# Patient Record
Sex: MALE | Race: WHITE | HISPANIC OR LATINO | Employment: UNEMPLOYED | URBAN - METROPOLITAN AREA
[De-identification: names, ages, dates, MRNs, and addresses within clinical notes are randomized per-mention and may not be internally consistent; named-entity substitution may affect disease eponyms.]

---

## 2017-01-07 ENCOUNTER — HOSPITAL ENCOUNTER (EMERGENCY)
Facility: HOSPITAL | Age: 47
Discharge: HOME | End: 2017-01-07
Attending: EMERGENCY MEDICINE

## 2017-01-07 ENCOUNTER — APPOINTMENT (EMERGENCY)
Dept: RADIOLOGY | Facility: HOSPITAL | Age: 47
End: 2017-01-07

## 2017-01-07 ENCOUNTER — APPOINTMENT (EMERGENCY)
Dept: CT IMAGING | Facility: HOSPITAL | Age: 47
End: 2017-01-07

## 2017-01-07 VITALS
SYSTOLIC BLOOD PRESSURE: 180 MMHG | DIASTOLIC BLOOD PRESSURE: 110 MMHG | TEMPERATURE: 98.1 F | OXYGEN SATURATION: 94 % | BODY MASS INDEX: 41.52 KG/M2 | WEIGHT: 290 LBS | RESPIRATION RATE: 18 BRPM | HEIGHT: 70 IN | HEART RATE: 80 BPM

## 2017-01-07 DIAGNOSIS — I16.0 HYPERTENSIVE URGENCY: Primary | ICD-10-CM

## 2017-01-07 LAB
ALBUMIN SERPL BCP-MCNC: 3.7 G/DL (ref 3.5–5)
ALP SERPL-CCNC: 98 U/L (ref 46–116)
ALT SERPL W P-5'-P-CCNC: 55 U/L (ref 12–78)
ANION GAP SERPL CALCULATED.3IONS-SCNC: 8 MMOL/L (ref 4–13)
AST SERPL W P-5'-P-CCNC: 44 U/L (ref 5–45)
BASOPHILS # BLD AUTO: 0.06 THOUSANDS/ΜL (ref 0–0.1)
BASOPHILS NFR BLD AUTO: 1 % (ref 0–1)
BILIRUB DIRECT SERPL-MCNC: 0.14 MG/DL (ref 0–0.2)
BILIRUB SERPL-MCNC: 0.7 MG/DL (ref 0.2–1)
BUN SERPL-MCNC: 11 MG/DL (ref 5–25)
CALCIUM SERPL-MCNC: 8.6 MG/DL (ref 8.3–10.1)
CHLORIDE SERPL-SCNC: 104 MMOL/L (ref 100–108)
CO2 SERPL-SCNC: 28 MMOL/L (ref 21–32)
CREAT SERPL-MCNC: 1.07 MG/DL (ref 0.6–1.3)
EOSINOPHIL # BLD AUTO: 0.46 THOUSAND/ΜL (ref 0–0.61)
EOSINOPHIL NFR BLD AUTO: 5 % (ref 0–6)
ERYTHROCYTE [DISTWIDTH] IN BLOOD BY AUTOMATED COUNT: 14.4 % (ref 11.6–15.1)
GFR SERPL CREATININE-BSD FRML MDRD: >60 ML/MIN/1.73SQ M
GLUCOSE SERPL-MCNC: 133 MG/DL (ref 65–140)
HCT VFR BLD AUTO: 47.1 % (ref 36.5–49.3)
HGB BLD-MCNC: 15.4 G/DL (ref 12–17)
INR PPP: 1.12 (ref 0.86–1.16)
LYMPHOCYTES # BLD AUTO: 1.36 THOUSANDS/ΜL (ref 0.6–4.47)
LYMPHOCYTES NFR BLD AUTO: 16 % (ref 14–44)
MCH RBC QN AUTO: 27.2 PG (ref 26.8–34.3)
MCHC RBC AUTO-ENTMCNC: 32.7 G/DL (ref 31.4–37.4)
MCV RBC AUTO: 83 FL (ref 82–98)
MONOCYTES # BLD AUTO: 0.71 THOUSAND/ΜL (ref 0.17–1.22)
MONOCYTES NFR BLD AUTO: 8 % (ref 4–12)
NEUTROPHILS # BLD AUTO: 6.16 THOUSANDS/ΜL (ref 1.85–7.62)
NEUTS SEG NFR BLD AUTO: 70 % (ref 43–75)
NRBC BLD AUTO-RTO: 0 /100 WBCS
NT-PROBNP SERPL-MCNC: 55 PG/ML
PLATELET # BLD AUTO: 261 THOUSANDS/UL (ref 149–390)
PMV BLD AUTO: 9.6 FL (ref 8.9–12.7)
POTASSIUM SERPL-SCNC: 3.1 MMOL/L (ref 3.5–5.3)
PROT SERPL-MCNC: 7.6 G/DL (ref 6.4–8.2)
PROTHROMBIN TIME: 14.3 SECONDS (ref 12–14.3)
RBC # BLD AUTO: 5.66 MILLION/UL (ref 3.88–5.62)
SODIUM SERPL-SCNC: 140 MMOL/L (ref 136–145)
TROPONIN I SERPL-MCNC: <0.02 NG/ML
TROPONIN I SERPL-MCNC: <0.02 NG/ML
WBC # BLD AUTO: 8.78 THOUSAND/UL (ref 4.31–10.16)

## 2017-01-07 PROCEDURE — 93005 ELECTROCARDIOGRAM TRACING: CPT

## 2017-01-07 PROCEDURE — 70450 CT HEAD/BRAIN W/O DYE: CPT

## 2017-01-07 PROCEDURE — 84484 ASSAY OF TROPONIN QUANT: CPT | Performed by: EMERGENCY MEDICINE

## 2017-01-07 PROCEDURE — 71020 HB CHEST X-RAY 2VW FRONTAL&LATL: CPT

## 2017-01-07 PROCEDURE — 83880 ASSAY OF NATRIURETIC PEPTIDE: CPT | Performed by: NURSE PRACTITIONER

## 2017-01-07 PROCEDURE — 99285 EMERGENCY DEPT VISIT HI MDM: CPT

## 2017-01-07 PROCEDURE — 80076 HEPATIC FUNCTION PANEL: CPT | Performed by: NURSE PRACTITIONER

## 2017-01-07 PROCEDURE — 84484 ASSAY OF TROPONIN QUANT: CPT

## 2017-01-07 PROCEDURE — 36415 COLL VENOUS BLD VENIPUNCTURE: CPT

## 2017-01-07 PROCEDURE — 96374 THER/PROPH/DIAG INJ IV PUSH: CPT

## 2017-01-07 PROCEDURE — 85610 PROTHROMBIN TIME: CPT

## 2017-01-07 PROCEDURE — 85025 COMPLETE CBC W/AUTO DIFF WBC: CPT

## 2017-01-07 PROCEDURE — 80048 BASIC METABOLIC PNL TOTAL CA: CPT

## 2017-01-07 RX ORDER — LABETALOL HYDROCHLORIDE 5 MG/ML
10 INJECTION, SOLUTION INTRAVENOUS ONCE
Status: COMPLETED | OUTPATIENT
Start: 2017-01-07 | End: 2017-01-07

## 2017-01-07 RX ORDER — LISINOPRIL 10 MG/1
10 TABLET ORAL DAILY
COMMUNITY
End: 2017-01-17

## 2017-01-07 RX ORDER — LISINOPRIL 10 MG/1
10 TABLET ORAL DAILY
Qty: 30 TABLET | Refills: 1 | Status: SHIPPED | OUTPATIENT
Start: 2017-01-07 | End: 2017-01-17

## 2017-01-07 RX ORDER — LISINOPRIL 10 MG/1
10 TABLET ORAL DAILY
Qty: 30 TABLET | Refills: 0 | Status: SHIPPED | OUTPATIENT
Start: 2017-01-07 | End: 2017-01-17

## 2017-01-07 RX ADMIN — LABETALOL HYDROCHLORIDE 10 MG: 5 INJECTION, SOLUTION INTRAVENOUS at 14:59

## 2017-01-09 LAB
ATRIAL RATE: 96 BPM
P AXIS: 50 DEGREES
PR INTERVAL: 172 MS
QRS AXIS: -55 DEGREES
QRSD INTERVAL: 114 MS
QT INTERVAL: 380 MS
QTC INTERVAL: 480 MS
T WAVE AXIS: 50 DEGREES
VENTRICULAR RATE: 96 BPM

## 2017-01-17 ENCOUNTER — HOSPITAL ENCOUNTER (EMERGENCY)
Facility: HOSPITAL | Age: 47
Discharge: HOME | End: 2017-01-17
Attending: EMERGENCY MEDICINE | Admitting: EMERGENCY MEDICINE

## 2017-01-17 VITALS
HEIGHT: 70 IN | TEMPERATURE: 98.6 F | OXYGEN SATURATION: 96 % | HEART RATE: 83 BPM | BODY MASS INDEX: 44.44 KG/M2 | WEIGHT: 310.41 LBS | SYSTOLIC BLOOD PRESSURE: 186 MMHG | DIASTOLIC BLOOD PRESSURE: 105 MMHG | RESPIRATION RATE: 18 BRPM

## 2017-01-17 DIAGNOSIS — I10 HYPERTENSION: Primary | ICD-10-CM

## 2017-01-17 LAB
ALBUMIN SERPL BCP-MCNC: 3.5 G/DL (ref 3.5–5)
ALP SERPL-CCNC: 73 U/L (ref 46–116)
ALT SERPL W P-5'-P-CCNC: 42 U/L (ref 12–78)
ANION GAP SERPL CALCULATED.3IONS-SCNC: 7 MMOL/L (ref 4–13)
AST SERPL W P-5'-P-CCNC: 34 U/L (ref 5–45)
BASOPHILS # BLD AUTO: 0.08 THOUSANDS/ΜL (ref 0–0.1)
BASOPHILS NFR BLD AUTO: 1 % (ref 0–1)
BILIRUB SERPL-MCNC: 0.8 MG/DL (ref 0.2–1)
BUN SERPL-MCNC: 12 MG/DL (ref 5–25)
CALCIUM SERPL-MCNC: 8.5 MG/DL (ref 8.3–10.1)
CHLORIDE SERPL-SCNC: 106 MMOL/L (ref 100–108)
CO2 SERPL-SCNC: 29 MMOL/L (ref 21–32)
CREAT SERPL-MCNC: 1.11 MG/DL (ref 0.6–1.3)
EOSINOPHIL # BLD AUTO: 0.36 THOUSAND/ΜL (ref 0–0.61)
EOSINOPHIL NFR BLD AUTO: 4 % (ref 0–6)
ERYTHROCYTE [DISTWIDTH] IN BLOOD BY AUTOMATED COUNT: 14.7 % (ref 11.6–15.1)
GFR SERPL CREATININE-BSD FRML MDRD: >60 ML/MIN/1.73SQ M
GLUCOSE SERPL-MCNC: 87 MG/DL (ref 65–140)
HCT VFR BLD AUTO: 45.5 % (ref 36.5–49.3)
HGB BLD-MCNC: 14.7 G/DL (ref 12–17)
LYMPHOCYTES # BLD AUTO: 1.34 THOUSANDS/ΜL (ref 0.6–4.47)
LYMPHOCYTES NFR BLD AUTO: 16 % (ref 14–44)
MCH RBC QN AUTO: 27.1 PG (ref 26.8–34.3)
MCHC RBC AUTO-ENTMCNC: 32.3 G/DL (ref 31.4–37.4)
MCV RBC AUTO: 84 FL (ref 82–98)
MONOCYTES # BLD AUTO: 0.7 THOUSAND/ΜL (ref 0.17–1.22)
MONOCYTES NFR BLD AUTO: 8 % (ref 4–12)
NEUTROPHILS # BLD AUTO: 6.07 THOUSANDS/ΜL (ref 1.85–7.62)
NEUTS SEG NFR BLD AUTO: 71 % (ref 43–75)
NRBC BLD AUTO-RTO: 0 /100 WBCS
PLATELET # BLD AUTO: 250 THOUSANDS/UL (ref 149–390)
PMV BLD AUTO: 9.6 FL (ref 8.9–12.7)
POTASSIUM SERPL-SCNC: 3.7 MMOL/L (ref 3.5–5.3)
PROT SERPL-MCNC: 7.2 G/DL (ref 6.4–8.2)
RBC # BLD AUTO: 5.42 MILLION/UL (ref 3.88–5.62)
SODIUM SERPL-SCNC: 142 MMOL/L (ref 136–145)
TROPONIN I SERPL-MCNC: <0.02 NG/ML
WBC # BLD AUTO: 8.58 THOUSAND/UL (ref 4.31–10.16)

## 2017-01-17 PROCEDURE — 36415 COLL VENOUS BLD VENIPUNCTURE: CPT | Performed by: EMERGENCY MEDICINE

## 2017-01-17 PROCEDURE — 93005 ELECTROCARDIOGRAM TRACING: CPT | Performed by: EMERGENCY MEDICINE

## 2017-01-17 PROCEDURE — 99284 EMERGENCY DEPT VISIT MOD MDM: CPT

## 2017-01-17 PROCEDURE — 80053 COMPREHEN METABOLIC PANEL: CPT | Performed by: EMERGENCY MEDICINE

## 2017-01-17 PROCEDURE — 85025 COMPLETE CBC W/AUTO DIFF WBC: CPT | Performed by: EMERGENCY MEDICINE

## 2017-01-17 PROCEDURE — 84484 ASSAY OF TROPONIN QUANT: CPT | Performed by: EMERGENCY MEDICINE

## 2017-01-17 RX ORDER — LISINOPRIL 20 MG/1
20 TABLET ORAL DAILY
Qty: 30 TABLET | Refills: 0 | Status: SHIPPED | OUTPATIENT
Start: 2017-01-17 | End: 2017-09-12 | Stop reason: HOSPADM

## 2017-01-18 LAB
ATRIAL RATE: 82 BPM
P AXIS: 47 DEGREES
PR INTERVAL: 174 MS
QRS AXIS: -39 DEGREES
QRSD INTERVAL: 118 MS
QT INTERVAL: 404 MS
QTC INTERVAL: 472 MS
T WAVE AXIS: 31 DEGREES
VENTRICULAR RATE: 82 BPM

## 2017-09-07 ENCOUNTER — HOSPITAL ENCOUNTER (EMERGENCY)
Facility: HOSPITAL | Age: 47
Discharge: HOME | End: 2017-09-07
Attending: EMERGENCY MEDICINE | Admitting: EMERGENCY MEDICINE

## 2017-09-07 ENCOUNTER — APPOINTMENT (EMERGENCY)
Dept: RADIOLOGY | Facility: HOSPITAL | Age: 47
End: 2017-09-07

## 2017-09-07 ENCOUNTER — APPOINTMENT (EMERGENCY)
Dept: CT IMAGING | Facility: HOSPITAL | Age: 47
End: 2017-09-07

## 2017-09-07 VITALS
OXYGEN SATURATION: 95 % | RESPIRATION RATE: 18 BRPM | HEART RATE: 78 BPM | DIASTOLIC BLOOD PRESSURE: 109 MMHG | HEIGHT: 70 IN | SYSTOLIC BLOOD PRESSURE: 181 MMHG | BODY MASS INDEX: 43.37 KG/M2 | WEIGHT: 302.91 LBS

## 2017-09-07 DIAGNOSIS — J32.9 SINUSITIS: ICD-10-CM

## 2017-09-07 DIAGNOSIS — I10 ACCELERATED HYPERTENSION: Primary | ICD-10-CM

## 2017-09-07 LAB
ALBUMIN SERPL BCP-MCNC: 3.7 G/DL (ref 3.5–5)
ALP SERPL-CCNC: 66 U/L (ref 46–116)
ALT SERPL W P-5'-P-CCNC: 31 U/L (ref 12–78)
ANION GAP SERPL CALCULATED.3IONS-SCNC: 7 MMOL/L (ref 4–13)
APTT PPP: 34 SECONDS (ref 23–35)
AST SERPL W P-5'-P-CCNC: 26 U/L (ref 5–45)
ATRIAL RATE: 82 BPM
BASOPHILS # BLD AUTO: 0.07 THOUSANDS/ΜL (ref 0–0.1)
BASOPHILS NFR BLD AUTO: 1 % (ref 0–1)
BILIRUB SERPL-MCNC: 1.3 MG/DL (ref 0.2–1)
BUN SERPL-MCNC: 15 MG/DL (ref 5–25)
CALCIUM SERPL-MCNC: 9.2 MG/DL (ref 8.3–10.1)
CHLORIDE SERPL-SCNC: 105 MMOL/L (ref 100–108)
CO2 SERPL-SCNC: 29 MMOL/L (ref 21–32)
CREAT SERPL-MCNC: 1.17 MG/DL (ref 0.6–1.3)
EOSINOPHIL # BLD AUTO: 0.38 THOUSAND/ΜL (ref 0–0.61)
EOSINOPHIL NFR BLD AUTO: 5 % (ref 0–6)
ERYTHROCYTE [DISTWIDTH] IN BLOOD BY AUTOMATED COUNT: 15.7 % (ref 11.6–15.1)
GFR SERPL CREATININE-BSD FRML MDRD: 73 ML/MIN/1.73SQ M
GLUCOSE SERPL-MCNC: 118 MG/DL (ref 65–140)
HCT VFR BLD AUTO: 47.6 % (ref 36.5–49.3)
HGB BLD-MCNC: 15.3 G/DL (ref 12–17)
INR PPP: 0.97 (ref 0.86–1.16)
LYMPHOCYTES # BLD AUTO: 1.27 THOUSANDS/ΜL (ref 0.6–4.47)
LYMPHOCYTES NFR BLD AUTO: 16 % (ref 14–44)
MCH RBC QN AUTO: 26.8 PG (ref 26.8–34.3)
MCHC RBC AUTO-ENTMCNC: 32.1 G/DL (ref 31.4–37.4)
MCV RBC AUTO: 83 FL (ref 82–98)
MONOCYTES # BLD AUTO: 0.57 THOUSAND/ΜL (ref 0.17–1.22)
MONOCYTES NFR BLD AUTO: 7 % (ref 4–12)
NEUTROPHILS # BLD AUTO: 5.55 THOUSANDS/ΜL (ref 1.85–7.62)
NEUTS SEG NFR BLD AUTO: 71 % (ref 43–75)
NRBC BLD AUTO-RTO: 0 /100 WBCS
P AXIS: 72 DEGREES
PLATELET # BLD AUTO: 233 THOUSANDS/UL (ref 149–390)
PMV BLD AUTO: 9.7 FL (ref 8.9–12.7)
POTASSIUM SERPL-SCNC: 3.2 MMOL/L (ref 3.5–5.3)
PR INTERVAL: 182 MS
PROT SERPL-MCNC: 7.7 G/DL (ref 6.4–8.2)
PROTHROMBIN TIME: 13.1 SECONDS (ref 12.1–14.4)
QRS AXIS: -61 DEGREES
QRSD INTERVAL: 88 MS
QT INTERVAL: 376 MS
QTC INTERVAL: 439 MS
RBC # BLD AUTO: 5.71 MILLION/UL (ref 3.88–5.62)
SODIUM SERPL-SCNC: 141 MMOL/L (ref 136–145)
T WAVE AXIS: 27 DEGREES
TROPONIN I SERPL-MCNC: <0.02 NG/ML
VENTRICULAR RATE: 82 BPM
WBC # BLD AUTO: 7.88 THOUSAND/UL (ref 4.31–10.16)

## 2017-09-07 PROCEDURE — 85730 THROMBOPLASTIN TIME PARTIAL: CPT | Performed by: PHYSICIAN ASSISTANT

## 2017-09-07 PROCEDURE — 96375 TX/PRO/DX INJ NEW DRUG ADDON: CPT

## 2017-09-07 PROCEDURE — 84484 ASSAY OF TROPONIN QUANT: CPT | Performed by: PHYSICIAN ASSISTANT

## 2017-09-07 PROCEDURE — 85025 COMPLETE CBC W/AUTO DIFF WBC: CPT | Performed by: PHYSICIAN ASSISTANT

## 2017-09-07 PROCEDURE — 80053 COMPREHEN METABOLIC PANEL: CPT | Performed by: PHYSICIAN ASSISTANT

## 2017-09-07 PROCEDURE — 93005 ELECTROCARDIOGRAM TRACING: CPT | Performed by: PHYSICIAN ASSISTANT

## 2017-09-07 PROCEDURE — 74175 CTA ABDOMEN W/CONTRAST: CPT

## 2017-09-07 PROCEDURE — 85610 PROTHROMBIN TIME: CPT | Performed by: PHYSICIAN ASSISTANT

## 2017-09-07 PROCEDURE — 70450 CT HEAD/BRAIN W/O DYE: CPT

## 2017-09-07 PROCEDURE — 36415 COLL VENOUS BLD VENIPUNCTURE: CPT | Performed by: PHYSICIAN ASSISTANT

## 2017-09-07 PROCEDURE — 71275 CT ANGIOGRAPHY CHEST: CPT

## 2017-09-07 PROCEDURE — 71010 HB CHEST X-RAY 1 VIEW FRONTAL (PORTABLE): CPT

## 2017-09-07 PROCEDURE — 96361 HYDRATE IV INFUSION ADD-ON: CPT

## 2017-09-07 PROCEDURE — 96374 THER/PROPH/DIAG INJ IV PUSH: CPT

## 2017-09-07 PROCEDURE — 99285 EMERGENCY DEPT VISIT HI MDM: CPT

## 2017-09-07 RX ORDER — CEFUROXIME AXETIL 500 MG/1
500 TABLET ORAL EVERY 12 HOURS SCHEDULED
Qty: 20 TABLET | Refills: 0 | Status: SHIPPED | OUTPATIENT
Start: 2017-09-07 | End: 2017-09-12 | Stop reason: HOSPADM

## 2017-09-07 RX ORDER — AMLODIPINE BESYLATE 5 MG/1
5 TABLET ORAL DAILY
Qty: 30 TABLET | Refills: 0 | Status: SHIPPED | OUTPATIENT
Start: 2017-09-07 | End: 2017-09-12 | Stop reason: HOSPADM

## 2017-09-07 RX ORDER — LISINOPRIL 10 MG/1
10 TABLET ORAL ONCE
Status: COMPLETED | OUTPATIENT
Start: 2017-09-07 | End: 2017-09-07

## 2017-09-07 RX ORDER — LISINOPRIL 10 MG/1
10 TABLET ORAL DAILY
Qty: 30 TABLET | Refills: 0 | Status: SHIPPED | OUTPATIENT
Start: 2017-09-07 | End: 2017-09-12 | Stop reason: HOSPADM

## 2017-09-07 RX ORDER — ONDANSETRON 2 MG/ML
4 INJECTION INTRAMUSCULAR; INTRAVENOUS ONCE
Status: COMPLETED | OUTPATIENT
Start: 2017-09-07 | End: 2017-09-07

## 2017-09-07 RX ORDER — HYDRALAZINE HYDROCHLORIDE 20 MG/ML
5 INJECTION INTRAMUSCULAR; INTRAVENOUS ONCE
Status: COMPLETED | OUTPATIENT
Start: 2017-09-07 | End: 2017-09-07

## 2017-09-07 RX ADMIN — ONDANSETRON 4 MG: 2 INJECTION INTRAMUSCULAR; INTRAVENOUS at 12:52

## 2017-09-07 RX ADMIN — SODIUM CHLORIDE 500 ML: 0.9 INJECTION, SOLUTION INTRAVENOUS at 12:24

## 2017-09-07 RX ADMIN — HYDRALAZINE HYDROCHLORIDE 5 MG: 20 INJECTION INTRAMUSCULAR; INTRAVENOUS at 14:40

## 2017-09-07 RX ADMIN — HYDROMORPHONE HYDROCHLORIDE 0.5 MG: 1 INJECTION, SOLUTION INTRAMUSCULAR; INTRAVENOUS; SUBCUTANEOUS at 12:54

## 2017-09-07 RX ADMIN — LISINOPRIL 10 MG: 10 TABLET ORAL at 16:02

## 2017-09-07 RX ADMIN — IOHEXOL 100 ML: 350 INJECTION, SOLUTION INTRAVENOUS at 13:04

## 2017-09-10 ENCOUNTER — HOSPITAL ENCOUNTER (OUTPATIENT)
Facility: HOSPITAL | Age: 47
Setting detail: OBSERVATION
Discharge: HOME | End: 2017-09-12
Attending: EMERGENCY MEDICINE | Admitting: INTERNAL MEDICINE

## 2017-09-10 ENCOUNTER — APPOINTMENT (EMERGENCY)
Dept: CT IMAGING | Facility: HOSPITAL | Age: 47
End: 2017-09-10

## 2017-09-10 DIAGNOSIS — I16.0 HYPERTENSIVE URGENCY: Primary | ICD-10-CM

## 2017-09-10 DIAGNOSIS — R20.2 PARESTHESIAS: ICD-10-CM

## 2017-09-10 DIAGNOSIS — R51.9 HEADACHE: ICD-10-CM

## 2017-09-10 DIAGNOSIS — H53.8 BLURRED VISION, LEFT EYE: ICD-10-CM

## 2017-09-10 LAB
ANION GAP SERPL CALCULATED.3IONS-SCNC: 9 MMOL/L (ref 4–13)
APTT PPP: 34 SECONDS (ref 23–35)
BUN SERPL-MCNC: 14 MG/DL (ref 5–25)
CALCIUM SERPL-MCNC: 9 MG/DL (ref 8.3–10.1)
CHLORIDE SERPL-SCNC: 104 MMOL/L (ref 100–108)
CO2 SERPL-SCNC: 28 MMOL/L (ref 21–32)
CREAT SERPL-MCNC: 1.29 MG/DL (ref 0.6–1.3)
ERYTHROCYTE [DISTWIDTH] IN BLOOD BY AUTOMATED COUNT: 15.6 % (ref 11.6–15.1)
GFR SERPL CREATININE-BSD FRML MDRD: 65 ML/MIN/1.73SQ M
GLUCOSE SERPL-MCNC: 126 MG/DL (ref 65–140)
HCT VFR BLD AUTO: 45.4 % (ref 36.5–49.3)
HGB BLD-MCNC: 14.9 G/DL (ref 12–17)
INR PPP: 1.01 (ref 0.86–1.16)
MCH RBC QN AUTO: 27.3 PG (ref 26.8–34.3)
MCHC RBC AUTO-ENTMCNC: 32.8 G/DL (ref 31.4–37.4)
MCV RBC AUTO: 83 FL (ref 82–98)
PLATELET # BLD AUTO: 241 THOUSANDS/UL (ref 149–390)
PMV BLD AUTO: 9.3 FL (ref 8.9–12.7)
POTASSIUM SERPL-SCNC: 3.3 MMOL/L (ref 3.5–5.3)
PROTHROMBIN TIME: 13.5 SECONDS (ref 12.1–14.4)
RBC # BLD AUTO: 5.45 MILLION/UL (ref 3.88–5.62)
SODIUM SERPL-SCNC: 141 MMOL/L (ref 136–145)
WBC # BLD AUTO: 9.05 THOUSAND/UL (ref 4.31–10.16)

## 2017-09-10 PROCEDURE — 70450 CT HEAD/BRAIN W/O DYE: CPT

## 2017-09-10 PROCEDURE — 96375 TX/PRO/DX INJ NEW DRUG ADDON: CPT

## 2017-09-10 PROCEDURE — 96374 THER/PROPH/DIAG INJ IV PUSH: CPT

## 2017-09-10 PROCEDURE — 85027 COMPLETE CBC AUTOMATED: CPT | Performed by: EMERGENCY MEDICINE

## 2017-09-10 PROCEDURE — 93005 ELECTROCARDIOGRAM TRACING: CPT | Performed by: EMERGENCY MEDICINE

## 2017-09-10 PROCEDURE — 80048 BASIC METABOLIC PNL TOTAL CA: CPT | Performed by: EMERGENCY MEDICINE

## 2017-09-10 PROCEDURE — 36415 COLL VENOUS BLD VENIPUNCTURE: CPT | Performed by: EMERGENCY MEDICINE

## 2017-09-10 PROCEDURE — 85730 THROMBOPLASTIN TIME PARTIAL: CPT | Performed by: EMERGENCY MEDICINE

## 2017-09-10 PROCEDURE — 85610 PROTHROMBIN TIME: CPT | Performed by: EMERGENCY MEDICINE

## 2017-09-10 PROCEDURE — 84443 ASSAY THYROID STIM HORMONE: CPT | Performed by: PHYSICIAN ASSISTANT

## 2017-09-10 RX ORDER — METOCLOPRAMIDE HYDROCHLORIDE 5 MG/ML
10 INJECTION INTRAMUSCULAR; INTRAVENOUS ONCE
Status: COMPLETED | OUTPATIENT
Start: 2017-09-10 | End: 2017-09-10

## 2017-09-10 RX ORDER — POTASSIUM CHLORIDE 20 MEQ/1
40 TABLET, EXTENDED RELEASE ORAL ONCE
Status: COMPLETED | OUTPATIENT
Start: 2017-09-10 | End: 2017-09-10

## 2017-09-10 RX ORDER — LABETALOL HYDROCHLORIDE 5 MG/ML
10 INJECTION, SOLUTION INTRAVENOUS ONCE
Status: COMPLETED | OUTPATIENT
Start: 2017-09-10 | End: 2017-09-10

## 2017-09-10 RX ADMIN — METOCLOPRAMIDE 10 MG: 5 INJECTION, SOLUTION INTRAMUSCULAR; INTRAVENOUS at 22:40

## 2017-09-10 RX ADMIN — LABETALOL HYDROCHLORIDE 10 MG: 5 INJECTION, SOLUTION INTRAVENOUS at 22:39

## 2017-09-10 RX ADMIN — POTASSIUM CHLORIDE 40 MEQ: 1500 TABLET, EXTENDED RELEASE ORAL at 23:12

## 2017-09-11 ENCOUNTER — APPOINTMENT (OUTPATIENT)
Dept: NON INVASIVE DIAGNOSTICS | Facility: HOSPITAL | Age: 47
End: 2017-09-11

## 2017-09-11 PROBLEM — I16.0 HYPERTENSIVE URGENCY: Status: ACTIVE | Noted: 2017-09-11

## 2017-09-11 PROBLEM — R20.2 PARESTHESIAS: Status: ACTIVE | Noted: 2017-09-11

## 2017-09-11 PROBLEM — R51.9 HEADACHE: Status: ACTIVE | Noted: 2017-09-11

## 2017-09-11 LAB
ANION GAP SERPL CALCULATED.3IONS-SCNC: 8 MMOL/L (ref 4–13)
BUN SERPL-MCNC: 12 MG/DL (ref 5–25)
CALCIUM SERPL-MCNC: 8.6 MG/DL (ref 8.3–10.1)
CHLORIDE SERPL-SCNC: 107 MMOL/L (ref 100–108)
CO2 SERPL-SCNC: 27 MMOL/L (ref 21–32)
CREAT SERPL-MCNC: 1.13 MG/DL (ref 0.6–1.3)
ERYTHROCYTE [DISTWIDTH] IN BLOOD BY AUTOMATED COUNT: 15.6 % (ref 11.6–15.1)
GFR SERPL CREATININE-BSD FRML MDRD: 76 ML/MIN/1.73SQ M
GLUCOSE P FAST SERPL-MCNC: 129 MG/DL (ref 65–99)
GLUCOSE SERPL-MCNC: 129 MG/DL (ref 65–140)
HCT VFR BLD AUTO: 43.6 % (ref 36.5–49.3)
HGB BLD-MCNC: 14.3 G/DL (ref 12–17)
MAGNESIUM SERPL-MCNC: 2.1 MG/DL (ref 1.6–2.6)
MCH RBC QN AUTO: 27.1 PG (ref 26.8–34.3)
MCHC RBC AUTO-ENTMCNC: 32.8 G/DL (ref 31.4–37.4)
MCV RBC AUTO: 83 FL (ref 82–98)
PLATELET # BLD AUTO: 223 THOUSANDS/UL (ref 149–390)
PMV BLD AUTO: 9.7 FL (ref 8.9–12.7)
POTASSIUM SERPL-SCNC: 3.2 MMOL/L (ref 3.5–5.3)
RBC # BLD AUTO: 5.27 MILLION/UL (ref 3.88–5.62)
SODIUM SERPL-SCNC: 142 MMOL/L (ref 136–145)
TSH SERPL DL<=0.05 MIU/L-ACNC: 3.74 UIU/ML (ref 0.36–3.74)
WBC # BLD AUTO: 7.43 THOUSAND/UL (ref 4.31–10.16)

## 2017-09-11 PROCEDURE — 80048 BASIC METABOLIC PNL TOTAL CA: CPT | Performed by: PHYSICIAN ASSISTANT

## 2017-09-11 PROCEDURE — 85027 COMPLETE CBC AUTOMATED: CPT | Performed by: PHYSICIAN ASSISTANT

## 2017-09-11 PROCEDURE — 93306 TTE W/DOPPLER COMPLETE: CPT

## 2017-09-11 PROCEDURE — 83735 ASSAY OF MAGNESIUM: CPT | Performed by: PHYSICIAN ASSISTANT

## 2017-09-11 PROCEDURE — 99285 EMERGENCY DEPT VISIT HI MDM: CPT

## 2017-09-11 RX ORDER — AMLODIPINE BESYLATE 5 MG/1
5 TABLET ORAL DAILY
Status: DISCONTINUED | OUTPATIENT
Start: 2017-09-11 | End: 2017-09-12

## 2017-09-11 RX ORDER — DOCUSATE SODIUM 100 MG/1
100 CAPSULE, LIQUID FILLED ORAL 2 TIMES DAILY
Status: DISCONTINUED | OUTPATIENT
Start: 2017-09-11 | End: 2017-09-12 | Stop reason: HOSPADM

## 2017-09-11 RX ORDER — HYDRALAZINE HYDROCHLORIDE 20 MG/ML
10 INJECTION INTRAMUSCULAR; INTRAVENOUS ONCE
Status: COMPLETED | OUTPATIENT
Start: 2017-09-11 | End: 2017-09-11

## 2017-09-11 RX ORDER — LISINOPRIL 10 MG/1
10 TABLET ORAL DAILY
Status: DISCONTINUED | OUTPATIENT
Start: 2017-09-11 | End: 2017-09-11

## 2017-09-11 RX ORDER — ONDANSETRON 2 MG/ML
4 INJECTION INTRAMUSCULAR; INTRAVENOUS EVERY 6 HOURS PRN
Status: DISCONTINUED | OUTPATIENT
Start: 2017-09-11 | End: 2017-09-12 | Stop reason: HOSPADM

## 2017-09-11 RX ORDER — LABETALOL 100 MG/1
200 TABLET, FILM COATED ORAL EVERY 12 HOURS SCHEDULED
Status: DISCONTINUED | OUTPATIENT
Start: 2017-09-11 | End: 2017-09-12 | Stop reason: HOSPADM

## 2017-09-11 RX ORDER — BUTALBITAL, ACETAMINOPHEN AND CAFFEINE 50; 325; 40 MG/1; MG/1; MG/1
1 TABLET ORAL EVERY 4 HOURS PRN
Status: DISCONTINUED | OUTPATIENT
Start: 2017-09-11 | End: 2017-09-12 | Stop reason: HOSPADM

## 2017-09-11 RX ORDER — LISINOPRIL 20 MG/1
20 TABLET ORAL DAILY
Status: DISCONTINUED | OUTPATIENT
Start: 2017-09-12 | End: 2017-09-12 | Stop reason: HOSPADM

## 2017-09-11 RX ORDER — ACETAMINOPHEN 325 MG/1
650 TABLET ORAL EVERY 6 HOURS PRN
Status: DISCONTINUED | OUTPATIENT
Start: 2017-09-11 | End: 2017-09-12 | Stop reason: HOSPADM

## 2017-09-11 RX ORDER — CEFUROXIME AXETIL 250 MG/1
250 TABLET ORAL EVERY 12 HOURS SCHEDULED
Status: DISCONTINUED | OUTPATIENT
Start: 2017-09-11 | End: 2017-09-12 | Stop reason: HOSPADM

## 2017-09-11 RX ORDER — CALCIUM CARBONATE 200(500)MG
1000 TABLET,CHEWABLE ORAL DAILY PRN
Status: DISCONTINUED | OUTPATIENT
Start: 2017-09-11 | End: 2017-09-12 | Stop reason: HOSPADM

## 2017-09-11 RX ADMIN — ACETAMINOPHEN 650 MG: 325 TABLET ORAL at 16:26

## 2017-09-11 RX ADMIN — HYDRALAZINE HYDROCHLORIDE 10 MG: 20 INJECTION INTRAMUSCULAR; INTRAVENOUS at 00:42

## 2017-09-11 RX ADMIN — BUTALBITAL, ACETAMINOPHEN, AND CAFFEINE 1 TABLET: 50; 325; 40 TABLET ORAL at 23:35

## 2017-09-11 RX ADMIN — ENOXAPARIN SODIUM 40 MG: 40 INJECTION SUBCUTANEOUS at 09:10

## 2017-09-11 RX ADMIN — LABETALOL HYDROCHLORIDE 200 MG: 100 TABLET, FILM COATED ORAL at 10:19

## 2017-09-11 RX ADMIN — CEFUROXIME AXETIL 250 MG: 250 TABLET ORAL at 10:19

## 2017-09-11 RX ADMIN — CEFUROXIME AXETIL 250 MG: 250 TABLET ORAL at 21:57

## 2017-09-11 RX ADMIN — DOCUSATE SODIUM 100 MG: 100 CAPSULE, LIQUID FILLED ORAL at 18:54

## 2017-09-11 RX ADMIN — LABETALOL HYDROCHLORIDE 200 MG: 100 TABLET, FILM COATED ORAL at 21:57

## 2017-09-11 RX ADMIN — DOCUSATE SODIUM 100 MG: 100 CAPSULE, LIQUID FILLED ORAL at 09:09

## 2017-09-11 RX ADMIN — AMLODIPINE BESYLATE 5 MG: 5 TABLET ORAL at 10:17

## 2017-09-12 VITALS
DIASTOLIC BLOOD PRESSURE: 92 MMHG | HEART RATE: 81 BPM | WEIGHT: 306.44 LBS | BODY MASS INDEX: 43.87 KG/M2 | SYSTOLIC BLOOD PRESSURE: 155 MMHG | OXYGEN SATURATION: 94 % | TEMPERATURE: 98.4 F | HEIGHT: 70 IN | RESPIRATION RATE: 18 BRPM

## 2017-09-12 PROBLEM — I10 ESSENTIAL HYPERTENSION: Status: ACTIVE | Noted: 2017-09-12

## 2017-09-12 PROBLEM — I16.0 HYPERTENSIVE URGENCY: Status: RESOLVED | Noted: 2017-09-11 | Resolved: 2017-09-12

## 2017-09-12 LAB
ATRIAL RATE: 90 BPM
CHOLEST SERPL-MCNC: 120 MG/DL (ref 50–200)
HDLC SERPL-MCNC: 34 MG/DL (ref 40–60)
LDLC SERPL CALC-MCNC: 59 MG/DL (ref 0–100)
P AXIS: 51 DEGREES
POTASSIUM SERPL-SCNC: 3.7 MMOL/L (ref 3.5–5.3)
PR INTERVAL: 170 MS
QRS AXIS: -58 DEGREES
QRSD INTERVAL: 110 MS
QT INTERVAL: 378 MS
QTC INTERVAL: 462 MS
T WAVE AXIS: 48 DEGREES
TRIGL SERPL-MCNC: 137 MG/DL
VENTRICULAR RATE: 90 BPM

## 2017-09-12 PROCEDURE — 84132 ASSAY OF SERUM POTASSIUM: CPT | Performed by: FAMILY MEDICINE

## 2017-09-12 PROCEDURE — 80061 LIPID PANEL: CPT | Performed by: PHYSICIAN ASSISTANT

## 2017-09-12 RX ORDER — LABETALOL 200 MG/1
200 TABLET, FILM COATED ORAL EVERY 12 HOURS SCHEDULED
Qty: 180 TABLET | Refills: 0 | Status: SHIPPED | OUTPATIENT
Start: 2017-09-12 | End: 2018-10-23 | Stop reason: ALTCHOICE

## 2017-09-12 RX ORDER — LISINOPRIL 20 MG/1
20 TABLET ORAL DAILY
Qty: 90 TABLET | Refills: 0 | Status: SHIPPED | OUTPATIENT
Start: 2017-09-12 | End: 2019-01-15 | Stop reason: HOSPADM

## 2017-09-12 RX ORDER — AMLODIPINE BESYLATE 10 MG/1
10 TABLET ORAL DAILY
Status: DISCONTINUED | OUTPATIENT
Start: 2017-09-13 | End: 2017-09-12 | Stop reason: HOSPADM

## 2017-09-12 RX ORDER — BUTALBITAL, ACETAMINOPHEN AND CAFFEINE 50; 325; 40 MG/1; MG/1; MG/1
1 TABLET ORAL EVERY 4 HOURS PRN
Qty: 12 TABLET | Refills: 0 | Status: SHIPPED | OUTPATIENT
Start: 2017-09-12 | End: 2018-10-23 | Stop reason: ALTCHOICE

## 2017-09-12 RX ORDER — AMLODIPINE BESYLATE 10 MG/1
10 TABLET ORAL DAILY
Qty: 90 TABLET | Refills: 0 | Status: SHIPPED | OUTPATIENT
Start: 2017-09-13 | End: 2019-01-15 | Stop reason: HOSPADM

## 2017-09-12 RX ORDER — SULFAMETHOXAZOLE AND TRIMETHOPRIM 800; 160 MG/1; MG/1
1 TABLET ORAL 2 TIMES DAILY
Qty: 14 TABLET | Refills: 0 | Status: SHIPPED | OUTPATIENT
Start: 2017-09-12 | End: 2017-09-19

## 2017-09-12 RX ADMIN — DOCUSATE SODIUM 100 MG: 100 CAPSULE, LIQUID FILLED ORAL at 08:20

## 2017-09-12 RX ADMIN — AMLODIPINE BESYLATE 5 MG: 5 TABLET ORAL at 08:20

## 2017-09-12 RX ADMIN — LABETALOL HYDROCHLORIDE 200 MG: 100 TABLET, FILM COATED ORAL at 08:21

## 2017-09-12 RX ADMIN — LISINOPRIL 20 MG: 20 TABLET ORAL at 10:42

## 2017-09-12 RX ADMIN — CEFUROXIME AXETIL 250 MG: 250 TABLET ORAL at 08:20

## 2017-09-12 RX ADMIN — ENOXAPARIN SODIUM 40 MG: 40 INJECTION SUBCUTANEOUS at 08:19

## 2018-05-10 ENCOUNTER — APPOINTMENT (EMERGENCY)
Dept: RADIOLOGY | Facility: HOSPITAL | Age: 48
End: 2018-05-10
Payer: COMMERCIAL

## 2018-05-10 ENCOUNTER — HOSPITAL ENCOUNTER (EMERGENCY)
Facility: HOSPITAL | Age: 48
Discharge: HOME | End: 2018-05-10
Attending: EMERGENCY MEDICINE | Admitting: EMERGENCY MEDICINE
Payer: COMMERCIAL

## 2018-05-10 VITALS
SYSTOLIC BLOOD PRESSURE: 172 MMHG | DIASTOLIC BLOOD PRESSURE: 102 MMHG | HEART RATE: 80 BPM | RESPIRATION RATE: 18 BRPM | HEIGHT: 70 IN | WEIGHT: 310 LBS | BODY MASS INDEX: 44.38 KG/M2 | TEMPERATURE: 98.4 F | OXYGEN SATURATION: 92 %

## 2018-05-10 DIAGNOSIS — R60.0 PEDAL EDEMA: Primary | ICD-10-CM

## 2018-05-10 DIAGNOSIS — I10 HYPERTENSION: ICD-10-CM

## 2018-05-10 LAB
ALBUMIN SERPL BCP-MCNC: 3.5 G/DL (ref 3.5–5)
ALP SERPL-CCNC: 71 U/L (ref 46–116)
ALT SERPL W P-5'-P-CCNC: 38 U/L (ref 12–78)
ANION GAP SERPL CALCULATED.3IONS-SCNC: 8 MMOL/L (ref 4–13)
AST SERPL W P-5'-P-CCNC: 30 U/L (ref 5–45)
ATRIAL RATE: 73 BPM
BASOPHILS # BLD AUTO: 0.05 THOUSANDS/ΜL (ref 0–0.1)
BASOPHILS NFR BLD AUTO: 1 % (ref 0–1)
BILIRUB SERPL-MCNC: 0.9 MG/DL (ref 0.2–1)
BUN SERPL-MCNC: 13 MG/DL (ref 5–25)
CALCIUM SERPL-MCNC: 8.7 MG/DL (ref 8.3–10.1)
CHLORIDE SERPL-SCNC: 106 MMOL/L (ref 100–108)
CO2 SERPL-SCNC: 27 MMOL/L (ref 21–32)
CREAT SERPL-MCNC: 1.09 MG/DL (ref 0.6–1.3)
EOSINOPHIL # BLD AUTO: 0.2 THOUSAND/ΜL (ref 0–0.61)
EOSINOPHIL NFR BLD AUTO: 3 % (ref 0–6)
ERYTHROCYTE [DISTWIDTH] IN BLOOD BY AUTOMATED COUNT: 14.7 % (ref 11.6–15.1)
GFR SERPL CREATININE-BSD FRML MDRD: 79 ML/MIN/1.73SQ M
GLUCOSE SERPL-MCNC: 106 MG/DL (ref 65–140)
HCT VFR BLD AUTO: 42.8 % (ref 36.5–49.3)
HGB BLD-MCNC: 13.8 G/DL (ref 12–17)
INR PPP: 1.1 (ref 0.86–1.16)
LYMPHOCYTES # BLD AUTO: 1.13 THOUSANDS/ΜL (ref 0.6–4.47)
LYMPHOCYTES NFR BLD AUTO: 16 % (ref 14–44)
MCH RBC QN AUTO: 27.3 PG (ref 26.8–34.3)
MCHC RBC AUTO-ENTMCNC: 32.2 G/DL (ref 31.4–37.4)
MCV RBC AUTO: 85 FL (ref 82–98)
MONOCYTES # BLD AUTO: 0.72 THOUSAND/ΜL (ref 0.17–1.22)
MONOCYTES NFR BLD AUTO: 10 % (ref 4–12)
NEUTROPHILS # BLD AUTO: 4.8 THOUSANDS/ΜL (ref 1.85–7.62)
NEUTS SEG NFR BLD AUTO: 69 % (ref 43–75)
NRBC BLD AUTO-RTO: 0 /100 WBCS
NT-PROBNP SERPL-MCNC: 30 PG/ML
P AXIS: 51 DEGREES
PLATELET # BLD AUTO: 228 THOUSANDS/UL (ref 149–390)
PMV BLD AUTO: 9.2 FL (ref 8.9–12.7)
POTASSIUM SERPL-SCNC: 3.6 MMOL/L (ref 3.5–5.3)
PR INTERVAL: 180 MS
PROT SERPL-MCNC: 7.1 G/DL (ref 6.4–8.2)
PROTHROMBIN TIME: 14.4 SECONDS (ref 12.1–14.4)
QRS AXIS: -38 DEGREES
QRSD INTERVAL: 110 MS
QT INTERVAL: 400 MS
QTC INTERVAL: 440 MS
RBC # BLD AUTO: 5.06 MILLION/UL (ref 3.88–5.62)
SODIUM SERPL-SCNC: 141 MMOL/L (ref 136–145)
T WAVE AXIS: 10 DEGREES
TROPONIN I SERPL-MCNC: <0.02 NG/ML
VENTRICULAR RATE: 73 BPM
WBC # BLD AUTO: 6.92 THOUSAND/UL (ref 4.31–10.16)

## 2018-05-10 PROCEDURE — 93005 ELECTROCARDIOGRAM TRACING: CPT

## 2018-05-10 PROCEDURE — 83880 ASSAY OF NATRIURETIC PEPTIDE: CPT | Performed by: EMERGENCY MEDICINE

## 2018-05-10 PROCEDURE — 99284 EMERGENCY DEPT VISIT MOD MDM: CPT

## 2018-05-10 PROCEDURE — 71046 X-RAY EXAM CHEST 2 VIEWS: CPT

## 2018-05-10 PROCEDURE — 84484 ASSAY OF TROPONIN QUANT: CPT | Performed by: EMERGENCY MEDICINE

## 2018-05-10 PROCEDURE — 93010 ELECTROCARDIOGRAM REPORT: CPT | Performed by: INTERNAL MEDICINE

## 2018-05-10 PROCEDURE — 85025 COMPLETE CBC W/AUTO DIFF WBC: CPT | Performed by: EMERGENCY MEDICINE

## 2018-05-10 PROCEDURE — 85610 PROTHROMBIN TIME: CPT | Performed by: EMERGENCY MEDICINE

## 2018-05-10 PROCEDURE — 96374 THER/PROPH/DIAG INJ IV PUSH: CPT

## 2018-05-10 PROCEDURE — 80053 COMPREHEN METABOLIC PANEL: CPT | Performed by: EMERGENCY MEDICINE

## 2018-05-10 PROCEDURE — 36415 COLL VENOUS BLD VENIPUNCTURE: CPT | Performed by: EMERGENCY MEDICINE

## 2018-05-10 RX ORDER — FUROSEMIDE 10 MG/ML
40 INJECTION INTRAMUSCULAR; INTRAVENOUS ONCE
Status: COMPLETED | OUTPATIENT
Start: 2018-05-10 | End: 2018-05-10

## 2018-05-10 RX ORDER — FUROSEMIDE 20 MG/1
20 TABLET ORAL 2 TIMES DAILY
Qty: 20 TABLET | Refills: 0 | Status: SHIPPED | OUTPATIENT
Start: 2018-05-10 | End: 2018-10-23 | Stop reason: ALTCHOICE

## 2018-05-10 RX ADMIN — FUROSEMIDE 40 MG: 10 INJECTION, SOLUTION INTRAMUSCULAR; INTRAVENOUS at 14:03

## 2018-05-10 NOTE — DISCHARGE INSTRUCTIONS
Chronic Hypertension, Ambulatory Care   GENERAL INFORMATION:   Chronic hypertension  is a long-term condition in which your blood pressure (BP) is higher than normal  Your BP is the force of your blood moving against the walls of your arteries  Hypertension is a BP of 140/90 or higher  Common symptoms include the following:   · Headache     · Blurred vision    · Chest pain     · Dizziness or weakness     · Trouble breathing     · Nosebleeds  Seek immediate care for the following symptoms:   · Severe headache or vision loss    · Weakness in an arm or leg    · Confusion or difficulty speaking    · Discomfort in your chest that feels like squeezing, pressure, fullness, or pain    · Suddenly feeling lightheaded or trouble breathing    · Pain or discomfort in your back, neck, jaw, stomach, or arm  Treatment for chronic hypertension  may include medicine to lower your BP  You may also need to make lifestyle changes  Take your medicine exactly as directed  Manage chronic hypertension:   · Take your BP at home  Sit and rest for 5 minutes before you take your BP  Extend your arm and support it on a flat surface  Your arm should be at the same level as your heart  Follow the directions that came with your BP monitor  If possible, take at least 2 BP readings each time  Take your BP at least twice a day at the same times each day, such as morning and evening  Keep a log of your BP readings and bring it to your follow-up visits  · Eat less sodium (salt)  Do not add sodium to your food  Limit foods that are high in sodium, such as canned foods, potato chips, and cold cuts  Your healthcare provider may suggest that you follow the [address] [place] [address] Street  The plan is low in sodium, unhealthy fats, and total fat  It is high in potassium, calcium, and fiber  · Exercise regularly  Exercise at least 30 minutes per day, on most days of the week  This will help decrease your BP   Ask your healthcare provider about the best exercise plan for you  · Limit alcohol  Women should limit alcohol to 1 drink a day  Men should limit alcohol to 2 drinks a day  A drink of alcohol is 12 ounces of beer, 5 ounces of wine, or 1½ ounces of liquor  · Do not smoke  If you smoke, it is never too late to quit  Smoking can increase your BP  Smoking also worsens other health conditions you may have that can increase your risk for hypertension  Ask your healthcare provider for information if you need help quitting  Follow up with your healthcare provider as directed: You will need to return to have your BP checked and to have other lab tests done  Write down your questions so you remember to ask them during your visits  CARE AGREEMENT:   You have the right to help plan your care  Learn about your health condition and how it may be treated  Discuss treatment options with your caregivers to decide what care you want to receive  You always have the right to refuse treatment  The above information is an  only  It is not intended as medical advice for individual conditions or treatments  Talk to your doctor, nurse or pharmacist before following any medical regimen to see if it is safe and effective for you  © 2014 [address] is for End User's use only and may not be sold, redistributed or otherwise used for commercial purposes  All illustrations and images included in CareNotes® are the copyrighted property of [employer]  or Connor Bush  Leg Edema   [facility] Criss QUACH, Britany RO, et al: AHA/ACCF Secondary Prevention and Risk Reduction Therapy for Patients With Coronary and Other Atherosclerotic Vascular Disease: 2011 Update: A Guideline From the American Heart Association and American College of Cardiology Foundation  Circulation 2011; 124(22):3881-2164  Miranda AD & Vashti GY: Venous thromboembolism  BMJ 2006; 332(9804):215-219  Fernando Qureshi RH & Matthew PJ: Management of varicose veins   Pelon Baker Physician 2008; 78(11):8714-7494  [place] Pam HUERTAS: Lower extremity venous disorders: implications for nursing practice  J Cardiovasc Nurs 2008; 23(2):132-143  Quyen Ortiz: Venous Disease  In: [facility] [address] Shaylee Yolandeandrea Franklin  Cardiology, 3rd ed  [address] [facility], [place], [place], 2010  Tamy L: Edema  In: Alejandro Zavala, Gaby , [employer], et al  Oliverioravinallely De Lamere  [facility] Consult, 4th ed  [address] [facility] [place], [place], 2011  National Heart Lung and Blood [place] (NHLBI): Heart Failure  National Heart Lung and Blood [place] ([address] [facility])  MD Huseyin  2010  Available from URL: [employer] [facility]  As accessed 2011-04-07  Nereida GREWAL & Sana RT: Chronic Venous Disorders: General Considerations  In: Elsie Osorio  [place]'s Vascular Surgery, 7th ed  [address], [place], [place], 2010  Tapson VF: Acute pulmonary embolism  N Engl J Med 2008; 358(10):0258-0718  © 2017 [address] Information is for End User's use only and may not be sold, redistributed or otherwise used for commercial purposes  All illustrations and images included in CareNotes® are the copyrighted property of A D A M , Inc  or Connor Bush  The above information is an  only  It is not intended as medical advice for individual conditions or treatments  Talk to your doctor, nurse or pharmacist before following any medical regimen to see if it is safe and effective for you

## 2018-05-10 NOTE — ED PROVIDER NOTES
History  Chief Complaint   Patient presents with    Leg Swelling     pt with bilateral leg swelling and bruising for the past few days      53-year-old male patient presents emergency department for evaluation of bilateral lower extremity swelling  The patient states the swelling is been ongoing for several months, he only came in today for evaluation because he finally got tired of it  Patient has 4+ pitting edema bilaterally, the patient demand does extend up to the knees on both sides  Patient states no shortness of breath  The patient states summer, when he first doses, he was able to lie down, elevated feet, and within several hours swelling of go way  Now, however, the patient's swelling has been persistent and is now in going the patient  Patient be evaluated with a differential diagnosis to include but not be limited to acute coronary syndrome, kidney failure, congestive heart failure, pedal edema  History provided by:  Patient   used: No    Leg Pain   Location:  Leg  Leg location:  R leg and L leg  Pain details:     Quality:  Aching    Radiates to:  Does not radiate    Severity:  Mild    Onset quality:  Gradual    Timing:  Constant    Progression:  Worsening  Chronicity:  New  Tetanus status:  Up to date  Prior injury to area:  No  Relieved by:  Nothing  Worsened by:  Nothing  Ineffective treatments:  None tried  Associated symptoms: no fatigue, no itching, no muscle weakness, no stiffness and no tingling        Prior to Admission Medications   Prescriptions Last Dose Informant Patient Reported? Taking?    amLODIPine (NORVASC) 10 mg tablet   No No   Sig: Take 1 tablet by mouth daily   butalbital-acetaminophen-caffeine (FIORICET,ESGIC) -40 mg per tablet   No No   Sig: Take 1 tablet by mouth every 4 (four) hours as needed for headaches   labetalol (NORMODYNE) 200 mg tablet   No No   Sig: Take 1 tablet by mouth every 12 (twelve) hours   lisinopril (ZESTRIL) 20 mg tablet No No   Sig: Take 1 tablet by mouth daily      Facility-Administered Medications: None       Past Medical History:   Diagnosis Date    Hypertension        Past Surgical History:   Procedure Laterality Date    KNEE SURGERY      LEG SURGERY         Family History   Problem Relation Age of Onset    Family history unknown: Yes     I have reviewed and agree with the history as documented  Social History   Substance Use Topics    Smoking status: Current Some Day Smoker     Packs/day: 0 20     Types: Cigars    Smokeless tobacco: Never Used    Alcohol use Yes      Comment: occassionally        Review of Systems   Constitutional: Negative for fatigue  Musculoskeletal: Negative for stiffness  Skin: Negative for itching  All other systems reviewed and are negative  Physical Exam  ED Triage Vitals [05/10/18 1149]   Temperature Pulse Respirations Blood Pressure SpO2   98 4 °F (36 9 °C) 87 18 145/90 95 %      Temp Source Heart Rate Source Patient Position - Orthostatic VS BP Location FiO2 (%)   Oral Monitor Sitting Right arm --      Pain Score       7           Orthostatic Vital Signs  Vitals:    05/10/18 1149   BP: 145/90   Pulse: 87   Patient Position - Orthostatic VS: Sitting       Physical Exam   Constitutional: He is oriented to person, place, and time  He appears well-developed and well-nourished  No distress  HENT:   Head: Normocephalic and atraumatic  Right Ear: External ear normal    Left Ear: External ear normal    Eyes: Conjunctivae and EOM are normal  Right eye exhibits no discharge  Left eye exhibits no discharge  No scleral icterus  Neck: Normal range of motion  Neck supple  No JVD present  No tracheal deviation present  No thyromegaly present  Cardiovascular: Normal rate and regular rhythm  Pulmonary/Chest: Effort normal and breath sounds normal  No stridor  No respiratory distress  He has no wheezes  He has no rales  Abdominal: Soft   Bowel sounds are normal  He exhibits no distension  There is no tenderness  Musculoskeletal: Normal range of motion  He exhibits edema  He exhibits no tenderness or deformity  Neurological: He is alert and oriented to person, place, and time  No cranial nerve deficit  Coordination normal    Skin: Skin is warm and dry  He is not diaphoretic  Psychiatric: He has a normal mood and affect  His behavior is normal    Nursing note and vitals reviewed  ED Medications  Medications   furosemide (LASIX) injection 40 mg (40 mg Intravenous Given 5/10/18 1403)       Diagnostic Studies  Results Reviewed     Procedure Component Value Units Date/Time    BNP [[id number]]  (Normal) Collected:  05/10/18 1238    Lab Status:  Final result Specimen:  Blood from Arm, Left Updated:  05/10/18 1313     NT-proBNP 30 pg/mL     Troponin I [[id number]]  (Normal) Collected:  05/10/18 1238    Lab Status:  Final result Specimen:  Blood from Arm, Left Updated:  05/10/18 1309     Troponin I <0 02 ng/mL     Narrative:         [employer] Chemistry analyzer 99% cutoff is > 0 04 ng/mL in network labs    o cTnI 99% cutoff is useful only when applied to patients in the clinical setting of myocardial ischemia  o cTnI 99% cutoff should be interpreted in the context of clinical history, ECG findings and possibly cardiac imaging to establish correct diagnosis  o cTnI 99% cutoff may be suggestive but clearly not indicative of a coronary event without the clinical setting of myocardial ischemia      Comprehensive metabolic panel [[id number]] Collected:  05/10/18 1238    Lab Status:  Final result Specimen:  Blood from Arm, Left Updated:  05/10/18 1306     Sodium 141 mmol/L      Potassium 3 6 mmol/L      Chloride 106 mmol/L      CO2 27 mmol/L      Anion Gap 8 mmol/L      BUN 13 mg/dL      Creatinine 1 09 mg/dL      Glucose 106 mg/dL      Calcium 8 7 mg/dL      AST 30 U/L      ALT 38 U/L      Alkaline Phosphatase 71 U/L      Total Protein 7 1 g/dL      Albumin 3 5 g/dL      Total Bilirubin 0 90 mg/dL eGFR 79 ml/min/1 73sq m     Narrative:         National Kidney Disease Education Program recommendations are as follows:  GFR calculation is accurate only with a steady state creatinine  Chronic Kidney disease less than 60 ml/min/1 73 sq  meters  Kidney failure less than 15 ml/min/1 73 sq  meters      Protime-INR [[id number]]  (Normal) Collected:  05/10/18 1238    Lab Status:  Final result Specimen:  Blood from Arm, Left Updated:  05/10/18 1301     Protime 14 4 seconds      INR 1 10    CBC and differential [[id number]] Collected:  05/10/18 1238    Lab Status:  Final result Specimen:  Blood from Arm, Left Updated:  05/10/18 1245     WBC 6 92 Thousand/uL      RBC 5 06 Million/uL      Hemoglobin 13 8 g/dL      Hematocrit 42 8 %      MCV 85 fL      MCH 27 3 pg      MCHC 32 2 g/dL      RDW 14 7 %      MPV 9 2 fL      Platelets 775 Thousands/uL      nRBC 0 /100 WBCs      Neutrophils Relative 69 %      Lymphocytes Relative 16 %      Monocytes Relative 10 %      Eosinophils Relative 3 %      Basophils Relative 1 %      Neutrophils Absolute 4 80 Thousands/µL      Lymphocytes Absolute 1 13 Thousands/µL      Monocytes Absolute 0 72 Thousand/µL      Eosinophils Absolute 0 20 Thousand/µL      Basophils Absolute 0 05 Thousands/µL                  XR chest 2 views    (Results Pending)              Procedures  Procedures       Phone Contacts  ED Phone Contact    ED Course                               MDM  Number of Diagnoses or Management Options  Hypertension: new and requires workup  Pedal edema: new and requires workup     Amount and/or Complexity of Data Reviewed  Clinical lab tests: reviewed and ordered  Tests in the radiology section of CPT®: reviewed and ordered  Decide to obtain previous medical records or to obtain history from someone other than the patient: yes  Review and summarize past medical records: yes    Patient Progress  Patient progress: stable    CritCare Time    Disposition  Final diagnoses:   None     ED Disposition     None      Follow-up Information    None       Patient's Medications   Discharge Prescriptions    No medications on file     No discharge procedures on file      ED Provider  Electronically Signed by           Shirley Suarez DO  05/15/18 8926

## 2018-06-05 ENCOUNTER — APPOINTMENT (EMERGENCY)
Dept: RADIOLOGY | Facility: HOSPITAL | Age: 48
End: 2018-06-05
Payer: COMMERCIAL

## 2018-06-05 ENCOUNTER — APPOINTMENT (EMERGENCY)
Dept: ULTRASOUND IMAGING | Facility: HOSPITAL | Age: 48
End: 2018-06-05
Payer: COMMERCIAL

## 2018-06-05 ENCOUNTER — HOSPITAL ENCOUNTER (EMERGENCY)
Facility: HOSPITAL | Age: 48
Discharge: HOME | End: 2018-06-06
Payer: COMMERCIAL

## 2018-06-05 VITALS
RESPIRATION RATE: 17 BRPM | BODY MASS INDEX: 44.38 KG/M2 | WEIGHT: 310 LBS | TEMPERATURE: 98.9 F | SYSTOLIC BLOOD PRESSURE: 183 MMHG | OXYGEN SATURATION: 95 % | DIASTOLIC BLOOD PRESSURE: 100 MMHG | HEIGHT: 70 IN | HEART RATE: 83 BPM

## 2018-06-05 DIAGNOSIS — R20.2 PARESTHESIA OF BILATERAL LEGS: ICD-10-CM

## 2018-06-05 DIAGNOSIS — R60.0 LOWER EXTREMITY EDEMA: Primary | ICD-10-CM

## 2018-06-05 LAB
ALBUMIN SERPL BCP-MCNC: 3.5 G/DL (ref 3.5–5)
ALP SERPL-CCNC: 72 U/L (ref 46–116)
ALT SERPL W P-5'-P-CCNC: 40 U/L (ref 12–78)
ANION GAP SERPL CALCULATED.3IONS-SCNC: 7 MMOL/L (ref 4–13)
APTT PPP: 34 SECONDS (ref 24–36)
AST SERPL W P-5'-P-CCNC: 26 U/L (ref 5–45)
ATRIAL RATE: 77 BPM
BASOPHILS # BLD AUTO: 0.05 THOUSANDS/ΜL (ref 0–0.1)
BASOPHILS NFR BLD AUTO: 1 % (ref 0–1)
BILIRUB DIRECT SERPL-MCNC: 0.09 MG/DL (ref 0–0.2)
BILIRUB SERPL-MCNC: 0.4 MG/DL (ref 0.2–1)
BUN SERPL-MCNC: 15 MG/DL (ref 5–25)
CALCIUM SERPL-MCNC: 8.8 MG/DL (ref 8.3–10.1)
CHLORIDE SERPL-SCNC: 106 MMOL/L (ref 100–108)
CO2 SERPL-SCNC: 28 MMOL/L (ref 21–32)
CREAT SERPL-MCNC: 1.17 MG/DL (ref 0.6–1.3)
EOSINOPHIL # BLD AUTO: 0.21 THOUSAND/ΜL (ref 0–0.61)
EOSINOPHIL NFR BLD AUTO: 3 % (ref 0–6)
ERYTHROCYTE [DISTWIDTH] IN BLOOD BY AUTOMATED COUNT: 15 % (ref 11.6–15.1)
GFR SERPL CREATININE-BSD FRML MDRD: 72 ML/MIN/1.73SQ M
GLUCOSE SERPL-MCNC: 144 MG/DL (ref 65–140)
HCT VFR BLD AUTO: 42.7 % (ref 36.5–49.3)
HGB BLD-MCNC: 13.8 G/DL (ref 12–17)
IMM GRANULOCYTES # BLD AUTO: 0.03 THOUSAND/UL (ref 0–0.2)
IMM GRANULOCYTES NFR BLD AUTO: 0 % (ref 0–2)
INR PPP: 1.1 (ref 0.86–1.17)
LYMPHOCYTES # BLD AUTO: 1.48 THOUSANDS/ΜL (ref 0.6–4.47)
LYMPHOCYTES NFR BLD AUTO: 18 % (ref 14–44)
MCH RBC QN AUTO: 27.5 PG (ref 26.8–34.3)
MCHC RBC AUTO-ENTMCNC: 32.3 G/DL (ref 31.4–37.4)
MCV RBC AUTO: 85 FL (ref 82–98)
MONOCYTES # BLD AUTO: 0.76 THOUSAND/ΜL (ref 0.17–1.22)
MONOCYTES NFR BLD AUTO: 10 % (ref 4–12)
NEUTROPHILS # BLD AUTO: 5.5 THOUSANDS/ΜL (ref 1.85–7.62)
NEUTS SEG NFR BLD AUTO: 68 % (ref 43–75)
NRBC BLD AUTO-RTO: 0 /100 WBCS
NT-PROBNP SERPL-MCNC: 35 PG/ML
P AXIS: 54 DEGREES
PLATELET # BLD AUTO: 217 THOUSANDS/UL (ref 149–390)
PMV BLD AUTO: 9.4 FL (ref 8.9–12.7)
POTASSIUM SERPL-SCNC: 3.6 MMOL/L (ref 3.5–5.3)
PR INTERVAL: 172 MS
PROT SERPL-MCNC: 7.2 G/DL (ref 6.4–8.2)
PROTHROMBIN TIME: 14.1 SECONDS (ref 11.8–14.2)
QRS AXIS: -42 DEGREES
QRSD INTERVAL: 104 MS
QT INTERVAL: 388 MS
QTC INTERVAL: 439 MS
RBC # BLD AUTO: 5.01 MILLION/UL (ref 3.88–5.62)
SODIUM SERPL-SCNC: 141 MMOL/L (ref 136–145)
T WAVE AXIS: 22 DEGREES
TROPONIN I SERPL-MCNC: <0.02 NG/ML
VENTRICULAR RATE: 77 BPM
WBC # BLD AUTO: 8.03 THOUSAND/UL (ref 4.31–10.16)

## 2018-06-05 PROCEDURE — 36415 COLL VENOUS BLD VENIPUNCTURE: CPT | Performed by: PHYSICIAN ASSISTANT

## 2018-06-05 PROCEDURE — 85730 THROMBOPLASTIN TIME PARTIAL: CPT | Performed by: PHYSICIAN ASSISTANT

## 2018-06-05 PROCEDURE — 93971 EXTREMITY STUDY: CPT

## 2018-06-05 PROCEDURE — 93005 ELECTROCARDIOGRAM TRACING: CPT

## 2018-06-05 PROCEDURE — 85610 PROTHROMBIN TIME: CPT | Performed by: PHYSICIAN ASSISTANT

## 2018-06-05 PROCEDURE — 80048 BASIC METABOLIC PNL TOTAL CA: CPT | Performed by: PHYSICIAN ASSISTANT

## 2018-06-05 PROCEDURE — 93010 ELECTROCARDIOGRAM REPORT: CPT | Performed by: INTERNAL MEDICINE

## 2018-06-05 PROCEDURE — 84484 ASSAY OF TROPONIN QUANT: CPT | Performed by: PHYSICIAN ASSISTANT

## 2018-06-05 PROCEDURE — 83880 ASSAY OF NATRIURETIC PEPTIDE: CPT | Performed by: PHYSICIAN ASSISTANT

## 2018-06-05 PROCEDURE — 85025 COMPLETE CBC W/AUTO DIFF WBC: CPT | Performed by: PHYSICIAN ASSISTANT

## 2018-06-05 PROCEDURE — 71046 X-RAY EXAM CHEST 2 VIEWS: CPT

## 2018-06-05 PROCEDURE — 80076 HEPATIC FUNCTION PANEL: CPT | Performed by: PHYSICIAN ASSISTANT

## 2018-06-06 PROCEDURE — 93971 EXTREMITY STUDY: CPT | Performed by: SURGERY

## 2018-06-06 PROCEDURE — 99284 EMERGENCY DEPT VISIT MOD MDM: CPT

## 2018-06-06 NOTE — ED PROVIDER NOTES
History  Chief Complaint   Patient presents with    Leg Swelling     Patient reports leg swelling for the past few days  Patient reports being put on lasix but it is making his legs feel weak  57-year-old male here for evaluation of bilateral leg weakness  He states he has been having leg swelling, bilateral, the past several months was seen here few weeks ago evaluated for this  Had unremarkable workup and was discharged home with Lasix  He states the swelling continues to come and go, currently seems little bit better  However for the past 3-4 days his new symptom is weakness in both legs  He also notes he had intense cramping on Saturday in his right thigh which has since resolved  His weakness is what concerns him the most   He denies any rash redness  No calf pain or tenderness  Currently the right leg is more swollen than the left leg  He has no history of DVT  No risk factors for DVT or PE  Denies chest pain  He has shortness of breath but states is chronic and unchanged from baseline  Denies fevers chills nausea vomiting  History provided by:  Patient   used: No    Leg Pain   Lower extremity pain location: Bilateral lower extremities    Time since incident:  4 days  Injury: no    Pain details:     Quality:  Tingling    Radiates to:  Does not radiate    Severity:  Severe    Onset quality:  Gradual    Duration:  4 days    Timing:  Intermittent    Progression:  Unchanged  Chronicity:  New  Dislocation: no    Foreign body present:  No foreign bodies  Tetanus status:  Unknown  Prior injury to area:  No  Relieved by:  Nothing  Worsened by:  Nothing  Ineffective treatments:  None tried  Associated symptoms: swelling (b/l lower extremities ) and tingling    Associated symptoms: no back pain, no decreased ROM, no fatigue, no fever, no itching, no muscle weakness, no neck pain, no numbness and no stiffness    Risk factors: obesity    Risk factors: no concern for non-accidental trauma, no frequent fractures, no known bone disorder and no recent illness        Prior to Admission Medications   Prescriptions Last Dose Informant Patient Reported? Taking? amLODIPine (NORVASC) 10 mg tablet   No No   Sig: Take 1 tablet by mouth daily   butalbital-acetaminophen-caffeine (FIORICET,ESGIC) -40 mg per tablet   No No   Sig: Take 1 tablet by mouth every 4 (four) hours as needed for headaches   furosemide (LASIX) 20 mg tablet   No No   Sig: Take 1 tablet (20 mg total) by mouth 2 (two) times a day   labetalol (NORMODYNE) 200 mg tablet   No No   Sig: Take 1 tablet by mouth every 12 (twelve) hours   lisinopril (ZESTRIL) 20 mg tablet   No No   Sig: Take 1 tablet by mouth daily      Facility-Administered Medications: None       Past Medical History:   Diagnosis Date    Hypertension        Past Surgical History:   Procedure Laterality Date    KNEE SURGERY      LEG SURGERY         Family History   Problem Relation Age of Onset    Family history unknown: Yes     I have reviewed and agree with the history as documented  Social History   Substance Use Topics    Smoking status: Current Some Day Smoker     Packs/day: 0 20     Types: Cigars    Smokeless tobacco: Never Used    Alcohol use Yes      Comment: occassionally        Review of Systems   Constitutional: Negative for activity change, appetite change, chills, diaphoresis, fatigue, fever and unexpected weight change  HENT: Negative for congestion, rhinorrhea, sinus pressure, sore throat and trouble swallowing  Eyes: Negative for photophobia and visual disturbance  Respiratory: Positive for shortness of breath  Negative for apnea, cough, choking, chest tightness, wheezing and stridor  Cardiovascular: Positive for leg swelling  Negative for chest pain and palpitations  Gastrointestinal: Negative for abdominal distention, abdominal pain, blood in stool, constipation, diarrhea, nausea and vomiting     Genitourinary: Negative for decreased urine volume, difficulty urinating, dysuria, enuresis, flank pain, frequency, hematuria and urgency  Musculoskeletal: Negative for arthralgias, back pain, myalgias, neck pain, neck stiffness and stiffness  Skin: Negative for color change, itching, pallor, rash and wound  Allergic/Immunologic: Negative  Neurological: Negative for dizziness, tremors, syncope, weakness, light-headedness, numbness and headaches  Hematological: Negative  Psychiatric/Behavioral: Negative  All other systems reviewed and are negative  Physical Exam  Physical Exam   Constitutional: He is oriented to person, place, and time  He appears well-developed and well-nourished  Non-toxic appearance  He does not have a sickly appearance  He does not appear ill  No distress  HENT:   Head: Normocephalic and atraumatic  Eyes: EOM and lids are normal  Pupils are equal, round, and reactive to light  Neck: Normal range of motion  Neck supple  Cardiovascular: Normal rate, regular rhythm, S1 normal, S2 normal, normal heart sounds, intact distal pulses and normal pulses  Exam reveals no gallop, no distant heart sounds, no friction rub and no decreased pulses  No murmur heard  Pulses:       Radial pulses are 2+ on the right side, and 2+ on the left side  Dorsalis pedis pulses are 2+ on the right side, and 2+ on the left side  Bilateral pitting edema, 2+ on right 1+ on left   Pulmonary/Chest: Effort normal and breath sounds normal  No accessory muscle usage  No apnea, no tachypnea and no bradypnea  No respiratory distress  He has no decreased breath sounds  He has no wheezes  He has no rhonchi  He has no rales  Lungs clear to auscultation bilaterally   Abdominal: Soft  Normal appearance and bowel sounds are normal  He exhibits no distension and no mass  There is no tenderness  There is no rigidity, no rebound and no guarding  No hernia  Musculoskeletal: Normal range of motion   He exhibits no edema, tenderness or deformity  Neurological: He is alert and oriented to person, place, and time  No cranial nerve deficit  GCS eye subscore is 4  GCS verbal subscore is 5  GCS motor subscore is 6  Skin: Skin is warm, dry and intact  No rash noted  He is not diaphoretic  No erythema  No pallor  Psychiatric: His speech is normal    Nursing note and vitals reviewed        Vital Signs  ED Triage Vitals [06/05/18 2048]   Temperature Pulse Respirations Blood Pressure SpO2   98 9 °F (37 2 °C) 84 18 (!) 185/104 97 %      Temp Source Heart Rate Source Patient Position - Orthostatic VS BP Location FiO2 (%)   Oral Monitor Sitting Left arm --      Pain Score       8           Vitals:    06/05/18 2048 06/05/18 2358   BP: (!) 185/104 (!) 183/100   Pulse: 84 83   Patient Position - Orthostatic VS: Sitting        Visual Acuity      ED Medications  Medications - No data to display    Diagnostic Studies  Results Reviewed     Procedure Component Value Units Date/Time    Hepatic function panel [[id number]]  (Normal) Collected:  06/05/18 2245    Lab Status:  Final result Specimen:  Blood from Arm, Right Updated:  06/05/18 2315     Total Bilirubin 0 40 mg/dL      Bilirubin, Direct 0 09 mg/dL      Alkaline Phosphatase 72 U/L      AST 26 U/L      ALT 40 U/L      Total Protein 7 2 g/dL      Albumin 3 5 g/dL     B-type natriuretic peptide [[id number]]  (Normal) Collected:  06/05/18 2245    Lab Status:  Final result Specimen:  Blood from Arm, Right Updated:  06/05/18 2315     NT-proBNP 35 pg/mL     Troponin I [[id number]]  (Normal) Collected:  06/05/18 2245    Lab Status:  Final result Specimen:  Blood from Arm, Right Updated:  06/05/18 2309     Troponin I <0 02 ng/mL     Narrative:         [employer] Chemistry analyzer 99% cutoff is > 0 04 ng/mL in network labs    o cTnI 99% cutoff is useful only when applied to patients in the clinical setting of myocardial ischemia  o cTnI 99% cutoff should be interpreted in the context of clinical history, ECG findings and possibly cardiac imaging to establish correct diagnosis  o cTnI 99% cutoff may be suggestive but clearly not indicative of a coronary event without the clinical setting of myocardial ischemia  Basic metabolic panel [[id number]]  (Abnormal) Collected:  06/05/18 2245    Lab Status:  Final result Specimen:  Blood from Arm, Right Updated:  06/05/18 2307     Sodium 141 mmol/L      Potassium 3 6 mmol/L      Chloride 106 mmol/L      CO2 28 mmol/L      Anion Gap 7 mmol/L      BUN 15 mg/dL      Creatinine 1 17 mg/dL      Glucose 144 (H) mg/dL      Calcium 8 8 mg/dL      eGFR 72 ml/min/1 73sq m     Narrative:         National Kidney Disease Education Program recommendations are as follows:  GFR calculation is accurate only with a steady state creatinine  Chronic Kidney disease less than 60 ml/min/1 73 sq  meters  Kidney failure less than 15 ml/min/1 73 sq  meters      Protime-INR [[id number]]  (Normal) Collected:  06/05/18 2245    Lab Status:  Final result Specimen:  Blood from Arm, Right Updated:  06/05/18 2302     Protime 14 1 seconds      INR 1 10    APTT [[id number]]  (Normal) Collected:  06/05/18 2245    Lab Status:  Final result Specimen:  Blood from Arm, Right Updated:  06/05/18 2302     PTT 34 seconds     CBC and differential [[id number]] Collected:  06/05/18 2245    Lab Status:  Final result Specimen:  Blood from Arm, Right Updated:  06/05/18 2251     WBC 8 03 Thousand/uL      RBC 5 01 Million/uL      Hemoglobin 13 8 g/dL      Hematocrit 42 7 %      MCV 85 fL      MCH 27 5 pg      MCHC 32 3 g/dL      RDW 15 0 %      MPV 9 4 fL      Platelets 354 Thousands/uL      nRBC 0 /100 WBCs      Neutrophils Relative 68 %      Immat GRANS % 0 %      Lymphocytes Relative 18 %      Monocytes Relative 10 %      Eosinophils Relative 3 %      Basophils Relative 1 %      Neutrophils Absolute 5 50 Thousands/µL      Immature Grans Absolute 0 03 Thousand/uL      Lymphocytes Absolute 1 48 Thousands/µL      Monocytes Absolute 0 76 Thousand/µL      Eosinophils Absolute 0 21 Thousand/µL      Basophils Absolute 0 05 Thousands/µL                  XR chest 2 views    (Results Pending)   VAS lower limb venous duplex study, unilateral/limited    (Results Pending)              Procedures  Procedures       Phone Contacts  ED Phone Contact    ED Course  ED Course as of Jun 06 0030 Tue Jun 05, 2018   2302 Prelim DVT report negative  HEART Risk Score      Most Recent Value   History  0 Filed at: 06/06/2018 0030   ECG  0 Filed at: 06/06/2018 0030   Age  1 Filed at: 06/06/2018 0030   Risk Factors  1 Filed at: 06/06/2018 0030   Troponin  0 Filed at: 06/06/2018 0030   Heart Score Risk Calculator   History  0 Filed at: 06/06/2018 0030   ECG  0 Filed at: 06/06/2018 0030   Age  1 Filed at: 06/06/2018 0030   Risk Factors  1 Filed at: 06/06/2018 0030   Troponin  0 Filed at: 06/06/2018 0030   HEART Score  2 Filed at: 06/06/2018 0030   HEART Score  2 Filed at: 06/06/2018 0030                            MDM  Number of Diagnoses or Management Options  Lower extremity edema: new and requires workup  Paresthesia of bilateral legs: new and requires workup  Diagnosis management comments: Differential diagnosis includes but is not limited to CHF, mi, ACS, hepatic failure, hypoalbuminemia, electrolyte abnormality, muscle spasm, DVT  Plan:  Ultrasound right leg, cardiac workup, chest x-ray, electrolytes  Disposition pending  Amount and/or Complexity of Data Reviewed  Clinical lab tests: reviewed and ordered  Tests in the radiology section of CPT®: ordered and reviewed  Independent visualization of images, tracings, or specimens: yes    Risk of Complications, Morbidity, and/or Mortality  Presenting problems: moderate  Management options: low  General comments: 57-year-old male with chronic leg swelling and now 4 days of paresthesias/tingling of his legs  His exam is benign  He has negative DVT study  Laboratory evaluation unremarkable   His blood pressure is the best it has been per his report  He is able to ambulate without difficulty at this time  The etiology of his symptoms is unclear  I doubt a central nervous etiology or spinal cord syndrome  Could be peripheral neuropathy  Recommended he follow up with family doctor and may ultimately need to see a neurologist   No indication for admission at this time  Patient understands and agrees with plan  He will return if symptoms worsen  Patient Progress  Patient progress: stable    CritCare Time    Disposition  Final diagnoses:   Lower extremity edema   Paresthesia of bilateral legs     Time reflects when diagnosis was documented in both MDM as applicable and the Disposition within this note     Time User Action Codes Description Comment    6/5/2018 11:42 PM Luly Seth L Add [R60 0] Lower extremity edema     6/5/2018 11:42 PM Luly Allredk L Add [R20 2] Paresthesia of bilateral legs       ED Disposition     ED Disposition Condition Comment    Discharge  [facility]  discharge to home/self care      Condition at discharge: Good        Follow-up Information     Follow up With Specialties Details Why Contact Info    Your Family doctor and Cardiologist  Call in 1 day to schedule follow up previously established    Yolie Nunn MD Cardiology, Radiology Call if you need a new cardiologist [facility] [phone]  [phone]            Discharge Medication List as of 6/5/2018 11:43 PM      CONTINUE these medications which have NOT CHANGED    Details   amLODIPine (NORVASC) 10 mg tablet Take 1 tablet by mouth daily, Starting Wed 9/13/2017, Print      butalbital-acetaminophen-caffeine (FIORICET,ESGIC) -40 mg per tablet Take 1 tablet by mouth every 4 (four) hours as needed for headaches, Starting Tue 9/12/2017, Print      furosemide (LASIX) 20 mg tablet Take 1 tablet (20 mg total) by mouth 2 (two) times a day, Starting Thu 5/10/2018, Normal      labetalol (NORMODYNE) 200 mg tablet Take 1 tablet by mouth every 12 (twelve) hours, Starting Tue 9/12/2017, Print      lisinopril (ZESTRIL) 20 mg tablet Take 1 tablet by mouth daily, Starting Tue 9/12/2017, Print           No discharge procedures on file      ED Provider  Electronically Signed by           Olivia Javed PA-C  06/06/18 0023       Olivia Javed PA-C  06/06/18 0030

## 2018-06-06 NOTE — DISCHARGE INSTRUCTIONS
Paresthesia   WHAT YOU NEED TO KNOW:   Paresthesia is numbness and tingling  It can happen in any part of your body, but usually occurs in your legs, feet, arms, or hands  There are a large number of conditions that can cause paresthesia  It affects the nerves that provide sensation and happens when there are changes in nerves or nerve pathways  These changes can be temporary, such as if you take certain medicines or you are not getting enough vitamin B  Paresthesia can become permanent when there is nerve damage  Conditions that may cause nerve damage include diabetes, carpel tunnel syndrome, stroke, and multiple sclerosis  The exact cause of your paresthesia may be unknown  DISCHARGE INSTRUCTIONS:   Medicines:  Ask for more information about medicines you may need to treat the condition causing your paresthesias  · NSAIDs  help decrease swelling and pain or fever  This medicine is available with or without a doctor's order  NSAIDs can cause stomach bleeding or kidney problems in certain people  If you take blood thinner medicine, always ask your healthcare provider if NSAIDs are safe for you  Always read the medicine label and follow directions  · Take your medicine as directed  Contact your healthcare provider if you think your medicine is not helping or if you have side effects  Tell him or her if you are allergic to any medicine  Keep a list of the medicines, vitamins, and herbs you take  Include the amounts, and when and why you take them  Bring the list or the pill bottles to follow-up visits  Carry your medicine list with you in case of an emergency  Follow up with your healthcare provider or neurologist as directed:  Write down your questions so you remember to ask them during your visits  Contact your healthcare provider or neurologist if:   · Your symptoms do not improve  · You have symptoms in more than one part of your body  · You have questions about your condition or care    Return to the emergency department if:   · You have any of the following signs of a stroke:      ¨ Numbness or drooping on one side of your face     ¨ Weakness in an arm or leg    ¨ Confusion or difficulty speaking    ¨ Dizziness, a severe headache, or vision loss    · You are not able to control your urine or bowel movements  · You have severe pain along with numbness and tingling  · Your legs suddenly become cold  You have trouble moving your legs, and they ache  · You have increased weakness in a part of your body  · You have uncontrolled movements, or you have a seizure  © 2017 [address] [employer] Information is for End User's use only and may not be sold, redistributed or otherwise used for commercial purposes  All illustrations and images included in CareNotes® are the copyrighted property of [employer]  or [employer]  The above information is an  only  It is not intended as medical advice for individual conditions or treatments  Talk to your doctor, nurse or pharmacist before following any medical regimen to see if it is safe and effective for you  Leg Edema   WHAT YOU NEED TO KNOW:   Leg edema is swelling caused by fluid buildup  Your legs may swell if you sit or stand for long periods of time, are pregnant, or are injured  Swelling may also occur if you have heart failure or circulation problems  This means that your heart does not pump blood through your body as it should  DISCHARGE INSTRUCTIONS:   Self-care:   · Elevate your legs:  Raise your legs above the level of your heart as often as you can  This will help decrease swelling and pain  Prop your legs on pillows or blankets to keep them elevated comfortably  · Wear pressure stockings: These tight stockings put pressure on your legs to promote blood flow and prevent blood clots  Wear the stockings during the day  Do not wear them while you sleep  · Apply heat:  Heat helps decrease pain and swelling  Apply heat on the area for 20 to 30 minutes every 2 hours for as many days as directed  · Stay active:  Do not stand or sit for long periods of time  Ask your healthcare provider about the best exercise plan for you  · Eat healthy foods:  Healthy foods include fruits, vegetables, whole-grain breads, low-fat dairy products, beans, lean meats, and fish  Ask if you need to be on a special diet  Limit salt  Salt will make your body hold even more fluid  Follow up with your healthcare provider as directed:  Write down your questions so you remember to ask them during your visits  Contact your healthcare provider if:   · You have a fever or feel more tired than usual     · The veins in your legs look larger than usual  They may look full or bulging  · Your legs itch or feel heavy  · You have red or white areas or sores on your legs  The skin may also appear dimpled or have indentations  · You are gaining weight  · You have trouble moving your ankles  · The swelling does not go away, or other parts of your body swell  · You have questions or concerns about your condition or care  Return to the emergency department if:   · You cannot walk  · You feel faint or confused  · Your skin turns blue or gray  · Your leg feels warm, tender, and painful  It may be swollen and red  · You have chest pain or trouble breathing that is worse when you lie down  · You suddenly feel lightheaded and have trouble breathing  · You have new and sudden chest pain  You may have more pain when you take deep breaths or cough  You may also cough up blood  © 2017 [address] [employer] Information is for End User's use only and may not be sold, redistributed or otherwise used for commercial purposes  All illustrations and images included in CareNotes® are the copyrighted property of [employer]  or [employer]  The above information is an  only   It is not intended as medical advice for individual conditions or treatments  Talk to your doctor, nurse or pharmacist before following any medical regimen to see if it is safe and effective for you  For information on Fall & Injury Prevention, visit [email or web address]

## 2018-06-12 ENCOUNTER — APPOINTMENT (EMERGENCY)
Dept: RADIOLOGY | Facility: HOSPITAL | Age: 48
End: 2018-06-12
Payer: COMMERCIAL

## 2018-06-12 ENCOUNTER — HOSPITAL ENCOUNTER (EMERGENCY)
Facility: HOSPITAL | Age: 48
Discharge: HOME | End: 2018-06-12
Attending: EMERGENCY MEDICINE | Admitting: EMERGENCY MEDICINE
Payer: COMMERCIAL

## 2018-06-12 VITALS
BODY MASS INDEX: 44.38 KG/M2 | TEMPERATURE: 98.3 F | HEART RATE: 71 BPM | SYSTOLIC BLOOD PRESSURE: 144 MMHG | OXYGEN SATURATION: 95 % | WEIGHT: 310 LBS | HEIGHT: 70 IN | RESPIRATION RATE: 18 BRPM | DIASTOLIC BLOOD PRESSURE: 80 MMHG

## 2018-06-12 DIAGNOSIS — I50.9 CONGESTIVE HEART FAILURE (HCC): Primary | ICD-10-CM

## 2018-06-12 DIAGNOSIS — I10 ESSENTIAL HYPERTENSION: ICD-10-CM

## 2018-06-12 LAB
ANION GAP SERPL CALCULATED.3IONS-SCNC: 11 MMOL/L (ref 4–13)
ATRIAL RATE: 82 BPM
BASOPHILS # BLD AUTO: 0.06 THOUSANDS/ΜL (ref 0–0.1)
BASOPHILS NFR BLD AUTO: 1 % (ref 0–1)
BUN SERPL-MCNC: 14 MG/DL (ref 5–25)
CALCIUM SERPL-MCNC: 9.1 MG/DL (ref 8.3–10.1)
CHLORIDE SERPL-SCNC: 105 MMOL/L (ref 100–108)
CO2 SERPL-SCNC: 25 MMOL/L (ref 21–32)
CREAT SERPL-MCNC: 1.19 MG/DL (ref 0.6–1.3)
EOSINOPHIL # BLD AUTO: 0.17 THOUSAND/ΜL (ref 0–0.61)
EOSINOPHIL NFR BLD AUTO: 2 % (ref 0–6)
ERYTHROCYTE [DISTWIDTH] IN BLOOD BY AUTOMATED COUNT: 14.9 % (ref 11.6–15.1)
GFR SERPL CREATININE-BSD FRML MDRD: 71 ML/MIN/1.73SQ M
GLUCOSE SERPL-MCNC: 116 MG/DL (ref 65–140)
HCT VFR BLD AUTO: 43.7 % (ref 36.5–49.3)
HGB BLD-MCNC: 14.1 G/DL (ref 12–17)
IMM GRANULOCYTES # BLD AUTO: 0.04 THOUSAND/UL (ref 0–0.2)
IMM GRANULOCYTES NFR BLD AUTO: 1 % (ref 0–2)
LYMPHOCYTES # BLD AUTO: 1.27 THOUSANDS/ΜL (ref 0.6–4.47)
LYMPHOCYTES NFR BLD AUTO: 18 % (ref 14–44)
MCH RBC QN AUTO: 27.4 PG (ref 26.8–34.3)
MCHC RBC AUTO-ENTMCNC: 32.3 G/DL (ref 31.4–37.4)
MCV RBC AUTO: 85 FL (ref 82–98)
MONOCYTES # BLD AUTO: 0.6 THOUSAND/ΜL (ref 0.17–1.22)
MONOCYTES NFR BLD AUTO: 9 % (ref 4–12)
NEUTROPHILS # BLD AUTO: 4.91 THOUSANDS/ΜL (ref 1.85–7.62)
NEUTS SEG NFR BLD AUTO: 69 % (ref 43–75)
NRBC BLD AUTO-RTO: 0 /100 WBCS
NT-PROBNP SERPL-MCNC: 24 PG/ML
P AXIS: 57 DEGREES
PLATELET # BLD AUTO: 224 THOUSANDS/UL (ref 149–390)
PMV BLD AUTO: 9 FL (ref 8.9–12.7)
POTASSIUM SERPL-SCNC: 3.6 MMOL/L (ref 3.5–5.3)
PR INTERVAL: 176 MS
QRS AXIS: -46 DEGREES
QRSD INTERVAL: 110 MS
QT INTERVAL: 380 MS
QTC INTERVAL: 443 MS
RBC # BLD AUTO: 5.14 MILLION/UL (ref 3.88–5.62)
SODIUM SERPL-SCNC: 141 MMOL/L (ref 136–145)
T WAVE AXIS: 41 DEGREES
TROPONIN I SERPL-MCNC: <0.02 NG/ML
VENTRICULAR RATE: 82 BPM
WBC # BLD AUTO: 7.05 THOUSAND/UL (ref 4.31–10.16)

## 2018-06-12 PROCEDURE — 96374 THER/PROPH/DIAG INJ IV PUSH: CPT

## 2018-06-12 PROCEDURE — 84484 ASSAY OF TROPONIN QUANT: CPT | Performed by: EMERGENCY MEDICINE

## 2018-06-12 PROCEDURE — 80048 BASIC METABOLIC PNL TOTAL CA: CPT | Performed by: EMERGENCY MEDICINE

## 2018-06-12 PROCEDURE — 71046 X-RAY EXAM CHEST 2 VIEWS: CPT

## 2018-06-12 PROCEDURE — 93010 ELECTROCARDIOGRAM REPORT: CPT | Performed by: INTERNAL MEDICINE

## 2018-06-12 PROCEDURE — 85025 COMPLETE CBC W/AUTO DIFF WBC: CPT | Performed by: EMERGENCY MEDICINE

## 2018-06-12 PROCEDURE — 36415 COLL VENOUS BLD VENIPUNCTURE: CPT | Performed by: EMERGENCY MEDICINE

## 2018-06-12 PROCEDURE — 93005 ELECTROCARDIOGRAM TRACING: CPT

## 2018-06-12 PROCEDURE — 83880 ASSAY OF NATRIURETIC PEPTIDE: CPT | Performed by: EMERGENCY MEDICINE

## 2018-06-12 PROCEDURE — 99284 EMERGENCY DEPT VISIT MOD MDM: CPT

## 2018-06-12 RX ORDER — FUROSEMIDE 10 MG/ML
20 INJECTION INTRAMUSCULAR; INTRAVENOUS ONCE
Status: COMPLETED | OUTPATIENT
Start: 2018-06-12 | End: 2018-06-12

## 2018-06-12 RX ORDER — FUROSEMIDE 20 MG/1
20 TABLET ORAL 2 TIMES DAILY
Qty: 20 TABLET | Refills: 0 | Status: SHIPPED | OUTPATIENT
Start: 2018-06-12 | End: 2018-10-23 | Stop reason: ALTCHOICE

## 2018-06-12 RX ORDER — NITROGLYCERIN 0.4 MG/1
0.4 TABLET SUBLINGUAL ONCE
Status: COMPLETED | OUTPATIENT
Start: 2018-06-12 | End: 2018-06-12

## 2018-06-12 RX ADMIN — NITROGLYCERIN 0.4 MG: 0.4 TABLET SUBLINGUAL at 15:43

## 2018-06-12 RX ADMIN — FUROSEMIDE 20 MG: 10 INJECTION, SOLUTION INTRAMUSCULAR; INTRAVENOUS at 18:08

## 2018-06-12 NOTE — DISCHARGE INSTRUCTIONS
Please follow-up with your cardiologist as soon as possible to discuss this visit  As we discussed, we are concerned that you may have heart failure and you need further diagnostic workup  This workup may include an echocardiogram or other further testing  Chronic Hypertension, Ambulatory Care   GENERAL INFORMATION:   Chronic hypertension  is a long-term condition in which your blood pressure (BP) is higher than normal  Your BP is the force of your blood moving against the walls of your arteries  Hypertension is a BP of 140/90 or higher  Common symptoms include the following:   · Headache     · Blurred vision    · Chest pain     · Dizziness or weakness     · Trouble breathing     · Nosebleeds  Seek immediate care for the following symptoms:   · Severe headache or vision loss    · Weakness in an arm or leg    · Confusion or difficulty speaking    · Discomfort in your chest that feels like squeezing, pressure, fullness, or pain    · Suddenly feeling lightheaded or trouble breathing    · Pain or discomfort in your back, neck, jaw, stomach, or arm  Treatment for chronic hypertension  may include medicine to lower your BP  You may also need to make lifestyle changes  Take your medicine exactly as directed  Manage chronic hypertension:   · Take your BP at home  Sit and rest for 5 minutes before you take your BP  Extend your arm and support it on a flat surface  Your arm should be at the same level as your heart  Follow the directions that came with your BP monitor  If possible, take at least 2 BP readings each time  Take your BP at least twice a day at the same times each day, such as morning and evening  Keep a log of your BP readings and bring it to your follow-up visits  · Eat less sodium (salt)  Do not add sodium to your food  Limit foods that are high in sodium, such as canned foods, potato chips, and cold cuts  Your healthcare provider may suggest that you follow the [address] [place] [address]   The plan is low in sodium, unhealthy fats, and total fat  It is high in potassium, calcium, and fiber  · Exercise regularly  Exercise at least 30 minutes per day, on most days of the week  This will help decrease your BP  Ask your healthcare provider about the best exercise plan for you  · Limit alcohol  Women should limit alcohol to 1 drink a day  Men should limit alcohol to 2 drinks a day  A drink of alcohol is 12 ounces of beer, 5 ounces of wine, or 1½ ounces of liquor  · Do not smoke  If you smoke, it is never too late to quit  Smoking can increase your BP  Smoking also worsens other health conditions you may have that can increase your risk for hypertension  Ask your healthcare provider for information if you need help quitting  Follow up with your healthcare provider as directed: You will need to return to have your BP checked and to have other lab tests done  Write down your questions so you remember to ask them during your visits  CARE AGREEMENT:   You have the right to help plan your care  Learn about your health condition and how it may be treated  Discuss treatment options with your caregivers to decide what care you want to receive  You always have the right to refuse treatment  The above information is an  only  It is not intended as medical advice for individual conditions or treatments  Talk to your doctor, nurse or pharmacist before following any medical regimen to see if it is safe and effective for you  © 2014 [address] is for End User's use only and may not be sold, redistributed or otherwise used for commercial purposes  All illustrations and images included in CareNotes® are the copyrighted property of [employer]  or [address]  Heart Failure   AMBULATORY CARE:   Heart failure (HF) is a condition that does not allow your heart to fill or pump properly  Not enough oxygen in your blood gets to your organs and tissues   HF can occur in the right side, the left side, or both lower chambers of your heart  HF is often caused by damage or injury to your heart  The damage may be caused by heart attack, other heart conditions, or high blood pressure  HF is a long-term condition that tends to get worse over time  It is important to manage your health to improve your quality of life  HF can be worsened by heavy alcohol use, smoking, diabetes that is not controlled, or obesity  Common signs and symptoms:   · Difficulty breathing with activity that worsens to difficulty breathing at rest    · Shortness of breath while lying flat    · Severe shortness of breath and coughing at night that usually wakes you     · Chest pain at night    · Periods of no breathing, then breathing fast    · Fatigue or lack of energy (often worsened by physical activity)     · Swelling in your ankles, legs, or abdomen    · Fast heartbeat, purple color around your mouth and nailbeds    · Fingers and toes cool to the touch  Call 911 if:   · You have any of the following signs of a heart attack:      ¨ Squeezing, pressure, or pain in your chest that lasts longer than 5 minutes or returns    ¨ Discomfort or pain in your back, neck, jaw, stomach, or arm     ¨ Trouble breathing    ¨ Nausea or vomiting    ¨ Lightheadedness or a sudden cold sweat, especially with chest pain or trouble breathing    Seek care immediately if:   · You gain 3 or more pounds (1 4 kg) in a day, or more than your healthcare provider says you should  · Your heartbeat is fast, slow, or uneven all the time    Contact your healthcare provider if:   · You have symptoms of worsening HF:      ¨ Shortness of breath at rest, at night, or that is getting worse in any way     ¨ Weight gain of 5 or more pounds (2 2 kg) in a week     ¨ More swelling in your legs or ankles     ¨ Abdominal pain or swelling     ¨ More coughing     ¨ Loss of appetite     ¨ Feeling tired all the time    · You feel hopeless or depressed, or you have lost interest in things you used to enjoy  · You often feel worried or afraid  · You have questions or concerns about your condition or care  Treatment for HF  may include any of the following:  · Medicines  may be needed to help regulate your heart rhythm  You may also need medicines to lower your blood pressure, and to get rid of extra fluids  · Oxygen  may help you breathe easier if your oxygen level is lower than normal  A CPAP machine may be used to keep your airway open while you sleep  · Surgery  can be done to implant a pacemaker in your chest to regulate your heart rhythm  Other types of surgery can open blocked heart vessels, replace a damaged heart valve, or remove scar tissue  Manage or prevent HF:   · Do not smoke  Nicotine and other chemicals in cigarettes and cigars can cause lung damage and make HF difficult to manage  Ask your healthcare provider for information if you currently smoke and need help to quit  E-cigarettes or smokeless tobacco still contain nicotine  Talk to your healthcare provider before you use these products  · Do not drink alcohol or take illegal drugs  Alcohol and drugs can worsen your symptoms quickly  · Weigh yourself every morning  Use the same scale, in the same spot  Do this after you use the bathroom, but before you eat or drink anything  Wear the same type of clothing  Do not wear shoes  Record your weight each day so you will notice any sudden weight gain  Swelling and weight gain are signs of fluid retention  If you are overweight, ask how to lose weight safely  · Check your blood pressure and heart rate every day  Ask for more information about how to measure your blood pressure and heart rate correctly  Ask what these numbers should be for you  · Manage any chronic health conditions you have  These include high blood pressure, diabetes, obesity, high cholesterol, metabolic syndrome, and COPD   You will have fewer symptoms if you manage these health conditions  Follow your healthcare provider's recommendations and follow up with him or her regularly  · Eat heart-healthy foods and limit sodium (salt)  An easy way to do this is to eat more fresh fruits and vegetables and fewer canned and processed foods  Replace butter and margarine with heart-healthy oils such as olive oil and canola oil  Other heart-healthy foods include walnuts, whole-grain breads, low-fat dairy products, beans, and lean meats  Fatty fish such as salmon and tuna are also heart healthy  Ask how much salt you can eat each day  Do not use salt substitutes  · Drink liquids as directed  You may need to limit the amount of liquids you drink if you retain fluid  Ask how much liquid to drink each day and which liquids are best for you  · Stay active  If you are not active, your symptoms are likely to worsen quickly  Walking, bicycling, and other types of physical activity help maintain your strength and improve your mood  Physical activity also helps you manage your weight  Work with your healthcare provider to create an exercise plan that is right for you  · Get vaccines as directed  Get a flu shot every year  You may also need the pneumonia vaccine  The flu and pneumonia can be severe for a person who has HF  Vaccines protect you from these infections  Join a support group:  Living with HF can be difficult  It may be helpful to talk with others who have HF  You may learn how to better manage your condition or get emotional support  For more information:   · Chintan More   Phone: 1- 636 - 375-7850  Web Address: https://[employer]/  Medify   Follow up with your healthcare provider or cardiologist within 2 weeks or as directed: You may need to return for other tests  You may need home health care   A healthcare provider will monitor your vital signs, weight, and make sure your medicines are working  Write down your questions so you remember to ask them during your visits  © 2017 [address] Information is for End User's use only and may not be sold, redistributed or otherwise used for commercial purposes  All illustrations and images included in CareNotes® are the copyrighted property of [employer] Inc  or Connor Bush  The above information is an  only  It is not intended as medical advice for individual conditions or treatments  Talk to your doctor, nurse or pharmacist before following any medical regimen to see if it is safe and effective for you

## 2018-06-12 NOTE — ED PROVIDER NOTES
Pt Name: [facility]  MRN: [id number]  Birthdate 1970  Age/Sex: 46 y o  male  Date of evaluation: 6/12/2018  PCP: No primary care provider on file  CHIEF COMPLAINT    Chief Complaint   Patient presents with    Leg Swelling     bilateral leg swelling with body aches, was seen here last week for same thing         HPI    Ronald Chicas presents to the Emergency Department complaining of gradual worsening of 1 month of shortness of breath  Patient complains of swelling in both legs gradually worsened over a month and improved with Lasix given a few weeks ago, as well as gradual shortness of breath, worse with movement or exertion and better at rest   Patient notes that this shortness of breath also improved the Lasix, but has worsened few days since he ran out  Patient has scheduled follow-up with a cardiologist for this issue on Monday, the 18th of June  He denies recent trauma, fevers, chest pain, nausea, vomiting, diarrhea  HPI      Past Medical and Surgical History    Past Medical History:   Diagnosis Date    Hypertension        Past Surgical History:   Procedure Laterality Date    KNEE SURGERY      LEG SURGERY         Family History   Problem Relation Age of Onset    Family history unknown: Yes       Social History   Substance Use Topics    Smoking status: Current Some Day Smoker     Packs/day: 0 20     Types: Cigars    Smokeless tobacco: Never Used    Alcohol use Yes      Comment: occassionally           Allergies    No Known Allergies    Home Medications    Prior to Admission medications    Medication Sig Start Date End Date Taking?  Authorizing Provider   amLODIPine (NORVASC) 10 mg tablet Take 1 tablet by mouth daily 9/13/17   Liliana Adams MD   butalbital-acetaminophen-caffeine [facility] [place]) -40 mg per tablet Take 1 tablet by mouth every 4 (four) hours as needed for headaches 9/12/17   Liliana Adams MD   furosemide (LASIX) 20 mg tablet Take 1 tablet (20 mg total) by mouth 2 (two) times a day 5/10/18   Barak Sams DO   labetalol (NORMODYNE) 200 mg tablet Take 1 tablet by mouth every 12 (twelve) hours 9/12/17   Berny Carter MD   lisinopril (ZESTRIL) 20 mg tablet Take 1 tablet by mouth daily 9/12/17   Berny Carter MD           Review of Systems    Review of Systems   Constitutional: Positive for activity change  Negative for appetite change, chills and diaphoresis  HENT: Negative for drooling, facial swelling, trouble swallowing and voice change  Respiratory: Positive for shortness of breath  Negative for apnea and wheezing  Cardiovascular: Positive for leg swelling  Negative for chest pain  Gastrointestinal: Negative for abdominal distention, abdominal pain, diarrhea, nausea and vomiting  Genitourinary: Negative for dysuria and urgency  Musculoskeletal: Negative for arthralgias, back pain, gait problem and neck pain  Skin: Negative for color change, rash and wound  Neurological: Negative for seizures, speech difficulty, weakness and headaches  Psychiatric/Behavioral: Negative for agitation, behavioral problems and dysphoric mood  The patient is not nervous/anxious  All other systems reviewed and negative  Physical Exam      ED Triage Vitals   Temperature Pulse Respirations Blood Pressure SpO2   06/12/18 1527 06/12/18 1527 06/12/18 1527 06/12/18 1527 06/12/18 1527   98 3 °F (36 8 °C) 83 18 (!) 200/110 93 %      Temp src Heart Rate Source Patient Position - Orthostatic VS BP Location FiO2 (%)   -- 06/12/18 1729 06/12/18 1729 06/12/18 1729 --    Monitor Sitting Left arm       Pain Score       06/12/18 1527       7               Physical Exam   Constitutional: He is oriented to person, place, and time  He appears well-developed and well-nourished  Obese   HENT:   Head: Normocephalic and atraumatic  Eyes: Conjunctivae and EOM are normal  Pupils are equal, round, and reactive to light     Neck: Normal range of motion  Neck supple  No tracheal deviation present  Cardiovascular: Normal rate, regular rhythm, normal heart sounds and intact distal pulses  No murmur heard  Neck exam positive for JVD   Pulmonary/Chest: Effort normal and breath sounds normal  No stridor  No respiratory distress  He has no wheezes  He has no rales  Abdominal: Soft  He exhibits no distension  There is no tenderness  There is no rebound and no guarding  Musculoskeletal: Normal range of motion  He exhibits edema  He exhibits no deformity  1+ edema in both legs, improved per patient   Neurological: He is alert and oriented to person, place, and time  Skin: Skin is warm and dry  No rash noted  Psychiatric: He has a normal mood and affect  His behavior is normal  Judgment and thought content normal            [facility]    Diagnostic Results    EKG Interpretation    Rate: 82 BPM  Rhythm: NSR  Axis: normal  Intervals: LAFB, QTc 443ms  Q waves: pathologic q waves absent  T waves: wnl  ST segments:  No significant elevations or depressions    Impression:  No evidence of acute ischemia, new left anterior fascicular block noted  EKG interpreted by me         Labs:    Results for orders placed or performed during the hospital encounter of 06/12/18   CBC and differential   Result Value Ref Range    WBC 7 05 4 31 - 10 16 Thousand/uL    RBC 5 14 3 88 - 5 62 Million/uL    Hemoglobin 14 1 12 0 - 17 0 g/dL    Hematocrit 43 7 36 5 - 49 3 %    MCV 85 82 - 98 fL    MCH 27 4 26 8 - 34 3 pg    MCHC 32 3 31 4 - 37 4 g/dL    RDW 14 9 11 6 - 15 1 %    MPV 9 0 8 9 - 12 7 fL    Platelets 315 869 - 570 Thousands/uL    nRBC 0 /100 WBCs    Neutrophils Relative 69 43 - 75 %    Immat GRANS % 1 0 - 2 %    Lymphocytes Relative 18 14 - 44 %    Monocytes Relative 9 4 - 12 %    Eosinophils Relative 2 0 - 6 %    Basophils Relative 1 0 - 1 %    Neutrophils Absolute 4 91 1 85 - 7 62 Thousands/µL    Immature Grans Absolute 0 04 0 00 - 0 20 Thousand/uL    Lymphocytes Absolute 1 27 0 60 - 4 47 Thousands/µL    Monocytes Absolute 0 60 0 17 - 1 22 Thousand/µL    Eosinophils Absolute 0 17 0 00 - 0 61 Thousand/µL    Basophils Absolute 0 06 0 00 - 0 10 Thousands/µL   Basic metabolic panel   Result Value Ref Range    Sodium 141 136 - 145 mmol/L    Potassium 3 6 3 5 - 5 3 mmol/L    Chloride 105 100 - 108 mmol/L    CO2 25 21 - 32 mmol/L    Anion Gap 11 4 - 13 mmol/L    BUN 14 5 - 25 mg/dL    Creatinine 1 19 0 60 - 1 30 mg/dL    Glucose 116 65 - 140 mg/dL    Calcium 9 1 8 3 - 10 1 mg/dL    eGFR 71 ml/min/1 73sq m   B-type natriuretic peptide   Result Value Ref Range    NT-proBNP 24 <125 pg/mL   Troponin I   Result Value Ref Range    Troponin I <0 02 <=0 04 ng/mL       All labs reviewed and utilized in the medical decision making process    Radiology:    X-ray chest 2 views   Final Result   No consolidation or effusion  No cardiomegaly or radiographic evidence for pulmonary edema  Workstation performed: [id number]             All radiology studies independently viewed by me and interpreted by the radiologist     Procedure    Procedures    CritCare Time      ED Course of Care and Re-Assessments      Blood pressure significantly improved by single dose of nitroglycerin  Medications   furosemide (LASIX) injection 20 mg (not administered)   nitroglycerin (NITROSTAT) SL tablet 0 4 mg (0 4 mg Sublingual Given 6/12/18 1543)           FINAL IMPRESSION    Final diagnoses:   Essential hypertension   Congestive heart failure [facility])         DISPOSITION/PLAN    61-year-old male with history and symptoms above  Vital signs remarkable for marked hypertension improved significantly with nitroglycerin  Examination remarkable for JVD, edema both legs  Lab sent returned reassuring overall, with negative troponin and negative proBNP  However, in setting of weight of 141 kg, negative proBNP is less reassuring as false negatives even with congestive heart failure can happen with masses over 100 kg    Overall clinical picture of dyspnea on exertion, JVD, edema, chronic poorly treated hypertension, all concerning for CHF his diagnosis  Do not suspect pulmonary embolism, bacterial pneumonia, acute MI, other immediate life threat at this time  Discharged strict return precautions, follow up with primary care doctor and with Cardiology as previously scheduled  Patient given Lasix emergency department for symptomatic relief and given a script for outpatient Lasix to cover him until he is seen by his cardiologist   Patient hemodynamically stable comfortable at time of discharge  Time reflects when diagnosis was documented in both MDM as applicable and the Disposition within this note     Time User Action Codes Description Comment    6/12/2018  5:51 PM Nisha Bartolo Add [I10] Essential hypertension     6/12/2018  5:52 PM Elena Tamayo T Add [I50 9] Congestive heart failure ([facility] [address] )     6/12/2018  5:52 PM Elena Tamayo T Modify [I10] Essential hypertension     6/12/2018  5:52 PM [place] Tamayo T Modify [I50 9] Congestive heart failure [facility])       ED Disposition     ED Disposition Condition Comment    Discharge  [facility]  discharge to home/self care  Condition at discharge: Stable        Follow-up Information     Follow up With Specialties Details Why Contact Info    Your Cardiologist   On 18 June as previously scheduled             PATIENT REFERRED TO:    Your Cardiologist      On 18 June as previously scheduled      DISCHARGE MEDICATIONS:    Patient's Medications   Discharge Prescriptions    FUROSEMIDE (LASIX) 20 MG TABLET    Take 1 tablet (20 mg total) by mouth 2 (two) times a day       Start Date: 6/12/2018 End Date: --       Order Dose: 20 mg       Quantity: 20 tablet    Refills: 0       No discharge procedures on file           MD Lanre Patel MD  06/12/18 9371

## 2018-10-23 ENCOUNTER — HOSPITAL ENCOUNTER (EMERGENCY)
Facility: HOSPITAL | Age: 48
Discharge: HOME | End: 2018-10-24
Admitting: EMERGENCY MEDICINE
Payer: COMMERCIAL

## 2018-10-23 ENCOUNTER — APPOINTMENT (EMERGENCY)
Dept: CT IMAGING | Facility: HOSPITAL | Age: 48
End: 2018-10-23
Payer: COMMERCIAL

## 2018-10-23 ENCOUNTER — APPOINTMENT (EMERGENCY)
Dept: RADIOLOGY | Facility: HOSPITAL | Age: 48
End: 2018-10-23
Payer: COMMERCIAL

## 2018-10-23 DIAGNOSIS — R07.9 CHRONIC CHEST PAIN: ICD-10-CM

## 2018-10-23 DIAGNOSIS — R42 DIZZINESS: Primary | ICD-10-CM

## 2018-10-23 DIAGNOSIS — R51.9 HEADACHE: ICD-10-CM

## 2018-10-23 DIAGNOSIS — G89.29 CHRONIC CHEST PAIN: ICD-10-CM

## 2018-10-23 DIAGNOSIS — R53.1 GENERALIZED WEAKNESS: ICD-10-CM

## 2018-10-23 LAB
ALBUMIN SERPL BCP-MCNC: 3.5 G/DL (ref 3.5–5)
ALP SERPL-CCNC: 77 U/L (ref 46–116)
ALT SERPL W P-5'-P-CCNC: 48 U/L (ref 12–78)
ANION GAP SERPL CALCULATED.3IONS-SCNC: 7 MMOL/L (ref 4–13)
AST SERPL W P-5'-P-CCNC: 36 U/L (ref 5–45)
ATRIAL RATE: 90 BPM
BASOPHILS # BLD AUTO: 0.06 THOUSANDS/ΜL (ref 0–0.1)
BASOPHILS NFR BLD AUTO: 1 % (ref 0–1)
BILIRUB SERPL-MCNC: 0.6 MG/DL (ref 0.2–1)
BUN SERPL-MCNC: 16 MG/DL (ref 5–25)
CALCIUM SERPL-MCNC: 9.2 MG/DL (ref 8.3–10.1)
CHLORIDE SERPL-SCNC: 101 MMOL/L (ref 100–108)
CO2 SERPL-SCNC: 30 MMOL/L (ref 21–32)
CREAT SERPL-MCNC: 1.27 MG/DL (ref 0.6–1.3)
EOSINOPHIL # BLD AUTO: 0.52 THOUSAND/ΜL (ref 0–0.61)
EOSINOPHIL NFR BLD AUTO: 6 % (ref 0–6)
ERYTHROCYTE [DISTWIDTH] IN BLOOD BY AUTOMATED COUNT: 14.3 % (ref 11.6–15.1)
GFR SERPL CREATININE-BSD FRML MDRD: 65 ML/MIN/1.73SQ M
GLUCOSE SERPL-MCNC: 124 MG/DL (ref 65–140)
HCT VFR BLD AUTO: 47.3 % (ref 36.5–49.3)
HGB BLD-MCNC: 15.6 G/DL (ref 12–17)
IMM GRANULOCYTES # BLD AUTO: 0.04 THOUSAND/UL (ref 0–0.2)
IMM GRANULOCYTES NFR BLD AUTO: 0 % (ref 0–2)
LIPASE SERPL-CCNC: 147 U/L (ref 73–393)
LYMPHOCYTES # BLD AUTO: 1.73 THOUSANDS/ΜL (ref 0.6–4.47)
LYMPHOCYTES NFR BLD AUTO: 19 % (ref 14–44)
MCH RBC QN AUTO: 28.1 PG (ref 26.8–34.3)
MCHC RBC AUTO-ENTMCNC: 33 G/DL (ref 31.4–37.4)
MCV RBC AUTO: 85 FL (ref 82–98)
MONOCYTES # BLD AUTO: 0.82 THOUSAND/ΜL (ref 0.17–1.22)
MONOCYTES NFR BLD AUTO: 9 % (ref 4–12)
NEUTROPHILS # BLD AUTO: 6.11 THOUSANDS/ΜL (ref 1.85–7.62)
NEUTS SEG NFR BLD AUTO: 65 % (ref 43–75)
NRBC BLD AUTO-RTO: 0 /100 WBCS
P AXIS: 43 DEGREES
PLATELET # BLD AUTO: 249 THOUSANDS/UL (ref 149–390)
PMV BLD AUTO: 9.8 FL (ref 8.9–12.7)
POTASSIUM SERPL-SCNC: 3.1 MMOL/L (ref 3.5–5.3)
PR INTERVAL: 162 MS
PROT SERPL-MCNC: 7.7 G/DL (ref 6.4–8.2)
QRS AXIS: -48 DEGREES
QRSD INTERVAL: 118 MS
QT INTERVAL: 368 MS
QTC INTERVAL: 450 MS
RBC # BLD AUTO: 5.55 MILLION/UL (ref 3.88–5.62)
SODIUM SERPL-SCNC: 138 MMOL/L (ref 136–145)
T WAVE AXIS: 23 DEGREES
TROPONIN I SERPL-MCNC: <0.02 NG/ML
VENTRICULAR RATE: 90 BPM
WBC # BLD AUTO: 9.28 THOUSAND/UL (ref 4.31–10.16)

## 2018-10-23 PROCEDURE — 85025 COMPLETE CBC W/AUTO DIFF WBC: CPT | Performed by: PHYSICIAN ASSISTANT

## 2018-10-23 PROCEDURE — 93005 ELECTROCARDIOGRAM TRACING: CPT

## 2018-10-23 PROCEDURE — 93010 ELECTROCARDIOGRAM REPORT: CPT | Performed by: INTERNAL MEDICINE

## 2018-10-23 PROCEDURE — 99285 EMERGENCY DEPT VISIT HI MDM: CPT

## 2018-10-23 PROCEDURE — 83690 ASSAY OF LIPASE: CPT | Performed by: PHYSICIAN ASSISTANT

## 2018-10-23 PROCEDURE — 80053 COMPREHEN METABOLIC PANEL: CPT | Performed by: PHYSICIAN ASSISTANT

## 2018-10-23 PROCEDURE — 71046 X-RAY EXAM CHEST 2 VIEWS: CPT

## 2018-10-23 PROCEDURE — 36415 COLL VENOUS BLD VENIPUNCTURE: CPT | Performed by: PHYSICIAN ASSISTANT

## 2018-10-23 PROCEDURE — 84484 ASSAY OF TROPONIN QUANT: CPT | Performed by: PHYSICIAN ASSISTANT

## 2018-10-23 PROCEDURE — 70450 CT HEAD/BRAIN W/O DYE: CPT

## 2018-10-24 VITALS
TEMPERATURE: 98 F | WEIGHT: 315 LBS | HEART RATE: 78 BPM | BODY MASS INDEX: 45.1 KG/M2 | DIASTOLIC BLOOD PRESSURE: 78 MMHG | SYSTOLIC BLOOD PRESSURE: 130 MMHG | OXYGEN SATURATION: 96 % | RESPIRATION RATE: 18 BRPM | HEIGHT: 70 IN

## 2018-10-24 NOTE — DISCHARGE INSTRUCTIONS
Acute Headache   WHAT YOU NEED TO KNOW:   An acute headache is pain or discomfort that starts suddenly and gets worse quickly  You may have an acute headache only when you feel stress or eat certain foods  Other acute headache pain can happen every day, and sometimes several times a day  DISCHARGE INSTRUCTIONS:   Return to the emergency department if:   · You have severe pain  · You have numbness or weakness on one side of your face or body  · You have a headache that occurs after a blow to the head, a fall, or other trauma  · You have a headache, are forgetful or confused, or have trouble speaking  · You have a headache, stiff neck, and a fever  Contact your healthcare provider if:   · You have a constant headache and are vomiting  · You have a headache each day that does not get better, even after treatment  · You have changes in your headaches, or new symptoms that occur when you have a headache  · You have questions or concerns about your condition or care  Medicines: You may need any of the following:  · Prescription pain medicine  may be given  The medicine your healthcare provider recommends will depend on the kind of headaches you have  You will need to take prescription headache medicines as directed to prevent a problem called rebound headache  These headaches happen with regular use of pain relievers for headache disorders  · NSAIDs , such as ibuprofen, help decrease swelling, pain, and fever  This medicine is available with or without a doctor's order  NSAIDs can cause stomach bleeding or kidney problems in certain people  If you take blood thinner medicine, always ask your healthcare provider if NSAIDs are safe for you  Always read the medicine label and follow directions  · Acetaminophen  decreases pain and fever  It is available without a doctor's order  Ask how much to take and how often to take it  Follow directions   Read the labels of all other medicines you are using to see if they also contain acetaminophen, or ask your doctor or pharmacist  Acetaminophen can cause liver damage if not taken correctly  Do not use more than 3 grams (3,000 milligrams) total of acetaminophen in one day  · Antidepressants  may be given for some kinds of headaches  · Take your medicine as directed  Contact your healthcare provider if you think your medicine is not helping or if you have side effects  Tell him or her if you are allergic to any medicine  Keep a list of the medicines, vitamins, and herbs you take  Include the amounts, and when and why you take them  Bring the list or the pill bottles to follow-up visits  Carry your medicine list with you in case of an emergency  Manage your symptoms:   · Apply heat or ice  on the headache area  Use a heat or ice pack  For an ice pack, you can also put crushed ice in a plastic bag  Cover the pack or bag with a towel before you apply it to your skin  Ice and heat both help decrease pain, and heat also helps decrease muscle spasms  Apply heat for 20 to 30 minutes every 2 hours  Apply ice for 15 to 20 minutes every hour  Apply heat or ice for as long and for as many days as directed  You may alternate heat and ice  · Relax your muscles  Lie down in a comfortable position and close your eyes  Relax your muscles slowly  Start at your toes and work your way up your body  · Keep a record of your headaches  Write down when your headaches start and stop  Include your symptoms and what you were doing when the headache began  Record what you ate or drank for 24 hours before the headache started  Describe the pain and where it hurts  Keep track of what you did to treat your headache and if it worked  Prevent an acute headache:   · Avoid anything that triggers an acute headache  Examples include exposure to chemicals, going to high altitude, or not getting enough sleep  Create a regular sleep routine   Go to sleep at the same time and wake up at the same time each day  Do not use electronic devices before bedtime  These may trigger a headache or prevent you from sleeping well  · Do not smoke  Nicotine and other chemicals in cigarettes and cigars can trigger an acute headache or make it worse  Ask your healthcare provider for information if you currently smoke and need help to quit  E-cigarettes or smokeless tobacco still contain nicotine  Talk to your healthcare provider before you use these products  · Limit alcohol as directed  Alcohol can trigger an acute headache or make it worse  If you have cluster headaches, do not drink alcohol during an episode  For other types of headaches, ask your healthcare provider if it is safe for you to drink alcohol  Ask how much is safe for you to drink, and how often  · Exercise as directed  Exercise can reduce tension and help with headache pain  Aim for 30 minutes of physical activity on most days of the week  Your healthcare provider can help you create an exercise plan  · Eat a variety of healthy foods  Healthy foods include fruits, vegetables, low-fat dairy products, lean meats, fish, whole grains, and cooked beans  Your healthcare provider or dietitian can help you create meals plans if you need to avoid foods that trigger headaches  Follow up with your healthcare provider as directed:  Bring your headache record with you when you see your healthcare provider  Write down your questions so you remember to ask them during your visits  © 2017 [facility] Information is for End User's use only and may not be sold, redistributed or otherwise used for commercial purposes  All illustrations and images included in CareNotes® are the copyrighted property of A D A M [employer] Inc  or [employer]  The above information is an  only  It is not intended as medical advice for individual conditions or treatments   Talk to your doctor, nurse or pharmacist before following any medical regimen to see if it is safe and effective for you

## 2018-10-24 NOTE — ED PROVIDER NOTES
History  Chief Complaint   Patient presents with    Dizziness     started saturday, on/off to doctors trying to figure out what's wrong     [facility]  is a 46 y o  male w PMH HTN who presents for evaluation of dizziness  Patient complains of multiple complaints  He mentions dizziness that has been ongoing for several months but has worsened since Saturday  Since Saturday he feels more weak than he has over the past few months  He was getting out of his car on Saturday, lost his balance and nearly fell but did not  Describes dizziness as feeling off balance but denies that the room is spinning  Denies that he feels like he will pass out  He has not fallen  He has chest pain that he describes is a chest cramps on the left side that this has been present for several months without any change  He follows with his family doctor closely regarding his high blood pressure  He is compliant with his medications  He has no visual changes  Has a headache that is chronic but slightly worse over the past 3 days  Located in the is septal region  He has no neck stiffness  No fevers or chills  Headache rated as 6/10  It is improved with over-the-counter medicines  He has had no trauma or injury  No new leg swelling  No history of PE/DVT, no recent travel, trauma, surgery, no malignancy, no unilateral leg swelling  No abdominal pain, some occasional nausea  No vomiting or change in bowel habits  Has intermittent palpitations for months as well  No unilateral extremity numbness or weakness  Prior to Admission Medications   Prescriptions Last Dose Informant Patient Reported? Taking?    amLODIPine (NORVASC) 10 mg tablet   No Yes   Sig: Take 1 tablet by mouth daily   lisinopril (ZESTRIL) 20 mg tablet   No Yes   Sig: Take 1 tablet by mouth daily      Facility-Administered Medications: None       Past Medical History:   Diagnosis Date    Hypertension        Past Surgical History:   Procedure Laterality Date    KNEE SURGERY      LEG SURGERY         Family History   Problem Relation Age of Onset    Family history unknown: Yes     I have reviewed and agree with the history as documented  Social History   Substance Use Topics    Smoking status: Current Some Day Smoker     Packs/day: 0 20     Types: Cigars    Smokeless tobacco: Never Used    Alcohol use Yes      Comment: occassionally        Review of Systems   Constitutional: Negative for activity change, chills, diaphoresis, fatigue and fever  HENT: Negative for congestion and rhinorrhea  Eyes: Negative for pain  Respiratory: Negative for cough, chest tightness, shortness of breath and wheezing  Cardiovascular: Positive for chest pain (chronic )  Negative for palpitations  Gastrointestinal: Negative for abdominal distention, constipation, diarrhea, nausea and vomiting  Genitourinary: Negative for difficulty urinating and dysuria  Musculoskeletal: Negative for arthralgias and myalgias  Neurological: Positive for dizziness and headaches  Negative for weakness and light-headedness  Psychiatric/Behavioral: The patient is not nervous/anxious  Physical Exam  Physical Exam   Constitutional: He is oriented to person, place, and time  He appears well-developed and well-nourished  No distress  HENT:   Head: Normocephalic and atraumatic  Eyes: Pupils are equal, round, and reactive to light  Neck: Normal range of motion  Neck supple  No tracheal deviation present  Cardiovascular: Normal rate, regular rhythm, normal heart sounds and intact distal pulses  Exam reveals no gallop and no friction rub  No murmur heard  Pulmonary/Chest: Effort normal and breath sounds normal  No respiratory distress  He has no wheezes  He has no rales  Abdominal: Soft  Bowel sounds are normal  He exhibits no distension and no mass  There is no tenderness  There is no guarding  Musculoskeletal: He exhibits no edema or deformity  Neurological: He is alert and oriented to person, place, and time  gcs 15 non focal   Normal motor / sensory function  Clear fluent speech   CN exam 2 - 12 intact  Normal cerebellar function w finger to nose    Skin: Skin is warm and dry  He is not diaphoretic  Edematous lower extremities    Psychiatric: He has a normal mood and affect  His behavior is normal    Nursing note and vitals reviewed        Vital Signs  ED Triage Vitals   Temperature Pulse Respirations Blood Pressure SpO2   10/23/18 1823 10/23/18 1823 10/23/18 1823 10/23/18 1823 10/23/18 1823   98 5 °F (36 9 °C) 95 18 (!) 200/100 93 %      Temp Source Heart Rate Source Patient Position - Orthostatic VS BP Location FiO2 (%)   10/23/18 2016 -- 10/23/18 2016 10/23/18 2016 --   Oral  Lying Right arm       Pain Score       10/23/18 1823       5           Vitals:    10/23/18 1823 10/23/18 2016 10/24/18 0058   BP: (!) 200/100 128/83 130/78   Pulse: 95 88 78   Patient Position - Orthostatic VS:  Lying        Visual Acuity  Visual Acuity      Most Recent Value   L Pupil Size (mm)  3   R Pupil Size (mm)  3          ED Medications  Medications - No data to display    Diagnostic Studies  Results Reviewed     Procedure Component Value Units Date/Time    Troponin I [[id number]]  (Normal) Collected:  10/23/18 2058    Lab Status:  Final result Specimen:  Blood from Arm, Left Updated:  10/23/18 2133     Troponin I <0 02 ng/mL     Comprehensive metabolic panel [[id number]]  (Abnormal) Collected:  10/23/18 2058    Lab Status:  Final result Specimen:  Blood from Arm, Left Updated:  10/23/18 2130     Sodium 138 mmol/L      Potassium 3 1 (L) mmol/L      Chloride 101 mmol/L      CO2 30 mmol/L      ANION GAP 7 mmol/L      BUN 16 mg/dL      Creatinine 1 27 mg/dL      Glucose 124 mg/dL      Calcium 9 2 mg/dL      AST 36 U/L      ALT 48 U/L      Alkaline Phosphatase 77 U/L      Total Protein 7 7 g/dL      Albumin 3 5 g/dL      Total Bilirubin 0 60 mg/dL      eGFR 65 ml/min/1 73sq m     Narrative:         National Kidney Disease Education Program recommendations are as follows:  GFR calculation is accurate only with a steady state creatinine  Chronic Kidney disease less than 60 ml/min/1 73 sq  meters  Kidney failure less than 15 ml/min/1 73 sq  meters  Lipase [[id number]]  (Normal) Collected:  10/23/18 2058    Lab Status:  Final result Specimen:  Blood from Arm, Left Updated:  10/23/18 2130     Lipase 147 u/L     CBC and differential [[id number]] Collected:  10/23/18 2058    Lab Status:  Final result Specimen:  Blood from Arm, Left Updated:  10/23/18 2113     WBC 9 28 Thousand/uL      RBC 5 55 Million/uL      Hemoglobin 15 6 g/dL      Hematocrit 47 3 %      MCV 85 fL      MCH 28 1 pg      MCHC 33 0 g/dL      RDW 14 3 %      MPV 9 8 fL      Platelets 765 Thousands/uL      nRBC 0 /100 WBCs      Neutrophils Relative 65 %      Immat GRANS % 0 %      Lymphocytes Relative 19 %      Monocytes Relative 9 %      Eosinophils Relative 6 %      Basophils Relative 1 %      Neutrophils Absolute 6 11 Thousands/µL      Immature Grans Absolute 0 04 Thousand/uL      Lymphocytes Absolute 1 73 Thousands/µL      Monocytes Absolute 0 82 Thousand/µL      Eosinophils Absolute 0 52 Thousand/µL      Basophils Absolute 0 06 Thousands/µL                  CT head without contrast   Final Result by Carmen Rivas MD (10/23 2146)      No acute intracranial abnormality  Workstation performed: [id number]         XR chest 2 views   ED Interpretation by Pat Jernigan PA-C (10/23 2133)   No acute abnormality or significant change from prior                 Procedures  Procedures       Phone Contacts  ED Phone Contact    ED Course  ED Course as of Oct 24 0510   Tue Oct 23, 2018   2129 EKG Interpretation    Rate: 90 BPM  Rhythm: NSR  Axis: LAD  Intervals: Normal, LAFB, QTc 450ms  Q waves: no  T waves: flattened in III   ST segments: no depression / elevation    Impression: nonspecific EKG   EKG for comparison: 6/12/18 EKG interpreted by me  HEART Risk Score      Most Recent Value   History  0 Filed at: 10/23/2018 2344   ECG  0 Filed at: 10/23/2018 2344   Age  1 Filed at: 10/23/2018 2344   Risk Factors  1 Filed at: 10/23/2018 2344   Troponin  0 Filed at: 10/23/2018 2344   Heart Score Risk Calculator   History  0 Filed at: 10/23/2018 2344   ECG  0 Filed at: 10/23/2018 2344   Age  1 Filed at: 10/23/2018 2344   Risk Factors  1 Filed at: 10/23/2018 2344   Troponin  0 Filed at: 10/23/2018 2344   HEART Score  2 Filed at: 10/23/2018 2344   HEART Score  2 Filed at: 10/23/2018 2344                            MDM  Number of Diagnoses or Management Options  Chronic chest pain:   Dizziness:   Generalized weakness:   Headache:   Diagnosis management comments: DDX includes but not ltd to: Most of pts sx are chronic but just slightly worse today  BP significantly elevated in triage but it was checked w a cuff that was too small  When checked w appropriate cuff his pressure has been 120s or 130s  Consider TIA / CVA  Consider ACS / arhythmia  Consider vertigo although dizziness seems to come at rest or w position changes - it is not consistent  Consider lyte abnormality / alternate metabolic derangement  Plan is to obtain:  CBC to check for anemia, leukocytosis, hydration status  Chemistry panel to check for lyte abnormalities, organ function   EKG/trop to check for ischemic changes   CT head to check for any acute intracranial abnormality/ ICH/ SAH/ SDH/ lesion/ mass/ CVA     Based on results:  No acute abn on labs / CTH  Given his sx are chronic would expect to find a stroke on [facility]  If anything more acute would need MRI but most recent change in sx was Saturday  His cp is chronic - offered him serial trop which he declined and I think is reasonable as pain is all chronic w no changes  He reports he can follow w his pcp tomorrow or the next day  Will return here if any worsening   He ambulated in ED wo any issue around department, feels better and wants to go home  Return parameters discussed  Pt requires f/u as an outpt  Pt expresses understanding w above treatment plan  All questions answered prior to d/c  CritCare Time    Disposition  Final diagnoses:   Dizziness   Generalized weakness   Chronic chest pain   Headache     Time reflects when diagnosis was documented in both MDM as applicable and the Disposition within this note     Time User Action Codes Description Comment    10/24/2018 12:20 AM Malou Able Add [R42] Dizziness     10/24/2018 12:20 AM Malou Able Add [R53 1] Generalized weakness     10/24/2018 12:20 AM Malou Able Add [R07 9,  G89 29] Chronic chest pain     10/24/2018 12:21 AM Malou Able Add [R51] Headache       ED Disposition     ED Disposition Condition Comment    Discharge  [facility]  discharge to home/self care  Condition at discharge: Good        Follow-up Information     Follow up With Specialties Details Why Contact Info Additional Information    [facility]  If symptoms worsen [address] [facility] [address] ED, [address], [place], [address] [facility] Emergency Medicine Call in 1 day  [address]       [address] [facility] Neurology Call in 1 day  [address] [facility] [id number]  [address] Neu[address], [address]          Discharge Medication List as of 10/24/2018 12:21 AM      CONTINUE these medications which have NOT CHANGED    Details   amLODIPine (NORVASC) 10 mg tablet Take 1 tablet by mouth daily, Starting Wed 9/13/2017, Print      lisinopril (ZESTRIL) 20 mg tablet Take 1 tablet by mouth daily, Starting Tue 9/12/2017, Print           No discharge procedures on file      ED Provider  Electronically Signed by           Elaine Lyons PA-C  10/24/18 0510

## 2019-01-14 ENCOUNTER — APPOINTMENT (EMERGENCY)
Dept: CT IMAGING | Facility: HOSPITAL | Age: 49
End: 2019-01-14
Payer: COMMERCIAL

## 2019-01-14 ENCOUNTER — HOSPITAL ENCOUNTER (OUTPATIENT)
Facility: HOSPITAL | Age: 49
Setting detail: OBSERVATION
Discharge: HOME | End: 2019-01-15
Attending: EMERGENCY MEDICINE | Admitting: INTERNAL MEDICINE
Payer: COMMERCIAL

## 2019-01-14 ENCOUNTER — APPOINTMENT (EMERGENCY)
Dept: RADIOLOGY | Facility: HOSPITAL | Age: 49
End: 2019-01-14
Payer: COMMERCIAL

## 2019-01-14 ENCOUNTER — APPOINTMENT (OUTPATIENT)
Dept: NON INVASIVE DIAGNOSTICS | Facility: HOSPITAL | Age: 49
End: 2019-01-14
Payer: COMMERCIAL

## 2019-01-14 DIAGNOSIS — R07.89 OTHER CHEST PAIN: ICD-10-CM

## 2019-01-14 DIAGNOSIS — R51.9 HEADACHE: ICD-10-CM

## 2019-01-14 DIAGNOSIS — E87.6 HYPOKALEMIA: ICD-10-CM

## 2019-01-14 DIAGNOSIS — I10 ESSENTIAL HYPERTENSION: ICD-10-CM

## 2019-01-14 DIAGNOSIS — R07.9 CHEST PAIN: Primary | ICD-10-CM

## 2019-01-14 PROBLEM — R20.2 PARESTHESIAS: Status: RESOLVED | Noted: 2017-09-11 | Resolved: 2019-01-14

## 2019-01-14 LAB
ALBUMIN SERPL BCP-MCNC: 3.7 G/DL (ref 3.5–5)
ALP SERPL-CCNC: 81 U/L (ref 46–116)
ALT SERPL W P-5'-P-CCNC: 52 U/L (ref 12–78)
ANION GAP SERPL CALCULATED.3IONS-SCNC: 12 MMOL/L (ref 4–13)
AST SERPL W P-5'-P-CCNC: 50 U/L (ref 5–45)
BASOPHILS # BLD AUTO: 0.08 THOUSANDS/ΜL (ref 0–0.1)
BASOPHILS NFR BLD AUTO: 1 % (ref 0–1)
BILIRUB SERPL-MCNC: 1 MG/DL (ref 0.2–1)
BUN SERPL-MCNC: 14 MG/DL (ref 5–25)
CALCIUM SERPL-MCNC: 8.9 MG/DL (ref 8.3–10.1)
CHLORIDE SERPL-SCNC: 102 MMOL/L (ref 100–108)
CHOLEST SERPL-MCNC: 106 MG/DL (ref 50–200)
CO2 SERPL-SCNC: 29 MMOL/L (ref 21–32)
CREAT SERPL-MCNC: 1.32 MG/DL (ref 0.6–1.3)
EOSINOPHIL # BLD AUTO: 0.44 THOUSAND/ΜL (ref 0–0.61)
EOSINOPHIL NFR BLD AUTO: 5 % (ref 0–6)
ERYTHROCYTE [DISTWIDTH] IN BLOOD BY AUTOMATED COUNT: 15.3 % (ref 11.6–15.1)
EST. AVERAGE GLUCOSE BLD GHB EST-MCNC: 189 MG/DL
GFR SERPL CREATININE-BSD FRML MDRD: 62 ML/MIN/1.73SQ M
GLUCOSE SERPL-MCNC: 142 MG/DL (ref 65–140)
HBA1C MFR BLD: 8.2 % (ref 4.2–6.3)
HCT VFR BLD AUTO: 51.4 % (ref 36.5–49.3)
HDLC SERPL-MCNC: 33 MG/DL (ref 40–60)
HGB BLD-MCNC: 16.8 G/DL (ref 12–17)
IMM GRANULOCYTES # BLD AUTO: 0.02 THOUSAND/UL (ref 0–0.2)
IMM GRANULOCYTES NFR BLD AUTO: 0 % (ref 0–2)
LDLC SERPL CALC-MCNC: 52 MG/DL (ref 0–100)
LYMPHOCYTES # BLD AUTO: 2.02 THOUSANDS/ΜL (ref 0.6–4.47)
LYMPHOCYTES NFR BLD AUTO: 23 % (ref 14–44)
MCH RBC QN AUTO: 27.6 PG (ref 26.8–34.3)
MCHC RBC AUTO-ENTMCNC: 32.7 G/DL (ref 31.4–37.4)
MCV RBC AUTO: 85 FL (ref 82–98)
MONOCYTES # BLD AUTO: 0.68 THOUSAND/ΜL (ref 0.17–1.22)
MONOCYTES NFR BLD AUTO: 8 % (ref 4–12)
NEUTROPHILS # BLD AUTO: 5.41 THOUSANDS/ΜL (ref 1.85–7.62)
NEUTS SEG NFR BLD AUTO: 63 % (ref 43–75)
NRBC BLD AUTO-RTO: 0 /100 WBCS
PLATELET # BLD AUTO: 210 THOUSANDS/UL (ref 149–390)
PLATELET # BLD AUTO: 228 THOUSANDS/UL (ref 149–390)
PMV BLD AUTO: 10.3 FL (ref 8.9–12.7)
PMV BLD AUTO: 9.7 FL (ref 8.9–12.7)
POTASSIUM SERPL-SCNC: 3.3 MMOL/L (ref 3.5–5.3)
PROT SERPL-MCNC: 7.9 G/DL (ref 6.4–8.2)
RBC # BLD AUTO: 6.08 MILLION/UL (ref 3.88–5.62)
SODIUM SERPL-SCNC: 143 MMOL/L (ref 136–145)
TRIGL SERPL-MCNC: 107 MG/DL
TROPONIN I SERPL-MCNC: <0.02 NG/ML
TSH SERPL DL<=0.05 MIU/L-ACNC: 1.88 UIU/ML (ref 0.36–3.74)
WBC # BLD AUTO: 8.65 THOUSAND/UL (ref 4.31–10.16)

## 2019-01-14 PROCEDURE — 93005 ELECTROCARDIOGRAM TRACING: CPT

## 2019-01-14 PROCEDURE — 93306 TTE W/DOPPLER COMPLETE: CPT | Performed by: INTERNAL MEDICINE

## 2019-01-14 PROCEDURE — 99243 OFF/OP CNSLTJ NEW/EST LOW 30: CPT | Performed by: INTERNAL MEDICINE

## 2019-01-14 PROCEDURE — 84484 ASSAY OF TROPONIN QUANT: CPT | Performed by: INTERNAL MEDICINE

## 2019-01-14 PROCEDURE — 83036 HEMOGLOBIN GLYCOSYLATED A1C: CPT | Performed by: INTERNAL MEDICINE

## 2019-01-14 PROCEDURE — 85049 AUTOMATED PLATELET COUNT: CPT | Performed by: INTERNAL MEDICINE

## 2019-01-14 PROCEDURE — 93306 TTE W/DOPPLER COMPLETE: CPT

## 2019-01-14 PROCEDURE — 84484 ASSAY OF TROPONIN QUANT: CPT | Performed by: EMERGENCY MEDICINE

## 2019-01-14 PROCEDURE — 84443 ASSAY THYROID STIM HORMONE: CPT | Performed by: INTERNAL MEDICINE

## 2019-01-14 PROCEDURE — 80061 LIPID PANEL: CPT | Performed by: INTERNAL MEDICINE

## 2019-01-14 PROCEDURE — 80053 COMPREHEN METABOLIC PANEL: CPT | Performed by: EMERGENCY MEDICINE

## 2019-01-14 PROCEDURE — 99219 PR INITIAL OBSERVATION CARE/DAY 50 MINUTES: CPT | Performed by: INTERNAL MEDICINE

## 2019-01-14 PROCEDURE — 99285 EMERGENCY DEPT VISIT HI MDM: CPT

## 2019-01-14 PROCEDURE — 85025 COMPLETE CBC W/AUTO DIFF WBC: CPT | Performed by: EMERGENCY MEDICINE

## 2019-01-14 PROCEDURE — 36415 COLL VENOUS BLD VENIPUNCTURE: CPT | Performed by: EMERGENCY MEDICINE

## 2019-01-14 PROCEDURE — 71046 X-RAY EXAM CHEST 2 VIEWS: CPT

## 2019-01-14 PROCEDURE — 70450 CT HEAD/BRAIN W/O DYE: CPT

## 2019-01-14 RX ORDER — HYDROCHLOROTHIAZIDE 12.5 MG/1
12.5 TABLET ORAL DAILY
COMMUNITY
End: 2019-02-05 | Stop reason: SDUPTHER

## 2019-01-14 RX ORDER — NITROGLYCERIN 0.4 MG/1
0.4 TABLET SUBLINGUAL
Status: DISCONTINUED | OUTPATIENT
Start: 2019-01-14 | End: 2019-01-15 | Stop reason: HOSPADM

## 2019-01-14 RX ORDER — ASPIRIN 81 MG/1
81 TABLET ORAL DAILY
Status: DISCONTINUED | OUTPATIENT
Start: 2019-01-14 | End: 2019-01-15 | Stop reason: HOSPADM

## 2019-01-14 RX ORDER — SENNOSIDES 8.6 MG
1 TABLET ORAL DAILY
Status: DISCONTINUED | OUTPATIENT
Start: 2019-01-14 | End: 2019-01-15 | Stop reason: HOSPADM

## 2019-01-14 RX ORDER — LISINOPRIL 20 MG/1
40 TABLET ORAL DAILY
Status: DISCONTINUED | OUTPATIENT
Start: 2019-01-15 | End: 2019-01-15 | Stop reason: HOSPADM

## 2019-01-14 RX ORDER — CARVEDILOL 3.12 MG/1
3.12 TABLET ORAL 2 TIMES DAILY WITH MEALS
Status: DISCONTINUED | OUTPATIENT
Start: 2019-01-14 | End: 2019-01-14

## 2019-01-14 RX ORDER — MORPHINE SULFATE 4 MG/ML
4 INJECTION, SOLUTION INTRAMUSCULAR; INTRAVENOUS EVERY 4 HOURS PRN
Status: DISCONTINUED | OUTPATIENT
Start: 2019-01-14 | End: 2019-01-15 | Stop reason: HOSPADM

## 2019-01-14 RX ORDER — LISINOPRIL 20 MG/1
20 TABLET ORAL DAILY
Status: DISCONTINUED | OUTPATIENT
Start: 2019-01-14 | End: 2019-01-14

## 2019-01-14 RX ORDER — PANTOPRAZOLE SODIUM 40 MG/1
40 TABLET, DELAYED RELEASE ORAL
Status: DISCONTINUED | OUTPATIENT
Start: 2019-01-14 | End: 2019-01-15 | Stop reason: HOSPADM

## 2019-01-14 RX ORDER — ACETAMINOPHEN 325 MG/1
650 TABLET ORAL EVERY 8 HOURS PRN
Status: DISCONTINUED | OUTPATIENT
Start: 2019-01-14 | End: 2019-01-15 | Stop reason: HOSPADM

## 2019-01-14 RX ORDER — DOCUSATE SODIUM 100 MG/1
100 CAPSULE, LIQUID FILLED ORAL 2 TIMES DAILY
Status: DISCONTINUED | OUTPATIENT
Start: 2019-01-14 | End: 2019-01-15 | Stop reason: HOSPADM

## 2019-01-14 RX ORDER — METOPROLOL TARTRATE 50 MG/1
50 TABLET, FILM COATED ORAL EVERY 12 HOURS SCHEDULED
Status: DISCONTINUED | OUTPATIENT
Start: 2019-01-14 | End: 2019-01-15 | Stop reason: HOSPADM

## 2019-01-14 RX ORDER — POTASSIUM CHLORIDE 20 MEQ/1
20 TABLET, EXTENDED RELEASE ORAL ONCE
Status: COMPLETED | OUTPATIENT
Start: 2019-01-14 | End: 2019-01-14

## 2019-01-14 RX ORDER — 0.9 % SODIUM CHLORIDE 0.9 %
3 VIAL (ML) INJECTION AS NEEDED
Status: DISCONTINUED | OUTPATIENT
Start: 2019-01-14 | End: 2019-01-15 | Stop reason: HOSPADM

## 2019-01-14 RX ORDER — HYDROCHLOROTHIAZIDE 12.5 MG/1
12.5 TABLET ORAL DAILY
Status: DISCONTINUED | OUTPATIENT
Start: 2019-01-15 | End: 2019-01-15 | Stop reason: HOSPADM

## 2019-01-14 RX ORDER — ONDANSETRON 2 MG/ML
4 INJECTION INTRAMUSCULAR; INTRAVENOUS EVERY 6 HOURS PRN
Status: DISCONTINUED | OUTPATIENT
Start: 2019-01-14 | End: 2019-01-15 | Stop reason: HOSPADM

## 2019-01-14 RX ORDER — AMLODIPINE BESYLATE 10 MG/1
10 TABLET ORAL DAILY
Status: DISCONTINUED | OUTPATIENT
Start: 2019-01-14 | End: 2019-01-14

## 2019-01-14 RX ORDER — NICOTINE 21 MG/24HR
1 PATCH, TRANSDERMAL 24 HOURS TRANSDERMAL DAILY
Status: DISCONTINUED | OUTPATIENT
Start: 2019-01-14 | End: 2019-01-14

## 2019-01-14 RX ADMIN — SENNOSIDES 8.6 MG: 8.6 TABLET, FILM COATED ORAL at 09:11

## 2019-01-14 RX ADMIN — METOPROLOL TARTRATE 50 MG: 50 TABLET, FILM COATED ORAL at 21:17

## 2019-01-14 RX ADMIN — PANTOPRAZOLE SODIUM 40 MG: 40 TABLET, DELAYED RELEASE ORAL at 07:27

## 2019-01-14 RX ADMIN — ENOXAPARIN SODIUM 40 MG: 40 INJECTION SUBCUTANEOUS at 09:11

## 2019-01-14 RX ADMIN — ASPIRIN 81 MG: 81 TABLET, COATED ORAL at 09:11

## 2019-01-14 RX ADMIN — METOPROLOL TARTRATE 50 MG: 50 TABLET, FILM COATED ORAL at 12:33

## 2019-01-14 RX ADMIN — CARVEDILOL 3.12 MG: 3.12 TABLET, FILM COATED ORAL at 07:27

## 2019-01-14 RX ADMIN — ACETAMINOPHEN 650 MG: 325 TABLET, FILM COATED ORAL at 07:23

## 2019-01-14 RX ADMIN — AMLODIPINE BESYLATE 10 MG: 10 TABLET ORAL at 09:11

## 2019-01-14 RX ADMIN — LISINOPRIL 20 MG: 20 TABLET ORAL at 09:11

## 2019-01-14 RX ADMIN — DOCUSATE SODIUM 100 MG: 100 CAPSULE, LIQUID FILLED ORAL at 17:05

## 2019-01-14 RX ADMIN — NITROGLYCERIN 0.4 MG: 0.4 TABLET SUBLINGUAL at 07:23

## 2019-01-14 RX ADMIN — DOCUSATE SODIUM 100 MG: 100 CAPSULE, LIQUID FILLED ORAL at 09:11

## 2019-01-14 RX ADMIN — POTASSIUM CHLORIDE 20 MEQ: 1500 TABLET, EXTENDED RELEASE ORAL at 12:33

## 2019-01-14 NOTE — ASSESSMENT & PLAN NOTE
· He presented with acute headache which he reports is improving  No vision changes  · CT of the head negative for any acute intracranial abnormalities  · Also, complaints transient tingling numbness of left arm and left leg  · Check MRI brain  · Symptomatic treatment

## 2019-01-14 NOTE — PLAN OF CARE
Problem: DISCHARGE PLANNING - CARE MANAGEMENT  Goal: Discharge to post-acute care or home with appropriate resources  INTERVENTIONS:  - Conduct assessment to determine patient/family and health care team treatment goals, and need for post-acute services based on payer coverage, community resources, and patient preferences, and barriers to discharge  - Address psychosocial, clinical, and financial barriers to discharge as identified in assessment in conjunction with the patient/family and health care team  - Arrange appropriate level of post-acute services according to patients   needs and preference and payer coverage in collaboration with the physician and health care team  - Communicate with and update the patient/family, physician, and health care team regarding progress on the discharge plan  - Arrange appropriate transportation to post-acute venues  Outcome: Progressing  CM met with pt and wife Ava Prather at bedside  Pt lives with his wife and children in a home with 10-15 FLORY-w/railing  Pt has no problem navigating steps and independent with ADL's  He uses no DME's  Denies substance abuse or mental health issues  He smokes cigars  Dr Stevie Palencia is his PCP  He has never been in rehab or used [facility] services  He uses [employer] and has no problem with his co-pays  He does not have a POA or Advanced  Directive  CM supplied info on both  He was on short term disability, but just returned to work last wk  He does drive, but his wife will transport home when he is medically cleared  CM discussed d/c needs including [facility] services, but pt does not feel this will be needed  OBS status reviewed and signed by pt  Copy to pt and copy to MR for scanning      CM reviewed discharge planning process including the following: identifying help at home, patient preference for discharge planning needs, pharmacy preference, and availability of treatment team to discuss questions or concerns patient and/or family may have regarding understanding medications and recognizing signs and symptoms once discharged  CM also encouraged patient to follow up with all recommended appointments after discharge  Patient advised of importance for patient and family to participate in managing patients medical well being  CM name and role reviewed  Discharge Checklist reviewed and CM will continue to monitor for progress toward discharge goals in nursing and provider rounds

## 2019-01-14 NOTE — ED NOTES
[facility] provider at bedside     [facility]  [facility] 4179 prenatal chart/[place]

## 2019-01-14 NOTE — ED NOTES
Called to give report, per Mirian De La Garza, will call back to get report        Mirna Rehman RN  01/14/19 5548

## 2019-01-14 NOTE — UTILIZATION REVIEW
[address]  Utilization Review Department  Phone: [phone]; Fax [phone]  [email or web address]  ATTENTION: Please call with any questions or concerns to [phone]  and carefully listen to the prompts so that you are directed to the right person  Send all requests for admission clinical reviews, approved or denied determinations and any other requests to fax [phone]  All voicemails are confidential     Initial Clinical Review    Admission: Date/Time/Statement: OBSERVATION 1/14/19 @0617  Orders Placed This Encounter   Procedures    Place in Observation (expected length of stay for this patient is less than two midnights)     Standing Status:   Standing     Number of Occurrences:   1     Order Specific Question:   Admitting Physician     Answer:   Chelita Barraza [[id number]]     Order Specific Question:   Level of Care     Answer:   Med Surg [16]     ED: Date/Time/Mode of Arrival:   ED Arrival Information     Expected Arrival Acuity Means of Arrival Escorted By Service Admission Type    - 1/14/2019 04:35 [facility] Medicine Emergency    Arrival Complaint    CHEST PAIN        Chief Complaint:   Chief Complaint   Patient presents with    Chest Pain     Onset 1 hour ago, was awoken from sleep with left chest pain and headache  History of Illness: 47 yo m to ED from home by EMS due to 9/10 L chest pain persistent/aggravated w/movement, relieved by rest  Rad to L shoulder and L head/neck  Lasted couple of minutes, then spontaneously resolved  +diaphoresis, palpitations, and nausea  Wife stated pt pale briefly  On arrival c/o intermittent L arm/leg numbness     ED Vital Signs:   ED Triage Vitals [01/14/19 0437]   Temperature Pulse Respirations Blood Pressure SpO2   98 5 °F (36 9 °C) 80 19 130/82 92 %      Temp Source Heart Rate Source Patient Position - Orthostatic VS BP Location FiO2 (%)   Oral Monitor Lying Right arm --      Pain Score       9        Wt Readings from Last 1 Encounters:   01/14/19 (!) 149 kg (329 lb)     Vital Signs (abnormal): 175/106   177/106  Pertinent Labs/Diagnostic Test Results:   k 3 3   Creat 1 32   Gluc 142   Trop wnl so far  Wbc 8 65   hgb 16 8   hct 51 4     CT head: No acute intracranial abnormality   Chronic appearing paranasal sinus disease appears similar to October 23, 2018  There appear to be some superficial soft tissue nodules involving the scalp   There is increased soft tissue density within the subcutaneous fat of the right suboccipital region extending to the overlying skin where there appears to be some associated skin thickening  CXR: nothing acute    Echo: pending  EKG: Normal sinus rhythm with left anterior fascicular block    MRI brain: pending    ED Treatment: cardiac workup  Past Medical/Surgical History: Active Ambulatory Problems     Diagnosis Date Noted    Headache 09/11/2017    Essential hypertension 09/12/2017       Past Medical History:   Diagnosis Date    Hypertension      Admitting Diagnosis: Chest pain [R07 9]  Headache [R51]  Age/Sex: 46 y o  male  Assessment/Plan: 47 yo m to ED from home by EMS admitted under observation due to chest pain & headache  9/10 L pain, radiating to L shoulder, L head/neck, assoc with sob, palpitations, and nausea  Was pale  Became diaphoretic with numbness & tingling L arm/leg  Cardio consulted, serial trops, asa, bb, ntg, morphine in progress  MRI brain pending     Admission Orders:  Scheduled Meds:   Current Facility-Administered Medications:  acetaminophen 650 mg Oral Q8H PRN   amLODIPine 10 mg Oral Daily   aspirin 81 mg Oral Daily   carvedilol 3 125 mg Oral BID With Meals   docusate sodium 100 mg Oral BID   enoxaparin 40 mg Subcutaneous Daily   lisinopril 20 mg Oral Daily   morphine injection 4 mg Intravenous Q4H PRN   nitroglycerin 0 4 mg Sublingual Q5 Min PRN x 1 1/14 @0723   ondansetron 4 mg Intravenous Q6H PRN   pantoprazole 40 mg Oral Early Morning   senna 1 tablet Oral Daily   sodium chloride (PF) 3 mL Intravenous PRN     Tele  oob as amarilys  Daily wt  I/O  scd's  MRI brain  Cmp, mg, phos, cbc, tsh in am  A1c, lipid panel  Trop q3h  Cardiac diet  Cons cardio  Cons case mgmt  Echo    Per med 1/14 1220: still having L chest pain with headache  Await cardio consult  +MARCIANO fortune

## 2019-01-14 NOTE — PLAN OF CARE
Problem: Potential for Falls  Goal: Patient will remain free of falls  INTERVENTIONS:  - Assess patient frequently for physical needs  -  Identify cognitive and physical deficits and behaviors that affect risk of falls    -  Gallion fall precautions as indicated by assessment   - Educate patient/family on patient safety including physical limitations  - Instruct patient to call for assistance with activity based on assessment  - Modify environment to reduce risk of injury  - Consider OT/PT consult to assist with strengthening/mobility    Outcome: Progressing      Problem: PAIN - ADULT  Goal: Verbalizes/displays adequate comfort level or baseline comfort level  Interventions:  - Encourage patient to monitor pain and request assistance  - Assess pain using appropriate pain scale  - Administer analgesics based on type and severity of pain and evaluate response  - Implement non-pharmacological measures as appropriate and evaluate response  - Consider cultural and social influences on pain and pain management  - Notify physician/advanced practitioner if interventions unsuccessful or patient reports new pain   Outcome: Progressing      Problem: INFECTION - ADULT  Goal: Absence or prevention of progression during hospitalization  INTERVENTIONS:  - Assess and monitor for signs and symptoms of infection  - Monitor lab/diagnostic results  - Monitor all insertion sites, i e  indwelling lines, tubes, and drains  - Monitor endotracheal (as able) and nasal secretions for changes in amount and color  - Gallion appropriate cooling/warming therapies per order  - Administer medications as ordered  - Instruct and encourage patient and family to use good hand hygiene technique  - Identify and instruct in appropriate isolation precautions for identified infection/condition   Outcome: Progressing    Goal: Absence of fever/infection during neutropenic period  INTERVENTIONS:  - Monitor WBC  - Implement neutropenic guidelines   Outcome: Progressing      Problem: SAFETY ADULT  Goal: Maintain or return to baseline ADL function  INTERVENTIONS:  -  Assess patient's ability to carry out ADLs; assess patient's baseline for ADL function and identify physical deficits which impact ability to perform ADLs (bathing, care of mouth/teeth, toileting, grooming, dressing, etc )  - Assess/evaluate cause of self-care deficits   - Assess range of motion  - Assess patient's mobility; develop plan if impaired  - Assess patient's need for assistive devices and provide as appropriate  - Encourage maximum independence but intervene and supervise when necessary  ¯ Involve family in performance of ADLs  ¯ Assess for home care needs following discharge   ¯ Request OT consult to assist with ADL evaluation and planning for discharge  ¯ Provide patient education as appropriate   Outcome: Progressing    Goal: Maintain or return mobility status to optimal level  INTERVENTIONS:  - Assess patient's baseline mobility status (ambulation, transfers, stairs, etc )    - Identify cognitive and physical deficits and behaviors that affect mobility  - Identify mobility aids required to assist with transfers and/or ambulation (gait belt, sit-to-stand, lift, walker, cane, etc )  - Pillager fall precautions as indicated by assessment  - Record patient progress and toleration of activity level on Mobility SBAR; progress patient to next Phase/Stage  - Instruct patient to call for assistance with activity based on assessment  - Request Rehabilitation consult to assist with strengthening/weightbearing, etc    Outcome: Progressing    Goal: Patient will remain free of falls  INTERVENTIONS:  - Assess patient frequently for physical needs  -  Identify cognitive and physical deficits and behaviors that affect risk of falls    -  [place]ager fall precautions as indicated by assessment   - Educate patient/family on patient safety including physical limitations  - Instruct patient to call for assistance with activity based on assessment  - Modify environment to reduce risk of injury  - Consider OT/PT consult to assist with strengthening/mobility    Outcome: Progressing      Problem: DISCHARGE PLANNING  Goal: Discharge to home or other facility with appropriate resources  INTERVENTIONS:  - Identify barriers to discharge w/patient and caregiver  - Arrange for needed discharge resources and transportation as appropriate  - Identify discharge learning needs (meds, wound care, etc )  - Arrange for interpretive services to assist at discharge as needed  - Refer to Case Management Department for coordinating discharge planning if the patient needs post-hospital services based on physician/advanced practitioner order or complex needs related to functional status, cognitive ability, or social support system   Outcome: Progressing      Problem: Knowledge Deficit  Goal: Patient/family/caregiver demonstrates understanding of disease process, treatment plan, medications, and discharge instructions  Complete learning assessment and assess knowledge base    Interventions:  - Provide teaching at level of understanding  - Provide teaching via preferred learning methods   Outcome: Progressing

## 2019-01-14 NOTE — ASSESSMENT & PLAN NOTE
· He presented with left-sided chest pain which he reports is improving  · Continue Telemetry  · Follow up next troponin  Trop x 2 negative  · CXR showed no acute cardiopulmonary disease  · Check Echo  · Aspirin, BB, nitroglycerin prn, morphine prn  · Cardiology consult pending

## 2019-01-14 NOTE — SOCIAL WORK
CM met with pt and wife Jameel Cruz at bedside  Pt lives with his wife and children in a home with 10-15 FLORY-w/railing  Pt has no problem navigating steps and independent with ADL's  He uses no DME's  Denies substance abuse or mental health issues  He smokes cigars  Dr Marianela Munoz is his PCP  He has never been in rehab or used [facility] services  He uses [address] and has no problem with his co-pays  He does not have a POA or Advanced  Directive  CM supplied info on both  He was on short term disability, but just returned to work last wk  He does drive, but his wife will transport home when he is medically cleared  CM discussed d/c needs including [facility] services, but pt does not feel this will be needed  OBS status reviewed and signed by pt  Copy to pt and copy to MR for scanning  CM reviewed discharge planning process including the following: identifying help at home, patient preference for discharge planning needs, pharmacy preference, and availability of treatment team to discuss questions or concerns patient and/or family may have regarding understanding medications and recognizing signs and symptoms once discharged  CM also encouraged patient to follow up with all recommended appointments after discharge  Patient advised of importance for patient and family to participate in managing patients medical well being  CM name and role reviewed  Discharge Checklist reviewed and CM will continue to monitor for progress toward discharge goals in nursing and provider rounds

## 2019-01-14 NOTE — H&P
H&P- Demetri  1970, 46 y o  male MRN: [id number]  Unit/Bed#: ED 12 Encounter: [id number]  Primary Care Provider: Harlene Kanner   Date and time admitted to hospital: 1/14/2019  4:35 AM        Other chest pain   Assessment & Plan    Maintain telemetry  Trend troponin q3h x3, TSH, fasting lipid profile, HBA1C, TTE  Initiate aspirin, beta-blocker, nitroglycerin as needed, morphine as needed  Consult Cardiology for expert opinion  Essential hypertension   Assessment & Plan    Blood pressure reasonable  Continue amlodipine, lisinopril  Headache   Assessment & Plan    CT head negative for any acute intracranial abnormalities  C/O transient tingling numbness of left arm and left leg  Obtain MRI brain  Symptomatic treatment  VTE PROPHYLAXIS:  LOVENOX + SCDs    CODE STATUS: FULL    Anticipated Length of Stay:  Patient will be admitted on an Observation basis with an anticipated length of stay of less than 2 midnights  Justification for Hospital Stay:  Chest pain      CHIEF COMPLAINT    · Chest pain    HISTORY OF PRESENT ILLNESS  Demetri  is a pleasant 78-year-old gentleman with past medical history of morbid obesity, essential hypertension came to the hospital for chest pain  Patient states that he was a weekend with the left-sided chest discomfort which he grades as around 9/10 intensity  The pain persisted aggravated by movement relieved by rest   He states that the pain went over his left shoulder and over the left side of his head and neck  It lasted for a couple of minutes and subsided by itself  Associated diaphoresis, palpitations and nausea  Patient's wife mentions that he appeared pale briefly  Patient now is also complaining of intermittent left-sided numbness involving the left arm and left leg   Patient denies worsening shortness of breath, PND, orthopnea, vomiting, diarrhea, constipation, blood in urine, stool, sputum, fevers, chills, abdominal pain,dysuria,new onset weakness, slurred speech, seizure-like activity,dizziness, blurred vision, loss of consciousness, fall, trauma to the head, recent change in medications, dietary noncompliance, skin rashes, recent travel or recent sick contacts  Patient is accompanied by his wife and son at bedside  REVIEW OF SYSTEMS  · A comprehensive 10 point review system conducted all negative except as mentioned in HPI       PMH/PSH    Past Medical History:   Diagnosis Date    Hypertension        Past Surgical History:   Procedure Laterality Date    KNEE SURGERY      LEG SURGERY         ALLERGIES  No Known Allergies    HOME MEDICATIONS  No current facility-administered medications on file prior to encounter  Current Outpatient Prescriptions on File Prior to Encounter   Medication Sig    amLODIPine (NORVASC) 10 mg tablet Take 1 tablet by mouth daily    lisinopril (ZESTRIL) 20 mg tablet Take 1 tablet by mouth daily         SOCIAL HISTORY     Marital Status: Single   Substance Use History:   History   Alcohol Use    Yes     Comment: occassionally     History   Smoking Status    Current Some Day Smoker    Packs/day: 0 20    Types: Cigars   Smokeless Tobacco    Never Used     History   Drug Use No       FAMILY HISTORY  · Reviewed noncontributory  OBJECTIVE    Vitals:   Blood Pressure: 136/90 (01/14/19 0553)  Pulse: 79 (01/14/19 0553)  Temperature: 98 5 °F (36 9 °C) (01/14/19 0437)  Temp Source: Oral (01/14/19 0437)  Respirations: 18 (01/14/19 0553)  Weight - Scale: (!) 150 kg (329 lb 12 9 oz) (01/14/19 0437)  SpO2: 96 % (01/14/19 0553)    GENERAL: AAO x 3, morbid obesity  HEENT: atraumatic, normocephalic  Oral mucosa moist, no icterus, pallor  PERRLA +  Neck supple, no JVD, no lymphadenopathy, no thryomegaly  CHEST: B/L breath sounds heard  CVS: S1, S2   ABDOMEN: Soft/obese/NT/BS heard  NEUROLOGICAL: CN II -XI grossly intact  No focal motor or sensory deficits   No signs of meningeal irritation or cerebellar dysfunction  EXTREMITIES: No cyanosis/clubbing or edema  LAB DATA  Results for Gladis Cintron (MRN [id number]) as of 1/14/2019 06:54   1/14/2019 04:40 1/14/2019 04:54 1/14/2019 05:06   eGFR 62     Sodium 143     Potassium 3 3 (L)     Chloride 102     CO2 29     Anion Gap 12     BUN 14     Creatinine 1 32 (H)     Glucose, Random 142 (H)     Calcium 8 9     AST 50 (H)     ALT 52     Alkaline Phosphatase 81     Total Protein 7 9     Albumin 3 7     TOTAL BILIRUBIN 1 00     Troponin I  <0 02    WBC 8 65     Red Blood Cell Count 6 08 (H)     Hemoglobin 16 8     HCT 51 4 (H)     MCV 85     MCH 27 6     MCHC 32 7     RDW 15 3 (H)     Platelet Count 740     MPV 10 3     nRBC 0     Neutrophils % 63     Immat GRANS % 0     Lymphocytes Relative 23     Monocytes Relative 8     Eosinophils 5     Basophils Relative 1     Immature Grans Absolute 0 02     Absolute Neutrophils 5 41     Lymphocytes Absolute 2 02     Absolute Monocytes 0 68     Absolute Eosinophils 0 44     Basophils Absolute 0 08     XR CHEST PA & LATERAL   Rpt       Ct Head Without Contrast    Result Date: 1/14/2019  Narrative: CT BRAIN - WITHOUT CONTRAST INDICATION:   headache  Headache, chest pain, history of hypertension  COMPARISON:  October 23, 2018  TECHNIQUE:  CT examination of the brain was performed  In addition to axial images, coronal 2D reformatted images were created and submitted for interpretation  Radiation dose length product (DLP) for this visit:  900 mGy-cm   This examination, like all CT scans performed in the [facility], was performed utilizing techniques to minimize radiation dose exposure, including the use of iterative reconstruction and automated exposure control  IMAGE QUALITY:  Diagnostic  FINDINGS: PARENCHYMA:  No intracranial mass, mass effect or midline shift  No CT signs of acute infarction  No acute parenchymal hemorrhage  VENTRICLES AND EXTRA-AXIAL SPACES:  Normal for the patient's age   VISUALIZED ORBITS AND PARANASAL SINUSES:  There are retention cysts and mucosal thickening in the maxillary sinuses, right greater than left, and there is partial opacification of the one of the posterior ethmoid air cells on the right, similar to the prior study  The orbits appear unremarkable  CALVARIUM AND EXTRACRANIAL SOFT TISSUES:  There appear to be some superficial soft tissue nodules involving the scalp  Additionally, there is some increased soft tissue density within the subcutaneous fat of the right suboccipital region extending to the skin, and there is some skin thickening in that area  The scalp findings all appear unchanged compared to the prior study  Clinical correlation is recommended  The calvarium appears intact  Impression: No acute intracranial abnormality  Chronic appearing paranasal sinus disease appears similar to October 23, 2018  There appear to be some superficial soft tissue nodules involving the scalp  There is increased soft tissue density within the subcutaneous fat of the right suboccipital region extending to the overlying skin where there appears to be some associated skin thickening  These findings all appear similar to October 23, 2018  Clinical correlation is recommended  Workstation performed: [id number]       EKG, Pathology, and Other Studies Reviewed on Admission:  Normal sinus rhythm with left anterior fascicular block  Total time spent in the process of admission, completion of records, counseling, coordination of care, discussion with patient/family was approximately 26 minutes  Amna Trent MD  HOSPITALIST SERVICES  1/14/2019      PLEASE NOTE:  This encounter was completed utilizing the [employer] Direct Speech Voice Recognition Software  Grammatical errors, random word insertions, pronoun errors and incomplete sentences are occasional consequences of the system due to software limitations, ambient noise and hardware issues  These may be missed by proof reading prior to affixing electronic signature  Any questions or concerns about the content, text or information contained within the body of this dictation should be directly addressed to the physician for clarification  Please do not hesitate to call me directly if you have any any questions or concerns

## 2019-01-14 NOTE — PROGRESS NOTES
Progress Note - Amrik Cantor  1970, 46 y o  male MRN: [id number]    Unit/Bed#: -01 Encounter: [id number]    Primary Care Provider: Andrew Ruff   Date and time admitted to hospital: 1/14/2019  4:35 AM    Post-admission note: Other chest pain   Assessment & Plan    · He presented with left-sided chest pain which he reports is improving  · Continue Telemetry  · Follow up next troponin  Trop x 2 negative  · CXR showed no acute cardiopulmonary disease  · Check Echo  · Aspirin, BB, nitroglycerin prn, morphine prn  · Cardiology consult pending  Hypokalemia   Assessment & Plan    · K 3 3  Replace  Check Mg  · BMP in am      Essential hypertension   Assessment & Plan    · Continue Lisinopril 40mg po daily, HCTZ 12 5mg po daily, and Metoprolol 50mg po BID  · Called pharm to verify medications  Headache   Assessment & Plan    · He presented with acute headache which he reports is improving  No vision changes  · CT of the head negative for any acute intracranial abnormalities  · Also, complaints transient tingling numbness of left arm and left leg  · Check MRI brain  · Symptomatic treatment  VTE Pharmacologic Prophylaxis:   Pharmacologic: Enoxaparin (Lovenox)    Patient Centered Rounds: I discussed with nurse outside of patient's room  Discussions with Specialists or Other Care Team Provider: Discussed with case management  Education and Discussions with Family / Patient: Discussed with patient and family at bedside  Time Spent for Care: 30 minutes  More than 50% of total time spent on counseling and coordination of care as described above  Current Length of Stay: 0 day(s)    Current Patient Status: Observation   Certification Statement: The patient will continue to require additional inpatient hospital stay due to further evaluation of CP and HA  Discharge Plan: Not medically cleared      Code Status: Level 1 - Full Code      Subjective:   Patient reports his left sided chest pain is less  No SOB  He also reports his headache is less  No vision changes  He reports some tingling of his left upper extremity  No abdominal pain  Objective:     Vitals:   Temp (24hrs), Av °F (36 7 °C), Min:97 6 °F (36 4 °C), Max:98 5 °F (36 9 °C)    Temp:  [97 6 °F (36 4 °C)-98 5 °F (36 9 °C)] 97 6 °F (36 4 °C)  HR:  [63-80] 76  Resp:  [18-19] 18  BP: (130-177)/() 135/79  SpO2:  [92 %-96 %] 93 %  Body mass index is 47 21 kg/m²  Input and Output Summary (last 24 hours): Intake/Output Summary (Last 24 hours) at 19 1220  Last data filed at 19 0900   Gross per 24 hour   Intake              240 ml   Output                0 ml   Net              240 ml       Physical Exam:     Physical Exam   Constitutional: He is oriented to person, place, and time  No distress  HENT:   Head: Normocephalic and atraumatic  Eyes: No scleral icterus  Neck: Neck supple  Cardiovascular: Normal rate and regular rhythm  Pulmonary/Chest: Effort normal and breath sounds normal  No respiratory distress  He has no wheezes  Abdominal: Soft  Bowel sounds are normal  He exhibits no distension  There is no tenderness  Musculoskeletal: He exhibits no edema  Neurological: He is alert and oriented to person, place, and time  Skin: Skin is warm and dry  He is not diaphoretic  Vitals reviewed  Additional Data:     Labs:      Results from last 7 days  Lab Units 19  0844 19  0440   WBC Thousand/uL  --  8 65   HEMOGLOBIN g/dL  --  16 8   HEMATOCRIT %  --  51 4*   PLATELETS Thousands/uL 210 228   NEUTROS PCT %  --  63   LYMPHS PCT %  --  23   MONOS PCT %  --  8   EOS PCT %  --  5       Results from last 7 days  Lab Units 19  0440   POTASSIUM mmol/L 3 3*   CHLORIDE mmol/L 102   CO2 mmol/L 29   BUN mg/dL 14   CREATININE mg/dL 1 32*   CALCIUM mg/dL 8 9   ALK PHOS U/L 81   ALT U/L 52   AST U/L 50*           * I Have Reviewed All Lab Data Listed Above    * Additional Pertinent Lab Tests Reviewed: All Labs Within Last 24 Hours Reviewed    Imaging:    Imaging Reports Reviewed Today Include: CT Head and CXR  Recent Cultures (last 7 days):           Last 24 Hours Medication List:     Current Facility-Administered Medications:  acetaminophen 650 mg Oral Q8H PRN Garcia Moseley MD   aspirin 81 mg Oral Daily Garcia Moseley MD   docusate sodium 100 mg Oral BID Garcia Moseley MD   enoxaparin 40 mg Subcutaneous Daily Garcia Moseley MD   [START ON 1/15/2019] hydrochlorothiazide 12 5 mg Oral Daily Aurora Mcdowell PA-C   [START ON 1/15/2019] lisinopril 40 mg Oral Daily Aurora Mcdowell PA-C   metoprolol tartrate 50 mg Oral Q12H [facility] Jacque White PA-C   morphine injection 4 mg Intravenous Q4H PRN Garcia Moseley MD   nitroglycerin 0 4 mg Sublingual Q5 Min PRN Garcia Moseley MD   ondansetron 4 mg Intravenous Q6H PRN Garcia Moseley MD   pantoprazole 40 mg Oral Early Morning Jaspal Becker MD   potassium chloride 20 mEq Oral Once Aurora Mcdowell PA-C   senna 1 tablet Oral Daily Jaspal Becker MD   sodium chloride (PF) 3 mL Intravenous PRN Anshul Krause MD        Today, Patient Was Seen By: Aurora Mcdowell PA-C    ** Please Note: Dictation voice to text software may have been used in the creation of this document   **

## 2019-01-14 NOTE — ED PROVIDER NOTES
History  Chief Complaint   Patient presents with    Chest Pain     Onset 1 hour ago, was awoken from sleep with left chest pain and headache  HPI  46 y o  male presents with 1 hour of substernal chest pain with some radiation to the left chest  Patient describes severe pressure that came on upon awakening (unclear if it woke the patient or he woke up with it) and continues in the ER though improved following nitroglycerin and aspirin by EMS  Patient states nothing clearly worsens the pain and nothing improves the pain  Patient denies exertional component to his chest pain  Patient associates nausea at the time along with headache that has worsened but was present upon awakening (before nitro); denies fever, dyspnea, cough, hemoptysis, weakness, dizziness, back pain, syncope, focal weakness or numbness, leg pain or swelling  All other review of systems reviewed and noted to be negative  The patient denies a history of atherosclerotic disease (CAD/TIA/CVA/PAD)  Patient affirms any history of hypertension, denies hyperlipidemia, denies diabetes; denies any early family history of CAD (male less than 51yo or female less than 71 yo)  The patient affirms any use of tobacco in the past 90 days  The patient denies any use of illicit drugs, including cocaine  Patient denies any immobilization of at least 3 days or surgery in the past 4 weeks  Patient denies any history of DVT or PE  Patient denies any malignancy with treatment within the past 6 months  Objective  PHYSICAL EXAM:  Constitutional:  Moderate acute distress  Eyes: No scleral icterus or erythema  HENT:  Head normocephalic and atraumatic  Pharynx moist without erythema or exudate  CV:  Normal inspection with no rash, signs of infection, or trauma  Regular rate and rhythm  No murmur  Peripheral pulses intact and equal   Respiratory:  Lungs clear to auscultation bilaterally without adventitious sounds    Abdomen:  Soft, non-tender, non-distended  Back:  No rash or signs of herpes zoster  Skin:  Normal color  Warm and Dry  Extremities:  Non-tender lower extremities without asymmetry; no clinical signs of DVT  No lower extremity edema  Neuro: Alert and answers questions appropriately  Normal finger to nose  Normal fine motor function with rapid finger movements  Normal hand tap  Cranial nerves II through XII grossly intact  Visual fields grossly intact  Upper and lower motor strength 5/5 and symmetric  Normal light touch sensory exam  Normal DTRs  Psych: Normal mood and affect  Medical Decision Making    HEART SCORE  History: 2   -Highly suspicious +2, Moderately suspicious +1   -Likelihood ratios: radiation to both arms (2 6), similar to prior ischemia (2 2), change in pattern over 24 hours (2 0)  EK (ST changes +2, Other abnormalities +1)  Age: 1 (greater 65 +2, 45-65 +1)  Risk factors: 1 (3 or more +2, 1-2 factors +1)  Troponin: 0 (greater than 3 times limit +2)  Total: 5    Patient presenting with chest pain with a broad differential including emergent diagnoses pericarditis, acute coronary syndrome, pulmonary embolism, pneumonia, rib fracture, and pneumothorax  Patient with multiple risk factors but no known history of CAD  Patient with multiple prior admissions for hypertensive urgency  Patient has a HEART score of 5  EKG obtained and reviewed independently by myself which demonstrated NSR with nonspecific findings, no acute FLORY  CXR obtained and reviewed independently by myself; this did not demonstrate any acute abnormalities  Labs obtained and reviewed  Considering headache with history of hypertension, CT head obtained which did not demonstrate acute findings, unclear skin findings which I discussed with the patient the need for follow up with PCP  Headache possibly worsened from nitroglycerin    Patient's HEART score demonstrated high risk for ACS and, patient likely to benefit from continued observation in the [facility] for serial monitoring of troponin and cardiac monitoring  Patient in agreement with this plan  Prior to Admission Medications   Prescriptions Last Dose Informant Patient Reported? Taking? amLODIPine (NORVASC) 10 mg tablet Not Taking at Unknown time  No No   Sig: Take 1 tablet by mouth daily   Patient not taking: Reported on 1/14/2019    hydrochlorothiazide (HYDRODIURIL) 12 5 mg tablet  Self Yes Yes   Sig: Take 12 5 mg by mouth daily   lisinopril (ZESTRIL) 20 mg tablet  Self No No   Sig: Take 1 tablet by mouth daily   Patient taking differently: Take 40 mg by mouth daily     metoprolol tartrate (LOPRESSOR) 25 mg tablet  Self Yes Yes   Sig: Take 50 mg by mouth every 12 (twelve) hours      Facility-Administered Medications: None       Past Medical History:   Diagnosis Date    Hypertension        Past Surgical History:   Procedure Laterality Date    KNEE SURGERY      LEG SURGERY         Family History   Problem Relation Age of Onset    Family history unknown: Yes     I have reviewed and agree with the history as documented  Social History   Substance Use Topics    Smoking status: Current Some Day Smoker     Packs/day: 0 20     Types: Cigars    Smokeless tobacco: Never Used    Alcohol use Yes      Comment: occassionally        Review of Systems   All other systems reviewed and are negative        Physical Exam  Physical Exam    Vital Signs  ED Triage Vitals [01/14/19 0437]   Temperature Pulse Respirations Blood Pressure SpO2   98 5 °F (36 9 °C) 80 19 130/82 92 %      Temp Source Heart Rate Source Patient Position - Orthostatic VS BP Location FiO2 (%)   Oral Monitor Lying Right arm --      Pain Score       9           Vitals:    01/14/19 0754 01/14/19 1100 01/14/19 1547 01/14/19 1945   BP: 132/82 135/79 150/90 142/86   Pulse: 77 76 72 77   Patient Position - Orthostatic VS: Lying Lying  Sitting       Visual Acuity      ED Medications  Medications   sodium chloride (PF) 0 9 % injection 3 mL (not administered)   enoxaparin (LOVENOX) subcutaneous injection 40 mg (40 mg Subcutaneous Given 1/14/19 0911)   docusate sodium (COLACE) capsule 100 mg (100 mg Oral Given 1/14/19 1705)   senna (SENOKOT) tablet 8 6 mg (8 6 mg Oral Given 1/14/19 0911)   ondansetron (ZOFRAN) injection 4 mg (not administered)   acetaminophen (TYLENOL) tablet 650 mg (650 mg Oral Given 1/14/19 0723)   pantoprazole (PROTONIX) EC tablet 40 mg (40 mg Oral Given 1/14/19 0727)   nitroglycerin (NITROSTAT) SL tablet 0 4 mg (0 4 mg Sublingual Given 1/14/19 0723)   morphine (PF) 4 mg/mL injection 4 mg (not administered)   aspirin (ECOTRIN LOW STRENGTH) EC tablet 81 mg (81 mg Oral Given 1/14/19 0911)   lisinopril (ZESTRIL) tablet 40 mg (not administered)   metoprolol tartrate (LOPRESSOR) tablet 50 mg (50 mg Oral Given 1/14/19 2117)   hydrochlorothiazide (HYDRODIURIL) tablet 12 5 mg (not administered)   potassium chloride (K-DUR,KLOR-CON) CR tablet 20 mEq (20 mEq Oral Given 1/14/19 1233)       Diagnostic Studies  Results Reviewed     Procedure Component Value Units Date/Time    Troponin I repeat in 3 hrs [[id number]]  (Normal) Collected:  01/14/19 0452    Lab Status:  Final result Specimen:  Blood Updated:  01/14/19 0508     Troponin I <0 02 ng/mL     Comprehensive metabolic panel [[id number]]  (Abnormal) Collected:  01/14/19 0440    Lab Status:  Final result Specimen:  Blood from Arm, Left Updated:  01/14/19 0506     Sodium 143 mmol/L      Potassium 3 3 (L) mmol/L      Chloride 102 mmol/L      CO2 29 mmol/L      ANION GAP 12 mmol/L      BUN 14 mg/dL      Creatinine 1 32 (H) mg/dL      Glucose 142 (H) mg/dL      Calcium 8 9 mg/dL      AST 50 (H) U/L      ALT 52 U/L      Alkaline Phosphatase 81 U/L      Total Protein 7 9 g/dL      Albumin 3 7 g/dL      Total Bilirubin 1 00 mg/dL      eGFR 62 ml/min/1 73sq m     Narrative:         National Kidney Disease Education Program recommendations are as follows:  GFR calculation is accurate only with a steady state creatinine  Chronic Kidney disease less than 60 ml/min/1 73 sq  meters  Kidney failure less than 15 ml/min/1 73 sq  meters  CBC and differential [[id number]]  (Abnormal) Collected:  01/14/19 0440    Lab Status:  Final result Specimen:  Blood from Arm, Left Updated:  01/14/19 0446     WBC 8 65 Thousand/uL      RBC 6 08 (H) Million/uL      Hemoglobin 16 8 g/dL      Hematocrit 51 4 (H) %      MCV 85 fL      MCH 27 6 pg      MCHC 32 7 g/dL      RDW 15 3 (H) %      MPV 10 3 fL      Platelets 922 Thousands/uL      nRBC 0 /100 WBCs      Neutrophils Relative 63 %      Immat GRANS % 0 %      Lymphocytes Relative 23 %      Monocytes Relative 8 %      Eosinophils Relative 5 %      Basophils Relative 1 %      Neutrophils Absolute 5 41 Thousands/µL      Immature Grans Absolute 0 02 Thousand/uL      Lymphocytes Absolute 2 02 Thousands/µL      Monocytes Absolute 0 68 Thousand/µL      Eosinophils Absolute 0 44 Thousand/µL      Basophils Absolute 0 08 Thousands/µL                  X-ray chest 2 views   ED Interpretation by Rose Allen MD (01/14 8547)   No acute findings  Final Result by Louie Pompa MD (09/26 6426)      No acute cardiopulmonary disease  Workstation performed: [id number]         CT head without contrast   Final Result by Katarina Pina MD (01/14 9253)      No acute intracranial abnormality  Chronic appearing paranasal sinus disease appears similar to October 23, 2018  There appear to be some superficial soft tissue nodules involving the scalp  There is increased soft tissue density within the subcutaneous fat of the right suboccipital region extending to the overlying skin where there appears to be some associated    skin thickening  These findings all appear similar to October 23, 2018  Clinical correlation is recommended                    Workstation performed: [id number]         MRI brain wo contrast    (Results Pending)              Procedures  Procedures       Phone Contacts  ED Phone Contact    ED Course  ED Course as of Roberto 15 0004   Mon Jan 14, 2019   8489 EKG demonstrates normal sinus rhythm with no acute ST segment changes  There is a flipped T-wave in lead 3 which appears new from prior from last October though it was flattened at that point  Otherwise nonspecific changes  0510 baseline Creatinine: (!) 1 32   0609 Patient with persistent pain, stating it feels like a bruise though improved from initial after nitroglycerin and aspirin  Patient does note stress testing several months ago that he reports as unremarkable though I can find no information regarding this in the medical history  There is a cardiology note from November that does not mention this  Patient with multiple admissions for hypertensive urgency previously, denies any history of similar chest pain  As such in considering patient's heart score, will place patient in observation for continued monitoring and evaluation            HEART Risk Score      Most Recent Value   History  2 Filed at: 01/14/2019 0443   ECG     Age  1 Filed at: 01/14/2019 0443   Risk Factors  2 Filed at: 01/14/2019 0443   Troponin     Heart Score Risk Calculator   History  2 Filed at: 01/14/2019 0443   ECG     Age  1 Filed at: 01/14/2019 0443   Risk Factors  2 Filed at: 01/14/2019 0443   Troponin     HEART Score     HEART Score                              MDM  CritCare Time    Disposition  Final diagnoses:   Chest pain   Headache     Time reflects when diagnosis was documented in both MDM as applicable and the Disposition within this note     Time User Action Codes Description Comment    1/14/2019  6:17 AM Renetta Ferrer Add [R07 9] Chest pain     1/14/2019  6:19 AM Renetta Ferrer Add [R51] Headache       ED Disposition     None      Follow-up Information    None         Current Discharge Medication List      CONTINUE these medications which have NOT CHANGED    Details   hydrochlorothiazide (HYDRODIURIL) 12 5 mg tablet Take 12 5 mg by mouth daily      metoprolol tartrate (LOPRESSOR) 25 mg tablet Take 50 mg by mouth every 12 (twelve) hours      amLODIPine (NORVASC) 10 mg tablet Take 1 tablet by mouth daily  Qty: 90 tablet, Refills: 0      lisinopril (ZESTRIL) 20 mg tablet Take 1 tablet by mouth daily  Qty: 90 tablet, Refills: 0           No discharge procedures on file      ED Provider  Electronically Signed by           Eris Cast MD  01/15/19 7656

## 2019-01-14 NOTE — ASSESSMENT & PLAN NOTE
Maintain telemetry  Trend troponin q3h x3, TSH, fasting lipid profile, TTE  Initiate aspirin, beta-blocker, nitroglycerin as needed, morphine as needed  Consult Cardiology for expert opinion

## 2019-01-14 NOTE — ASSESSMENT & PLAN NOTE
CT head negative for any acute intracranial abnormalities  C/O transient tingling numbness of left arm and left leg  Obtain MRI brain  Symptomatic treatment

## 2019-01-14 NOTE — ASSESSMENT & PLAN NOTE
· Continue Lisinopril 40mg po daily, HCTZ 12 5mg po daily, and Metoprolol 50mg po BID  · Called pharm to verify medications

## 2019-01-14 NOTE — CONSULTS
Consultation - Cardiology    [facility]  46 y o  male MRN: [id number]  Unit/Bed#: -01 Encounter: [id number]    Physician Requesting Consult: Cristina Smith MD    Reason for Consult / Chief Complaint:  Chest pain    HPI: [facility]  is a 46 y o  male with a past medical history significant for hypertension  patient came to the emergency room with complaints of chest pain  Patient states he began to have chest pain over the weekend  It was left-sided  He states he was not a 10 on a pain scale  It was worse with movement, relieved with rest   He states nitroglycerin helped  He states it was over the left shoulder and on the left side of his head as well  He also had sweatiness, palpitations, nausea  His wife stated he looked pale  He also had left-sided numbness of the arm and the leg  Also shortness of breath  Patient currently states the symptoms are resolved  Patient states he had stress test and echocardiogram done last summer at Dr Cindi Avlia office will try and obtain those results  No history of CAD for himself          Historical Information   Past Medical History:   Diagnosis Date    Hypertension      Past Surgical History:   Procedure Laterality Date    KNEE SURGERY      LEG SURGERY       History   Alcohol Use    Yes     Comment: occassionally     History   Drug Use No     History   Smoking Status    Current Some Day Smoker    Packs/day: 0 20    Types: Cigars   Smokeless Tobacco    Never Used       FAMILY HISTORY:  Family History   Problem Relation Age of Onset    Family history unknown: Yes       MEDS & ALLERGIES:  all current active meds have been reviewed and current meds:   Current Facility-Administered Medications   Medication Dose Route Frequency    acetaminophen (TYLENOL) tablet 650 mg  650 mg Oral Q8H PRN    aspirin (ECOTRIN LOW STRENGTH) EC tablet 81 mg  81 mg Oral Daily    docusate sodium (COLACE) capsule 100 mg  100 mg Oral BID    enoxaparin (LOVENOX) subcutaneous injection 40 mg  40 mg Subcutaneous Daily    [START ON 1/15/2019] hydrochlorothiazide (HYDRODIURIL) tablet 12 5 mg  12 5 mg Oral Daily    [START ON 1/15/2019] lisinopril (ZESTRIL) tablet 40 mg  40 mg Oral Daily    metoprolol tartrate (LOPRESSOR) tablet 50 mg  50 mg Oral Q12H [address]    morphine (PF) 4 mg/mL injection 4 mg  4 mg Intravenous Q4H PRN    nitroglycerin (NITROSTAT) SL tablet 0 4 mg  0 4 mg Sublingual Q5 Min PRN    ondansetron (ZOFRAN) injection 4 mg  4 mg Intravenous Q6H PRN    pantoprazole (PROTONIX) EC tablet 40 mg  40 mg Oral Early Morning    senna (SENOKOT) tablet 8 6 mg  1 tablet Oral Daily    sodium chloride (PF) 0 9 % injection 3 mL  3 mL Intravenous PRN        No Known Allergies      REVIEW OF SYSTEMS:  Constitutional: Denies fever or chills  Eyes: Denies eye discharge or change in visual acuity   HENT: Denies sneezing, nasal congestion or sore throat   Respiratory: Denies cough, expectoration or shortness of breath   Cardiovascular:+ chest pain  GI: Denies abdominal pain, nausea, vomiting, bloody stools or diarrhea   : Denies dysuria, frequency, difficulty in micturition or nocturia  Musculoskeletal: Denies back pain or joint pain   Neurologic: Denies lightheadedness, syncope, headache, focal weakness or sensory changes   Endocrine: Denies polyuria or polydipsia   Lymphatic: Denies swollen glands   Psychiatric: Denies depression, anxiety or panic       PHYSICAL EXAM:  Vitals:   Vitals:    19 1100   BP: 135/79   Pulse: 76   Resp: 18   Temp: 97 6 °F (36 4 °C)   SpO2: 93%     Body mass index is 47 21 kg/m²      Systolic (14HUS), OH , Min:130 , IGZ:588     Diastolic (22YPR), DHV:84, Min:79, Max:106      Intake/Output Summary (Last 24 hours) at 19 1344  Last data filed at 19 0900   Gross per 24 hour   Intake              240 ml   Output                0 ml   Net              240 ml     Weight (last 2 days)     Date/Time   Weight    19 0731  (!)  149 (329)    19 5901  (!)  149 (328 49)    01/14/19 0437  (!)  150 (329 81)            Invasive Devices     Peripheral Intravenous Line            Peripheral IV 10/23/18 Left Hand 82 days    Peripheral IV 01/14/19 Left Hand less than 1 day                General: Patient is not in acute distress  Laying comfortably in the bed  Awake, alert, responding to commands  Head: Normocephalic  Atraumatic  HEENT: Both pupils normal sized, round and reactive to light  Nonicteric  Neck: JVP not elevated  Trachea central  No thyromegaly  Respiratory: Bilateral bronchovascular breath sounds with no crackles or rhonchi  Cardiovascular: RRR  S1 and S2 normal  No murmur, rub or gallop  GI: + obese Abdomen soft, nontender  No guarding or rigidity  Liver and spleen not palpable  Bowel sounds present  Neurologic: Patient is awake, alert, responding to commands  Well-oriented to time, place and person   Moving all extremities  Extremities: No clubbing, cyanosis or edema  Integument: No skin rashes or ulceration  Lymphatic: No cervical lymphadenopathy      LABORATORY RESULTS:    Results from last 7 days  Lab Units 01/14/19  1307 01/14/19  0844 01/14/19  0454   TROPONIN I ng/mL <0 02 <0 02 <0 02       CBC with diff:   Results from last 7 days  Lab Units 01/14/19  0844 01/14/19  0440   WBC Thousand/uL  --  8 65   HEMOGLOBIN g/dL  --  16 8   HEMATOCRIT %  --  51 4*   MCV fL  --  85   PLATELETS Thousands/uL 210 228   MCH pg  --  27 6   MCHC g/dL  --  32 7   RDW %  --  15 3*   MPV fL 9 7 10 3   NRBC AUTO /100 WBCs  --  0       CMP:  Results from last 7 days  Lab Units 01/14/19  0440   POTASSIUM mmol/L 3 3*   CHLORIDE mmol/L 102   CO2 mmol/L 29   BUN mg/dL 14   CREATININE mg/dL 1 32*   CALCIUM mg/dL 8 9   AST U/L 50*   ALT U/L 52   ALK PHOS U/L 81   EGFR ml/min/1 73sq m 62       BMP:  Results from last 7 days  Lab Units 01/14/19  0440   POTASSIUM mmol/L 3 3*   CHLORIDE mmol/L 102   CO2 mmol/L 29   BUN mg/dL 14   CREATININE mg/dL 1 32*   CALCIUM mg/dL 8 9 Results from last 7 days  Lab Units 19  0844   HEMOGLOBIN A1C % 8 2*       Results from last 7 days  Lab Units 19  0844   TSH 3RD GENERATON uIU/mL 1 882           Lipid Profile:   No results found for: CHOL  Lab Results   Component Value Date    HDL 33 (L) 2019    HDL 34 (L) 2017     Lab Results   Component Value Date    LDLCALC 52 2019    LDLCALC 59 2017     Lab Results   Component Value Date    TRIG 107 2019    TRIG 137 2017       Cardiac testing:   Results for orders placed during the hospital encounter of 09/10/17   Echo complete with contrast if indicated    Narrative [address] [place] [id number] [phone]    Transthoracic Echocardiogram  2D, M-mode, Doppler, and Color Doppler    Study date:  11-Sep-2017    Patient: Ephraim Monroe  MR number: [id number]  Account number: [de-identified]  : 1970  Age: 50 years  Gender: Male  Status: Outpatient  Location: Bedside  Height: 70 in  Weight: 306 lb  BP: 166/ 107 mmHg    Indications: Hypertensive Heart Disease    Diagnoses: 402 90 - HYPERTENSIVE HRT DIS NOS    Sonographer:  DONNA Zendejas,RDCS  Interpreting Physician:  Becky Jo MD  Referring Physician:  Olivia Roque PA-C  Group:  [facility]    SUMMARY    LEFT VENTRICLE:  Systolic function was normal  Ejection fraction was estimated to be 65 %  There were no regional wall motion abnormalities  Wall thickness was mildly increased  There was mild concentric hypertrophy  Doppler parameters were consistent with abnormal left ventricular relaxation (grade 1 diastolic dysfunction)  MITRAL VALVE:  There was mild regurgitation  AORTIC VALVE:  The valve was not visualized well enough to rule out a bicuspid morphology  Leaflets exhibited normal thickness and normal cuspal separation      HISTORY: PRIOR HISTORY: Paresthesias, Headache, Hypertensive Urgency    PROCEDURE: The procedure was performed at the bedside  This was a routine study  The transthoracic approach was used  The study included complete 2D imaging, M-mode, complete spectral Doppler, and color Doppler  The heart rate was 73 bpm,  at the start of the study  Images were obtained from the parasternal, apical, subcostal, and suprasternal notch acoustic windows  Echocardiographic views were limited due to poor acoustic window availability, decreased penetration, and  lung interference  This was a technically difficult study  LEFT VENTRICLE: Size was normal  Systolic function was normal  Ejection fraction was estimated to be 65 %  There were no regional wall motion abnormalities  Wall thickness was mildly increased  There was mild concentric hypertrophy  DOPPLER: Doppler parameters were consistent with abnormal left ventricular relaxation (grade 1 diastolic dysfunction)  RIGHT VENTRICLE: The size was normal  Systolic function was normal  Wall thickness was normal     LEFT ATRIUM: Size was normal     RIGHT ATRIUM: Size was normal     MITRAL VALVE: Valve structure was normal  There was normal leaflet separation  DOPPLER: The transmitral velocity was within the normal range  There was no evidence for stenosis  There was mild regurgitation  AORTIC VALVE: The valve was not visualized well enough to rule out a bicuspid morphology  Leaflets exhibited normal thickness and normal cuspal separation  The valve was not well visualized  DOPPLER: Transaortic velocity was within the  normal range  There was no evidence for stenosis  There was no regurgitation  TRICUSPID VALVE: The valve structure was normal  There was normal leaflet separation  DOPPLER: The transtricuspid velocity was within the normal range  There was no evidence for stenosis  There was no regurgitation  PULMONIC VALVE: Leaflets exhibited normal thickness, no calcification, and normal cuspal separation   DOPPLER: The transpulmonic velocity was within the normal range  There was no regurgitation  PERICARDIUM: There was no pericardial effusion  The pericardium was normal in appearance  AORTA: The root exhibited normal size  SYSTEMIC VEINS: IVC: The inferior vena cava was normal in size and course  Respirophasic changes were normal     SYSTEM MEASUREMENT TABLES    2D  %FS: 55 6 %  Ao Diam: 2 5 cm  EDV(Teich): 121 8 ml  EF(Teich): 85 9 %  ESV(Teich): 17 1 ml  IVSd: 1 2 cm  LA Area: 20 5 cm2  LA Diam: 3 6 cm  LVEDV MOD A4C: 156 6 ml  LVEF MOD A4C: 70 5 %  LVESV MOD A4C: 46 2 ml  LVIDd: 5 1 cm  LVIDs: 2 2 cm  LVLd A4C: 9 cm  LVLs A4C: 7 1 cm  LVPWd: 1 3 cm  RA Area: 14 1 cm2  RVIDd: 2 9 cm  SV MOD A4C: 110 4 ml  SV(Teich): 104 6 ml    MM  TAPSE: 2 1 cm    PW  E': 0 1 m/s  E/E': 6 3  MV A Ketan: 0 8 m/s  MV Dec Beltrami: 2 8 m/s2  MV DecT: 201 2 ms  MV E Ketan: 0 6 m/s  MV E/A Ratio: 0 7  MV PHT: 58 3 ms  MVA By PHT: 3 8 cm2    [facility] Accredited Echocardiography Laboratory    Prepared and electronically signed by    Matthias Diaz MD  Signed 11-Sep-2017 18:12:05       No results found for this or any previous visit  No procedure found  No results found for this or any previous visit  Imaging: I have personally reviewed pertinent reports  Procedure: X-ray Chest 2 Views    Result Date: 1/14/2019  Narrative: CHEST INDICATION:   chest pain  COMPARISON:  10/23/2018 EXAM PERFORMED/VIEWS:  XR CHEST PA & LATERAL FINDINGS: Cardiomediastinal silhouette appears unremarkable  The lungs are clear  No pneumothorax or pleural effusion  Osseous structures appear within normal limits for patient age  Impression: No acute cardiopulmonary disease  Workstation performed: [id number]     Procedure: Ct Head Without Contrast    Result Date: 1/14/2019  Narrative: CT BRAIN - WITHOUT CONTRAST INDICATION:   headache  Headache, chest pain, history of hypertension  COMPARISON:  October 23, 2018   TECHNIQUE:  CT examination of the brain was performed  In addition to axial images, coronal 2D reformatted images were created and submitted for interpretation  Radiation dose length product (DLP) for this visit:  900 mGy-cm   This examination, like all CT scans performed in the [facility], was performed utilizing techniques to minimize radiation dose exposure, including the use of iterative reconstruction and automated exposure control  IMAGE QUALITY:  Diagnostic  FINDINGS: PARENCHYMA:  No intracranial mass, mass effect or midline shift  No CT signs of acute infarction  No acute parenchymal hemorrhage  VENTRICLES AND EXTRA-AXIAL SPACES:  Normal for the patient's age  VISUALIZED ORBITS AND PARANASAL SINUSES:  There are retention cysts and mucosal thickening in the maxillary sinuses, right greater than left, and there is partial opacification of the one of the posterior ethmoid air cells on the right, similar to the prior study  The orbits appear unremarkable  CALVARIUM AND EXTRACRANIAL SOFT TISSUES:  There appear to be some superficial soft tissue nodules involving the scalp  Additionally, there is some increased soft tissue density within the subcutaneous fat of the right suboccipital region extending to the skin, and there is some skin thickening in that area  The scalp findings all appear unchanged compared to the prior study  Clinical correlation is recommended  The calvarium appears intact  Impression: No acute intracranial abnormality  Chronic appearing paranasal sinus disease appears similar to October 23, 2018  There appear to be some superficial soft tissue nodules involving the scalp  There is increased soft tissue density within the subcutaneous fat of the right suboccipital region extending to the overlying skin where there appears to be some associated skin thickening  These findings all appear similar to October 23, 2018  Clinical correlation is recommended   Workstation performed: [id number]       EKG reviewed personally:  Unavailable to me at this time      Assessment and Plan:  1-chest pain, troponins negative x3, will arrange for echocardiogram to assess LV function and regional wall motion abnormalities  Spoke to Dr Eliezer Jeffers office patient never had stress test   Will arrange for nuclear stress test tomorrow 1/15/2019 to assess for ischemia  Further recommendations based upon stress test and echocardiogram findings  2-hypertension stable  Continue all medications  3-morbid obesity, weight loss advised      Code Status: Level 1 - Full Code      Thank you for allowing us to participate in this patient's care  This pt will follow up with Dr Amarilis Cotto once discharged  Counseling / Coordination of Care  Total floor / unit time spent today 35 minutes  Greater than 50% of total time was spent with the patient and / or family counseling and / or coordination of care  A description of the counseling / coordination of care: Review of history, current assessment, development of a plan  Oswaldo Tang PA-C  1/14/2019,1:44 PM      Portions of the record may have been created with voice recognition software  Occasional wrong words or sound alike substitutions may have occurred due to the inherent limitations of voice recognition software  Please read the chart carefully and recognize, using context, where substitutions may have occurred

## 2019-01-15 ENCOUNTER — APPOINTMENT (OUTPATIENT)
Dept: NUCLEAR MEDICINE | Facility: HOSPITAL | Age: 49
End: 2019-01-15
Payer: COMMERCIAL

## 2019-01-15 ENCOUNTER — APPOINTMENT (OUTPATIENT)
Dept: NON INVASIVE DIAGNOSTICS | Facility: HOSPITAL | Age: 49
End: 2019-01-15
Payer: COMMERCIAL

## 2019-01-15 ENCOUNTER — APPOINTMENT (OUTPATIENT)
Dept: MRI IMAGING | Facility: HOSPITAL | Age: 49
End: 2019-01-15
Payer: COMMERCIAL

## 2019-01-15 VITALS
BODY MASS INDEX: 45.1 KG/M2 | SYSTOLIC BLOOD PRESSURE: 140 MMHG | OXYGEN SATURATION: 93 % | RESPIRATION RATE: 18 BRPM | HEART RATE: 73 BPM | TEMPERATURE: 98.6 F | WEIGHT: 315 LBS | DIASTOLIC BLOOD PRESSURE: 83 MMHG | HEIGHT: 70 IN

## 2019-01-15 PROBLEM — R07.89 OTHER CHEST PAIN: Status: RESOLVED | Noted: 2019-01-14 | Resolved: 2019-01-15

## 2019-01-15 PROBLEM — R51.9 HEADACHE: Status: RESOLVED | Noted: 2017-09-11 | Resolved: 2019-01-15

## 2019-01-15 PROBLEM — E11.9 DIABETES (HCC): Status: ACTIVE | Noted: 2019-01-15

## 2019-01-15 LAB
ALBUMIN SERPL BCP-MCNC: 3 G/DL (ref 3.5–5)
ALP SERPL-CCNC: 74 U/L (ref 46–116)
ALT SERPL W P-5'-P-CCNC: 47 U/L (ref 12–78)
ANION GAP SERPL CALCULATED.3IONS-SCNC: 8 MMOL/L (ref 4–13)
ARRHY DURING EX: NORMAL
AST SERPL W P-5'-P-CCNC: 35 U/L (ref 5–45)
ATRIAL RATE: 82 BPM
ATRIAL RATE: 84 BPM
BASOPHILS # BLD AUTO: 0.06 THOUSANDS/ΜL (ref 0–0.1)
BASOPHILS NFR BLD AUTO: 1 % (ref 0–1)
BILIRUB SERPL-MCNC: 0.6 MG/DL (ref 0.2–1)
BUN SERPL-MCNC: 15 MG/DL (ref 5–25)
CALCIUM SERPL-MCNC: 8.6 MG/DL (ref 8.3–10.1)
CHEST PAIN STATEMENT: NORMAL
CHLORIDE SERPL-SCNC: 106 MMOL/L (ref 100–108)
CO2 SERPL-SCNC: 28 MMOL/L (ref 21–32)
CREAT SERPL-MCNC: 1.15 MG/DL (ref 0.6–1.3)
EOSINOPHIL # BLD AUTO: 0.28 THOUSAND/ΜL (ref 0–0.61)
EOSINOPHIL NFR BLD AUTO: 4 % (ref 0–6)
ERYTHROCYTE [DISTWIDTH] IN BLOOD BY AUTOMATED COUNT: 15.1 % (ref 11.6–15.1)
GFR SERPL CREATININE-BSD FRML MDRD: 74 ML/MIN/1.73SQ M
GLUCOSE P FAST SERPL-MCNC: 187 MG/DL (ref 65–99)
GLUCOSE SERPL-MCNC: 187 MG/DL (ref 65–140)
HCT VFR BLD AUTO: 44.1 % (ref 36.5–49.3)
HGB BLD-MCNC: 14.5 G/DL (ref 12–17)
IMM GRANULOCYTES # BLD AUTO: 0.03 THOUSAND/UL (ref 0–0.2)
IMM GRANULOCYTES NFR BLD AUTO: 1 % (ref 0–2)
LYMPHOCYTES # BLD AUTO: 1.36 THOUSANDS/ΜL (ref 0.6–4.47)
LYMPHOCYTES NFR BLD AUTO: 21 % (ref 14–44)
MAGNESIUM SERPL-MCNC: 2.2 MG/DL (ref 1.6–2.6)
MAX DIASTOLIC BP: 110 MMHG
MAX HEART RATE: 99 BPM
MAX PREDICTED HEART RATE: 170 BPM
MAX. SYSTOLIC BP: 195 MMHG
MCH RBC QN AUTO: 27.8 PG (ref 26.8–34.3)
MCHC RBC AUTO-ENTMCNC: 32.9 G/DL (ref 31.4–37.4)
MCV RBC AUTO: 85 FL (ref 82–98)
MONOCYTES # BLD AUTO: 0.58 THOUSAND/ΜL (ref 0.17–1.22)
MONOCYTES NFR BLD AUTO: 9 % (ref 4–12)
NEUTROPHILS # BLD AUTO: 4.17 THOUSANDS/ΜL (ref 1.85–7.62)
NEUTS SEG NFR BLD AUTO: 64 % (ref 43–75)
NRBC BLD AUTO-RTO: 0 /100 WBCS
P AXIS: 47 DEGREES
P AXIS: 50 DEGREES
PHOSPHATE SERPL-MCNC: 2.8 MG/DL (ref 2.7–4.5)
PLATELET # BLD AUTO: 210 THOUSANDS/UL (ref 149–390)
PMV BLD AUTO: 9.7 FL (ref 8.9–12.7)
POTASSIUM SERPL-SCNC: 3.4 MMOL/L (ref 3.5–5.3)
PR INTERVAL: 160 MS
PR INTERVAL: 166 MS
PROT SERPL-MCNC: 6.5 G/DL (ref 6.4–8.2)
PROTOCOL NAME: NORMAL
QRS AXIS: -46 DEGREES
QRS AXIS: -46 DEGREES
QRSD INTERVAL: 116 MS
QRSD INTERVAL: 118 MS
QT INTERVAL: 392 MS
QT INTERVAL: 394 MS
QTC INTERVAL: 460 MS
QTC INTERVAL: 463 MS
RBC # BLD AUTO: 5.21 MILLION/UL (ref 3.88–5.62)
REASON FOR TERMINATION: NORMAL
SODIUM SERPL-SCNC: 142 MMOL/L (ref 136–145)
T WAVE AXIS: 1 DEGREES
T WAVE AXIS: 3 DEGREES
TARGET HR FORMULA: NORMAL
TEST INDICATION: NORMAL
TIME IN EXERCISE PHASE: NORMAL
VENTRICULAR RATE: 82 BPM
VENTRICULAR RATE: 84 BPM
WBC # BLD AUTO: 6.48 THOUSAND/UL (ref 4.31–10.16)

## 2019-01-15 PROCEDURE — 93018 CV STRESS TEST I&R ONLY: CPT | Performed by: INTERNAL MEDICINE

## 2019-01-15 PROCEDURE — 93016 CV STRESS TEST SUPVJ ONLY: CPT | Performed by: INTERNAL MEDICINE

## 2019-01-15 PROCEDURE — 99213 OFFICE O/P EST LOW 20 MIN: CPT | Performed by: INTERNAL MEDICINE

## 2019-01-15 PROCEDURE — 78452 HT MUSCLE IMAGE SPECT MULT: CPT | Performed by: INTERNAL MEDICINE

## 2019-01-15 PROCEDURE — 70551 MRI BRAIN STEM W/O DYE: CPT

## 2019-01-15 PROCEDURE — 80053 COMPREHEN METABOLIC PANEL: CPT | Performed by: INTERNAL MEDICINE

## 2019-01-15 PROCEDURE — 78452 HT MUSCLE IMAGE SPECT MULT: CPT

## 2019-01-15 PROCEDURE — 93017 CV STRESS TEST TRACING ONLY: CPT

## 2019-01-15 PROCEDURE — 84100 ASSAY OF PHOSPHORUS: CPT | Performed by: INTERNAL MEDICINE

## 2019-01-15 PROCEDURE — 83735 ASSAY OF MAGNESIUM: CPT | Performed by: INTERNAL MEDICINE

## 2019-01-15 PROCEDURE — 93010 ELECTROCARDIOGRAM REPORT: CPT | Performed by: INTERNAL MEDICINE

## 2019-01-15 PROCEDURE — 85025 COMPLETE CBC W/AUTO DIFF WBC: CPT | Performed by: INTERNAL MEDICINE

## 2019-01-15 PROCEDURE — 99239 HOSP IP/OBS DSCHRG MGMT >30: CPT | Performed by: PHYSICIAN ASSISTANT

## 2019-01-15 PROCEDURE — A9502 TC99M TETROFOSMIN: HCPCS

## 2019-01-15 RX ORDER — LISINOPRIL 40 MG/1
40 TABLET ORAL DAILY
Refills: 0
Start: 2019-01-16 | End: 2019-02-05 | Stop reason: SDUPTHER

## 2019-01-15 RX ORDER — POTASSIUM CHLORIDE 20 MEQ/1
40 TABLET, EXTENDED RELEASE ORAL ONCE
Status: COMPLETED | OUTPATIENT
Start: 2019-01-15 | End: 2019-01-15

## 2019-01-15 RX ORDER — ASPIRIN 81 MG/1
81 TABLET ORAL DAILY
Refills: 0
Start: 2019-01-16 | End: 2019-09-25 | Stop reason: HOSPADM

## 2019-01-15 RX ADMIN — LISINOPRIL 40 MG: 20 TABLET ORAL at 09:29

## 2019-01-15 RX ADMIN — PANTOPRAZOLE SODIUM 40 MG: 40 TABLET, DELAYED RELEASE ORAL at 05:00

## 2019-01-15 RX ADMIN — HYDROCHLOROTHIAZIDE 12.5 MG: 12.5 TABLET ORAL at 09:28

## 2019-01-15 RX ADMIN — POTASSIUM CHLORIDE 40 MEQ: 1500 TABLET, EXTENDED RELEASE ORAL at 17:20

## 2019-01-15 RX ADMIN — ASPIRIN 81 MG: 81 TABLET, COATED ORAL at 09:29

## 2019-01-15 RX ADMIN — REGADENOSON 0.4 MG: 0.08 INJECTION, SOLUTION INTRAVENOUS at 08:16

## 2019-01-15 RX ADMIN — METOPROLOL TARTRATE 50 MG: 50 TABLET, FILM COATED ORAL at 09:29

## 2019-01-15 RX ADMIN — ENOXAPARIN SODIUM 40 MG: 40 INJECTION SUBCUTANEOUS at 09:29

## 2019-01-15 NOTE — SOCIAL WORK
Pt is a new diabetic    A PCP appointment set up with Star Tran for Monday, 1/21/19 at [address] 15 minutes early and bring photo ID, insurance cards, and med list

## 2019-01-15 NOTE — DISCHARGE INSTRUCTIONS
Follow up closely with your physician for management of a new diagnosis of diabetes  It is important to be on a diabetic diet  You may need further management with medication  Also, follow up for close monitoring of your blood pressure  You should follow up with the cardiologist in a few weeks  Also, a referral was placed in our system to set up an appointment with a neurologist for further evaluation as an outpatient  Have the blood work as an outpatient to recheck your potassium level which was low during admission and replaced

## 2019-01-15 NOTE — DISCHARGE SUMMARY
Discharge- Demetri  1970, 46 y o  male MRN: [id number]    Unit/Bed#: -01 Encounter: [id number]    Primary Care Provider: Mike Cheema   Date and time admitted to hospital: 1/14/2019  4:35 AM        Other chest pain   Assessment & Plan    · He presented with left-sided chest pain which has resolved  · Troponins negative x 3   · CXR showed no acute cardiopulmonary disease  · Echo showed EF 60%  · Stress test showed no reversible perfusion defect was seen, there was a moderate-sized, moderately severe, fixed myocardial perfusion defect of the inferior wall  · Continue Aspirin, BB, Lisinopril  · He has been cleared by cardiology for discharge to home today  Discussed with cardiology on day of discharge  · Recommend outpatient follow up  Headache   Assessment & Plan    · He presented with acute headache which has resolved  No vision changes  · CT of the head negative for any acute intracranial abnormalities  · Also, he complained of transient tingling and numbness of left arm and leg which has since resolved  · MRI brain showed a single small T2 and FLAIR hyperintense focus is present within the subcortical white matter of the lateral left mid frontal lobe  Although nonspecific, the finding is suspicious for mild chronic microangiopathic change such as may accompany migraine headaches  Clinical correlation recommended  This appearance not visible by CT  There is no discrete mass, mass effect or midline shift  There is no intracranial hemorrhage  There is no evidence of acute infarction and diffusion imaging is unremarkable  There are no additional white matter changes in the cerebral hemispheres  Persistent calcified anterior falx plaque  · Patient reports a history of symptoms in the past and has seen neurology  He was recommended to have an EMG which was negative  · Patient wishes for discharge home today with outpatient neurology follow up    · Discussed findings with neurology: patient can follow up as outpatient regarding possible further testing, EMG, etc      Diabetes ([address] )   Assessment & Plan    Lab Results   Component Value Date    HGBA1C 8 2 (H) 01/14/2019       No results for input(s): POCGLU in the last 72 hours  Blood Sugar Average: Last 72 hrs:     HGB A1C consistent with diagosis of DM Type 2  He reports in the past, he was told he was prediabetic  Discussed importance of close follow up with his PCP right away for further management/medication recommendations  Discussed diabetic diet/lifestyle changes with patient and information provided  Hypokalemia   Assessment & Plan    · Replaced with K dur  Mg normal   · Recheck BMP as outpatient  Essential hypertension   Assessment & Plan    · Continue Lisinopril 40mg po daily, HCTZ 12 5mg po daily, and Metoprolol 50mg po BID  · He needs close follow up with his PCP for monitoring of his BP  Discharging Physician / Practitioner: Pretty Roman PA-C  PCP: Gretchen Lang  Admission Date:   Admission Orders     Ordered        01/14/19 0617  Place in Observation (expected length of stay for this patient is less than two midnights)  Once             Discharge Date: 01/15/19    Resolved Problems  Date Reviewed: 1/15/2019    None          Consultations During Hospital Stay:  · Cardiology    Procedures Performed:   · None    Significant Findings / Test Results:   · Troponins x 3 negative  · CXR showed no acute cardiopulmonary disease  · Echo showed EF 60%  · Stress test showed no reversible perfusion defect was seen, there was a moderate-sized, moderately severe, fixed myocardial perfusion defect of the inferior wall  · CT of the head negative for any acute intracranial abnormalities  · MRI brain showed a single small T2 and FLAIR hyperintense focus is present within the subcortical white matter of the lateral left mid frontal lobe    Although nonspecific, the finding is suspicious for mild chronic microangiopathic change such as may accompany migraine headaches  Clinical correlation recommended  This appearance not visible by CT  There is no discrete mass, mass effect or midline shift  There is no intracranial hemorrhage  There is no evidence of acute infarction and diffusion imaging is unremarkable  There are no additional white matter changes in the cerebral hemispheres  Persistent calcified anterior falx plaque  · HGB A1C 8 2  Test Results Pending at Discharge (will require follow up): · None     Outpatient Tests Requested:  · Follow up with PCP, cardiology and neurology  Complications:  None    Reason for Admission: Chest pain    Hospital Course:     Lupillo Winkler  is a 46 y o  male patient who originally presented to the hospital on 1/14/2019 due to chest pain  He presented with left sided chest pain and was admitted for further evaluation  He was monitored on telemetry without events  He had serial troponins which were negative  He was seen by Cardiology in consultation  Had an echo which showed an EF of 60%  He also had a stress test showed no reversible perfusion defect was seen, there was a moderate-sized, moderately severe, fixed myocardial perfusion defect of the inferior wall  He was cleared by cardiology for discharge to home today with recommendation for outpatient follow up  He had also reported a headache and transient paresthesias of his left arm which resolved during the admission  He had a CT Head which was negative for acute intracranial abnormality  MRI was also performed which showed a single small T2 and FLAIR hyperintense focus is present within the subcortical white matter of the lateral left mid frontal lobe  Although nonspecific, the finding is suspicious for mild chronic microangiopathic change such as may accompany migraine headaches  No evidence of an acute infarction on MRI    He has seen neurology in the past for a work up and was recommended to have an EMG which he did not have done  Reviewed MRI findings with neurology by phone with recommendation for outpatient follow up  He was also noted to have a new diagnosis of DM with a HGB A1c of 8 2  Diabetic diet instructions provided to patient with recommendation for close follow up with his PCP for further management/medication treatment  Please see above list of diagnoses and related plan for additional information  Condition at Discharge: stable     Discharge Day Visit / Exam:     Subjective:  Patient is eager for discharge home today  No further CP  No SOB  His headache has resolved  His left arm tingling has resolved  He wishes for further follow up as an outpatient and reports he feels much better than when he came in  Vitals: Blood Pressure: 140/83 (01/15/19 1500)  Pulse: 73 (01/15/19 1500)  Temperature: 98 6 °F (37 °C) (01/15/19 1500)  Temp Source: Oral (01/15/19 1500)  Respirations: 18 (01/15/19 1500)  Height: 5' 10" (177 8 cm) (01/14/19 0731)  Weight - Scale: (!) 149 kg (329 lb) (01/14/19 0731)  SpO2: 93 % (01/15/19 1500)  Exam:   Physical Exam   Constitutional: He is oriented to person, place, and time  No distress  Obese  HENT:   Head: Normocephalic and atraumatic  Eyes: No scleral icterus  Neck: Neck supple  Cardiovascular: Normal rate and regular rhythm  No murmur heard  Pulmonary/Chest: Effort normal and breath sounds normal  No respiratory distress  He has no wheezes  Abdominal: Soft  Bowel sounds are normal  He exhibits no distension  There is no tenderness  Neurological: He is alert and oriented to person, place, and time  No cranial nerve deficit  Skin: Skin is warm and dry  He is not diaphoretic  Psychiatric: He has a normal mood and affect  Vitals reviewed  Discharge instructions/Information to patient and family:   See after visit summary for information provided to patient and family        Provisions for Follow-Up Care:  See after visit summary for information related to follow-up care and any pertinent home health orders  Disposition:     Home    For Discharges to [facility]F:   · Not Applicable to this Patient - Not Applicable to this Patient    Planned Readmission: None     Discharge Statement:  I spent 45 minutes discharging the patient  This time was spent on the day of discharge  I had direct contact with the patient on the day of discharge  Greater than 50% of the total time was spent examining patient, answering all patient questions, arranging and discussing plan of care with patient as well as directly providing post-discharge instructions  Additional time then spent on discharge activities  Discharge Medications:  See after visit summary for reconciled discharge medications provided to patient and family        ** Please Note: This note has been constructed using a voice recognition system **

## 2019-01-15 NOTE — UTILIZATION REVIEW
[address] Utilization Review Department  Phone: [phone]; Fax [phone]  [email or web address]  ATTENTION: Please call with any questions or concerns to [phone]  and carefully listen to the prompts so that you are directed to the right person  Send all requests for admission clinical reviews, approved or denied determinations and any other requests to fax [phone]  All voicemails are confidential     Continued Stay Review    Date: 1/15/19    OBSERVATION   Vital Signs: /70 (BP Location: Left arm)   Pulse 73   Temp 98 2 °F (36 8 °C) (Oral)   Resp 18   Ht 5' 10" (1 778 m)   Wt (!) 149 kg (329 lb)   SpO2 90%   BMI 47 21 kg/m²   Assessment/Plan:  47 yo m to ED from home by EMS admitted under observation 1/14/19 due to chest pain & headache  1/15: stress test to be done today    Medications:   Scheduled Meds:   Current Facility-Administered Medications:  acetaminophen 650 mg Oral Q8H PRN   aspirin 81 mg Oral Daily   docusate sodium 100 mg Oral BID   enoxaparin 40 mg Subcutaneous Daily   hydrochlorothiazide 12 5 mg Oral Daily   lisinopril 40 mg Oral Daily   metoprolol tartrate 50 mg Oral Q12H [facility]   morphine injection 4 mg Intravenous Q4H PRN   nitroglycerin 0 4 mg Sublingual Q5 Min PRN   ondansetron 4 mg Intravenous Q6H PRN   pantoprazole 40 mg Oral Early Morning   senna 1 tablet Oral Daily   sodium chloride (PF) 3 mL Intravenous PRN   Pertinent Labs/Diagnostic Results:   1/14 echo: EF 60%, Wall thickness was mildly increased  There was mild concentric hypertrophy  grade 1 diastolic dysfunction  MITRAL VALVE:There was trace regurgitation  Kermitt Sukhwinder was mild regurgitation    TRICUSPID VALVE:There was trace regurgitation      1/15: k 3 4   Gluc 187   Trop wnl   Cbc wnl  a1c 8 2  MRI brain: Small nonspecific left frontal subcortical white matter focus which is suspicious for mild chronic microangiopathic change such as may accompany migraine headaches   No acute ischemic disease identified by diffusion imaging  Mild multifocal paranasal sinus disease    Several persistent superficial scalp nodules, similar to previous    nuc stress: : Abnormal study after pharmacologic stress  No reversible perfusion defect was seen  There was a moderate-sized, moderately severe, fixed myocardial perfusion defect of the inferior wall  Left ventricular systolic function was  reduced, with regional wall motion abnormalities      Age/Sex: 46 y o  male     Discharge Plan: TBD  Is a new diabetic, IP CM set up appt for 1/21/19

## 2019-01-15 NOTE — ASSESSMENT & PLAN NOTE
· Continue Lisinopril 40mg po daily, HCTZ 12 5mg po daily, and Metoprolol 50mg po BID  · He needs close follow up with his PCP for monitoring of his BP

## 2019-01-15 NOTE — UTILIZATION REVIEW
Notification of Observation Care Status/Observation Authorization Request    This is a Notification of Observation Care Status to our facility [facility]  Be advised that this patient was admitted to our facility under Observation Status  Please contact the Utilization Review Department where the patient is receiving care services for additional admission information  Place of Service Code: 25  Place of Service Name: [facility]  CPT Code for Observation:     Presentation Date & Time: 1/14/2019  4:35 AM  Observation Admission Date & Time:  Hours in Observation:  Discharge Date & Time: No discharge date for patient encounter  Discharge Disposition (if discharged): Home/Self Care  Attending Physician: LUPE Israel  [facility] ID- [id number]  Primary Office:  [address] [place] [address], [address] [place] [address]  Phone 1: [phone]  Fax: [phone]    Admission Orders     Ordered        01/14/19 0617  Place in Observation (expected length of stay for this patient is less than two midnights)  Once               Facility: [facility]  Address: [address] [place] [address]  Phone: [phone]  Tax ID: [id number]  NPI: [id number]  Medicare ID: [id number]  [address]  Utilization Review Department  Phone: [phone]; Fax [phone]  ATTENTION: Please call with any questions or concerns to [phone]  and carefully listen to the prompts so that you are directed to the right person  Send all requests for admission clinical reviews, approved or denied determinations and any other requests to fax [phone]   All voicemails are confidential

## 2019-01-15 NOTE — PROGRESS NOTES
Progress Note - Cardiology     [facility]  46 y o  male MRN: [id number]  Unit/Bed#: -01 Encounter: [id number]      Subjective:   Patient denies chest pain, SOB, palpitations, lightheadedness  Scheduled for stress test today      REVIEW OF SYSTEMS:  Constitutional: Denies fever or chills  Eyes: Denies eye discharge or change in visual acuity   HENT: Denies sneezing, nasal congestion or sore throat   Respiratory: Denies cough, expectoration or shortness of breath   Cardiovascular: Denies chest pain, palpitations or lower extremity swelling   GI: Denies abdominal pain, nausea, vomiting, bloody stools or diarrhea   : Denies dysuria, frequency, difficulty in micturition or nocturia  Musculoskeletal: Denies back pain or joint pain   Neurologic: Denies lightheadedness, syncope, headache, focal weakness or sensory changes   Endocrine: Denies polyuria or polydipsia   Lymphatic: Denies swollen glands   Psychiatric: Denies depression, anxiety or panic       Objective:   Vitals:  Vitals:    01/15/19 0331   BP: 142/100   Pulse: 65   Resp: 12   Temp: 97 6 °F (36 4 °C)   SpO2: 92%       Body mass index is 47 21 kg/m²  Systolic (58OQW), JUI:334 , Min:135 , BIF:115     Diastolic (22KHQ), GIQ:17, Min:79, Max:100      Intake/Output Summary (Last 24 hours) at 01/15/19 0900  Last data filed at 01/15/19 0626   Gross per 24 hour   Intake              320 ml   Output              645 ml   Net             -325 ml     Weight (last 2 days)     Date/Time   Weight    01/14/19 0731  (!)  149 (329)    01/14/19 0656  (!)  149 (328 49)    01/14/19 0437  (!)  150 (329 81)              PHYSICAL EXAM:  General:  Morbidly obese  Patient is not in acute distress  Laying comfortably in the bed  Awake, alert, responding to commands  Head: Normocephalic  Atraumatic  HEENT: Both pupils normal sized, round and reactive to light  Nonicteric  Neck: JVP not raised   Trachea central  No thyromegaly  Respiratory: Bilateral bronchovascular breath sounds with no crackles or rhonchi  Cardiovascular: RRR  S1 and S2 normal  No murmur, rub or gallop  GI: Abdomen soft, nontender  No guarding or rigidity  Liver and spleen not palpable  Bowel sounds present  Neurologic: Patient is awake, alert, responding to command   Moving all extremities  Integumentary:  No skin rash  Lymphatic: No cervical lymphadenopathy  Extremities: No clubbing, cyanosis or edema      LABORATORY RESULTS:    Results from last 7 days  Lab Units 01/14/19  1307 01/14/19  0844 01/14/19  0454   TROPONIN I ng/mL <0 02 <0 02 <0 02     CBC with diff:   Results from last 7 days  Lab Units 01/15/19  0449 01/14/19  0844 01/14/19  0440   WBC Thousand/uL 6 48  --  8 65   HEMOGLOBIN g/dL 14 5  --  16 8   HEMATOCRIT % 44 1  --  51 4*   MCV fL 85  --  85   PLATELETS Thousands/uL 210 210 228   MCH pg 27 8  --  27 6   MCHC g/dL 32 9  --  32 7   RDW % 15 1  --  15 3*   MPV fL 9 7 9 7 10 3   NRBC AUTO /100 WBCs 0  --  0       CMP:  Results from last 7 days  Lab Units 01/15/19  0449 01/14/19  0440   POTASSIUM mmol/L 3 4* 3 3*   CHLORIDE mmol/L 106 102   CO2 mmol/L 28 29   BUN mg/dL 15 14   CREATININE mg/dL 1 15 1 32*   CALCIUM mg/dL 8 6 8 9   AST U/L 35 50*   ALT U/L 47 52   ALK PHOS U/L 74 81   EGFR ml/min/1 73sq m 74 62       BMP:  Results from last 7 days  Lab Units 01/15/19  0449 01/14/19  0440   POTASSIUM mmol/L 3 4* 3 3*   CHLORIDE mmol/L 106 102   CO2 mmol/L 28 29   BUN mg/dL 15 14   CREATININE mg/dL 1 15 1 32*   CALCIUM mg/dL 8 6 8 9                Results from last 7 days  Lab Units 01/15/19  0449   MAGNESIUM mg/dL 2 2         Results from last 7 days  Lab Units 01/14/19  0844   HEMOGLOBIN A1C % 8 2*         Results from last 7 days  Lab Units 01/14/19  0844   TSH 3RD GENERATON uIU/mL 1 882             Lipid Profile:   No results found for: CHOL  Lab Results   Component Value Date    HDL 33 (L) 01/14/2019    HDL 34 (L) 09/12/2017     Lab Results   Component Value Date    LDLCALC 52 01/14/2019    LDLCALC 59 2017     Lab Results   Component Value Date    TRIG 107 2019    TRIG 137 2017       Cardiac testing:   Results for orders placed during the hospital encounter of 19   Echo complete with contrast if indicated    Narrative [address] [place] [id number]  [phone]    Transthoracic Echocardiogram  2D, M-mode, Doppler, and Color Doppler    Study date:  2019    Patient: Don lAbarran  MR number: [id number]  Account number: [de-identified]  : 1970  Age: 46 years  Gender: Male  Status: Outpatient  Location: Bedside  Height: 70 in  Weight: 329 lb  BP: 177/ 106 mmHg    Indications: Hypertensive heart disease, Assess left ventricular function  Diagnoses: I11 9 - Hypertensive heart disease without heart failure    Sonographer:  Patito Melgar  Referring Physician:  Sourav Henderson MD  Group:  [facility]  Interpreting Physician:  Gabrielle Soares MD    SUMMARY    PROCEDURE INFORMATION:  This was a technically difficult study  Echocardiographic views were limited due to poor acoustic window availability, decreased penetration, and lung interference  LEFT VENTRICLE:  Systolic function was normal  Ejection fraction was estimated to be 60 %  This study was inadequate for the evaluation of regional wall motion  Wall thickness was mildly increased  There was mild concentric hypertrophy  Doppler parameters were consistent with abnormal left ventricular relaxation (grade 1 diastolic dysfunction)  RIGHT VENTRICLE:  The size was normal   Systolic function was normal     MITRAL VALVE:  There was trace regurgitation  AORTIC VALVE:  There was mild regurgitation  TRICUSPID VALVE:  There was trace regurgitation  HISTORY: Symptoms: chest pain  PRIOR HISTORY: Risk factors: hypertension and morbid obesity  PROCEDURE: The procedure was performed at the bedside  This was a routine study   The transthoracic approach was used  The study included complete 2D imaging, M-mode, complete spectral Doppler, and color Doppler  The heart rate was 69 bpm,  at the start of the study  Images were obtained from the parasternal, apical, subcostal, and suprasternal notch acoustic windows  Echocardiographic views were limited due to poor acoustic window availability, decreased penetration, and  lung interference  This was a technically difficult study  LEFT VENTRICLE: Size was normal  Systolic function was normal  Ejection fraction was estimated to be 60 %  This study was inadequate for the evaluation of regional wall motion  Wall thickness was mildly increased  There was mild concentric  hypertrophy  No evidence of apical thrombus  DOPPLER: Doppler parameters were consistent with abnormal left ventricular relaxation (grade 1 diastolic dysfunction)  RIGHT VENTRICLE: The size was normal  Systolic function was normal  Wall thickness was normal     LEFT ATRIUM: Size was normal     RIGHT ATRIUM: Size was normal     MITRAL VALVE: Valve structure was normal  There was normal leaflet separation  DOPPLER: The transmitral velocity was within the normal range  There was no evidence for stenosis  There was trace regurgitation  AORTIC VALVE: The valve was trileaflet  Leaflets exhibited mildly increased thickness and normal cuspal separation  DOPPLER: Transaortic velocity was within the normal range  There was no evidence for stenosis  There was mild  regurgitation  TRICUSPID VALVE: The valve structure was normal  There was normal leaflet separation  DOPPLER: The transtricuspid velocity was within the normal range  There was no evidence for stenosis  There was trace regurgitation  PULMONIC VALVE: Leaflets exhibited normal thickness, no calcification, and normal cuspal separation  DOPPLER: The transpulmonic velocity was within the normal range  There was no significant regurgitation  PERICARDIUM: There was no pericardial effusion  The pericardium was normal in appearance  AORTA: The root exhibited dilatation - 4 2 cm  SYSTEMIC VEINS: IVC: The inferior vena cava was normal in size  SYSTEM MEASUREMENT TABLES    2D  %FS: 44 2 %  Ao Diam: 4 2 cm  EDV(Teich): 64 2 ml  EF(Teich): 76 1 %  ESV(Teich): 15 4 ml  IVSd: 1 9 cm  LA Area: 16 cm2  LA Diam: 3 2 cm  LVEDV MOD A4C: 117 5 ml  LVEF MOD A4C: 56 2 %  LVESV MOD A4C: 51 5 ml  LVIDd: 3 9 cm  LVIDs: 2 2 cm  LVLd A4C: 8 4 cm  LVLs A4C: 7 cm  LVPWd: 1 3 cm  RA Area: 14 4 cm2  RVIDd: 3 1 cm  SV MOD A4C: 66 ml  SV(Teich): 48 9 ml    CW  AR Dec [place]: 1 1 m/s2  AR Dec Time: 3372 ms  AR PHT: 977 9 ms  AR Vmax: 3 6 m/s  AR maxP 9 mmHg  TR Vmax: 2 3 m/s  TR maxP 8 mmHg    MM  TAPSE: 2 cm    PW  E': 0 1 m/s  E/E': 9  MV A Ketan: 0 8 m/s  MV Dec [place]: 2 9 m/s2  MV DecT: 232 5 ms  MV E Ketan: 0 7 m/s  MV E/A Ratio: 0 9  MV PHT: 67 4 ms  MVA By PHT: 3 3 cm2    [facility] Accredited [facility]    Prepared and electronically signed by    Yaniv Martin MD  Signed 2019 19:14:26         Imaging: I have personally reviewed pertinent reports  Procedure: X-ray Chest 2 Views    Result Date: 2019  Narrative: CHEST INDICATION:   chest pain  COMPARISON:  10/23/2018 EXAM PERFORMED/VIEWS:  XR CHEST PA & LATERAL FINDINGS: Cardiomediastinal silhouette appears unremarkable  The lungs are clear  No pneumothorax or pleural effusion  Osseous structures appear within normal limits for patient age  Impression: No acute cardiopulmonary disease  Workstation performed: [id number]     Procedure: Ct Head Without Contrast    Result Date: 2019  Narrative: CT BRAIN - WITHOUT CONTRAST INDICATION:   headache  Headache, chest pain, history of hypertension  COMPARISON:  2018  TECHNIQUE:  CT examination of the brain was performed  In addition to axial images, coronal 2D reformatted images were created and submitted for interpretation    Radiation dose length product (DLP) for this visit:  900 mGy-cm   This examination, like all CT scans performed in the [facility], was performed utilizing techniques to minimize radiation dose exposure, including the use of iterative reconstruction and automated exposure control  IMAGE QUALITY:  Diagnostic  FINDINGS: PARENCHYMA:  No intracranial mass, mass effect or midline shift  No CT signs of acute infarction  No acute parenchymal hemorrhage  VENTRICLES AND EXTRA-AXIAL SPACES:  Normal for the patient's age  VISUALIZED ORBITS AND PARANASAL SINUSES:  There are retention cysts and mucosal thickening in the maxillary sinuses, right greater than left, and there is partial opacification of the one of the posterior ethmoid air cells on the right, similar to the prior study  The orbits appear unremarkable  CALVARIUM AND EXTRACRANIAL SOFT TISSUES:  There appear to be some superficial soft tissue nodules involving the scalp  Additionally, there is some increased soft tissue density within the subcutaneous fat of the right suboccipital region extending to the skin, and there is some skin thickening in that area  The scalp findings all appear unchanged compared to the prior study  Clinical correlation is recommended  The calvarium appears intact  Impression: No acute intracranial abnormality  Chronic appearing paranasal sinus disease appears similar to October 23, 2018  There appear to be some superficial soft tissue nodules involving the scalp  There is increased soft tissue density within the subcutaneous fat of the right suboccipital region extending to the overlying skin where there appears to be some associated skin thickening  These findings all appear similar to October 23, 2018  Clinical correlation is recommended   Workstation performed: [id number]     Procedure: Mri Brain Wo Contrast    Result Date: 1/15/2019  Narrative: MRI BRAIN WITHOUT CONTRAST INDICATION: 57-year-old male, headaches, left-sided paresthesias COMPARISON:   1/14/2019 CT TECHNIQUE:  Sagittal T1, axial T2, axial FLAIR, axial T1, axial [place] and axial diffusion imaging  IMAGE QUALITY:  Diagnostic  FINDINGS: BRAIN PARENCHYMA: A single small T2 and FLAIR hyperintense focus is present within the subcortical white matter of the lateral left mid frontal lobe  Although nonspecific, the finding is suspicious for mild chronic microangiopathic change such as may accompany migraine headaches  Clinical correlation recommended  MS or Lyme disease not excluded  This appearance not visible by CT  There is no discrete mass, mass effect or midline shift  There is no intracranial hemorrhage  There is no evidence of acute infarction and diffusion imaging is unremarkable  There are no additional white matter changes in the cerebral hemispheres  Persistent calcified anterior falx plaque VENTRICLES:  The ventricles are normal in size and contour  SELLA AND PITUITARY GLAND:  Normal  ORBITS:  Normal  PARANASAL SINUSES:  Mild multifocal paranasal sinus disease again evident VASCULATURE:  Evaluation of the major intracranial vasculature demonstrates appropriate flow voids  CALVARIUM AND SKULL BASE:  Normal  EXTRACRANIAL SOFT TISSUES:  Several small nonspecific superficial scalp nodules again evident, unchanged  Clinical correlation recommended  Impression: Small nonspecific left frontal subcortical white matter focus which is suspicious for mild chronic microangiopathic change such as may accompany migraine headaches    Clinical correlation recommended No acute ischemic disease identified by diffusion imaging Mild multifocal paranasal sinus disease Several persistent superficial scalp nodules, similar to previous Workstation performed: [id number]       Meds/Allergies   current meds:   Current Facility-Administered Medications   Medication Dose Route Frequency    acetaminophen (TYLENOL) tablet 650 mg  650 mg Oral Q8H PRN    aspirin (ECOTRIN LOW STRENGTH) EC tablet 81 mg  81 mg Oral Daily    docusate sodium (COLACE) capsule 100 mg  100 mg Oral BID    enoxaparin (LOVENOX) subcutaneous injection 40 mg  40 mg Subcutaneous Daily    hydrochlorothiazide (HYDRODIURIL) tablet 12 5 mg  12 5 mg Oral Daily    lisinopril (ZESTRIL) tablet 40 mg  40 mg Oral Daily    metoprolol tartrate (LOPRESSOR) tablet 50 mg  50 mg Oral Q12H CHICO    morphine (PF) 4 mg/mL injection 4 mg  4 mg Intravenous Q4H PRN    nitroglycerin (NITROSTAT) SL tablet 0 4 mg  0 4 mg Sublingual Q5 Min PRN    ondansetron (ZOFRAN) injection 4 mg  4 mg Intravenous Q6H PRN    pantoprazole (PROTONIX) EC tablet 40 mg  40 mg Oral Early Morning    senna (SENOKOT) tablet 8 6 mg  1 tablet Oral Daily    sodium chloride (PF) 0 9 % injection 3 mL  3 mL Intravenous PRN     Prescriptions Prior to Admission   Medication    hydrochlorothiazide (HYDRODIURIL) 12 5 mg tablet    metoprolol tartrate (LOPRESSOR) 25 mg tablet    amLODIPine (NORVASC) 10 mg tablet    lisinopril (ZESTRIL) 20 mg tablet              Assessment and Plan:  Chest pain - EKG without acute ST-T changes  Troponins negative x3  Echocardiogram with normal EF and no RWMA  Patient for stress test today  Further management based on the result  Hypertension-blood pressure stable  Continue current medications  Morbid obesity-strongly counseled to try to lose weight  Luis Armando Martinez MD  1/15/2019,9:00 AM      Portions of the record may have been created with voice recognition software  Occasional wrong words or sound alike substitutions may have occurred due to the inherent limitations of voice recognition software  Please read the chart carefully and recognize, using context, where substitutions may have occurred

## 2019-01-15 NOTE — ASSESSMENT & PLAN NOTE
· He presented with left-sided chest pain which has resolved  · Troponins negative x 3   · CXR showed no acute cardiopulmonary disease  · Echo showed EF 60%  · Stress test showed no reversible perfusion defect was seen, there was a moderate-sized, moderately severe, fixed myocardial perfusion defect of the inferior wall  · Continue Aspirin, BB, Lisinopril  · He has been cleared by cardiology for discharge to home today  Discussed with cardiology on day of discharge  · Recommend outpatient follow up

## 2019-01-15 NOTE — PLAN OF CARE
Problem: DISCHARGE PLANNING - CARE MANAGEMENT  Goal: Discharge to post-acute care or home with appropriate resources  INTERVENTIONS:  - Conduct assessment to determine patient/family and health care team treatment goals, and need for post-acute services based on payer coverage, community resources, and patient preferences, and barriers to discharge  - Address psychosocial, clinical, and financial barriers to discharge as identified in assessment in conjunction with the patient/family and health care team  - Arrange appropriate level of post-acute services according to patients   needs and preference and payer coverage in collaboration with the physician and health care team  - Communicate with and update the patient/family, physician, and health care team regarding progress on the discharge plan  - Arrange appropriate transportation to post-acute venues   Outcome: Progressing  Pt is a new diabetic    A PCP appointment set up with [facility] for Monday, 1/21/19 at [id number] 15 minutes early and bring photo ID, insurance cards, and med list

## 2019-01-15 NOTE — ASSESSMENT & PLAN NOTE
Lab Results   Component Value Date    HGBA1C 8 2 (H) 01/14/2019       No results for input(s): POCGLU in the last 72 hours  Blood Sugar Average: Last 72 hrs:     HGB A1C consistent with diagosis of DM Type 2  He reports in the past, he was told he was prediabetic  Discussed importance of close follow up with his PCP right away for further management/medication recommendations  Discussed diabetic diet/lifestyle changes with patient and information provided

## 2019-01-15 NOTE — ASSESSMENT & PLAN NOTE
· He presented with acute headache which has resolved  No vision changes  · CT of the head negative for any acute intracranial abnormalities  · Also, he complained of transient tingling and numbness of left arm and leg which has since resolved  · MRI brain showed a single small T2 and FLAIR hyperintense focus is present within the subcortical white matter of the lateral left mid frontal lobe  Although nonspecific, the finding is suspicious for mild chronic microangiopathic change such as may accompany migraine headaches  Clinical correlation recommended  This appearance not visible by CT  There is no discrete mass, mass effect or midline shift  There is no intracranial hemorrhage  There is no evidence of acute infarction and diffusion imaging is unremarkable  There are no additional white matter changes in the cerebral hemispheres  Persistent calcified anterior falx plaque  · Patient reports a history of symptoms in the past and has seen neurology  He was recommended to have an EMG which was negative  · Patient wishes for discharge home today with outpatient neurology follow up    · Discussed findings with neurology: patient can follow up as outpatient regarding possible further testing, EMG, etc

## 2019-01-15 NOTE — PLAN OF CARE
CARDIOVASCULAR - ADULT     Maintains optimal cardiac output and hemodynamic stability Progressing     Absence of cardiac dysrhythmias or at baseline rhythm Progressing        DISCHARGE PLANNING     Discharge to home or other facility with appropriate resources Progressing        DISCHARGE PLANNING - CARE MANAGEMENT     Discharge to post-acute care or home with appropriate resources Progressing        INFECTION - ADULT     Absence or prevention of progression during hospitalization Progressing     Absence of fever/infection during neutropenic period Progressing        Knowledge Deficit     Patient/family/caregiver demonstrates understanding of disease process, treatment plan, medications, and discharge instructions Progressing        PAIN - ADULT     Verbalizes/displays adequate comfort level or baseline comfort level Progressing        Potential for Falls     Patient will remain free of falls Progressing        SAFETY ADULT     Maintain or return to baseline ADL function Progressing     Maintain or return mobility status to optimal level Progressing     Patient will remain free of falls Progressing

## 2019-01-18 ENCOUNTER — TELEPHONE (OUTPATIENT)
Dept: CARDIOLOGY CLINIC | Facility: CLINIC | Age: 49
End: 2019-01-18

## 2019-02-05 ENCOUNTER — OFFICE VISIT (OUTPATIENT)
Dept: CARDIOLOGY CLINIC | Facility: CLINIC | Age: 49
End: 2019-02-05
Payer: COMMERCIAL

## 2019-02-05 VITALS
BODY MASS INDEX: 45.1 KG/M2 | DIASTOLIC BLOOD PRESSURE: 124 MMHG | OXYGEN SATURATION: 96 % | WEIGHT: 315 LBS | SYSTOLIC BLOOD PRESSURE: 180 MMHG | HEART RATE: 94 BPM | HEIGHT: 70 IN

## 2019-02-05 DIAGNOSIS — I10 ESSENTIAL HYPERTENSION: ICD-10-CM

## 2019-02-05 DIAGNOSIS — I51.89 DIASTOLIC DYSFUNCTION: ICD-10-CM

## 2019-02-05 DIAGNOSIS — I51.7 LVH (LEFT VENTRICULAR HYPERTROPHY): ICD-10-CM

## 2019-02-05 DIAGNOSIS — E66.01 MORBID OBESITY DUE TO EXCESS CALORIES (HCC): Primary | ICD-10-CM

## 2019-02-05 DIAGNOSIS — I35.1 NONRHEUMATIC AORTIC VALVE INSUFFICIENCY: ICD-10-CM

## 2019-02-05 PROCEDURE — 99214 OFFICE O/P EST MOD 30 MIN: CPT | Performed by: INTERNAL MEDICINE

## 2019-02-05 RX ORDER — HYDROCHLOROTHIAZIDE 25 MG/1
25 TABLET ORAL DAILY
Qty: 90 TABLET | Refills: 1 | Status: SHIPPED | OUTPATIENT
Start: 2019-02-05 | End: 2021-01-10 | Stop reason: HOSPADM

## 2019-02-05 RX ORDER — METOPROLOL TARTRATE 100 MG/1
100 TABLET ORAL EVERY 12 HOURS SCHEDULED
Qty: 180 TABLET | Refills: 1 | Status: SHIPPED | OUTPATIENT
Start: 2019-02-05 | End: 2019-09-26 | Stop reason: SDUPTHER

## 2019-02-05 RX ORDER — LISINOPRIL 40 MG/1
60 TABLET ORAL DAILY
Qty: 135 TABLET | Refills: 1 | Status: SHIPPED | OUTPATIENT
Start: 2019-02-05 | End: 2019-09-26 | Stop reason: SDUPTHER

## 2019-02-05 RX ORDER — CLONIDINE HYDROCHLORIDE 0.3 MG/1
0.3 TABLET ORAL
COMMUNITY
Start: 2019-01-24 | End: 2019-09-25 | Stop reason: HOSPADM

## 2019-02-05 NOTE — PROGRESS NOTES
CARDIOLOGY OFFICE VISIT  [facility]  [address], [place], [facility] [place] Harris  Tel: [phone]      NAME: John Escoto  AGE: 46 y o  SEX: male  : 1970   MRN: [id number]      Chief Complaint:  Chief Complaint   Patient presents with    Follow-up     hospital follow-up CP         History of Present Illness:   Patient comes for follow up s/p recent (2019) hospitalization at [address] [place] [address] for chest pain  2D echocardiogram and stress test done there were reviewed with pt  States he is doing well from cardiac stand point and denies chest pain / pressure, SOB, palpitations, lightheadedness, syncope, swelling feet, orthopnea, PND, claudication  HTN, LVH, DD -  Has been hypertensive for many years  Taking medications regularly  Denies lightheadedness, headache, medication side effects  But states that his BP tends to fluctuate a lot    Morbid obesity -  Trying to lose weight  Aortic regurgitation - and mild, asymptomatic      Past Medical History:  Past Medical History:   Diagnosis Date    Hypertension          Past Surgical History:  Past Surgical History:   Procedure Laterality Date    KNEE SURGERY      LEG SURGERY           Family History:  Family History   Problem Relation Age of Onset    Family history unknown: Yes         Social History:  Social History     Social History    Marital status: Single     Spouse name: N/A    Number of children: N/A    Years of education: N/A     Social History Main Topics    Smoking status: Current Some Day Smoker     Packs/day: 0 20     Types: Cigars    Smokeless tobacco: Never Used    Alcohol use Yes      Comment: occassionally    Drug use: No    Sexual activity: Not on file     Other Topics Concern    Not on file     Social History Narrative    No narrative on file         Active Problems:  Patient Active Problem List   Diagnosis    Essential hypertension  Hypokalemia    Diabetes ([facility] [address] )         The following portions of the patient's history were reviewed and updated as appropriate: past medical history, past surgical history, past family history,  past social history, current medications, allergies and problem list       Review of Systems:  Constitutional: Denies fever, chills, fatigue  Eyes: Denies eye redness, eye discharge, double vision  ENT: Denies hearing loss, tinnitus, sneezing, nasal discharge, sore throat   Respiratory: Denies cough, expectoration, hemoptysis, shortness of breath  Cardiovascular: Denies chest pain, palpitations, orthopnea, PND, lower extremity swelling  Gastrointestinal: Denies abdominal pain, nausea, vomiting, hematemesis, diarrhea, bloody stools  Genito-Urinary: Denies dysuria, incontinence  Musculoskeletal: Denies back pain, joint pain, muscle pain  Neurologic: Denies confusion, lightheadedness, syncope, headache, focal weakness, sensory changes, seizures  Endocrine: Denies polyuria, polydipsia, temperature intolerance  Allergy and Immunology: Denies hives, insect bite sensitivity  Hematological and Lymphatic: Denies bleeding problems, swollen glands   Psychological: Denies depression, suicidal ideation, anxiety, panic  Dermatological: Denies pruritus, rash, skin lesion changes      Vitals:  Vitals:    02/05/19 1539   BP: (!) 180/124   Pulse: 94   SpO2: 96%       Body mass index is 47 35 kg/m²  Weight (last 2 days)     Date/Time   Weight    02/05/19 1539  (!)  150 (330)                Physical Examination:  General: Patient is not in acute distress  Awake, alert, oriented in time, place and person  Responding to commands  Head: Normocephalic  Atraumatic  Eyes: Both pupils normal sized, round and reactive to light  Nonicteric  ENT: Normal external ear canals  Nares normal, no drainage  Lips and oral mucosa normal  Neck: Supple  JVP not raised   Trachea central  No thyromegaly  Lungs: Bilateral bronchovascular breath sounds with no crackles or rhonchi  Chest wall: No tenderness  Cardiovascular: RRR  S1 and S2 normal  No murmur, rub or gallop  Gastrointestinal: Abdomen soft, nontender  No guarding or rigidity  Liver and spleen not palpable  Bowel sounds present  Neurologic: Patient is awake, alert, oriented in time, place and person  Responding to command  Moving all extremities  Integumentary:  No skin rash  Lymphatic: No cervical lymphadenopathy  Back: Symmetric   No CVA tenderness  Extremities: No clubbing, cyanosis or edema      Laboratory Results:  CBC with diff:   Lab Results   Component Value Date    WBC 6 48 01/15/2019    RBC 5 21 01/15/2019    HGB 14 5 01/15/2019    HCT 44 1 01/15/2019    MCV 85 01/15/2019    MCH 27 8 01/15/2019    RDW 15 1 01/15/2019     01/15/2019       CMP:  Lab Results   Component Value Date    CREATININE 1 15 01/15/2019    BUN 15 01/15/2019    K 3 4 (L) 01/15/2019     01/15/2019    CO2 28 01/15/2019    ALKPHOS 74 01/15/2019    ALT 47 01/15/2019    AST 35 01/15/2019    BILIDIR 0 09 06/05/2018       Lab Results   Component Value Date    HGBA1C 8 2 (H) 01/14/2019    MG 2 2 01/15/2019    PHOS 2 8 01/15/2019       Lab Results   Component Value Date    TROPONINI <0 02 01/14/2019    TROPONINI <0 02 01/14/2019    TROPONINI <0 02 01/14/2019       Lipid Profile:   No results found for: CHOL  Lab Results   Component Value Date    HDL 33 (L) 01/14/2019    HDL 34 (L) 09/12/2017     Lab Results   Component Value Date    LDLCALC 52 01/14/2019    LDLCALC 59 09/12/2017     Lab Results   Component Value Date    TRIG 107 01/14/2019    TRIG 137 09/12/2017       Cardiac testing:   Results for orders placed during the hospital encounter of 01/14/19   Echo complete with contrast if indicated    Narrative [address] [place] [id number] [phone]    Transthoracic Echocardiogram  2D, M-mode, Doppler, and Color Doppler    Study date:  14-Jan-2019    Patient: Serenity Valderrama  MR number: [id number]  Account number: [de-identified]  : 1970  Age: 46 years  Gender: Male  Status: Outpatient  Location: Bedside  Height: 70 in  Weight: 329 lb  BP: 177/ 106 mmHg    Indications: Hypertensive heart disease, Assess left ventricular function  Diagnoses: I11 9 - Hypertensive heart disease without heart failure    Sonographer:  Patito Pizano  Referring Physician:  Marco Pack MD  Group:  [facility]  Interpreting Physician:  Baltazar Minaya MD    SUMMARY    PROCEDURE INFORMATION:  This was a technically difficult study  Echocardiographic views were limited due to poor acoustic window availability, decreased penetration, and lung interference  LEFT VENTRICLE:  Systolic function was normal  Ejection fraction was estimated to be 60 %  This study was inadequate for the evaluation of regional wall motion  Wall thickness was mildly increased  There was mild concentric hypertrophy  Doppler parameters were consistent with abnormal left ventricular relaxation (grade 1 diastolic dysfunction)  RIGHT VENTRICLE:  The size was normal   Systolic function was normal     MITRAL VALVE:  There was trace regurgitation  AORTIC VALVE:  There was mild regurgitation  TRICUSPID VALVE:  There was trace regurgitation  HISTORY: Symptoms: chest pain  PRIOR HISTORY: Risk factors: hypertension and morbid obesity  PROCEDURE: The procedure was performed at the bedside  This was a routine study  The transthoracic approach was used  The study included complete 2D imaging, M-mode, complete spectral Doppler, and color Doppler  The heart rate was 69 bpm,  at the start of the study  Images were obtained from the parasternal, apical, subcostal, and suprasternal notch acoustic windows  Echocardiographic views were limited due to poor acoustic window availability, decreased penetration, and  lung interference  This was a technically difficult study      LEFT VENTRICLE: Size was normal  Systolic function was normal  Ejection fraction was estimated to be 60 %  This study was inadequate for the evaluation of regional wall motion  Wall thickness was mildly increased  There was mild concentric  hypertrophy  No evidence of apical thrombus  DOPPLER: Doppler parameters were consistent with abnormal left ventricular relaxation (grade 1 diastolic dysfunction)  RIGHT VENTRICLE: The size was normal  Systolic function was normal  Wall thickness was normal     LEFT ATRIUM: Size was normal     RIGHT ATRIUM: Size was normal     MITRAL VALVE: Valve structure was normal  There was normal leaflet separation  DOPPLER: The transmitral velocity was within the normal range  There was no evidence for stenosis  There was trace regurgitation  AORTIC VALVE: The valve was trileaflet  Leaflets exhibited mildly increased thickness and normal cuspal separation  DOPPLER: Transaortic velocity was within the normal range  There was no evidence for stenosis  There was mild  regurgitation  TRICUSPID VALVE: The valve structure was normal  There was normal leaflet separation  DOPPLER: The transtricuspid velocity was within the normal range  There was no evidence for stenosis  There was trace regurgitation  PULMONIC VALVE: Leaflets exhibited normal thickness, no calcification, and normal cuspal separation  DOPPLER: The transpulmonic velocity was within the normal range  There was no significant regurgitation  PERICARDIUM: There was no pericardial effusion  The pericardium was normal in appearance  AORTA: The root exhibited dilatation - 4 2 cm  SYSTEMIC VEINS: IVC: The inferior vena cava was normal in size      SYSTEM MEASUREMENT TABLES    2D  %FS: 44 2 %  Ao Diam: 4 2 cm  EDV(Teich): 64 2 ml  EF(Teich): 76 1 %  ESV(Teich): 15 4 ml  IVSd: 1 9 cm  LA Area: 16 cm2  LA Diam: 3 2 cm  LVEDV MOD A4C: 117 5 ml  LVEF MOD A4C: 56 2 %  LVESV MOD A4C: 51 5 ml  LVIDd: 3 9 cm  LVIDs: 2 2 cm  LVLd A4C: 8 4 cm  LVLs A4C: 7 cm  LVPWd: 1 3 cm  RA Area: 14 4 cm2  RVIDd: 3 1 cm  SV MOD A4C: 66 ml  SV(Teich): 48 9 ml    CW  AR Dec Aiken: 1 1 m/s2  AR Dec Time: 3372 ms  AR PHT: 977 9 ms  AR Vmax: 3 6 m/s  AR maxP 9 mmHg  TR Vmax: 2 3 m/s  TR maxP 8 mmHg    MM  TAPSE: 2 cm    PW  E': 0 1 m/s  E/E': 9  MV A Ketan: 0 8 m/s  MV Dec Aiken: 2 9 m/s2  MV DecT: 232 5 ms  MV E Ketan: 0 7 m/s  MV E/A Ratio: 0 9  MV PHT: 67 4 ms  MVA By PHT: 3 3 cm2    [employer] Accredited Echocardiography Laboratory    Prepared and electronically signed by    Yaniv Martin MD  Signed 2019 19:14:26         Medications:    Current Outpatient Prescriptions:     aspirin (ECOTRIN LOW STRENGTH) 81 mg EC tablet, Take 1 tablet (81 mg total) by mouth daily, Disp: , Rfl: 0    cloNIDine (CATAPRES) 0 3 mg tablet, Take 0 3 mg by mouth, Disp: , Rfl:     hydrochlorothiazide (HYDRODIURIL) 25 mg tablet, Take 1 tablet (25 mg total) by mouth daily, Disp: 90 tablet, Rfl: 1    lisinopril (ZESTRIL) 40 mg tablet, Take 1 5 tablets (60 mg total) by mouth daily, Disp: 135 tablet, Rfl: 1    metoprolol tartrate (LOPRESSOR) 100 mg tablet, Take 1 tablet (100 mg total) by mouth every 12 (twelve) hours, Disp: 180 tablet, Rfl: 1      Allergies:  No Known Allergies      Assessment and Plan:  1  Essential hypertension, LVH, diastolic dysfunction  BP very high  Asked patient to modify his medications as follow - HCTZ 25 mg daily, lisinopril 60 mg daily, metoprolol tartrate 100 mg twice a day and clonidine twice a day as before  Continue to monitor BP at home and call if abnormal   Come to the clinic in 3 days for a BP recheck and medication adjustment if needed    2  Morbid obesity due to excess calories ([address] )  Acute counseled try to lose weight    3  Aortic regurgitation  Follow-up with serial echocardiograms at recommended intervals    Recommend aggressive risk factor modification and therapeutic lifestyle changes    Low-salt, low-calorie, low-fat, low-cholesterol diet with regular exercise and to optimize weight  I will defer the ordering and monitoring of necessity lab studies to you, but I am available and happy to review and manage any of the data at your request in the future  Discussed concepts of atherosclerosis, including signs and symptoms of cardiac disease  Previous studies were reviewed  Safety measures were reviewed  Questions were entertained and answered  Patient was advised to report any problems requiring medical attention  Follow-up with PCP and appropriate specialist and lab work as discussed  Return for follow up visit as scheduled or earlier, if needed  Thank you for allowing me to participate in the care and evaluation of your patient  Should you have any questions, please feel free to contact me        Luis Armando Martinez MD  2/5/2019,4:00 PM

## 2019-02-07 ENCOUNTER — TELEPHONE (OUTPATIENT)
Dept: CARDIOLOGY CLINIC | Facility: CLINIC | Age: 49
End: 2019-02-07

## 2019-02-07 NOTE — TELEPHONE ENCOUNTER
Patients employer is requesting a letter listing any restrictions/limitations patient has for working  They faxed paper with patient job function/duties  In folder for review  Please advise, thanks

## 2019-02-18 ENCOUNTER — TELEPHONE (OUTPATIENT)
Dept: CARDIOLOGY CLINIC | Facility: CLINIC | Age: 49
End: 2019-02-18

## 2019-02-18 NOTE — TELEPHONE ENCOUNTER
Flaco Thomas from [employer] called and stated he is faxing over a pt record release form   The need to know if the pt has been seen by the Dr Huynh Given from 8/29/18-11/5/18

## 2019-02-18 NOTE — LETTER
March 15, 2019     Patient: Vamsi Bazzi  YOB: 1970   Date of Visit: 2/18/2019       To Whom It May Concern: It is my medical opinion that [facility] may return to full duty immediately with no restrictions  If you have any questions or concerns, please don't hesitate to call           Sincerely,      Meliza Marie MD

## 2019-02-19 NOTE — TELEPHONE ENCOUNTER
[address] [facility] IS REQUESTING FORMS FROM EMPLOYER NEEDING INFO ON PT RESTRICTIONS  SEE TASK FROM 02/07/19  WAS A MR FORM RECEIVED YESTERDAY? PLEASE FAX FORMS [phone]   lEiseo Pelaez

## 2019-02-25 NOTE — TELEPHONE ENCOUNTER
Called Marti back and no answer  LM stating forms have not been received and given fax# to [facility] to reassure fax was sent to that number

## 2019-03-14 ENCOUNTER — TELEPHONE (OUTPATIENT)
Dept: CARDIOLOGY CLINIC | Facility: CLINIC | Age: 49
End: 2019-03-14

## 2019-03-14 NOTE — TELEPHONE ENCOUNTER
[address] CALLED AND SAID NO ONE HAS CALLED HER BACK REGARDING FAX  I TOLD HER THERE WAS A MESSAGE LEFT STATING WE NEVER RECEIVED HER FAX  I TOLD HER TO FAX PAPERS TO DR PRICE'S FAX  SHE WILL CALL BACK TOMORROW MORNING TO VERIFY THAT WE RECEIVED FAX THIS TIME

## 2019-09-17 ENCOUNTER — APPOINTMENT (EMERGENCY)
Dept: CT IMAGING | Facility: HOSPITAL | Age: 49
DRG: 111 | End: 2019-09-17
Payer: COMMERCIAL

## 2019-09-17 ENCOUNTER — HOSPITAL ENCOUNTER (INPATIENT)
Facility: HOSPITAL | Age: 49
LOS: 1 days | DRG: 111 | End: 2019-09-18
Attending: EMERGENCY MEDICINE | Admitting: INTERNAL MEDICINE
Payer: COMMERCIAL

## 2019-09-17 DIAGNOSIS — S09.90XA HEAD INJURY: Primary | ICD-10-CM

## 2019-09-17 DIAGNOSIS — R40.4 STARING EPISODES: ICD-10-CM

## 2019-09-17 DIAGNOSIS — R73.9 HYPERGLYCEMIA: ICD-10-CM

## 2019-09-17 LAB
ALBUMIN SERPL BCP-MCNC: 3.4 G/DL (ref 3.5–5)
ALP SERPL-CCNC: 115 U/L (ref 46–116)
ALT SERPL W P-5'-P-CCNC: 61 U/L (ref 12–78)
ANION GAP SERPL CALCULATED.3IONS-SCNC: 7 MMOL/L (ref 4–13)
AST SERPL W P-5'-P-CCNC: 46 U/L (ref 5–45)
BASOPHILS # BLD AUTO: 0.03 THOUSANDS/ΜL (ref 0–0.1)
BASOPHILS NFR BLD AUTO: 1 % (ref 0–1)
BILIRUB DIRECT SERPL-MCNC: 0.29 MG/DL (ref 0–0.2)
BILIRUB SERPL-MCNC: 1.1 MG/DL (ref 0.2–1)
BUN SERPL-MCNC: 9 MG/DL (ref 5–25)
CALCIUM SERPL-MCNC: 9.3 MG/DL (ref 8.3–10.1)
CHLORIDE SERPL-SCNC: 95 MMOL/L (ref 100–108)
CO2 SERPL-SCNC: 28 MMOL/L (ref 21–32)
CREAT SERPL-MCNC: 1.13 MG/DL (ref 0.6–1.3)
EOSINOPHIL # BLD AUTO: 0.16 THOUSAND/ΜL (ref 0–0.61)
EOSINOPHIL NFR BLD AUTO: 3 % (ref 0–6)
ERYTHROCYTE [DISTWIDTH] IN BLOOD BY AUTOMATED COUNT: 13.5 % (ref 11.6–15.1)
GFR SERPL CREATININE-BSD FRML MDRD: 75 ML/MIN/1.73SQ M
GLUCOSE SERPL-MCNC: 616 MG/DL (ref 65–140)
HCT VFR BLD AUTO: 46.2 % (ref 36.5–49.3)
HGB BLD-MCNC: 15.3 G/DL (ref 12–17)
LYMPHOCYTES # BLD AUTO: 0.99 THOUSANDS/ΜL (ref 0.6–4.47)
LYMPHOCYTES NFR BLD AUTO: 16 % (ref 14–44)
MCH RBC QN AUTO: 29.8 PG (ref 26.8–34.3)
MCHC RBC AUTO-ENTMCNC: 33.1 G/DL (ref 31.4–37.4)
MCV RBC AUTO: 90 FL (ref 82–98)
MONOCYTES # BLD AUTO: 0.4 THOUSAND/ΜL (ref 0.17–1.22)
MONOCYTES NFR BLD AUTO: 6 % (ref 4–12)
NEUTROPHILS # BLD AUTO: 4.77 THOUSANDS/ΜL (ref 1.85–7.62)
NEUTS SEG NFR BLD AUTO: 74 % (ref 43–75)
PLATELET # BLD AUTO: 180 THOUSANDS/UL (ref 149–390)
PMV BLD AUTO: 11 FL (ref 8.9–12.7)
POTASSIUM SERPL-SCNC: 4.1 MMOL/L (ref 3.5–5.3)
PROT SERPL-MCNC: 7.3 G/DL (ref 6.4–8.2)
RBC # BLD AUTO: 5.14 MILLION/UL (ref 3.88–5.62)
SODIUM SERPL-SCNC: 130 MMOL/L (ref 136–145)
WBC # BLD AUTO: 6.35 THOUSAND/UL (ref 4.31–10.16)

## 2019-09-17 PROCEDURE — 80048 BASIC METABOLIC PNL TOTAL CA: CPT | Performed by: EMERGENCY MEDICINE

## 2019-09-17 PROCEDURE — 80076 HEPATIC FUNCTION PANEL: CPT | Performed by: EMERGENCY MEDICINE

## 2019-09-17 PROCEDURE — 85025 COMPLETE CBC W/AUTO DIFF WBC: CPT | Performed by: EMERGENCY MEDICINE

## 2019-09-17 PROCEDURE — 93005 ELECTROCARDIOGRAM TRACING: CPT

## 2019-09-17 PROCEDURE — 70450 CT HEAD/BRAIN W/O DYE: CPT

## 2019-09-17 PROCEDURE — 99285 EMERGENCY DEPT VISIT HI MDM: CPT

## 2019-09-17 PROCEDURE — 99285 EMERGENCY DEPT VISIT HI MDM: CPT | Performed by: EMERGENCY MEDICINE

## 2019-09-17 PROCEDURE — 83605 ASSAY OF LACTIC ACID: CPT | Performed by: EMERGENCY MEDICINE

## 2019-09-17 PROCEDURE — 82010 KETONE BODYS QUAN: CPT | Performed by: EMERGENCY MEDICINE

## 2019-09-17 PROCEDURE — 36415 COLL VENOUS BLD VENIPUNCTURE: CPT | Performed by: EMERGENCY MEDICINE

## 2019-09-17 RX ORDER — SODIUM CHLORIDE 9 MG/ML
125 INJECTION, SOLUTION INTRAVENOUS CONTINUOUS
Status: DISCONTINUED | OUTPATIENT
Start: 2019-09-17 | End: 2019-09-18

## 2019-09-17 RX ADMIN — INSULIN HUMAN 10 UNITS: 100 INJECTION, SOLUTION PARENTERAL at 23:50

## 2019-09-17 RX ADMIN — SODIUM CHLORIDE 1000 ML: 0.9 INJECTION, SOLUTION INTRAVENOUS at 23:49

## 2019-09-18 ENCOUNTER — APPOINTMENT (INPATIENT)
Dept: NEUROLOGY | Facility: HOSPITAL | Age: 49
DRG: 111 | End: 2019-09-18
Payer: COMMERCIAL

## 2019-09-18 ENCOUNTER — APPOINTMENT (INPATIENT)
Dept: NEUROLOGY | Facility: CLINIC | Age: 49
DRG: 053 | End: 2019-09-18
Payer: COMMERCIAL

## 2019-09-18 ENCOUNTER — HOSPITAL ENCOUNTER (INPATIENT)
Facility: HOSPITAL | Age: 49
LOS: 7 days | Discharge: HOME | DRG: 053 | End: 2019-09-25
Attending: INTERNAL MEDICINE | Admitting: INTERNAL MEDICINE
Payer: COMMERCIAL

## 2019-09-18 VITALS
DIASTOLIC BLOOD PRESSURE: 87 MMHG | WEIGHT: 259 LBS | RESPIRATION RATE: 18 BRPM | HEART RATE: 83 BPM | BODY MASS INDEX: 37.08 KG/M2 | SYSTOLIC BLOOD PRESSURE: 137 MMHG | TEMPERATURE: 98.8 F | OXYGEN SATURATION: 93 % | HEIGHT: 70 IN

## 2019-09-18 DIAGNOSIS — E11.8 TYPE 2 DIABETES MELLITUS WITH COMPLICATION, WITHOUT LONG-TERM CURRENT USE OF INSULIN (HCC): Chronic | ICD-10-CM

## 2019-09-18 DIAGNOSIS — S09.90XS INJURY OF HEAD, SEQUELA: ICD-10-CM

## 2019-09-18 DIAGNOSIS — R73.9 HYPERGLYCEMIA: ICD-10-CM

## 2019-09-18 DIAGNOSIS — I10 ESSENTIAL HYPERTENSION: ICD-10-CM

## 2019-09-18 DIAGNOSIS — J96.00 ACUTE RESPIRATORY FAILURE, UNSPECIFIED WHETHER WITH HYPOXIA OR HYPERCAPNIA (HCC): ICD-10-CM

## 2019-09-18 DIAGNOSIS — R56.9 SEIZURE (HCC): Primary | ICD-10-CM

## 2019-09-18 PROBLEM — S09.90XA HEAD INJURY: Status: ACTIVE | Noted: 2019-09-18

## 2019-09-18 PROBLEM — R42 DIZZINESS: Status: ACTIVE | Noted: 2019-09-18

## 2019-09-18 PROBLEM — R40.4 STARING EPISODES: Status: ACTIVE | Noted: 2019-09-18

## 2019-09-18 LAB
ALBUMIN SERPL BCP-MCNC: 3.1 G/DL (ref 3.5–5)
ALP SERPL-CCNC: 90 U/L (ref 46–116)
ALT SERPL W P-5'-P-CCNC: 48 U/L (ref 12–78)
ANION GAP SERPL CALCULATED.3IONS-SCNC: 11 MMOL/L (ref 4–13)
AST SERPL W P-5'-P-CCNC: 33 U/L (ref 5–45)
ATRIAL RATE: 95 BPM
BASOPHILS # BLD AUTO: 0.06 THOUSANDS/ΜL (ref 0–0.1)
BASOPHILS NFR BLD AUTO: 1 % (ref 0–1)
BETA-HYDROXYBUTYRATE: 0.2 MMOL/L
BILIRUB SERPL-MCNC: 1 MG/DL (ref 0.2–1)
BUN SERPL-MCNC: 9 MG/DL (ref 5–25)
CALCIUM SERPL-MCNC: 8.8 MG/DL (ref 8.3–10.1)
CHLORIDE SERPL-SCNC: 101 MMOL/L (ref 100–108)
CO2 SERPL-SCNC: 23 MMOL/L (ref 21–32)
CREAT SERPL-MCNC: 0.78 MG/DL (ref 0.6–1.3)
EOSINOPHIL # BLD AUTO: 0.24 THOUSAND/ΜL (ref 0–0.61)
EOSINOPHIL NFR BLD AUTO: 3 % (ref 0–6)
ERYTHROCYTE [DISTWIDTH] IN BLOOD BY AUTOMATED COUNT: 12.9 % (ref 11.6–15.1)
EST. AVERAGE GLUCOSE BLD GHB EST-MCNC: 332 MG/DL
GFR SERPL CREATININE-BSD FRML MDRD: 105 ML/MIN/1.73SQ M
GLUCOSE SERPL-MCNC: 286 MG/DL (ref 65–140)
GLUCOSE SERPL-MCNC: 322 MG/DL (ref 65–140)
GLUCOSE SERPL-MCNC: 332 MG/DL (ref 65–140)
GLUCOSE SERPL-MCNC: 341 MG/DL (ref 65–140)
GLUCOSE SERPL-MCNC: 348 MG/DL (ref 65–140)
GLUCOSE SERPL-MCNC: 387 MG/DL (ref 65–140)
GLUCOSE SERPL-MCNC: 487 MG/DL (ref 65–140)
HBA1C MFR BLD: 13.2 % (ref 4.2–6.3)
HCT VFR BLD AUTO: 42.6 % (ref 36.5–49.3)
HGB BLD-MCNC: 14.2 G/DL (ref 12–17)
IMM GRANULOCYTES # BLD AUTO: 0.03 THOUSAND/UL (ref 0–0.2)
IMM GRANULOCYTES NFR BLD AUTO: 0 % (ref 0–2)
LACTATE SERPL-SCNC: 0.8 MMOL/L (ref 0.5–2)
LACTATE SERPL-SCNC: 1.6 MMOL/L (ref 0.5–2)
LYMPHOCYTES # BLD AUTO: 1.66 THOUSANDS/ΜL (ref 0.6–4.47)
LYMPHOCYTES NFR BLD AUTO: 23 % (ref 14–44)
MAGNESIUM SERPL-MCNC: 1.7 MG/DL (ref 1.6–2.6)
MCH RBC QN AUTO: 29.8 PG (ref 26.8–34.3)
MCHC RBC AUTO-ENTMCNC: 33.3 G/DL (ref 31.4–37.4)
MCV RBC AUTO: 89 FL (ref 82–98)
MONOCYTES # BLD AUTO: 0.51 THOUSAND/ΜL (ref 0.17–1.22)
MONOCYTES NFR BLD AUTO: 7 % (ref 4–12)
NEUTROPHILS # BLD AUTO: 4.78 THOUSANDS/ΜL (ref 1.85–7.62)
NEUTS SEG NFR BLD AUTO: 66 % (ref 43–75)
NRBC BLD AUTO-RTO: 0 /100 WBCS
P AXIS: 56 DEGREES
PHOSPHATE SERPL-MCNC: 2.9 MG/DL (ref 2.7–4.5)
PLATELET # BLD AUTO: 180 THOUSANDS/UL (ref 149–390)
PMV BLD AUTO: 10.5 FL (ref 8.9–12.7)
POTASSIUM SERPL-SCNC: 3.3 MMOL/L (ref 3.5–5.3)
PR INTERVAL: 174 MS
PROT SERPL-MCNC: 6.5 G/DL (ref 6.4–8.2)
QRS AXIS: -66 DEGREES
QRSD INTERVAL: 106 MS
QT INTERVAL: 368 MS
QTC INTERVAL: 462 MS
RBC # BLD AUTO: 4.77 MILLION/UL (ref 3.88–5.62)
SODIUM SERPL-SCNC: 135 MMOL/L (ref 136–145)
T WAVE AXIS: 47 DEGREES
VENTRICULAR RATE: 95 BPM
WBC # BLD AUTO: 7.28 THOUSAND/UL (ref 4.31–10.16)

## 2019-09-18 PROCEDURE — 80053 COMPREHEN METABOLIC PANEL: CPT | Performed by: PHYSICIAN ASSISTANT

## 2019-09-18 PROCEDURE — 95816 EEG AWAKE AND DROWSY: CPT

## 2019-09-18 PROCEDURE — 82948 REAGENT STRIP/BLOOD GLUCOSE: CPT

## 2019-09-18 PROCEDURE — 85025 COMPLETE CBC W/AUTO DIFF WBC: CPT | Performed by: PHYSICIAN ASSISTANT

## 2019-09-18 PROCEDURE — 95951 HB EEG MONITORING/VIDEORECORD: CPT

## 2019-09-18 PROCEDURE — 83605 ASSAY OF LACTIC ACID: CPT | Performed by: EMERGENCY MEDICINE

## 2019-09-18 PROCEDURE — 95819 EEG AWAKE AND ASLEEP: CPT | Performed by: PSYCHIATRY & NEUROLOGY

## 2019-09-18 PROCEDURE — 99223 1ST HOSP IP/OBS HIGH 75: CPT | Performed by: INTERNAL MEDICINE

## 2019-09-18 PROCEDURE — 93010 ELECTROCARDIOGRAM REPORT: CPT | Performed by: INTERNAL MEDICINE

## 2019-09-18 PROCEDURE — 83735 ASSAY OF MAGNESIUM: CPT | Performed by: PHYSICIAN ASSISTANT

## 2019-09-18 PROCEDURE — 83036 HEMOGLOBIN GLYCOSYLATED A1C: CPT | Performed by: PHYSICIAN ASSISTANT

## 2019-09-18 PROCEDURE — 84100 ASSAY OF PHOSPHORUS: CPT | Performed by: PHYSICIAN ASSISTANT

## 2019-09-18 PROCEDURE — 99255 IP/OBS CONSLTJ NEW/EST HI 80: CPT | Performed by: PHYSICIAN ASSISTANT

## 2019-09-18 RX ORDER — NICOTINE 21 MG/24HR
1 PATCH, TRANSDERMAL 24 HOURS TRANSDERMAL DAILY
Status: DISCONTINUED | OUTPATIENT
Start: 2019-09-18 | End: 2019-09-18 | Stop reason: HOSPADM

## 2019-09-18 RX ORDER — HYDROCHLOROTHIAZIDE 25 MG/1
25 TABLET ORAL DAILY
Status: CANCELLED | OUTPATIENT
Start: 2019-09-19

## 2019-09-18 RX ORDER — INSULIN GLARGINE 100 [IU]/ML
20 INJECTION, SOLUTION SUBCUTANEOUS EVERY MORNING
Status: DISCONTINUED | OUTPATIENT
Start: 2019-09-19 | End: 2019-09-19

## 2019-09-18 RX ORDER — ACETAMINOPHEN 325 MG/1
650 TABLET ORAL EVERY 6 HOURS PRN
Status: CANCELLED | OUTPATIENT
Start: 2019-09-18

## 2019-09-18 RX ORDER — METOPROLOL TARTRATE 50 MG/1
100 TABLET, FILM COATED ORAL EVERY 12 HOURS SCHEDULED
Status: DISCONTINUED | OUTPATIENT
Start: 2019-09-18 | End: 2019-09-18 | Stop reason: HOSPADM

## 2019-09-18 RX ORDER — NICOTINE 21 MG/24HR
1 PATCH, TRANSDERMAL 24 HOURS TRANSDERMAL DAILY
Status: CANCELLED | OUTPATIENT
Start: 2019-09-19

## 2019-09-18 RX ORDER — ACETAMINOPHEN 325 MG/1
650 TABLET ORAL EVERY 6 HOURS PRN
Status: DISCONTINUED | OUTPATIENT
Start: 2019-09-18 | End: 2019-09-18 | Stop reason: HOSPADM

## 2019-09-18 RX ORDER — HYDROCHLOROTHIAZIDE 25 MG/1
25 TABLET ORAL DAILY
Status: DISCONTINUED | OUTPATIENT
Start: 2019-09-19 | End: 2019-09-19

## 2019-09-18 RX ORDER — HYDRALAZINE HYDROCHLORIDE 20 MG/ML
10 INJECTION INTRAMUSCULAR; INTRAVENOUS EVERY 4 HOURS PRN
Status: DISCONTINUED | OUTPATIENT
Start: 2019-09-18 | End: 2019-09-18 | Stop reason: HOSPADM

## 2019-09-18 RX ORDER — ONDANSETRON 2 MG/ML
4 INJECTION INTRAMUSCULAR; INTRAVENOUS EVERY 6 HOURS PRN
Status: DISCONTINUED | OUTPATIENT
Start: 2019-09-18 | End: 2019-09-25 | Stop reason: HOSPADM

## 2019-09-18 RX ORDER — LISINOPRIL 20 MG/1
40 TABLET ORAL DAILY
Status: CANCELLED | OUTPATIENT
Start: 2019-09-19

## 2019-09-18 RX ORDER — HYDROCHLOROTHIAZIDE 25 MG/1
25 TABLET ORAL DAILY
Status: DISCONTINUED | OUTPATIENT
Start: 2019-09-18 | End: 2019-09-18 | Stop reason: HOSPADM

## 2019-09-18 RX ORDER — HYDRALAZINE HYDROCHLORIDE 20 MG/ML
10 INJECTION INTRAMUSCULAR; INTRAVENOUS EVERY 4 HOURS PRN
Status: DISCONTINUED | OUTPATIENT
Start: 2019-09-18 | End: 2019-09-19

## 2019-09-18 RX ORDER — METOPROLOL TARTRATE 50 MG/1
100 TABLET, FILM COATED ORAL EVERY 12 HOURS SCHEDULED
Status: DISCONTINUED | OUTPATIENT
Start: 2019-09-19 | End: 2019-09-19

## 2019-09-18 RX ORDER — INSULIN GLARGINE 100 [IU]/ML
20 INJECTION, SOLUTION SUBCUTANEOUS EVERY MORNING
Status: DISCONTINUED | OUTPATIENT
Start: 2019-09-18 | End: 2019-09-18 | Stop reason: HOSPADM

## 2019-09-18 RX ORDER — LISINOPRIL 20 MG/1
40 TABLET ORAL DAILY
Status: DISCONTINUED | OUTPATIENT
Start: 2019-09-19 | End: 2019-09-19

## 2019-09-18 RX ORDER — POTASSIUM CHLORIDE 20 MEQ/1
20 TABLET, EXTENDED RELEASE ORAL ONCE
Status: COMPLETED | OUTPATIENT
Start: 2019-09-18 | End: 2019-09-18

## 2019-09-18 RX ORDER — LORAZEPAM 2 MG/ML
1 INJECTION INTRAMUSCULAR ONCE
Status: COMPLETED | OUTPATIENT
Start: 2019-09-18 | End: 2019-09-18

## 2019-09-18 RX ORDER — METOPROLOL TARTRATE 50 MG/1
100 TABLET, FILM COATED ORAL EVERY 12 HOURS SCHEDULED
Status: CANCELLED | OUTPATIENT
Start: 2019-09-18

## 2019-09-18 RX ORDER — LORAZEPAM 2 MG/ML
2 INJECTION INTRAMUSCULAR ONCE
Status: COMPLETED | OUTPATIENT
Start: 2019-09-18 | End: 2019-09-18

## 2019-09-18 RX ORDER — ACETAMINOPHEN 325 MG/1
650 TABLET ORAL EVERY 6 HOURS PRN
Status: DISCONTINUED | OUTPATIENT
Start: 2019-09-18 | End: 2019-09-19

## 2019-09-18 RX ORDER — ONDANSETRON 2 MG/ML
4 INJECTION INTRAMUSCULAR; INTRAVENOUS EVERY 6 HOURS PRN
Status: DISCONTINUED | OUTPATIENT
Start: 2019-09-18 | End: 2019-09-18 | Stop reason: HOSPADM

## 2019-09-18 RX ORDER — ONDANSETRON 2 MG/ML
4 INJECTION INTRAMUSCULAR; INTRAVENOUS EVERY 6 HOURS PRN
Status: CANCELLED | OUTPATIENT
Start: 2019-09-18

## 2019-09-18 RX ORDER — HYDRALAZINE HYDROCHLORIDE 20 MG/ML
10 INJECTION INTRAMUSCULAR; INTRAVENOUS ONCE
Status: COMPLETED | OUTPATIENT
Start: 2019-09-18 | End: 2019-09-18

## 2019-09-18 RX ORDER — INSULIN GLARGINE 100 [IU]/ML
20 INJECTION, SOLUTION SUBCUTANEOUS EVERY MORNING
Status: CANCELLED | OUTPATIENT
Start: 2019-09-19

## 2019-09-18 RX ORDER — HYDRALAZINE HYDROCHLORIDE 20 MG/ML
10 INJECTION INTRAMUSCULAR; INTRAVENOUS EVERY 4 HOURS PRN
Status: CANCELLED | OUTPATIENT
Start: 2019-09-18

## 2019-09-18 RX ORDER — LISINOPRIL 20 MG/1
60 TABLET ORAL DAILY
Status: DISCONTINUED | OUTPATIENT
Start: 2019-09-18 | End: 2019-09-18 | Stop reason: HOSPADM

## 2019-09-18 RX ORDER — NICOTINE 21 MG/24HR
1 PATCH, TRANSDERMAL 24 HOURS TRANSDERMAL DAILY
Status: DISCONTINUED | OUTPATIENT
Start: 2019-09-19 | End: 2019-09-25 | Stop reason: HOSPADM

## 2019-09-18 RX ADMIN — INSULIN LISPRO 6 UNITS: 100 INJECTION, SOLUTION INTRAVENOUS; SUBCUTANEOUS at 17:34

## 2019-09-18 RX ADMIN — INSULIN LISPRO 8 UNITS: 100 INJECTION, SOLUTION INTRAVENOUS; SUBCUTANEOUS at 11:47

## 2019-09-18 RX ADMIN — LISINOPRIL 60 MG: 20 TABLET ORAL at 17:31

## 2019-09-18 RX ADMIN — ACETAMINOPHEN 650 MG: 325 TABLET ORAL at 23:09

## 2019-09-18 RX ADMIN — LEVETIRACETAM 3000 MG: 100 INJECTION, SOLUTION INTRAVENOUS at 15:13

## 2019-09-18 RX ADMIN — SODIUM CHLORIDE 125 ML/HR: 0.9 INJECTION, SOLUTION INTRAVENOUS at 01:41

## 2019-09-18 RX ADMIN — ENOXAPARIN SODIUM 40 MG: 40 INJECTION SUBCUTANEOUS at 08:21

## 2019-09-18 RX ADMIN — LORAZEPAM 2 MG: 2 INJECTION INTRAMUSCULAR; INTRAVENOUS at 23:47

## 2019-09-18 RX ADMIN — LORAZEPAM 1 MG: 2 INJECTION INTRAMUSCULAR; INTRAVENOUS at 15:03

## 2019-09-18 RX ADMIN — POTASSIUM CHLORIDE 20 MEQ: 1500 TABLET, EXTENDED RELEASE ORAL at 14:14

## 2019-09-18 RX ADMIN — HYDRALAZINE HYDROCHLORIDE 10 MG: 20 INJECTION INTRAMUSCULAR; INTRAVENOUS at 19:09

## 2019-09-18 RX ADMIN — INSULIN GLARGINE 20 UNITS: 100 INJECTION, SOLUTION SUBCUTANEOUS at 14:14

## 2019-09-18 RX ADMIN — INSULIN LISPRO 5 UNITS: 100 INJECTION, SOLUTION INTRAVENOUS; SUBCUTANEOUS at 17:33

## 2019-09-18 RX ADMIN — LEVETIRACETAM 1000 MG: 100 INJECTION, SOLUTION INTRAVENOUS at 23:00

## 2019-09-18 RX ADMIN — HYDROCHLOROTHIAZIDE 25 MG: 25 TABLET ORAL at 17:31

## 2019-09-18 RX ADMIN — METOPROLOL TARTRATE 100 MG: 50 TABLET, FILM COATED ORAL at 17:00

## 2019-09-18 RX ADMIN — INSULIN LISPRO 8 UNITS: 100 INJECTION, SOLUTION INTRAVENOUS; SUBCUTANEOUS at 08:21

## 2019-09-18 RX ADMIN — HYDRALAZINE HYDROCHLORIDE 10 MG: 20 INJECTION INTRAMUSCULAR; INTRAVENOUS at 02:36

## 2019-09-18 RX ADMIN — ACETAMINOPHEN 650 MG: 325 TABLET, FILM COATED ORAL at 08:37

## 2019-09-18 NOTE — ASSESSMENT & PLAN NOTE
Lab Results   Component Value Date    HGBA1C 8 2 (H) 01/14/2019       Recent Labs     09/18/19  0020   POCGLU 487*       Blood Sugar Average: Last 72 hrs:  (P) 487   · Patient sugar was 616 when he presented to the ED, he does not recall taking anything for his diabetes  · Check hemoglobin A1c  · Sliding scale insulin  · Check sugars t i d

## 2019-09-18 NOTE — PLAN OF CARE
Problem: Potential for Falls  Goal: Patient will remain free of falls  Description  INTERVENTIONS:  - Assess patient frequently for physical needs  -  Identify cognitive and physical deficits and behaviors that affect risk of falls  -  [place] fall precautions as indicated by assessment   - Educate patient/family on patient safety including physical limitations  - Instruct patient to call for assistance with activity based on assessment  - Modify environment to reduce risk of injury  - Consider OT/PT consult to assist with strengthening/mobility  Outcome: Progressing     Problem: Nutrition/Hydration-ADULT  Goal: Nutrient/Hydration intake appropriate for improving, restoring or maintaining nutritional needs  Description  Monitor and assess patient's nutrition/hydration status for malnutrition  Collaborate with interdisciplinary team and initiate plan and interventions as ordered  Monitor patient's weight and dietary intake as ordered or per policy  Utilize nutrition screening tool and intervene as necessary  Determine patient's food preferences and provide high-protein, high-caloric foods as appropriate       INTERVENTIONS:  - Monitor oral intake, urinary output, labs, and treatment plans  - Assess nutrition and hydration status and recommend course of action  - Evaluate amount of meals eaten  - Assist patient with eating if necessary   - Allow adequate time for meals  - Recommend/ encourage appropriate diets, oral nutritional supplements, and vitamin/mineral supplements  - Order, calculate, and assess calorie counts as needed  - Recommend, monitor, and adjust tube feedings and TPN/PPN based on assessed needs  - Assess need for intravenous fluids  - Provide specific nutrition/hydration education as appropriate  - Include patient/family/caregiver in decisions related to nutrition  Outcome: Progressing

## 2019-09-18 NOTE — PHYSICAL THERAPY NOTE
Physical Therapy Cancellation Note    PT order received  Chart review performed  At this time, PT evaluation cancelled as pt unavailable, undergoing EEG study  Discussed with ANNIE Sánchez  PT will follow and evaluate as appropriate      Tesfaye Yun, PT, DPT

## 2019-09-18 NOTE — ASSESSMENT & PLAN NOTE
· Patient with recent head injury 08/2019 was admitted to ICU at [facility] for subarachnoid hemorrhage secondary to head trauma-he was cleared by Neurosurgery at the time  · It appears that patient has been having episodes of dizziness and absence like seizures  · He has been having brief episodes of  staring that was even reported in the ED  · He had a head CT done today which was negative  · ED spoke with Neurosurgery, okay to admit to [facility]  · Monitor for any changes in symptoms,neuro checks  · Neurology consultation  · EEG ordered

## 2019-09-18 NOTE — ASSESSMENT & PLAN NOTE
Status post head injury August 2019 which cause subarachnoid hemorrhage he was admitted to ICU at [facility] in cleared by Neurosurgery  Recent head CT done today is negative for any acute pathology  Keep on fall precautions  PT OT  Supportive care

## 2019-09-18 NOTE — ED PROVIDER NOTES
History  Chief Complaint   Patient presents with    Head Injury     Pt presents to ED with head injury a few weeks ago  Pt states he's "felt off" since and has vision issues  Was seen at [facility] and had two head bleeds at this time  43-year-old male presents after a fall resulted in a subarachnoid and subdural hematoma on August 26 with a chief complaint of "something is wrong in my sensory "  Patient states that he had improved following the incident and had no symptoms until 3 days ago during which he notes he has been off balance and "hearing and seeing stuff that is not there" which patient further describes as it "feels like I am picking up 2 different radiofrequencies is at one time "    Patient experienced an approximately 5 second pause while talking to nursing where he "spaced out "    Patient states he was evaluated at [facility] and told he did not have a concussion after a barrage of testing  [address] [facility] [facility]  "[facility] and [facility] on OSH imaging  Briefly, patient is a 44 y o  male with PMH of HTN presenting s/p witnessed fall backwards, striking head on rock  +LOC, neg thinners  No acute concerns the following morning, no neck pain  C-collar cleared  Evaluated by Neurosurgery, repeat [address] unchanged  Recommend outpatient follow up  Cleared by PT/OT  Patient requesting discharge multiple times  Remained stable while in [facility]  Discharged home with appropriate Neurosurgery, PT, and PCP follow up advised  "    Impression and plan:  43-year-old male status post head injury with subarachnoid and subdural hematomas  Patient was seen immediately upon arrival and taken to CT for repeat imaging to evaluate for delayed bleeding  Patient's neurologic evaluation is unremarkable other than the reported pause witnessed by nursing    I did not experience any pauses during my conversations with patient though the patient's wife states he has been having these of increasing frequency over the past few days  Considering patient was evaluated for concussion and was negative at that time he has poor outpatient follow-up due to lack of insurance, I have advised the patient it would be best to be transferred back to a trauma center for re-evaluation  As patient is prior evaluation was at [facility], I have suggested transfer back to the facility for continuity of care as we do not have trauma at this facility  Will monitor and re-evaluate the patient discussed this and considers my recommendations  Reassessment:  Patient continues to decline transfer  Notably patient's glucose is significantly elevated  Patient states I thought I had diabetes but denies prior history though there is information in the medical record noting he has a history of diabetes  Patient does not have follow-up due to lack of insurance  Patient's symptoms could be somewhat related to hyperglycemia or possibly secondary to absent seizures considering his recent trauma  Patient may have underlying concussion though this would need to be evaluated after other more significant concerns are ruled out  I discussed with Dr Kenny De Los Santos of trauma at [place] the patient's case and he was in agreement the patient could start evaluation here as his repeat CT scan did not demonstrate acute trauma today and be transferred if symptoms worsen or findings are identified that would require neurosurgical or trauma evaluation  Patient was in agreement with being evaluated at [place] for continued monitoring and evaluation        Head Injury w/unknown LOC   Associated symptoms: blurred vision, disorientation, headache, memory loss and nausea    Associated symptoms: no difficulty breathing, no double vision, no focal weakness, no hearing loss, no loss of consciousness (none since the episode), no neck pain, no numbness, no seizures and no vomiting        Prior to Admission Medications   Prescriptions Last Dose Informant Patient Reported? Taking?   aspirin (ECOTRIN LOW STRENGTH) 81 mg EC tablet   No No   Sig: Take 1 tablet (81 mg total) by mouth daily   cloNIDine (CATAPRES) 0 3 mg tablet   Yes No   Sig: Take 0 3 mg by mouth   hydrochlorothiazide (HYDRODIURIL) 25 mg tablet   No No   Sig: Take 1 tablet (25 mg total) by mouth daily   lisinopril (ZESTRIL) 40 mg tablet   No No   Sig: Take 1 5 tablets (60 mg total) by mouth daily   metoprolol tartrate (LOPRESSOR) 100 mg tablet   No No   Sig: Take 1 tablet (100 mg total) by mouth every 12 (twelve) hours      Facility-Administered Medications: None       Past Medical History:   Diagnosis Date    Hypertension        Past Surgical History:   Procedure Laterality Date    KNEE SURGERY      LEG SURGERY         Family History   Family history unknown: Yes     I have reviewed and agree with the history as documented  Social History     Tobacco Use    Smoking status: Current Some Day Smoker     Packs/day: 0 20     Types: Cigars    Smokeless tobacco: Never Used   Substance Use Topics    Alcohol use: Yes     Alcohol/week: 3 0 standard drinks     Types: 3 Cans of beer per week     Frequency: 2-4 times a month     Drinks per session: 3 or 4     Comment: occassionally    Drug use: No        Review of Systems   HENT: Negative for hearing loss  Eyes: Positive for blurred vision  Negative for double vision  Gastrointestinal: Positive for nausea  Negative for vomiting  Musculoskeletal: Negative for neck pain  Neurological: Positive for headaches  Negative for focal weakness, seizures, loss of consciousness (none since the episode) and numbness  Psychiatric/Behavioral: Positive for memory loss  All other systems reviewed and are negative  Physical Exam  Physical Exam   Constitutional: He is oriented to person, place, and time  He appears well-developed and well-nourished  No distress  HENT:   Head: Normocephalic and atraumatic     Eyes: Pupils are equal, round, and reactive to light  EOM are normal    Neck: Normal range of motion  Neck supple  Cardiovascular: Normal rate and regular rhythm  Pulmonary/Chest: Effort normal and breath sounds normal    Abdominal: Soft  Bowel sounds are normal  He exhibits no distension  There is no tenderness  There is no rebound and no guarding  Musculoskeletal: He exhibits no edema, tenderness or deformity  Neurological: He is alert and oriented to person, place, and time  He displays normal reflexes  No cranial nerve deficit or sensory deficit  He exhibits normal muscle tone  Coordination normal    Neuro: Alert and answers questions appropriately  Normal tandem gait, including toe walking and heel walking  Normal Romberg exam  No pronator drift  Normal finger to nose  Normal fine motor function with rapid finger movements  Normal hand tap  Cranial nerves II through XII grossly intact  Visual fields grossly intact  Upper and lower motor strength 5/5 and symmetric  Normal light touch sensory exam  Normal DTRs  Skin: Skin is warm and dry  He is not diaphoretic  Psychiatric: He has a normal mood and affect  Vitals reviewed        Vital Signs  ED Triage Vitals   Temperature Pulse Respirations Blood Pressure SpO2   09/17/19 2115 09/17/19 2115 09/17/19 2115 09/17/19 2115 09/17/19 2115   99 3 °F (37 4 °C) 90 16 (!) 188/100 93 %      Temp Source Heart Rate Source Patient Position - Orthostatic VS BP Location FiO2 (%)   09/17/19 2115 09/17/19 2115 09/17/19 2115 09/17/19 2115 --   Oral Monitor Sitting Left arm       Pain Score       09/17/19 0035       No Pain           Vitals:    09/17/19 2115 09/18/19 0030 09/18/19 0100   BP: (!) 188/100 170/97 (!) 164/106   Pulse: 90 100 78   Patient Position - Orthostatic VS: Sitting Sitting Sitting         Visual Acuity  Visual Acuity      Most Recent Value   L Pupil Size (mm)  4   R Pupil Size (mm)  4   L Pupil Shape  Round   R Pupil Shape  Round          ED Medications  Medications   sodium chloride 0 9 % infusion (125 mL/hr Intravenous New Bag 9/18/19 0141)   hydrochlorothiazide (HYDRODIURIL) tablet 25 mg (has no administration in time range)   lisinopril (ZESTRIL) tablet 60 mg (has no administration in time range)   metoprolol tartrate (LOPRESSOR) tablet 100 mg (100 mg Oral Not Given 9/18/19 0152)   ondansetron (ZOFRAN) injection 4 mg (has no administration in time range)   nicotine (NICODERM CQ) 21 mg/24 hr TD 24 hr patch 1 patch (has no administration in time range)   enoxaparin (LOVENOX) subcutaneous injection 40 mg (has no administration in time range)   acetaminophen (TYLENOL) tablet 650 mg (has no administration in time range)   insulin lispro (HumaLOG) 100 units/mL subcutaneous injection 2-12 Units (has no administration in time range)   hydrALAZINE (APRESOLINE) injection 10 mg (has no administration in time range)   sodium chloride 0 9 % bolus 1,000 mL (1,000 mL Intravenous New Bag 9/17/19 2349)   insulin regular (HumuLIN R,NovoLIN R) injection 10 Units (10 Units Intravenous Given 9/17/19 2350)   hydrALAZINE (APRESOLINE) injection 10 mg (10 mg Intravenous Given 9/18/19 0236)       Diagnostic Studies  Results Reviewed     Procedure Component Value Units Date/Time    Lactic acid x2 Q2H [[id number]]  (Normal) Collected:  09/17/19 2349    Lab Status:  Final result Specimen:  Blood from Arm, Right Updated:  09/18/19 0029     LACTIC ACID 1 6 mmol/L     Narrative:       Result may be elevated if tourniquet was used during collection      Fingerstick Glucose (POCT) [[id number]]  (Abnormal) Collected:  09/18/19 0020    Lab Status:  Final result Updated:  09/18/19 0021     POC Glucose 487 mg/dl     Beta Hydroxybutyrate [[id number]]  (Normal) Collected:  09/17/19 2349    Lab Status:  Final result Specimen:  Blood from Arm, Right Updated:  09/18/19 0013     BETA-HYDROXYBUTYRATE 0 2 mmol/L     Hepatic function panel [[id number]]  (Abnormal) Collected:  09/17/19 2218    Lab Status:  Final result Specimen:  Blood from Arm, Right Updated:  09/17/19 2309     Total Bilirubin 1 10 mg/dL      Bilirubin, Direct 0 29 mg/dL      Alkaline Phosphatase 115 U/L      AST 46 U/L      ALT 61 U/L      Total Protein 7 3 g/dL      Albumin 3 4 g/dL     Lactic acid x2 Q2H [[id number]]     Lab Status:  No result Specimen:  Blood     Basic metabolic panel [[id number]]  (Abnormal) Collected:  09/17/19 2218    Lab Status:  Final result Specimen:  Blood from Arm, Right Updated:  09/17/19 2247     Sodium 130 mmol/L      Potassium 4 1 mmol/L      Chloride 95 mmol/L      CO2 28 mmol/L      ANION GAP 7 mmol/L      BUN 9 mg/dL      Creatinine 1 13 mg/dL      Glucose 616 mg/dL      Calcium 9 3 mg/dL      eGFR 75 ml/min/1 73sq m     Narrative:       [place] guidelines for Chronic Kidney Disease (CKD):     Stage 1 with normal or high GFR (GFR > 90 mL/min/1 73 square meters)    Stage 2 Mild CKD (GFR = 60-89 mL/min/1 73 square meters)    Stage 3A Moderate CKD (GFR = 45-59 mL/min/1 73 square meters)    Stage 3B Moderate CKD (GFR = 30-44 mL/min/1 73 square meters)    Stage 4 Severe CKD (GFR = 15-29 mL/min/1 73 square meters)    Stage 5 End Stage CKD (GFR <15 mL/min/1 73 square meters)  Note: GFR calculation is accurate only with a steady state creatinine    CBC and differential [[id number]]  (Normal) Collected:  09/17/19 2218    Lab Status:  Final result Specimen:  Blood from Arm, Right Updated:  09/17/19 2226     WBC 6 35 Thousand/uL      RBC 5 14 Million/uL      Hemoglobin 15 3 g/dL      Hematocrit 46 2 %      MCV 90 fL      MCH 29 8 pg      MCHC 33 1 g/dL      RDW 13 5 %      MPV 11 0 fL      Platelets 428 Thousands/uL      Neutrophils Relative 74 %      Lymphocytes Relative 16 %      Monocytes Relative 6 %      Eosinophils Relative 3 %      Basophils Relative 1 %      Neutrophils Absolute 4 77 Thousands/µL      Lymphocytes Absolute 0 99 Thousands/µL      Monocytes Absolute 0 40 Thousand/µL      Eosinophils Absolute 0 16 Thousand/µL Basophils Absolute 0 03 Thousands/µL                  CT head without contrast   Final Result by Neri Mortensen DO (09/17 2140)      No acute intracranial abnormality  Left parietal scalp soft tissue swelling which could represent contusion  No calvarial fracture  Workstation performed: [id number]                    Procedures  Procedures       ED Course  ED Course as of Sep 18 0456   Tue Sep 17, 2019   2330 Patient does not want to return to [facility] as it is quite far from his home  I discussed the case with Dr Mariusz Reyes of trauma at [place] and as there is no new trauma with a negative CT imaging, he feels it is reasonable to start the evaluation at [facility] and transfer if findings are concerning or symptoms worsen  I discussed this with the patient who was okay with staying at [facility]                                    MDM    Disposition  Final diagnoses:   Head injury   Staring episodes   Hyperglycemia     Time reflects when diagnosis was documented in both MDM as applicable and the Disposition within this note     Time User Action Codes Description Comment    9/17/2019 11:36 PM Lisha Garcia Add [S09 90XA] Head injury     9/17/2019 11:36 PM Lisha Garcia Add [R40 4] Staring episodes     9/17/2019 11:36 PM Lisha Garcia Add [R73 9] Hyperglycemia       ED Disposition     ED Disposition Condition Date/Time Comment    Admit Stable Tue Sep 17, 2019 11:36 PM Case was discussed with KOJO and the patient's admission status was agreed to be Admission Status: inpatient status to the service of Dr Nida Brewster          Follow-up Information    None         Current Discharge Medication List      CONTINUE these medications which have NOT CHANGED    Details   aspirin (ECOTRIN LOW STRENGTH) 81 mg EC tablet Take 1 tablet (81 mg total) by mouth daily  Refills: 0    Associated Diagnoses: Other chest pain      cloNIDine (CATAPRES) 0 3 mg tablet Take 0 3 mg by mouth      hydrochlorothiazide (HYDRODIURIL) 25 mg tablet Take 1 tablet (25 mg total) by mouth daily  Qty: 90 tablet, Refills: 1    Associated Diagnoses: Essential hypertension      lisinopril (ZESTRIL) 40 mg tablet Take 1 5 tablets (60 mg total) by mouth daily  Qty: 135 tablet, Refills: 1    Associated Diagnoses: Essential hypertension      metoprolol tartrate (LOPRESSOR) 100 mg tablet Take 1 tablet (100 mg total) by mouth every 12 (twelve) hours  Qty: 180 tablet, Refills: 1    Associated Diagnoses: Essential hypertension           No discharge procedures on file      ED Provider  Electronically Signed by           Krys Chowdhury MD  09/18/19 2630

## 2019-09-18 NOTE — DISCHARGE SUMMARY
Discharge Summary - [address] [facility]    Patient Information: Allyson Zarate  44 y o  male MRN: [id number]  Unit/Bed#: -01 Encounter: [id number]    Discharging Physician / Practitioner: Crystal Fernández MD  PCP: Elias Jones  Admission Date: 9/17/2019  Discharge Date: 09/18/19    Disposition:     Other: Transfer to [facility]    Reason for Admission:  Dizziness, staring spells, headache    Discharge Diagnoses:     Principal Problem:    Dizziness  Active Problems:    Essential hypertension    Diabetes ([address] )    Staring episodes    Head injury  Resolved Problems:    * No resolved hospital problems  *      Consultations During Hospital Stay:  · Neurology    Procedures Performed:     · Routine EEG    Significant Findings / Test Results:     · CT of the head    Incidental Findings:   None    Test Results Pending at Discharge (will require follow up): · None     Outpatient Tests Requested:  · None    Complications:  None    Hospital Course:     Allyson Zarate  is a 44 y o  male patient with past medical history of uncontrolled hypertension, morbid obesity, recent history of subarachnoid hemorrhage and subdural hematoma status post fall in August 2019 who originally presented to the hospital on 9/17/2019 due to multiple episodes of dizziness, headache, ambulatory dysfunction and staring spells as per his wife  Patient was evaluated by Neurology, underwent routine EEG which demonstrated several electrographic seizures stemming from right hemisphere this concerning for ongoing active subclinical seizure activity  Due to recent history of right subarachnoid hemorrhage/cortical irritation patient was recommended to be transferred to [facility] for continuous EEG monitoring  Neurology discussed the case with epileptologist Dr Orville Galloway    I personally discussed the case with slim attending on-call at [facility] who accepted the patient    Patient's blood pressure was uncontrolled while in the hospital, he refused to take any blood pressure medications though he was counseled on importance of blood pressure control  especially given history of recent subarachnoid hemorrhage  As per his his wife this is new onset/diagnosis of diabetes A1c is 13 8%  Patient was started on Lantus and Humalog with sliding scale  Will need diabetic education prior to discharge  Condition at Discharge: stable     Discharge Day Visit / Exam:     Subjective:  Patient seen and examined  He denies any new complaints  He has mild headache at this time  Again explained to the patient about taking blood pressure pills, he refused again    Vitals: Blood Pressure: (!) 171/103 (09/18/19 1500)  Pulse: 101 (09/18/19 1500)  Temperature: 98 8 °F (37 1 °C) (09/18/19 1500)  Temp Source: Oral (09/18/19 1500)  Respirations: 18 (09/18/19 1500)  Height: 5' 10" (177 8 cm) (09/17/19 2115)  Weight - Scale: 117 kg (259 lb) (09/17/19 2115)  SpO2: 93 % (09/18/19 1500)  Exam:   Physical Exam   Constitutional: He is oriented to person, place, and time  He appears well-developed and well-nourished  HENT:   Head: Normocephalic and atraumatic  Eyes: Pupils are equal, round, and reactive to light  EOM are normal    Neck: Normal range of motion  Neck supple  Cardiovascular: Normal rate and regular rhythm  Pulmonary/Chest: Effort normal and breath sounds normal    Abdominal: Soft  Bowel sounds are normal    Musculoskeletal: Normal range of motion  Neurological: He is alert and oriented to person, place, and time  Skin: Skin is warm and dry  Discussion with Family: significant other on the phone    Discharge instructions/Information to patient and family:   See after visit summary for information provided to patient and family  Provisions for Follow-Up Care:  See after visit summary for information related to follow-up care and any pertinent home health orders        Planned Readmission: to [facility]     Discharge Statement:  I spent 35 minutes discharging the patient  This time was spent on the day of discharge  I had direct contact with the patient on the day of discharge  Greater than 50% of the total time was spent examining patient, answering all patient questions, arranging and discussing plan of care with patient as well as directly providing post-discharge instructions  Additional time then spent on discharge activities  Discharge Medications:  See after visit summary for reconciled discharge medications provided to patient and family        ** Please Note: This note has been constructed using a voice recognition system **

## 2019-09-18 NOTE — ASSESSMENT & PLAN NOTE
· Elevated  · Continue with home meds  · If it remains elevated titrate of IV hydralazine are metoprolol

## 2019-09-18 NOTE — ED NOTES
1  Chief Complaint: Pt presents to ED with head injury a few weeks ago  Pt states he's "felt off" since and has vision issues  Was seen at [facility] and had two head bleeds at this time  2  Is this admission related to an injury?: No    3  Orientation Status: AAOx4, easily distracted    4  Abnormal Labs/ Abnormal Focused Assessment/ Abnormal Vitals: poor attention/ concentration, spouse states unsteady gait at times, pt states easily distracted by peripheral sights and sounds since discharge  Glucose 616 (given 10 units humulin/ 1L NSS bolus); Na 130/ Cl 95; BP elevated (non compliant)  5  Medication/ Drips: NSS, insulin IVP    6  Last narcotic/ pain medication given: none    7  IV lines/ drains/ etc: #20G RAC    8  Isolation Status: none    9  Skin: intact    10   Ambulatory Status: Ambulatory    11: ED Phone Number: [phone]     Holly Panda RN  09/17/19 8642

## 2019-09-18 NOTE — CONSULTS
Consultation - Neurology   Demetri  44 y o  male MRN: [id number]  Unit/Bed#: -01 Encounter: [id number]      Assessment/Plan   Assessment/Plan:  33-year-old male with history of recent traumatic right-sided subarachnoid hemorrhage in late August 2019, presenting with wide array of neurologic symptoms for several days duration including: headache, gait instability, dysarthria, staring spells per wife and abnormal hearing disturbance  Ultimately on routine EEG he was noted to have several electrographic seizures stemming from the right hemisphere; thus ongoing concern for active subclinical seizure activity (potentially from recent right subarachnoid hemorrhage/cortical irritation)      1  Gait instability, staring spells, hearing disturbance, recent right subarachnoid hemorrhage:  -reviewed routine EEG results with epileptologist:  Several electrographic seizures arising from right hemisphere without clear clinical correlate  -will give IV Ativan 1 mg and Keppra load of 3 G now, will continue maintenance dosing of Keppra (1 G twice daily) beginning tonight   -will transfer to [facility] for video EEG monitoring to further evaluate subclinical seizure activity, discussed with epileptologist and primary team (counseled patient extensively on the need for video EEG monitoring to evaluate for resolution of electrographic/subclinical seizure activity)  -reviewed CT head yesterday, no acute pathology noted  -supportive care/seizure precautions  -continued monitoring of any metabolic or infectious derangements per primary team    Discussed plan of care with attending neurologist     History of Present Illness     Reason for Consult / Principal Problem:  Recent subarachnoid hemorrhage, headache, gait instability, staring spells, hearing disturbance, seizure    HPI: Demetri  is a 44 y o  male with history as mentioned above in assessment who neurology is asked to evaluate in regards to several days duration of constellation of neurologic symptoms following recent admission for traumatic right SAH    To review, he was recently admitted in late August 2019 at [facility] for a traumatic fall and subarachnoid hemorrhage/subdural hematoma  To review, he was fixing the brakes on his car during that time when he went to sit down on a stool and misjudged its placement, he fell backwards striking his head on a large rock with loss of consciousness  He was brought to [facility] where he was noted to have right-sided subarachnoid hemorrhage, as well as temporal parietal extra-axial hematoma, was evaluated by Neurosurgery, he had surveillance CT head which was stable, underwent therapy evaluations and subsequently discharged home with Neurosurgery and therapy follow-up  Upon discussion with the patient this afternoon he states following that admission, he did well at home over the past several weeks, only occasionally having dizziness with ambulation  He noted over the past 2-3 days a worsening of overall symptoms, which include:  Posterior headache, neck pain, dysarthria noted per wife, word-finding difficulty, frequent staring spells per wife, gait instability, as well as hearing disturbance (which the patient describes as hearing things specifically behind his left ear, even if they are occurring on his right side)  He denies any specific vision disturbances, no clear visual hallucinations per patient  No focal weakness, no focal sensory changes  Ultimately due to the symptoms persisting over the past several days he came to the ED for evaluation yesterday  Initial CT head negative for acute intracranial pathology  He denies any fall or head trauma in the interim since late August 2019  Denies any traditional seizure risk factors including personal or family history of seizure, prior head trauma/TBI before August 2019, no CNS infection, no recent infection or illness to patient's knowledge       No acute issues with concentration/memory/orientation/confusion  Inpatient consult to Neurology  Consult performed by: Angelita Cotton PA-C  Consult ordered by: Brady Saavedra PA-C          Review of Systems   Constitutional: Negative  HENT: Negative  Hearing disturbance   Eyes: Negative for photophobia and visual disturbance  Respiratory: Negative  Cardiovascular: Negative  Gastrointestinal: Negative  Musculoskeletal: Negative  Skin: Negative  Neurological: Positive for dizziness, seizures, speech difficulty, light-headedness, numbness and headaches  Negative for tremors, syncope, facial asymmetry and weakness  Psychiatric/Behavioral: Positive for hallucinations  Negative for confusion and decreased concentration  All other ROS reviewed and negative  Historical Information   Past Medical History:   Diagnosis Date    Hypertension      Past Surgical History:   Procedure Laterality Date    KNEE SURGERY      LEG SURGERY       Social History   Social History     Substance and Sexual Activity   Alcohol Use Yes    Alcohol/week: 3 0 standard drinks    Types: 3 Cans of beer per week    Frequency: 2-4 times a month    Drinks per session: 3 or 4    Comment: occassionally     Social History     Substance and Sexual Activity   Drug Use No     Social History     Tobacco Use   Smoking Status Current Some Day Smoker    Packs/day: 0 20    Types: Cigars   Smokeless Tobacco Never Used     Family History:   Family History   Family history unknown: Yes       Review of previous medical records was completed      Meds/Allergies   current meds:   Current Facility-Administered Medications   Medication Dose Route Frequency    acetaminophen (TYLENOL) tablet 650 mg  650 mg Oral Q6H PRN    enoxaparin (LOVENOX) subcutaneous injection 40 mg  40 mg Subcutaneous Daily    hydrALAZINE (APRESOLINE) injection 10 mg  10 mg Intravenous Q4H PRN    hydrochlorothiazide (HYDRODIURIL) tablet 25 mg  25 mg Oral Daily    insulin glargine (LANTUS) subcutaneous injection 20 Units 0 2 mL  20 Units Subcutaneous QAM    insulin lispro (HumaLOG) 100 units/mL subcutaneous injection 2-12 Units  2-12 Units Subcutaneous TID AC    insulin lispro (HumaLOG) 100 units/mL subcutaneous injection 5 Units  5 Units Subcutaneous TID With Meals    levETIRAcetam (KEPPRA) 1,000 mg in sodium chloride 0 9 % 100 mL IVPB  1,000 mg Intravenous Q12H    levETIRAcetam (KEPPRA) 3,000 mg in sodium chloride 0 9 % 250 mL IVPB  3,000 mg Intravenous Once    lisinopril (ZESTRIL) tablet 60 mg  60 mg Oral Daily    metoprolol tartrate (LOPRESSOR) tablet 100 mg  100 mg Oral Q12H [facility]    nicotine (NICODERM CQ) 21 mg/24 hr TD 24 hr patch 1 patch  1 patch Transdermal Daily    ondansetron (ZOFRAN) injection 4 mg  4 mg Intravenous Q6H PRN    sodium chloride 0 9 % infusion  125 mL/hr Intravenous Continuous    and PTA meds:   Prior to Admission Medications   Prescriptions Last Dose Informant Patient Reported? Taking?   aspirin (ECOTRIN LOW STRENGTH) 81 mg EC tablet   No No   Sig: Take 1 tablet (81 mg total) by mouth daily   cloNIDine (CATAPRES) 0 3 mg tablet   Yes No   Sig: Take 0 3 mg by mouth   hydrochlorothiazide (HYDRODIURIL) 25 mg tablet   No No   Sig: Take 1 tablet (25 mg total) by mouth daily   lisinopril (ZESTRIL) 40 mg tablet   No No   Sig: Take 1 5 tablets (60 mg total) by mouth daily   metoprolol tartrate (LOPRESSOR) 100 mg tablet   No No   Sig: Take 1 tablet (100 mg total) by mouth every 12 (twelve) hours      Facility-Administered Medications: None       No Known Allergies    Objective   Vitals:Blood pressure 160/94, pulse 86, temperature 98 5 °F (36 9 °C), temperature source Oral, resp  rate 18, height 5' 10" (1 778 m), weight 117 kg (259 lb), SpO2 92 %  ,Body mass index is 37 16 kg/m²      Intake/Output Summary (Last 24 hours) at 9/18/2019 1329  Last data filed at 9/18/2019 0800  Gross per 24 hour   Intake 416 ml   Output    Net 416 ml       Invasive Devices: Invasive Devices     Peripheral Intravenous Line            Peripheral IV 09/17/19 Right Antecubital less than 1 day                Physical Exam   Constitutional: He is oriented to person, place, and time  He appears well-developed and well-nourished  HENT:   Head: Normocephalic and atraumatic  No frontal/temporal tenderness to palpation, area of bruising at his prior head trauma site  Eyes: Pupils are equal, round, and reactive to light  Conjunctivae and EOM are normal    Neck: Normal range of motion  Neck supple  Mild left paracervical tenderness to palpation, no specific range of motion abnormalities, head turning strength as well as neck flexion extension appears full   Cardiovascular: Normal rate and regular rhythm  Pulmonary/Chest: Effort normal and breath sounds normal    Abdominal: Soft  Bowel sounds are normal    Musculoskeletal: Normal range of motion  Neurological: He is alert and oriented to person, place, and time  He has a normal Finger-Nose-Finger Test and a normal Heel to [employer]    Skin: Skin is warm and dry  Neurologic Exam     Mental Status   Oriented to person, place, and time  Awake and alert, cooperative, fully oriented, follows commands well  Speech is without dysarthria or aphasia  No evidence of staring off during personal evaluation  Cranial Nerves     CN II   Visual fields full to confrontation  CN III, IV, VI   Pupils are equal, round, and reactive to light  Extraocular motions are normal      CN V   Facial sensation intact  CN VII   Facial expression full, symmetric  CN VIII   CN VIII normal      CN IX, X   CN IX normal    CN X normal      CN XI   CN XI normal      CN XII   CN XII normal      Motor Exam   Muscle bulk: normal  Overall muscle tone: normal  Right arm pronator drift: absent  Left arm pronator drift: absent  5/5 upper and lower extremity strength throughout    No focal deficit or laterality on exam      Sensory Exam   Light touch normal      Gait, Coordination, and Reflexes     Coordination   Finger to nose coordination: normal  Heel to shin coordination: normal    Tremor   Resting tremor: absent  Intention tremor: absent    Reflexes   Right plantar: normal  Left plantar: normal  Right ankle clonus: absent  Left ankle clonus: absent      Lab Results:   CBC:   Results from last 7 days   Lab Units 09/18/19  0518 09/17/19  2218   WBC Thousand/uL 7 28 6 35   RBC Million/uL 4 77 5 14   HEMOGLOBIN g/dL 14 2 15 3   HEMATOCRIT % 42 6 46 2   MCV fL 89 90   PLATELETS Thousands/uL 180 180   , BMP/CMP:   Results from last 7 days   Lab Units 09/18/19  0518 09/17/19  2218   SODIUM mmol/L 135* 130*   POTASSIUM mmol/L 3 3* 4 1   CHLORIDE mmol/L 101 95*   CO2 mmol/L 23 28   BUN mg/dL 9 9   CREATININE mg/dL 0 78 1 13   CALCIUM mg/dL 8 8 9 3   AST U/L 33 46*   ALT U/L 48 61   ALK PHOS U/L 90 115   EGFR ml/min/1 73sq m 105 75   , Vitamin B12:   , HgBA1C:   Results from last 7 days   Lab Units 09/18/19 0518   HEMOGLOBIN A1C % 13 2*   , TSH:   , Coagulation:   , Lipid Profile:   , Ammonia:   , Urinalysis:       Invalid input(s): URIBILINOGEN, Drug Screen:   , Medication Drug Levels:       Invalid input(s): CARBAMAZEPINE,  PHENOBARB, LACOSAMIDE, OXCARBAZEPINE  Imaging Studies: I have personally reviewed pertinent films in PACS   CT head 09/17/2019: No acute intracranial abnormality      Left parietal scalp soft tissue swelling which could represent contusion  No calvarial fracture  EKG, Pathology, and Other Studies: I have personally reviewed pertinent reports  VTE Prophylaxis: Sequential compression device (Venodyne)  and Enoxaparin (Lovenox)    Code Status: Level 1 - Full Code    Total time spent today 55 minutes  Discussed plan of care with patient and primary team:  Active seizures on routine EEG, need for video EEG monitoring, starting AEDs

## 2019-09-18 NOTE — PROGRESS NOTES
Upon admission patient noted to have an elevated /106  Patient refused metoprolol stating," I have not been taking my blood pressure medication at home for the past few months  It makes me sick so I do not take it  As a matter of fact I have been doing better without it"  Patient educated on the importance of medication compliance  Continued to refuse  SLIM made aware  IV hydralazine administered as ordered

## 2019-09-18 NOTE — CASE MANAGEMENT
Patient admit to the hospital with dizziness/ recent fall  CM met with patient baseline information was obtained  Per patient he recently was at [facility] for a head injury after a fall and has had persistent symptoms of concentration/ fuzziness/ hearing issues  Pt resides with his wife and 2 adult children  Pt reports he resides in a 2 story home with 10-15 steps to enter with hand rails and notes all ADLS are done on the 1st floor  Per patient he is not using any assistive devices or DME's  Pt has not utilized HHC/rehab in the past  Pt denies MH/SA hx Patient uses [facility] for his medications Currently he does not have health insurance and per patient is waiting for disability as he has had persistent issues with dizziness and has not worked since April    Pt said his currents medications prior to admission were affordable  Per patient he was to follow up with a [facility] representative at home today and rescheduled this  Pt does not have a POA/AD, notes his wife Esha Shepard would be his health care representative  Pt does drive and will have available transportation home upon dc  CM reviewed discharge planning process including the following: identifying help at home, patient preference for discharge planning needs, pharmacy preference, and availability of treatment team to discuss questions or concerns patient and/or family may have regarding understanding medications and recognizing signs and symptoms once discharged  CM also encouraged patient to follow up with all recommended appointments after discharge  Patient advised of importance for patient and family to participate in managing patients medical well being  CM educated patient on the importance of follow up care upon discharge and the need to have a  PCP appointment post discharge  Pt was not agreeable for me to call his PCP as he will if needed       Spoke to [employer] and PATHS will be notified that patient is in the hospital and Financial requested PATHS to see  Per patient his family is supportive and available to assist prn  DC plan at this time is home with family support  CM department will follow patient through discharge          Jamie Ramos RN BSN Clinical Coordinator

## 2019-09-18 NOTE — UTILIZATION REVIEW
Initial Clinical Review    Admission: Date/Time/Statement: Inpatient Admission Orders (From admission, onward)     Ordered        09/17/19 2337  Inpatient Admission  Once                   Orders Placed This Encounter   Procedures    Inpatient Admission     Standing Status:   Standing     Number of Occurrences:   1     Order Specific Question:   Admitting Physician     Answer:   [address] [[id number]]     Order Specific Question:   Level of Care     Answer:   Med Surg [[id number]]     Order Specific Question:   Estimated length of stay     Answer:   More than 2 Midnights     Order Specific Question:   Certification     Answer:   I certify that inpatient services are medically necessary for this patient for a duration of greater than two midnights  See H&P and MD Progress Notes for additional information about the patient's course of treatment  ED Arrival Information     Expected Arrival Acuity Means of Arrival Escorted By Service Admission Type    - 9/17/2019 21:05 Emergent Walk-In Family Member General Medicine Emergency    Arrival Complaint    Dizziness        Chief Complaint   Patient presents with    Head Injury     Pt presents to ED with head injury a few weeks ago  Pt states he's "felt off" since and has vision issues  [place] and had two head bleeds at this time  Assessment/Plan: 43 yo male to ED from home w/ feeling off balance since head injury 8/26 after fall and hitting head on rock   CT head checked for delayed bleed and was neg  Admitted IP status for dizziness consult to neurosurgery and neuro , EEG , fall precautions, PT OT and supportive care      ED Triage Vitals   Temperature Pulse Respirations Blood Pressure SpO2   09/17/19 2115 09/17/19 2115 09/17/19 2115 09/17/19 2115 09/17/19 2115   99 3 °F (37 4 °C) 90 16 (!) 188/100 93 %      Temp Source Heart Rate Source Patient Position - Orthostatic VS BP Location FiO2 (%)   09/17/19 2115 09/17/19 2115 09/17/19 2115 09/17/19 2115 -- Oral Monitor Sitting Left arm       Pain Score       09/17/19 0035       No Pain        Wt Readings from Last 1 Encounters:   09/17/19 117 kg (259 lb)     Additional Vital Signs:   09/18/19 0739  98 5 °F (36 9 °C)  86  18  160/94  92 %  None (Room air)  Lying   09/18/19 0100  98 5 °F (36 9 °C)  78  16  164/106Abnormal   95 %  None (Room air)  Sitting   09/18/19 0030    100  29Abnormal   170/97  95 %  None (Room air)  Sitting   09/17/19 2117            None (Room air)         Pertinent Labs/Diagnostic Test Results:   9/17 CT head - Left parietal scalp soft tissue swelling which could represent contusion  No calvarial fracture    Results from last 7 days   Lab Units 09/18/19  0518 09/17/19  2218   WBC Thousand/uL 7 28 6 35   HEMOGLOBIN g/dL 14 2 15 3   HEMATOCRIT % 42 6 46 2   PLATELETS Thousands/uL 180 180   NEUTROS ABS Thousands/µL 4 78 4 77     Results from last 7 days   Lab Units 09/18/19  0518 09/17/19  2218   SODIUM mmol/L 135* 130*   POTASSIUM mmol/L 3 3* 4 1   CHLORIDE mmol/L 101 95*   CO2 mmol/L 23 28   ANION GAP mmol/L 11 7   BUN mg/dL 9 9   CREATININE mg/dL 0 78 1 13   EGFR ml/min/1 73sq m 105 75   CALCIUM mg/dL 8 8 9 3   MAGNESIUM mg/dL 1 7  --    PHOSPHORUS mg/dL 2 9  --      Results from last 7 days   Lab Units 09/18/19  0518 09/17/19  2218   AST U/L 33 46*   ALT U/L 48 61   ALK PHOS U/L 90 115   TOTAL PROTEIN g/dL 6 5 7 3   ALBUMIN g/dL 3 1* 3 4*   TOTAL BILIRUBIN mg/dL 1 00 1 10*   BILIRUBIN DIRECT mg/dL  --  0 29*     Results from last 7 days   Lab Units 09/18/19  0717 09/18/19  0358 09/18/19  0227 09/18/19  0020   POC GLUCOSE mg/dl 341* 332* 387* 487*     Results from last 7 days   Lab Units 09/18/19  0518 09/17/19  2218   GLUCOSE RANDOM mg/dL 322* 616*       BETA-HYDROXYBUTYRATE   Date Value Ref Range Status   09/17/2019 0 2 <0 6 mmol/L Final      Results from last 7 days   Lab Units 09/18/19  0518 09/17/19  2349   LACTIC ACID mmol/L 0 8 1 6     ED Treatment:   Medication Administration from 09/17/2019 2105 to 09/18/2019 0107       Date/Time Order Dose Route Action     09/17/2019 2349 sodium chloride 0 9 % bolus 1,000 mL 1,000 mL Intravenous New Bag     09/17/2019 2350 insulin regular (HumuLIN R,NovoLIN R) injection 10 Units 10 Units Intravenous Given        Past Medical History:   Diagnosis Date    Hypertension      Present on Admission:   Essential hypertension   Diabetes ([facility] [address] )      Admitting Diagnosis: Dizziness [R42]  Head injury [S09 90XA]  Hyperglycemia [R73 9]  Staring episodes [R40 4]  Age/Sex: 44 y o  male  Admission Orders:    Current Facility-Administered Medications:  acetaminophen 650 mg Oral Q6H PRN     enoxaparin 40 mg Subcutaneous Daily     hydrALAZINE 10 mg Intravenous Q4H PRN     hydrochlorothiazide 25 mg Oral Daily     insulin lispro 2-12 Units Subcutaneous TID AC     lisinopril 60 mg Oral Daily     metoprolol tartrate 100 mg Oral Q12H [address]     nicotine 1 patch Transdermal Daily     ondansetron 4 mg Intravenous Q6H PRN     sodium chloride 125 mL/hr Intravenous Continuous       OT PT eval   Neuro checks   Reg diet   SCD  OT PT eval   Up and oOB as amarilys     IP CONSULT TO NEUROLOGY    Network Utilization Review Department  Phone: [phone]; Fax [phone]  [email or web address]  ATTENTION: Please call with any questions or concerns to [phone]  and carefully listen to the prompts so that you are directed to the right person  Send all requests for admission clinical reviews, approved or denied determinations and any other requests to fax [phone]   All voicemails are confidential

## 2019-09-18 NOTE — CASE MANAGEMENT
Per nursing patient is going to go to [place], medical necessity form was done and placed in the chart, nursing is aware

## 2019-09-18 NOTE — PROGRESS NOTES
Notified by nurse  BP elevated 164/106, 170/97  Pt refusing his home oral BP meds stating- all of my blood pressure medicine makes me feel sick and thats why I haven't been taking them  Will give a one time dose of IV hydralazine 10 mg  now  Add prn hydralazine 10 mg for SBP >170  Will need counseling of importance of medication compliance zohra with his neurological history of subarachnoid hemorrhage and head trauma

## 2019-09-18 NOTE — TRANSPORTATION MEDICAL NECESSITY
Section I - General Information    Name of Patient: Gordon Negron  : 1970    Medicare #:   Transport Date: 19 (PCS is valid for round trips on this date and for all repetitive trips in the 60-day range as noted below )  Origin: [place]: [facility]  Is the pt's stay covered under Medicare Part A (PPS/DRG)   []     Closest appropriate facility? If no, why is transport to more distant facility required? Yes  If hospice pt, is this transport related to pt's terminal illness? No       Section II - Medical Necessity Questionnaire  Ambulance transportation is medically necessary only if other means of transport are contraindicated or would be potentially harmful to the patient  To meet this requirement, the patient must either be "bed confined" or suffer from a condition such that transport by means other than ambulance is contraindicated by the patient's condition  The following questions must be answered by the medical professional signing below for this form to be valid:    1)  Describe the MEDICAL CONDITION (physical and/or mental) of this patient AT [address] [place] [address] that requires the patient to be transported in an ambulance and why transport by other means is contraindicated by the patient's condition: hospital to hospital transfer  Higher level of care needed  Medical attendant required    2) Is the patient "bed confined" as defined below? No    To be "be confined" the patient must satisfy all three of the following conditions: (1) unable to get up from bed without Assistance; AND (2) unable to ambulate; AND (3) unable to sit in a chair or wheelchair  98    3) Can this patient safely be transported by car or wheelchair van (i e , seated during transport without a medical attendant or monitoring)?    No    4) In addition to completing questions 1-3 above, please check any of the following conditions that apply*:   *Note: supporting documentation for any boxes checked must be maintained in the patient's medical records  If hosp-hosp transfer, describe services needed at 2nd facility not available at 1st facility? Other(specify) patient requiring ambulance as hospital to hospital transfer, needs monitoring for new onset of seizures, attendant required      Section III - Signature of Physician or Healthcare Professional  I certify that the above information is true and correct based on my evaluation of this patient, and represent that the patient requires transport by ambulance and that other forms of transport are contraindicated  I understand that this information will be used by the Centers for Medicare and Medicaid Services (CMS) to support the determination of medical necessity for ambulance services, and I represent that I have personal knowledge of the patient's condition at time of transport  []  If this box is checked, I also certify that the patient is physically or mentally incapable of signing the ambulance service's claim and that the institution with which I am affiliated has furnished care, services, or assistance to the patient  My signature below is made on behalf of the patient pursuant to 42 CFR §424 36(b)(4)  In accordance with 42 CFR §424 37, the specific reason(s) that the patient is physically or mentally incapable of signing the claim form is as follows: n/a  Signature of Physician* or Healthcare Professional______________________________________________________________  Signature Date 09/18/19 (For scheduled repetitive transports, this form is not valid for transports performed more than 60 days after this date)    Printed Name & Credentials of Physician or Healthcare Professional (MD, DO, RN, etc )________________________________  *Form must be signed by patient's attending physician for scheduled, repetitive transports   For non-repetitive, unscheduled ambulance transports, if unable to obtain the signature of the attending physician, any of the following may sign (choose appropriate option below)  [] Physician Assistant []  Clinical Nurse Specialist [x]  Registered Nurse  []  Nurse Practitioner  [] Discharge Planner

## 2019-09-18 NOTE — H&P
H&P- [facility]  1970, 44 y o  male MRN: [id number]    Unit/Bed#: -01 Encounter: [id number]    Primary Care Provider: Noemy Whitney   Date and time admitted to hospital: 9/17/2019  9:20 PM        * Dizziness  Assessment & Plan  · Patient with recent head injury 08/2019 was admitted to ICU at [facility] for subarachnoid hemorrhage secondary to head trauma-he was cleared by Neurosurgery at the time  · It appears that patient has been having episodes of dizziness and absence like seizures  · He has been having brief episodes of  staring that was even reported in the ED  · He had a head CT done today which was negative  · ED spoke with Neurosurgery, okay to admit to [facility]  · Monitor for any changes in symptoms,neuro checks  · Neurology consultation  · EEG ordered    Head injury  Assessment & Plan  Status post head injury August 2019 which cause subarachnoid hemorrhage he was admitted to ICU at [facility] in cleared by Neurosurgery  Recent head CT done today is negative for any acute pathology  Keep on fall precautions  PT OT  Supportive care    Staring episodes  Assessment & Plan  See plan above, will order an EEG    Diabetes [facility])  Assessment & Plan  Lab Results   Component Value Date    HGBA1C 8 2 (H) 01/14/2019       Recent Labs     09/18/19  0020   POCGLU 487*       Blood Sugar Average: Last 72 hrs:  (P) 487   · Patient sugar was 616 when he presented to the ED, he does not recall taking anything for his diabetes  · Check hemoglobin A1c  · Sliding scale insulin  · Check sugars t i d     Essential hypertension  Assessment & Plan  · Elevated  · Continue with home meds  · If it remains elevated titrate of IV hydralazine are metoprolol    VTE Prophylaxis: recent head bleed  / sequential compression device   Code Status: full  Code   POLST: POLST form is on file already (pre-hospital)  Discussion with family:     Anticipated Length of Stay:  Patient will be admitted on an Inpatient basis with an anticipated length of stay of   2 midnights  Justification for Hospital Stay: dizziness    Total Time for Visit, including Counseling / Coordination of Care: 1 hour  Greater than 50% of this total time spent on direct patient counseling and coordination of care  Chief Complaint:   Dizziness, possible absence sz    History of Present Illness:    Alyssia Alfonso  is a 44 y o  male with history of subarachnoid hemorrhage and subdural hematoma status post fall August 26, 2019  Patient present today today with complaints of hearing things that are not really there  He describes it as picking up 2 different radiofrequencies  He also complains of his being off balance since the head injury, it was reported in the ED that he appears spaced out at times  He was seen at [facility] for head injury status post fall witnessed fall backwards striking his head on a rock was cleared by neuro surgery  Patient is not on any blood thinners  At the time advised to follow up with PCP and PT upon discharge  CT of the head was done down in the ED to check for any delayed bleeding which was negative  Patient will be admitted for further evaluation and monitoring  He denies any numbness, speech difficulty, syncope, tremors or weakness    Review of Systems:    Review of Systems   Constitutional: Negative  HENT: Negative  Respiratory: Negative  Cardiovascular: Negative  Gastrointestinal: Negative  Musculoskeletal: Negative  Neurological: Positive for dizziness and seizures  Negative for tremors, syncope, facial asymmetry, speech difficulty, weakness, light-headedness, numbness and headaches  All other systems reviewed and are negative        Past Medical and Surgical History:     Past Medical History:   Diagnosis Date    Hypertension        Past Surgical History:   Procedure Laterality Date    KNEE SURGERY      LEG SURGERY         Meds/Allergies:    Prior to Admission medications Medication Sig Start Date End Date Taking? Authorizing Provider   aspirin (ECOTRIN LOW STRENGTH) 81 mg EC tablet Take 1 tablet (81 mg total) by mouth daily 1/16/19   Erin Nunn PA-C   cloNIDine (CATAPRES) 0 3 mg tablet Take 0 3 mg by mouth 1/24/19 1/24/20  Historical Provider, MD   hydrochlorothiazide (HYDRODIURIL) 25 mg tablet Take 1 tablet (25 mg total) by mouth daily 2/5/19   Raghavendra Pool MD   lisinopril (ZESTRIL) 40 mg tablet Take 1 5 tablets (60 mg total) by mouth daily 2/5/19   Raghavendra Pool MD   metoprolol tartrate (LOPRESSOR) 100 mg tablet Take 1 tablet (100 mg total) by mouth every 12 (twelve) hours 2/5/19   Raghavendra Pool MD     I have reviewed home medications with patient personally  Allergies: No Known Allergies    Social History:     Marital Status: Single     Substance Use History:   Social History     Substance and Sexual Activity   Alcohol Use Yes    Alcohol/week: 3 0 standard drinks    Types: 3 Cans of beer per week    Frequency: 2-4 times a month    Drinks per session: 3 or 4    Comment: occassionally     Social History     Tobacco Use   Smoking Status Current Some Day Smoker    Packs/day: 0 20    Types: Cigars   Smokeless Tobacco Never Used     Social History     Substance and Sexual Activity   Drug Use No       Family History:    non-contributory    Physical Exam:     Vitals:   Blood Pressure: 137/87 (09/18/19 1931)  Pulse: 83 (09/18/19 1931)  Temperature: 98 8 °F (37 1 °C) (09/18/19 1500)  Temp Source: Oral (09/18/19 1500)  Respirations: 18 (09/18/19 1500)  Height: 5' 10" (177 8 cm) (09/17/19 2115)  Weight - Scale: 117 kg (259 lb) (09/17/19 2115)  SpO2: 93 % (09/18/19 1500)    Physical Exam   Constitutional: He is oriented to person, place, and time  No distress  Neck: Normal range of motion  Neck supple  Cardiovascular: Normal rate, regular rhythm, normal heart sounds and intact distal pulses     Pulmonary/Chest: Effort normal and breath sounds normal  No respiratory distress  Abdominal: Soft  Bowel sounds are normal    Neurological: He is alert and oriented to person, place, and time  He has normal strength  No cranial nerve deficit or sensory deficit  Coordination and gait normal  GCS eye subscore is 4  GCS verbal subscore is 5  GCS motor subscore is 6  Skin: Skin is warm  Additional Data:     Lab Results: I have personally reviewed pertinent reports  Results from last 7 days   Lab Units 09/18/19  0518   WBC Thousand/uL 7 28   HEMOGLOBIN g/dL 14 2   HEMATOCRIT % 42 6   PLATELETS Thousands/uL 180   NEUTROS PCT % 66   LYMPHS PCT % 23   MONOS PCT % 7   EOS PCT % 3     Results from last 7 days   Lab Units 09/18/19  0518   SODIUM mmol/L 135*   POTASSIUM mmol/L 3 3*   CHLORIDE mmol/L 101   CO2 mmol/L 23   BUN mg/dL 9   CREATININE mg/dL 0 78   ANION GAP mmol/L 11   CALCIUM mg/dL 8 8   ALBUMIN g/dL 3 1*   TOTAL BILIRUBIN mg/dL 1 00   ALK PHOS U/L 90   ALT U/L 48   AST U/L 33   GLUCOSE RANDOM mg/dL 322*         Results from last 7 days   Lab Units 09/18/19  1728 09/18/19  1053 09/18/19  0717 09/18/19  0358 09/18/19  0227 09/18/19  0020   POC GLUCOSE mg/dl 286* 348* 341* 332* 387* 487*     Results from last 7 days   Lab Units 09/18/19  0518   HEMOGLOBIN A1C % 13 2*     Results from last 7 days   Lab Units 09/18/19  0518 09/17/19  2349   LACTIC ACID mmol/L 0 8 1 6       Imaging: I have personally reviewed pertinent reports  CT head without contrast   Final Result by Jodi Haley DO (09/17 2140)      No acute intracranial abnormality  Left parietal scalp soft tissue swelling which could represent contusion  No calvarial fracture  Workstation performed: [id number]             EKG, Pathology, and Other Studies Reviewed on Admission:   · EKG:     Allscripts / Epic Records Reviewed: Yes     ** Please Note: This note has been constructed using a voice recognition system   **

## 2019-09-19 ENCOUNTER — APPOINTMENT (INPATIENT)
Dept: RADIOLOGY | Facility: HOSPITAL | Age: 49
DRG: 053 | End: 2019-09-19
Payer: COMMERCIAL

## 2019-09-19 ENCOUNTER — APPOINTMENT (INPATIENT)
Dept: NEUROLOGY | Facility: CLINIC | Age: 49
DRG: 053 | End: 2019-09-19
Payer: COMMERCIAL

## 2019-09-19 PROBLEM — G40.901 STATUS EPILEPTICUS (HCC): Status: ACTIVE | Noted: 2019-09-19

## 2019-09-19 PROBLEM — I10 ESSENTIAL HYPERTENSION: Chronic | Status: ACTIVE | Noted: 2017-09-12

## 2019-09-19 PROBLEM — J96.00 ACUTE RESPIRATORY FAILURE (HCC): Status: ACTIVE | Noted: 2019-09-19

## 2019-09-19 PROBLEM — E11.9 DIABETES (HCC): Chronic | Status: ACTIVE | Noted: 2019-01-15

## 2019-09-19 PROBLEM — S06.9XAA TBI (TRAUMATIC BRAIN INJURY): Chronic | Status: ACTIVE | Noted: 2019-09-19

## 2019-09-19 PROBLEM — S09.90XA HEAD INJURY: Status: RESOLVED | Noted: 2019-09-18 | Resolved: 2019-09-19

## 2019-09-19 PROBLEM — S06.9X9A TBI (TRAUMATIC BRAIN INJURY) (HCC): Chronic | Status: ACTIVE | Noted: 2019-09-19

## 2019-09-19 PROBLEM — J96.00 ACUTE RESPIRATORY FAILURE (HCC): Status: RESOLVED | Noted: 2019-09-19 | Resolved: 2019-09-19

## 2019-09-19 PROBLEM — R56.9 SEIZURE (HCC): Status: ACTIVE | Noted: 2019-09-18

## 2019-09-19 LAB
ALBUMIN SERPL BCP-MCNC: 3.3 G/DL (ref 3.5–5)
ALP SERPL-CCNC: 99 U/L (ref 46–116)
ALT SERPL W P-5'-P-CCNC: 53 U/L (ref 12–78)
ANION GAP SERPL CALCULATED.3IONS-SCNC: 7 MMOL/L (ref 4–13)
ARTERIAL PATENCY WRIST A: NO
AST SERPL W P-5'-P-CCNC: 36 U/L (ref 5–45)
BASE EXCESS BLDA CALC-SCNC: -1.3 MMOL/L
BASOPHILS # BLD AUTO: 0.06 THOUSANDS/ΜL (ref 0–0.1)
BASOPHILS NFR BLD AUTO: 1 % (ref 0–1)
BILIRUB SERPL-MCNC: 1.12 MG/DL (ref 0.2–1)
BUN SERPL-MCNC: 11 MG/DL (ref 5–25)
CALCIUM SERPL-MCNC: 9.3 MG/DL (ref 8.3–10.1)
CHLORIDE SERPL-SCNC: 103 MMOL/L (ref 100–108)
CO2 SERPL-SCNC: 26 MMOL/L (ref 21–32)
CREAT SERPL-MCNC: 1.18 MG/DL (ref 0.6–1.3)
EOSINOPHIL # BLD AUTO: 0.23 THOUSAND/ΜL (ref 0–0.61)
EOSINOPHIL NFR BLD AUTO: 3 % (ref 0–6)
ERYTHROCYTE [DISTWIDTH] IN BLOOD BY AUTOMATED COUNT: 13.5 % (ref 11.6–15.1)
GFR SERPL CREATININE-BSD FRML MDRD: 71 ML/MIN/1.73SQ M
GLUCOSE SERPL-MCNC: 201 MG/DL (ref 65–140)
GLUCOSE SERPL-MCNC: 341 MG/DL (ref 65–140)
GLUCOSE SERPL-MCNC: 342 MG/DL (ref 65–140)
GLUCOSE SERPL-MCNC: 358 MG/DL (ref 65–140)
GLUCOSE SERPL-MCNC: 365 MG/DL (ref 65–140)
GLUCOSE SERPL-MCNC: 380 MG/DL (ref 65–140)
GLUCOSE SERPL-MCNC: >500 MG/DL (ref 65–140)
HCO3 BLDA-SCNC: 23.8 MMOL/L (ref 22–28)
HCT VFR BLD AUTO: 48.6 % (ref 36.5–49.3)
HGB BLD-MCNC: 15.9 G/DL (ref 12–17)
HOROWITZ INDEX BLDA+IHG-RTO: 40 MM[HG]
IMM GRANULOCYTES # BLD AUTO: 0.03 THOUSAND/UL (ref 0–0.2)
IMM GRANULOCYTES NFR BLD AUTO: 0 % (ref 0–2)
LACTATE SERPL-SCNC: 2 MMOL/L (ref 0.5–2)
LACTATE SERPL-SCNC: 2.2 MMOL/L (ref 0.5–2)
LYMPHOCYTES # BLD AUTO: 1.43 THOUSANDS/ΜL (ref 0.6–4.47)
LYMPHOCYTES NFR BLD AUTO: 17 % (ref 14–44)
MAGNESIUM SERPL-MCNC: 2.2 MG/DL (ref 1.6–2.6)
MCH RBC QN AUTO: 29.7 PG (ref 26.8–34.3)
MCHC RBC AUTO-ENTMCNC: 32.7 G/DL (ref 31.4–37.4)
MCV RBC AUTO: 91 FL (ref 82–98)
MONOCYTES # BLD AUTO: 0.56 THOUSAND/ΜL (ref 0.17–1.22)
MONOCYTES NFR BLD AUTO: 7 % (ref 4–12)
NEUTROPHILS # BLD AUTO: 6.33 THOUSANDS/ΜL (ref 1.85–7.62)
NEUTS SEG NFR BLD AUTO: 72 % (ref 43–75)
NRBC BLD AUTO-RTO: 0 /100 WBCS
O2 CT BLDA-SCNC: 20.5 ML/DL (ref 16–23)
OXYHGB MFR BLDA: 95.7 % (ref 94–97)
PCO2 BLDA: 41.3 MM HG (ref 36–44)
PEEP RESPIRATORY: 5 CM[H2O]
PH BLDA: 7.38 [PH] (ref 7.35–7.45)
PHENYTOIN SERPL-MCNC: 2.5 UG/ML (ref 10–20)
PHOSPHATE SERPL-MCNC: 2.4 MG/DL (ref 2.7–4.5)
PLATELET # BLD AUTO: 224 THOUSANDS/UL (ref 149–390)
PMV BLD AUTO: 10.6 FL (ref 8.9–12.7)
PO2 BLDA: 90.2 MM HG (ref 75–129)
POTASSIUM SERPL-SCNC: 3.3 MMOL/L (ref 3.5–5.3)
PROT SERPL-MCNC: 7.4 G/DL (ref 6.4–8.2)
RBC # BLD AUTO: 5.36 MILLION/UL (ref 3.88–5.62)
SODIUM SERPL-SCNC: 136 MMOL/L (ref 136–145)
SPECIMEN SOURCE: NORMAL
VALPROATE SERPL-MCNC: 101 UG/ML (ref 50–100)
VALPROATE SERPL-MCNC: 107 UG/ML (ref 50–100)
VENT AC: 12
VENT- AC: AC
VT SETTING VENT: 500 ML
WBC # BLD AUTO: 8.64 THOUSAND/UL (ref 4.31–10.16)

## 2019-09-19 PROCEDURE — 99233 SBSQ HOSP IP/OBS HIGH 50: CPT | Performed by: PSYCHIATRY & NEUROLOGY

## 2019-09-19 PROCEDURE — 84100 ASSAY OF PHOSPHORUS: CPT | Performed by: NURSE PRACTITIONER

## 2019-09-19 PROCEDURE — 95951 HB EEG MONITORING/VIDEORECORD: CPT

## 2019-09-19 PROCEDURE — 02HV33Z INSERTION OF INFUSION DEVICE INTO SUPERIOR VENA CAVA, PERCUTANEOUS APPROACH: ICD-10-PCS | Performed by: INTERNAL MEDICINE

## 2019-09-19 PROCEDURE — C1751 CATH, INF, PER/CENT/MIDLINE: HCPCS

## 2019-09-19 PROCEDURE — NC001 PR NO CHARGE: Performed by: INTERNAL MEDICINE

## 2019-09-19 PROCEDURE — 94002 VENT MGMT INPAT INIT DAY: CPT

## 2019-09-19 PROCEDURE — 71045 X-RAY EXAM CHEST 1 VIEW: CPT

## 2019-09-19 PROCEDURE — 82805 BLOOD GASES W/O2 SATURATION: CPT | Performed by: PHYSICIAN ASSISTANT

## 2019-09-19 PROCEDURE — 0BH17EZ INSERTION OF ENDOTRACHEAL AIRWAY INTO TRACHEA, VIA NATURAL OR ARTIFICIAL OPENING: ICD-10-PCS | Performed by: INTERNAL MEDICINE

## 2019-09-19 PROCEDURE — 83605 ASSAY OF LACTIC ACID: CPT | Performed by: NURSE PRACTITIONER

## 2019-09-19 PROCEDURE — 83735 ASSAY OF MAGNESIUM: CPT | Performed by: NURSE PRACTITIONER

## 2019-09-19 PROCEDURE — 82948 REAGENT STRIP/BLOOD GLUCOSE: CPT

## 2019-09-19 PROCEDURE — 95951 PR EEG MONITORING/VIDEORECORD: CPT | Performed by: PSYCHIATRY & NEUROLOGY

## 2019-09-19 PROCEDURE — 36569 INSJ PICC 5 YR+ W/O IMAGING: CPT

## 2019-09-19 PROCEDURE — 94760 N-INVAS EAR/PLS OXIMETRY 1: CPT

## 2019-09-19 PROCEDURE — 80164 ASSAY DIPROPYLACETIC ACD TOT: CPT | Performed by: PHYSICIAN ASSISTANT

## 2019-09-19 PROCEDURE — 80185 ASSAY OF PHENYTOIN TOTAL: CPT | Performed by: PHYSICIAN ASSISTANT

## 2019-09-19 PROCEDURE — 80053 COMPREHEN METABOLIC PANEL: CPT | Performed by: INTERNAL MEDICINE

## 2019-09-19 PROCEDURE — 5A1945Z RESPIRATORY VENTILATION, 24-96 CONSECUTIVE HOURS: ICD-10-PCS | Performed by: INTERNAL MEDICINE

## 2019-09-19 PROCEDURE — 99291 CRITICAL CARE FIRST HOUR: CPT | Performed by: INTERNAL MEDICINE

## 2019-09-19 PROCEDURE — 99232 SBSQ HOSP IP/OBS MODERATE 35: CPT | Performed by: INTERNAL MEDICINE

## 2019-09-19 PROCEDURE — 85025 COMPLETE CBC W/AUTO DIFF WBC: CPT | Performed by: NURSE PRACTITIONER

## 2019-09-19 PROCEDURE — 31500 INSERT EMERGENCY AIRWAY: CPT | Performed by: INTERNAL MEDICINE

## 2019-09-19 PROCEDURE — 36600 WITHDRAWAL OF ARTERIAL BLOOD: CPT

## 2019-09-19 RX ORDER — FENTANYL CITRATE 50 UG/ML
100 INJECTION, SOLUTION INTRAMUSCULAR; INTRAVENOUS ONCE
Status: COMPLETED | OUTPATIENT
Start: 2019-09-19 | End: 2019-09-19

## 2019-09-19 RX ORDER — LORAZEPAM 2 MG/ML
2 INJECTION INTRAMUSCULAR ONCE
Status: COMPLETED | OUTPATIENT
Start: 2019-09-19 | End: 2019-09-19

## 2019-09-19 RX ORDER — CHLORHEXIDINE GLUCONATE 0.12 MG/ML
15 RINSE ORAL EVERY 12 HOURS SCHEDULED
Status: DISCONTINUED | OUTPATIENT
Start: 2019-09-19 | End: 2019-09-22

## 2019-09-19 RX ORDER — SUCCINYLCHOLINE/SOD CL,ISO/PF 100 MG/5ML
100 SYRINGE (ML) INTRAVENOUS ONCE
Status: COMPLETED | OUTPATIENT
Start: 2019-09-19 | End: 2019-09-19

## 2019-09-19 RX ORDER — MIDAZOLAM HYDROCHLORIDE 1 MG/ML
4 INJECTION INTRAMUSCULAR; INTRAVENOUS ONCE
Status: COMPLETED | OUTPATIENT
Start: 2019-09-19 | End: 2019-09-19

## 2019-09-19 RX ORDER — PROPOFOL 10 MG/ML
5-50 INJECTION, EMULSION INTRAVENOUS
Status: DISCONTINUED | OUTPATIENT
Start: 2019-09-19 | End: 2019-09-20

## 2019-09-19 RX ORDER — MIDAZOLAM HYDROCHLORIDE 1 MG/ML
2 INJECTION INTRAMUSCULAR; INTRAVENOUS EVERY 2 HOUR PRN
Status: DISCONTINUED | OUTPATIENT
Start: 2019-09-19 | End: 2019-09-22

## 2019-09-19 RX ORDER — MIDAZOLAM HYDROCHLORIDE 1 MG/ML
8 INJECTION INTRAMUSCULAR; INTRAVENOUS ONCE
Status: COMPLETED | OUTPATIENT
Start: 2019-09-19 | End: 2019-09-19

## 2019-09-19 RX ORDER — SODIUM CHLORIDE, SODIUM GLUCONATE, SODIUM ACETATE, POTASSIUM CHLORIDE, MAGNESIUM CHLORIDE, SODIUM PHOSPHATE, DIBASIC, AND POTASSIUM PHOSPHATE .53; .5; .37; .037; .03; .012; .00082 G/100ML; G/100ML; G/100ML; G/100ML; G/100ML; G/100ML; G/100ML
500 INJECTION, SOLUTION INTRAVENOUS ONCE
Status: COMPLETED | OUTPATIENT
Start: 2019-09-19 | End: 2019-09-19

## 2019-09-19 RX ORDER — POTASSIUM CHLORIDE 20MEQ/15ML
20 LIQUID (ML) ORAL ONCE
Status: COMPLETED | OUTPATIENT
Start: 2019-09-19 | End: 2019-09-19

## 2019-09-19 RX ORDER — MIDAZOLAM HYDROCHLORIDE 1 MG/ML
INJECTION INTRAMUSCULAR; INTRAVENOUS
Status: COMPLETED
Start: 2019-09-19 | End: 2019-09-19

## 2019-09-19 RX ORDER — FENTANYL CITRATE 50 UG/ML
50 INJECTION, SOLUTION INTRAMUSCULAR; INTRAVENOUS EVERY 4 HOURS PRN
Status: DISCONTINUED | OUTPATIENT
Start: 2019-09-19 | End: 2019-09-20

## 2019-09-19 RX ORDER — SODIUM CHLORIDE, SODIUM GLUCONATE, SODIUM ACETATE, POTASSIUM CHLORIDE, MAGNESIUM CHLORIDE, SODIUM PHOSPHATE, DIBASIC, AND POTASSIUM PHOSPHATE .53; .5; .37; .037; .03; .012; .00082 G/100ML; G/100ML; G/100ML; G/100ML; G/100ML; G/100ML; G/100ML
75 INJECTION, SOLUTION INTRAVENOUS CONTINUOUS
Status: DISCONTINUED | OUTPATIENT
Start: 2019-09-19 | End: 2019-09-20

## 2019-09-19 RX ORDER — ETOMIDATE 2 MG/ML
20 INJECTION INTRAVENOUS ONCE
Status: COMPLETED | OUTPATIENT
Start: 2019-09-19 | End: 2019-09-19

## 2019-09-19 RX ORDER — POTASSIUM CHLORIDE 20MEQ/15ML
40 LIQUID (ML) ORAL ONCE
Status: COMPLETED | OUTPATIENT
Start: 2019-09-19 | End: 2019-09-19

## 2019-09-19 RX ORDER — SODIUM CHLORIDE, SODIUM GLUCONATE, SODIUM ACETATE, POTASSIUM CHLORIDE, MAGNESIUM CHLORIDE, SODIUM PHOSPHATE, DIBASIC, AND POTASSIUM PHOSPHATE .53; .5; .37; .037; .03; .012; .00082 G/100ML; G/100ML; G/100ML; G/100ML; G/100ML; G/100ML; G/100ML
1000 INJECTION, SOLUTION INTRAVENOUS ONCE
Status: COMPLETED | OUTPATIENT
Start: 2019-09-19 | End: 2019-09-19

## 2019-09-19 RX ORDER — LABETALOL 20 MG/4 ML (5 MG/ML) INTRAVENOUS SYRINGE
20 EVERY 4 HOURS PRN
Status: DISCONTINUED | OUTPATIENT
Start: 2019-09-19 | End: 2019-09-19

## 2019-09-19 RX ORDER — VALPROIC ACID 250 MG/5ML
500 SOLUTION ORAL EVERY 8 HOURS SCHEDULED
Status: DISCONTINUED | OUTPATIENT
Start: 2019-09-19 | End: 2019-09-23

## 2019-09-19 RX ADMIN — PHENYTOIN SODIUM 200 MG: 50 INJECTION INTRAMUSCULAR; INTRAVENOUS at 21:19

## 2019-09-19 RX ADMIN — INSULIN LISPRO 8 UNITS: 100 INJECTION, SOLUTION INTRAVENOUS; SUBCUTANEOUS at 07:41

## 2019-09-19 RX ADMIN — CHLORHEXIDINE GLUCONATE 0.12% ORAL RINSE 15 ML: 1.2 LIQUID ORAL at 21:19

## 2019-09-19 RX ADMIN — POTASSIUM CHLORIDE 20 MEQ: 20 SOLUTION ORAL at 16:30

## 2019-09-19 RX ADMIN — MIDAZOLAM HYDROCHLORIDE 6 MG: 1 INJECTION INTRAMUSCULAR; INTRAVENOUS at 12:21

## 2019-09-19 RX ADMIN — VALPROIC ACID 500 MG: 500 SOLUTION ORAL at 22:22

## 2019-09-19 RX ADMIN — HYDROCHLOROTHIAZIDE 25 MG: 25 TABLET ORAL at 09:04

## 2019-09-19 RX ADMIN — SODIUM CHLORIDE, SODIUM GLUCONATE, SODIUM ACETATE, POTASSIUM CHLORIDE, MAGNESIUM CHLORIDE, SODIUM PHOSPHATE, DIBASIC, AND POTASSIUM PHOSPHATE 1000 ML: .53; .5; .37; .037; .03; .012; .00082 INJECTION, SOLUTION INTRAVENOUS at 14:13

## 2019-09-19 RX ADMIN — LORAZEPAM 2 MG: 2 INJECTION INTRAMUSCULAR; INTRAVENOUS at 03:32

## 2019-09-19 RX ADMIN — SODIUM CHLORIDE, SODIUM GLUCONATE, SODIUM ACETATE, POTASSIUM CHLORIDE AND MAGNESIUM CHLORIDE 100 ML/HR: 526; 502; 368; 37; 30 INJECTION, SOLUTION INTRAVENOUS at 16:30

## 2019-09-19 RX ADMIN — SODIUM CHLORIDE, SODIUM GLUCONATE, SODIUM ACETATE, POTASSIUM CHLORIDE AND MAGNESIUM CHLORIDE 100 ML/HR: 526; 502; 368; 37; 30 INJECTION, SOLUTION INTRAVENOUS at 19:42

## 2019-09-19 RX ADMIN — SODIUM CHLORIDE, SODIUM GLUCONATE, SODIUM ACETATE, POTASSIUM CHLORIDE AND MAGNESIUM CHLORIDE 100 ML/HR: 526; 502; 368; 37; 30 INJECTION, SOLUTION INTRAVENOUS at 12:20

## 2019-09-19 RX ADMIN — LISINOPRIL 40 MG: 20 TABLET ORAL at 09:05

## 2019-09-19 RX ADMIN — SODIUM CHLORIDE 2350 MG PE: 9 INJECTION, SOLUTION INTRAVENOUS at 04:32

## 2019-09-19 RX ADMIN — LEVETIRACETAM 1000 MG: 100 INJECTION, SOLUTION INTRAVENOUS at 21:38

## 2019-09-19 RX ADMIN — INSULIN LISPRO 5 UNITS: 100 INJECTION, SOLUTION INTRAVENOUS; SUBCUTANEOUS at 09:28

## 2019-09-19 RX ADMIN — INSULIN GLARGINE 20 UNITS: 100 INJECTION, SOLUTION SUBCUTANEOUS at 09:23

## 2019-09-19 RX ADMIN — INSULIN LISPRO 4 UNITS: 100 INJECTION, SOLUTION INTRAVENOUS; SUBCUTANEOUS at 18:22

## 2019-09-19 RX ADMIN — FENTANYL CITRATE 100 MCG: 50 INJECTION INTRAMUSCULAR; INTRAVENOUS at 12:43

## 2019-09-19 RX ADMIN — LEVETIRACETAM 1000 MG: 100 INJECTION, SOLUTION INTRAVENOUS at 09:05

## 2019-09-19 RX ADMIN — SODIUM CHLORIDE, SODIUM GLUCONATE, SODIUM ACETATE, POTASSIUM CHLORIDE, MAGNESIUM CHLORIDE, SODIUM PHOSPHATE, DIBASIC, AND POTASSIUM PHOSPHATE 500 ML: .53; .5; .37; .037; .03; .012; .00082 INJECTION, SOLUTION INTRAVENOUS at 13:24

## 2019-09-19 RX ADMIN — PHENYTOIN SODIUM 200 MG: 50 INJECTION INTRAMUSCULAR; INTRAVENOUS at 09:41

## 2019-09-19 RX ADMIN — MIDAZOLAM 6 MG: 1 INJECTION INTRAMUSCULAR; INTRAVENOUS at 12:21

## 2019-09-19 RX ADMIN — ENOXAPARIN SODIUM 40 MG: 40 INJECTION SUBCUTANEOUS at 09:02

## 2019-09-19 RX ADMIN — METOPROLOL TARTRATE 100 MG: 50 TABLET, FILM COATED ORAL at 09:04

## 2019-09-19 RX ADMIN — FENTANYL CITRATE 50 MCG: 50 INJECTION INTRAMUSCULAR; INTRAVENOUS at 22:22

## 2019-09-19 RX ADMIN — Medication 100 MG: at 12:27

## 2019-09-19 RX ADMIN — PROPOFOL 40 MCG/KG/MIN: 10 INJECTION, EMULSION INTRAVENOUS at 12:40

## 2019-09-19 RX ADMIN — MIDAZOLAM 2 MG/HR: 5 INJECTION INTRAMUSCULAR; INTRAVENOUS at 13:22

## 2019-09-19 RX ADMIN — VALPROATE SODIUM 3000 MG: 100 INJECTION, SOLUTION INTRAVENOUS at 00:23

## 2019-09-19 RX ADMIN — DIVALPROEX SODIUM 750 MG: 500 TABLET, DELAYED RELEASE ORAL at 09:03

## 2019-09-19 RX ADMIN — VALPROIC ACID 500 MG: 500 SOLUTION ORAL at 14:46

## 2019-09-19 RX ADMIN — Medication 20 MG: at 14:45

## 2019-09-19 RX ADMIN — CHLORHEXIDINE GLUCONATE 0.12% ORAL RINSE 15 ML: 1.2 LIQUID ORAL at 14:45

## 2019-09-19 RX ADMIN — MIDAZOLAM 4 MG: 1 INJECTION INTRAMUSCULAR; INTRAVENOUS at 16:41

## 2019-09-19 RX ADMIN — ETOMIDATE 20 MG: 2 INJECTION, SOLUTION INTRAVENOUS at 12:24

## 2019-09-19 RX ADMIN — POTASSIUM CHLORIDE 40 MEQ: 20 SOLUTION ORAL at 14:46

## 2019-09-19 RX ADMIN — PROPOFOL 10 MCG/KG/MIN: 10 INJECTION, EMULSION INTRAVENOUS at 19:42

## 2019-09-19 RX ADMIN — INSULIN LISPRO 10 UNITS: 100 INJECTION, SOLUTION INTRAVENOUS; SUBCUTANEOUS at 11:52

## 2019-09-19 RX ADMIN — MIDAZOLAM 6 MG/HR: 5 INJECTION INTRAMUSCULAR; INTRAVENOUS at 21:59

## 2019-09-19 NOTE — PROCEDURES
Insert PICC line  Date/Time: 9/19/2019 3:52 PM  Performed by: Terrie Wu RN  Authorized by: Bernadine Salter MD     Patient location:  Bedside  Other Assisting Provider: Yes (comment) (Judit Rebollar, Infusion Tech)    Consent:     Consent obtained:  Written    Consent given by:  Healthcare agent    Procedural risks discussed: when obtaining consent  Alternatives discussed: when obtaining consent  Universal protocol:     Procedure explained and questions answered to patient or proxy's satisfaction: yes      Relevant documents present and verified: yes      Test results available and properly labeled: yes      Radiology Images displayed and confirmed  If images not available, report reviewed: yes      Required blood products, implants, devices, and special equipment available: yes      Site/side marked: yes      Immediately prior to procedure, a time out was called: yes      Patient identity confirmed:  Verbally with patient and arm band  Pre-procedure details:     Hand hygiene: Hand hygiene performed prior to insertion      Sterile barrier technique: All elements of maximal sterile technique followed      Skin preparation:  ChloraPrep  Indications:     PICC line indications: medications requiring central line    Anesthesia (see MAR for exact dosages):      Anesthesia method:  Local infiltration    Local anesthetic:  Lidocaine 1% w/o epi (3 mls)  Procedure details:     Location:  Brachial    Vessel type: vein      Laterality:  Right    Approach: percutaneous technique used      Patient position:  Flat    Procedural supplies:  Triple lumen    Catheter size:  5 Fr    Landmarks identified: yes      Ultrasound guidance: yes      Sterile ultrasound techniques: Sterile gel and sterile probe covers were used      Number of attempts:  1    Successful placement: yes      Vessel of catheter tip end:  Sherlock 3CG confirmed    Total catheter length (cm):  48    Catheter out on skin (cm):  2    Max flow rate:  999    Arm circumference:  34  Post-procedure details:     Post-procedure:  Dressing applied and securement device placed    Assessment:  Blood return through all ports    Post-procedure complications: none      Patient tolerance of procedure:   Tolerated well, no immediate complications
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
## 2019-09-19 NOTE — NURSING NOTE
Patient alert and responsive  Transferred to [facility] for continous eeg monitoring  All belongings taken with patient   Left facility per stretcher by transport team

## 2019-09-19 NOTE — OCCUPATIONAL THERAPY NOTE
Occupational Therapy Cancellation Note  OT orders received, pt's chart reviewed  Per neurology, pt not appropriate for OT evaluation at this time  OT to continue to follow and re-attempt as medically and clinically appropriate      ZIGGY Hu, OTR/L
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
## 2019-09-19 NOTE — PROGRESS NOTES
Patient had 3 more similar events at [facility], 0325, 0330  On-call SLIM made aware and is at bedside  Ativan 2mg/ml ordered and given  Blood drawn for valproic level  Patient still minimally responsive  Does not follow commands but he moves purposefully  He mumbles his name and he states he feels "woozy " Will continue to monitor

## 2019-09-19 NOTE — H&P
H&P- Demetri  1970, 44 y o  male MRN: [id number]    [facility] Encounter: [id number]    Primary Care Provider: Ameena Lorenzana   Date and time admitted to hospital: 9/18/2019  9:19 PM        * Seizure [facility])  Assessment & Plan  · Initially presenting to THE [facility] with dizziness but also with staring episodes suspicious for absence like seizures  · Had routine EEG with evidence of seizure and given 1 mg IV Ativan and 3 g Keppra load, transferred to this facility for video EEG  · Initiate video EEG at this time, continue anti epileptics as per Neurology and appreciate their recommendations  · Seizure precautions  · Fall precautions    Head injury  Assessment & Plan  · With history of head injury August 2019 causing subarachnoid hemorrhage  · CT head on admission at THE [facility] negative for acute pathology  · Fall precautions, PT/OT evaluations    Diabetes [facility])  Assessment & Plan  Lab Results   Component Value Date    HGBA1C 13 2 (H) 09/18/2019       Recent Labs     09/18/19  0358 09/18/19  0717 09/18/19  1053 09/18/19  1728   POCGLU 332* 341* 348* 286*       Blood Sugar Average: Last 72 hrs:  ·  uncontrolled, was started on insulin at THE [facility] and will continue  · SSI with Accu-Cheks  · Monitor blood glucose, initiate hypoglycemia protocol    Essential hypertension  Assessment & Plan  · Continue home antihypertensives; note patient has been refusing these at prior admission  · Blood pressure monitoring per protocol      VTE Prophylaxis: Enoxaparin (Lovenox)  / sequential compression device   Code Status: Level 1 - Full Code  POLST: POLST form is not discussed and not completed at this time  Anticipated Length of Stay:  Patient will be admitted on an Inpatient basis with an anticipated length of stay of  greater than 2 midnights  Justification for Hospital Stay: Please see detailed plans noted above      Chief Complaint:     Staring episodes  History of Present Illness:  Demetri  is a 44 y o  male who has past medical history significant for uncontrolled hypertension, obesity, recent history subarachnoid hemorrhage and subdural hematoma S/P fall 2019 initially presenting to THE [facility] 09/17/2019 due to multiple episodes of dizziness, ambulatory dysfunction, and staring spells  He underwent routine EEG revealing several extra graphic seizure setting from right hemisphere suspicious for ongoing active subclinical seizure activity, and the setting of above injuries is transferred to this facility for video EEG monitoring  Currently, patient is somnolent and somewhat difficult to arouse, but does respond to verbal stimuli  He does admit to some mild headache, otherwise no other complaints at this time      Review of Systems:    Constitutional:  Denies fever or chills   Eyes:  Denies change in visual acuity   HENT:  Denies nasal congestion or sore throat   Respiratory:  Denies cough or shortness of breath   Cardiovascular:  Denies chest pain or edema   GI:  Denies abdominal pain, nausea, vomiting, bloody stools or diarrhea   :  Denies dysuria   Musculoskeletal:  Denies back pain or joint pain   Integument:  Denies rash   Neurologic:  Denies focal weakness or sensory changes but endorses headache  Endocrine:  Denies polyuria or polydipsia   Lymphatic:  Denies swollen glands   Psychiatric:  Denies depression or anxiety     Past Medical and Surgical History:   Past Medical History:   Diagnosis Date    Hypertension      Past Surgical History:   Procedure Laterality Date    KNEE SURGERY      LEG SURGERY         Meds/Allergies:  Medications Prior to Admission   Medication    aspirin (ECOTRIN LOW STRENGTH) 81 mg EC tablet    cloNIDine (CATAPRES) 0 3 mg tablet    hydrochlorothiazide (HYDRODIURIL) 25 mg tablet    lisinopril (ZESTRIL) 40 mg tablet    metoprolol tartrate (LOPRESSOR) 100 mg tablet       Allergies: No Known Allergies  History:  Marital Status: Single     Substance Use History:   Social History Substance and Sexual Activity   Alcohol Use Yes    Alcohol/week: 3 0 standard drinks    Types: 3 Cans of beer per week    Frequency: 2-4 times a month    Drinks per session: 3 or 4    Comment: occassionally     Social History     Tobacco Use   Smoking Status Current Some Day Smoker    Packs/day: 0 20    Types: Cigars   Smokeless Tobacco Never Used     Social History     Substance and Sexual Activity   Drug Use No       Family History:  Family History   Family history unknown: Yes       Physical Exam:     Vitals:   Blood Pressure: 142/93 (09/19/19 0249)  Pulse: 91 (09/19/19 0249)  Temperature: 98 7 °F (37 1 °C) (09/18/19 2132)  Respirations: 18 (09/19/19 0249)  Height: 5' 10" (177 8 cm) (09/18/19 2132)  Weight - Scale: 117 kg (257 lb 15 oz) (09/18/19 2132)  SpO2: 92 % (09/19/19 0249)    Constitutional:  Well developed, well nourished, no acute distress, non-toxic appearance   Eyes:  PERRL, conjunctiva normal   HENT:  Atraumatic, external ears normal, nose normal, oropharynx moist, no pharyngeal exudates  Neck- normal range of motion, no tenderness, supple   Respiratory:  No respiratory distress, normal breath sounds, no rales, no wheezing   Cardiovascular:  Normal rate, normal rhythm, no murmurs, no gallops, no rubs   GI:  Soft, nondistended, normal bowel sounds, nontender, no organomegaly, no mass, no rebound, no guarding   :  No costovertebral angle tenderness   Musculoskeletal:  No edema, no tenderness, no deformities  Back- no tenderness  Integument:  Well hydrated, no rash   Lymphatic:  No lymphadenopathy noted   Neurologic:  Alert &awake but somewhat somnolent but responds to verbal stimuli, communicative, CN 2-12 normal, normal motor function, normal sensory function, no focal deficits noted   Psychiatric:  Speech and behavior appropriate       Lab Results: I have personally reviewed pertinent reports        Results from last 7 days   Lab Units 09/18/19  0518   WBC Thousand/uL 7 28   HEMOGLOBIN g/dL 14 2   HEMATOCRIT % 42 6   PLATELETS Thousands/uL 180   NEUTROS PCT % 66   LYMPHS PCT % 23   MONOS PCT % 7   EOS PCT % 3     Results from last 7 days   Lab Units 19  0518   POTASSIUM mmol/L 3 3*   CHLORIDE mmol/L 101   CO2 mmol/L 23   BUN mg/dL 9   CREATININE mg/dL 0 78   CALCIUM mg/dL 8 8   ALK PHOS U/L 90   ALT U/L 48   AST U/L 33           EK2019- Normal sinus rhythm, incomplete RBBB, LAFB    Imaging: I have personally reviewed pertinent reports  Ct Head Without Contrast    Result Date: 2019  Narrative: CT BRAIN - WITHOUT CONTRAST INDICATION:   Confusion/delirium, altered LOC, unexplained  COMPARISON:  CT head 2019, MRI brain 1/15/2019 TECHNIQUE:  CT examination of the brain was performed  In addition to axial images, coronal 2D reformatted images were created and submitted for interpretation  Radiation dose length product (DLP) for this visit:  846 19 mGy-cm   This examination, like all CT scans performed in the [facility], was performed utilizing techniques to minimize radiation dose exposure, including the use of iterative  reconstruction and automated exposure control  IMAGE QUALITY:  Diagnostic  FINDINGS: PARENCHYMA:  No intracranial mass, mass effect or midline shift  No CT signs of acute infarction  No acute parenchymal hemorrhage  VENTRICLES AND EXTRA-AXIAL SPACES:  Normal for the patient's age  Ossification of the anterior interhemispheric falx is again identified  VISUALIZED ORBITS AND PARANASAL SINUSES:  No acute abnormality involving the orbits  Mild scattered sinus mucosal thickening is noted  No fluid levels are seen  CALVARIUM AND EXTRACRANIAL SOFT TISSUES:  There is some left parietal scalp soft tissue swelling which could represent contusion  No calvarial fracture  Impression: No acute intracranial abnormality  Left parietal scalp soft tissue swelling which could represent contusion  No calvarial fracture   Workstation performed: [id number]         ** Please Note: Dragon 360 Dictation voice to text software was used in the creation of this document   **

## 2019-09-19 NOTE — ASSESSMENT & PLAN NOTE
· Initially presenting to THE [facility] with dizziness but also with staring episodes suspicious for absence like seizures  · Had routine EEG with evidence of seizure and given 1 mg IV Ativan and 3 g Keppra load, transferred to this facility for video EEG  · Initiate video EEG at this time, continue anti epileptics as per Neurology and appreciate their recommendations  · Seizure precautions  · Fall precautions

## 2019-09-19 NOTE — ASSESSMENT & PLAN NOTE
Lab Results   Component Value Date    HGBA1C 13 2 (H) 09/18/2019       Recent Labs     09/18/19  0358 09/18/19  0717 09/18/19  1053 09/18/19  1728   POCGLU 332* 341* 348* 286*       Blood Sugar Average: Last 72 hrs:  ·  uncontrolled, was started on insulin at THE [facility] and will continue  · SSI with Accu-Cheks  · Monitor blood glucose, initiate hypoglycemia protocol

## 2019-09-19 NOTE — RESPIRATORY THERAPY NOTE
RT Ventilator Management Note  Bonnie Suh  44 y o  male MRN: [id number]  Unit/Bed#: [facility] 05 Encounter: [id number]      Daily Screen     No data found in the last 10 encounters              Physical Exam:   Assessment Type: Assess only  General Appearance: Sedated  Respiratory Pattern: Assisted  Chest Assessment: Chest expansion symmetrical, Trachea midline  Bilateral Breath Sounds: Clear  R Breath Sounds: Clear  L Breath Sounds: Clear  Location Specific: No  Cough: None  O2 Device: Vent 40%  Subjective Data: Protocol and vent check      Resp Comments: PT is put on vent sedated, Dr Georgina Villegas intubated the pt with Marianna Edwards 3 successfully(AC//12/40%/5+)

## 2019-09-19 NOTE — PROCEDURES
Intubation  Date/Time: 9/19/2019 1:02 PM  Performed by: Frankey Nanny, MD  Authorized by: Frankey Nanny, MD     Patient location:  Bedside  Consent:     Consent obtained:  Verbal    Consent given by:  Spouse    Risks discussed:  Aspiration, dental trauma, laryngeal injury, pneumothorax, hypoxia, death and bleeding    Alternatives discussed:  No treatment and delayed treatment  Universal protocol:     Procedure explained and questions answered to patient or proxy's satisfaction: yes      Relevant documents present and verified: yes      Test results available and properly labeled: yes      Radiology Images displayed and confirmed  If images not available, report reviewed: yes      Required blood products, implants, devices, and special equipment available: yes      Immediately prior to procedure, a time out was called: yes      Patient identity confirmed:  Verbally with patient and arm band  Pre-procedure details:     Patient status:  Awake    Mallampati score:  2    Pretreatment medications:  Midazolam, etomidate and fentanyl (Versed 4mg, Etomidate 20mg, Succinylcholine 100mg, 40mcg fentanyl)    Paralytics:  Succinylcholine  Indications:     Indications for intubation: other (comment)      Indications for intubation comment:  Airway protection - status epilepticus   Procedure details:     Preoxygenation:  Bag valve mask    CPR in progress: no      Intubation method:  Oral    Oral intubation technique:  Direct    Laryngoscope blade:  Soto 4    Tube size (mm):  8 0    Number of attempts:  2    Ventilation between attempts: yes      Tube visualized through cords: yes    Placement assessment:     ETT to lip:  26cm    Tube secured with:  ETT lilly    Breath sounds:  Equal    Placement verification: chest rise, condensation and equal breath sounds      CXR findings:  ETT in proper place (3 cm above francisca )  Post-procedure details:     Patient tolerance of procedure:   Tolerated well, no immediate complications

## 2019-09-19 NOTE — SOCIAL WORK
Pt is a transfer form  [facility] for continuous EEG monitoring  Pt is currently intubated and sedated  CM reviewed pt's medical records from last admission  Pt has no LW and POA  Primary contact is his wife Torri Segovia- [phone]  Pt resides with his wife and 2 adult children in a 2 story home with 10-15 steps to enter with hand rails  Pt can have a 1st flr set up  Pt was IPTA with ADL's and drive  Pt has no DME's  Pt denies HHC, STR, MH/SA hx   Patient uses [facility] for his medications Currently he does not have health insurance and per patient is waiting for disability as he has had persistent issues with dizziness and has not worked since April  Pt will have available transportation home upon dc  CM reviewed d/c planning process including the following: identifying help at home, patient preference for d/c planning needs, Discharge Lounge, [facility] Meds to Bed program, availability of treatment team to discuss questions or concerns patient and/or family may have regarding understanding medications and recognizing signs and symptoms once discharged  CM also encouraged patient to follow up with all recommended appointments after discharge  Patient advised of importance for patient and family to participate in managing patients medical well being

## 2019-09-19 NOTE — PROGRESS NOTES
At 2342 MD ordered pt ativan 2mg/ml IV  Charge nurse told primary RN that patient is actively having an event  Upon reaching pt's room, on-call from [facility] are at bed side assessing the patient  Pt is oriented to person, place, and time  Minimally responsive and dozes off after answering questions  Unable to raise his arms up d/t lethargy  Pt was able to identify 2 common objects  Ativan given as ordered  depacon to be given when obtained from pharmacy  VSS: HR 88, /95, SaO2 92% on RA  Pt was also complaining of auditory hallucinations earlier at 2200 when I was admitting the patient but denies it now   He stated earlier at 2200 that he was hearing "a lady's voice mumbling and she saying comments about the niesha in the television " will continue to monitor

## 2019-09-19 NOTE — RESPIRATORY THERAPY NOTE
RT Protocol Note  Barbara Griffin  44 y o  male MRN: [id number]  Unit/Bed#: [facility] 05 Encounter: [id number]    Assessment    Principal Problem:    Seizure ([facility] [address] )  Active Problems:    Essential hypertension    Diabetes ([facility] [address] )    Head injury      Home Pulmonary Medications:    Home Devices/Therapy: Other (Comment)(none)    Past Medical History:   Diagnosis Date    Hypertension      Social History     Socioeconomic History    Marital status: Single     Spouse name: Not on file    Number of children: Not on file    Years of education: Not on file    Highest education level: Not on file   Occupational History    Not on file   Social Needs    Financial resource strain: Not on file    Food insecurity:     Worry: Not on file     Inability: Not on file    Transportation needs:     Medical: Not on file     Non-medical: Not on file   Tobacco Use    Smoking status: Current Some Day Smoker     Packs/day: 0 20     Types: Cigars    Smokeless tobacco: Never Used   Substance and Sexual Activity    Alcohol use:  Yes     Alcohol/week: 3 0 standard drinks     Types: 3 Cans of beer per week     Frequency: 2-4 times a month     Drinks per session: 3 or 4     Comment: occassionally    Drug use: No    Sexual activity: Not on file   Lifestyle    Physical activity:     Days per week: Not on file     Minutes per session: Not on file    Stress: Not on file   Relationships    Social connections:     Talks on phone: Not on file     Gets together: Not on file     Attends Yazdanism service: Not on file     Active member of club or organization: Not on file     Attends meetings of clubs or organizations: Not on file     Relationship status: Not on file    Intimate partner violence:     Fear of current or ex partner: Not on file     Emotionally abused: Not on file     Physically abused: Not on file     Forced sexual activity: Not on file   Other Topics Concern    Not on file   Social History Narrative    Not on file Subjective    Subjective Data: Protocol and vent check    Objective    Physical Exam:   Assessment Type: Assess only  General Appearance: Sedated  Respiratory Pattern: Assisted  Chest Assessment: Chest expansion symmetrical, Trachea midline  Bilateral Breath Sounds: Clear  R Breath Sounds: Clear  L Breath Sounds: Clear  Location Specific: No  Cough: None  O2 Device: Vent 40%    Vitals:  Blood pressure 100/63, pulse 74, temperature 98 6 °F (37 °C), temperature source Oral, resp  rate 12, height 5' 10" (1 778 m), weight 118 kg (260 lb 9 3 oz), SpO2 99 %  Imaging and other studies: I have personally reviewed pertinent reports        O2 Device: Vent 40%     Plan    Respiratory Plan: Vent/NIV/HFNC        Resp Comments: PT is put on vent sedated, Dr Felice Renner intubated the pt with Claudette Puente 3 successfully(AC//12/40%/5+)

## 2019-09-19 NOTE — PROGRESS NOTES
EEG button activated by EEG techs from the monitor room  Patient is responding to sternal rubs but he's somnolent  Not responding to questions, nor following commands  He opens his eyes once in a while but just close them right away  Patient unable to identify common objects because he's too lethargic  Pt also unable to recall code phrase  VSS  Will continue to monitor

## 2019-09-19 NOTE — PROGRESS NOTES
RN giving pt his depacon/valproate IV  Pt minimally responsive  He opens his eyes to sternal rub but does not respond or answer questions  EEG techs made aware of that and that depacon was started

## 2019-09-19 NOTE — ASSESSMENT & PLAN NOTE
· With history of head injury August 2019 causing subarachnoid hemorrhage  · CT head on admission at THE [facility] negative for acute pathology  · Fall precautions, PT/OT evaluations

## 2019-09-19 NOTE — ASSESSMENT & PLAN NOTE
· Continue home antihypertensives; note patient has been refusing these at prior admission  · Blood pressure monitoring per protocol

## 2019-09-20 ENCOUNTER — APPOINTMENT (INPATIENT)
Dept: NEUROLOGY | Facility: CLINIC | Age: 49
DRG: 053 | End: 2019-09-20
Payer: COMMERCIAL

## 2019-09-20 LAB
ALBUMIN SERPL BCP-MCNC: 2.8 G/DL (ref 3.5–5)
ANION GAP SERPL CALCULATED.3IONS-SCNC: 6 MMOL/L (ref 4–13)
BASOPHILS # BLD AUTO: 0.04 THOUSANDS/ΜL (ref 0–0.1)
BASOPHILS NFR BLD AUTO: 1 % (ref 0–1)
BUN SERPL-MCNC: 12 MG/DL (ref 5–25)
CALCIUM SERPL-MCNC: 8.4 MG/DL (ref 8.3–10.1)
CHLORIDE SERPL-SCNC: 107 MMOL/L (ref 100–108)
CO2 SERPL-SCNC: 27 MMOL/L (ref 21–32)
CREAT SERPL-MCNC: 0.83 MG/DL (ref 0.6–1.3)
EOSINOPHIL # BLD AUTO: 0.18 THOUSAND/ΜL (ref 0–0.61)
EOSINOPHIL NFR BLD AUTO: 2 % (ref 0–6)
ERYTHROCYTE [DISTWIDTH] IN BLOOD BY AUTOMATED COUNT: 13.8 % (ref 11.6–15.1)
GFR SERPL CREATININE-BSD FRML MDRD: 102 ML/MIN/1.73SQ M
GLUCOSE SERPL-MCNC: 172 MG/DL (ref 65–140)
GLUCOSE SERPL-MCNC: 184 MG/DL (ref 65–140)
GLUCOSE SERPL-MCNC: 194 MG/DL (ref 65–140)
GLUCOSE SERPL-MCNC: 207 MG/DL (ref 65–140)
GLUCOSE SERPL-MCNC: 211 MG/DL (ref 65–140)
GLUCOSE SERPL-MCNC: 245 MG/DL (ref 65–140)
HCT VFR BLD AUTO: 45.9 % (ref 36.5–49.3)
HGB BLD-MCNC: 15 G/DL (ref 12–17)
IMM GRANULOCYTES # BLD AUTO: 0.03 THOUSAND/UL (ref 0–0.2)
IMM GRANULOCYTES NFR BLD AUTO: 0 % (ref 0–2)
LYMPHOCYTES # BLD AUTO: 1.2 THOUSANDS/ΜL (ref 0.6–4.47)
LYMPHOCYTES NFR BLD AUTO: 14 % (ref 14–44)
MAGNESIUM SERPL-MCNC: 2.7 MG/DL (ref 1.6–2.6)
MCH RBC QN AUTO: 29.9 PG (ref 26.8–34.3)
MCHC RBC AUTO-ENTMCNC: 32.7 G/DL (ref 31.4–37.4)
MCV RBC AUTO: 92 FL (ref 82–98)
MONOCYTES # BLD AUTO: 0.62 THOUSAND/ΜL (ref 0.17–1.22)
MONOCYTES NFR BLD AUTO: 7 % (ref 4–12)
NEUTROPHILS # BLD AUTO: 6.42 THOUSANDS/ΜL (ref 1.85–7.62)
NEUTS SEG NFR BLD AUTO: 76 % (ref 43–75)
NRBC BLD AUTO-RTO: 0 /100 WBCS
PHENYTOIN SERPL-MCNC: 10.8 UG/ML (ref 10–20)
PLATELET # BLD AUTO: 186 THOUSANDS/UL (ref 149–390)
PMV BLD AUTO: 10.4 FL (ref 8.9–12.7)
POTASSIUM SERPL-SCNC: 3 MMOL/L (ref 3.5–5.3)
POTASSIUM SERPL-SCNC: 3.3 MMOL/L (ref 3.5–5.3)
RBC # BLD AUTO: 5.01 MILLION/UL (ref 3.88–5.62)
SODIUM SERPL-SCNC: 140 MMOL/L (ref 136–145)
VALPROATE SERPL-MCNC: 92 UG/ML (ref 50–100)
WBC # BLD AUTO: 8.49 THOUSAND/UL (ref 4.31–10.16)

## 2019-09-20 PROCEDURE — 82040 ASSAY OF SERUM ALBUMIN: CPT | Performed by: PHYSICIAN ASSISTANT

## 2019-09-20 PROCEDURE — 83735 ASSAY OF MAGNESIUM: CPT | Performed by: STUDENT IN AN ORGANIZED HEALTH CARE EDUCATION/TRAINING PROGRAM

## 2019-09-20 PROCEDURE — 95951 HB EEG MONITORING/VIDEORECORD: CPT

## 2019-09-20 PROCEDURE — NC001 PR NO CHARGE: Performed by: EMERGENCY MEDICINE

## 2019-09-20 PROCEDURE — 0T9B70Z DRAINAGE OF BLADDER WITH DRAINAGE DEVICE, VIA NATURAL OR ARTIFICIAL OPENING: ICD-10-PCS | Performed by: INTERNAL MEDICINE

## 2019-09-20 PROCEDURE — 80185 ASSAY OF PHENYTOIN TOTAL: CPT | Performed by: NURSE PRACTITIONER

## 2019-09-20 PROCEDURE — 82948 REAGENT STRIP/BLOOD GLUCOSE: CPT

## 2019-09-20 PROCEDURE — 80048 BASIC METABOLIC PNL TOTAL CA: CPT | Performed by: PHYSICIAN ASSISTANT

## 2019-09-20 PROCEDURE — 80164 ASSAY DIPROPYLACETIC ACD TOT: CPT | Performed by: NURSE PRACTITIONER

## 2019-09-20 PROCEDURE — 94760 N-INVAS EAR/PLS OXIMETRY 1: CPT

## 2019-09-20 PROCEDURE — 95951 PR EEG MONITORING/VIDEORECORD: CPT | Performed by: PSYCHIATRY & NEUROLOGY

## 2019-09-20 PROCEDURE — 99233 SBSQ HOSP IP/OBS HIGH 50: CPT | Performed by: PSYCHIATRY & NEUROLOGY

## 2019-09-20 PROCEDURE — 94003 VENT MGMT INPAT SUBQ DAY: CPT

## 2019-09-20 PROCEDURE — 99291 CRITICAL CARE FIRST HOUR: CPT | Performed by: INTERNAL MEDICINE

## 2019-09-20 PROCEDURE — 84132 ASSAY OF SERUM POTASSIUM: CPT | Performed by: STUDENT IN AN ORGANIZED HEALTH CARE EDUCATION/TRAINING PROGRAM

## 2019-09-20 PROCEDURE — 85025 COMPLETE CBC W/AUTO DIFF WBC: CPT | Performed by: PHYSICIAN ASSISTANT

## 2019-09-20 RX ORDER — SENNOSIDES 8.8 MG/5ML
8.8 LIQUID ORAL
Status: DISCONTINUED | OUTPATIENT
Start: 2019-09-20 | End: 2019-09-21

## 2019-09-20 RX ORDER — FENTANYL CITRATE-0.9 % NACL/PF 10 MCG/ML
100 PLASTIC BAG, INJECTION (ML) INTRAVENOUS CONTINUOUS
Status: DISCONTINUED | OUTPATIENT
Start: 2019-09-20 | End: 2019-09-22

## 2019-09-20 RX ORDER — FENTANYL CITRATE 50 UG/ML
50 INJECTION, SOLUTION INTRAMUSCULAR; INTRAVENOUS EVERY 2 HOUR PRN
Status: DISCONTINUED | OUTPATIENT
Start: 2019-09-20 | End: 2019-09-20

## 2019-09-20 RX ORDER — BISACODYL 10 MG
10 SUPPOSITORY, RECTAL RECTAL DAILY PRN
Status: DISCONTINUED | OUTPATIENT
Start: 2019-09-20 | End: 2019-09-25 | Stop reason: HOSPADM

## 2019-09-20 RX ORDER — SODIUM CHLORIDE, SODIUM GLUCONATE, SODIUM ACETATE, POTASSIUM CHLORIDE, MAGNESIUM CHLORIDE, SODIUM PHOSPHATE, DIBASIC, AND POTASSIUM PHOSPHATE .53; .5; .37; .037; .03; .012; .00082 G/100ML; G/100ML; G/100ML; G/100ML; G/100ML; G/100ML; G/100ML
500 INJECTION, SOLUTION INTRAVENOUS ONCE
Status: COMPLETED | OUTPATIENT
Start: 2019-09-20 | End: 2019-09-20

## 2019-09-20 RX ORDER — POTASSIUM CHLORIDE 20MEQ/15ML
40 LIQUID (ML) ORAL ONCE
Status: COMPLETED | OUTPATIENT
Start: 2019-09-20 | End: 2019-09-20

## 2019-09-20 RX ORDER — FENTANYL CITRATE 50 UG/ML
100 INJECTION, SOLUTION INTRAMUSCULAR; INTRAVENOUS EVERY 2 HOUR PRN
Status: DISCONTINUED | OUTPATIENT
Start: 2019-09-20 | End: 2019-09-22

## 2019-09-20 RX ORDER — INSULIN GLARGINE 100 [IU]/ML
20 INJECTION, SOLUTION SUBCUTANEOUS ONCE
Status: COMPLETED | OUTPATIENT
Start: 2019-09-20 | End: 2019-09-20

## 2019-09-20 RX ORDER — POTASSIUM CHLORIDE 20MEQ/15ML
40 LIQUID (ML) ORAL 2 TIMES DAILY
Status: COMPLETED | OUTPATIENT
Start: 2019-09-20 | End: 2019-09-20

## 2019-09-20 RX ORDER — ACETAMINOPHEN 325 MG/1
975 TABLET ORAL ONCE
Status: COMPLETED | OUTPATIENT
Start: 2019-09-21 | End: 2019-09-21

## 2019-09-20 RX ORDER — MAGNESIUM SULFATE HEPTAHYDRATE 40 MG/ML
2 INJECTION, SOLUTION INTRAVENOUS ONCE
Status: COMPLETED | OUTPATIENT
Start: 2019-09-20 | End: 2019-09-20

## 2019-09-20 RX ADMIN — SENNOSIDES 8.8 MG: 8.8 SYRUP ORAL at 21:21

## 2019-09-20 RX ADMIN — FENTANYL CITRATE 50 MCG: 50 INJECTION INTRAMUSCULAR; INTRAVENOUS at 10:18

## 2019-09-20 RX ADMIN — INSULIN LISPRO 5 UNITS: 100 INJECTION, SOLUTION INTRAVENOUS; SUBCUTANEOUS at 17:38

## 2019-09-20 RX ADMIN — CHLORHEXIDINE GLUCONATE 0.12% ORAL RINSE 15 ML: 1.2 LIQUID ORAL at 21:21

## 2019-09-20 RX ADMIN — SODIUM CHLORIDE, SODIUM GLUCONATE, SODIUM ACETATE, POTASSIUM CHLORIDE AND MAGNESIUM CHLORIDE 100 ML/HR: 526; 502; 368; 37; 30 INJECTION, SOLUTION INTRAVENOUS at 04:45

## 2019-09-20 RX ADMIN — NICOTINE 1 PATCH: 21 PATCH, EXTENDED RELEASE TRANSDERMAL at 09:55

## 2019-09-20 RX ADMIN — INSULIN LISPRO 4 UNITS: 100 INJECTION, SOLUTION INTRAVENOUS; SUBCUTANEOUS at 05:39

## 2019-09-20 RX ADMIN — MIDAZOLAM 2 MG: 1 INJECTION INTRAMUSCULAR; INTRAVENOUS at 23:59

## 2019-09-20 RX ADMIN — DEXMEDETOMIDINE 0.2 MCG/KG/HR: 100 INJECTION, SOLUTION, CONCENTRATE INTRAVENOUS at 10:45

## 2019-09-20 RX ADMIN — MIDAZOLAM 5 MG/HR: 5 INJECTION INTRAMUSCULAR; INTRAVENOUS at 14:01

## 2019-09-20 RX ADMIN — Medication 10 MG: at 17:26

## 2019-09-20 RX ADMIN — POTASSIUM CHLORIDE 40 MEQ: 20 SOLUTION ORAL at 09:55

## 2019-09-20 RX ADMIN — FENTANYL CITRATE 100 MCG: 50 INJECTION INTRAMUSCULAR; INTRAVENOUS at 22:31

## 2019-09-20 RX ADMIN — VALPROIC ACID 500 MG: 500 SOLUTION ORAL at 05:39

## 2019-09-20 RX ADMIN — LEVETIRACETAM 1000 MG: 100 INJECTION, SOLUTION INTRAVENOUS at 10:34

## 2019-09-20 RX ADMIN — MAGNESIUM SULFATE HEPTAHYDRATE 2 G: 40 INJECTION, SOLUTION INTRAVENOUS at 06:33

## 2019-09-20 RX ADMIN — MIDAZOLAM 2 MG: 1 INJECTION INTRAMUSCULAR; INTRAVENOUS at 10:37

## 2019-09-20 RX ADMIN — CHLORHEXIDINE GLUCONATE 0.12% ORAL RINSE 15 ML: 1.2 LIQUID ORAL at 09:55

## 2019-09-20 RX ADMIN — POTASSIUM CHLORIDE 40 MEQ: 20 SOLUTION ORAL at 06:33

## 2019-09-20 RX ADMIN — VALPROIC ACID 500 MG: 500 SOLUTION ORAL at 21:21

## 2019-09-20 RX ADMIN — SODIUM CHLORIDE, SODIUM GLUCONATE, SODIUM ACETATE, POTASSIUM CHLORIDE AND MAGNESIUM CHLORIDE 500 ML: 526; 502; 368; 37; 30 INJECTION, SOLUTION INTRAVENOUS at 12:40

## 2019-09-20 RX ADMIN — POTASSIUM CHLORIDE 40 MEQ: 20 SOLUTION ORAL at 17:36

## 2019-09-20 RX ADMIN — FENTANYL CITRATE 50 MCG: 50 INJECTION INTRAMUSCULAR; INTRAVENOUS at 07:39

## 2019-09-20 RX ADMIN — Medication 20 MG: at 05:39

## 2019-09-20 RX ADMIN — LEVETIRACETAM 1000 MG: 100 INJECTION, SOLUTION INTRAVENOUS at 21:21

## 2019-09-20 RX ADMIN — Medication 100 MCG/HR: at 10:02

## 2019-09-20 RX ADMIN — FENTANYL CITRATE 50 MCG: 50 INJECTION INTRAMUSCULAR; INTRAVENOUS at 05:02

## 2019-09-20 RX ADMIN — MIDAZOLAM 3 MG/HR: 5 INJECTION INTRAMUSCULAR; INTRAVENOUS at 16:42

## 2019-09-20 RX ADMIN — INSULIN GLARGINE 20 UNITS: 100 INJECTION, SOLUTION SUBCUTANEOUS at 10:35

## 2019-09-20 RX ADMIN — PHENYTOIN SODIUM 200 MG: 50 INJECTION INTRAMUSCULAR; INTRAVENOUS at 09:49

## 2019-09-20 RX ADMIN — MIDAZOLAM 2 MG: 1 INJECTION INTRAMUSCULAR; INTRAVENOUS at 21:03

## 2019-09-20 RX ADMIN — POTASSIUM CHLORIDE 40 MEQ: 20 SOLUTION ORAL at 14:01

## 2019-09-20 RX ADMIN — Medication 75 MCG/HR: at 16:49

## 2019-09-20 RX ADMIN — INSULIN LISPRO 2 UNITS: 100 INJECTION, SOLUTION INTRAVENOUS; SUBCUTANEOUS at 01:05

## 2019-09-20 RX ADMIN — MIDAZOLAM 6 MG/HR: 5 INJECTION INTRAMUSCULAR; INTRAVENOUS at 05:40

## 2019-09-20 RX ADMIN — INSULIN LISPRO 5 UNITS: 100 INJECTION, SOLUTION INTRAVENOUS; SUBCUTANEOUS at 12:10

## 2019-09-20 RX ADMIN — ENOXAPARIN SODIUM 40 MG: 40 INJECTION SUBCUTANEOUS at 09:55

## 2019-09-20 RX ADMIN — VALPROIC ACID 500 MG: 500 SOLUTION ORAL at 14:01

## 2019-09-20 RX ADMIN — PHENYTOIN SODIUM 200 MG: 50 INJECTION INTRAMUSCULAR; INTRAVENOUS at 21:54

## 2019-09-20 RX ADMIN — PROPOFOL 10 MCG/KG/MIN: 10 INJECTION, EMULSION INTRAVENOUS at 04:45

## 2019-09-20 RX ADMIN — FENTANYL CITRATE 50 MCG: 50 INJECTION INTRAMUSCULAR; INTRAVENOUS at 20:17

## 2019-09-20 NOTE — PLAN OF CARE
Problem: Potential for Falls  Goal: Patient will remain free of falls  Description  INTERVENTIONS:  - Assess patient frequently for physical needs  -  Identify cognitive and physical deficits and behaviors that affect risk of falls  -  [place] fall precautions as indicated by assessment   - Educate patient/family on patient safety including physical limitations  - Instruct patient to call for assistance with activity based on assessment  - Modify environment to reduce risk of injury  - Consider OT/PT consult to assist with strengthening/mobility  Outcome: Progressing     Problem: NEUROSENSORY - ADULT  Goal: Remains free of injury related to seizures activity  Description  INTERVENTIONS  - Maintain airway, patient safety  and administer oxygen as ordered  - Monitor patient for seizure activity, document and report duration and description of seizure to physician/advanced practitioner  - If seizure occurs,  ensure patient safety during seizure  - Reorient patient post seizure  - Seizure pads on all 4 side rails  - Instruct patient/family to notify RN of any seizure activity including if an aura is experienced  - Instruct patient/family to call for assistance with activity based on nursing assessment  - Administer anti-seizure medications if ordered    Outcome: Progressing  Goal: Achieves maximal functionality and self care  Description  INTERVENTIONS  - Monitor swallowing and airway patency with patient fatigue and changes in neurological status  - Encourage and assist patient to increase activity and self care     - Encourage visually impaired, hearing impaired and aphasic patients to use assistive/communication devices  Outcome: Progressing     Problem: DISCHARGE PLANNING  Goal: Discharge to home or other facility with appropriate resources  Description  INTERVENTIONS:  - Identify barriers to discharge w/patient and caregiver  - Arrange for needed discharge resources and transportation as appropriate  - Identify discharge learning needs (meds, wound care, etc )  - Arrange for interpretive services to assist at discharge as needed  - Refer to Case Management Department for coordinating discharge planning if the patient needs post-hospital services based on physician/advanced practitioner order or complex needs related to functional status, cognitive ability, or social support system  Outcome: Progressing     Problem: Knowledge Deficit  Goal: Patient/family/caregiver demonstrates understanding of disease process, treatment plan, medications, and discharge instructions  Description  Complete learning assessment and assess knowledge base  Interventions:  - Provide teaching at level of understanding  - Provide teaching via preferred learning methods  Outcome: Progressing     Problem: Nutrition/Hydration-ADULT  Goal: Nutrient/Hydration intake appropriate for improving, restoring or maintaining nutritional needs  Description  Monitor and assess patient's nutrition/hydration status for malnutrition  Collaborate with interdisciplinary team and initiate plan and interventions as ordered  Monitor patient's weight and dietary intake as ordered or per policy  Utilize nutrition screening tool and intervene as necessary  Determine patient's food preferences and provide high-protein, high-caloric foods as appropriate       INTERVENTIONS:  - Monitor oral intake, urinary output, labs, and treatment plans  - Assess nutrition and hydration status and recommend course of action  - Evaluate amount of meals eaten  - Assist patient with eating if necessary   - Allow adequate time for meals  - Recommend/ encourage appropriate diets, oral nutritional supplements, and vitamin/mineral supplements  - Order, calculate, and assess calorie counts as needed  - Recommend, monitor, and adjust tube feedings and TPN/PPN based on assessed needs  - Assess need for intravenous fluids  - Provide specific nutrition/hydration education as appropriate  - Include patient/family/caregiver in decisions related to nutrition  Outcome: Progressing     Problem: Prexisting or High Potential for Compromised Skin Integrity  Goal: Skin integrity is maintained or improved  Description  INTERVENTIONS:  - Identify patients at risk for skin breakdown  - Assess and monitor skin integrity  - Assess and monitor nutrition and hydration status  - Monitor labs   - Assess for incontinence   - Turn and reposition patient  - Assist with mobility/ambulation  - Relieve pressure over bony prominences  - Avoid friction and shearing  - Provide appropriate hygiene as needed including keeping skin clean and dry  - Evaluate need for skin moisturizer/barrier cream  - Collaborate with interdisciplinary team   - Patient/family teaching  - Consider wound care consult   Outcome: Progressing     Problem: SAFETY,RESTRAINT: NV/NON-SELF DESTRUCTIVE BEHAVIOR  Goal: Remains free of harm/injury (restraint for non violent/non self-detsructive behavior)  Description  INTERVENTIONS:  - Instruct patient/family regarding restraint use   - Assess and monitor physiologic and psychological status   - Provide interventions and comfort measures to meet assessed patient needs   - Identify and implement measures to help patient regain control  - Assess readiness for release of restraint   Outcome: Progressing  Goal: Returns to optimal restraint-free functioning  Description  INTERVENTIONS:  - Assess the patient's behavior and symptoms that indicate continued need for restraint  - Identify and implement measures to help patient regain control  - Assess readiness for release of restraint   Outcome: Progressing     Problem: RESPIRATORY - ADULT  Goal: Achieves optimal ventilation and oxygenation  Description  INTERVENTIONS:  - Assess for changes in respiratory status  - Assess for changes in mentation and behavior  - Position to facilitate oxygenation and minimize respiratory effort  - Oxygen administered by appropriate delivery if ordered  - Initiate smoking cessation education as indicated  - Encourage broncho-pulmonary hygiene including cough, deep breathe, Incentive Spirometry  - Assess the need for suctioning and aspirate as needed  - Assess and instruct to report SOB or any respiratory difficulty  - Respiratory Therapy support as indicated  Outcome: Progressing     Problem: COPING  Goal: Pt/Family able to verbalize concerns and demonstrate effective coping strategies  Description  INTERVENTIONS:  - Assist patient/family to identify coping skills, available support systems and cultural and spiritual values  - Provide emotional support, including active listening and acknowledgement of concerns of patient and caregivers  - Reduce environmental stimuli, as able  - Provide patient education  - Assess for spiritual pain/suffering and initiate spiritual care, including notification of Pastoral Care or [place]abel based community as needed  - Assess effectiveness of coping strategies  Outcome: Progressing  Goal: Will report anxiety at manageable levels  Description  INTERVENTIONS:  - Administer medication as ordered  - Teach and encourage coping skills  - Provide emotional support  - Assess patient/family for anxiety and ability to cope  Outcome: Progressing     Problem: BEHAVIOR  Goal: Pt/Family maintain appropriate behavior and adhere to behavioral management agreement, if implemented  Description  INTERVENTIONS:  - Assess the family dynamic   - Encourage verbalization of thoughts and concerns in a socially appropriate manner  - Assess patient/family's coping skills and non-compliant behavior (including use of illegal substances)    - Utilize positive, consistent limit setting strategies supporting safety of patient, staff and others  - Initiate consult with Case Management, Spiritual Care or other ancillary services as appropriate  - If a patient's/visitor's behavior jeopardizes the safety of the patient, staff, or others, refer to organization procedure     - Notify Security of behavior or suspected illegal substances which indicate the need for search of the patient and/or belongings  - Encourage participation in the decision making process about a behavioral management agreement; implement if patient meets criteria  Outcome: Progressing     Problem: NEUROSENSORY - ADULT  Goal: Achieves stable or improved neurological status  Description  INTERVENTIONS  - Monitor and report changes in neurological status  - Monitor vital signs such as temperature, blood pressure, glucose, and any other labs ordered   - Initiate measures to prevent increased intracranial pressure  - Monitor for seizure activity and implement precautions if appropriate      Outcome: Not Progressing     Problem: SAFETY ADULT  Goal: Maintain or return to baseline ADL function  Description  INTERVENTIONS:  -  Assess patient's ability to carry out ADLs; assess patient's baseline for ADL function and identify physical deficits which impact ability to perform ADLs (bathing, care of mouth/teeth, toileting, grooming, dressing, etc )  - Assess/evaluate cause of self-care deficits   - Assess range of motion  - Assess patient's mobility; develop plan if impaired  - Assess patient's need for assistive devices and provide as appropriate  - Encourage maximum independence but intervene and supervise when necessary  - Involve family in performance of ADLs  - Assess for home care needs following discharge   - Consider OT consult to assist with ADL evaluation and planning for discharge  - Provide patient education as appropriate  Outcome: Not Progressing  Goal: Maintain or return mobility status to optimal level  Description  INTERVENTIONS:  - Assess patient's baseline mobility status (ambulation, transfers, stairs, etc )    - Identify cognitive and physical deficits and behaviors that affect mobility  - Identify mobility aids required to assist with transfers and/or ambulation (gait belt, sit-to-stand, lift, walker, cane, etc )  - [place] fall precautions as indicated by assessment  - Record patient progress and toleration of activity level on Mobility SBAR; progress patient to next Phase/Stage  - Instruct patient to call for assistance with activity based on assessment  - Consider rehabilitation consult to assist with strengthening/weightbearing, etc   Outcome: Not Progressing

## 2019-09-20 NOTE — NUTRITION
09/20/19 1503   Recommendations/Interventions   Nutrition Recommendations Continue EN/PN as ordered; Other (specify)  (Replete K and phosphorus  Continue to advance TF as ordered, to goal: Jevity 1 2 @ 80ml/hr  Provides 2304kcal, 107g protein, 1555ml free water   Adjust water flushes per MD   Monitor weight and lytes )

## 2019-09-20 NOTE — ASSESSMENT & PLAN NOTE
· Subclinical right hemispheric focal status epilepticus  · Recent traumatic subarachnoid hemorrhage  · Persisting despite Depakote, Keppra, phenytoin  · Discussed with Neurology - patient to be transferred to critical care for elective intubation and midazolam drip

## 2019-09-20 NOTE — OCCUPATIONAL THERAPY NOTE
OT CANCEL NOTE    OT orders received  Chart reviewed  Pt is currently intubated/sedated and not appropriate to engage in skilled OT services at this time  Will hold initial OT evaluation  Will continue to follow pt on caseload and see pt when medically stable and as clinically appropriate      Roxanne TRINH, OTR/L

## 2019-09-20 NOTE — PROGRESS NOTES
Progress Note - Carol Connelly  1970, 44 y o  male MRN: [id number]    [facility] En[facility]: [id number]    Primary Care Provider: Efra Rogers   Date and time admitted to hospital: 2019  9:19 PM        Diabetes [facility])  Assessment & Plan  Lab Results   Component Value Date    HGBA1C 13 2 (H) 2019     New diagnosis  Begun on subcu insulin  Currently being transferred to critical care - insulin per CC      ·     Essential hypertension  Assessment & Plan  · Continue home regimen    * Refractory status epilepticus  Assessment & Plan  · Subclinical right hemispheric focal status epilepticus  · Recent traumatic subarachnoid hemorrhage  · Persisting despite Depakote, Keppra, phenytoin  · Discussed with Neurology - patient to be transferred to critical care for elective intubation and midazolam drip      VTE Pharmacologic Prophylaxis:   Pharmacologic: Enoxaparin (Lovenox)  Mechanical VTE Prophylaxis in Place: Yes    Patient Centered Rounds: I have performed bedside rounds with nursing staff today  Discussions with Specialists or Other Care Team Provider:  Discussed with Neurology    Education and Discussions with Family / Patient:  Discussed with wife on the phone    Time Spent for Care: 20 minutes  More than 50% of total time spent on counseling and coordination of care as described above  Current Length of Stay: 1 day(s)    Current Patient Status: Inpatient   Certification Statement: The patient will continue to require additional inpatient hospital stay due to Refractory seizures    Code Status: Level 1 - Full Code    Subjective:   Video EEG shows subclinical seizures  Patient feels well    Objective:     Vitals:   Temp (24hrs), Av 5 °F (36 9 °C), Min:98 1 °F (36 7 °C), Max:98 9 °F (37 2 °C)    Temp:  [98 1 °F (36 7 °C)-98 9 °F (37 2 °C)] 98 3 °F (36 8 °C)  HR:  [70-95] 84  Resp:  [11-26] 15  BP: ()/() 95/57  SpO2:  [92 %-100 %] 97 %  Body mass index is 37 39 kg/m²  Physical Exam:     Physical Exam   Constitutional: He is oriented to person, place, and time  HENT:   Head: Normocephalic and atraumatic  Eyes: Pupils are equal, round, and reactive to light  EOM are normal    Neck: Normal range of motion  Neck supple  Cardiovascular: Normal rate and regular rhythm  Pulmonary/Chest: Effort normal and breath sounds normal  No stridor  No respiratory distress  He has no wheezes  Abdominal: Soft  Bowel sounds are normal  He exhibits no distension  There is no tenderness  There is no guarding  Musculoskeletal: He exhibits no edema  Neurological: He is alert and oriented to person, place, and time  Skin: Skin is warm and dry  Psychiatric: He has a normal mood and affect  Additional Data:     Labs:    Results from last 7 days   Lab Units 09/19/19  1113   WBC Thousand/uL 8 64   HEMOGLOBIN g/dL 15 9   HEMATOCRIT % 48 6   PLATELETS Thousands/uL 224   NEUTROS PCT % 72   LYMPHS PCT % 17   MONOS PCT % 7   EOS PCT % 3     Results from last 7 days   Lab Units 09/19/19  1116   SODIUM mmol/L 136   POTASSIUM mmol/L 3 3*   CHLORIDE mmol/L 103   CO2 mmol/L 26   BUN mg/dL 11   CREATININE mg/dL 1 18   ANION GAP mmol/L 7   CALCIUM mg/dL 9 3   ALBUMIN g/dL 3 3*   TOTAL BILIRUBIN mg/dL 1 12*   ALK PHOS U/L 99   ALT U/L 53   AST U/L 36   GLUCOSE RANDOM mg/dL 342*         Results from last 7 days   Lab Units 09/19/19  1804 09/19/19  1150 09/19/19  1146 09/19/19  0931 09/19/19  0629 09/18/19  2137 09/18/19  1728 09/18/19  1053 09/18/19  0717 09/18/19  0358 09/18/19  0227 09/18/19  0020   POC GLUCOSE mg/dl 201* 358* >500* 380* 341* 365* 286* 348* 341* 332* 387* 487*     Results from last 7 days   Lab Units 09/18/19  0518   HEMOGLOBIN A1C % 13 2*     Results from last 7 days   Lab Units 09/19/19  1627 09/19/19  1113 09/18/19  0518 09/17/19  2349   LACTIC ACID mmol/L 2 0 2 2* 0 8 1 6           * I Have Reviewed All Lab Data Listed Above  * Additional Pertinent Lab Tests Reviewed:  [facility]      Last 24 Hours Medication List:     Current Facility-Administered Medications:  chlorhexidine 15 mL Swish & Spit Q12H [facility] HOME ROCKY Miller    enoxaparin 40 mg Subcutaneous Daily ROCKY Miller    fentanyl citrate (PF) 50 mcg Intravenous Q4H PRN ROCKY Miller    insulin lispro 2-12 Units Subcutaneous Q6H [facility] ROCKY Dias    levETIRAcetam 1,000 mg Intravenous Q12H ROCKY Miller Last Rate: Stopped (09/19/19 2201)   midazolam 6 mg/hr Intravenous Continuous Arleen Osgood, MD Last Rate: 6 mg/hr (09/19/19 2159)   midazolam 2 mg Intravenous Q2H PRN ROCKY Miller    multi-electrolyte 100 mL/hr Intravenous Continuous Elizabeth Leslie PA-C Last Rate: 100 mL/hr (09/19/19 1942)   nicotine 1 patch Transdermal Daily ROCKY Myrick    omeprazole (PRILOSEC) suspension 2 mg/mL 20 mg Oral Early Morning ROCKY Miller    ondansetron 4 mg Intravenous Q6H PRN ROCKY Miller    phenytoin 200 mg Intravenous Q12H [facility] ROCKY Miller Last Rate: Stopped (09/19/19 2201)   propofol 5-50 mcg/kg/min Intravenous Titrated ROCKY Miller Last Rate: 10 mcg/kg/min (09/19/19 1942)   valproic acid 500 mg Oral Q8H [address] ROCKY Burger         Today, Patient Was Seen By: Mickey Holland MD    ** Please Note: Dictation voice to text software may have been used in the creation of this document   **

## 2019-09-20 NOTE — RESPIRATORY THERAPY NOTE
RT Ventilator Management Note  Marvin Howard  44 y o  male MRN: [id number]  Unit[facility]Bed#: [facility] Encounter: [id number]      Daily Screen     No data found in the last 10 encounters  Physical Exam:   Assessment Type: Assess only  General Appearance: Sedated  Respiratory Pattern: Assisted  Chest Assessment: Chest expansion symmetrical  Bilateral Breath Sounds: Clear  O2 Device: vent      Resp Comments: pt remained on AC/VC mode/settings all night   No vent changes made during the night

## 2019-09-20 NOTE — UTILIZATION REVIEW
Initial Clinical Review    Admission: Date/Time/Statement: Inpatient Admission Orders (From admission, onward)     Ordered        09/18/19 2138  Inpatient Admission  Once                   Orders Placed This Encounter   Procedures    Inpatient Admission     Standing Status:   Standing     Number of Occurrences:   1     Order Specific Question:   Admitting Physician     Answer:   Nay Scherer [[id number]]     Order Specific Question:   Level of Care     Answer:   Med Surg [[id number]]     Order Specific Question:   Estimated length of stay     Answer:   More than 2 Midnights     Order Specific Question:   Certification     Answer:   I certify that inpatient services are medically necessary for this patient for a duration of greater than two midnights  See H&P and MD Progress Notes for additional information about the patient's course of treatment  Assessment/Plan:  44 yop m  With a PMH of uncontrolled HTN, obesity, recent  (August 2019) [facility] and SDH s/p fall   He initially presented to [address] [place] [address] with episodes of dizziness , ambulatory dysfunction and staring spells  Routine EEG  Revealed several extra graphic seizure setting from right hemisphere suspicious for ongoing active subclinical seizure activity  ( status epilepticus)   He is transferred to [facility] and admitted inpatient for video EEG monitoring  On initiation of  V EEG the patient was found to havenumerous subclinical right temporal seizures  Patient loaded with Keppra 3 g, and 1 g b i d  Initiated  An additional 2 mg of Ativan IV ordered  Patient did become less responsive overnight  Patient given an additional 2 mgs of Ativan  At approximately 4:00 a m  This morning, the patient initiated on fosphenytoin 200 mg p o  B i d   Patient continues to have subclinical seizures    In discussion with epileptologist, attending neurologist and ICU attending, will recommend elective intubation and seizures verses burst suppression  On 9/19 am he was transferred to ICU for elective intubation and midazolam drip  Neurology note 9/19 - Subclinical R hemispheric focal status epilepticus in setting of recent traumatic subarachnoid hemorrhage              -VEEG with several focal partial R temporal seizures despite escalating AEDs  -Thus far, pt with:                                      -Depakote 750mg PO BID (), Loaded with 3G x1                                       -Keppra 1G BID  Loaded with 3G x1                                      -phenytoin 200mg PO BID                                       -Ativan 5mg total               -In discussion [facility] Epileptology, attending Neurologist and Critical Care attending, will recommend elective intubation and initiation of Midazolam drip               -the patient continues to demonstrate refractory status epilepticus, will consider lumbar puncture for CSF analysis              -Will continue to follow closely, please monitor exam and notify with changes         9/20 - 24hr events intubated, sedated with versed gtt  Mild hypotension responded to IVF  PICC line placed,s traight cath x 3 overnight    No seizure activity with good burst suppression per discussion with neurology     PLAN  · NEURO - continue current AED agents per neurology, continue versed gtt at 6mg/hr until 1200 then start 12hr wean as tolerated, start fentanyl gtt 100mcg/hr and precedex gtt, cEEG monitoring  · CARDIAC - currently holding BP regimen with sedatives for burst suppression  · PULM - cont MV support, SBT once off versed gtt and neurologically stable  · GI - Titrate TFs to goal, add bowel regimen  · RENAL - replete K now, condom cath/serial bladder scans - may need paul  · ID - monitor off abx  · ENDO - ISS add lantus 20units x 1  · HEME - no active issues  · ICU Care - SCDs/LMWH, PPI, HOB >30deg, CHG  · Full Code          Vitals   Temperature Pulse Respirations Blood Pressure SpO2   09/18/19 9512 09/18/19 2132 09/18/19 2132 09/18/19 2132 09/18/19 2132   98 7 °F (37 1 °C) 84 20 147/93 96 %      Temp Source Heart Rate Source Patient Position - Orthostatic VS BP Location FiO2 (%)   09/19/19 1059 09/19/19 1059 09/19/19 1059 09/19/19 1059 09/19/19 1300   Oral Monitor Lying Left arm 40      Pain Score       09/18/19 2100       7        Wt Readings from Last 1 Encounters:   09/20/19 117 kg (257 lb 0 9 oz)     Additional Vital Signs:   Date/Time  Temp  Pulse  Resp  BP  MAP (mmHg)  SpO2  O2 Device  Patient Position - Orthostatic VS   09/20/19 1252    86  13  102/64  77  98 %       09/20/19 1245    86  12  94/58  68  97 %       09/20/19 1230    88  11Abnormal   99/64  74  96 %    Lying   09/20/19 1215    88  12  71/44Abnormal   53  93 %       09/20/19 1200    88  11Abnormal   66/47Abnormal   53  95 %    Lying   09/20/19 1151            96 %       09/20/19 1000    98  17  135/86  103  98 %  Ventilator  Lying   09/20/19 0800    92  12  104/68  81  95 %       09/20/19 0736  98 1 °F (36 7 °C)      98/64  73    Ventilator  Lying   09/20/19 0733            94 %       09/20/19 0700    102  15  93/64  77  95 %       09/20/19 0600    100  16  99/69  79  96 %       09/20/19 0500    100  17  149/96  112  98 %       09/20/19 0415            96 %       09/20/19 0400    96  19  97/67  76  96 %       09/20/19 0300  97 8 °F (36 6 °C)  92  18  112/70  87  96 %  Ventilator  Lying   09/20/19 0200    90  17  102/65  75  96 %       09/20/19 0100    90  17  98/65  80  96 %       09/20/19 0020            96 %       09/20/19 0000  98 5 °F (36 9 °C)  92  15  118/79  95  98 %  Ventilator  Lying   09/19/19 2300    90  15  96/66  74  97 %       09/19/19 2200    84  15  95/57  72  97 %       09/19/19 2100    86  15  104/62  78  97 %       09/19/19 2010            97 %       09/19/19 2000  98 3 °F (36 8 °C)  86  19  128/77  89  98 %  Ventilator  Lying   09/19/19 1900   80  14  106/65  79  98 %       09/19/19 1800    82  14  102/58  75  96 %       09/19/19 1700    80  17  136/81  98  96 %       09/19/19 1600  98 9 °F (37 2 °C)  76  13  105/62  78  98 %  Ventilator  Lying   09/19/19 1400    74  14  82/51Abnormal   67  98 %  Ventilator  Lying   09/19/19 1300    74  12  81/57Abnormal   63  99 %  Other (comment)      O2 Device: Vent at 09/19/19 1300   09/19/19 1255    74  12  87/63Abnormal   71  99 %       09/19/19 1250    74  11Abnormal   117/78  93  100 %       09/19/19 1240    70  14  154/101Abnormal   118  100 %       09/19/19 1235    78  15  153/102Abnormal   120  100 %       09/19/19 1225    76  26Abnormal   224/151Abnormal    181  99 %       BP: DURING INTUBATION PT AGITATED at 09/19/19 1225   09/19/19 1100    76  18  100/63  75      Lying   09/19/19 1059  98 6 °F (37 °C)  78  16  119/77  94  94 %  None (Room air)  Lying         Pertinent Labs/Diagnostic Test Results:   Results from last 7 days   Lab Units 09/20/19  0451 09/19/19  1113 09/18/19  0518 09/17/19  2218   WBC Thousand/uL 8 49 8 64 7 28 6 35   HEMOGLOBIN g/dL 15 0 15 9 14 2 15 3   HEMATOCRIT % 45 9 48 6 42 6 46 2   PLATELETS Thousands/uL 186 224 180 180   NEUTROS ABS Thousands/µL 6 42 6 33 4 78 4 77         Results from last 7 days   Lab Units 09/20/19  1214 09/20/19  0451 09/19/19  1117 09/19/19  1116 09/18/19  0518 09/17/19  2218   SODIUM mmol/L  --  140  --  136 135* 130*   POTASSIUM mmol/L 3 3* 3 0*  --  3 3* 3 3* 4 1   CHLORIDE mmol/L  --  107  --  103 101 95*   CO2 mmol/L  --  27  --  26 23 28   ANION GAP mmol/L  --  6  --  7 11 7   BUN mg/dL  --  12  --  11 9 9   CREATININE mg/dL  --  0 83  --  1 18 0 78 1 13   EGFR ml/min/1 73sq m  --  102  --  71 105 75   CALCIUM mg/dL  --  8 4  --  9 3 8 8 9 3   MAGNESIUM mg/dL 2 7*  --  2 2  --  1 7  --    PHOSPHORUS mg/dL  --   --  2 4*  --  2 9  --      Results from last 7 days   Lab Units 09/20/19  0451 09/19/19  1116 09/18/19  0518 09/17/19  2218   AST U/L  --  36 33 46*   ALT U/L  --  53 48 61   ALK PHOS U/L  --  99 90 115   TOTAL PROTEIN g/dL  --  7 4 6 5 7 3   ALBUMIN g/dL 2 8* 3 3* 3 1* 3 4*   TOTAL BILIRUBIN mg/dL  --  1 12* 1 00 1 10*   BILIRUBIN DIRECT mg/dL  --   --   --  0 29*     Results from last 7 days   Lab Units 09/20/19  1128 09/20/19  0546 09/20/19  0539 09/20/19  0102 09/19/19  1804 09/19/19  1150 09/19/19  1146 09/19/19  0931 09/19/19  0629 09/18/19  2137   POC GLUCOSE mg/dl 184* 207* 245* 194* 201* 358* >500* 380* 341* 365*     Results from last 7 days   Lab Units 09/20/19  0451 09/19/19  1116 09/18/19  0518 09/17/19  2218   GLUCOSE RANDOM mg/dL 211* 342* 322* 616*         Results from last 7 days   Lab Units 09/18/19  0518   HEMOGLOBIN A1C % 13 2*   EAG mg/dl 332     BETA-HYDROXYBUTYRATE   Date Value Ref Range Status   09/17/2019 0 2 <0 6 mmol/L Final      Results from last 7 days   Lab Units 09/19/19  1427   PH ART  7 379   PCO2 ART mm Hg 41 3   PO2 ART mm Hg 90 2   HCO3 ART mmol/L 23 8   BASE EXC ART mmol/L -1 3   O2 CONTENT ART mL/dL 20 5   O2 HGB, ARTERIAL % 95 7   ABG SOURCE  Radial, Right     Results from last 7 days   Lab Units 09/19/19  1627 09/19/19  1113 09/18/19  0518 09/17/19  2349   LACTIC ACID mmol/L 2 0 2 2* 0 8 1 6           Past Medical History:   Diagnosis Date    Hypertension      Present on Admission:   [facility]   Diabetes ([facility] )   Essential hypertension   (Resolved) Head injury   Refractory status epilepticus   TBI (traumatic brain injury) ([facility] )      Admitting Diagnosis: Seizure ([facility] ) [[id number]]  Age/Sex: 44 y o  male  Admission Orders:    Current Facility-Administered Medications:  bisacodyl 10 mg Rectal Daily PRN    chlorhexidine 15 mL Swish & Spit Q12H [address]    dexmedetomidine 0 1-0 7 mcg/kg/hr Intravenous Titrated    enoxaparin 40 mg Subcutaneous Daily    fentaNYL 75 mcg/hr Intravenous Continuous    fentanyl citrate (PF) 50 mcg Intravenous Q2H PRN    insulin lispro 5-25 Units Subcutaneous Q6H [address]    levETIRAcetam 1,000 mg Intravenous Q12H    midazolam 5 mg/hr Intravenous Continuous    midazolam 2 mg Intravenous Q2H PRN    nicotine 1 patch Transdermal Daily    ondansetron 4 mg Intravenous Q6H PRN    phenytoin 200 mg Intravenous Q12H [address]    potassium chloride 40 mEq Oral BID    senna 8 8 mg Oral HS    valproic acid 500 mg Oral Q8H [address]      Network Utilization Review Department  Phone: [phone]; Fax [phone]  [email or web address]  ATTENTION: Please call with any questions or concerns to [phone]  and carefully listen to the prompts so that you are directed to the right person  Send all requests for admission clinical reviews, approved or denied determinations and any other requests to fax [phone]   All voicemails are confidential

## 2019-09-20 NOTE — RESPIRATORY THERAPY NOTE
RT Ventilator Management Note  Trisha Quintana  44 y o  male MRN: [id number]  Unit/Bed#: [facility] 05 Encounter: [id number]      Daily Screen       9/20/2019  0733             Patient safety screen outcome[de-identified]  Failed            Physical Exam:   Assessment Type: Assess only  General Appearance: Sedated  Respiratory Pattern: Assisted  Chest Assessment: Chest expansion symmetrical  Bilateral Breath Sounds: Scattered, Coarse  O2 Device: vent      Resp Comments: Pt currently sedated, resting on AC settings, on EMU  Per RN awaiting 24H of no seizures before waking patient  HiLow noted at full cont sxn  made appropriate changes

## 2019-09-20 NOTE — PHYSICAL THERAPY NOTE
Physical Therapy Cancellation Note      PT orders received, chart review performed  Pt is currently intubated/sedated and not appropriate for PT evaluation at this time  PT to hold evaluation, continue to follow       Steven Garcia PT, DPT

## 2019-09-20 NOTE — ASSESSMENT & PLAN NOTE
Lab Results   Component Value Date    HGBA1C 13 2 (H) 09/18/2019     New diagnosis  Begun on subcu insulin  Currently being transferred to critical care - insulin per CC      ·

## 2019-09-20 NOTE — PROGRESS NOTES
Progress Note - [facility]  44 y o  male MRN: [id number]  [facility] En[facility]: [id number]    Assessment:   Principal Problem:    Refractory status epilepticus  Active Problems:    Essential hypertension    Diabetes ([facility] )    Seizure ([facility] )    [facility]    Acute respiratory failure   Resolved Problems:    Head injury      Plan  Neuro:   Hx of [address] 8/2019 after fall  Focal status epilepticus, subclinical  Sedation with goal burst suppression on vEEG  Pain  · Continue video eeg with goal burst suppression  · Continue versed gtt and titrate for goal, currently 6mg/hr, no evidence of clinical seizure overnight  · Appreciate Neuro/Epileptology recommendations  · Fentanyl prn for pain, Propofol gtt for sedation, currently at 10mcg/kg/hr  · Continue on AED's keppra 1000mg bid, phenytoin and valproic acid as per Neuro  · Delirium Precautions  · CAM ICU per protocol  · Regulate sleep/wake cycle+  · Trend neuro exam  CV:   Underlying HTN disorder  Grade 1 DD on echo 1/2019, appears compensated currently  · Continue to monitor bp  · Hold home antiHTN medications currently with high sedation  · Consider restart low dose home BB today  · MAP goal > 65  · Rhythm: NSR  · Follow rhythm on telemetry  Lung:   Acute hypoxic respiratory failure, intubated for airway protection  · Continue AC/VC at currently settings  · Daily SBT assessment in coordination with SAT per protocol: yes  ·  Chlorhexidine/HOB>30degrees ordered: yes  · Pulmonary toileting  · Wean vent as able  GI:   NPO with OG tube  · Increase tube feeds to goal today  · Stress ulcer prophylaxis: Not Indicated discontinue today when tube feeds advanced  · Bowel regimen: senna  FEN:   Hypokalemia  · Goal 24 hour fluid balance: even   Fluid/Diuretic plan: isolyte maintence, stop once tube feeds to goal  · Nutrition/diet plan: jevity 1 2  · Replete electrolytes with goals: K >4 0, Mag >2 0, and Phos >3 0  :   Urine retention on urine retention protocol  · Indwelling Paul present: no   · Trend UOP and BUN/creat  · Strict I and O  · Continue urine retention protocol with serial straight cath, insert paul if fails again  ID:   · Trend temps and WBC count  · No evidence of infection  Heme:   · Trend hgb and plts  · Transfuse as needed for goal hgb >7  · Cbc am  Endo:   Poorly controlled diabetic type 2, last a1c 13 2  · Glycemic control plan: increase from algorithm 4 to 5  · ssi for goal 140-180  · Consider endocrine consult when back on med surgery floor  MSK/Skin:  · Mobility goal: serially moving and off loading, undergoing burst suppression  · PT consult: yes  · OT consult: yes  · Frequent turning and pressure off-loading  Family:  · Family updated within 24 hours: yes    Lines:  · Right picc line day 2, peripheral IV's, ett day 2, og day 2  VTE Prophylaxis:  · Pharmacologic Prophylaxis:Enoxaparin (Lovenox)  · Mechanical Prophylaxis: sequential compression device    Disposition: Continue ICU care      ______________________________________________________________________  Chief Complaint: intubated sedated      HPI/24hr events: yesterday brought to the icu, intubated and sedated for burst suppression on versed drip, propofol weaned down, video eeg on   No reports of clinical seizure activity, no radiographic seizure activity reported    Afebrile overnight, no arrhythmia    Prior to intubation moving all 4 extremities, eyes open, following commands, confused on year, orient to person place and situation      Review of Systems   Unable to perform ROS: Intubated     ______________________________________________________________________  Temperature:   Temp (24hrs), Av 4 °F (36 9 °C), Min:97 8 °F (36 6 °C), Max:98 9 °F (37 2 °C)    Current Temperature: 97 8 °F (36 6 °C)    Temp:  [97 8 °F (36 6 °C)-98 9 °F (37 2 °C)] 97 8 °F (36 6 °C)  HR:  [] 100  Resp:  [11-26] 16  BP: (81224)/() 99/69  FiO2 (%):  [40] 40    Vitals: 09/20/19 0400 09/20/19 0415 09/20/19 0500 09/20/19 0600   BP: 97/67  149/96 99/69   BP Location:       Pulse: 96  100 100   Resp: 19 17 16   Temp:       TempSrc:       SpO2: 96% 96% 98% 96%   Weight:       Height:                  Weights:   IBW: 73 kg    Body mass index is 36 88 kg/m²  Weight (last 2 days)     Date/Time   Weight    09/20/19 0300   117 (257 06)    09/19/19 1059   118 (260 58)    09/18/19 21:32:07   117 (257 94)            Height: 5' 10" (177 8 cm)  Noninvasive/Ventilator Settings:  Ventilator Settings:   Vent Mode: AC/VC    Settings  Resp Rate (BPM): 12 BPM  Vt (mL): 500 mL  FIO2 (%): 40 %  PEEP (cmH2O): 5 cmH2O  Flow (LPM): 60 L/min      Lab Results   Component Value Date    PHART 7 379 09/19/2019    KKJ6PED 41 3 09/19/2019    PO2ART 90 2 09/19/2019    [id number] 23 8 09/19/2019    BEART -1 3 09/19/2019    SOURCE Radial, Right 09/19/2019     SpO2: SpO2: 96 %  ______________________________________________________________________  Physical Exam:  Aguilar Agitation Sedation Scale (RASS): Light sedation  Physical Exam   Constitutional: He appears well-developed and well-nourished  HENT:   Head: Normocephalic and atraumatic  Nose: Nose normal    eeg leads in place   Eyes: Pupils are equal, round, and reactive to light  Conjunctivae are normal    4-5mm brisk reaction to light, midline, no roving   Neck: No JVD present  Cardiovascular: Normal rate, regular rhythm, normal heart sounds and intact distal pulses  Pulmonary/Chest: Effort normal and breath sounds normal  He has no wheezes  Abdominal: Soft  Bowel sounds are normal    Musculoskeletal: He exhibits no edema or deformity  Neurological: GCS eye subscore is 1  GCS verbal subscore is 1  GCS motor subscore is 4  Withdrawal to pain x 4  No tics or tremors   Skin: Skin is warm  Capillary refill takes less than 2 seconds  He is not diaphoretic     Nursing note and vitals reviewed  ______________________________________________________________________  Intake and Outputs:  I/O       09/18 0701 - 09/19 0700 09/19 0701 - 09/20 0700    I V  (mL/kg)  3206 9 (27 4)    NG/GT  283    IV Piggyback 150 280    Total Intake(mL/kg) 150 (1 3) 3769 9 (32 2)    Urine (mL/kg/hr)  3569 (1 3)    Total Output  3569    Net +150 +200 9          Unmeasured Urine Occurrence  1 x          Nutrition:        Diet Orders   (From admission, onward)             Start     Ordered    09/19/19 1631  Diet Enteral/Parenteral; Tube Feeding No Oral Diet; Jevity 1 2 Yunior; Continuous; 20  Diet effective now     Comments:  Please hold at 20 mL/hr for now   Question Answer Comment   Diet Type Enteral/Parenteral    Enteral/Parenteral Tube Feeding No Oral Diet    Tube Feeding Formula: Jevity 1 2 Yunior    Bolus/Cyclic/Continuous Continuous    Tube Feeding Goal Rate (mL/hr): 20    RD to adjust diet per protocol?  Yes        09/19/19 1631              Labs:   Results from last 7 days   Lab Units 09/20/19 0451 09/19/19  1113 09/18/19  0518   WBC Thousand/uL 8 49 8 64 7 28   HEMOGLOBIN g/dL 15 0 15 9 14 2   HEMATOCRIT % 45 9 48 6 42 6   PLATELETS Thousands/uL 186 224 180   NEUTROS PCT % 76* 72 66   MONOS PCT % 7 7 7    Results from last 7 days   Lab Units 09/20/19  0451 09/19/19  1116 09/18/19  0518 09/17/19  2218   SODIUM mmol/L 140 136 135* 130*   POTASSIUM mmol/L 3 0* 3 3* 3 3* 4 1   CHLORIDE mmol/L 107 103 101 95*   CO2 mmol/L 27 26 23 28   BUN mg/dL 12 11 9 9   CREATININE mg/dL 0 83 1 18 0 78 1 13   CALCIUM mg/dL 8 4 9 3 8 8 9 3   ALK PHOS U/L  --  99 90 115   ALT U/L  --  53 48 61   AST U/L  --  36 33 46*   GLUCOSE RANDOM mg/dL 211* 342* 322* 616*     Results from last 7 days   Lab Units 09/19/19  1117 09/18/19  0518   MAGNESIUM mg/dL 2 2 1 7     Results from last 7 days   Lab Units 09/19/19  1117 09/18/19  0518   PHOSPHORUS mg/dL 2 4* 2 9          Results from last 7 days   Lab Units 09/19/19  1627   LACTIC ACID mmol/L 2 0 0   Lab Value Date/Time    TROPONINI <0 02 01/14/2019 1307    TROPONINI <0 02 01/14/2019 0844    TROPONINI <0 02 01/14/2019 3057    TROPONINI <0 02 10/23/2018 2058    TROPONINI <0 02 06/12/2018 1549    TROPONINI <0 02 06/05/2018 2245    TROPONINI <0 02 05/10/2018 1238    TROPONINI <0 02 09/07/2017 1221    TROPONINI <0 02 01/17/2017 1517    TROPONINI <0 02 01/07/2017 1622    TROPONINI <0 02 01/07/2017 1349     Imaging: CXR yesterday with no infiltrated, ett tube in place above francisca, og requiring advancement I have personally reviewed pertinent reports      EKG: NSR  Micro:  No results found for: Charles Kirby, WOUNDCULT, SPUTUMCULTUR  Allergies: No Known Allergies  Medications:   Scheduled Meds:  Current Facility-Administered Medications:  chlorhexidine 15 mL Swish & Spit Q12H [address] Galliarashmi Littlejohn, ROCKY    enoxaparin 40 mg Subcutaneous Daily Gallia Crystal, CRNIMA    fentanyl citrate (PF) 50 mcg Intravenous Q2H PRN Mushtaq Bates PA-C    insulin lispro 5-25 Units Subcutaneous Q6H [address] Richard Vicente PA-C    levETIRAcetam 1,000 mg Intravenous Q12H Gallia Crystal, CRNP Last Rate: Stopped (09/19/19 2201)   magnesium sulfate 2 g Intravenous Once Mushtaq Bates PA-C    midazolam 6 mg/hr Intravenous Continuous Jan Tadeo MD Last Rate: 6 mg/hr (09/20/19 0540)   midazolam 2 mg Intravenous Q2H PRN Dorene Crystal, CRNP    multi-electrolyte 100 mL/hr Intravenous Continuous Mushtaq Bates PA-C Last Rate: 100 mL/hr (09/20/19 8805)   nicotine 1 patch Transdermal Daily ROCKY Myrick    omeprazole (PRILOSEC) suspension 2 mg/mL 20 mg Oral Early Morning Gallia Crystal, CRNP    ondansetron 4 mg Intravenous Q6H PRN Gallia Crystal, CRNP    phenytoin 200 mg Intravenous Q12H [address] Dorene Crystal, CRNP Last Rate: Stopped (09/19/19 2201)   potassium chloride 40 mEq Oral BID Gisella Vicente PA-C    propofol 5-50 mcg/kg/min Intravenous Titrated ROCKY Hager Last Rate: 10 mcg/kg/min (09/20/19 5682) valproic acid 500 mg Oral [facility] ROCKY Wheeler      Continuous Infusions:  midazolam 6 mg/hr Last Rate: 6 mg/hr (09/20/19 0540)   multi-electrolyte 100 mL/hr Last Rate: 100 mL/hr (09/20/19 0445)   propofol 5-50 mcg/kg/min Last Rate: 10 mcg/kg/min (09/20/19 0445)     PRN Meds:    fentanyl citrate (PF) 50 mcg Q2H PRN   midazolam 2 mg Q2H PRN   ondansetron 4 mg Q6H PRN       Invasive lines and devices: Invasive Devices     Peripherally Inserted Central Catheter Line            PICC Line 09/19/19 Left Brachial less than 1 day          Peripheral Intravenous Line            Peripheral IV 09/19/19 Dorsal (posterior); Left Hand less than 1 day    Peripheral IV 09/19/19 Left Antecubital less than 1 day          Drain            NG/OG Tube Orogastric Right mouth less than 1 day    NG/OG/Enteral Tube Orogastric Center mouth less than 1 day          Airway            ETT  8 mm less than 1 day                   Counseling / Coordination of Care  Total Critical Care time spent 35 minutes excluding procedures, teaching and family updates  Code Status: Level 1 - Full Code    Portions of the record may have been created with voice recognition software  Occasional wrong word or "sound a like" substitutions may have occurred due to the inherent limitations of voice recognition software  Read the chart carefully and recognize, using context, where substitutions have occurred      Sybil Shelby PA-C

## 2019-09-20 NOTE — PROGRESS NOTES
Pt visited by Brii Church anointed pt and provided garfield       09/20/19 1300   Clinical Encounter Type   Visited With Patient   Routine Visit Introduction   Sacramental Encounters   Sacrament of Sick-Anointing Anointed   Sacrament Other Other (Comment)

## 2019-09-20 NOTE — PROGRESS NOTES
Progress Note - Neurology   [facility]  44 y o  male MRN: [id number]  Unit/Bed#: MICU 05 Encounter: [id number]    Assessment / Plan:  1)  Subclinical R hemispheric focal status epilepticus in setting of recent traumatic subarachnoid hemorrhage   -VEEG with several focal partial R temporal seizures despite escalating AEDs  -With initiation of midazolam GGT, no further epileptiform activity    -Pt required propofol over the evening due to agitation, discontinued this AM    -Plan for 12 hour midazolam wean    -Will continue VEEG    -Continue     -Depakote 750mg PO BID       -VPA trend from 107 (9/19) to 92 (9/20)      -VPA Qam     -Keppra 1G BID      -phenytoin 200mg PO BID       -Total 10 8 (9/20)      -Phenytoin free and total Qam   -Supportive care and medication management per primary team   -Will continue to follow closely, please monitor exam and notify with changes      Subjective:   Pt is a 44 YOM, s/p R traumatic SAH in August, 2019 who initially presented to the [facility] emergency dept due to a variety of neurologic complaints over the past several days including headache, gait disability, dysarthria, and staring spells  A routine EEG was performed, and the patient had 3 electrographic subclinical seizures  The patient was given 1 mg of Ativan and a 3 g Keppra load, and was transferred to [facility] for video EEG monitoring  CT head performed at [id number] [facility] without acute intracranial abnormality, or evidence of hemorrhage  Upon initiation of video EEG monitoring, the patient was found to have numerous subclinical right temporal seizures  Patient loaded with Keppra 3 g, and 1 g b i d  Initiated  An additional 2 mg of Ativan IV ordered  Patient did become less responsive overnight on 09/19/2018  Patient given an additional 2 mgs of Ativan  At approximately 4:00 a m   On 09/19/2018, the patient initiated on fosphenytoin 200 mg p o  B i d   Patient continued to have subclinical seizures  In discussion with epileptologist, attending neurologist and ICU attending, recommended elective intubation and seizures verses burst suppression  Patient placed on midazolam drip at approximately 12 noon on 09/19/2019  Patient required 6 milligrams/hour as well as propofol overnight for agitation  In discussion with epileptologist, no further epileptiform activity with initiation of midazolam     On examination today, the patient is examined on all sedating medications and is intubated  Discussed plan as above with Critical Care Medicine  Patient loaded with Precedex and fentanyl prior to planned for decreasing midazolam over 12 hour period today  Unable to obtain review of systems secondary sedation/ intubation  Neurological exam is detailed below          ROS:  See subjective    Medications:  Scheduled Meds:    Current Facility-Administered Medications:  bisacodyl 10 mg Rectal Daily PRN Holly Kroner, DO    chlorhexidine 15 mL Swish & Spit Q12H [address] ROCKY Arrington    dexmedetomidine 0 1-0 7 mcg/kg/hr Intravenous Titrated Holly Kroner, DO Last Rate: 0 2 mcg/kg/hr (09/20/19 1045)   enoxaparin 40 mg Subcutaneous Daily ROCKY Arrington    fentaNYL 100 mcg/hr Intravenous Continuous Holly Kroner, DO Last Rate: 100 mcg/hr (09/20/19 1002)   fentanyl citrate (PF) 50 mcg Intravenous Q2H PRN Willem Almanzar PA-C    insulin lispro 5-25 Units Subcutaneous Q6H [address] Richard Vicente PA-C    levETIRAcetam 1,000 mg Intravenous Q12H ROCKY Arrington Last Rate: Stopped (09/20/19 1109)   midazolam 6 mg/hr Intravenous Continuous Osvaldo Veliz MD Last Rate: 6 mg/hr (09/20/19 0540)   midazolam 2 mg Intravenous Q2H PRN ROCKY Arrington    nicotine 1 patch Transdermal Daily ROCKY Arrington    ondansetron 4 mg Intravenous Q6H PRN ROCKY Arrington    phenytoin 200 mg Intravenous Q12H [address] ROCKY Arrington Last Rate: Stopped (09/20/19 1002)   potassium chloride 40 mEq Oral BID Mushtaq Bates PA-C    senna 8 8 mg Oral HS Mushtaq Bates PA-C    valproic acid 500 mg Oral [facility] ROCKY Hager      Continuous Infusions:    dexmedetomidine 0 1-0 7 mcg/kg/hr Last Rate: 0 2 mcg/kg/hr (09/20/19 1045)   fentaNYL 100 mcg/hr Last Rate: 100 mcg/hr (09/20/19 1002)   midazolam 6 mg/hr Last Rate: 6 mg/hr (09/20/19 0540)     PRN Meds:   bisacodyl    fentanyl citrate (PF)    midazolam    ondansetron    Vitals: Blood pressure 135/86, pulse 98, temperature 98 1 °F (36 7 °C), temperature source Oral, resp  rate 17, height 5' 10" (1 778 m), weight 117 kg (257 lb 0 9 oz), SpO2 98 %  ,Body mass index is 36 88 kg/m²  Physical Exam:   Physical Exam   Constitutional: He appears well-developed and well-nourished  HENT:   Head: Normocephalic and atraumatic  Intubated   Eyes: Conjunctivae are normal  Left eye exhibits no discharge  No scleral icterus  Neck: Normal range of motion  Neck supple  Pulmonary/Chest:   Ventilated   Musculoskeletal: He exhibits no edema or deformity  Skin: Skin is warm and dry  No rash noted  No erythema  No pallor  Nursing note and vitals reviewed  Neurologic Exam     Mental Status     Patient examined on midazolam drip  Unable to obtain mental status exam     Cranial Nerves     Patient examined on midazolam drip  Pupils pinpoint  No blink to threat as expected  CN 2 through 12 otherwise grossly intact     Motor Exam   Patient moves all extremities spontaneously after noxious stimuli     Sensory Exam   Grimace to noxious stimuli x4     Gait, Coordination, and Reflexes     Gait  Gait: (Deferred secondary to intubation and sedation)    Tremor   Resting tremor: absent      Lab, Imaging and other studies: I have personally reviewed pertinent reports  I personally reviewed pertinent imaging in PACs  VTE Prophylaxis: Sequential compression device (Venodyne)  and Enoxaparin (Lovenox)    Counseling / Coordination of Care  Total time spent today 35 minutes  Greater than 50% of total time was spent with the patient and / or family counseling and / or coordination of care  A description of the counseling / coordination of care: Francy Boogie

## 2019-09-21 ENCOUNTER — APPOINTMENT (INPATIENT)
Dept: RADIOLOGY | Facility: HOSPITAL | Age: 49
DRG: 053 | End: 2019-09-21
Payer: COMMERCIAL

## 2019-09-21 ENCOUNTER — APPOINTMENT (INPATIENT)
Dept: NEUROLOGY | Facility: CLINIC | Age: 49
DRG: 053 | End: 2019-09-21
Payer: COMMERCIAL

## 2019-09-21 PROBLEM — A41.9 SEPSIS (HCC): Status: ACTIVE | Noted: 2019-09-21

## 2019-09-21 PROBLEM — N47.2 PARAPHIMOSIS: Status: ACTIVE | Noted: 2019-09-21

## 2019-09-21 LAB
ALBUMIN SERPL BCP-MCNC: 2.4 G/DL (ref 3.5–5)
ANION GAP SERPL CALCULATED.3IONS-SCNC: 5 MMOL/L (ref 4–13)
APTT PPP: 24 SECONDS (ref 23–37)
BACTERIA UR QL AUTO: ABNORMAL /HPF
BASOPHILS # BLD AUTO: 0.02 THOUSANDS/ΜL (ref 0–0.1)
BASOPHILS NFR BLD AUTO: 0 % (ref 0–1)
BILIRUB UR QL STRIP: ABNORMAL
BUN SERPL-MCNC: 15 MG/DL (ref 5–25)
CALCIUM SERPL-MCNC: 7.8 MG/DL (ref 8.3–10.1)
CHLORIDE SERPL-SCNC: 110 MMOL/L (ref 100–108)
CLARITY UR: CLEAR
CO2 SERPL-SCNC: 25 MMOL/L (ref 21–32)
COLOR UR: ABNORMAL
CREAT SERPL-MCNC: 0.88 MG/DL (ref 0.6–1.3)
EOSINOPHIL # BLD AUTO: 0.11 THOUSAND/ΜL (ref 0–0.61)
EOSINOPHIL NFR BLD AUTO: 2 % (ref 0–6)
ERYTHROCYTE [DISTWIDTH] IN BLOOD BY AUTOMATED COUNT: 13.7 % (ref 11.6–15.1)
GFR SERPL CREATININE-BSD FRML MDRD: 99 ML/MIN/1.73SQ M
GLUCOSE SERPL-MCNC: 213 MG/DL (ref 65–140)
GLUCOSE SERPL-MCNC: 223 MG/DL (ref 65–140)
GLUCOSE SERPL-MCNC: 230 MG/DL (ref 65–140)
GLUCOSE SERPL-MCNC: 253 MG/DL (ref 65–140)
GLUCOSE SERPL-MCNC: 255 MG/DL (ref 65–140)
GLUCOSE SERPL-MCNC: 293 MG/DL (ref 65–140)
GLUCOSE SERPL-MCNC: 414 MG/DL (ref 65–140)
GLUCOSE UR STRIP-MCNC: ABNORMAL MG/DL
HCT VFR BLD AUTO: 42.3 % (ref 36.5–49.3)
HGB BLD-MCNC: 13.2 G/DL (ref 12–17)
HGB UR QL STRIP.AUTO: ABNORMAL
HYALINE CASTS #/AREA URNS LPF: ABNORMAL /LPF
IMM GRANULOCYTES # BLD AUTO: 0.02 THOUSAND/UL (ref 0–0.2)
IMM GRANULOCYTES NFR BLD AUTO: 0 % (ref 0–2)
INR PPP: 1.22 (ref 0.84–1.19)
KETONES UR STRIP-MCNC: ABNORMAL MG/DL
LACTATE SERPL-SCNC: 1.1 MMOL/L (ref 0.5–2)
LEUKOCYTE ESTERASE UR QL STRIP: ABNORMAL
LYMPHOCYTES # BLD AUTO: 0.52 THOUSANDS/ΜL (ref 0.6–4.47)
LYMPHOCYTES NFR BLD AUTO: 8 % (ref 14–44)
MAGNESIUM SERPL-MCNC: 2.7 MG/DL (ref 1.6–2.6)
MCH RBC QN AUTO: 29.6 PG (ref 26.8–34.3)
MCHC RBC AUTO-ENTMCNC: 31.2 G/DL (ref 31.4–37.4)
MCV RBC AUTO: 95 FL (ref 82–98)
MONOCYTES # BLD AUTO: 0.55 THOUSAND/ΜL (ref 0.17–1.22)
MONOCYTES NFR BLD AUTO: 9 % (ref 4–12)
NEUTROPHILS # BLD AUTO: 5.22 THOUSANDS/ΜL (ref 1.85–7.62)
NEUTS SEG NFR BLD AUTO: 81 % (ref 43–75)
NITRITE UR QL STRIP: NEGATIVE
NON-SQ EPI CELLS URNS QL MICRO: ABNORMAL /HPF
NRBC BLD AUTO-RTO: 0 /100 WBCS
PH UR STRIP.AUTO: 5.5 [PH]
PLATELET # BLD AUTO: 144 THOUSANDS/UL (ref 149–390)
PMV BLD AUTO: 10.4 FL (ref 8.9–12.7)
POTASSIUM SERPL-SCNC: 3.7 MMOL/L (ref 3.5–5.3)
PROCALCITONIN SERPL-MCNC: 0.14 NG/ML
PROT UR STRIP-MCNC: ABNORMAL MG/DL
PROTHROMBIN TIME: 15 SECONDS (ref 11.6–14.5)
RBC # BLD AUTO: 4.46 MILLION/UL (ref 3.88–5.62)
RBC #/AREA URNS AUTO: ABNORMAL /HPF
SODIUM SERPL-SCNC: 140 MMOL/L (ref 136–145)
SP GR UR STRIP.AUTO: 1.03 (ref 1–1.03)
UROBILINOGEN UR QL STRIP.AUTO: 1 E.U./DL
WBC # BLD AUTO: 6.44 THOUSAND/UL (ref 4.31–10.16)
WBC #/AREA URNS AUTO: ABNORMAL /HPF

## 2019-09-21 PROCEDURE — 83735 ASSAY OF MAGNESIUM: CPT | Performed by: PHYSICIAN ASSISTANT

## 2019-09-21 PROCEDURE — 71045 X-RAY EXAM CHEST 1 VIEW: CPT

## 2019-09-21 PROCEDURE — 87086 URINE CULTURE/COLONY COUNT: CPT | Performed by: INTERNAL MEDICINE

## 2019-09-21 PROCEDURE — NC001 PR NO CHARGE: Performed by: EMERGENCY MEDICINE

## 2019-09-21 PROCEDURE — 81001 URINALYSIS AUTO W/SCOPE: CPT | Performed by: STUDENT IN AN ORGANIZED HEALTH CARE EDUCATION/TRAINING PROGRAM

## 2019-09-21 PROCEDURE — 85025 COMPLETE CBC W/AUTO DIFF WBC: CPT | Performed by: PHYSICIAN ASSISTANT

## 2019-09-21 PROCEDURE — 95951 HB EEG MONITORING/VIDEORECORD: CPT

## 2019-09-21 PROCEDURE — 87081 CULTURE SCREEN ONLY: CPT | Performed by: INTERNAL MEDICINE

## 2019-09-21 PROCEDURE — 84145 PROCALCITONIN (PCT): CPT | Performed by: STUDENT IN AN ORGANIZED HEALTH CARE EDUCATION/TRAINING PROGRAM

## 2019-09-21 PROCEDURE — 85730 THROMBOPLASTIN TIME PARTIAL: CPT | Performed by: STUDENT IN AN ORGANIZED HEALTH CARE EDUCATION/TRAINING PROGRAM

## 2019-09-21 PROCEDURE — 94760 N-INVAS EAR/PLS OXIMETRY 1: CPT

## 2019-09-21 PROCEDURE — 99291 CRITICAL CARE FIRST HOUR: CPT | Performed by: INTERNAL MEDICINE

## 2019-09-21 PROCEDURE — 95951 PR EEG MONITORING/VIDEORECORD: CPT | Performed by: PSYCHIATRY & NEUROLOGY

## 2019-09-21 PROCEDURE — 94003 VENT MGMT INPAT SUBQ DAY: CPT

## 2019-09-21 PROCEDURE — 80186 ASSAY OF PHENYTOIN FREE: CPT | Performed by: EMERGENCY MEDICINE

## 2019-09-21 PROCEDURE — 0VNTXZZ RELEASE PREPUCE, EXTERNAL APPROACH: ICD-10-PCS | Performed by: UROLOGY

## 2019-09-21 PROCEDURE — 80164 ASSAY DIPROPYLACETIC ACD TOT: CPT | Performed by: EMERGENCY MEDICINE

## 2019-09-21 PROCEDURE — 82948 REAGENT STRIP/BLOOD GLUCOSE: CPT

## 2019-09-21 PROCEDURE — 87040 BLOOD CULTURE FOR BACTERIA: CPT | Performed by: STUDENT IN AN ORGANIZED HEALTH CARE EDUCATION/TRAINING PROGRAM

## 2019-09-21 PROCEDURE — 85610 PROTHROMBIN TIME: CPT | Performed by: STUDENT IN AN ORGANIZED HEALTH CARE EDUCATION/TRAINING PROGRAM

## 2019-09-21 PROCEDURE — 80048 BASIC METABOLIC PNL TOTAL CA: CPT | Performed by: PHYSICIAN ASSISTANT

## 2019-09-21 PROCEDURE — 83605 ASSAY OF LACTIC ACID: CPT | Performed by: STUDENT IN AN ORGANIZED HEALTH CARE EDUCATION/TRAINING PROGRAM

## 2019-09-21 PROCEDURE — 94664 DEMO&/EVAL PT USE INHALER: CPT

## 2019-09-21 PROCEDURE — 80185 ASSAY OF PHENYTOIN TOTAL: CPT | Performed by: EMERGENCY MEDICINE

## 2019-09-21 PROCEDURE — 54450 PREPUTIAL STRETCHING: CPT | Performed by: PHYSICIAN ASSISTANT

## 2019-09-21 PROCEDURE — 82040 ASSAY OF SERUM ALBUMIN: CPT | Performed by: PHYSICIAN ASSISTANT

## 2019-09-21 PROCEDURE — 99232 SBSQ HOSP IP/OBS MODERATE 35: CPT | Performed by: PSYCHIATRY & NEUROLOGY

## 2019-09-21 RX ORDER — SODIUM CHLORIDE, SODIUM GLUCONATE, SODIUM ACETATE, POTASSIUM CHLORIDE, MAGNESIUM CHLORIDE, SODIUM PHOSPHATE, DIBASIC, AND POTASSIUM PHOSPHATE .53; .5; .37; .037; .03; .012; .00082 G/100ML; G/100ML; G/100ML; G/100ML; G/100ML; G/100ML; G/100ML
2200 INJECTION, SOLUTION INTRAVENOUS ONCE
Status: COMPLETED | OUTPATIENT
Start: 2019-09-21 | End: 2019-09-21

## 2019-09-21 RX ORDER — POTASSIUM CHLORIDE 20MEQ/15ML
20 LIQUID (ML) ORAL ONCE
Status: COMPLETED | OUTPATIENT
Start: 2019-09-21 | End: 2019-09-21

## 2019-09-21 RX ORDER — INSULIN GLARGINE 100 [IU]/ML
30 INJECTION, SOLUTION SUBCUTANEOUS ONCE
Status: COMPLETED | OUTPATIENT
Start: 2019-09-21 | End: 2019-09-21

## 2019-09-21 RX ORDER — ACETAMINOPHEN 160 MG/5ML
650 SUSPENSION, ORAL (FINAL DOSE FORM) ORAL EVERY 6 HOURS PRN
Status: DISCONTINUED | OUTPATIENT
Start: 2019-09-21 | End: 2019-09-22

## 2019-09-21 RX ORDER — SENNOSIDES 8.8 MG/5ML
17.6 LIQUID ORAL
Status: DISCONTINUED | OUTPATIENT
Start: 2019-09-21 | End: 2019-09-22

## 2019-09-21 RX ORDER — LEVALBUTEROL 1.25 MG/.5ML
1.25 SOLUTION, CONCENTRATE RESPIRATORY (INHALATION)
Status: DISCONTINUED | OUTPATIENT
Start: 2019-09-21 | End: 2019-09-22

## 2019-09-21 RX ORDER — MIDAZOLAM HYDROCHLORIDE 1 MG/ML
2 INJECTION INTRAMUSCULAR; INTRAVENOUS ONCE
Status: COMPLETED | OUTPATIENT
Start: 2019-09-21 | End: 2019-09-21

## 2019-09-21 RX ORDER — POLYETHYLENE GLYCOL 3350 17 G/17G
17 POWDER, FOR SOLUTION ORAL DAILY
Status: DISCONTINUED | OUTPATIENT
Start: 2019-09-21 | End: 2019-09-23

## 2019-09-21 RX ORDER — SODIUM CHLORIDE 30 MG/ML INHALATION SOLUTION 30 MG/ML
4 SOLUTION INHALANT
Status: DISCONTINUED | OUTPATIENT
Start: 2019-09-21 | End: 2019-09-21

## 2019-09-21 RX ORDER — LEVALBUTEROL INHALATION SOLUTION 0.63 MG/3ML
0.63 SOLUTION RESPIRATORY (INHALATION)
Status: DISCONTINUED | OUTPATIENT
Start: 2019-09-21 | End: 2019-09-21

## 2019-09-21 RX ORDER — SODIUM CHLORIDE 30 MG/ML INHALATION SOLUTION 30 MG/ML
3 SOLUTION INHALANT
Status: DISCONTINUED | OUTPATIENT
Start: 2019-09-21 | End: 2019-09-22

## 2019-09-21 RX ADMIN — INSULIN GLARGINE 30 UNITS: 100 INJECTION, SOLUTION SUBCUTANEOUS at 11:17

## 2019-09-21 RX ADMIN — ENOXAPARIN SODIUM 40 MG: 40 INJECTION SUBCUTANEOUS at 08:09

## 2019-09-21 RX ADMIN — Medication 100 MCG/HR: at 19:36

## 2019-09-21 RX ADMIN — INSULIN LISPRO 10 UNITS: 100 INJECTION, SOLUTION INTRAVENOUS; SUBCUTANEOUS at 07:17

## 2019-09-21 RX ADMIN — AMPICILLIN 2000 MG: 2 INJECTION, POWDER, FOR SOLUTION INTRAVENOUS at 18:41

## 2019-09-21 RX ADMIN — PHENYTOIN SODIUM 200 MG: 50 INJECTION INTRAMUSCULAR; INTRAVENOUS at 20:37

## 2019-09-21 RX ADMIN — MIDAZOLAM 2 MG: 1 INJECTION INTRAMUSCULAR; INTRAVENOUS at 07:51

## 2019-09-21 RX ADMIN — FENTANYL CITRATE 100 MCG: 50 INJECTION INTRAMUSCULAR; INTRAVENOUS at 17:44

## 2019-09-21 RX ADMIN — SENNOSIDES 17.6 MG: 8.8 SYRUP ORAL at 21:20

## 2019-09-21 RX ADMIN — LEVETIRACETAM 1000 MG: 100 INJECTION, SOLUTION INTRAVENOUS at 11:18

## 2019-09-21 RX ADMIN — VALPROIC ACID 500 MG: 500 SOLUTION ORAL at 15:04

## 2019-09-21 RX ADMIN — NICOTINE 1 PATCH: 21 PATCH, EXTENDED RELEASE TRANSDERMAL at 08:08

## 2019-09-21 RX ADMIN — FENTANYL CITRATE 100 MCG: 50 INJECTION INTRAMUSCULAR; INTRAVENOUS at 07:59

## 2019-09-21 RX ADMIN — SODIUM CHLORIDE SOLN NEBU 3% 3 ML: 3 NEBU SOLN at 16:31

## 2019-09-21 RX ADMIN — VANCOMYCIN HYDROCHLORIDE 1750 MG: 1 INJECTION, POWDER, LYOPHILIZED, FOR SOLUTION INTRAVENOUS at 15:06

## 2019-09-21 RX ADMIN — INSULIN LISPRO 15 UNITS: 100 INJECTION, SOLUTION INTRAVENOUS; SUBCUTANEOUS at 00:21

## 2019-09-21 RX ADMIN — FENTANYL CITRATE 100 MCG: 50 INJECTION INTRAMUSCULAR; INTRAVENOUS at 22:01

## 2019-09-21 RX ADMIN — SODIUM CHLORIDE, SODIUM GLUCONATE, SODIUM ACETATE, POTASSIUM CHLORIDE, MAGNESIUM CHLORIDE, SODIUM PHOSPHATE, DIBASIC, AND POTASSIUM PHOSPHATE 2200 ML: .53; .5; .37; .037; .03; .012; .00082 INJECTION, SOLUTION INTRAVENOUS at 01:44

## 2019-09-21 RX ADMIN — DEXMEDETOMIDINE 0.6 MCG/KG/HR: 100 INJECTION, SOLUTION, CONCENTRATE INTRAVENOUS at 01:10

## 2019-09-21 RX ADMIN — LEVALBUTEROL HYDROCHLORIDE 1.25 MG: 1.25 SOLUTION, CONCENTRATE RESPIRATORY (INHALATION) at 23:59

## 2019-09-21 RX ADMIN — VALPROIC ACID 500 MG: 500 SOLUTION ORAL at 07:17

## 2019-09-21 RX ADMIN — AMPICILLIN 2000 MG: 2 INJECTION, POWDER, FOR SOLUTION INTRAVENOUS at 21:27

## 2019-09-21 RX ADMIN — POTASSIUM CHLORIDE 20 MEQ: 20 SOLUTION ORAL at 08:09

## 2019-09-21 RX ADMIN — METRONIDAZOLE 500 MG: 500 INJECTION, SOLUTION INTRAVENOUS at 01:57

## 2019-09-21 RX ADMIN — LEVALBUTEROL HYDROCHLORIDE 1.25 MG: 1.25 SOLUTION, CONCENTRATE RESPIRATORY (INHALATION) at 16:31

## 2019-09-21 RX ADMIN — FENTANYL CITRATE 100 MCG: 50 INJECTION INTRAMUSCULAR; INTRAVENOUS at 13:21

## 2019-09-21 RX ADMIN — ALTEPLASE 2 MG: 2.2 INJECTION, POWDER, LYOPHILIZED, FOR SOLUTION INTRAVENOUS at 11:20

## 2019-09-21 RX ADMIN — LEVETIRACETAM 1000 MG: 100 INJECTION, SOLUTION INTRAVENOUS at 21:01

## 2019-09-21 RX ADMIN — CEFTRIAXONE SODIUM 2000 MG: 10 INJECTION, POWDER, FOR SOLUTION INTRAVENOUS at 19:33

## 2019-09-21 RX ADMIN — MIDAZOLAM 2 MG: 1 INJECTION INTRAMUSCULAR; INTRAVENOUS at 12:38

## 2019-09-21 RX ADMIN — CEFEPIME HYDROCHLORIDE 2000 MG: 2 INJECTION, POWDER, FOR SOLUTION INTRAVENOUS at 13:26

## 2019-09-21 RX ADMIN — CHLORHEXIDINE GLUCONATE 0.12% ORAL RINSE 15 ML: 1.2 LIQUID ORAL at 08:09

## 2019-09-21 RX ADMIN — MIDAZOLAM 2 MG: 1 INJECTION INTRAMUSCULAR; INTRAVENOUS at 17:44

## 2019-09-21 RX ADMIN — METRONIDAZOLE 500 MG: 500 INJECTION, SOLUTION INTRAVENOUS at 17:45

## 2019-09-21 RX ADMIN — ACETAMINOPHEN 975 MG: 325 TABLET ORAL at 00:42

## 2019-09-21 RX ADMIN — VALPROIC ACID 500 MG: 500 SOLUTION ORAL at 21:20

## 2019-09-21 RX ADMIN — POLYETHYLENE GLYCOL 3350 17 G: 17 POWDER, FOR SOLUTION ORAL at 08:08

## 2019-09-21 RX ADMIN — DEXMEDETOMIDINE 0.6 MCG/KG/HR: 100 INJECTION, SOLUTION, CONCENTRATE INTRAVENOUS at 09:44

## 2019-09-21 RX ADMIN — MIDAZOLAM 2 MG: 1 INJECTION INTRAMUSCULAR; INTRAVENOUS at 18:40

## 2019-09-21 RX ADMIN — DEXMEDETOMIDINE 1 MCG/KG/HR: 100 INJECTION, SOLUTION, CONCENTRATE INTRAVENOUS at 19:33

## 2019-09-21 RX ADMIN — INSULIN LISPRO 15 UNITS: 100 INJECTION, SOLUTION INTRAVENOUS; SUBCUTANEOUS at 12:27

## 2019-09-21 RX ADMIN — INSULIN LISPRO 15 UNITS: 100 INJECTION, SOLUTION INTRAVENOUS; SUBCUTANEOUS at 17:45

## 2019-09-21 RX ADMIN — VANCOMYCIN HYDROCHLORIDE 1750 MG: 1 INJECTION, POWDER, LYOPHILIZED, FOR SOLUTION INTRAVENOUS at 04:06

## 2019-09-21 RX ADMIN — DEXMEDETOMIDINE 0.8 MCG/KG/HR: 100 INJECTION, SOLUTION, CONCENTRATE INTRAVENOUS at 15:04

## 2019-09-21 RX ADMIN — PHENYTOIN SODIUM 200 MG: 50 INJECTION INTRAMUSCULAR; INTRAVENOUS at 09:45

## 2019-09-21 RX ADMIN — CHLORHEXIDINE GLUCONATE 0.12% ORAL RINSE 15 ML: 1.2 LIQUID ORAL at 20:09

## 2019-09-21 RX ADMIN — Medication 75 MCG/HR: at 07:17

## 2019-09-21 RX ADMIN — METRONIDAZOLE 500 MG: 500 INJECTION, SOLUTION INTRAVENOUS at 08:09

## 2019-09-21 RX ADMIN — MIDAZOLAM 2 MG: 1 INJECTION INTRAMUSCULAR; INTRAVENOUS at 22:24

## 2019-09-21 RX ADMIN — CEFEPIME HYDROCHLORIDE 2000 MG: 2 INJECTION, POWDER, FOR SOLUTION INTRAVENOUS at 02:52

## 2019-09-21 NOTE — PROGRESS NOTES
Neurology note: The patient was seen this morning on rounds  Patient was fully discussed with the critical care team   The events of left is evening were noted particularly of a para phimosis which was reduced, becoming febrile and hypotensive and the resuscitation  In discussion with the epileptologist this a m  The EEG has shown no further subclinical or electrographic seizures however is diffusely slow  The patient is much more alert this morning responding to vocal commands and lateralizing movement  The patient opens eyes to command nods to command, moves fingers bilaterally and toes bilaterally to command with appropriate movement  Pupils are reactive and gaze is primary midline and patient has full extraocular movement  He is blinking to visual threat in all fields  There is no abnormal tone and toes are downgoing    Anticonvulsant levels both free and total are pending  Impression:  Status epilepticus resolving off midazolam infusion  Overall the clinical picture is certainly improving  However with the temperature elevation and hypotension underlying infection is certainly strongly considered  A consideration would be a lumbar puncture however I feel we could continue to observe the patient due to his significant improvement in clinical status  We are going to continue video continuous video EEG at this time    I discussed the case with the critical care team and epileptologist   Critical care time 45 minutes

## 2019-09-21 NOTE — PROGRESS NOTES
Called to patient's bedside by the nurse to evaluate the patient's penile swelling  Patient's foreskin appeared edematous and I was unable to get the glans to retract into the foreskin  Patient was noted to be tachycardic and febrile to 101F so blood cultures, procal, lactate, a CXR, and a UA were sent  Called and spoke with the surgical PA who came to evaluate the patient and was able to break an adhesion between the foreskin and the glans, which then allowed the foreskin to cover the glans and become less edematous  Although there was some mild bleeding from where the adhesion broke  We applied bacitracin to the area and dressed it with some gauze

## 2019-09-21 NOTE — PROGRESS NOTES
Patient had a decline in BP to 70 with new fever and tachycardia meeting sepsis/ sirs/ shock criteria   Lactic pending, abx ordered and patient to receive ivf bolus for sepsis 30ml/ kg at ideal body weight as BMI >30      CC time 32min

## 2019-09-21 NOTE — RESPIRATORY THERAPY NOTE
RT Ventilator Management Note  Arti Jolley  44 y o  male MRN: [id number]  Unit/Bed#: MICU 05 Encounter: [id number]      Daily Screen       9/20/2019  0733 9/20/2019  1559          Patient safety screen outcome[de-identified]  Failed  Failed              Physical Exam:   Assessment Type: Assess only  General Appearance: Sedated  Respiratory Pattern: Assisted  Chest Assessment: Chest expansion symmetrical  Bilateral Breath Sounds: Diminished  Suction: ET Tube  O2 Device: vent      Resp Comments: pt remained on AC/VC mode/settings all evening/night   No vent changes made

## 2019-09-21 NOTE — PROGRESS NOTES
Called by critical care resident because patient appears to have a paraphimosis which cannot be adequately released/ reversed  Patient is somewhat sedated and is intubated secondary to prior seizure events  When I arrived, I found his foreskin raised and edematous  Using blunt finger and pulling technique, I was able to forcibly release his foreskin band and retract it back downward into its normal position  The edges of his foreskin appeared raw with minor evidence of bleeding which was blotted with a sterile gauze pad  His paul catheter was intact and draining a slightly yellow murky-colored urine into the collection container  I advised his nurse to apply some neosporin/ antibiotic ointment along his foreskin edge  She was present throughout the procedure/ encounter  Patient had some discomfort during the event but tolerated the procedure well and is now resting comfortably        Abraham mary lou [facility]  9/21/19  0020

## 2019-09-21 NOTE — PROGRESS NOTES
Progress Note - [address] [facility]  44 y o  male MRN: [id number]  Unit/Bed#: MICU 05 Encounter: [id number]  Attending Physician: Jovanny Hills MD  Assessment:   Principal Problem:    Refractory status epilepticus s/p burst suppression  Active Problems:  Acute respiratory failure /airway protection for burst suppression  Sepsis/Shock, fluid responsive  Encephalopathy  Paraphimosis    Essential hypertension    Diabetes ([facility] [address] )    Seizure ([facility] [address] )    TBI (traumatic brain injury) ([facility] )  Resolved Problems:    Head injury    Plan  Neuro:   Subclinical Right hemispheric focal status epilepticus  Recent traumatic SAH  Focal Partial Right Temporal Seizures  Encephalopathy  · Continue AED per neurology recommendations: Keppra 1 gram IV Q12 hours, Valporic acid 500 mg PO Q8 hours, Phenytoin 200 mg IV Q12 hours  · Monitor levels: dilantin, valproic acid levels duet at 0830 am  · Trend neuro exam  · CVEEG monitoring per epileptology  · PAD  · Pain controlled with:  Fentanyl drip and p r n   Final doses (100 mcg IV x 2  And 50 mcg IV x 1 dose overnight)  · Sedation plan/Daily sedation holiday: Precedex 0 4 mcg/kg/hr, PRN versed x 2 doses overnight  · RASS goal: -1 Drowsy and 0 Alert and Calm  · Delirium Precautions  · CAM ICU per protocol  · Regulate sleep/wake cycle+  CV:   Septic Shock  · Hemodynamic infusions: None  · MAP goal > 65  · Rhythm: NSR  · Follow rhythm on telemetry  Lung:  Acute Respiratory Failure/Airway Protection   · Daily SBT assessment in coordination with SAT per protocol: assess daily, suspect may not be able to extubated secondary to encephalopathy  ·  Chlorhexidine/HOB>30degrees ordered: yes  · Pulmonary toileting  GI:   · Stress ulcer prophylaxis: Not Indicated  · Bowel regimen: Senna, add miralax  FEN:   · Goal 24 hour fluid balance: net positive   Fluid/Diuretic plan: may need additional fluid  · Nutrition/diet plan: TF increase to goal  · Replete electrolytes with goals: K >4 0, Mag >2 0, and Phos >3 0  :   Paraphimosis  · Indwelling Kurtz present: yes   · Trend UOP and BUN/creat  · Strict I and O  ID:   Septic Shock  · Abx ordered: Cefepime, Metronidazole and Vancomycin  · PCT 0 14  · Trend temps and WBC count  Heme:   · Trend hgb and plts  · Transfuse as needed for goal hgb >7  Endo:   Diabetes  · Glycemic control plan: Redose lantus today, increase dose to 30 units SQ x 1 dose and continue SSI  · Lantus x 1 dose yesterday (20 units) /SSI alg #6 (total of 24 units SSI)  · Blood glucose 414-184mg/dl after lantus  MSK/Skin:  · Mobility goal: Turn and position q2 hours  · PT consult: not applicable  · OT consult: not applicable  · Frequent turning and pressure off-loading  · Allevyn to pressure points  Family:  · Family updated within 24 hours: yes   · Family meeting planned today: no   Lines:  · Central venous access: Right arm PICC  · Keep central line today for no peripheral access    VTE Prophylaxis:  · Pharmacologic Prophylaxis:Enoxaparin (Lovenox)  · Mechanical Prophylaxis: sequential compression device    Disposition: Continue ICU care      ______________________________________________________________________  Chief Complaint:   Pt intubated and sedated, unable to provide, overnight events discussed with nursing staff at the bedside    HPI/24hr events:   Tmax 101 3  Paraphimosis reduced by surgical PA at bedside  Met Septic shock criteria, 30cc/kg IBW, cultures and abx started (Cefepime/Vanco/Flagyl)  Decreased UO    Review of Systems   Unable to perform ROS: Intubated     ______________________________________________________________________  Temperature:   Temp (24hrs), Av 8 °F (37 7 °C), Min:98 1 °F (36 7 °C), Max:101 3 °F (38 5 °C)    Current Temperature: 99 3 °F (37 4 °C)    Vitals:    19 0400 19 0500 19 0600 19 0645   BP: (!) 84/59 92/62 92/63 97/67   BP Location:       Pulse: 72 76 74 78   Resp: 13 13 14 16   Temp: 99 3 °F (37 4 °C) 99 3 °F (37 4 °C) 99 3 °F (37 4 °C) 99 3 °F (37 4 °C)   TempSrc:       SpO2: 92% 93% 93% 94%   Weight:       Height:               Weights:   IBW: 73 kg    Body mass index is 36 88 kg/m²  Weight (last 2 days)     Date/Time   Weight    09/20/19 0300   117 (257 06)    09/19/19 1059   118 (260 58)            Height: 5' 10" (177 8 cm)  Hemodynamic Monitoring:  N/A     Noninvasive/Ventilator Settings:  Ventilator Settings:   Vent Mode: AC/VC    Settings  Resp Rate (BPM): 12 BPM  Vt (mL): 500 mL  FIO2 (%): 40 %  PEEP (cmH2O): 5 cmH2O  Flow (LPM): 60 L/min    SpO2: SpO2: 94 %  ______________________________________________________________________  Physical Exam:  Aguilar Agitation Sedation Scale (RASS): Restless  Physical Exam   Constitutional: He appears lethargic  No distress  He is sedated, intubated and restrained  HENT:   Head: Normocephalic and atraumatic  Eyes: Pupils are equal, round, and reactive to light  Right eye exhibits no exudate  Left eye exhibits no exudate  Neck: Neck supple  No JVD present  Cardiovascular: Normal rate, regular rhythm and normal heart sounds  Pulses:       Radial pulses are 2+ on the right side, and 2+ on the left side  Dorsalis pedis pulses are 1+ on the right side, and 1+ on the left side  Pulmonary/Chest: Breath sounds normal  No accessory muscle usage  He is intubated  He has no wheezes  He has no rhonchi  Scant secretions per nursing   Abdominal: Soft  Bowel sounds are normal  There is no tenderness  Genitourinary:   Genitourinary Comments: Some bloody drainage noted from penis  Kurtz to gravity   Neurological: He appears lethargic  GCS eye subscore is 3  GCS verbal subscore is 1  GCS motor subscore is 6  Patient is sedated on fentanyl and Precedex infusions  Opens eyes to voice  Follows simple commands in all extremities  Follows 2 step commands intermittently   Skin: Skin is warm, dry and intact  Capillary refill takes less than 2 seconds  No cyanosis  Nails show no clubbing  ______________________________________________________________________  Intake and Outputs:  I/O       09/19 0701 - 09/20 0700 09/20 0701 - 09/21 0700    I V  (mL/kg) 3206 9 (27 4) 3785 6 (32 4)    NG/ 1599    IV Piggyback 280 480    Total Intake(mL/kg) 3829 9 (32 7) 5864 6 (50 1)    Urine (mL/kg/hr) 3569 (1 3) 950 (0 3)    Total Output 3569 950    Net +260 9 +4914 6          Unmeasured Urine Occurrence 1 x         UOP: 10-30/hour   Nutrition:        Diet Orders   (From admission, onward)             Start     Ordered    09/20/19 0943  Diet Enteral/Parenteral; Tube Feeding No Oral Diet; Jevity 1 2 Yunior; Continuous; 80; 120; Every 4 hours  Diet effective now     Comments:  Increase by 10 mL/hr every 4 hours until at goal of 80 mL/hr   Question Answer Comment   Diet Type Enteral/Parenteral    Enteral/Parenteral Tube Feeding No Oral Diet    Tube Feeding Formula: Jevity 1 2 Yunior    Bolus/Cyclic/Continuous Continuous    Tube Feeding Goal Rate (mL/hr): 80    Tube Feeding water flush (mL): 120    Water flush frequency: Every 4 hours    RD to adjust diet per protocol?  Yes        09/20/19 0944              Formula: Jevity 1 2, currently running at 50ml/hr, with a goal of 80 ml/hr  Labs:   Results from last 7 days   Lab Units 09/21/19  0547 09/20/19  0451 09/19/19  1113 09/18/19  0518 09/17/19  2218   WBC Thousand/uL 6 44 8 49 8 64 7 28 6 35   HEMOGLOBIN g/dL 13 2 15 0 15 9 14 2 15 3   HEMATOCRIT % 42 3 45 9 48 6 42 6 46 2   PLATELETS Thousands/uL 144* 186 224 180 180   NEUTROS PCT % 81* 76* 72 66 74   MONOS PCT % 9 7 7 7 6     Results from last 7 days   Lab Units 09/21/19  0547 09/20/19  1214 09/20/19  0451 09/19/19  1116 09/18/19  0518 09/17/19  2218   SODIUM mmol/L 140  --  140 136 135* 130*   POTASSIUM mmol/L 3 7 3 3* 3 0* 3 3* 3 3* 4 1   CHLORIDE mmol/L 110*  --  107 103 101 95*   CO2 mmol/L 25  --  27 26 23 28   ANION GAP mmol/L 5  --  6 7 11 7   BUN mg/dL 15  --  12 11 9 9   CREATININE mg/dL 0 88  --  0 83 1 18 0  78 1 13   CALCIUM mg/dL 7 8*  --  8 4 9 3 8 8 9 3   GLUCOSE RANDOM mg/dL 213*  --  211* 342* 322* 616*   ALT U/L  --   --   --  53 48 61   AST U/L  --   --   --  36 33 46*   ALK PHOS U/L  --   --   --  99 90 115   ALBUMIN g/dL 2 4*  --  2 8* 3 3* 3 1* 3 4*   TOTAL BILIRUBIN mg/dL  --   --   --  1 12* 1 00 1 10*     Results from last 7 days   Lab Units 09/21/19  0547 09/20/19  1214 09/19/19  1117 09/18/19  0518   MAGNESIUM mg/dL 2 7* 2 7* 2 2 1 7   PHOSPHORUS mg/dL  --   --  2 4* 2 9      Results from last 7 days   Lab Units 09/21/19  0129   INR  1 22*   PTT seconds 24          Results from last 7 days   Lab Units 09/21/19  0016 09/19/19  1627 09/19/19  1113 09/18/19  0518 09/17/19  2349   LACTIC ACID mmol/L 1 1 2 0 2 2* 0 8 1 6       Results from last 7 days   Lab Units 09/21/19  0016   PROCALCITONIN ng/ml 0 14     Imaging:  I have personally reviewed pertinent films in PACS, ETT in appropriate position, no PTX, PICC in appropriate position, gastric tube below diaphragm    Micro:  Procedure Component Value - Date/Time   Blood culture [[id number]] Collected: 09/21/19 0140   Lab Status: In process Specimen: Blood from Arm, Left Updated: 09/21/19 0144   Blood culture [[id number]] Collected: 09/21/19 0125   Lab Status:  In process Specimen: Blood from Hand, Right Updated: 09/21/19 0144     Component      Latest Ref Rng & Units 9/21/2019   Clarity, UA       Clear   Color, UA       Dk Yellow   SL AMB SPECIFIC GRAVITY-URINE      1 003 - 1 030 1 028   pH, UA      4 5, 5 0, 5 5, 6 0, 6 5, 7 0, 7 5, 8 0 5 5   Glucose, UA      Negative mg/dl >=1000 (1%) (A)   Ketones, UA      Negative mg/dl 40 (2+) (A)   Blood, UA      Negative Large (A)   POCT URINE PROTEIN      Negative mg/dl 30 (1+) (A)   Nitrite, UA      Negative Negative   Bilirubin, UA      Negative Interference- unable to analyze (A)   SL AMB POCT UROBILINOGEN      0 2, 1 0 E U /dl E U /dl 1 0   Leukocytes, UA      Negative Small (A)   WBC, UA      None Seen, 0-5, 5-55, 5-65 /hpf 20-30 (A)   RBC, UA      None Seen, 0-5 /hpf Innumerable (A)   Hyaline Casts, UA      None Seen /lpf 10-25 (A)   Bacteria, UA      None Seen, Occasional /hpf None Seen   Epithelial Cells      None Seen, Occasional /hpf None Seen         Allergies: No Known Allergies  Medications:   Scheduled Meds:    Current Facility-Administered Medications:  bisacodyl 10 mg Rectal Daily PRN Lisa Heron, DO    cefepime 2,000 mg Intravenous Q12H Tia Whelan MD Last Rate: Stopped (09/21/19 0406)   chlorhexidine 15 mL Swish & Spit Q12H [address] ROCKY Dias    dexmedetomidine 0 1-0 7 mcg/kg/hr Intravenous Titrated Lisa Heron, DO Last Rate: 0 4 mcg/kg/hr (09/21/19 0315)   enoxaparin 40 mg Subcutaneous Daily ROCKY Nowak    fentaNYL 75 mcg/hr Intravenous Continuous ROCKY Caro Last Rate: 75 mcg/hr (09/20/19 1649)   fentanyl citrate (PF) 100 mcg Intravenous Q2H PRN Tia Whelan MD    insulin lispro 5-25 Units Subcutaneous Q6H [address] Richard Vicente PA-C    levETIRAcetam 1,000 mg Intravenous Q12H ROCKY Nowak Last Rate: Stopped (09/20/19 2154)   metroNIDAZOLE 500 mg Intravenous Q8H Tia Whelan MD Last Rate: Stopped (09/21/19 0252)   midazolam 2 mg Intravenous Q2H PRN ROCKY Nowak    nicotine 1 patch Transdermal Daily ROCKY Nowak    ondansetron 4 mg Intravenous Q6H PRN ROCKY Nowak    phenytoin 200 mg Intravenous Q12H [address] ROCKY Nowak Last Rate: Stopped (09/20/19 2210)   polyethylene glycol 17 g Per NG Tube Daily ROCKY Caro    potassium chloride 20 mEq Per NG Tube Once ROCKY Caro    senna 8 8 mg Oral HS Richard Vicente PA-C    valproic acid 500 mg Oral [facility] ROCKY Myrick    vancomycin 15 mg/kg Intravenous Q12H Tia Whelan MD Last Rate: Stopped (09/21/19 0630)     Continuous Infusions:    dexmedetomidine 0 1-0 7 mcg/kg/hr Last Rate: 0 4 mcg/kg/hr (09/21/19 0315)   fentaNYL 75 mcg/hr Last Rate: 75 mcg/hr (09/20/19 3229)     PRN Meds:    bisacodyl 10 mg Daily PRN   fentanyl citrate (PF) 100 mcg Q2H PRN   midazolam 2 mg Q2H PRN   ondansetron 4 mg Q6H PRN       Invasive lines and devices: Invasive Devices     Peripherally Inserted Central Catheter Line            PICC Line 09/19/19 Right Brachial 1 day          Peripheral Intravenous Line            Peripheral IV 09/19/19 Dorsal (posterior); Left Hand 1 day    Peripheral IV 09/19/19 Left Antecubital 1 day          Drain            NG/OG/Enteral Tube Orogastric Center mouth less than 1 day    Urethral Catheter Temperature probe 16 Fr  less than 1 day          Airway            ETT  8 mm 1 day                   Counseling / Coordination of Care  0 mins of critical care time    Code Status: Level 1 - Full Code    Portions of the record may have been created with voice recognition software  Occasional wrong word or "sound a like" substitutions may have occurred due to the inherent limitations of voice recognition software  Read the chart carefully and recognize, using context, where substitutions have occurred      ROCKY Schwarz

## 2019-09-21 NOTE — PROGRESS NOTES
Vancomycin Assessment    Soheila Lowe  is a 44 y o  male who is currently receiving vancomycin 1750 mg q12 for other septic shock   Relevant clinical data and objective history reviewed:  Creatinine   Date Value Ref Range Status   09/21/2019 0 88 0 60 - 1 30 mg/dL Final     Comment:     Standardized to IDMS reference method   09/20/2019 0 83 0 60 - 1 30 mg/dL Final     Comment:     Standardized to IDMS reference method   09/19/2019 1 18 0 60 - 1 30 mg/dL Final     Comment:     Standardized to IDMS reference method     BP 97/67   Pulse 78   Temp 99 3 °F (37 4 °C)   Resp 16   Ht 5' 10" (1 778 m)   Wt 117 kg (257 lb 0 9 oz)   SpO2 94%   BMI 36 88 kg/m²   I/O last 3 completed shifts: In: 8241 9 [I V :5279 9; NG/GT:1842; IV [id number]:9635]  Out: 7045 [Urine:2370]  Lab Results   Component Value Date/Time    BUN 15 09/21/2019 05:47 AM    WBC 6 44 09/21/2019 05:47 AM    HGB 13 2 09/21/2019 05:47 AM    HCT 42 3 09/21/2019 05:47 AM    MCV 95 09/21/2019 05:47 AM     (L) 09/21/2019 05:47 AM     Temp Readings from Last 3 Encounters:   09/21/19 99 3 °F (37 4 °C)   09/18/19 98 8 °F (37 1 °C) (Oral)   01/15/19 98 6 °F (37 °C) (Oral)     Vancomycin Days of Therapy: 1    Assessment/Plan  The patient is currently on vancomycin utilizing scheduled dosing based on adjusted body weight (due to obesity)  Baseline risks associated with therapy include: obese   The patient is currently receiving 1750 mg q12 and is clinically appropriate and dose will be continued  Pharmacy will also follow closely for s/sx of nephrotoxicity, infusion reactions and appropriateness of therapy  BMP and CBC will be ordered per protocol  Plan for trough as patient approaches steady state, prior to the 4th  dose at approximately 1545 sun 9-22  Due to infection severity, will target a trough of 15-20 (appropriate for most indications)   Pharmacy will continue to follow the patients culture results and clinical progress daily      Polly Driscoll Romero, Pharmacist no

## 2019-09-21 NOTE — RESPIRATORY THERAPY NOTE
RT Ventilator Management Note  Bonnie Suh  44 y o  male MRN: [id number]  Unit/Bed#: MICU 05 Encounter: [id number]      Daily Screen       9/20/2019  1559 9/21/2019  0756          Patient safety screen outcome[de-identified]  Failed  Failed  (Pended)       Not Ready for Weaning due to[de-identified]    Underline problem not resolved; Oxygenation saturation < 90%; FiO2 >60%  (Pended)               Physical Exam:   Assessment Type: Assess only  General Appearance: Sedated  Respiratory Pattern: Assisted  Chest Assessment: Chest expansion symmetrical  O2 Device: vent      Resp Comments: Patient attempted to self extubate this morning  He did pull his OG tube out  ETT was found out to 24-23 cm and disconnected from ventilator  ETT readjusted back to 26 @ lip  BS are coarse, suctioned small-mod  Amount tan sputum  SPO2 down to high 80s despite suctioning    FIO2 was adjusted to 60% and then 65% to maintain SPO2 >/= 92%

## 2019-09-22 ENCOUNTER — APPOINTMENT (INPATIENT)
Dept: RADIOLOGY | Facility: HOSPITAL | Age: 49
DRG: 053 | End: 2019-09-22
Payer: COMMERCIAL

## 2019-09-22 ENCOUNTER — APPOINTMENT (INPATIENT)
Dept: NEUROLOGY | Facility: CLINIC | Age: 49
DRG: 053 | End: 2019-09-22
Payer: COMMERCIAL

## 2019-09-22 LAB
ANION GAP SERPL CALCULATED.3IONS-SCNC: 4 MMOL/L (ref 4–13)
BACTERIA UR CULT: NORMAL
BASOPHILS # BLD AUTO: 0.02 THOUSANDS/ΜL (ref 0–0.1)
BASOPHILS NFR BLD AUTO: 0 % (ref 0–1)
BUN SERPL-MCNC: 13 MG/DL (ref 5–25)
CA-I BLD-SCNC: 1.16 MMOL/L (ref 1.12–1.32)
CALCIUM SERPL-MCNC: 7.9 MG/DL (ref 8.3–10.1)
CHLORIDE SERPL-SCNC: 111 MMOL/L (ref 100–108)
CO2 SERPL-SCNC: 27 MMOL/L (ref 21–32)
CREAT SERPL-MCNC: 0.66 MG/DL (ref 0.6–1.3)
EOSINOPHIL # BLD AUTO: 0.16 THOUSAND/ΜL (ref 0–0.61)
EOSINOPHIL NFR BLD AUTO: 2 % (ref 0–6)
ERYTHROCYTE [DISTWIDTH] IN BLOOD BY AUTOMATED COUNT: 13.6 % (ref 11.6–15.1)
GFR SERPL CREATININE-BSD FRML MDRD: 112 ML/MIN/1.73SQ M
GLUCOSE SERPL-MCNC: 163 MG/DL (ref 65–140)
GLUCOSE SERPL-MCNC: 167 MG/DL (ref 65–140)
GLUCOSE SERPL-MCNC: 178 MG/DL (ref 65–140)
GLUCOSE SERPL-MCNC: 196 MG/DL (ref 65–140)
GLUCOSE SERPL-MCNC: 241 MG/DL (ref 65–140)
GLUCOSE SERPL-MCNC: 325 MG/DL (ref 65–140)
HCT VFR BLD AUTO: 40.5 % (ref 36.5–49.3)
HGB BLD-MCNC: 12.5 G/DL (ref 12–17)
IMM GRANULOCYTES # BLD AUTO: 0.03 THOUSAND/UL (ref 0–0.2)
IMM GRANULOCYTES NFR BLD AUTO: 0 % (ref 0–2)
LYMPHOCYTES # BLD AUTO: 0.59 THOUSANDS/ΜL (ref 0.6–4.47)
LYMPHOCYTES NFR BLD AUTO: 9 % (ref 14–44)
MAGNESIUM SERPL-MCNC: 2.3 MG/DL (ref 1.6–2.6)
MCH RBC QN AUTO: 29.6 PG (ref 26.8–34.3)
MCHC RBC AUTO-ENTMCNC: 30.9 G/DL (ref 31.4–37.4)
MCV RBC AUTO: 96 FL (ref 82–98)
MONOCYTES # BLD AUTO: 0.67 THOUSAND/ΜL (ref 0.17–1.22)
MONOCYTES NFR BLD AUTO: 10 % (ref 4–12)
NEUTROPHILS # BLD AUTO: 5.3 THOUSANDS/ΜL (ref 1.85–7.62)
NEUTS SEG NFR BLD AUTO: 79 % (ref 43–75)
NRBC BLD AUTO-RTO: 0 /100 WBCS
PHENYTOIN SERPL-MCNC: 7.3 UG/ML (ref 10–20)
PHENYTOIN SERPL-MCNC: 8.3 UG/ML (ref 10–20)
PHOSPHATE SERPL-MCNC: 2.2 MG/DL (ref 2.7–4.5)
PLATELET # BLD AUTO: 128 THOUSANDS/UL (ref 149–390)
PMV BLD AUTO: 10.6 FL (ref 8.9–12.7)
POTASSIUM SERPL-SCNC: 3.5 MMOL/L (ref 3.5–5.3)
PROCALCITONIN SERPL-MCNC: 0.35 NG/ML
RBC # BLD AUTO: 4.23 MILLION/UL (ref 3.88–5.62)
SODIUM SERPL-SCNC: 142 MMOL/L (ref 136–145)
VALPROATE SERPL-MCNC: 63 UG/ML (ref 50–100)
VALPROATE SERPL-MCNC: 72 UG/ML (ref 50–100)
WBC # BLD AUTO: 6.77 THOUSAND/UL (ref 4.31–10.16)

## 2019-09-22 PROCEDURE — 84100 ASSAY OF PHOSPHORUS: CPT | Performed by: NURSE PRACTITIONER

## 2019-09-22 PROCEDURE — 80164 ASSAY DIPROPYLACETIC ACD TOT: CPT | Performed by: EMERGENCY MEDICINE

## 2019-09-22 PROCEDURE — 83735 ASSAY OF MAGNESIUM: CPT | Performed by: NURSE PRACTITIONER

## 2019-09-22 PROCEDURE — 82330 ASSAY OF CALCIUM: CPT | Performed by: NURSE PRACTITIONER

## 2019-09-22 PROCEDURE — 95951 PR EEG MONITORING/VIDEORECORD: CPT | Performed by: PSYCHIATRY & NEUROLOGY

## 2019-09-22 PROCEDURE — 80048 BASIC METABOLIC PNL TOTAL CA: CPT | Performed by: NURSE PRACTITIONER

## 2019-09-22 PROCEDURE — 84145 PROCALCITONIN (PCT): CPT | Performed by: PHYSICIAN ASSISTANT

## 2019-09-22 PROCEDURE — 71045 X-RAY EXAM CHEST 1 VIEW: CPT

## 2019-09-22 PROCEDURE — 80185 ASSAY OF PHENYTOIN TOTAL: CPT | Performed by: EMERGENCY MEDICINE

## 2019-09-22 PROCEDURE — 82948 REAGENT STRIP/BLOOD GLUCOSE: CPT

## 2019-09-22 PROCEDURE — 94760 N-INVAS EAR/PLS OXIMETRY 1: CPT

## 2019-09-22 PROCEDURE — 99232 SBSQ HOSP IP/OBS MODERATE 35: CPT | Performed by: PHYSICIAN ASSISTANT

## 2019-09-22 PROCEDURE — 80186 ASSAY OF PHENYTOIN FREE: CPT | Performed by: EMERGENCY MEDICINE

## 2019-09-22 PROCEDURE — 94640 AIRWAY INHALATION TREATMENT: CPT

## 2019-09-22 PROCEDURE — 99233 SBSQ HOSP IP/OBS HIGH 50: CPT | Performed by: INTERNAL MEDICINE

## 2019-09-22 PROCEDURE — 85025 COMPLETE CBC W/AUTO DIFF WBC: CPT | Performed by: PHYSICIAN ASSISTANT

## 2019-09-22 PROCEDURE — 95951 HB EEG MONITORING/VIDEORECORD: CPT

## 2019-09-22 RX ORDER — POTASSIUM CHLORIDE 20MEQ/15ML
20 LIQUID (ML) ORAL ONCE
Status: COMPLETED | OUTPATIENT
Start: 2019-09-22 | End: 2019-09-22

## 2019-09-22 RX ORDER — SODIUM CHLORIDE FOR INHALATION 0.9 %
3 VIAL, NEBULIZER (ML) INHALATION
Status: DISCONTINUED | OUTPATIENT
Start: 2019-09-22 | End: 2019-09-22

## 2019-09-22 RX ORDER — LEVALBUTEROL 1.25 MG/.5ML
1.25 SOLUTION, CONCENTRATE RESPIRATORY (INHALATION) EVERY 6 HOURS PRN
Status: DISCONTINUED | OUTPATIENT
Start: 2019-09-22 | End: 2019-09-23

## 2019-09-22 RX ORDER — ALBUTEROL SULFATE 2.5 MG/3ML
2.5 SOLUTION RESPIRATORY (INHALATION) EVERY 4 HOURS PRN
Status: DISCONTINUED | OUTPATIENT
Start: 2019-09-22 | End: 2019-09-25 | Stop reason: HOSPADM

## 2019-09-22 RX ORDER — INSULIN GLARGINE 100 [IU]/ML
30 INJECTION, SOLUTION SUBCUTANEOUS ONCE
Status: COMPLETED | OUTPATIENT
Start: 2019-09-22 | End: 2019-09-22

## 2019-09-22 RX ORDER — SODIUM CHLORIDE FOR INHALATION 0.9 %
3 VIAL, NEBULIZER (ML) INHALATION
Status: DISCONTINUED | OUTPATIENT
Start: 2019-09-23 | End: 2019-09-23

## 2019-09-22 RX ORDER — LEVALBUTEROL 1.25 MG/.5ML
1.25 SOLUTION, CONCENTRATE RESPIRATORY (INHALATION)
Status: DISCONTINUED | OUTPATIENT
Start: 2019-09-23 | End: 2019-09-23

## 2019-09-22 RX ORDER — ACETAMINOPHEN 325 MG/1
650 TABLET ORAL EVERY 6 HOURS PRN
Status: DISCONTINUED | OUTPATIENT
Start: 2019-09-22 | End: 2019-09-25 | Stop reason: HOSPADM

## 2019-09-22 RX ORDER — ALBUTEROL SULFATE 2.5 MG/3ML
SOLUTION RESPIRATORY (INHALATION)
Status: COMPLETED
Start: 2019-09-22 | End: 2019-09-22

## 2019-09-22 RX ORDER — MIDAZOLAM HYDROCHLORIDE 1 MG/ML
2 INJECTION INTRAMUSCULAR; INTRAVENOUS ONCE
Status: COMPLETED | OUTPATIENT
Start: 2019-09-22 | End: 2019-09-22

## 2019-09-22 RX ORDER — KETOROLAC TROMETHAMINE 30 MG/ML
15 INJECTION, SOLUTION INTRAMUSCULAR; INTRAVENOUS ONCE
Status: COMPLETED | OUTPATIENT
Start: 2019-09-22 | End: 2019-09-22

## 2019-09-22 RX ORDER — SODIUM CHLORIDE FOR INHALATION 0.9 %
3 VIAL, NEBULIZER (ML) INHALATION EVERY 6 HOURS PRN
Status: DISCONTINUED | OUTPATIENT
Start: 2019-09-22 | End: 2019-09-23

## 2019-09-22 RX ORDER — FUROSEMIDE 10 MG/ML
10 INJECTION INTRAMUSCULAR; INTRAVENOUS ONCE
Status: COMPLETED | OUTPATIENT
Start: 2019-09-22 | End: 2019-09-22

## 2019-09-22 RX ADMIN — VALPROIC ACID 500 MG: 500 SOLUTION ORAL at 05:07

## 2019-09-22 RX ADMIN — MIDAZOLAM 2 MG: 1 INJECTION INTRAMUSCULAR; INTRAVENOUS at 23:34

## 2019-09-22 RX ADMIN — VALPROIC ACID 500 MG: 500 SOLUTION ORAL at 16:09

## 2019-09-22 RX ADMIN — DEXMEDETOMIDINE 1 MCG/KG/HR: 100 INJECTION, SOLUTION, CONCENTRATE INTRAVENOUS at 07:21

## 2019-09-22 RX ADMIN — DIBASIC SODIUM PHOSPHATE, MONOBASIC POTASSIUM PHOSPHATE AND MONOBASIC SODIUM PHOSPHATE 1 TABLET: 852; 155; 130 TABLET ORAL at 21:36

## 2019-09-22 RX ADMIN — ACETAMINOPHEN 650 MG: 325 TABLET ORAL at 16:25

## 2019-09-22 RX ADMIN — ALBUTEROL SULFATE 2.5 MG: 2.5 SOLUTION RESPIRATORY (INHALATION) at 11:05

## 2019-09-22 RX ADMIN — LEVETIRACETAM 1000 MG: 100 INJECTION, SOLUTION INTRAVENOUS at 22:22

## 2019-09-22 RX ADMIN — CEFTRIAXONE SODIUM 2000 MG: 10 INJECTION, POWDER, FOR SOLUTION INTRAVENOUS at 06:06

## 2019-09-22 RX ADMIN — FENTANYL CITRATE 100 MCG: 50 INJECTION INTRAMUSCULAR; INTRAVENOUS at 09:34

## 2019-09-22 RX ADMIN — INSULIN LISPRO 10 UNITS: 100 INJECTION, SOLUTION INTRAVENOUS; SUBCUTANEOUS at 05:42

## 2019-09-22 RX ADMIN — DEXMEDETOMIDINE 1 MCG/KG/HR: 100 INJECTION, SOLUTION, CONCENTRATE INTRAVENOUS at 02:07

## 2019-09-22 RX ADMIN — SODIUM CHLORIDE SOLN NEBU 3% 3 ML: 3 NEBU SOLN at 16:57

## 2019-09-22 RX ADMIN — FENTANYL CITRATE 100 MCG: 50 INJECTION INTRAMUSCULAR; INTRAVENOUS at 04:34

## 2019-09-22 RX ADMIN — INSULIN GLARGINE 30 UNITS: 100 INJECTION, SOLUTION SUBCUTANEOUS at 10:26

## 2019-09-22 RX ADMIN — AMPICILLIN 2000 MG: 2 INJECTION, POWDER, FOR SOLUTION INTRAVENOUS at 02:08

## 2019-09-22 RX ADMIN — DIBASIC SODIUM PHOSPHATE, MONOBASIC POTASSIUM PHOSPHATE AND MONOBASIC SODIUM PHOSPHATE 1 TABLET: 852; 155; 130 TABLET ORAL at 16:08

## 2019-09-22 RX ADMIN — DIBASIC SODIUM PHOSPHATE, MONOBASIC POTASSIUM PHOSPHATE AND MONOBASIC SODIUM PHOSPHATE 1 TABLET: 852; 155; 130 TABLET ORAL at 08:05

## 2019-09-22 RX ADMIN — POTASSIUM CHLORIDE 20 MEQ: 20 SOLUTION ORAL at 08:04

## 2019-09-22 RX ADMIN — INSULIN LISPRO 10 UNITS: 100 INJECTION, SOLUTION INTRAVENOUS; SUBCUTANEOUS at 00:12

## 2019-09-22 RX ADMIN — VALPROIC ACID 500 MG: 500 SOLUTION ORAL at 21:20

## 2019-09-22 RX ADMIN — ACETAMINOPHEN 650 MG: 160 SUSPENSION ORAL at 05:22

## 2019-09-22 RX ADMIN — INSULIN LISPRO 20 UNITS: 100 INJECTION, SOLUTION INTRAVENOUS; SUBCUTANEOUS at 12:00

## 2019-09-22 RX ADMIN — ENOXAPARIN SODIUM 40 MG: 40 INJECTION SUBCUTANEOUS at 08:04

## 2019-09-22 RX ADMIN — CHLORHEXIDINE GLUCONATE 0.12% ORAL RINSE 15 ML: 1.2 LIQUID ORAL at 08:04

## 2019-09-22 RX ADMIN — SODIUM CHLORIDE SOLN NEBU 3% 3 ML: 3 NEBU SOLN at 00:00

## 2019-09-22 RX ADMIN — LEVALBUTEROL HYDROCHLORIDE 1.25 MG: 1.25 SOLUTION, CONCENTRATE RESPIRATORY (INHALATION) at 16:54

## 2019-09-22 RX ADMIN — LEVETIRACETAM 1000 MG: 100 INJECTION, SOLUTION INTRAVENOUS at 10:22

## 2019-09-22 RX ADMIN — PHENYTOIN SODIUM 300 MG: 50 INJECTION INTRAMUSCULAR; INTRAVENOUS at 21:13

## 2019-09-22 RX ADMIN — METRONIDAZOLE 500 MG: 500 INJECTION, SOLUTION INTRAVENOUS at 08:05

## 2019-09-22 RX ADMIN — LEVALBUTEROL HYDROCHLORIDE 1.25 MG: 1.25 SOLUTION, CONCENTRATE RESPIRATORY (INHALATION) at 07:13

## 2019-09-22 RX ADMIN — POLYETHYLENE GLYCOL 3350 17 G: 17 POWDER, FOR SOLUTION ORAL at 08:04

## 2019-09-22 RX ADMIN — LEVETIRACETAM 1000 MG: 100 INJECTION, SOLUTION INTRAVENOUS at 21:29

## 2019-09-22 RX ADMIN — FENTANYL CITRATE 100 MCG: 50 INJECTION INTRAMUSCULAR; INTRAVENOUS at 06:55

## 2019-09-22 RX ADMIN — VANCOMYCIN HYDROCHLORIDE 1750 MG: 1 INJECTION, POWDER, LYOPHILIZED, FOR SOLUTION INTRAVENOUS at 03:56

## 2019-09-22 RX ADMIN — AMPICILLIN 2000 MG: 2 INJECTION, POWDER, FOR SOLUTION INTRAVENOUS at 05:17

## 2019-09-22 RX ADMIN — DEXMEDETOMIDINE 1 MCG/KG/HR: 100 INJECTION, SOLUTION, CONCENTRATE INTRAVENOUS at 03:18

## 2019-09-22 RX ADMIN — ONDANSETRON 4 MG: 2 INJECTION INTRAMUSCULAR; INTRAVENOUS at 19:56

## 2019-09-22 RX ADMIN — FUROSEMIDE 10 MG: 10 INJECTION, SOLUTION INTRAMUSCULAR; INTRAVENOUS at 10:26

## 2019-09-22 RX ADMIN — AMPICILLIN 2000 MG: 2 INJECTION, POWDER, FOR SOLUTION INTRAVENOUS at 09:03

## 2019-09-22 RX ADMIN — KETOROLAC TROMETHAMINE 15 MG: 30 INJECTION, SOLUTION INTRAMUSCULAR at 20:13

## 2019-09-22 RX ADMIN — METRONIDAZOLE 500 MG: 500 INJECTION, SOLUTION INTRAVENOUS at 00:13

## 2019-09-22 RX ADMIN — MIDAZOLAM 2 MG: 1 INJECTION INTRAMUSCULAR; INTRAVENOUS at 03:07

## 2019-09-22 RX ADMIN — MIDAZOLAM 2 MG: 1 INJECTION INTRAMUSCULAR; INTRAVENOUS at 05:06

## 2019-09-22 RX ADMIN — NICOTINE 1 PATCH: 21 PATCH, EXTENDED RELEASE TRANSDERMAL at 08:06

## 2019-09-22 RX ADMIN — SODIUM CHLORIDE SOLN NEBU 3% 3 ML: 3 NEBU SOLN at 07:15

## 2019-09-22 RX ADMIN — PHENYTOIN SODIUM 200 MG: 50 INJECTION INTRAMUSCULAR; INTRAVENOUS at 09:04

## 2019-09-22 RX ADMIN — Medication 100 MCG/HR: at 05:41

## 2019-09-22 RX ADMIN — MIDAZOLAM 2 MG: 1 INJECTION INTRAMUSCULAR; INTRAVENOUS at 07:21

## 2019-09-22 RX ADMIN — SODIUM CHLORIDE 250 MG PE: 9 INJECTION, SOLUTION INTRAVENOUS at 13:49

## 2019-09-22 RX ADMIN — FENTANYL CITRATE 100 MCG: 50 INJECTION INTRAMUSCULAR; INTRAVENOUS at 00:22

## 2019-09-22 NOTE — CONSULTS
Vancomycin IV Pharmacy-to-Dose Consultation    Arun López  is a 44 y o  male who was receiving Vancomycin IV with management by the Pharmacy Consult service for treatment of other septic shock  The patients Vancomycin therapy has been discontinued per CritCdanial  Thank you for allowing us to take part in this patient's care  Pharmacy will sign-off now; please call or re-consult if there are any questions  Familia Bañuelos  D , [place]  Clinical Pharmacist, [address]  [phone] or [place]

## 2019-09-22 NOTE — PLAN OF CARE
Problem: Potential for Falls  Goal: Patient will remain free of falls  Description  INTERVENTIONS:  - Assess patient frequently for physical needs  -  Identify cognitive and physical deficits and behaviors that affect risk of falls  -  [place] fall precautions as indicated by assessment   - Educate patient/family on patient safety including physical limitations  - Instruct patient to call for assistance with activity based on assessment  - Modify environment to reduce risk of injury  - Consider OT/PT consult to assist with strengthening/mobility  Outcome: Progressing     Problem: NEUROSENSORY - ADULT  Goal: Achieves stable or improved neurological status  Description  INTERVENTIONS  - Monitor and report changes in neurological status  - Monitor vital signs such as temperature, blood pressure, glucose, and any other labs ordered   - Initiate measures to prevent increased intracranial pressure  - Monitor for seizure activity and implement precautions if appropriate      Outcome: Progressing  Goal: Remains free of injury related to seizures activity  Description  INTERVENTIONS  - Maintain airway, patient safety  and administer oxygen as ordered  - Monitor patient for seizure activity, document and report duration and description of seizure to physician/advanced practitioner  - If seizure occurs,  ensure patient safety during seizure  - Reorient patient post seizure  - Seizure pads on all 4 side rails  - Instruct patient/family to notify RN of any seizure activity including if an aura is experienced  - Instruct patient/family to call for assistance with activity based on nursing assessment  - Administer anti-seizure medications if ordered    Outcome: Progressing  Goal: Achieves maximal functionality and self care  Description  INTERVENTIONS  - Monitor swallowing and airway patency with patient fatigue and changes in neurological status  - Encourage and assist patient to increase activity and self care     - Encourage visually impaired, hearing impaired and aphasic patients to use assistive/communication devices  Outcome: Progressing     Problem: DISCHARGE PLANNING  Goal: Discharge to home or other facility with appropriate resources  Description  INTERVENTIONS:  - Identify barriers to discharge w/patient and caregiver  - Arrange for needed discharge resources and transportation as appropriate  - Identify discharge learning needs (meds, wound care, etc )  - Arrange for interpretive services to assist at discharge as needed  - Refer to Case Management Department for coordinating discharge planning if the patient needs post-hospital services based on physician/advanced practitioner order or complex needs related to functional status, cognitive ability, or social support system  Outcome: Progressing     Problem: Knowledge Deficit  Goal: Patient/family/caregiver demonstrates understanding of disease process, treatment plan, medications, and discharge instructions  Description  Complete learning assessment and assess knowledge base  Interventions:  - Provide teaching at level of understanding  - Provide teaching via preferred learning methods  Outcome: Progressing     Problem: Nutrition/Hydration-ADULT  Goal: Nutrient/Hydration intake appropriate for improving, restoring or maintaining nutritional needs  Description  Monitor and assess patient's nutrition/hydration status for malnutrition  Collaborate with interdisciplinary team and initiate plan and interventions as ordered  Monitor patient's weight and dietary intake as ordered or per policy  Utilize nutrition screening tool and intervene as necessary  Determine patient's food preferences and provide high-protein, high-caloric foods as appropriate       INTERVENTIONS:  - Monitor oral intake, urinary output, labs, and treatment plans  - Assess nutrition and hydration status and recommend course of action  - Evaluate amount of meals eaten  - Assist patient with eating if necessary   - Allow adequate time for meals  - Recommend/ encourage appropriate diets, oral nutritional supplements, and vitamin/mineral supplements  - Order, calculate, and assess calorie counts as needed  - Recommend, monitor, and adjust tube feedings and TPN/PPN based on assessed needs  - Assess need for intravenous fluids  - Provide specific nutrition/hydration education as appropriate  - Include patient/family/caregiver in decisions related to nutrition  Outcome: Progressing     Problem: Prexisting or High Potential for Compromised Skin Integrity  Goal: Skin integrity is maintained or improved  Description  INTERVENTIONS:  - Identify patients at risk for skin breakdown  - Assess and monitor skin integrity  - Assess and monitor nutrition and hydration status  - Monitor labs   - Assess for incontinence   - Turn and reposition patient  - Assist with mobility/ambulation  - Relieve pressure over bony prominences  - Avoid friction and shearing  - Provide appropriate hygiene as needed including keeping skin clean and dry  - Evaluate need for skin moisturizer/barrier cream  - Collaborate with interdisciplinary team   - Patient/family teaching  - Consider wound care consult   Outcome: Progressing     Problem: RESPIRATORY - ADULT  Goal: Achieves optimal ventilation and oxygenation  Description  INTERVENTIONS:  - Assess for changes in respiratory status  - Assess for changes in mentation and behavior  - Position to facilitate oxygenation and minimize respiratory effort  - Oxygen administered by appropriate delivery if ordered  - Initiate smoking cessation education as indicated  - Encourage broncho-pulmonary hygiene including cough, deep breathe, Incentive Spirometry  - Assess the need for suctioning and aspirate as needed  - Assess and instruct to report SOB or any respiratory difficulty  - Respiratory Therapy support as indicated  Outcome: Progressing     Problem: COPING  Goal: Pt/Family able to verbalize concerns and demonstrate effective coping strategies  Description  INTERVENTIONS:  - Assist patient/family to identify coping skills, available support systems and cultural and spiritual values  - Provide emotional support, including active listening and acknowledgement of concerns of patient and caregivers  - Reduce environmental stimuli, as able  - Provide patient education  - Assess for spiritual pain/suffering and initiate spiritual care, including notification of Pastoral Care or isabel based community as needed  - Assess effectiveness of coping strategies  Outcome: Progressing  Goal: Will report anxiety at manageable levels  Description  INTERVENTIONS:  - Administer medication as ordered  - Teach and encourage coping skills  - Provide emotional support  - Assess patient/family for anxiety and ability to cope  Outcome: Progressing     Problem: BEHAVIOR  Goal: Pt/Family maintain appropriate behavior and adhere to behavioral management agreement, if implemented  Description  INTERVENTIONS:  - Assess the family dynamic   - Encourage verbalization of thoughts and concerns in a socially appropriate manner  - Assess patient/family's coping skills and non-compliant behavior (including use of illegal substances)  - Utilize positive, consistent limit setting strategies supporting safety of patient, staff and others  - Initiate consult with Case Management, Spiritual Care or other ancillary services as appropriate  - If a patient's/visitor's behavior jeopardizes the safety of the patient, staff, or others, refer to organization procedure     - Notify Security of behavior or suspected illegal substances which indicate the need for search of the patient and/or belongings  - Encourage participation in the decision making process about a behavioral management agreement; implement if patient meets criteria  Outcome: Progressing     Problem: SAFETY ADULT  Goal: Maintain or return to baseline ADL function  Description  INTERVENTIONS:  - Assess patient's ability to carry out ADLs; assess patient's baseline for ADL function and identify physical deficits which impact ability to perform ADLs (bathing, care of mouth/teeth, toileting, grooming, dressing, etc )  - Assess/evaluate cause of self-care deficits   - Assess range of motion  - Assess patient's mobility; develop plan if impaired  - Assess patient's need for assistive devices and provide as appropriate  - Encourage maximum independence but intervene and supervise when necessary  - Involve family in performance of ADLs  - Assess for home care needs following discharge   - Consider OT consult to assist with ADL evaluation and planning for discharge  - Provide patient education as appropriate  Outcome: Not Progressing  Goal: Maintain or return mobility status to optimal level  Description  INTERVENTIONS:  - Assess patient's baseline mobility status (ambulation, transfers, stairs, etc )    - Identify cognitive and physical deficits and behaviors that affect mobility  - Identify mobility aids required to assist with transfers and/or ambulation (gait belt, sit-to-stand, lift, walker, cane, etc )  - [place] fall precautions as indicated by assessment  - Record patient progress and toleration of activity level on Mobility SBAR; progress patient to next Phase/Stage  - Instruct patient to call for assistance with activity based on assessment  - Consider rehabilitation consult to assist with strengthening/weightbearing, etc   Outcome: Not Progressing     Problem: SAFETY,RESTRAINT: NV/NON-SELF DESTRUCTIVE BEHAVIOR  Goal: Remains free of harm/injury (restraint for non violent/non self-detsructive behavior)  Description  INTERVENTIONS:  - Instruct patient/family regarding restraint use   - Assess and monitor physiologic and psychological status   - Provide interventions and comfort measures to meet assessed patient needs   - Identify and implement measures to help patient regain control  - Assess readiness for release of restraint   Outcome: Not Progressing  Goal: Returns to optimal restraint-free functioning  Description  INTERVENTIONS:  - Assess the patient's behavior and symptoms that indicate continued need for restraint  - Identify and implement measures to help patient regain control  - Assess readiness for release of restraint   Outcome: Not Progressing

## 2019-09-22 NOTE — PROGRESS NOTES
Progress Note - Neurology   Demetri  44 y o  male [id number]  Unit/Bed#: MICU 05/MICU 05    Assessment:  Demetri  is a 44 y o  male seen by Neurology for subclinical right hemispheric focal status epilepticus in the setting of recent traumatic subarachnoid hemorrhage  Patient was placed on Depakote, Keppra, phenytoin, and midazolam drip, which eventually resolved his seizure activity  He has been seizure free for 36 hours  AED plan as detailed below, with new increase in phenytoin given low level  Plan:  - Okay to discontinue vEEG monitoring today  Discussed with epileptology  - Current AED regimen   · Depakote 750 mg BID  · Keppra 1g BID  · Phenytoin 200 mg BID- Increase to 300 mg BID  · Add fosphenytoin 250 mg IV once  - Levels pending:  · Phenytoin level, free pending    - Will defer LP as patient improved significantly   - Continue neuro checks   - Medical management and supportive care per primary team  Correction of any metabolic or infectious disturbances  Results:   - No epileptiform activity overnight  EEG remained slow  - Phenytoin (total) level 7 3   - Valproic acid level normal       Subjective:   Patient seen and examined with attending neurologist, Dr Haris Petty  Patient was seen sitting up in bed, conversing with family at bedside  He has since been extubated  He did appear drowsy, but demonstrated a positive attitude and a good sense of humor  Family reported he seemed like he was back to normal, as if nothing had happened  The patient denied any complaints           Past Medical History:   Diagnosis Date    Hypertension      Past Surgical History:   Procedure Laterality Date    KNEE SURGERY      LEG SURGERY       Family History   Family history unknown: Yes     Social History     Socioeconomic History    Marital status: Single     Spouse name: Not on file    Number of children: Not on file    Years of education: Not on file    Highest education level: Not on file Occupational History    Not on file   Social Needs    Financial resource strain: Not on file    Food insecurity:     Worry: Not on file     Inability: Not on file    Transportation needs:     Medical: Not on file     Non-medical: Not on file   Tobacco Use    Smoking status: Current Some Day Smoker     Packs/day: 0 20     Types: Cigars    Smokeless tobacco: Never Used   Substance and Sexual Activity    Alcohol use: Yes     Alcohol/week: 3 0 standard drinks     Types: 3 Cans of beer per week     Frequency: 2-4 times a month     Drinks per session: 3 or 4     Comment: occassionally    Drug use: No    Sexual activity: Not on file   Lifestyle    Physical activity:     Days per week: Not on file     Minutes per session: Not on file    Stress: Not on file   Relationships    Social connections:     Talks on phone: Not on file     Gets together: Not on file     Attends Sikhism service: Not on file     Active member of club or organization: Not on file     Attends meetings of clubs or organizations: Not on file     Relationship status: Not on file    Intimate partner violence:     Fear of current or ex partner: Not on file     Emotionally abused: Not on file     Physically abused: Not on file     Forced sexual activity: Not on file   Other Topics Concern    Not on file   Social History Narrative    Not on file         Medications:   All current active meds have been reviewed and current meds:  Scheduled Meds:  Current Facility-Administered Medications:  acetaminophen 650 mg Per NG Tube Q6H PRN Cathryn Hope PA-C    alteplase 2 mg Intracatheter Once Orene Cooler, ROCKY    alteplase 2 mg Intracatheter Once Orene Cooler, VIVIENNP    bisacodyl 10 mg Rectal Daily PRN Ana Shea DO    [START ON 9/23/2019] cefTRIAXone 1,000 mg Intravenous Q24H Cathryn Hope PA-C    chlorhexidine 15 mL Swish & Spit Q12H [facility] ROCKY Horan    dexmedetomidine 0 1-1 mcg/kg/hr Intravenous Titrated Can Gaspar MD Last Rate: 0 8 mcg/kg/hr (09/22/19 1005)   enoxaparin 40 mg Subcutaneous Daily ROCKY Ramos    insulin lispro 5-25 Units Subcutaneous Q6H [facility] Brandon Fitch PA-C    levalbuterol 1 25 mg Nebulization Q8H Guillermina Conteh, DO    levETIRAcetam 1,000 mg Intravenous Q12H ROCKY Ramos Last Rate: 1,000 mg (09/22/19 1022)   nicotine 1 patch Transdermal Daily ROCKY Myrick    ondansetron 4 mg Intravenous Q6H PRN ROCKY Ramos    phenytoin 200 mg Intravenous Q12H [facility] ROCKY Ramos Last Rate: 200 mg (09/22/19 0904)   polyethylene glycol 17 g Per NG Tube Daily Hsantel Gaye, ROCKY    potassium-sodium phosphateS 1 tablet Oral Q8H ROCKY Spear    senna 17 6 mg Oral HS ROCKY Spear    sodium chloride 3 mL Nebulization Q8H Yazan Mark,     valproic acid 500 mg Oral [facility] ROCKY Ramos      Continuous Infusions:  dexmedetomidine 0 1-1 mcg/kg/hr Last Rate: 0 8 mcg/kg/hr (09/22/19 1005)     PRN Meds:   acetaminophen    bisacodyl    ondansetron       ROS: A 12 point ROS was completed  Other than the above mentioned complaints in the HPI and those commented on below, all remaining systems were negative:  - Fatigue       Vitals:   BP 95/58   Pulse 92   Temp 99 7 °F (37 6 °C)   Resp 16   Ht 5' 10" (1 778 m)   Wt 117 kg (257 lb 0 9 oz)   SpO2 93%   BMI 36 88 kg/m²       Physical Exam:   Constitutional: Patient is resting comfortably in bed, EEG leads being removed  Well developed  No acute distress  No diaphoresis  HENT: Right and left external ear normal  Oropharynx clear and moist  Nose normal    Eyes: PERRL  EOMs intact without nystagmus  No scleral icterus or injection  No discharge  Neck: Supple, normal ROM  Cardiovascular: Regular rate and rhythm  Pulmonary: No respiratory distress  Effort normal    Musculoskeletal: Moves all 4 extremities spontaneously  Neurological: Alert, follows commands  Skin: Warm and dry     Psychiatric: Conversational  Normal mood and affect  Normal thought process and thought content, without hallucinations  Neurologic Exam:  Mental Status: Patient is awake, alert, and oriented  Follows all simple commands without difficulty  No aphasia noted  Cranial Nerves: Visual acuity grossly intact  EOMs intact  No obvious facial asymmetry  Motor: Moves all 4 extremities spontaneously  No tremors noted  Labs: I have personally reviewed pertinent reports     Recent Results (from the past 24 hour(s))   Fingerstick Glucose (POCT)    Collection Time: 09/21/19 11:56 AM   Result Value Ref Range    POC Glucose 293 (H) 65 - 140 mg/dl   Fingerstick Glucose (POCT)    Collection Time: 09/21/19  5:43 PM   Result Value Ref Range    POC Glucose 255 (H) 65 - 140 mg/dl   Fingerstick Glucose (POCT)    Collection Time: 09/21/19 11:38 PM   Result Value Ref Range    POC Glucose 230 (H) 65 - 140 mg/dl   CBC and differential    Collection Time: 09/22/19  4:10 AM   Result Value Ref Range    WBC 6 77 4 31 - 10 16 Thousand/uL    RBC 4 23 3 88 - 5 62 Million/uL    Hemoglobin 12 5 12 0 - 17 0 g/dL    Hematocrit 40 5 36 5 - 49 3 %    MCV 96 82 - 98 fL    MCH 29 6 26 8 - 34 3 pg    MCHC 30 9 (L) 31 4 - 37 4 g/dL    RDW 13 6 11 6 - 15 1 %    MPV 10 6 8 9 - 12 7 fL    Platelets 381 (L) 358 - 390 Thousands/uL    nRBC 0 /100 WBCs    Neutrophils Relative 79 (H) 43 - 75 %    Immat GRANS % 0 0 - 2 %    Lymphocytes Relative 9 (L) 14 - 44 %    Monocytes Relative 10 4 - 12 %    Eosinophils Relative 2 0 - 6 %    Basophils Relative 0 0 - 1 %    Neutrophils Absolute 5 30 1 85 - 7 62 Thousands/µL    Immature Grans Absolute 0 03 0 00 - 0 20 Thousand/uL    Lymphocytes Absolute 0 59 (L) 0 60 - 4 47 Thousands/µL    Monocytes Absolute 0 67 0 17 - 1 22 Thousand/µL    Eosinophils Absolute 0 16 0 00 - 0 61 Thousand/µL    Basophils Absolute 0 02 0 00 - 0 10 Thousands/µL   Procalcitonin    Collection Time: 09/22/19  4:10 AM   Result Value Ref Range    Procalcitonin 0 35 (H) <=0 25 ng/ml   Basic metabolic panel    Collection Time: 09/22/19  4:10 AM   Result Value Ref Range    Sodium 142 136 - 145 mmol/L    Potassium 3 5 3 5 - 5 3 mmol/L    Chloride 111 (H) 100 - 108 mmol/L    CO2 27 21 - 32 mmol/L    ANION GAP 4 4 - 13 mmol/L    BUN 13 5 - 25 mg/dL    Creatinine 0 66 0 60 - 1 30 mg/dL    Glucose 196 (H) 65 - 140 mg/dL    Calcium 7 9 (L) 8 3 - 10 1 mg/dL    eGFR 112 ml/min/1 73sq m   Magnesium    Collection Time: 09/22/19  4:10 AM   Result Value Ref Range    Magnesium 2 3 1 6 - 2 6 mg/dL   Phosphorus    Collection Time: 09/22/19  4:10 AM   Result Value Ref Range    Phosphorus 2 2 (L) 2 7 - 4 5 mg/dL   Calcium, ionized    Collection Time: 09/22/19  4:10 AM   Result Value Ref Range    Calcium, Ionized 1 16 1 12 - 1 32 mmol/L   Fingerstick Glucose (POCT)    Collection Time: 09/22/19  5:42 AM   Result Value Ref Range    POC Glucose 241 (H) 65 - 140 mg/dl       Imaging: I have personally reviewed pertinent imaging and I have personally reviewed PACS reports  EKG, Pathology, and Other Studies: I have personally reviewed pertinent reports         VTE Prophylaxis: Enoxaparin (Lovenox)

## 2019-09-22 NOTE — RESPIRATORY THERAPY NOTE
RT Ventilator Management Note  Tara Knutson  44 y o  male MRN: [id number]  Unit/Bed#: MICU 05 Encounter: [id number]      Daily Screen       9/21/2019  0756 9/22/2019  0715          Patient safety screen outcome[de-identified]  Failed  Failed      Not Ready for Weaning due to[de-identified]  Underline problem not resolved; Oxygenation saturation < 90%; FiO2 >60%  Underline problem not resolved              Physical Exam:   Assessment Type: Pre-treatment  General Appearance: Sedated  Respiratory Pattern: Assisted  Chest Assessment: Chest expansion symmetrical  Bilateral Breath Sounds: Diminished, Clear  Suction: ET Tube, Oral  O2 Device: vent  Subjective Data: intubated      Resp Comments: Patient wtih desat to mid-high 80s this morning  PEEP was increased to +8 and FIO2 was increased to 60%  Patient was lavaged and suctioned for minimal secretions  Patient with occassional double stacking breaths  He was changed to AC/VC+ for asynchrony with vent           09/22/19 0715   Vent Information   Vent ID [id number]   Vent type     Vent Mode AC/VC+   $ Pulse Oximetry Spot Check Charge Completed   SpO2 94 %   AC/VC+ Settings   Resp Rate (BPM) 12 BPM   VT (mL) 500 mL   Insp Time (S) 0 9 S   FIO2 (%) 60 %   PEEP (cmH2O) 8 cmH2O   Rise Time (%) 50 %   Trigger Sensitivity Flow (LPM) -3 LPM   Humidification Heater   Heater Temp 98 6 °F (37 °C)   AC/VC+ Actuals   Resp Rate (BPM) 15 BPM   VT (mL) 568 mL   MV (Obs) 8 39   MAP (cmH2O) 11 cmH2O   Peak Pressure (cmH2O) 17 cmH2O   I:E Ratio (Obs) 1:3 9   Static Compliance (mL/cmH20) 83 mL/cmH2O   Plateau Pressure (cm H2O) 18 cm H2O   Heater Temperature (Obs) 98 4 °F (36 9 °C)   AC/VC+ ALARMS   High Peak Pressure (cmH2O) 45 cmH2O   Low Peak Pressure (cmH2O) 0 cm H2O   High Resp Rate (BPM) 45 BPM   High MV (L/min) 25 L/min   Low MV (L/min) 4 L/min   High VT (mL) 1300 mL   Low VT (mL) 300 mL   High Ben VTE (mL) 1300 mL   Low Ben VTE (mL) 300 mL   High Spont VTE (mL) 1300 mL   Low Spont VTE (mL) 300 mL High Insp Ben VT (mL) 1400 mL   AC/VC+ Apnea Settings   Resp Rate (BPM) 12 BPM   VT (mL) 500 mL   FIO2 (%) 100 %   Apnea Time (s) 20 S   Apnea Flow (L/min) 60 L/min   Maintenance   Alarm (pink) cable attached Yes   Resuscitation bag with peep valve at bedside Yes   Water bag changed No   Circuit changed No   Daily Screen   Patient safety screen outcome: Failed   Not Ready for Weaning due to:  Underline problem not resolved   IHI Ventilator Associated Pneumonia Bundle   Daily Awakening Trials Performed Not applicable (Comment)  (Patient keeps desatting)   Daily Assessment of Readiness to Extubate Yes   ETT  8 mm   Placement Date/Time: 09/19/19 (c) 2716   Tube Size: 8 mm  Location: Oral  Insertion attempts: 2  Placement Verification: Symmetrical chest wall movement  Secured at (cm): 26cm   Secured at (cm) 26   [facility]   Repositioned Left to [address] [place] [address] by Commercial tube lilly   Site Condition Dry   Cuff Pressure (cm H2O) 25 cm H2O   HI-LO Suction  Intermittent suction   HI-LO Secretions Small   HI-LO Intervention Patent

## 2019-09-23 PROBLEM — A41.9 SEPSIS (HCC): Status: RESOLVED | Noted: 2019-09-21 | Resolved: 2019-09-23

## 2019-09-23 LAB
ANION GAP SERPL CALCULATED.3IONS-SCNC: 6 MMOL/L (ref 4–13)
BUN SERPL-MCNC: 8 MG/DL (ref 5–25)
CALCIUM SERPL-MCNC: 8.4 MG/DL (ref 8.3–10.1)
CHLORIDE SERPL-SCNC: 110 MMOL/L (ref 100–108)
CO2 SERPL-SCNC: 27 MMOL/L (ref 21–32)
CREAT SERPL-MCNC: 0.69 MG/DL (ref 0.6–1.3)
ERYTHROCYTE [DISTWIDTH] IN BLOOD BY AUTOMATED COUNT: 13.5 % (ref 11.6–15.1)
GFR SERPL CREATININE-BSD FRML MDRD: 110 ML/MIN/1.73SQ M
GLUCOSE SERPL-MCNC: 137 MG/DL (ref 65–140)
GLUCOSE SERPL-MCNC: 139 MG/DL (ref 65–140)
GLUCOSE SERPL-MCNC: 150 MG/DL (ref 65–140)
GLUCOSE SERPL-MCNC: 164 MG/DL (ref 65–140)
GLUCOSE SERPL-MCNC: 193 MG/DL (ref 65–140)
HCT VFR BLD AUTO: 39.9 % (ref 36.5–49.3)
HGB BLD-MCNC: 12.8 G/DL (ref 12–17)
MCH RBC QN AUTO: 30.1 PG (ref 26.8–34.3)
MCHC RBC AUTO-ENTMCNC: 32.1 G/DL (ref 31.4–37.4)
MCV RBC AUTO: 94 FL (ref 82–98)
MRSA NOSE QL CULT: NORMAL
PHENYTOIN SERPL-MCNC: 12.2 UG/ML (ref 10–20)
PHOSPHATE SERPL-MCNC: 2.4 MG/DL (ref 2.7–4.5)
PLATELET # BLD AUTO: 139 THOUSANDS/UL (ref 149–390)
PMV BLD AUTO: 10.6 FL (ref 8.9–12.7)
POTASSIUM SERPL-SCNC: 3 MMOL/L (ref 3.5–5.3)
RBC # BLD AUTO: 4.25 MILLION/UL (ref 3.88–5.62)
SODIUM SERPL-SCNC: 143 MMOL/L (ref 136–145)
VALPROATE SERPL-MCNC: 66 UG/ML (ref 50–100)
WBC # BLD AUTO: 7.49 THOUSAND/UL (ref 4.31–10.16)

## 2019-09-23 PROCEDURE — 94640 AIRWAY INHALATION TREATMENT: CPT

## 2019-09-23 PROCEDURE — 97166 OT EVAL MOD COMPLEX 45 MIN: CPT

## 2019-09-23 PROCEDURE — 80186 ASSAY OF PHENYTOIN FREE: CPT | Performed by: PHYSICIAN ASSISTANT

## 2019-09-23 PROCEDURE — 80048 BASIC METABOLIC PNL TOTAL CA: CPT | Performed by: NURSE PRACTITIONER

## 2019-09-23 PROCEDURE — 80185 ASSAY OF PHENYTOIN TOTAL: CPT | Performed by: PHYSICIAN ASSISTANT

## 2019-09-23 PROCEDURE — 94760 N-INVAS EAR/PLS OXIMETRY 1: CPT

## 2019-09-23 PROCEDURE — NC001 PR NO CHARGE: Performed by: EMERGENCY MEDICINE

## 2019-09-23 PROCEDURE — 97163 PT EVAL HIGH COMPLEX 45 MIN: CPT

## 2019-09-23 PROCEDURE — 95951 PR EEG MONITORING/VIDEORECORD: CPT | Performed by: PSYCHIATRY & NEUROLOGY

## 2019-09-23 PROCEDURE — 85027 COMPLETE CBC AUTOMATED: CPT | Performed by: NURSE PRACTITIONER

## 2019-09-23 PROCEDURE — G8979 MOBILITY GOAL STATUS: HCPCS

## 2019-09-23 PROCEDURE — G8978 MOBILITY CURRENT STATUS: HCPCS

## 2019-09-23 PROCEDURE — 84100 ASSAY OF PHOSPHORUS: CPT | Performed by: NURSE PRACTITIONER

## 2019-09-23 PROCEDURE — 99233 SBSQ HOSP IP/OBS HIGH 50: CPT | Performed by: INTERNAL MEDICINE

## 2019-09-23 PROCEDURE — 80164 ASSAY DIPROPYLACETIC ACD TOT: CPT | Performed by: PHYSICIAN ASSISTANT

## 2019-09-23 PROCEDURE — 82948 REAGENT STRIP/BLOOD GLUCOSE: CPT

## 2019-09-23 PROCEDURE — NC001 PR NO CHARGE: Performed by: INTERNAL MEDICINE

## 2019-09-23 PROCEDURE — G8987 SELF CARE CURRENT STATUS: HCPCS

## 2019-09-23 PROCEDURE — 99233 SBSQ HOSP IP/OBS HIGH 50: CPT | Performed by: PSYCHIATRY & NEUROLOGY

## 2019-09-23 PROCEDURE — 99255 IP/OBS CONSLTJ NEW/EST HI 80: CPT | Performed by: PHYSICAL MEDICINE & REHABILITATION

## 2019-09-23 PROCEDURE — G8988 SELF CARE GOAL STATUS: HCPCS

## 2019-09-23 RX ORDER — POLYETHYLENE GLYCOL 3350 17 G/17G
17 POWDER, FOR SOLUTION ORAL DAILY
Status: DISCONTINUED | OUTPATIENT
Start: 2019-09-23 | End: 2019-09-25 | Stop reason: HOSPADM

## 2019-09-23 RX ORDER — METOCLOPRAMIDE HYDROCHLORIDE 5 MG/ML
10 INJECTION INTRAMUSCULAR; INTRAVENOUS ONCE
Status: DISCONTINUED | OUTPATIENT
Start: 2019-09-23 | End: 2019-09-23

## 2019-09-23 RX ORDER — ONDANSETRON 2 MG/ML
4 INJECTION INTRAMUSCULAR; INTRAVENOUS ONCE
Status: COMPLETED | OUTPATIENT
Start: 2019-09-23 | End: 2019-09-23

## 2019-09-23 RX ORDER — LABETALOL 20 MG/4 ML (5 MG/ML) INTRAVENOUS SYRINGE
10 EVERY 4 HOURS PRN
Status: DISCONTINUED | OUTPATIENT
Start: 2019-09-23 | End: 2019-09-25 | Stop reason: HOSPADM

## 2019-09-23 RX ORDER — INSULIN GLARGINE 100 [IU]/ML
30 INJECTION, SOLUTION SUBCUTANEOUS
Status: DISCONTINUED | OUTPATIENT
Start: 2019-09-23 | End: 2019-09-24

## 2019-09-23 RX ORDER — VALPROIC ACID 250 MG/5ML
750 SOLUTION ORAL EVERY 8 HOURS SCHEDULED
Status: DISCONTINUED | OUTPATIENT
Start: 2019-09-23 | End: 2019-09-24

## 2019-09-23 RX ORDER — POTASSIUM CHLORIDE 20 MEQ/1
40 TABLET, EXTENDED RELEASE ORAL 2 TIMES DAILY
Status: COMPLETED | OUTPATIENT
Start: 2019-09-23 | End: 2019-09-23

## 2019-09-23 RX ORDER — ACETAMINOPHEN 325 MG/1
975 TABLET ORAL ONCE
Status: COMPLETED | OUTPATIENT
Start: 2019-09-23 | End: 2019-09-23

## 2019-09-23 RX ORDER — METOPROLOL TARTRATE 50 MG/1
50 TABLET, FILM COATED ORAL EVERY 12 HOURS SCHEDULED
Status: DISCONTINUED | OUTPATIENT
Start: 2019-09-23 | End: 2019-09-25 | Stop reason: HOSPADM

## 2019-09-23 RX ADMIN — VALPROIC ACID 750 MG: 500 SOLUTION ORAL at 22:43

## 2019-09-23 RX ADMIN — POTASSIUM CHLORIDE 40 MEQ: 1500 TABLET, EXTENDED RELEASE ORAL at 18:03

## 2019-09-23 RX ADMIN — INSULIN GLARGINE 30 UNITS: 100 INJECTION, SOLUTION SUBCUTANEOUS at 21:40

## 2019-09-23 RX ADMIN — LEVETIRACETAM 1000 MG: 100 INJECTION, SOLUTION INTRAVENOUS at 22:44

## 2019-09-23 RX ADMIN — ACETAMINOPHEN 975 MG: 325 TABLET ORAL at 09:28

## 2019-09-23 RX ADMIN — INSULIN LISPRO 4 UNITS: 100 INJECTION, SOLUTION INTRAVENOUS; SUBCUTANEOUS at 12:40

## 2019-09-23 RX ADMIN — POLYETHYLENE GLYCOL 3350 17 G: 17 POWDER, FOR SOLUTION ORAL at 10:07

## 2019-09-23 RX ADMIN — ONDANSETRON 4 MG: 2 INJECTION INTRAMUSCULAR; INTRAVENOUS at 11:48

## 2019-09-23 RX ADMIN — METOPROLOL TARTRATE 50 MG: 50 TABLET, FILM COATED ORAL at 21:39

## 2019-09-23 RX ADMIN — CEFTRIAXONE SODIUM 1000 MG: 10 INJECTION, POWDER, FOR SOLUTION INTRAVENOUS at 05:38

## 2019-09-23 RX ADMIN — DIBASIC SODIUM PHOSPHATE, MONOBASIC POTASSIUM PHOSPHATE AND MONOBASIC SODIUM PHOSPHATE 2 TABLET: 852; 155; 130 TABLET ORAL at 09:55

## 2019-09-23 RX ADMIN — ENOXAPARIN SODIUM 40 MG: 40 INJECTION SUBCUTANEOUS at 09:55

## 2019-09-23 RX ADMIN — VALPROIC ACID 500 MG: 500 SOLUTION ORAL at 05:40

## 2019-09-23 RX ADMIN — ONDANSETRON 4 MG: 2 INJECTION INTRAMUSCULAR; INTRAVENOUS at 10:07

## 2019-09-23 RX ADMIN — LEVALBUTEROL HYDROCHLORIDE 1.25 MG: 1.25 SOLUTION, CONCENTRATE RESPIRATORY (INHALATION) at 07:35

## 2019-09-23 RX ADMIN — METOPROLOL TARTRATE 50 MG: 50 TABLET, FILM COATED ORAL at 12:39

## 2019-09-23 RX ADMIN — LEVETIRACETAM 1000 MG: 100 INJECTION, SOLUTION INTRAVENOUS at 09:56

## 2019-09-23 RX ADMIN — POTASSIUM CHLORIDE 40 MEQ: 1500 TABLET, EXTENDED RELEASE ORAL at 09:55

## 2019-09-23 RX ADMIN — INSULIN LISPRO 4 UNITS: 100 INJECTION, SOLUTION INTRAVENOUS; SUBCUTANEOUS at 18:02

## 2019-09-23 RX ADMIN — PHENYTOIN SODIUM 300 MG: 50 INJECTION INTRAMUSCULAR; INTRAVENOUS at 10:42

## 2019-09-23 RX ADMIN — ISODIUM CHLORIDE 3 ML: 0.03 SOLUTION RESPIRATORY (INHALATION) at 07:36

## 2019-09-23 RX ADMIN — VALPROATE SODIUM 1000 MG: 100 INJECTION, SOLUTION INTRAVENOUS at 12:40

## 2019-09-23 NOTE — OCCUPATIONAL THERAPY NOTE
[address] Evaluation     Patient Name: Mere Suggs  Today's Date: 9/23/2019  Problem List  Principal Problem:    Refractory status epilepticus  Active Problems:    Essential hypertension    Hypokalemia    Diabetes (HCC)    Seizure (HCC)    TBI (traumatic brain injury) ([address] )    Acute respiratory failure     Paraphimosis    Past Medical History  Past Medical History:   Diagnosis Date    Hypertension      Past Surgical History  Past Surgical History:   Procedure Laterality Date    KNEE SURGERY      LEG SURGERY               09/23/19 1512   Note Type   Note type Eval/Treat   Restrictions/Precautions   Weight Bearing Precautions Per Order No   Other Precautions Cognitive; Impulsive; Chair Alarm; Bed Alarm;Multiple lines;Telemetry; Fall Risk;Pain   Pain Assessment   Pain Assessment No/denies pain   Pain Score No Pain   Home Living   Type of [address] [place] [address] Two level; Other (Comment); Able to live on main level with bedroom/bathroom; Bed/bath upstairs  (0 FLORY)   Bathroom Shower/Tub Tub/shower unit   Bathroom Toilet Standard   Bathroom Equipment Grab bars in [facility] Other (Comment)  (denies any)   Additional Comments pt reports living in 2 SH, 0 FLORY, full fligh to 2nd floor, reports 1st floor full bath, can stay on 1st floor if needed   Prior Function   Level of [place] Independent with ADLs and functional mobility   Lives With Spouse; Other (Comment)  (2 kids; and mother in law)   [address] Help From Family   ADL Assistance Independent   IADLs Needs assistance   Vocational   (reports he does not work)   Comments Pt reports being I w/ ADLS, IADLS, transfers and functional mobility PTA   Lifestyle   Autonomy I ADLS, has assist w/ IADLS, transfers and functional mobility PTA   Reciprocal Relationships Pt lives w/ spouse, 2 kids and mother in law (she is home 24/7); per CM pt will have ongoing family support   Service to Others Pt does not work   Intrinsic Gratification Pt enjoys TV Psychosocial   Psychosocial (WDL) X   Patient Behaviors/Mood Delusions; Hallucinations   Subjective   Subjective " I hear kids laughing and growling  I see a little boy in the corner"   ADL   Eating Assistance 5  Supervision/Setup   Grooming Assistance 5  Supervision/Setup   UB Bathing Assistance 5  Supervision/Setup   LB Bathing Assistance 4  Minimal Assistance   [address] 5  Supervision/Setup   LB [place] 4  Minimal Assistance   Functional Deficit Impulsive;Verbal cueing;Supervision/safety   Bed Mobility   Supine to Sit 4  Minimal assistance   Additional items HOB elevated; Increased time required;Verbal cues; Impulsive   Sit to Supine Unable to assess   Additional Comments Pt went from supine to sit w/ CTG for sitting balance/trunk control and +VC for safety 2/2 impulsivity   Transfers   Sit to Stand 4  Minimal assistance   Additional items Assist x 1; Increased time required; Impulsive   Stand to Sit 4  Minimal assistance   Additional items Assist x 1; Increased time required; Impulsive;Verbal cues   Additional Comments Pt performed sit-stand from EOB w/ Min A x1 for mild force production into standing and +VC for safety/proper technique   Functional Mobility   Functional Mobility 4  Minimal assistance   Additional Comments Pt ambulated few small steps in room w/ Min A x1, HHA used, +VC for safety   Additional items Hand hold assistance   Balance   Static Sitting Fair -   Dynamic Sitting Fair -   Static Standing Fair -   Dynamic Standing Poor +   Ambulatory Poor +   Activity Tolerance   Activity Tolerance Patient limited by fatigue; Other (Comment)  (limited by hallucinations)   Medical Staff Made Aware PT   Nurse Made Aware yes, Lucian Phillips   RUE Assessment   RUE Assessment WFL   LUE Assessment   LUE Assessment WFL   Hand Function   Gross Motor Coordination Functional   Fine Motor Coordination Functional   Sensation   Light Touch No apparent deficits   Cognition   Overall Cognitive Status Impaired   Arousal/Participation Responsive; Cooperative   Attention Attends with cues to redirect   Orientation Level Oriented X4   Memory Decreased recall of precautions   Following Commands Follows one step commands without difficulty   Comments Pt is pleasant and cooperative; reports seeing people in the room who are not there, and hearing voices frequently  Pt often looks around the room as if people are staring at him  Pt reaassured no one additional was in the room aside from therapy but pt unable to believe reports  Pt has decreased safety awareness and understanding of deficits  Demonstrated impulsivity and decreased safety awareness  Recommend continued cog eval to assist w/ safe D/C planning  Per CM, pt will have 24/7 assist and supervision at D/C if needed   Assessment   Limitation Decreased ADL status; Decreased Safe judgement during ADL;Decreased cognition;Decreased endurance;Decreased self-care trans;Decreased high-level ADLs   Prognosis Fair   Assessment Pt is a 43 y/o male seen for OT eval s/p adm [facility] due to multiple episode of dizziness, ambulatory dysfunction and staring spells  Pt underwent EEG revealing ongoing seizure activity  Pt is dx'd w/ refractory status epilepticus  Pt  has a past medical history of Hypertension  , and obesity, SAH and subdural hematoma s/p fall this year  Pt with active OT orders and up and OOB as tolerated orders  Pt lives with spouse, 2 kids and mother in law in 2 SH, 0 FLORY, 1st floor setup available, but full flight to 2nd floor  Pt was I w/ ADLS, has assist w/ IADLS from his mother in law, drove, & required no use of DME PTA but has grab bars in the bathroom   Pt is currently demonstrating the following occupational deficits: S UB ADLS, Min A LB ADLS, Min A transfers and functional mobility w/ HHA, +VC for safety 2/2 impulsivity and decreased safety awareness  These deficits that are impacting pt's baseline areas of occupation are a result of the following impairments: pain, endurance, activity tolerance, functional mobility, forward functional reach, balance, trunk control, functional standing tolerance, decreased I w/ ADLS/IADLS, cognitive impairments, decreased safety awareness and decreased insight into deficits  The following Occupational Performance Areas to address include: grooming, bathing/shower, toilet hygiene, dressing, medication management, socialization, health maintenance, functional mobility, community mobility, clothing management, household maintenance, care of children and social participation  Pt scored overall 55/100 on the Barthel Index  Based on the aforementioned OT evaluation, functional performance deficits, and assessments, pt has been identified as a moderate complexity evaluation  Recommend home w/ family support pending progress and cog eval (MOCA) upon D/C, once medically stable  Pt to continue to benefit from acute immediate OT services to address the following goals 3-5x/week to  w/in 7-10 days:    Goals   Patient Goals to stop seeing things   LTG Time Frame 7-10   Long Term Goal #1 see below listed goals   Plan   Treatment Interventions ADL retraining;Functional transfer training;UE strengthening/ROM; Endurance training;Cognitive reorientation;Patient/family training;Continued evaluation; Energy conservation; Activityengagement   Goal Expiration Date 10/03/19   OT Frequency 3-5x/wk   Recommendation   OT Discharge Recommendation Home with family support  (pending progress & cog eval (MOCA))   OT - OK to Discharge Yes  (when medically stable)   Barthel Index   Feeding 10   Bathing 0   Grooming Score 5   Dressing Score 5   Bladder Score 10   Bowels Score 10   Toilet Use Score 5   Transfers (Bed/Chair) Score 10   Mobility (Level Surface) Score 0   Stairs Score 0   Barthel Index Score 55   Modified [place] Scale   Modified [place] Scale 4        GOALS    1) Pt will improve activity tolerance to G for min 30 min txment sessions for increase engagement in functional tasks  2) Pt will complete UB/LB dressing/self care w/ S using adaptive device and DME as needed  3) Pt will complete bathing w/ S w/ use of AE and DME as needed  4) Pt will complete toileting w/ S w/ G hygiene/thoroughness using DME as needed  5) Pt will improve functional transfers to S on/off all surfaces using DME as needed w/ G balance/safety   6) Pt will improve functional mobility during ADL/IADL/leisure tasks to S using DME as needed w/ G balance/safety   7) Pt will be attentive 100% of the time during ongoing cognitive assessment w/ G participation to assist w/ safe d/c planning/recommendations  8) Pt will demonstrate G carryover of pt/caregiver education and training as appropriate w/o cues w/ good tolerance to increase safety during functional tasks  9) Pt will demonstrate 100% carryover of energy conservation techniques t/o functional I/ADL/leisure tasks w/o cues s/p skilled education to increase endurance during functional tasks     Deya West MS, OTR/L

## 2019-09-23 NOTE — CONSULTS
PHYSICAL MEDICINE AND REHABILITATION CONSULT NOTE  Feng Mo  44 y o  male MRN: [id number]  Unit/Bed#: MICU 05 Encounter: [id number]    Requested by (Physician/Service): Saúl Owen MD  Reason for Consultation:  Assessment of rehabilitation needs  Reason for Hospitalization:  Seizure [facility]) [R56 9]  Rehabilitation Diagnosis: TBI    History of Present Illness:  Feng Mo  is a 44 y o  male who  has a past medical history of Hypertension  who [facility] with tinnitus, gait imbalance  He had recent fall on 8/26/19 with LOC  Imaging at that time revealed SAH  He was recommended outpatient follow up with neurosurgery, PCP, and PT  Routine EEG revealed seizure activity, and he was transferred to [facility] for video EEG monitoring  Neurology was consulted, and he is currently on depakote, keppra, and phenytoin  vEEG was discontinued  He has been since extubated  PM&R consulted for rehabilitation recommendations  Restrictions include:  none    Hospital Complications/Comorbidities:   Complications: As above  Comorbidities: As above    Morbid Obesity (BMI > 40) No     Last Weight Last BMI   Wt Readings from Last 1 Encounters:   09/20/19 117 kg (257 lb 0 9 oz)    Body mass index is 36 88 kg/m²       Functional History:     Prior to Admission:     Independent with ADLs, mobility       Present:  Physical Therapy Occupational Therapy Speech Therapy   pending pending      Past Medical History:   Past Surgical History:   Family History:     Past Medical History:   Diagnosis Date    Hypertension     Past Surgical History:   Procedure Laterality Date    KNEE SURGERY      LEG SURGERY       Family History   Family history unknown: Yes        Medications:    Current Facility-Administered Medications:     acetaminophen (TYLENOL) tablet 650 mg, 650 mg, Oral, Q6H PRN, ROCKY Stock, 650 mg at 09/22/19 1625    albuterol inhalation solution 2 5 mg, 2 5 mg, Nebulization, Q4H PRN, Anderson Carranza MD, 2 5 mg at 09/22/19 1105    bisacodyl (DULCOLAX) rectal suppository 10 mg, 10 mg, Rectal, Daily PRN, Aisha Robert, DO, 10 mg at 09/20/19 1726    cefTRIAXone (ROCEPHIN) 1,000 mg in dextrose 5 % 50 mL IVPB, 1,000 mg, Intravenous, Q24H, Elizabeth Leslie PA-C, Stopped at 09/23/19 0600    enoxaparin (LOVENOX) subcutaneous injection 40 mg, 40 mg, Subcutaneous, Daily, ROCKY Miller, 40 mg at 09/23/19 0955    insulin lispro (HumaLOG) 100 units/mL subcutaneous injection 5-25 Units, 5-25 Units, Subcutaneous, TID With Meals, 5 Units at 09/22/19 1921 **AND** Fingerstick Glucose (POCT), , , Q6H, ROCKY Huggins    levETIRAcetam (KEPPRA) 1,000 mg in sodium chloride 0 9 % 100 mL IVPB, 1,000 mg, Intravenous, Q12H, ROCKY Miller, Last Rate: 400 mL/hr at 09/23/19 0956, 1,000 mg at 09/23/19 0956    nicotine (NICODERM CQ) 21 mg/24 hr TD 24 hr patch 1 patch, 1 patch, Transdermal, Daily, ROCKY Miller, 1 patch at 09/22/19 0806    ondansetron (ZOFRAN) injection 4 mg, 4 mg, Intravenous, Q6H PRN, ROCKY Miller, 4 mg at 09/23/19 1007    phenytoin (DILANTIN) 300 mg in sodium chloride 0 9 % 60 mL IVPB (maintenance dose), 300 mg, Intravenous, Q12H [address], Arleen Osgood, MD, Stopped at 09/22/19 2222    polyethylene glycol (MIRALAX) packet 17 g, 17 g, Oral, Daily, Damon Vicente PA-C, 17 g at 09/23/19 1007    potassium chloride (K-DUR,KLOR-CON) CR tablet 40 mEq, 40 mEq, Oral, BID, Elizabeth Kerbs, PA-BEAU, 40 mEq at 09/23/19 9546    potassium-sodium phosphateS (K-PHOS,PHOSPHA 250) -250 mg tablet 2 tablet, 2 tablet, Oral, BID With Meals, Elizabeth Leslie PA-C, 2 tablet at 09/23/19 0955    valproic acid (DEPAKENE) 250 MG/5ML oral soln 500 mg, 500 mg, Oral, Q8H [address], Gillermina David, CRNP, 500 mg at 09/23/19 0540    Allergies:   No Known Allergies     Social History:    Social History     Socioeconomic History    Marital status: Single     Spouse name: Not on file    Number of children: Not on file    Years of education: Not on file    Highest education level: Not on file   Occupational History    Not on file   Social Needs    Financial resource strain: Not on file    Food insecurity:     Worry: Not on file     Inability: Not on file    Transportation needs:     Medical: Not on file     Non-medical: Not on file   Tobacco Use    Smoking status: Current Some Day Smoker     Packs/day: 0 20     Types: Cigars    Smokeless tobacco: Never Used   Substance and Sexual Activity    Alcohol use: Yes     Alcohol/week: 3 0 standard drinks     Types: 3 Cans of beer per week     Frequency: 2-4 times a month     Drinks per session: 3 or 4     Comment: occassionally    Drug use: No    Sexual activity: Not on file   Lifestyle    Physical activity:     Days per week: Not on file     Minutes per session: Not on file    Stress: Not on file   Relationships    Social connections:     Talks on phone: Not on file     Gets together: Not on file     Attends Mu-ism service: Not on file     Active member of club or organization: Not on file     Attends meetings of clubs or organizations: Not on file     Relationship status: Not on file    Intimate partner violence:     Fear of current or ex partner: Not on file     Emotionally abused: Not on file     Physically abused: Not on file     Forced sexual activity: Not on file   Other Topics Concern    Not on file   Social History Narrative    Not on file        Demetri  lives with wife and daughter  Mother in law able to provide 24/7 supervision if needed  2 story home, able to arrange for first floor living if needed    Review of Systems: A 10-point review of systems was performed   Negative except as listed above  +constipation, headache, auditory hallucinations    Physical Exam:  Vital Signs:      Temp:  [98 6 °F (37 °C)-101 5 °F (38 6 °C)] 98 6 °F (37 °C)  HR:  [] 98  Resp:  [12-35] 12  BP: (144-175)/() 170/99   Intake/Output Summary (Last 24 hours) at 9/23/2019 1035  Last data filed at 9/23/2019 0600  Gross per 24 hour   Intake 1300 14 ml   Output 3245 ml   Net -1944 86 ml        Laboratory:      Lab Results   Component Value Date    HGB 12 8 09/23/2019    HCT 39 9 09/23/2019    WBC 7 49 09/23/2019     Lab Results   Component Value Date    BUN 8 09/23/2019    K 3 0 (L) 09/23/2019     (H) 09/23/2019    CREATININE 0 69 09/23/2019     Lab Results   Component Value Date    PROTIME 15 0 (H) 09/21/2019    INR 1 22 (H) 09/21/2019        GEN: NAD, sitting comfortably in bed  Head: NCAT, no gross lesions  Eyes: PERRL, EOMI  Throat: clear, no thrush, MMM  Pulm: CTAB, no rales/wheezes  CV: RRR, normal s1/s2  Abd: soft, NTND  Ext: no pedal edema bilaterally, distal extremities warm and well perfused  Psych: normal affect, no agitation  Skin: no observable rashes  Neuro: A+Ox3, fluent speech, follows commands     CN II-XII intact    Right  Left  Site  Right  Left  Site    5 5  S Ab: Shoulder Abductors  5  5  HF: Hip Flexors    5 5  EF: Elbow Flexors  5 5 KF: Knee Flexors    5  5  EE: Elbow Extensors  5  5  KE: Knee Extensors    5  5  WE: Wrist Extensors  5  5  DR: Dorsi Flexors    5  5  FF: Finger Flexors  5  5  PF: Plantar Flexors    5  5  HI: Hand Intrinsics  5  5  EHL: Extensor Hallucis Longus     Able to perform finger-nose bilateral without dysmetria  Proprioception intact bilateral hallux    Imaging: Reviewed  Xr Chest Portable    Result Date: 9/22/2019  Impression: There may be minimal left basilar retrocardiac atelectasis/infiltrate  Workstation performed: [id number]     Xr Chest Portable    Result Date: 9/21/2019  Impression: No acute cardiopulmonary disease  The nasogastric tube has been advanced with tip now in the stomach  Workstation performed: [id number]     Xr Chest Portable    Result Date: 9/19/2019  Impression: Status post intubation  Nasogastric tube side port is still in the distal esophagus    The tube could be advanced 3 to 6 cm if clinically indicated  Result was reported as immediate in the Epic system  Workstation performed: [id number]     Xr Chest Portable Icu    Result Date: 9/22/2019  Impression: No acute cardiopulmonary disease  Workstation performed: [id number]       Assessment and Recommendations:  44 y o  male with history of recent TBI and SAH, who was admitted with epileptiform activity  Now off vEEG and on AEDs  PM&R consulted for rehabilitation recommendations  Impairments:  Impaired functional mobility and ability to perform ADL's  Impaired cognition and speech    Recommendations:  1  Rehab  - pending PT/OT eval  - recommend SLP eval for cog - poor insight into current deficits   - based on physical exam, will not likely have functional goals for acute rehabilitation admission - recommend discharge to home with 24/7 supervision for safety    - will follow progress with PT/OT during hospital stay and adjust recommendations accordingly    2  [place] [facility]  - AED management per neurology   - monitor sleep/wake cycle and for agitation   - minimize noxious stimuli, noise at night    3  Constipation   - on miralax  - add senna for promotility agent    4  Bladder - continent  - recommend checking PVRs x3 to rule out retention     5  DVT ppx  - on lovenox     Thank you for allowing the PM&R service to participate in the care of this patient  We will continue to follow [facility] 's progress with you  Please do not hesitate to call with questions or concerns    ** Please Note: Fluency Direct voice to text software may have been used in the creation of this document   **
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
## 2019-09-23 NOTE — SOCIAL WORK
PT/OT anticipate possible d/c home  CM spoke with pt s/o, Saurav Amaya, to discuss dcp  Duwaine Jose Luis reports pt will have 24/7 supervision/help between his adult children and herself  Duwaine Jose Luis confirmed no DME in the home  Duwaine Jose Luis hoping applying for MA will assist with any equipment that may be needed at d/c  CM provided Duwaine Jose Luis with PATH[facility] contact number to f/u on application

## 2019-09-23 NOTE — PLAN OF CARE
Problem: PHYSICAL THERAPY ADULT  Goal: Performs mobility at highest level of function for planned discharge setting  See evaluation for individualized goals  Description  Treatment/Interventions: Functional transfer training, LE strengthening/ROM, Therapeutic exercise, Endurance training, Bed mobility, Gait training, Spoke to nursing, OT, Spoke to case management          See flowsheet documentation for full assessment, interventions and recommendations  Note:   Prognosis: Good  Problem List: Decreased endurance, Impaired balance, Decreased mobility, Decreased coordination, Decreased safety awareness, Impaired judgement  Assessment: Pt is a 43 yo male presenting to [facility] [place] [facility]19 with multiple episodes of dizziness, ambulatory dysfunction, and staring spells  Routine EEG revealing several extra graphic seizure setting from right hemisphere suspicious for ongoing active subclinical seizure activity, transferred to [facility] [place] [facility] for vEEG  Pt was transferred to MICU for elective intubation to achieve high dose sedation with burst suppression on vEEG  Pt extubated prior to PT evaluation  PMH significant for: uncontrolled hypertension, obesity, recent history subarachnoid hemorrhage and subdural hematoma s/p fall 8/2019  Pt is supine at start of session and agreeable to therapy  Pt is impulsive throughout evaluation requiring frequent cues for safety with activity  Pt additionally experiencing seizure aura of children laughing during evaluation  RN notified, okay to mobilize to chair  Pt was responsive throughout evaluation  Pt transferred supine to sit with supervision  Pt transferred sit <> stand with supervision  Pt ambulated 3 ft to bedside chair with iTtus and frequent cues for safety  Further ambulation deferred 2/2 pt experiencing seizure aura  Pt is anticipated safe to D/C home with family support pending increased ambulation   PT to follow        Recommendation: (S) Home with family support(Pending increased ambulation)     PT - OK to Discharge: (S) No(Pending increased ambulation)    See flowsheet documentation for full assessment        Jackie Pickett, PT, DPT
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
## 2019-09-23 NOTE — RESPIRATORY THERAPY NOTE
resp care      09/23/19 0740   Respiratory Assessment   Assessment Type Assess only   General Appearance Alert; Awake   Respiratory Pattern Normal   Chest Assessment Chest expansion symmetrical   Bilateral Breath Sounds Clear;Diminished   Cough None   Resp Comments udn d/c'd, changed to q 4 prn for sob/wheezing, no resp hx, no resp meds at home   O2 Device room air

## 2019-09-23 NOTE — PROGRESS NOTES
Progress Note - Neurology   Demetri  44 y o  male [id number]  [facility]    Assessment:  Demetri  is a 44 y o  male with subclinical right hemispheric focal status epilepticus and recent traumatic subarachnoid hemorrhage  Patient was seizure free for an extended period of time, but then experienced one minute event early this morning consisting of right sided twitching, right gaze preference, and auditory hallucinations with no post-ictal state  Unclear if this is epileptic in origin, but will increase medications regardless due to low blood levels  In regards to his reported headache, suspect post-concussive in origin  Plan:  - Will defer further EEG testing at this time, will re-consider if further events occur despite increasing medication doses  - AED regimen  · Will increase Depakote to 750 mg TID, with a one time load of 1g   · Keppra 1g BID  · Phenytoin 300 mg BID  - Check trough levels prior to evening doses  · Phenytoin free  · Phenytoin total   · Valproic acid total  - Can consider magnesium sulfate 2g or Gabapentin 300 mg TID if headache persists  - An outpatient hospital follow up appointment has been requested with epilepsy  Office will call patient to schedule the appointment  - [employer] form completed and faxed  - Medical management and supportive care per primary team  Correction of any metabolic or infectious disturbances  Subjective:   Early this morning, the patient experienced 2- one minute episode of right sided twitching, right gaze preference, and subsequent auditory hallucinations  There was no post-ictal state  He was given Versed and has since returned to his baseline  He appears drowsy today on examination, resting in bed, but is conversational  He is reporting a 5/10 pressure like headache, located on the top of his head  It is associated with photophobia and phonophobia   He reports that he is experiencing an increased attentiveness to the sounds around him  He is also hearing things that others don't  The auditory hallucinations have been present for the past 3 weeks, since the initial [facility] per critical care discussion with family  The patient and family deny any visual hallucinations  He has been afebrile since 9/22  Antibiotics have been discontinued  He remains hypertensive  Past Medical History:   Diagnosis Date    Hypertension      Past Surgical History:   Procedure Laterality Date    KNEE SURGERY      LEG SURGERY       Family History   Family history unknown: Yes     Social History     Socioeconomic History    Marital status: Single     Spouse name: Not on file    Number of children: Not on file    Years of education: Not on file    Highest education level: Not on file   Occupational History    Not on file   Social Needs    Financial resource strain: Not on file    Food insecurity:     Worry: Not on file     Inability: Not on file    Transportation needs:     Medical: Not on file     Non-medical: Not on file   Tobacco Use    Smoking status: Current Some Day Smoker     Packs/day: 0 20     Types: Cigars    Smokeless tobacco: Never Used   Substance and Sexual Activity    Alcohol use:  Yes     Alcohol/week: 3 0 standard drinks     Types: 3 Cans of beer per week     Frequency: 2-4 times a month     Drinks per session: 3 or 4     Comment: occassionally    Drug use: No    Sexual activity: Not on file   Lifestyle    Physical activity:     Days per week: Not on file     Minutes per session: Not on file    Stress: Not on file   Relationships    Social connections:     Talks on phone: Not on file     Gets together: Not on file     Attends Rastafari service: Not on file     Active member of club or organization: Not on file     Attends meetings of clubs or organizations: Not on file     Relationship status: Not on file    Intimate partner violence:     Fear of current or ex partner: Not on file     Emotionally abused: Not on file     Physically abused: Not on file     Forced sexual activity: Not on file   Other Topics Concern    Not on file   Social History Narrative    Not on file         Medications: All current active meds have been reviewed and current meds:  Scheduled Meds:  Current Facility-Administered Medications:  acetaminophen 650 mg Oral Q6H PRN ROCKY Caro    albuterol 2 5 mg Nebulization Q4H PRN Nupur Rogers MD    bisacodyl 10 mg Rectal Daily PRN Lisa Shelby DO    cefTRIAXone 1,000 mg Intravenous Q24H Gee Estrada PA-C Last Rate: Stopped (09/23/19 0600)   enoxaparin 40 mg Subcutaneous Daily ROCKY Myrick    insulin lispro 5-25 Units Subcutaneous TID With Meals ROCKY Caro    levETIRAcetam 1,000 mg Intravenous Q12H ROCKY Nowak Last Rate: 1,000 mg (09/23/19 0956)   nicotine 1 patch Transdermal Daily ROCKY Nowak    ondansetron 4 mg Intravenous Q6H PRN ROCKY Nowak    phenytoin 300 mg Intravenous Q12H [address] Payton Miller MD Last Rate: Stopped (09/22/19 2222)   polyethylene glycol 17 g Oral Daily Richard Vicente PA-C    potassium chloride 40 mEq Oral BID Gee Estrada PA-C    potassium-sodium phosphateS 2 tablet Oral BID With Meals Gee Estrada PA-C    valproic acid 500 mg Oral Q8H [address] ROCKY Myrick      Continuous Infusions:   PRN Meds:   acetaminophen    albuterol    bisacodyl    ondansetron       ROS:   Review of Systems   Constitutional: Positive for fatigue  Negative for chills and fever  HENT: Negative for hearing loss  Eyes: Negative for photophobia and visual disturbance  Respiratory: Negative for chest tightness and shortness of breath  Cardiovascular: Negative for chest pain and palpitations  Gastrointestinal: Negative for nausea and vomiting  Endocrine: Negative for cold intolerance and heat intolerance  Genitourinary: Negative for difficulty urinating     Musculoskeletal: Negative for back pain, neck pain and neck stiffness  Skin: Negative for rash  Neurological: Positive for headaches  Negative for dizziness, tremors, facial asymmetry, speech difficulty, weakness, light-headedness and numbness  Hematological: Negative for adenopathy  Psychiatric/Behavioral: Positive for hallucinations  Negative for confusion  The patient is not nervous/anxious  Vitals:   /99 (BP Location: Left arm)   Pulse 98   Temp 98 6 °F (37 °C) (Oral)   Resp 12   Ht 5' 10" (1 778 m)   Wt 117 kg (257 lb 0 9 oz)   SpO2 92%   BMI 36 88 kg/m²     Physical Exam:   Physical Exam   Constitutional: He is oriented to person, place, and time  He appears well-developed and well-nourished  No distress  Drowsy   HENT:   Head: Normocephalic and atraumatic  Eyes: Conjunctivae and EOM are normal  Right eye exhibits no discharge  Left eye exhibits no discharge  No scleral icterus  Neck: Normal range of motion  Neck supple  Cardiovascular: Normal rate  Pulmonary/Chest: Effort normal  No respiratory distress  Abdominal: Soft  Musculoskeletal: Normal range of motion  He exhibits no edema, tenderness or deformity  Neurological: He is alert and oriented to person, place, and time  No sensory deficit  Skin: Skin is warm and dry  No rash noted  He is not diaphoretic  No pallor  Psychiatric: His speech is normal    Auditory hallucinations   Nursing note and vitals reviewed  Neurologic Exam     Mental Status   Oriented to person, place, and time  Speech: speech is normal   Level of consciousness: drowsy  Oriented to person, place, month  Can state that it is his mother's birthday but not the specific date  Cranial Nerves     CN II   Visual acuity: (Grossly intact)    CN III, IV, VI   Extraocular motions are normal    Conjugate gaze: present    CN V   Facial sensation intact  CN VII   Facial expression full, symmetric       CN VIII   Hearing: intact    CN XII   Tongue: not atrophic  Fasciculations: absent  Tongue deviation: none    Motor Exam   Muscle bulk: normalMoves all 4 extremities spontaneously  No drift noted in arms or legs  Sensory Exam   Light touch normal      Gait, Coordination, and Reflexes     Tremor   Resting tremor: absent  Intention tremor: absent  Action tremor: absent          Labs: I have personally reviewed pertinent reports  Recent Results (from the past 24 hour(s))   Fingerstick Glucose (POCT)    Collection Time: 09/22/19 11:29 AM   Result Value Ref Range    POC Glucose 325 (H) 65 - 140 mg/dl   Fingerstick Glucose (POCT)    Collection Time: 09/22/19  5:48 PM   Result Value Ref Range    POC Glucose 178 (H) 65 - 140 mg/dl   Fingerstick Glucose (POCT)    Collection Time: 09/22/19  7:45 PM   Result Value Ref Range    POC Glucose 167 (H) 65 - 140 mg/dl   Fingerstick Glucose (POCT)    Collection Time: 09/22/19 11:40 PM   Result Value Ref Range    POC Glucose 163 (H) 65 - 140 mg/dl   Basic metabolic panel    Collection Time: 09/23/19  5:33 AM   Result Value Ref Range    Sodium 143 136 - 145 mmol/L    Potassium 3 0 (L) 3 5 - 5 3 mmol/L    Chloride 110 (H) 100 - 108 mmol/L    CO2 27 21 - 32 mmol/L    ANION GAP 6 4 - 13 mmol/L    BUN 8 5 - 25 mg/dL    Creatinine 0 69 0 60 - 1 30 mg/dL    Glucose 137 65 - 140 mg/dL    Calcium 8 4 8 3 - 10 1 mg/dL    eGFR 110 ml/min/1 73sq m   CBC    Collection Time: 09/23/19  5:33 AM   Result Value Ref Range    WBC 7 49 4 31 - 10 16 Thousand/uL    RBC 4 25 3 88 - 5 62 Million/uL    Hemoglobin 12 8 12 0 - 17 0 g/dL    Hematocrit 39 9 36 5 - 49 3 %    MCV 94 82 - 98 fL    MCH 30 1 26 8 - 34 3 pg    MCHC 32 1 31 4 - 37 4 g/dL    RDW 13 5 11 6 - 15 1 %    Platelets 451 (L) 878 - 390 Thousands/uL    MPV 10 6 8 9 - 12 7 fL   Phosphorus    Collection Time: 09/23/19  5:33 AM   Result Value Ref Range    Phosphorus 2 4 (L) 2 7 - 4 5 mg/dL   Fingerstick Glucose (POCT)    Collection Time: 09/23/19  5:53 AM   Result Value Ref Range    POC Glucose 150 (H) 65 - 140 mg/dl       Imaging:  I have personally reviewed pertinent imaging and I have personally reviewed PACS reports  EKG, Pathology, and Other Studies: I have personally reviewed pertinent reports  VTE Prophylaxis: Sequential compression device (Venodyne)  and Enoxaparin (Lovenox)        Total time spent today 25 minutes  Greater than 50% of total time was spent with the patient and / or family counseling and / or coordination of care   A description of the counseling / coordination of care: Discussed medications and side effects, results of vEEG

## 2019-09-23 NOTE — PROGRESS NOTES
Patient visited by [facility] family present received blessing and was anointed       09/23/19 1300   Clinical Encounter Type   Visited With Patient and family together   Routine Visit Follow-up   Continue Visiting Yes   Sacramental Encounters   Sacrament of Sick-Anointing Anointed   Sacrament Other Other (Comment)

## 2019-09-23 NOTE — PLAN OF CARE
Problem: OCCUPATIONAL THERAPY ADULT  Goal: Performs self-care activities at highest level of function for planned discharge setting  See evaluation for individualized goals  Description  Treatment Interventions: ADL retraining, Functional transfer training, UE strengthening/ROM, Endurance training, Cognitive reorientation, Patient/family training, Continued evaluation, Energy conservation, Activityengagement          See flowsheet documentation for full assessment, interventions and recommendations  Note:   Limitation: Decreased ADL status, Decreased Safe judgement during ADL, Decreased cognition, Decreased endurance, Decreased self-care trans, Decreased high-level ADLs  Prognosis: Fair  Assessment: Pt is a 45 y/o male seen for OT eval s/p adm to B as a transfer from [facility] due to multiple episode of dizziness, ambulatory dysfunction and staring spells  Pt underwent EEG revealing ongoing seizure activity  Pt is dx'd w/ refractory status epilepticus  Pt  has a past medical history of Hypertension  , and obesity, SAH and subdural hematoma s/p fall this year  Pt with active OT orders and up and OOB as tolerated orders  Pt lives with spouse, 2 kids and mother in law in 2 , 0 FLORY, 1st floor setup available, but full flight to 2nd floor  Pt was I w/ ADLS, has assist w/ IADLS from his mother in law, drove, & required no use of DME PTA but has grab bars in the bathroom  Pt is currently demonstrating the following occupational deficits: S UB ADLS, Min A LB ADLS, Min A transfers and functional mobility w/ HHA, +VC for safety 2/2 impulsivity and decreased safety awareness   These deficits that are impacting pt's baseline areas of occupation are a result of the following impairments: pain, endurance, activity tolerance, functional mobility, forward functional reach, balance, trunk control, functional standing tolerance, decreased I w/ ADLS/IADLS, cognitive impairments, decreased safety awareness and decreased insight into deficits  The following Occupational Performance Areas to address include: grooming, bathing/shower, toilet hygiene, dressing, medication management, socialization, health maintenance, functional mobility, community mobility, clothing management, household maintenance, care of children and social participation  Pt scored overall 55/100 on the Barthel Index  Based on the aforementioned OT evaluation, functional performance deficits, and assessments, pt has been identified as a moderate complexity evaluation  Recommend home w/ family support pending progress and cog eval (MOCA) upon D/C, once medically stable   Pt to continue to benefit from acute immediate OT services to address the following goals 3-5x/week to  w/in 7-10 days:      OT Discharge Recommendation: Home with family support(pending progress & cog eval (MOCA))  OT - OK to Discharge: Yes(when medically stable)     Anny Ansari MS, OTR/L

## 2019-09-23 NOTE — PROGRESS NOTES
At approximately 1930, patient reported nausea and increasing headache  Patient medicated with Zofran and Toradol with some improvement  At approximately 2030, patient began to report hearing high-pitched children's voices coming from his left side which were distressing to him  While providing emotional support, patient noted to begin turning his head to the far right with a jerking motion, right-sided nystagmus, and right eye gaze preference  This episode lasted approximately 10 seconds  Mounika Joy made aware and presented to bedside to evaluate patient  The patient had several more episodes similar to this, each time beginning with reports of hearing loud children's voices, a sudden headache, and nausea  He denies visual disturbances  The patient remains closely monitored and emotional support given

## 2019-09-23 NOTE — PROGRESS NOTES
Patient with episode x 2 of twitching right neck and hearing children screaming and unresponsive state lasting 1 min  Patient returned to baseline without post ictal state  Patient was admitted with SE  Keppra 1 gram additional administered after first event  Versed given with second event  EEG was discontinued 9/22/19  Unclear if event seizure, plan to discuss with neurology in am  PMR consult placed for post concussive syndrome with headaches and new events unclear if seizure  Patient had TBI with SAH 2 weeks ago       Cc time 30min

## 2019-09-23 NOTE — ASSESSMENT & PLAN NOTE
S/p video eeg with refractory focal status epilepticus despite 3 AED therapy  Underwent elective intubation for burst suppression 9/19 -9/20/19  Versed gtt titrated off   Neurology following  AED's per neuro, titrated up dilantin today after discussion with Epileptology  Check PM trough levels at 10pm tonigh 9/23  Hallucinations overnight, events reviewed with Neurology, continue to titrate AED's

## 2019-09-23 NOTE — PHYSICAL THERAPY NOTE
Physical Therapy Evaluation     Patient's Name: [facility]  Admitting Diagnosis  [facility] [[id number]]    Problem List  Patient Active Problem List   Diagnosis    Essential hypertension    Hypokalemia    Diabetes ([facility] )    Dizziness    Seizure ([facility] )    Hyperglycemia    Refractory status epilepticus    TBI (traumatic brain injury) ([facility] )    Acute respiratory failure     Paraphimosis       Past Medical History  Past Medical History:   Diagnosis Date    Hypertension        Past Surgical History  Past Surgical History:   Procedure Laterality Date    KNEE SURGERY      LEG SURGERY            09/23/19 8823   Note Type   Note type Eval only   Pain Assessment   Pain Assessment No/denies pain   Pain Score No Pain   Home Living   Type of Home House  (0 FLORY)   Home Layout Two level; Able to live on main level with bedroom/bathroom  (Pt could have first floor setup if needed)   Home Equipment   (None)   Prior Function   Level of [place] Independent with ADLs and functional mobility   Lives With Spouse; Family  (x2 children, mother in law)   Receives Help From Family   ADL Assistance Independent   IADLs Independent   Comments Per CM, pt could have 24/7 supervision upon D/C home   Restrictions/Precautions   Weight Bearing Precautions Per Order No   Other Precautions Impulsive;Cognitive; Chair Alarm; Bed Alarm;Multiple lines;Telemetry; Fall Risk;Seizure   General   Family/Caregiver Present No   Cognition   Overall Cognitive Status Impaired   Arousal/Participation Cooperative   Attention Attends with cues to redirect   Orientation Level Oriented X4   Following Commands Follows one step commands without difficulty   Comments Pt is impulsive throughout evaluation requiring frequent cues for safety with activity  Pt additionally experiencing seizure aura of children laughing during evaluation  RN notified, okay to mobilize to chair  Pt was responsive throughout evaluation     RLE Assessment   RLE Assessment WFL   LLE Assessment   LLE Assessment WFL   Bed Mobility   Supine to Sit 5  Supervision   Additional items Impulsive   Transfers   Sit to Stand 5  Supervision   Additional items Impulsive   Stand to Sit 5  Supervision   Additional items Impulsive   Ambulation/Elevation   Gait pattern Excessively slow  (Easily distracted, decreased safety awareness)   Gait Assistance 4  Minimal assist   Additional items Assist x 1;Verbal cues; Tactile cues   Assistive Device None   Distance 3 ft   Balance   Static Sitting Fair   Dynamic Sitting Fair -   Static Standing Poor +   Dynamic Standing Poor +   Ambulatory Poor +   Endurance Deficit   Endurance Deficit Yes   Endurance Deficit Description Pt experiencing seizure aura during session   Activity Tolerance   Activity Tolerance Patient limited by fatigue   Medical Staff Made Aware OT   Nurse Made Aware Yes [facility]   Assessment   Prognosis Good   Problem List Decreased endurance; Impaired balance;Decreased mobility; Decreased coordination;Decreased safety awareness; Impaired judgement   Assessment Pt is a 43 yo male presenting to [facility] on 9/18/19 with multiple episodes of dizziness, ambulatory dysfunction, and staring spells  Routine EEG revealing several extra graphic seizure setting from right hemisphere suspicious for ongoing active subclinical seizure activity, transferred to [facility] for vEEG  Pt was transferred to MICU for elective intubation to achieve high dose sedation with burst suppression on vEEG  Pt extubated prior to PT evaluation  PMH significant for: uncontrolled hypertension, obesity, recent history subarachnoid hemorrhage and subdural hematoma s/p fall 8/2019  Pt is supine at start of session and agreeable to therapy  Pt is impulsive throughout evaluation requiring frequent cues for safety with activity  Pt additionally experiencing seizure aura of children laughing during evaluation  RN notified, okay to mobilize to chair  Pt was responsive throughout evaluation   Pt transferred supine to sit with supervision  Pt transferred sit <> stand with supervision  Pt ambulated 3 ft to bedside chair with Titus and frequent cues for safety  Further ambulation deferred 2/2 pt experiencing seizure aura  Pt is anticipated safe to D/C home with family support pending increased ambulation  PT to follow   Goals   Patient Goals To go home   STG Expiration Date 10/07/19   Short Term Goal #1 In 10 sessions pt will: 1  Transfer supine <> sit independently  2  Transfer sit <> stand independently  3  Ambulate >150 ft independently  4  Navigate 1 full flight of steps independently  5  Perform LE exercise program   Treatment Day 0   Plan   Treatment/Interventions Functional transfer training;LE strengthening/ROM; Therapeutic exercise; Endurance training;Bed mobility;Gait training;Spoke to nursing;OT;Spoke to case management   PT Frequency   (3-5x/wk)   Recommendation   Recommendation Home with family support  (Pending increased ambulation)   PT - OK to Discharge No  (Pending increased ambulation)   Modified [place] Scale   Modified [place] Scale 4   Barthel Index   Feeding 10   Bathing 0   Grooming Score 5   Dressing Score 5   Bladder Score 10   Bowels Score 10   Toilet Use Score 5   Transfers (Bed/Chair) Score 10   Mobility (Level Surface) Score 0   Stairs Score 0   Barthel Index Score 55         Briseyda Lund, PT, DPT

## 2019-09-24 PROBLEM — J96.00 ACUTE RESPIRATORY FAILURE (HCC): Status: RESOLVED | Noted: 2019-09-19 | Resolved: 2019-09-24

## 2019-09-24 PROBLEM — N47.2 PARAPHIMOSIS: Status: RESOLVED | Noted: 2019-09-21 | Resolved: 2019-09-24

## 2019-09-24 PROBLEM — E87.6 HYPOKALEMIA: Status: RESOLVED | Noted: 2019-01-14 | Resolved: 2019-09-24

## 2019-09-24 LAB
ANION GAP SERPL CALCULATED.3IONS-SCNC: 10 MMOL/L (ref 4–13)
BASOPHILS # BLD AUTO: 0.06 THOUSANDS/ΜL (ref 0–0.1)
BASOPHILS NFR BLD AUTO: 1 % (ref 0–1)
BUN SERPL-MCNC: 6 MG/DL (ref 5–25)
CALCIUM SERPL-MCNC: 9 MG/DL (ref 8.3–10.1)
CHLORIDE SERPL-SCNC: 113 MMOL/L (ref 100–108)
CO2 SERPL-SCNC: 24 MMOL/L (ref 21–32)
CREAT SERPL-MCNC: 0.66 MG/DL (ref 0.6–1.3)
EOSINOPHIL # BLD AUTO: 0.34 THOUSAND/ΜL (ref 0–0.61)
EOSINOPHIL NFR BLD AUTO: 5 % (ref 0–6)
ERYTHROCYTE [DISTWIDTH] IN BLOOD BY AUTOMATED COUNT: 13.4 % (ref 11.6–15.1)
GFR SERPL CREATININE-BSD FRML MDRD: 112 ML/MIN/1.73SQ M
GLUCOSE SERPL-MCNC: 104 MG/DL (ref 65–140)
GLUCOSE SERPL-MCNC: 110 MG/DL (ref 65–140)
GLUCOSE SERPL-MCNC: 127 MG/DL (ref 65–140)
GLUCOSE SERPL-MCNC: 162 MG/DL (ref 65–140)
GLUCOSE SERPL-MCNC: 165 MG/DL (ref 65–140)
HCT VFR BLD AUTO: 42.1 % (ref 36.5–49.3)
HGB BLD-MCNC: 13 G/DL (ref 12–17)
IMM GRANULOCYTES # BLD AUTO: 0.06 THOUSAND/UL (ref 0–0.2)
IMM GRANULOCYTES NFR BLD AUTO: 1 % (ref 0–2)
LYMPHOCYTES # BLD AUTO: 1.25 THOUSANDS/ΜL (ref 0.6–4.47)
LYMPHOCYTES NFR BLD AUTO: 18 % (ref 14–44)
MCH RBC QN AUTO: 29.1 PG (ref 26.8–34.3)
MCHC RBC AUTO-ENTMCNC: 30.9 G/DL (ref 31.4–37.4)
MCV RBC AUTO: 94 FL (ref 82–98)
MONOCYTES # BLD AUTO: 0.78 THOUSAND/ΜL (ref 0.17–1.22)
MONOCYTES NFR BLD AUTO: 11 % (ref 4–12)
NEUTROPHILS # BLD AUTO: 4.59 THOUSANDS/ΜL (ref 1.85–7.62)
NEUTS SEG NFR BLD AUTO: 64 % (ref 43–75)
NRBC BLD AUTO-RTO: 0 /100 WBCS
PHENYTOIN FREE SERPL-MCNC: 1.1 UG/ML (ref 1–2)
PHENYTOIN FREE SERPL-MCNC: 1.2 UG/ML (ref 1–2)
PLATELET # BLD AUTO: 168 THOUSANDS/UL (ref 149–390)
PMV BLD AUTO: 10.4 FL (ref 8.9–12.7)
POTASSIUM SERPL-SCNC: 3.4 MMOL/L (ref 3.5–5.3)
PROCALCITONIN SERPL-MCNC: 0.48 NG/ML
RBC # BLD AUTO: 4.46 MILLION/UL (ref 3.88–5.62)
SODIUM SERPL-SCNC: 147 MMOL/L (ref 136–145)
WBC # BLD AUTO: 7.08 THOUSAND/UL (ref 4.31–10.16)

## 2019-09-24 PROCEDURE — 99233 SBSQ HOSP IP/OBS HIGH 50: CPT | Performed by: INTERNAL MEDICINE

## 2019-09-24 PROCEDURE — 84145 PROCALCITONIN (PCT): CPT | Performed by: PHYSICIAN ASSISTANT

## 2019-09-24 PROCEDURE — 82948 REAGENT STRIP/BLOOD GLUCOSE: CPT

## 2019-09-24 PROCEDURE — 99232 SBSQ HOSP IP/OBS MODERATE 35: CPT | Performed by: PSYCHIATRY & NEUROLOGY

## 2019-09-24 PROCEDURE — 99254 IP/OBS CNSLTJ NEW/EST MOD 60: CPT | Performed by: INTERNAL MEDICINE

## 2019-09-24 PROCEDURE — 85025 COMPLETE CBC W/AUTO DIFF WBC: CPT | Performed by: PHYSICIAN ASSISTANT

## 2019-09-24 PROCEDURE — 80048 BASIC METABOLIC PNL TOTAL CA: CPT | Performed by: PHYSICIAN ASSISTANT

## 2019-09-24 RX ORDER — POTASSIUM CHLORIDE 20 MEQ/1
40 TABLET, EXTENDED RELEASE ORAL ONCE
Status: COMPLETED | OUTPATIENT
Start: 2019-09-24 | End: 2019-09-24

## 2019-09-24 RX ORDER — PHENYTOIN SODIUM 100 MG/1
300 CAPSULE, EXTENDED RELEASE ORAL EVERY 12 HOURS SCHEDULED
Status: DISCONTINUED | OUTPATIENT
Start: 2019-09-24 | End: 2019-09-25 | Stop reason: HOSPADM

## 2019-09-24 RX ORDER — PHENYTOIN 50 MG/1
300 TABLET, CHEWABLE ORAL EVERY 12 HOURS SCHEDULED
Status: DISCONTINUED | OUTPATIENT
Start: 2019-09-24 | End: 2019-09-24

## 2019-09-24 RX ORDER — LEVETIRACETAM 500 MG/1
1000 TABLET ORAL EVERY 12 HOURS SCHEDULED
Status: DISCONTINUED | OUTPATIENT
Start: 2019-09-24 | End: 2019-09-25 | Stop reason: HOSPADM

## 2019-09-24 RX ORDER — HYDROCHLOROTHIAZIDE 12.5 MG/1
12.5 TABLET ORAL DAILY
Status: DISCONTINUED | OUTPATIENT
Start: 2019-09-24 | End: 2019-09-25 | Stop reason: HOSPADM

## 2019-09-24 RX ORDER — INSULIN GLARGINE 100 [IU]/ML
25 INJECTION, SOLUTION SUBCUTANEOUS
Status: DISCONTINUED | OUTPATIENT
Start: 2019-09-24 | End: 2019-09-25 | Stop reason: HOSPADM

## 2019-09-24 RX ORDER — LISINOPRIL 20 MG/1
40 TABLET ORAL DAILY
Status: DISCONTINUED | OUTPATIENT
Start: 2019-09-24 | End: 2019-09-25 | Stop reason: HOSPADM

## 2019-09-24 RX ADMIN — VALPROIC ACID 750 MG: 500 SOLUTION ORAL at 05:15

## 2019-09-24 RX ADMIN — DIVALPROEX SODIUM 750 MG: 250 TABLET, DELAYED RELEASE ORAL at 21:07

## 2019-09-24 RX ADMIN — ENOXAPARIN SODIUM 40 MG: 40 INJECTION SUBCUTANEOUS at 09:27

## 2019-09-24 RX ADMIN — INSULIN LISPRO 4 UNITS: 100 INJECTION, SOLUTION INTRAVENOUS; SUBCUTANEOUS at 12:31

## 2019-09-24 RX ADMIN — INSULIN LISPRO 5 UNITS: 100 INJECTION, SOLUTION INTRAVENOUS; SUBCUTANEOUS at 16:50

## 2019-09-24 RX ADMIN — VALPROIC ACID 750 MG: 500 SOLUTION ORAL at 12:33

## 2019-09-24 RX ADMIN — INSULIN GLARGINE 25 UNITS: 100 INJECTION, SOLUTION SUBCUTANEOUS at 21:08

## 2019-09-24 RX ADMIN — HYDROCHLOROTHIAZIDE 12.5 MG: 12.5 TABLET ORAL at 12:32

## 2019-09-24 RX ADMIN — PHENYTOIN SODIUM 300 MG: 50 INJECTION INTRAMUSCULAR; INTRAVENOUS at 09:31

## 2019-09-24 RX ADMIN — INSULIN LISPRO 1 UNITS: 100 INJECTION, SOLUTION INTRAVENOUS; SUBCUTANEOUS at 21:11

## 2019-09-24 RX ADMIN — LISINOPRIL 40 MG: 20 TABLET ORAL at 12:32

## 2019-09-24 RX ADMIN — LABETALOL 20 MG/4 ML (5 MG/ML) INTRAVENOUS SYRINGE 10 MG: at 21:17

## 2019-09-24 RX ADMIN — METOPROLOL TARTRATE 50 MG: 50 TABLET, FILM COATED ORAL at 09:27

## 2019-09-24 RX ADMIN — PHENYTOIN SODIUM 300 MG: 100 CAPSULE ORAL at 21:16

## 2019-09-24 RX ADMIN — LEVETIRACETAM 1000 MG: 100 INJECTION, SOLUTION INTRAVENOUS at 10:12

## 2019-09-24 RX ADMIN — POTASSIUM CHLORIDE 40 MEQ: 1500 TABLET, EXTENDED RELEASE ORAL at 12:31

## 2019-09-24 RX ADMIN — LEVETIRACETAM 1000 MG: 500 TABLET, FILM COATED ORAL at 21:07

## 2019-09-24 RX ADMIN — PHENYTOIN SODIUM 300 MG: 50 INJECTION INTRAMUSCULAR; INTRAVENOUS at 00:23

## 2019-09-24 RX ADMIN — METOPROLOL TARTRATE 50 MG: 50 TABLET, FILM COATED ORAL at 21:09

## 2019-09-24 NOTE — PROGRESS NOTES
Pastoral Care Progress Note    2019  Patient: Arun López  : 1970  Admission Date & Time: 20199  MRN: [id number] [facility]: [id number]                     Chaplaincy Interventions Utilized:     Ritual: Provided prayer  Patient visited by [facility] and blessing was provided   Pt anointed during a previous Visit        Chaplaincy Outcomes Achieved:  Spiritual Coping Strategies Utilized:   Spiritual comfort     19 1400   Clinical Encounter Type   Visited With Patient and family together   Routine Visit Follow-up   Sacramental Encounters   Sacrament of Sick-Anointing Anointed

## 2019-09-24 NOTE — CONSULTS
Consultation - [facility]    Patient Information: Lexie Cabral  44 y o  male MRN: [id number]  [facility] Encounter: [id number]  PCP: Hua Rodriguez  Date of Admission:  09/24/19    ASSESSMENT:  45 yo M with recent subarachnoid hemorrhage in 8/2019 who came in with subclinical status epilepticus  Pt was noted to have elevated hemoglobin A1c 13 2, denying knowledge of diagnosis of diabetes  Endocrinology was consulted for diabetes management  Hospital Problem List:     Principal Problem:    Refractory status epilepticus  Active Problems:    Essential hypertension    Hypokalemia    Diabetes (HCC)    Seizure (HCC)    TBI (traumatic brain injury) ([facility] [address] )    Acute respiratory failure     Paraphimosis      PLAN:  Type 2 diabetes  Pt states he did not know he had diabetes  A1c 13 2, was previously 8 2 in 1/2019, 6 6 in 2018  Patient was started on 30 units Lantus and sliding scale insulin coverage  Fasting glucose this morning was 104  Can continue with 30 units Lantus, sliding scale coverage and add mealtime insulin as needed  May need to transition to NPH as patient does not have medical insurance  Patient will need insulin teaching prior to discharge  Counseled patient on avoidance of high sugar liquids like soda or juice and on importance of treatment of diabetes/controlling blood sugars to avoid complications of diabetes like retinopathy, neuropathy, nephropathy  Will need diabetic eye exam, monofilament testing, urine microalbumin as an outpatient    Reason for consultation:   Hyperglycemia, type 2 diabetes    History of Present Illness:    Lexie Cabral  is a 44 y o  male with recent right-sided traumatic subarachnoid hemorrhage after fall in August 2019, hypertension who initially presented to [facility] with headache, gait disability, dysarthria, staring spells, auditory hallucinations and was found to be in status epilepticus    Patient underwent elective intubation and was extubated on 09/22  Endocrinology is consulted for hyperglycemia and type 2 diabetes mellitus  Patient denies ever knowing he had diabetes, hemoglobin A1c on this admission was 13 2  Per chart review, hemoglobin A1c was 8 2 on January 2019 and 6 6 in 2018  Per chart review, patient was recommended PCP follow-up for treatment of diabetes in January 2019  Patient states he did not know to follow-up and has not been taking diabetes medications  Patient reports he last saw his primary care provider 3 months ago  Patient reports 30 lb of weight loss over the last 3 months, and endorses polyuria (using the bathroom 5-6 times per night) and polydipsia (patient states he drinks 2 beers of soda/juice because of his thirst daily)  Patient reports worsening in his vision, denies numbness or tingling in his extremities  Patient reports family history of type 2 diabetes in his mother  Today, pt has no new complaints, has been eating well, is interested in going home  Review of Systems:    Review of Systems   Constitutional: Negative for fatigue and fever  Respiratory: Negative for shortness of breath  Cardiovascular: Negative for chest pain, palpitations and leg swelling  Gastrointestinal: Negative for abdominal pain, constipation, diarrhea, nausea and vomiting  Genitourinary: Positive for frequency  Negative for dysuria and hematuria  Neurological: Negative for dizziness and light-headedness  Psychiatric/Behavioral: Positive for hallucinations (pt reports hearing auditory hallucinations on and off)         Past Medical and Surgical History:     Past Medical History:   Diagnosis Date    Hypertension        Past Surgical History:   Procedure Laterality Date    KNEE SURGERY      LEG SURGERY         Meds/Allergies:    all medications and allergies reviewed    Allergies: No Known Allergies  History:     Marital Status: Single   Occupation:   Substance Use History:   Social History     Substance and Sexual Activity Alcohol Use Yes    Alcohol/week: 3 0 standard drinks    Types: 3 Cans of beer per week    Frequency: 2-4 times a month    Drinks per session: 3 or 4    Comment: occassionally     Social History     Tobacco Use   Smoking Status Current Some Day Smoker    Packs/day: 0 20    Types: Cigars   Smokeless Tobacco Never Used     Social History     Substance and Sexual Activity   Drug Use No       Family History:    non-contributory    Physical Exam:     Vitals:   Blood Pressure: (!) 168/114 (09/24/19 0926)  Pulse: 94 (09/24/19 0926)  Temperature: 98 6 °F (37 °C) (09/24/19 0757)  Temp Source: Oral (09/23/19 1200)  Respirations: 18 (09/24/19 0757)  Height: 5' 10" (177 8 cm) (09/19/19 1059)  Weight - Scale: 117 kg (257 lb 0 9 oz) (09/20/19 0300)  SpO2: 94 % (09/24/19 0926)    Physical Exam   Constitutional: He is oriented to person, place, and time  He appears well-developed and well-nourished  HENT:   Head: Normocephalic and atraumatic  Eyes: Right eye exhibits no discharge  Left eye exhibits no discharge  Cardiovascular: Normal rate and regular rhythm  Exam reveals no gallop  No murmur heard  Pulmonary/Chest: Effort normal and breath sounds normal  No stridor  No respiratory distress  He has no wheezes  Abdominal: Soft  He exhibits no distension  There is no tenderness  There is no guarding  Musculoskeletal: He exhibits no edema  Neurological: He is alert and oriented to person, place, and time  Skin: Skin is warm and dry  Lab and Imaging Results: I have personally reviewed pertinent reports        Results from last 7 days   Lab Units 09/24/19  0438   WBC Thousand/uL 7 08   HEMOGLOBIN g/dL 13 0   HEMATOCRIT % 42 1   PLATELETS Thousands/uL 168   NEUTROS PCT % 64   LYMPHS PCT % 18   MONOS PCT % 11   EOS PCT % 5     Results from last 7 days   Lab Units 09/24/19  0438  09/19/19  1116   POTASSIUM mmol/L 3 4*   < > 3 3*   CHLORIDE mmol/L 113*   < > 103   CO2 mmol/L 24   < > 26   BUN mg/dL 6   < > 11 CREATININE mg/dL 0 66   < > 1 18   CALCIUM mg/dL 9 0   < > 9 3   ALK PHOS U/L  --   --  99   ALT U/L  --   --  53   AST U/L  --   --  36    < > = values in this interval not displayed  Results from last 7 days   Lab Units 09/21/19  0129   INR  1 22*     POC Glucose (mg/dl)   Date Value   09/24/2019 127   09/23/2019 139   09/23/2019 164 (H)   09/23/2019 193 (H)   09/23/2019 150 (H)   09/22/2019 163 (H)   09/22/2019 167 (H)   09/22/2019 178 (H)   09/22/2019 325 (H)   09/22/2019 241 (H)       Xr Chest Portable    Result Date: 9/22/2019  Narrative: CHEST INDICATION:   Evaluate for pneumonia, Hypoxia  COMPARISON:  9/21/2019 EXAM PERFORMED/VIEWS:  XR CHEST PORTABLE FINDINGS:  Lines and tubes are unchanged from prior exam  Cardiomediastinal silhouette appears unremarkable  There may be minimal left basilar retrocardiac atelectasis/infiltrate  No pneumothorax or pleural effusion  Osseous structures appear within normal limits for patient age  Impression: There may be minimal left basilar retrocardiac atelectasis/infiltrate  Workstation performed: [id number]     Xr Chest Portable    Result Date: 9/21/2019  Narrative: CHEST INDICATION:   fever  COMPARISON:  Chest x-ray from 9/19/2019  EXAM PERFORMED/VIEWS:  XR CHEST PORTABLE FINDINGS:  Endotracheal tube is unchanged  The nasogastric tube has been advanced with tip now in the stomach  Right-sided PICC line tip is in the SVC  Cardiomediastinal silhouette appears unremarkable  The lungs are clear  No pneumothorax or pleural effusion  Osseous structures appear within normal limits for patient age  Impression: No acute cardiopulmonary disease  The nasogastric tube has been advanced with tip now in the stomach  Workstation performed: [id number]     Xr Chest Portable    Result Date: 9/19/2019  Narrative: CHEST INDICATION:   ETT placement    History of seizures; dizziness COMPARISON:  1/14/2019 EXAM PERFORMED/VIEWS:  XR CHEST PORTABLE FINDINGS:  Endotracheal tube is present, in satisfactory position with its tip above the level of the francisca  Enteric tube tip is below left hemidiaphragm above the side port is slightly above the level of the left hemidiaphragm and could be advanced  Cardiomediastinal silhouette appears unremarkable  The lungs are clear  No pneumothorax or pleural effusion  Mild hypoventilation is seen  Osseous structures appear within normal limits for patient age  Impression: Status post intubation  Nasogastric tube side port is still in the distal esophagus  The tube could be advanced 3 to 6 cm if clinically indicated  Result was reported as immediate in the Epic system  Workstation performed: [id number]     Ct Head Without Contrast    Result Date: 9/17/2019  Narrative: CT BRAIN - WITHOUT CONTRAST INDICATION:   Confusion/delirium, altered LOC, unexplained  COMPARISON:  CT head 1/14/2019, MRI brain 1/15/2019 TECHNIQUE:  CT examination of the brain was performed  In addition to axial images, coronal 2D reformatted images were created and submitted for interpretation  Radiation dose length product (DLP) for this visit:  846 19 mGy-cm   This examination, like all CT scans performed in the [facility], was performed utilizing techniques to minimize radiation dose exposure, including the use of iterative  reconstruction and automated exposure control  IMAGE QUALITY:  Diagnostic  FINDINGS: PARENCHYMA:  No intracranial mass, mass effect or midline shift  No CT signs of acute infarction  No acute parenchymal hemorrhage  VENTRICLES AND EXTRA-AXIAL SPACES:  Normal for the patient's age  Ossification of the anterior interhemispheric falx is again identified  VISUALIZED ORBITS AND PARANASAL SINUSES:  No acute abnormality involving the orbits  Mild scattered sinus mucosal thickening is noted  No fluid levels are seen   CALVARIUM AND EXTRACRANIAL SOFT TISSUES:  There is some left parietal scalp soft tissue swelling which could represent contusion  No calvarial fracture  Impression: No acute intracranial abnormality  Left parietal scalp soft tissue swelling which could represent contusion  No calvarial fracture  Workstation performed: [id number]     Xr Chest Portable Icu    Result Date: 9/22/2019  Narrative: CHEST INDICATION:   Hypoxemia  COMPARISON:  Chest x-ray from 9/21/2019 at 12:47 AM  EXAM PERFORMED/VIEWS:  XR CHEST PORTABLE ICU FINDINGS:  Lines and tubes are unchanged from prior exam  Cardiomediastinal silhouette appears unremarkable  The lungs are clear  No pneumothorax or pleural effusion  Osseous structures appear within normal limits for patient age  Impression: No acute cardiopulmonary disease  Workstation performed: [id number]       ** Please Note: Dragon 360 Dictation voice to text software may have been used in the creation of this document   **

## 2019-09-24 NOTE — CONSULTS
Consultation - [facility]    Patient Information: Erasto Meredith  44 y o  male MRN: [id number]  Unit/Bed#: [facility] 706-01 Encounter: [id number]  PCP: Mayco Pavon  Date of Admission:  09/24/19    ASSESSMENT:  45 yo M with recent subarachnoid hemorrhage in 8/2019 who came in with subclinical status epilepticus  Pt was noted to have elevated hemoglobin A1c 13 2, denying knowledge of diagnosis of diabetes  Endocrinology was consulted for diabetes management  Hospital Problem List:     Principal Problem:    Refractory status epilepticus  Active Problems:    Essential hypertension    Hypokalemia    Diabetes (HCC)    Seizure (HCC)    TBI (traumatic brain injury) ([facility] [address] )    Acute respiratory failure     Paraphimosis    PLAN:  Type 2 diabetes  Pt states he did not know he had diabetes  A1c 13 2, was previously 8 2 in 1/2019, 6 6 in 2018  Patient was started on 30 units Lantus and sliding scale insulin coverage  Fasting glucose this morning was 104  Will decrease to 25 units Lantus, 5 units mealtime insulin, sliding scale coverage and adjust as needed  May need to transition to NPH as patient does not have medical insurance  Patient will need insulin teaching prior to discharge  Counseled patient on avoidance of high sugar liquids like soda or juice and on importance of treatment of diabetes/controlling blood sugars to avoid complications of diabetes like retinopathy, neuropathy, nephropathy  Will need diabetic eye exam, monofilament testing, urine microalbumin as an outpatient      Reason for consultation:   Hyperglycemia, type 2 diabetes    History of Present Illness:    Erasto Meredith  is a 44 y o  male with recent right-sided traumatic subarachnoid hemorrhage after fall in August 2019, hypertension who initially presented to [place] [address] with headache, gait disability, dysarthria, staring spells, auditory hallucinations and was found to be in status epilepticus    Patient underwent elective intubation and was extubated on 09/22  Endocrinology is consulted for hyperglycemia and type 2 diabetes mellitus  Patient denies ever knowing he had diabetes, hemoglobin A1c on this admission was 13 2  Per chart review, hemoglobin A1c was 8 2 on January 2019 and 6 6 in 2018  Per chart review, patient was recommended PCP follow-up for treatment of diabetes in January 2019  Patient states he did not know to follow-up and has not been taking diabetes medications  Patient reports he last saw his primary care provider 3 months ago  Patient reports 30 lb of weight loss over the last 3 months, and endorses polyuria (using the bathroom 5-6 times per night) and polydipsia (patient states he drinks 2 beers of soda/juice because of his thirst daily)  Patient reports worsening in his vision, denies numbness or tingling in his extremities  Patient reports family history of type 2 diabetes in his mother  Today, pt has no new complaints, has been eating well, is interested in going home  Review of Systems:    Review of Systems   Constitutional: Negative for fatigue and fever  Respiratory: Negative for shortness of breath  Cardiovascular: Negative for chest pain, palpitations and leg swelling  Gastrointestinal: Negative for abdominal pain, constipation, diarrhea, nausea and vomiting  Genitourinary: Positive for frequency  Negative for dysuria and hematuria  Neurological: Negative for dizziness and light-headedness  Psychiatric/Behavioral: Positive for hallucinations (pt reports hearing auditory hallucinations on and off)         Past Medical and Surgical History:     Past Medical History:   Diagnosis Date    Hypertension        Past Surgical History:   Procedure Laterality Date    KNEE SURGERY      LEG SURGERY         Meds/Allergies:    all medications and allergies reviewed    Allergies: No Known Allergies  History:     Marital Status: Single   Occupation:   Substance Use History:   Social History     Substance and Sexual Activity   Alcohol Use Yes    Alcohol/week: 3 0 standard drinks    Types: 3 Cans of beer per week    Frequency: 2-4 times a month    Drinks per session: 3 or 4    Comment: occassionally     Social History     Tobacco Use   Smoking Status Current Some Day Smoker    Packs/day: 0 20    Types: Cigars   Smokeless Tobacco Never Used     Social History     Substance and Sexual Activity   Drug Use No       Family History:    non-contributory    Physical Exam:     Vitals:   Blood Pressure: (!) 168/114 (09/24/19 0926)  Pulse: 94 (09/24/19 0926)  Temperature: 98 6 °F (37 °C) (09/24/19 0757)  Temp Source: Oral (09/23/19 1200)  Respirations: 18 (09/24/19 0757)  Height: 5' 10" (177 8 cm) (09/19/19 1059)  Weight - Scale: 117 kg (257 lb 0 9 oz) (09/20/19 0300)  SpO2: 94 % (09/24/19 0926)    Physical Exam   Constitutional: He is oriented to person, place, and time  He appears well-developed and well-nourished  HENT:   Head: Normocephalic and atraumatic  Eyes: Right eye exhibits no discharge  Left eye exhibits no discharge  Cardiovascular: Normal rate and regular rhythm  Exam reveals no gallop  No murmur heard  Pulmonary/Chest: Effort normal and breath sounds normal  No stridor  No respiratory distress  He has no wheezes  Abdominal: Soft  He exhibits no distension  There is no tenderness  There is no guarding  Musculoskeletal: He exhibits no edema  Neurological: He is alert and oriented to person, place, and time  Skin: Skin is warm and dry  Lab and Imaging Results: I have personally reviewed pertinent reports        Results from last 7 days   Lab Units 09/24/19  0438   WBC Thousand/uL 7 08   HEMOGLOBIN g/dL 13 0   HEMATOCRIT % 42 1   PLATELETS Thousands/uL 168   NEUTROS PCT % 64   LYMPHS PCT % 18   MONOS PCT % 11   EOS PCT % 5     Results from last 7 days   Lab Units 09/24/19  0438  09/19/19  1116   POTASSIUM mmol/L 3 4*   < > 3 3*   CHLORIDE mmol/L 113*   < > 103   CO2 mmol/L 24   < > 26   BUN mg/dL 6   < > 11   CREATININE mg/dL 0 66   < > 1 18   CALCIUM mg/dL 9 0   < > 9 3   ALK PHOS U/L  --   --  99   ALT U/L  --   --  53   AST U/L  --   --  36    < > = values in this interval not displayed  Results from last 7 days   Lab Units 09/21/19  0129   INR  1 22*     POC Glucose (mg/dl)   Date Value   09/24/2019 127   09/23/2019 139   09/23/2019 164 (H)   09/23/2019 193 (H)   09/23/2019 150 (H)   09/22/2019 163 (H)   09/22/2019 167 (H)   09/22/2019 178 (H)   09/22/2019 325 (H)   09/22/2019 241 (H)       Xr Chest Portable    Result Date: 9/22/2019  Narrative: CHEST INDICATION:   Evaluate for pneumonia, Hypoxia  COMPARISON:  9/21/2019 EXAM PERFORMED/VIEWS:  XR CHEST PORTABLE FINDINGS:  Lines and tubes are unchanged from prior exam  Cardiomediastinal silhouette appears unremarkable  There may be minimal left basilar retrocardiac atelectasis/infiltrate  No pneumothorax or pleural effusion  Osseous structures appear within normal limits for patient age  Impression: There may be minimal left basilar retrocardiac atelectasis/infiltrate  Workstation performed: [id number]     Xr Chest Portable    Result Date: 9/21/2019  Narrative: CHEST INDICATION:   fever  COMPARISON:  Chest x-ray from 9/19/2019  EXAM PERFORMED/VIEWS:  XR CHEST PORTABLE FINDINGS:  Endotracheal tube is unchanged  The nasogastric tube has been advanced with tip now in the stomach  Right-sided PICC line tip is in the SVC  Cardiomediastinal silhouette appears unremarkable  The lungs are clear  No pneumothorax or pleural effusion  Osseous structures appear within normal limits for patient age  Impression: No acute cardiopulmonary disease  The nasogastric tube has been advanced with tip now in the stomach  Workstation performed: [id number]     Xr Chest Portable    Result Date: 9/19/2019  Narrative: CHEST INDICATION:   ETT placement    History of seizures; dizziness COMPARISON:  1/14/2019 EXAM PERFORMED/VIEWS:  XR CHEST PORTABLE FINDINGS: Endotracheal tube is present, in satisfactory position with its tip above the level of the francisca  Enteric tube tip is below left hemidiaphragm above the side port is slightly above the level of the left hemidiaphragm and could be advanced  Cardiomediastinal silhouette appears unremarkable  The lungs are clear  No pneumothorax or pleural effusion  Mild hypoventilation is seen  Osseous structures appear within normal limits for patient age  Impression: Status post intubation  Nasogastric tube side port is still in the distal esophagus  The tube could be advanced 3 to 6 cm if clinically indicated  Result was reported as immediate in the Epic system  Workstation performed: [id number]     Ct Head Without Contrast    Result Date: 9/17/2019  Narrative: CT BRAIN - WITHOUT CONTRAST INDICATION:   Confusion/delirium, altered LOC, unexplained  COMPARISON:  CT head 1/14/2019, MRI brain 1/15/2019 TECHNIQUE:  CT examination of the brain was performed  In addition to axial images, coronal 2D reformatted images were created and submitted for interpretation  Radiation dose length product (DLP) for this visit:  846 19 mGy-cm   This examination, like all CT scans performed in the [facility], was performed utilizing techniques to minimize radiation dose exposure, including the use of iterative  reconstruction and automated exposure control  IMAGE QUALITY:  Diagnostic  FINDINGS: PARENCHYMA:  No intracranial mass, mass effect or midline shift  No CT signs of acute infarction  No acute parenchymal hemorrhage  VENTRICLES AND EXTRA-AXIAL SPACES:  Normal for the patient's age  Ossification of the anterior interhemispheric falx is again identified  VISUALIZED ORBITS AND PARANASAL SINUSES:  No acute abnormality involving the orbits  Mild scattered sinus mucosal thickening is noted  No fluid levels are seen   CALVARIUM AND EXTRACRANIAL SOFT TISSUES:  There is some left parietal scalp soft tissue swelling which could represent contusion  No calvarial fracture  Impression: No acute intracranial abnormality  Left parietal scalp soft tissue swelling which could represent contusion  No calvarial fracture  Workstation performed: [id number]     Xr Chest Portable Icu    Result Date: 9/22/2019  Narrative: CHEST INDICATION:   Hypoxemia  COMPARISON:  Chest x-ray from 9/21/2019 at 12:47 AM  EXAM PERFORMED/VIEWS:  XR CHEST PORTABLE ICU FINDINGS:  Lines and tubes are unchanged from prior exam  Cardiomediastinal silhouette appears unremarkable  The lungs are clear  No pneumothorax or pleural effusion  Osseous structures appear within normal limits for patient age  Impression: No acute cardiopulmonary disease  Workstation performed: [id number]       ** Please Note: Dragon 360 Dictation voice to text software may have been used in the creation of this document   **

## 2019-09-24 NOTE — PROGRESS NOTES
Progress Note - Neurology   [facility]  44 y o  male [id number]  [facility]    Assessment:  3 44year-old admitted due to subclinical right hemispheric status epilepticus in the setting of recent traumatic subarachnoid hemorrhage  Suspect post traumatic seizures and post-concussive headache, will continue medication regimen as listed below  Plan:  - Will transition patient's AED medications from IV to PO:   - Continue Depakote 750mg TID   - Continue Dilantin 300mg BID   - Continue Keppra 1000mg BID  - Recommend Gabapentin 300mg PRN TID for headaches  - Phenytoin free level pending  - PT/OT  - Neuro checks  - [employer] form previously completed and faxed  - Notify Neurology with any changes in neuro examination  - Medical management and supportive care as per primary team  - Recommend checking valproic acid, levetiracetam, and phenytoin levels in 2 weeks  Outpatient order placed  - Patient will need outpatient Epileptology follow up  Communication will be sent to the office  Results:  1  Valproic acid level, total: 66  2  Phenytoin level, total: 12 2    Subjective:   Patient reports that he is doing well  He reports that his headache is a 1/10  He denies any other episodes of seizure like activity  He is still experiencing auditory hallucinations of a kid screaming and occasional voices  He denies chest pain, shortness of breath, abdominal pain, weakness, and numbness      Past Medical History:   Diagnosis Date    Hypertension      Past Surgical History:   Procedure Laterality Date    KNEE SURGERY      LEG SURGERY       Family History   Family history unknown: Yes     Social History     Socioeconomic History    Marital status: Single     Spouse name: Not on file    Number of children: Not on file    Years of education: Not on file    Highest education level: Not on file   Occupational History    Not on file   Social Needs    Financial resource strain: Not on file   Eyad-Karyn insecurity:     Worry: Not on file     Inability: Not on file    Transportation needs:     Medical: Not on file     Non-medical: Not on file   Tobacco Use    Smoking status: Current Some Day Smoker     Packs/day: 0 20     Types: Cigars    Smokeless tobacco: Never Used   Substance and Sexual Activity    Alcohol use: Yes     Alcohol/week: 3 0 standard drinks     Types: 3 Cans of beer per week     Frequency: 2-4 times a month     Drinks per session: 3 or 4     Comment: occassionally    Drug use: No    Sexual activity: Not on file   Lifestyle    Physical activity:     Days per week: Not on file     Minutes per session: Not on file    Stress: Not on file   Relationships    Social connections:     Talks on phone: Not on file     Gets together: Not on file     Attends Church service: Not on file     Active member of club or organization: Not on file     Attends meetings of clubs or organizations: Not on file     Relationship status: Not on file    Intimate partner violence:     Fear of current or ex partner: Not on file     Emotionally abused: Not on file     Physically abused: Not on file     Forced sexual activity: Not on file   Other Topics Concern    Not on file   Social History Narrative    Not on file         Medications:   All current active meds have been reviewed and current meds:  Scheduled Meds:  Current Facility-Administered Medications:  acetaminophen 650 mg Oral Q6H PRN Gee Estrada PA-C    albuterol 2 5 mg Nebulization Q4H PRN Gee Estrada PA-C    bisacodyl 10 mg Rectal Daily PRN Gee Estrada PA-C    enoxaparin 40 mg Subcutaneous Daily Richard Vicente PA-C    insulin glargine 30 Units Subcutaneous HS Bárbara Drosmare Vicente PA-C    insulin lispro 4-20 Units Subcutaneous TID AC Richard Vicente PA-C    Labetalol HCl 10 mg Intravenous Q4H PRN Gee Estrada PA-C    levETIRAcetam 1,000 mg Intravenous Q12H Gee Estrada PA-C Last Rate: 1,000 mg (09/23/19 5961)   metoprolol tartrate 50 mg Oral Q12H [facility] Wolfganggriselda PoundYOVANI garvey    nicotine 1 patch Transdermal Daily Romayne Pounds, PA-C    ondansetron 4 mg Intravenous Q6H PRN Romayne Pounds, PA-C    phenytoin 300 mg Intravenous Q12H [facility] Romayne Pounds, PA-C Last Rate: 300 mg (09/24/19 0931)   polyethylene glycol 17 g Oral Daily Wolfgangdeborajanna ChundYOVANI garvey    valproic acid 750 mg Oral Q8H [facility] Richard Vicente PA-C      Continuous Infusions:   PRN Meds:   acetaminophen    albuterol    bisacodyl    Labetalol HCl    ondansetron     ROS:   A 12 point ROS was completed  Other than the above mentioned complaints in the HPI, all remaining systems were negative  Vitals:   BP (!) 168/114   Pulse 94   Temp 98 6 °F (37 °C)   Resp 18   Ht 5' 10" (1 778 m)   Wt 117 kg (257 lb 0 9 oz)   SpO2 94%   BMI 36 88 kg/m²     Physical Exam:   Physical Exam   Constitutional: He is oriented to person, place, and time  No distress  HENT:   Head: Normocephalic and atraumatic  Right Ear: External ear normal    Left Ear: External ear normal    Nose: Nose normal    Mouth/Throat: Oropharynx is clear and moist    Eyes: Pupils are equal, round, and reactive to light  EOM are normal    Neck: Neck supple  Cardiovascular: Normal heart sounds  Pulmonary/Chest: Effort normal  No respiratory distress  Neurological: He is alert and oriented to person, place, and time  He has normal strength  He has a normal Finger-Nose-Finger Test    Skin: Skin is warm and dry  He is not diaphoretic  Psychiatric: His speech is normal    Auditory hallucinations  Pleasant and cooperative     Neurologic Exam     Mental Status   Oriented to person, place, and time  Attention: normal  Concentration: normal    Speech: speech is normal   Patient is alert and oriented to person, place, date, and president  Patient follows all commands appropriately     Cranial Nerves     CN III, IV, VI   Pupils are equal, round, and reactive to light    Extraocular motions are normal    Nystagmus: none Conjugate gaze: present    CN V   Facial sensation intact  CN VII   Facial expression full, symmetric  CN VIII   Hearing: intact    CN IX, X   CN IX normal      CN XII   CN XII normal      Motor Exam   Muscle bulk: normal  Overall muscle tone: normal  Right arm pronator drift: absent  Left arm pronator drift: absent    Strength   Strength 5/5 throughout  Sensory Exam   Light touch normal      Gait, Coordination, and Reflexes     Coordination   Finger to nose coordination: normal    Tremor   Resting tremor: absent    Labs: I have personally reviewed pertinent reports     Recent Results (from the past 24 hour(s))   Fingerstick Glucose (POCT)    Collection Time: 09/23/19 11:21 AM   Result Value Ref Range    POC Glucose 193 (H) 65 - 140 mg/dl   Fingerstick Glucose (POCT)    Collection Time: 09/23/19  4:52 PM   Result Value Ref Range    POC Glucose 164 (H) 65 - 140 mg/dl   Fingerstick Glucose (POCT)    Collection Time: 09/23/19  8:27 PM   Result Value Ref Range    POC Glucose 139 65 - 140 mg/dl   Phenytoin level, total    Collection Time: 09/23/19  9:42 PM   Result Value Ref Range    Phenytoin Lvl 12 2 10 0 - 20 0 ug/mL   Valproic acid level, total    Collection Time: 09/23/19  9:42 PM   Result Value Ref Range    Valproic Acid, Total 66 50 - 100 ug/mL   Basic metabolic panel    Collection Time: 09/24/19  4:38 AM   Result Value Ref Range    Sodium 147 (H) 136 - 145 mmol/L    Potassium 3 4 (L) 3 5 - 5 3 mmol/L    Chloride 113 (H) 100 - 108 mmol/L    CO2 24 21 - 32 mmol/L    ANION GAP 10 4 - 13 mmol/L    BUN 6 5 - 25 mg/dL    Creatinine 0 66 0 60 - 1 30 mg/dL    Glucose 104 65 - 140 mg/dL    Calcium 9 0 8 3 - 10 1 mg/dL    eGFR 112 ml/min/1 73sq m   CBC and differential    Collection Time: 09/24/19  4:38 AM   Result Value Ref Range    WBC 7 08 4 31 - 10 16 Thousand/uL    RBC 4 46 3 88 - 5 62 Million/uL    Hemoglobin 13 0 12 0 - 17 0 g/dL    Hematocrit 42 1 36 5 - 49 3 %    MCV 94 82 - 98 fL    MCH 29 1 26 8 - 34 3 pg    MCHC 30 9 (L) 31 4 - 37 4 g/dL    RDW 13 4 11 6 - 15 1 %    MPV 10 4 8 9 - 12 7 fL    Platelets 871 039 - 233 Thousands/uL    nRBC 0 /100 WBCs    Neutrophils Relative 64 43 - 75 %    Immat GRANS % 1 0 - 2 %    Lymphocytes Relative 18 14 - 44 %    Monocytes Relative 11 4 - 12 %    Eosinophils Relative 5 0 - 6 %    Basophils Relative 1 0 - 1 %    Neutrophils Absolute 4 59 1 85 - 7 62 Thousands/µL    Immature Grans Absolute 0 06 0 00 - 0 20 Thousand/uL    Lymphocytes Absolute 1 25 0 60 - 4 47 Thousands/µL    Monocytes Absolute 0 78 0 17 - 1 22 Thousand/µL    Eosinophils Absolute 0 34 0 00 - 0 61 Thousand/µL    Basophils Absolute 0 06 0 00 - 0 10 Thousands/µL   Procalcitonin    Collection Time: 09/24/19  4:38 AM   Result Value Ref Range    Procalcitonin 0 48 (H) <=0 25 ng/ml   Fingerstick Glucose (POCT)    Collection Time: 09/24/19  6:09 AM   Result Value Ref Range    POC Glucose 127 65 - 140 mg/dl     Imaging: I have personally reviewed pertinent imaging and I have personally reviewed PACS reports  EKG, Pathology, and Other Studies: I have personally reviewed pertinent reports  VTE Prophylaxis: Sequential compression device (Venodyne)  and Enoxaparin (Lovenox)    Total time spent today 25 minutes  Greater than 50% of total time was spent with the patient and / or family counseling and / or coordination of care  A description of the counseling / coordination of care: Discussed with patient: AED medication regimen, future Neurology appointments, and plans of care

## 2019-09-24 NOTE — RESTORATIVE TECHNICIAN NOTE
Restorative Specialist Mobility Note       Activity: Ambulate in [place], Ambulate in room, Bathroom privileges, Dangle, Stand at bedside(Educated/encouraged pt to ambulate with assistance 3-4 x's/day  Bed alarm on   Pt callbell, phone/tray within reach )     Assistive Device: None          Mauricio THOMPSON, Restorative Technician, [employer] [facility]

## 2019-09-25 VITALS
HEIGHT: 70 IN | WEIGHT: 257.06 LBS | BODY MASS INDEX: 36.8 KG/M2 | DIASTOLIC BLOOD PRESSURE: 80 MMHG | OXYGEN SATURATION: 96 % | HEART RATE: 88 BPM | TEMPERATURE: 99.8 F | RESPIRATION RATE: 18 BRPM | SYSTOLIC BLOOD PRESSURE: 140 MMHG

## 2019-09-25 PROBLEM — G40.901 STATUS EPILEPTICUS (HCC): Status: RESOLVED | Noted: 2019-09-19 | Resolved: 2019-09-25

## 2019-09-25 LAB
ANION GAP SERPL CALCULATED.3IONS-SCNC: 10 MMOL/L (ref 4–13)
BUN SERPL-MCNC: 6 MG/DL (ref 5–25)
CALCIUM SERPL-MCNC: 9 MG/DL (ref 8.3–10.1)
CHLORIDE SERPL-SCNC: 109 MMOL/L (ref 100–108)
CO2 SERPL-SCNC: 26 MMOL/L (ref 21–32)
CREAT SERPL-MCNC: 0.7 MG/DL (ref 0.6–1.3)
GFR SERPL CREATININE-BSD FRML MDRD: 109 ML/MIN/1.73SQ M
GLUCOSE SERPL-MCNC: 112 MG/DL (ref 65–140)
GLUCOSE SERPL-MCNC: 135 MG/DL (ref 65–140)
GLUCOSE SERPL-MCNC: 188 MG/DL (ref 65–140)
POTASSIUM SERPL-SCNC: 3.2 MMOL/L (ref 3.5–5.3)
SODIUM SERPL-SCNC: 145 MMOL/L (ref 136–145)

## 2019-09-25 PROCEDURE — 99232 SBSQ HOSP IP/OBS MODERATE 35: CPT | Performed by: INTERNAL MEDICINE

## 2019-09-25 PROCEDURE — 99239 HOSP IP/OBS DSCHRG MGMT >30: CPT | Performed by: INTERNAL MEDICINE

## 2019-09-25 PROCEDURE — 82948 REAGENT STRIP/BLOOD GLUCOSE: CPT

## 2019-09-25 PROCEDURE — 80048 BASIC METABOLIC PNL TOTAL CA: CPT | Performed by: INTERNAL MEDICINE

## 2019-09-25 PROCEDURE — 97116 GAIT TRAINING THERAPY: CPT

## 2019-09-25 PROCEDURE — 94760 N-INVAS EAR/PLS OXIMETRY 1: CPT

## 2019-09-25 PROCEDURE — 97530 THERAPEUTIC ACTIVITIES: CPT

## 2019-09-25 RX ORDER — POTASSIUM CHLORIDE 20 MEQ/1
40 TABLET, EXTENDED RELEASE ORAL
Status: DISCONTINUED | OUTPATIENT
Start: 2019-09-25 | End: 2019-09-25 | Stop reason: HOSPADM

## 2019-09-25 RX ORDER — LEVETIRACETAM 1000 MG/1
1000 TABLET ORAL EVERY 12 HOURS SCHEDULED
Qty: 60 TABLET | Refills: 0 | Status: SHIPPED | OUTPATIENT
Start: 2019-09-25 | End: 2019-12-14 | Stop reason: HOSPADM

## 2019-09-25 RX ORDER — DIVALPROEX SODIUM 250 MG/1
750 TABLET, DELAYED RELEASE ORAL EVERY 8 HOURS SCHEDULED
Qty: 90 TABLET | Refills: 0 | Status: SHIPPED | OUTPATIENT
Start: 2019-09-25 | End: 2020-01-09 | Stop reason: SDUPTHER

## 2019-09-25 RX ORDER — PHENYTOIN SODIUM 300 MG/1
300 CAPSULE, EXTENDED RELEASE ORAL EVERY 12 HOURS SCHEDULED
Qty: 60 CAPSULE | Refills: 0 | Status: SHIPPED | OUTPATIENT
Start: 2019-09-25 | End: 2020-01-09 | Stop reason: SDUPTHER

## 2019-09-25 RX ADMIN — METOPROLOL TARTRATE 50 MG: 50 TABLET, FILM COATED ORAL at 08:40

## 2019-09-25 RX ADMIN — DIVALPROEX SODIUM 750 MG: 250 TABLET, DELAYED RELEASE ORAL at 13:23

## 2019-09-25 RX ADMIN — INSULIN LISPRO 5 UNITS: 100 INJECTION, SOLUTION INTRAVENOUS; SUBCUTANEOUS at 12:33

## 2019-09-25 RX ADMIN — DIVALPROEX SODIUM 750 MG: 250 TABLET, DELAYED RELEASE ORAL at 05:24

## 2019-09-25 RX ADMIN — PHENYTOIN SODIUM 300 MG: 100 CAPSULE ORAL at 08:40

## 2019-09-25 RX ADMIN — LISINOPRIL 40 MG: 20 TABLET ORAL at 08:40

## 2019-09-25 RX ADMIN — POTASSIUM CHLORIDE 40 MEQ: 1500 TABLET, EXTENDED RELEASE ORAL at 10:31

## 2019-09-25 RX ADMIN — HYDROCHLOROTHIAZIDE 12.5 MG: 12.5 TABLET ORAL at 08:40

## 2019-09-25 RX ADMIN — INSULIN LISPRO 1 UNITS: 100 INJECTION, SOLUTION INTRAVENOUS; SUBCUTANEOUS at 12:32

## 2019-09-25 RX ADMIN — ENOXAPARIN SODIUM 40 MG: 40 INJECTION SUBCUTANEOUS at 08:40

## 2019-09-25 RX ADMIN — INSULIN LISPRO 5 UNITS: 100 INJECTION, SOLUTION INTRAVENOUS; SUBCUTANEOUS at 07:40

## 2019-09-25 RX ADMIN — LEVETIRACETAM 1000 MG: 500 TABLET, FILM COATED ORAL at 08:47

## 2019-09-25 NOTE — PROGRESS NOTES
Progress Note - Namrata Dudley  1970, 44 y o  male MRN: [id number]    Unit/Bed#: [facility] 706-01 Encounter: [id number]    Primary Care Provider: Timothy Diaz   Date and time admitted to hospital: 9/18/2019  9:19 PM        * Refractory status epilepticus  Assessment & Plan  Resolved and patient now controlled on Depakote, phenytoin and [address]  Neurology consulted, appreciate their recommendations  Repeat AED levels as per neuro with f/u outpatient in 4 weeks    Seizure ([address] )  Assessment & Plan  As above  Hallucinations remain, likely due to post concussive state as per neuro    Diabetes [facility])  Assessment & Plan  A1c 13  Endocrinology consulted, appreciate their recommendations  Diabetic diet, Accu-Cheks, SSI  Lantus 25 units nightly, Humalog 5 units with meals  Will need to find cost efficient option for patient for discharge    TBI (traumatic brain injury) [facility])  Assessment & Plan  8/2019 with [facility]  Now with possible post traumatic seizures +/- headaches  Likely source for auditory hallucinations as per Neurology, patient states they are improving    Essential hypertension  Assessment & Plan  Restarted home meds  Blood pressure improving      VTE Pharmacologic Prophylaxis:   Pharmacologic: Enoxaparin (Lovenox)  Mechanical VTE Prophylaxis in Place: Yes    Patient Centered Rounds: I have performed bedside rounds with nursing staff today  Discussions with Specialists or Other Care Team Provider:  Neurology    Education and Discussions with Family / Patient:  Discussed with patient patient's wife    Time Spent for Care: 45 minutes  More than 50% of total time spent on counseling and coordination of care as described above      Current Length of Stay: 6 day(s)    Current Patient Status: Inpatient   Certification Statement: The patient will continue to require additional inpatient hospital stay due to Further titration of antiepileptic drugs and insulin    Discharge Plan:  Likely discharge tomorrow    Code Status: Level 1 - Full Code      Subjective:   No acute overnight events  Patient states he feels well  No chest pain, shortness of breath no nausea vomiting  Objective:     Vitals:   Temp (24hrs), Av 6 °F (37 °C), Min:98 6 °F (37 °C), Max:98 6 °F (37 °C)    Temp:  [98 6 °F (37 °C)] 98 6 °F (37 °C)  HR:  [72-95] 84  Resp:  [16-18] 16  BP: (158-172)/() 158/98  SpO2:  [90 %-94 %] 92 %  Body mass index is 36 88 kg/m²  Input and Output Summary (last 24 hours): Intake/Output Summary (Last 24 hours) at 2019  Last data filed at 2019 1429  Gross per 24 hour   Intake 660 ml   Output 2200 ml   Net -1540 ml       Physical Exam:     Physical Exam   Constitutional: He is oriented to person, place, and time  He appears well-developed and well-nourished  HENT:   Head: Normocephalic and atraumatic  Mouth/Throat: Oropharynx is clear and moist    Eyes: Pupils are equal, round, and reactive to light  Conjunctivae are normal    Neck: Neck supple  No JVD present  Cardiovascular: Normal rate, regular rhythm, normal heart sounds and intact distal pulses  Pulmonary/Chest: Effort normal and breath sounds normal    Abdominal: Soft  Bowel sounds are normal    Musculoskeletal: He exhibits no edema or tenderness  Neurological: He is alert and oriented to person, place, and time  Skin: Skin is warm and dry  Capillary refill takes less than 2 seconds  Psychiatric: He has a normal mood and affect  His behavior is normal  Judgment and thought content normal    Nursing note and vitals reviewed          Additional Data:     Labs:    Results from last 7 days   Lab Units 19  0438   WBC Thousand/uL 7 08   HEMOGLOBIN g/dL 13 0   HEMATOCRIT % 42 1   PLATELETS Thousands/uL 168   NEUTROS PCT % 64   LYMPHS PCT % 18   MONOS PCT % 11   EOS PCT % 5     Results from last 7 days   Lab Units 19  0438  19  0547  19  1116   SODIUM mmol/L 147*   < > 140   < > 136   POTASSIUM mmol/L 3 4*   < > 3 7 < > 3 3*   CHLORIDE mmol/L 113*   < > 110*   < > 103   CO2 mmol/L 24   < > 25   < > 26   BUN mg/dL 6   < > 15   < > 11   CREATININE mg/dL 0 66   < > 0 88   < > 1 18   ANION GAP mmol/L 10   < > 5   < > 7   CALCIUM mg/dL 9 0   < > 7 8*   < > 9 3   ALBUMIN g/dL  --   --  2 4*   < > 3 3*   TOTAL BILIRUBIN mg/dL  --   --   --   --  1 12*   ALK PHOS U/L  --   --   --   --  99   ALT U/L  --   --   --   --  53   AST U/L  --   --   --   --  36   GLUCOSE RANDOM mg/dL 104   < > 213*   < > 342*    < > = values in this interval not displayed  Results from last 7 days   Lab Units 09/21/19  0129   INR  1 22*     Results from last 7 days   Lab Units 09/24/19  2043 09/24/19  1634 09/24/19  1201 09/24/19  0609 09/23/19  2027 09/23/19  1652 09/23/19  1121 09/23/19  0553 09/22/19  2340 09/22/19  1945 09/22/19  1748 09/22/19  1129   POC GLUCOSE mg/dl 162* 110 165* 127 139 164* 193* 150* 163* 167* 178* 325*     Results from last 7 days   Lab Units 09/18/19  0518   HEMOGLOBIN A1C % 13 2*     Results from last 7 days   Lab Units 09/24/19  0438 09/22/19  0410 09/21/19  0016 09/19/19  1627 09/19/19  1113 09/18/19  0518   LACTIC ACID mmol/L  --   --  1 1 2 0 2 2* 0 8   PROCALCITONIN ng/ml 0 48* 0 35* 0 14  --   --   --            * I Have Reviewed All Lab Data Listed Above  * Additional Pertinent Lab Tests Reviewed: All Labs Within Last 24 Hours Reviewed    Imaging:    Imaging Reports Reviewed Today Include: n/a  Imaging Personally Reviewed by Myself Includes:  n/a    Recent Cultures (last 7 days):     Results from last 7 days   Lab Units 09/21/19  0936 09/21/19  0140 09/21/19  0125   BLOOD CULTURE   --  No Growth at 72 hrs  No Growth at 72 hrs     URINE CULTURE  No Growth <1000 cfu/mL  --   --        Last 24 Hours Medication List:     Current Facility-Administered Medications:  acetaminophen 650 mg Oral Q6H PRN KIYA Pedro-BEAU   albuterol 2 5 mg Nebulization Q4H PRN Gal Jordan PA-C   bisacodyl 10 mg Rectal Daily PRN Rosario Rey YOVANI Vicente   divalproex sodium 750 mg Oral Q8H [address] Betty Lechuga PA-C   enoxaparin 40 mg Subcutaneous Daily Karyle Sheerer Gerding, PA-C   hydrochlorothiazide 12 5 mg Oral Daily Baltazar Martinez MD   insulin glargine 25 Units Subcutaneous HS Brian Zepeda MD   insulin lispro 1-5 Units Subcutaneous HS Brian Zepeda MD   insulin lispro 1-6 Units Subcutaneous TID [facility] Brian Zepeda MD   insulin lispro 5 Units Subcutaneous TID With Meals Brian Zepeda MD   Labetalol HCl 10 mg Intravenous Q4H PRN Mendez Euceda PA-C   levETIRAcetam 1,000 mg Oral Q12H [address] Betty Lechuga PA-C   lisinopril 40 mg Oral Daily Baltazar Martinez MD   metoprolol tartrate 50 mg Oral Q12H [address] Mendez Euceda PA-C   nicotine 1 patch Transdermal Daily Mendez Euceda PA-C   ondansetron 4 mg Intravenous Q6H PRN Mendez Euceda PA-C   phenytoin 300 mg Oral Q12H [address] Betty Lechuga PA-C   polyethylene glycol 17 g Oral Daily Mendez Euceda PA-C        Today, Patient Was Seen By: Baltazar Martinez MD    ** Please Note: Dictation voice to text software may have been used in the creation of this document   **

## 2019-09-25 NOTE — PHYSICAL THERAPY NOTE
Physical Therapy Progress Note     09/25/19 1033   Pain Assessment   Pain Assessment No/denies pain   Pain Score No Pain   Restrictions/Precautions   Weight Bearing Precautions Per Order No   Other Precautions Fall Risk   General   Family/Caregiver Present No   Cognition   Overall Cognitive Status WFL   Arousal/Participation Alert; Cooperative   Bed Mobility   Supine to Sit 6  Modified independent   Transfers   Sit to Stand 6  Modified independent   Stand to Sit 6  Modified independent   Ambulation/Elevation   Gait pattern WNL   Gait Assistance 6  Modified independent   Assistive Device None   Distance 400 feet   Stair Management Assistance 6  Modified independent   Stair Management Technique One rail R;Alternating pattern   Number of Stairs 7   Balance   Static Sitting Normal   Dynamic Sitting Good   Static Standing Good   Dynamic Standing Fair +   Ambulatory Fair +   Activity Tolerance   Activity Tolerance Patient tolerated treatment well   Nurse Made Aware Ok to see per RN Ryan Villalba   Exercises   Hip Flexion Standing;5 reps;AROM; Bilateral   Hip Abduction Standing;10 reps;AROM; Bilateral   Ankle Pumps Standing;10 reps;AROM; Bilateral  (Heel raise)   Squat Standing;10 reps   Assessment   Prognosis Good   Problem List Decreased strength;Decreased endurance;Decreased mobility   Assessment Pt is progressing well with functional mobility  Gait is slow but steady today and without loss of balance  Completed 7 stairs as well with one handrail  Without loss of balance on stairs  Based on functional progress, continue to recommend home with family support once medically stable for discharge  Encouraged pt to ambulate on unit periodically daily  Ryan Villalba RN aware  Physical therapy will sign off  Goals   Patient Goals Hopefully to go home today   STG Expiration Date 10/07/19   Short Term Goal #1 In 10 sessions pt will: 1  Transfer supine <> sit independently  2  Transfer sit <> stand independently   3  Ambulate >150 ft independently  4  Navigate 1 full flight of steps independently  5  Perform LE exercise program   PT Treatment Day 1   Plan   Treatment/Interventions Functional transfer training;LE strengthening/ROM; Therapeutic exercise;Elevations; Endurance training;Patient/family training;Bed mobility;Gait training;Spoke to nursing   Progress Improving as expected   PT Frequency   (3-5x/week)   Recommendation   Recommendation Home with family support   PT - OK to Discharge Yes  (Once medically stable)     Tg [place] PTA

## 2019-09-25 NOTE — RESTORATIVE TECHNICIAN NOTE
Restorative Specialist Mobility Note       Activity: Ambulate in [place], Stand at bedside, Ambulate in room, Dangle(Pt was educated and instructed on activity  Left in room with bed alarm armed and call bell and tray within reach )     Assistive Device: None             Excell Marking

## 2019-09-25 NOTE — ASSESSMENT & PLAN NOTE
Resolved and patient now controlled on Depakote, phenytoin and [place]   Neurology consulted, appreciate their recommendations  Repeat AED levels as per neuro with f/u outpatient in 4 weeks

## 2019-09-25 NOTE — ASSESSMENT & PLAN NOTE
A1c 13  Endocrinology consulted, appreciate their recommendations  Diabetic diet, Accu-Cheks, SSI  Lantus 25 units nightly, Humalog 5 units with meals  Will need to find cost efficient option for patient for discharge

## 2019-09-25 NOTE — ASSESSMENT & PLAN NOTE
8/2019 with [facility]  Now with possible post traumatic seizures +/- headaches  Likely source for auditory hallucinations as per Neurology, patient states they are improving

## 2019-09-25 NOTE — SOCIAL WORK
CM was informed by Dr Perla Clark that pt is medically stable for dc today  Dr Perla Clark states she provided scripts for [employer] brand insulin; pt to receive from [facility]  Pt aware and agreeable to same

## 2019-09-25 NOTE — PLAN OF CARE
Problem: PHYSICAL THERAPY ADULT  Goal: Performs mobility at highest level of function for planned discharge setting  See evaluation for individualized goals  Description  Treatment/Interventions: Functional transfer training, LE strengthening/ROM, Therapeutic exercise, Endurance training, Bed mobility, Gait training, Spoke to nursing, OT, Spoke to case management          See flowsheet documentation for full assessment, interventions and recommendations  Outcome: Progressing  Note:   Prognosis: Good  Problem List: Decreased strength, Decreased endurance, Decreased mobility  Assessment: Pt is progressing well with functional mobility  Gait is slow but steady today and without loss of balance  Completed 7 stairs as well with one handrail  Without loss of balance on stairs  Based on functional progress, continue to recommend home with family support once medically stable for discharge  Encouraged pt to ambulate on unit periodically daily  Idris Arabia RN aware  Physical therapy will sign off  Recommendation: Home with family support     PT - OK to Discharge: Yes(Once medically stable)    See flowsheet documentation for full assessment

## 2019-09-25 NOTE — PROGRESS NOTES
Assessment:  43 yo M with recent subarachnoid hemorrhage in 8/2019 who came in with subclinical status epilepticus  Pt was noted to have elevated hemoglobin A1c 13 2, denying knowledge of diagnosis of diabetes  Endocrinology was consulted for diabetes management  Plan:  Type 2 diabetes  Pt states he did not know he had diabetes prior to this admission  A1c 13 2, was previously 8 2 in 1/2019, 6 6 in 2018  Has been on 25 units Lantus, 5 units mealtime insulin, sliding scale coverage for the last one day with adjustments as needed  Pt had required higher amounts of insulin during ICU stay  · Recommend metformin 500mg BID on discharge and 70/30 regimen with 25 units with breakfast and 15 units with dinner given lack of medical coverage  Pt will receive mealtime insulin teaching with lunch today, but states he has administered insulin to friends in the past, knows to rotate sites, has also used insulin needles for friends  · Follow up in 3-4 weeks outpatient  Counseled patient on avoidance of high sugar liquids like soda or juice and on importance of treatment of diabetes/controlling blood sugars to avoid complications of diabetes like retinopathy, neuropathy, nephropathy  Will need diabetic eye exam, monofilament testing, urine microalbumin as an outpatient  Plan relayed to primary team     S:  Pt seen and evaluated today  Patient reports no acute complaints and is eager to go home as soon as possible  Pt states his increased urination and increased thirst have significantly improved since control of his blood sugars  Patient states he has administered insulin in the past to his friend but is agreeable to mealtime teaching today prior to discharge    O:  Patient seen and examined personally    /80 (BP Location: Left arm)   Pulse 88   Temp 99 8 °F (37 7 °C) (Oral)   Resp 18   Ht 5' 10" (1 778 m)   Wt 117 kg (257 lb 0 9 oz)   SpO2 96%   BMI 36 88 kg/m²     Physical Exam   Constitutional: He is oriented to person, place, and time  He appears well-developed and well-nourished  HENT:   Head: Normocephalic and atraumatic  Eyes: Right eye exhibits no discharge  Left eye exhibits no discharge  Neck: Normal range of motion  Neck supple  Cardiovascular: Normal rate, regular rhythm and normal heart sounds  Exam reveals no gallop and no friction rub  No murmur heard  Pulmonary/Chest: Effort normal and breath sounds normal  No respiratory distress  He has no wheezes  He has no rales  He exhibits no tenderness  Abdominal: Soft  Bowel sounds are normal  He exhibits no distension and no mass  There is no tenderness  Musculoskeletal: Normal range of motion  He exhibits no edema  Neurological: He is alert and oriented to person, place, and time  Skin: Skin is warm and dry  Psychiatric: He has a normal mood and affect   His behavior is normal          Current Facility-Administered Medications:  acetaminophen 650 mg Oral Q6H PRN Mushtaq Bates PA-C   albuterol 2 5 mg Nebulization Q4H PRN Mushtaq Bates, YOVANI   bisacodyl 10 mg Rectal Daily PRN Mushtaq Bates PA-C   divalproex sodium 750 mg Oral Q8H [address] Betty Lechuga PA-C   enoxaparin 40 mg Subcutaneous Daily Gisella Vicente PA-C   hydrochlorothiazide 12 5 mg Oral Daily Desire Ng MD   insulin glargine 25 Units Subcutaneous HS Caroline Edwards MD   insulin lispro 1-5 Units Subcutaneous HS Caroline Edwards MD   insulin lispro 1-6 Units Subcutaneous TID [facility] Caroline Edwards MD   insulin lispro 5 Units Subcutaneous TID With Meals Caroline Edwards MD   Labetalol HCl 10 mg Intravenous Q4H PRN Mushtaq Bates PA-C   levETIRAcetam 1,000 mg Oral Q12H [address] Betty Lechuga PA-C   lisinopril 40 mg Oral Daily Desire Ng MD   metoprolol tartrate 50 mg Oral Q12H [address] Mushtaq Bates PA-C   nicotine 1 patch Transdermal Daily Mushtaq Bates PA-C   ondansetron 4 mg Intravenous Q6H PRN Mushtaq Bates PA-C   phenytoin 300 mg Oral Q12H [address] Betty PRICE YOVANI Lechuga   polyethylene glycol 17 g Oral Daily Gera Vicente PA-C   potassium chloride 40 mEq Oral BID (AM & Afternoon) Elizabeth Kuhn MD        Lab, Imaging and other studies:   POC Glucose (mg/dl)   Date Value   09/25/2019 135   09/24/2019 162 (H)   09/24/2019 110   09/24/2019 165 (H)   09/24/2019 127   09/23/2019 139   09/23/2019 164 (H)   09/23/2019 193 (H)   09/23/2019 150 (H)   09/22/2019 163 (H)       D/W : primary team  Dispo: planned DC today    Chantel Ellington, DO  Internal Medicine PGY-2

## 2019-09-25 NOTE — DISCHARGE SUMMARY
Discharge Summary - [facility] Internal Medicine    Patient Information: Melodie John  44 y o  male MRN: [id number]  Unit/Bed#: [facility] 706-01 Encounter: [id number]    Discharging Physician / Practitioner: Phu Mae MD  PCP: Gaurav Vogel  Admission Date: 9/18/2019  Discharge Date: 09/25/19    Reason for Admission:  Staring episodes    Discharge Diagnoses:     Principal Problem (Resolved):    Refractory status epilepticus  Active Problems:    Seizure ([facility] [address] )    Diabetes ([facility] [address] )    TBI (traumatic brain injury) ([facility] )    Essential hypertension  Resolved Problems:    Hypokalemia    Head injury    Acute respiratory failure     Sepsis ([facility] )    Paraphimosis      Consultations During Hospital Stay:  · Neurology  · Critical care  · Endocrinology    Procedures Performed:     · Endotracheal Intubation  · CT head showed left parietal scalp soft tissue swelling which could represent contusion  · Chest x-ray shows minimal left basilar retrocardiac atelectasis    Significant Findings:     · Type 2 diabetes mellitus    Incidental Findings:   · Type 2 diabetes mellitus     Test Results Pending at Discharge (will require follow up): · None     Outpatient Tests Requested:  · Repeat AED levels as per Neurology    Complications:  None    Hospital Course:     Melodie John is a 44 y o  male patient who originally presented to the hospital on 9/18/2019 due to dizziness  He initially presented to [facility] dizziness but also staring episodes that were suspicious for absent seizures  He had routine EEG with evidence of seizures and was given Ativan at 3 g Keppra load and transferred to [facility] for video EEG monitoring at that time Neurology was consulted and recommended patient be put on Depakote, Keppra, phenytoin 1 with Ativan p r n  Luz Mojica   Patient was in refractory status epilepticus and required intubation and was transferred to the ICU and was cared for by Critical Care and was put on a Versed drip before his seizures federico   While in the ICU patient had paraphimosis which was reduced by Urology  His Versed was weaned off and he remained seizure free for 36 hours and was extubated  The patient was also noted to have auditory hallucinations  It was noted that he had a traumatic injury to his head several weeks prior to admission which resulted in a subarachnoid hemorrhage  Neurology had deemed these hallucinations to be due to a post concussive state  Throughout hospitalization patient was found to have type 2 diabetes with an A1c of 13  Endocrinology was consulted and he was counseled on diabetic diet  He was taught insulin injection  He was discharged on an insulin regimen and metformin  Prior to discharge his antiepileptic drug levels were appropriate  He was advised to follow up with Endocrinology in 3-4 weeks, neurology in 3 weeks and his primary care in 1 week  Condition at Discharge: fair     Discharge Day Visit / Exam:     Subjective:  No acute overnight events  Patient states he feels well  He denies any chest pain, shortness of breath, nausea, vomiting, diaphoresis  Vitals: Blood Pressure: 140/80 (09/25/19 0734)  Pulse: 88 (09/25/19 0734)  Temperature: 99 8 °F (37 7 °C) (09/25/19 0734)  Temp Source: Oral (09/25/19 0734)  Respirations: 18 (09/25/19 0734)  Height: 5' 10" (177 8 cm) (09/19/19 1059)  Weight - Scale: 117 kg (257 lb 0 9 oz) (09/20/19 0300)  SpO2: 96 % (09/25/19 0753)  Exam:   Physical Exam   Constitutional: He is oriented to person, place, and time  He appears well-developed and well-nourished  HENT:   Head: Normocephalic and atraumatic  Mouth/Throat: Oropharynx is clear and moist    Eyes: Pupils are equal, round, and reactive to light  Conjunctivae are normal    Neck: Neck supple  No JVD present  No thyromegaly present  Cardiovascular: Normal rate, regular rhythm, normal heart sounds and intact distal pulses  Pulmonary/Chest: Effort normal and breath sounds normal    Abdominal: Soft  Bowel sounds are normal    Musculoskeletal: He exhibits no edema  Neurological: He is alert and oriented to person, place, and time  Skin: Skin is warm and dry  Capillary refill takes less than 2 seconds  Psychiatric: He has a normal mood and affect  His behavior is normal  Judgment and thought content normal    Nursing note and vitals reviewed  Discharge instructions/Information to patient and family:   See after visit summary for information provided to patient and family  Provisions for Follow-Up Care:  See after visit summary for information related to follow-up care and any pertinent home health orders  Disposition:     Home    For Discharges to [facility] SNF:   · Not Applicable to this Patient - Not Applicable to this Patient    Planned Readmission: no     Discharge Statement:  I spent 39 minutes discharging the patient  This time was spent on the day of discharge  I had direct contact with the patient on the day of discharge  Greater than 50% of the total time was spent examining patient, answering all patient questions, arranging and discussing plan of care with patient as well as directly providing post-discharge instructions  Additional time then spent on discharge activities  Discharge Medications:  See after visit summary for reconciled discharge medications provided to patient and family  ** Please Note: Dragon 360 Dictation voice to text software may have been used in the creation of this document   **

## 2019-09-26 ENCOUNTER — TELEPHONE (OUTPATIENT)
Dept: NEUROLOGY | Facility: CLINIC | Age: 49
End: 2019-09-26

## 2019-09-26 LAB
BACTERIA BLD CULT: NORMAL
BACTERIA BLD CULT: NORMAL
PHENYTOIN FREE SERPL-MCNC: 1.4 UG/ML (ref 1–2)

## 2019-09-26 RX ORDER — LISINOPRIL 40 MG/1
40 TABLET ORAL DAILY
Qty: 30 TABLET | Refills: 0 | Status: SHIPPED | OUTPATIENT
Start: 2019-09-26 | End: 2019-09-26 | Stop reason: SDUPTHER

## 2019-09-26 RX ORDER — METOPROLOL TARTRATE 100 MG/1
50 TABLET ORAL EVERY 12 HOURS SCHEDULED
Qty: 60 TABLET | Refills: 0 | Status: ON HOLD | OUTPATIENT
Start: 2019-09-26 | End: 2019-12-14 | Stop reason: SDUPTHER

## 2019-09-26 RX ORDER — METOPROLOL TARTRATE 100 MG/1
100 TABLET ORAL EVERY 12 HOURS SCHEDULED
Qty: 30 TABLET | Refills: 0 | Status: SHIPPED | OUTPATIENT
Start: 2019-09-26 | End: 2019-09-26 | Stop reason: SDUPTHER

## 2019-09-26 RX ORDER — LISINOPRIL 40 MG/1
40 TABLET ORAL DAILY
Qty: 30 TABLET | Refills: 0 | Status: SHIPPED | OUTPATIENT
Start: 2019-09-26 | End: 2020-03-09

## 2019-09-26 NOTE — TELEPHONE ENCOUNTER
----- Message from Marcelo Nguyễn PA-C sent at 9/24/2019 12:08 PM EDT -----  Regarding: HFU  Diagnosis/Reason for follow-up: subclinical seizures in the setting of recent traumatic [facility]  Subspecialty for follow-up: Epileptology  Existing neurologist: N/A  Recommended timing for HFU: 4-6 weeks  Tests/Labs/Imaging ordered: N/A  AP/Attending: attending  Additional notes: N/A    Thanks!

## 2019-11-01 ENCOUNTER — TELEPHONE (OUTPATIENT)
Dept: NEUROLOGY | Facility: CLINIC | Age: 49
End: 2019-11-01

## 2019-11-01 NOTE — TELEPHONE ENCOUNTER
Please advised the patient to get the blood work for anti seizure medications Keppra Dilantin and Depakote that is been ordered, and see if you  can accommodate this patient next week    And if patient has any issues prior to that to follow up with family physician or the ER

## 2019-11-01 NOTE — TELEPHONE ENCOUNTER
Spoke to patient  Informed him of blood work and recommendations for ED or PCP if symptoms worsen  Scheduled for 11/4 at 830 in [facility] with Musa Sanchez made aware

## 2019-11-01 NOTE — TELEPHONE ENCOUNTER
Patient called with concerns related to symptoms he has been having since head injury  Patient has hospital follow up appt with you 11/25/19  Patient is concerned with symptoms and asking if he should be seen sooner  Symptoms mainly include hearing things  He is unable to determine if these things are real or just things made up/hearing in his mind  Patient states if he is overly rested he is able to determine if sounds are real or not  Patient states he hears screaming, arguing, fighting, horns beeping, someone coming into apartment  Sometimes even hears words being "stuck in a loop" such as heyheyheyheyheyhey repeating over and over  Patient states this is becoming very bothersome  Please advise how to proceed

## 2019-11-04 ENCOUNTER — OFFICE VISIT (OUTPATIENT)
Dept: NEUROLOGY | Facility: CLINIC | Age: 49
End: 2019-11-04
Payer: COMMERCIAL

## 2019-11-04 VITALS
HEIGHT: 69 IN | BODY MASS INDEX: 39.4 KG/M2 | WEIGHT: 266 LBS | HEART RATE: 62 BPM | DIASTOLIC BLOOD PRESSURE: 102 MMHG | SYSTOLIC BLOOD PRESSURE: 152 MMHG

## 2019-11-04 DIAGNOSIS — R56.9 SEIZURE (HCC): ICD-10-CM

## 2019-11-04 DIAGNOSIS — S06.9X9D TRAUMATIC BRAIN INJURY WITH LOSS OF CONSCIOUSNESS, SUBSEQUENT ENCOUNTER: Primary | Chronic | ICD-10-CM

## 2019-11-04 PROCEDURE — 99215 OFFICE O/P EST HI 40 MIN: CPT | Performed by: PSYCHIATRY & NEUROLOGY

## 2019-11-04 NOTE — PROGRESS NOTES
Demetri  is a 44 y o  male  Chief Complaint   Patient presents with    Hallucinations     Auditory    Hx of head injury - Aug 2019       Assessment:  1  Traumatic brain injury with loss of consciousness, subsequent encounter    2  Seizure [facility])          Discussion:  Patient's hospital records and CT scan of the brain results were reviewed, Differential diagnosis discussed with the patient, it is possible patient's hallucinations are secondary to traumatic brain injury, doubt this is seizures because patient does not lose any consciousness or awareness and he had the symptoms even prior to discharge from the hospital, would recommend an MRI scan of the brain since his last MRI scan was in January of 2019 to make sure that there is no other pathology, also would recommend EEG and blood work for Depakote level Keppra level and Dilantin level, he if patient is not having any seizures then we could consider decreasing the anti seizure medications, lie would also have the patient see our epilepsy specialist for their opinion and to see if he can treat with patient's hallucination symptomatic early since it is distressing for him, he was advised to continue with seizure precautions, he does not drive, he is usually around his family members, he was advised to see his family physician ASAP her regarding his blood pressure and other issues, he was also advised to discuss with his family physician and see traumatic brain injury specialist and an ENT surgeon for his tinnitus in the left ear, to keep his blood pressure cholesterol and sugar under control, go to the hospital if has any worsening symptoms and call me otherwise to see me back in 1 month and follow up with his other physicians      Subjective:    HPI   Patient is here in follow-up after his recent discharge from the hospital for his refractory status epilepticus, he was put on Depakote Keppra phenytoin, he had a complicated course in the hospital with being intubated and having paraphimosis which was reduced by Urology, he also was having auditory hallucination it was noted that he had a traumatic injury to his head several weeks prior to the admission which resulted in a subarachnoid hemorrhage his repeat CT scan showed resolution of the subarachnoid hemorrhage and Neurology in the hospital felt that these hallucinations were due to post concussive state, his hemoglobin A1c was increased at 13 and endocrinology was consulted, patient's headaches are much better now when he was in the hospital he still has a mild headache on and off but he still continues to have auditory hallucinations that he describes as people talking to him but he is aware of his surroundings and he is not confused during that time and he is not having any clinical seizures or any confusion, there is no suicidal or homicidal thoughts, his mood has been okay, he was supposed to get blood work for his antiseizure medication levels which he has not yet done, no other complaints    Vitals:    11/04/19 0845   BP: (!) 152/102   BP Location: Left arm   Patient Position: Sitting   Cuff Size: Adult   Pulse: 62   Weight: 121 kg (266 lb)   Height: 5' 8 5" (1 74 m)       Current Medications    Current Outpatient Medications:     divalproex sodium (DEPAKOTE) 250 mg EC tablet, Take 3 tablets (750 mg total) by mouth every 8 (eight) hours, Disp: 90 tablet, Rfl: 0    hydrochlorothiazide (HYDRODIURIL) 25 mg tablet, Take 1 tablet (25 mg total) by mouth daily, Disp: 90 tablet, Rfl: 1    insulin NPH-insulin regular (NovoLIN 70/30) 100 units/mL subcutaneous injection, Inject subcutaneously 25 units with breakfast and 15 units with dinner, Disp: 3 mL, Rfl: 0    levETIRAcetam (KEPPRA) 1000 MG tablet, Take 1 tablet (1,000 mg total) by mouth every 12 (twelve) hours, Disp: 60 tablet, Rfl: 0    lisinopril (ZESTRIL) 40 mg tablet, Take 1 tablet (40 mg total) by mouth daily, Disp: 30 tablet, Rfl: 0    metFORMIN (GLUCOPHAGE) 500 mg tablet, Take 1 tablet (500 mg total) by mouth 2 (two) times a day with meals, Disp: 60 tablet, Rfl: 0    metoprolol tartrate (LOPRESSOR) 100 mg tablet, Take 0 5 tablets (50 mg total) by mouth every 12 (twelve) hours, Disp: 60 tablet, Rfl: 0    phenytoin (DILANTIN) 300 MG ER capsule, Take 1 capsule (300 mg total) by mouth every 12 (twelve) hours, Disp: 60 capsule, Rfl: 0      Allergies  Patient has no known allergies  Past Medical History  Past Medical History:   Diagnosis Date    Chemical exposure     Diabetes mellitus ([facility] [address] )     H/O bone graft     Head injury     August 25, 2019 fell backwards hit head on a boulder w/ LOC    Head injury     as an air [place] with the [employer] - jumped out of a plane parachute disfunction    Hypertension          Past Surgical History:  Past Surgical History:   Procedure Laterality Date    KNEE ARTHROSCOPY W/ MENISCECTOMY Left     LEG SURGERY      TONSILLECTOMY           Family History:  Family History   Problem Relation Age of Onset    Diabetes Mother     Hypertension Mother     Asthma Mother     Brain cancer Father     No Known Problems Sister     No Known Problems Brother     No Known Problems Brother        Social History:   reports that he has never smoked  He has never used smokeless tobacco  He reports that he drinks about 3 0 standard drinks of alcohol per week  He reports that he does not use drugs  I have reviewed the past medical history, surgical history, social and family history, current medications, allergies vitals, review of systems, and updated this information as appropriate today  Objective:    Physical Exam    Neurological Exam    GENERAL:  Cooperative in no acute distress  Well-developed and well-nourished    HEAD and NECK   Head is atraumatic normocephalic with no lesions or masses  Neck is supple with full range of motion    CARDIOVASCULAR  Carotid Arteries-no carotid bruits      NEUROLOGIC:  Mental Status-the patient is awake alert and oriented without aphasia or apraxia  Cranial Nerves: Visual fields are full to confrontation  Discs are flat limited exam as unable to dilate the pupils 2+ and symmetrical  Extraocular movements are full without nystagmus  Pupils are 2-1/2 mm and reactive  Face is symmetrical to light touch  Movements of facial expression move symmetrically  Hearing is normal to finger rub bilaterally  Soft palate lifts symmetrically  Shoulder shrug is symmetrical  Tongue is midline without atrophy  Motor: No drift is noted on arm extension  Strength is full in the upper and lower extremities with normal bulk and tone  Sensory: Intact to temperature and vibratory sensation in the upper and lower extremities bilaterally  Cortical function is intact  Coordination: Finger to nose testing is performed accurately  Romberg is negative  Gait reveals a normal base with symmetrical arm swing  Tandem walk is normal   Reflexes:     2+ and symmetrical  Toes are downgoing  No spine tenderness          ROS:  Review of Systems   Constitutional: Positive for fatigue  Negative for appetite change and fever  HENT: Positive for tinnitus (left ear)  Negative for hearing loss, trouble swallowing and voice change  Sonophobia   Eyes: Positive for photophobia  Negative for pain  Respiratory: Negative  Negative for shortness of breath  Cardiovascular: Positive for leg swelling  Negative for palpitations  Gastrointestinal: Positive for nausea  Negative for constipation, diarrhea and vomiting  Endocrine: Negative  Negative for cold intolerance and heat intolerance  Genitourinary: Negative  Negative for dysuria, frequency and urgency  Musculoskeletal: Positive for back pain, gait problem and neck pain  Negative for myalgias  Skin: Negative  Negative for rash  Neurological: Positive for light-headedness and headaches  Negative for dizziness, seizures, syncope, facial asymmetry and numbness   Tremors: eye, hands  Weakness: legs, left arm  Diminished ability to taste  Tingling of left arm and legs worse on his left leg   Hematological: Negative  Does not bruise/bleed easily  Psychiatric/Behavioral: Positive for decreased concentration, hallucinations (hears kids crying, screaming,  repetitive words- multiple conversations -negative themes) and sleep disturbance  The patient is nervous/anxious           Word finding difficulty  Mood swings

## 2019-11-25 ENCOUNTER — HOSPITAL ENCOUNTER (OUTPATIENT)
Dept: NEUROLOGY | Facility: HOSPITAL | Age: 49
Discharge: HOME | End: 2019-11-25
Attending: PSYCHIATRY & NEUROLOGY
Payer: COMMERCIAL

## 2019-11-25 DIAGNOSIS — R56.9 SEIZURE (HCC): ICD-10-CM

## 2019-11-25 PROCEDURE — 95816 EEG AWAKE AND DROWSY: CPT

## 2019-11-25 PROCEDURE — 95816 EEG AWAKE AND DROWSY: CPT | Performed by: PSYCHIATRY & NEUROLOGY

## 2019-12-08 ENCOUNTER — APPOINTMENT (EMERGENCY)
Dept: CT IMAGING | Facility: HOSPITAL | Age: 49
End: 2019-12-08
Payer: COMMERCIAL

## 2019-12-08 ENCOUNTER — TELEPHONE (OUTPATIENT)
Dept: NEUROLOGY | Facility: CLINIC | Age: 49
End: 2019-12-08

## 2019-12-08 ENCOUNTER — HOSPITAL ENCOUNTER (EMERGENCY)
Facility: HOSPITAL | Age: 49
End: 2019-12-09
Attending: EMERGENCY MEDICINE | Admitting: EMERGENCY MEDICINE
Payer: COMMERCIAL

## 2019-12-08 DIAGNOSIS — R56.9 SEIZURE-LIKE ACTIVITY (HCC): ICD-10-CM

## 2019-12-08 DIAGNOSIS — R44.0 AUDITORY HALLUCINATIONS: Primary | ICD-10-CM

## 2019-12-08 PROBLEM — Z79.4 TYPE 2 DIABETES MELLITUS WITHOUT COMPLICATION, WITH LONG-TERM CURRENT USE OF INSULIN (HCC): Status: ACTIVE | Noted: 2019-01-15

## 2019-12-08 PROBLEM — R46.2 BIZARRE BEHAVIOR: Status: ACTIVE | Noted: 2019-12-08

## 2019-12-08 LAB
ALBUMIN SERPL BCP-MCNC: 3.5 G/DL (ref 3.5–5)
ALP SERPL-CCNC: 60 U/L (ref 46–116)
ALT SERPL W P-5'-P-CCNC: 21 U/L (ref 12–78)
ANION GAP SERPL CALCULATED.3IONS-SCNC: 9 MMOL/L (ref 4–13)
AST SERPL W P-5'-P-CCNC: 28 U/L (ref 5–45)
BASOPHILS # BLD AUTO: 0.05 THOUSANDS/ΜL (ref 0–0.1)
BASOPHILS NFR BLD AUTO: 1 % (ref 0–1)
BILIRUB SERPL-MCNC: 0.2 MG/DL (ref 0.2–1)
BUN SERPL-MCNC: 15 MG/DL (ref 5–25)
CALCIUM SERPL-MCNC: 8.5 MG/DL (ref 8.3–10.1)
CHLORIDE SERPL-SCNC: 108 MMOL/L (ref 100–108)
CO2 SERPL-SCNC: 27 MMOL/L (ref 21–32)
CREAT SERPL-MCNC: 0.89 MG/DL (ref 0.6–1.3)
EOSINOPHIL # BLD AUTO: 0.22 THOUSAND/ΜL (ref 0–0.61)
EOSINOPHIL NFR BLD AUTO: 3 % (ref 0–6)
ERYTHROCYTE [DISTWIDTH] IN BLOOD BY AUTOMATED COUNT: 13.7 % (ref 11.6–15.1)
GFR SERPL CREATININE-BSD FRML MDRD: 99 ML/MIN/1.73SQ M
GLUCOSE SERPL-MCNC: 102 MG/DL (ref 65–140)
GLUCOSE SERPL-MCNC: 82 MG/DL (ref 65–140)
HCT VFR BLD AUTO: 46 % (ref 36.5–49.3)
HGB BLD-MCNC: 14.8 G/DL (ref 12–17)
IMM GRANULOCYTES # BLD AUTO: 0.03 THOUSAND/UL (ref 0–0.2)
IMM GRANULOCYTES NFR BLD AUTO: 0 % (ref 0–2)
LYMPHOCYTES # BLD AUTO: 1.48 THOUSANDS/ΜL (ref 0.6–4.47)
LYMPHOCYTES NFR BLD AUTO: 22 % (ref 14–44)
MCH RBC QN AUTO: 29.1 PG (ref 26.8–34.3)
MCHC RBC AUTO-ENTMCNC: 32.2 G/DL (ref 31.4–37.4)
MCV RBC AUTO: 91 FL (ref 82–98)
MONOCYTES # BLD AUTO: 0.62 THOUSAND/ΜL (ref 0.17–1.22)
MONOCYTES NFR BLD AUTO: 9 % (ref 4–12)
NEUTROPHILS # BLD AUTO: 4.28 THOUSANDS/ΜL (ref 1.85–7.62)
NEUTS SEG NFR BLD AUTO: 65 % (ref 43–75)
NRBC BLD AUTO-RTO: 0 /100 WBCS
PHENYTOIN SERPL-MCNC: 7.5 UG/ML (ref 10–20)
PLATELET # BLD AUTO: 203 THOUSANDS/UL (ref 149–390)
PMV BLD AUTO: 9.8 FL (ref 8.9–12.7)
POTASSIUM SERPL-SCNC: 4.3 MMOL/L (ref 3.5–5.3)
PROT SERPL-MCNC: 7.2 G/DL (ref 6.4–8.2)
RBC # BLD AUTO: 5.08 MILLION/UL (ref 3.88–5.62)
SODIUM SERPL-SCNC: 144 MMOL/L (ref 136–145)
VALPROATE SERPL-MCNC: 61 UG/ML (ref 50–100)
WBC # BLD AUTO: 6.68 THOUSAND/UL (ref 4.31–10.16)

## 2019-12-08 PROCEDURE — 80053 COMPREHEN METABOLIC PANEL: CPT | Performed by: EMERGENCY MEDICINE

## 2019-12-08 PROCEDURE — 36415 COLL VENOUS BLD VENIPUNCTURE: CPT | Performed by: EMERGENCY MEDICINE

## 2019-12-08 PROCEDURE — 85025 COMPLETE CBC W/AUTO DIFF WBC: CPT | Performed by: EMERGENCY MEDICINE

## 2019-12-08 PROCEDURE — 80164 ASSAY DIPROPYLACETIC ACD TOT: CPT | Performed by: EMERGENCY MEDICINE

## 2019-12-08 PROCEDURE — 80185 ASSAY OF PHENYTOIN TOTAL: CPT | Performed by: EMERGENCY MEDICINE

## 2019-12-08 PROCEDURE — 70450 CT HEAD/BRAIN W/O DYE: CPT

## 2019-12-08 PROCEDURE — 80177 DRUG SCRN QUAN LEVETIRACETAM: CPT | Performed by: EMERGENCY MEDICINE

## 2019-12-08 PROCEDURE — 99285 EMERGENCY DEPT VISIT HI MDM: CPT | Performed by: EMERGENCY MEDICINE

## 2019-12-08 PROCEDURE — 82948 REAGENT STRIP/BLOOD GLUCOSE: CPT

## 2019-12-08 PROCEDURE — 99285 EMERGENCY DEPT VISIT HI MDM: CPT

## 2019-12-08 RX ORDER — PHENYTOIN SODIUM 100 MG/1
300 CAPSULE, EXTENDED RELEASE ORAL ONCE
Status: COMPLETED | OUTPATIENT
Start: 2019-12-08 | End: 2019-12-08

## 2019-12-08 RX ORDER — LEVETIRACETAM 500 MG/1
1000 TABLET ORAL ONCE
Status: COMPLETED | OUTPATIENT
Start: 2019-12-08 | End: 2019-12-08

## 2019-12-08 RX ORDER — VALPROIC ACID 250 MG/1
250 CAPSULE, LIQUID FILLED ORAL ONCE
Status: COMPLETED | OUTPATIENT
Start: 2019-12-08 | End: 2019-12-08

## 2019-12-08 RX ADMIN — LEVETIRACETAM 1000 MG: 500 TABLET ORAL at 20:29

## 2019-12-08 RX ADMIN — METFORMIN HYDROCHLORIDE 500 MG: 500 TABLET ORAL at 20:28

## 2019-12-08 RX ADMIN — PHENYTOIN SODIUM 300 MG: 100 CAPSULE ORAL at 20:30

## 2019-12-08 RX ADMIN — VALPROIC ACID 250 MG: 250 CAPSULE, LIQUID FILLED ORAL at 20:30
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
## 2019-12-08 NOTE — TELEPHONE ENCOUNTER
Neurology on call        Paulo Lewis  is a 44 y o  male patient who originally presented to the hospital on  08/26/19 where he was noted to have a subarachnoid hemorrhage    He began with auditory hallucinaitons which were felt to be due to post concussive state  He initially presented to [place] dizziness but also staring episodes that were suspicious for absent seizures  transferred to [facility] for video EEG monitoring at that time Neurology was consulted and recommended patient be put on Depakote, Keppra, phenytoin 1 with Ativan p r n  Patsi Reil Patient was in refractory status epilepticus and required intubation     Eventually he was d/c and seen by dr Nydia Palencia in November of 2019   An eeg was normal , labs have been ordered and are pending    Today he informs me of increase in auditory halluncinations   And is afraid he will hurt himself or others  His  wife Pedro Vazquez reports periods of confusion, shouting and waking up in the middle of the night       On depakote 750 mg q8 hrs , keppra 1000mg bid and Dilantin 300mg bid       I informed them that  He should seek urgent  medical attentions , er michael   Will not change the meds at this time due to  risk of altering the meds could cause a return to status epilepticus

## 2019-12-09 ENCOUNTER — HOSPITAL ENCOUNTER (INPATIENT)
Facility: HOSPITAL | Age: 49
LOS: 5 days | Discharge: HOME | DRG: 757 | End: 2019-12-14
Attending: INTERNAL MEDICINE | Admitting: INTERNAL MEDICINE
Payer: COMMERCIAL

## 2019-12-09 ENCOUNTER — APPOINTMENT (INPATIENT)
Dept: NEUROLOGY | Facility: CLINIC | Age: 49
DRG: 757 | End: 2019-12-09
Payer: COMMERCIAL

## 2019-12-09 VITALS
DIASTOLIC BLOOD PRESSURE: 87 MMHG | OXYGEN SATURATION: 95 % | WEIGHT: 273.37 LBS | SYSTOLIC BLOOD PRESSURE: 136 MMHG | BODY MASS INDEX: 40.49 KG/M2 | HEIGHT: 69 IN | HEART RATE: 73 BPM | RESPIRATION RATE: 18 BRPM | TEMPERATURE: 98.1 F

## 2019-12-09 DIAGNOSIS — E11.8 TYPE 2 DIABETES MELLITUS WITH COMPLICATION, WITHOUT LONG-TERM CURRENT USE OF INSULIN (HCC): Chronic | ICD-10-CM

## 2019-12-09 DIAGNOSIS — S06.9X9D TRAUMATIC BRAIN INJURY WITH LOSS OF CONSCIOUSNESS, SUBSEQUENT ENCOUNTER: Chronic | ICD-10-CM

## 2019-12-09 DIAGNOSIS — R56.1 POST TRAUMATIC SEIZURE DISORDER (HCC): ICD-10-CM

## 2019-12-09 DIAGNOSIS — R56.9 SEIZURE (HCC): Primary | ICD-10-CM

## 2019-12-09 DIAGNOSIS — F29 PSYCHOTIC DISORDER (HCC): ICD-10-CM

## 2019-12-09 DIAGNOSIS — I10 ESSENTIAL HYPERTENSION: ICD-10-CM

## 2019-12-09 DIAGNOSIS — R44.0 AUDITORY HALLUCINATIONS: ICD-10-CM

## 2019-12-09 LAB
ANION GAP SERPL CALCULATED.3IONS-SCNC: 5 MMOL/L (ref 4–13)
BASOPHILS # BLD AUTO: 0.03 THOUSANDS/ΜL (ref 0–0.1)
BASOPHILS NFR BLD AUTO: 1 % (ref 0–1)
BILIRUB UR QL STRIP: NEGATIVE
BUN SERPL-MCNC: 13 MG/DL (ref 5–25)
CALCIUM SERPL-MCNC: 8.4 MG/DL (ref 8.3–10.1)
CHLORIDE SERPL-SCNC: 112 MMOL/L (ref 100–108)
CLARITY UR: CLEAR
CO2 SERPL-SCNC: 26 MMOL/L (ref 21–32)
COLOR UR: YELLOW
CREAT SERPL-MCNC: 0.79 MG/DL (ref 0.6–1.3)
EOSINOPHIL # BLD AUTO: 0.25 THOUSAND/ΜL (ref 0–0.61)
EOSINOPHIL NFR BLD AUTO: 4 % (ref 0–6)
ERYTHROCYTE [DISTWIDTH] IN BLOOD BY AUTOMATED COUNT: 13.7 % (ref 11.6–15.1)
EST. AVERAGE GLUCOSE BLD GHB EST-MCNC: 143 MG/DL
GFR SERPL CREATININE-BSD FRML MDRD: 104 ML/MIN/1.73SQ M
GLUCOSE SERPL-MCNC: 59 MG/DL (ref 65–140)
GLUCOSE SERPL-MCNC: 67 MG/DL (ref 65–140)
GLUCOSE SERPL-MCNC: 80 MG/DL (ref 65–140)
GLUCOSE SERPL-MCNC: 83 MG/DL (ref 65–140)
GLUCOSE SERPL-MCNC: 84 MG/DL (ref 65–140)
GLUCOSE SERPL-MCNC: 86 MG/DL (ref 65–140)
GLUCOSE SERPL-MCNC: 99 MG/DL (ref 65–140)
GLUCOSE UR STRIP-MCNC: NEGATIVE MG/DL
HBA1C MFR BLD: 6.6 % (ref 4.2–6.3)
HCT VFR BLD AUTO: 42.8 % (ref 36.5–49.3)
HGB BLD-MCNC: 13.7 G/DL (ref 12–17)
HGB UR QL STRIP.AUTO: NEGATIVE
IMM GRANULOCYTES # BLD AUTO: 0.02 THOUSAND/UL (ref 0–0.2)
IMM GRANULOCYTES NFR BLD AUTO: 0 % (ref 0–2)
KETONES UR STRIP-MCNC: NEGATIVE MG/DL
LEUKOCYTE ESTERASE UR QL STRIP: NEGATIVE
LYMPHOCYTES # BLD AUTO: 1.5 THOUSANDS/ΜL (ref 0.6–4.47)
LYMPHOCYTES NFR BLD AUTO: 24 % (ref 14–44)
MAGNESIUM SERPL-MCNC: 2.2 MG/DL (ref 1.6–2.6)
MCH RBC QN AUTO: 28.9 PG (ref 26.8–34.3)
MCHC RBC AUTO-ENTMCNC: 32 G/DL (ref 31.4–37.4)
MCV RBC AUTO: 90 FL (ref 82–98)
MONOCYTES # BLD AUTO: 0.51 THOUSAND/ΜL (ref 0.17–1.22)
MONOCYTES NFR BLD AUTO: 8 % (ref 4–12)
NEUTROPHILS # BLD AUTO: 4.06 THOUSANDS/ΜL (ref 1.85–7.62)
NEUTS SEG NFR BLD AUTO: 63 % (ref 43–75)
NITRITE UR QL STRIP: NEGATIVE
NRBC BLD AUTO-RTO: 0 /100 WBCS
PH UR STRIP.AUTO: 6.5 [PH]
PLATELET # BLD AUTO: 209 THOUSANDS/UL (ref 149–390)
PMV BLD AUTO: 9.6 FL (ref 8.9–12.7)
POTASSIUM SERPL-SCNC: 3.8 MMOL/L (ref 3.5–5.3)
PROT UR STRIP-MCNC: NEGATIVE MG/DL
RBC # BLD AUTO: 4.74 MILLION/UL (ref 3.88–5.62)
SODIUM SERPL-SCNC: 143 MMOL/L (ref 136–145)
SP GR UR STRIP.AUTO: 1.02 (ref 1–1.03)
TSH SERPL DL<=0.05 MIU/L-ACNC: 2.52 UIU/ML (ref 0.36–3.74)
UROBILINOGEN UR QL STRIP.AUTO: 1 E.U./DL
WBC # BLD AUTO: 6.37 THOUSAND/UL (ref 4.31–10.16)

## 2019-12-09 PROCEDURE — 95951 HB EEG MONITORING/VIDEORECORD: CPT

## 2019-12-09 PROCEDURE — 82948 REAGENT STRIP/BLOOD GLUCOSE: CPT

## 2019-12-09 PROCEDURE — 83036 HEMOGLOBIN GLYCOSYLATED A1C: CPT | Performed by: INTERNAL MEDICINE

## 2019-12-09 PROCEDURE — 99254 IP/OBS CNSLTJ NEW/EST MOD 60: CPT | Performed by: PSYCHIATRY & NEUROLOGY

## 2019-12-09 PROCEDURE — 95951 PR EEG MONITORING/VIDEORECORD: CPT | Performed by: PSYCHIATRY & NEUROLOGY

## 2019-12-09 PROCEDURE — 83735 ASSAY OF MAGNESIUM: CPT | Performed by: INTERNAL MEDICINE

## 2019-12-09 PROCEDURE — 99223 1ST HOSP IP/OBS HIGH 75: CPT | Performed by: INTERNAL MEDICINE

## 2019-12-09 PROCEDURE — 99255 IP/OBS CONSLTJ NEW/EST HI 80: CPT | Performed by: PSYCHIATRY & NEUROLOGY

## 2019-12-09 PROCEDURE — 81003 URINALYSIS AUTO W/O SCOPE: CPT | Performed by: INTERNAL MEDICINE

## 2019-12-09 PROCEDURE — 99232 SBSQ HOSP IP/OBS MODERATE 35: CPT | Performed by: PHYSICIAN ASSISTANT

## 2019-12-09 PROCEDURE — 84443 ASSAY THYROID STIM HORMONE: CPT | Performed by: INTERNAL MEDICINE

## 2019-12-09 PROCEDURE — 80048 BASIC METABOLIC PNL TOTAL CA: CPT | Performed by: INTERNAL MEDICINE

## 2019-12-09 PROCEDURE — 85025 COMPLETE CBC W/AUTO DIFF WBC: CPT | Performed by: INTERNAL MEDICINE

## 2019-12-09 RX ORDER — LORAZEPAM 2 MG/ML
2 INJECTION INTRAMUSCULAR EVERY 8 HOURS PRN
Status: DISCONTINUED | OUTPATIENT
Start: 2019-12-09 | End: 2019-12-14 | Stop reason: HOSPADM

## 2019-12-09 RX ORDER — PHENYTOIN SODIUM 100 MG/1
300 CAPSULE, EXTENDED RELEASE ORAL EVERY 12 HOURS SCHEDULED
Status: DISCONTINUED | OUTPATIENT
Start: 2019-12-09 | End: 2019-12-14 | Stop reason: HOSPADM

## 2019-12-09 RX ORDER — HYDROCHLOROTHIAZIDE 25 MG/1
25 TABLET ORAL DAILY
Status: DISCONTINUED | OUTPATIENT
Start: 2019-12-09 | End: 2019-12-14 | Stop reason: HOSPADM

## 2019-12-09 RX ORDER — ACETAMINOPHEN 325 MG/1
650 TABLET ORAL EVERY 6 HOURS PRN
Status: DISCONTINUED | OUTPATIENT
Start: 2019-12-09 | End: 2019-12-10

## 2019-12-09 RX ORDER — INSULIN ASPART 100 [IU]/ML
15 INJECTION, SUSPENSION SUBCUTANEOUS
Status: DISCONTINUED | OUTPATIENT
Start: 2019-12-09 | End: 2019-12-09

## 2019-12-09 RX ORDER — LISINOPRIL 20 MG/1
60 TABLET ORAL DAILY
Status: DISCONTINUED | OUTPATIENT
Start: 2019-12-10 | End: 2019-12-14 | Stop reason: HOSPADM

## 2019-12-09 RX ORDER — INSULIN ASPART 100 [IU]/ML
20 INJECTION, SUSPENSION SUBCUTANEOUS
Status: DISCONTINUED | OUTPATIENT
Start: 2019-12-10 | End: 2019-12-12

## 2019-12-09 RX ORDER — LISINOPRIL 20 MG/1
40 TABLET ORAL DAILY
Status: DISCONTINUED | OUTPATIENT
Start: 2019-12-09 | End: 2019-12-09

## 2019-12-09 RX ORDER — LEVETIRACETAM 500 MG/1
1000 TABLET ORAL EVERY 12 HOURS SCHEDULED
Status: DISCONTINUED | OUTPATIENT
Start: 2019-12-09 | End: 2019-12-09

## 2019-12-09 RX ORDER — INSULIN ASPART 100 [IU]/ML
25 INJECTION, SUSPENSION SUBCUTANEOUS
Status: DISCONTINUED | OUTPATIENT
Start: 2019-12-09 | End: 2019-12-09

## 2019-12-09 RX ORDER — INSULIN ASPART 100 [IU]/ML
10 INJECTION, SUSPENSION SUBCUTANEOUS
Status: DISCONTINUED | OUTPATIENT
Start: 2019-12-10 | End: 2019-12-11

## 2019-12-09 RX ORDER — METOPROLOL TARTRATE 50 MG/1
100 TABLET, FILM COATED ORAL EVERY 12 HOURS SCHEDULED
Status: DISCONTINUED | OUTPATIENT
Start: 2019-12-09 | End: 2019-12-14 | Stop reason: HOSPADM

## 2019-12-09 RX ORDER — ONDANSETRON 2 MG/ML
4 INJECTION INTRAMUSCULAR; INTRAVENOUS EVERY 6 HOURS PRN
Status: DISCONTINUED | OUTPATIENT
Start: 2019-12-09 | End: 2019-12-14 | Stop reason: HOSPADM

## 2019-12-09 RX ORDER — MAGNESIUM HYDROXIDE/ALUMINUM HYDROXICE/SIMETHICONE 120; 1200; 1200 MG/30ML; MG/30ML; MG/30ML
30 SUSPENSION ORAL EVERY 6 HOURS PRN
Status: DISCONTINUED | OUTPATIENT
Start: 2019-12-09 | End: 2019-12-14 | Stop reason: HOSPADM

## 2019-12-09 RX ORDER — DOCUSATE SODIUM 100 MG/1
100 CAPSULE, LIQUID FILLED ORAL 2 TIMES DAILY PRN
Status: DISCONTINUED | OUTPATIENT
Start: 2019-12-09 | End: 2019-12-14 | Stop reason: HOSPADM

## 2019-12-09 RX ADMIN — DIVALPROEX SODIUM 750 MG: 500 TABLET, DELAYED RELEASE ORAL at 13:59

## 2019-12-09 RX ADMIN — METFORMIN HYDROCHLORIDE 500 MG: 500 TABLET ORAL at 17:23

## 2019-12-09 RX ADMIN — PHENYTOIN SODIUM 300 MG: 100 CAPSULE ORAL at 21:19

## 2019-12-09 RX ADMIN — ACETAMINOPHEN 650 MG: 325 TABLET ORAL at 13:59

## 2019-12-09 RX ADMIN — ACETAMINOPHEN 650 MG: 325 TABLET ORAL at 20:18

## 2019-12-09 RX ADMIN — HYDROCHLOROTHIAZIDE 25 MG: 25 TABLET ORAL at 08:20

## 2019-12-09 RX ADMIN — METOPROLOL TARTRATE 100 MG: 50 TABLET, FILM COATED ORAL at 08:15

## 2019-12-09 RX ADMIN — METOPROLOL TARTRATE 100 MG: 50 TABLET, FILM COATED ORAL at 21:19

## 2019-12-09 RX ADMIN — LEVETIRACETAM 1000 MG: 500 TABLET, FILM COATED ORAL at 08:15

## 2019-12-09 RX ADMIN — INSULIN ASPART 25 UNITS: 100 INJECTION, SUSPENSION SUBCUTANEOUS at 08:15

## 2019-12-09 RX ADMIN — DIVALPROEX SODIUM 750 MG: 500 TABLET, DELAYED RELEASE ORAL at 21:19

## 2019-12-09 RX ADMIN — PHENYTOIN SODIUM 300 MG: 100 CAPSULE ORAL at 08:17

## 2019-12-09 RX ADMIN — DIVALPROEX SODIUM 750 MG: 500 TABLET, DELAYED RELEASE ORAL at 06:20

## 2019-12-09 RX ADMIN — METFORMIN HYDROCHLORIDE 500 MG: 500 TABLET ORAL at 08:15

## 2019-12-09 RX ADMIN — LISINOPRIL 40 MG: 20 TABLET ORAL at 08:15

## 2019-12-09 RX ADMIN — ENOXAPARIN SODIUM 40 MG: 40 INJECTION SUBCUTANEOUS at 08:15

## 2019-12-09 NOTE — TELEPHONE ENCOUNTER
I tried to contact the patient, the phone is disconnected, please inform the patient to go to the hospital otherwise connect him to me,  Thank you

## 2019-12-09 NOTE — H&P
H&P- Demetri  1970, 44 y o  male MRN: [id number]    [facility] Encounter: [id number]    Primary Care Provider: Perez Vera   Date and time admitted to hospital: 12/9/2019  2:31 AM        * Seizure [facility])  Assessment & Plan  Would need video EEG as discussed with Neurology (Dr Daly Hahn)  Seizure precaution  Neuro checks per protocol  Neurology opinion  Continue Dilantin 300 mg ER Q 12  Keppra 1 g Q 12  Depakote 750 mg Q 8  Bizarre behavior  Assessment & Plan  May be manifestation of worsening seizures  As of the moment, Dr Daly Hahn recommends patient be transferred to [facility], video EEG  Seizure protocol  Continue anti epileptics  TBI (traumatic brain injury) [facility])  Assessment & Plan  Traumatic brain injury is stable and was a result of a fall backwards with subdural and subarachnoid hemorrhage  Currently with seizure disorder and auditory hallucinations  Type 2 diabetes mellitus without complication, with long-term current use of insulin ([facility])  Assessment & Plan  Lab Results   Component Value Date    HGBA1C 13 2 (H) 09/18/2019   Hemoglobin A1c  Insulin 70/30 25 units in the morning and 15 units at dinnertime  Insulin sliding scale with Accu-Cheks per protocol  Diabetic diet  Recent Labs     12/08/19 2023   POCGLU 102       Blood Sugar Average: Last 72 hrs:      Essential hypertension  Assessment & Plan  On metoprolol 100 mg Q 12  Zestril 40 mg Q daily  Hydrochlorothiazide 25 mg q day  VTE Prophylaxis: Enoxaparin (Lovenox)  / sequential compression device   Code Status: Level 1 - Full Code full code  POLST: There is no POLST form on file for this patient (pre-hospital)    Anticipated Length of Stay:  Patient will be admitted on an Inpatient basis with an anticipated length of stay of  greater than 2 midnights  Justification for Hospital Stay: Please see detailed plans noted above      Chief Complaint:     Auditory hallucinations with staring spells  History of Present Illness:  Pineda Naranjo  is a 44 y o  male who has a past medical history of traumatic brain injury having fallen backwards with resultant head trauma and subarachnoid hemorrhage and subdural hemorrhage  Essentially, after that the patient has been having auditory hallucinations with seizure and currently is on Dilantin, Keppra and Depakote  The patient presented in [facility] with complaints of increased confusion, forgetful  Patient has problem recognizing family members  There was also increased instances of auditory hallucinations and was also noticed over the past 2 days to have increased staring spells  Instances of unsteady gait  No fever  Dizzy sensation  Even though with staring spells, can be called into attention with return of full consciousness and usual behavior  Currently, patient is quiet in the room but is able to communicate  He acknowledges that he needs help with his current condition  Review of Systems:    Constitutional:  Denies fever or chills   Eyes:  Denies change in visual acuity   HENT:  Denies nasal congestion or sore throat   Respiratory:  Denies cough or shortness of breath   Cardiovascular:  Denies chest pain or edema   GI:  Denies abdominal pain, nausea, vomiting, bloody stools or diarrhea   :  Denies dysuria   Musculoskeletal:  Denies back pain or joint pain   Integument:  Denies rash   Neurologic:  Denies headache, focal weakness or sensory changes   Endocrine:  Denies polyuria or polydipsia   Lymphatic:  Denies swollen glands   Psychiatric:  Denies depression or anxiety however has auditory hallucinations claiming gibberish or children crying      Past Medical and Surgical History:   Past Medical History:   Diagnosis Date    Chemical exposure     Diabetes mellitus ([facility] [address] )     H/O bone graft     Head injury     August 25, 2019 fell backwards hit head on a boulder w/ LOC    Head injury     as an air [place] with the [employer] - jumped out of a plane parachute disfunction    Hypertension      Past Surgical History:   Procedure Laterality Date    KNEE ARTHROSCOPY W/ MENISCECTOMY Left     LEG SURGERY      TONSILLECTOMY         Meds/Allergies:  Medications Prior to Admission   Medication    divalproex sodium (DEPAKOTE) 250 mg EC tablet    hydrochlorothiazide (HYDRODIURIL) 25 mg tablet    insulin NPH-insulin regular (NovoLIN 70/30) 100 units/mL subcutaneous injection    levETIRAcetam (KEPPRA) 1000 MG tablet    lisinopril (ZESTRIL) 40 mg tablet    metFORMIN (GLUCOPHAGE) 500 mg tablet    metoprolol tartrate (LOPRESSOR) 100 mg tablet    phenytoin (DILANTIN) 300 MG ER capsule       Allergies: No Known Allergies  History:  Marital Status: Single   Occupation:  Currently unemployed  Patient Pre-hospital Living Situation:  Lives at home  Patient Pre-hospital Level of Mobility:  Ambulatory  Patient Pre-hospital Diet Restrictions:  Diabetic diet  Substance Use History:   Social History     Substance and Sexual Activity   Alcohol Use Yes    Alcohol/week: 3 0 standard drinks    Types: 3 Cans of beer per week    Frequency: Monthly or less    Comment: occassionally     Social History     Tobacco Use   Smoking Status Never Smoker   Smokeless Tobacco Never Used     Social History     Substance and Sexual Activity   Drug Use No       Family History:  Family History   Problem Relation Age of Onset    Diabetes Mother     Hypertension Mother     Asthma Mother     Brain cancer Father     No Known Problems Sister     No Known Problems Brother     No Known Problems Brother        Physical Exam:     Vitals:   Blood Pressure: 154/100 (12/09/19 0236)  Pulse: 76 (12/09/19 0236)  Temperature: 97 7 °F (36 5 °C) (12/09/19 0236)  Respirations: 18 (12/09/19 0236)  Height: 5' 9" (175 3 cm) (12/09/19 0236)  Weight - Scale: 127 kg (279 lb 12 8 oz) (12/09/19 0236)  SpO2: 94 % (12/09/19 0236)    Constitutional:  Well developed, well nourished, no acute distress, non-toxic appearance   Eyes:  PERRL, conjunctiva normal   HENT:  Atraumatic, external ears normal, nose normal, oropharynx moist, no pharyngeal exudates  Neck- normal range of motion, no tenderness, supple   Respiratory:  No respiratory distress, normal breath sounds, no rales, no wheezing   Cardiovascular:  Normal rate, normal rhythm, no murmurs, no gallops, no rubs   GI:  Soft, nondistended, normal bowel sounds, nontender, no organomegaly, no mass, no rebound, no guarding   :  No costovertebral angle tenderness   Musculoskeletal:  No edema, no tenderness, no deformities  Back- no tenderness  Integument:  Well hydrated, no rash   Lymphatic:  No lymphadenopathy noted   Neurologic:  Alert &awake, communicative, CN 2-12 normal, normal motor function, normal sensory function, no focal deficits noted and currently with out see any seizure activity  No drifting  Psychiatric:  Speech and behavior appropriate       Lab Results: I have personally reviewed pertinent reports  Results from last 7 days   Lab Units 12/08/19  1732   WBC Thousand/uL 6 68   HEMOGLOBIN g/dL 14 8   HEMATOCRIT % 46 0   PLATELETS Thousands/uL 203   NEUTROS PCT % 65   LYMPHS PCT % 22   MONOS PCT % 9   EOS PCT % 3     Results from last 7 days   Lab Units 12/08/19  1732   POTASSIUM mmol/L 4 3   CHLORIDE mmol/L 108   CO2 mmol/L 27   BUN mg/dL 15   CREATININE mg/dL 0 89   CALCIUM mg/dL 8 5   ALK PHOS U/L 60   ALT U/L 21   AST U/L 28               Imaging: I have personally reviewed pertinent reports  Ct Head Without Contrast    Result Date: 12/8/2019  Narrative: CT BRAIN - WITHOUT CONTRAST INDICATION:   TBI, hallucinations  COMPARISON:  Head CT 9/17/2019 TECHNIQUE:  CT examination of the brain was performed  In addition to axial images, coronal 2D reformatted images were created and submitted for interpretation  Radiation dose length product (DLP) for this visit:  850 mGy-cm     This examination, like all CT scans performed in the [facility], was performed utilizing techniques to minimize radiation dose exposure, including the use of iterative reconstruction and automated exposure control  IMAGE QUALITY:  Diagnostic  FINDINGS: PARENCHYMA:  No intracranial mass, mass effect or midline shift  No CT signs of acute infarction  No acute parenchymal hemorrhage  VENTRICLES AND EXTRA-AXIAL SPACES:  Normal for the patient's age  VISUALIZED ORBITS AND PARANASAL SINUSES:  No acute abnormality involving the orbits  Mild scattered sinus mucosal thickening is noted  No fluid levels are seen  CALVARIUM AND EXTRACRANIAL SOFT TISSUES:  Focal infiltrative changes in the subcutaneous tissues of the right posterior neck, unchanged, question related to prior trauma  Decreased infiltrative changes in the left parietal scalp subcutaneous tissues  Impression: No acute intracranial abnormality  Workstation performed: [id number]         ** Please Note: Dragon 360 Dictation voice to text software was used in the creation of this document   **

## 2019-12-09 NOTE — CONSULTS
[facility] - [address] [place] [address]  44 y o  male MRN: [id number]  Unit/Bed#: [facility] 629-75 Encounter: [id number]      Chief Complaint:  I never hear voices until August    History of Present Illness   Physician Requesting Consult: Julissa Feliz MD  Reason for Consult / Principal Problem:  Auditory hallucination    Alexa Matamoros  is a 44 y o  male with traumatic brain injury, type 2 diabetes mellitus without complications, hypertension presents with seizures and bizarre behavior  Patient had no prior psychiatric history he states that he has a he traumatic brain injury on August /26/2019  He states that he started hear noises like door  close  and other things like this but he never hears voices talking to him until recently  He states that he wake up confused and he hears the voices telling him to do things, he is very scare because he had an event with his brother-in-law that he thought that was somebody coming in the house and despite that family member were telling who was the person he was very confused and he did not realize that was him  He does not feel paranoid, he denies any symptoms of depression and anxiety, he does feel scared that omn one of these episodes somebody can get hurt  He denies any suicidal homicidal ideation plan or intent  Psychiatric Review Of Systems:  sleep: no  appetite changes: no  weight changes: no  energy/anergy: no  interest/pleasure/anhedonia: no  somatic symptoms: no  anxiety/panic: no  afshin: no  guilty/hopeless: no  self injurious behavior/risky behavior: no    Historical Information   Past Psychiatric History:   None  Currently in treatment with none    Past Suicide attempts:  None  Past Violent behavior:  None  Past Psychiatric medication trial:  None    Substance Abuse History:  He drinks socially, denies any drug use     I have assessed this patient for substance use within the past 12 months     History of IP/OP rehabilitation program: None  Smoking history:  He smokes cigars but also social  Family Psychiatric History:   Denies any history of mental illness drug or alcohol or in the family    Social History  Education: some college  Learning Disabilities: No  Marital history:   Living arrangement, social support: Live with his wife and his children  Occupational History: on temporary disability  Functioning Relationships: good support system    Other Pertinent History: He was in the [employer] service for 2 years    Traumatic History:   Abuse: None  Other Traumatic Events: Traumatic brain injury    Past Medical History:   Diagnosis Date    Chemical exposure     Diabetes mellitus ([facility] [address] )     H/O bone graft     Head injury     August 25, 2019 fell backwards hit head on a boulder w/ LOC    Head injury     as an air Born [place] with the [employer] - jumped out of a plane parachute disfunction    Hypertension        Medical Review Of Systems:  Review of Systems - Negative except hallucinations, seizures, all other systems reviewed were negative    Meds/Allergies   current meds:   Current Facility-Administered Medications   Medication Dose Route Frequency    acetaminophen (TYLENOL) tablet 650 mg  650 mg Oral Q6H PRN    aluminum-magnesium hydroxide-simethicone (MYLANTA) 200-200-20 mg/5 mL oral suspension 30 mL  30 mL Oral Q6H PRN    divalproex sodium (DEPAKOTE) EC tablet 750 mg  750 mg Oral Q8H [facility]    docusate sodium (COLACE) capsule 100 mg  100 mg Oral BID PRN    enoxaparin (LOVENOX) subcutaneous injection 40 mg  40 mg Subcutaneous Daily    hydrochlorothiazide (HYDRODIURIL) tablet 25 mg  25 mg Oral Daily    insulin aspart protamine-insulin aspart (NovoLOG 70/30) 100 units/mL subcutaneous injection 15 Units  15 Units Subcutaneous Before Dinner    [START ON 12/10/2019] insulin aspart protamine-insulin aspart (NovoLOG 70/30) 100 units/mL subcutaneous injection 20 Units  20 Units Subcutaneous Daily Before Breakfast    insulin lispro (HumaLOG) 100 units/mL subcutaneous injection 1-5 Units  1-5 Units Subcutaneous HS    insulin lispro (HumaLOG) 100 units/mL subcutaneous injection 2-12 Units  2-12 Units Subcutaneous TID AC    levETIRAcetam (KEPPRA) tablet 1,000 mg  1,000 mg Oral Q12H [facility]    [START ON 12/10/2019] lisinopril (ZESTRIL) tablet 60 mg  60 mg Oral Daily    LORazepam (ATIVAN) 2 mg/mL injection 2 mg  2 mg Intravenous Q8H PRN    metFORMIN (GLUCOPHAGE) tablet 500 mg  500 mg Oral BID With Meals    metoprolol tartrate (LOPRESSOR) tablet 100 mg  100 mg Oral Q12H [facility]    ondansetron (ZOFRAN) injection 4 mg  4 mg Intravenous Q6H PRN    phenytoin (DILANTIN) ER capsule 300 mg  300 mg Oral Q12H [facility]     No Known Allergies    Objective   Vital signs in last 24 hours:  Temp:  [97 7 °F (36 5 °C)-98 3 °F (36 8 °C)] 98 3 °F (36 8 °C)  HR:  [66-77] 77  Resp:  [18-20] 18  BP: (127-175)/() 154/101    No intake or output data in the 24 hours ending 12/09/19 1509    Mental Status Evaluation:  Appearance:  age appropriate and disheveled   Behavior:  normal   Speech:  normal pitch and normal volume   Mood:  anxious   Affect:  mood-congruent   Language: naming objects and repeating phrases   Thought Process:  goal directed   Associations: intact associations   Thought Content:  normal   Perceptual Disturbances:  Auditory hallucination lost noises and multiple people talking   Risk Potential: He denies any suicidal or homicidal plans or intent   Sensorium:  person, place and time/date   Memory:  recent and remote memory grossly intact   Cognition:  grossly intact   Consciousness:  alert and awake    Attention: attention span appeared shorter than expected for age   Intellect: normal   Fund of Knowledge: awareness of current events: fair, past history: fair and vocabulary: fair   Insight:  fair   Judgment: fair   Muscle Strength and Tone: Within normal limits   Gait/Station: Unable to assess patient is in bed   Motor Activity: no abnormal movements     Lab Results:    I have personally reviewed all pertinent laboratory/tests results  Labs in last 72 hours:   Recent Labs     12/08/19  1732 12/09/19  0515 12/09/19  0516   WBC 6 68  --  6 37   RBC 5 08  --  4 74   HGB 14 8  --  13 7   HCT 46 0  --  42 8     --  209   RDW 13 7  --  13 7   NEUTROABS 4 28  --  4 06   SODIUM 144 143  --    K 4 3 3 8  --     112*  --    CO2 27 26  --    BUN 15 13  --    CREATININE 0 89 0 79  --    GLUC 82 86  --    CALCIUM 8 5 8 4  --    AST 28  --   --    ALT 21  --   --    ALKPHOS 60  --   --    TP 7 2  --   --    ALB 3 5  --   --    TBILI 0 20  --   --    VALPROICTOT 61  --   --    [id number]  --  2 520  --        Code Status: )Level 1 - Full Code    Assessment/Plan     Assessment:  Malik Acosta  is a 44 y o  male with a history of TBI, diabetes mellitus, hypertension presented with seizures and auditory hallucination, the auditory hallucination started in August after he have the TBI  Patient denies any history of mental illness, he never took any psychotropic medications, he does not have any history of drug use  Patient denies any suicidal thoughts plans or intent, patient is not delusional     Diagnosis:   Psychotic disorder unspecified type  Plan:   Continue medical and neurological treatment  I discussed with Neurology that this can be 1 of the side effect of the Keppra , and Neurology thought about switching to VIMPAT but patient does not have any medical insurance and is unable to afford this medication  With discussed about start risperidone 1 mg p o  HS, I discussed this with patient and he agreed to start it  I will follow up  Risks, benefits and possible side effects of Medications:   Risks, benefits, and possible side effects of medications explained to patient and patient verbalizes understanding             Cornelia Burch MD

## 2019-12-09 NOTE — PROGRESS NOTES
Progress Note - Kavita Agustin  1970, 44 y o  male MRN: [id number]  Unit/Bed#: [facility] 716-01 Encounter: [id number]  Primary Care Provider: Funmilayo Carmona   Date and time admitted to hospital: 12/9/2019  2:31 AM    * Post traumatic seizure disorder [facility])  Assessment & Plan  · No evidence of seizure activity on EEG thus far  Per neurology, will continue for another 24 hours  · Continue with seizure precautions  · Continue Dilantin 300 mg ER Q 12   · Keppra 1g Q 12  Could this be contributing to patient's psychosis? Psychiatry has been consulted  · Depakote 750 mg Q 8  Bizarre behavior  Assessment & Plan  · Psychiatry consult pending  · Concern with Keppra as psychosis can be a side effect  The timeline is not quite clear when these hallucinations began; however, is it possible that Keppra could be causing progression of the symptoms  Will defer to psychiatry  · First documentation of hallucinations noted on 9/17/19  Patient was loaded with Keppra on 9/18/19  TBI (traumatic brain injury) ([facility] [address] )  Assessment & Plan  · Traumatic brain injury is stable and was a result of a fall backwards with subdural and subarachnoid hemorrhage  Currently with seizure disorder and auditory hallucinations  Type 2 diabetes mellitus without complication, with long-term current use of insulin [facility])  Assessment & Plan  Lab Results   Component Value Date    HGBA1C 6 6 (H) 12/09/2019     Recent Labs     12/08/19  2023 12/09/19  0655 12/09/19  1103 12/09/19  1215   POCGLU 102 67 59* 84     Blood Sugar Average: Last 72 hrs:  (P) 70     · Blood sugars a little on the low side  Will decrease morning insulin to 20 units  Continue 15 units at dinnertime  · Insulin sliding scale with Accu-Cheks per protocol  · Diabetic diet  Essential hypertension  Assessment & Plan  · With hypertensive urgency  · Continue metoprolol 100 mg Q 12  · Increase Zestril to 60 mg daily  · Continue Hydrochlorothiazide 25 mg q day      VTE Pharmacologic Prophylaxis:   Pharmacologic: Enoxaparin (Lovenox)  Mechanical: Mechanical VTE prophylaxis in place  Patient Centered Rounds: I have performed bedside rounds with nursing staff today  Discussions with Specialists or Other Care Team Provider: None  Education and Discussions with Family / Patient: All patient questions answered to the best of my ability  Unable to contact patient's significant other since phone number in chart is not in service  Time Spent for Care: 20 minutes  More than 50% of total time spent on counseling and coordination of care as described above  Current Length of Stay: 0 day(s)  Current Patient Status: Inpatient   Certification Statement: The patient will continue to require additional inpatient hospital stay due to continued need for EEG monitoring and pending psychiatric evaluation  Discharge Plan: Patient is not medically stable for discharge  Code Status: Level 1 - Full Code    Subjective:   Patient continues to have auditory hallucinations  He reports hearing children crying and laughing  He reports the hallucinations likely started prior to starting Keppra but he cannot say for sure  He feels as though they are progressively worsening however reporting that at first he would just hear "paper crumbling or a foot step" but they have progressed to now hearing voices  He has some blurred vision and mild headache  He denies any visual hallucinations  Objective:   Vitals:   Temp (24hrs), Av 1 °F (36 7 °C), Min:97 7 °F (36 5 °C), Max:98 3 °F (36 8 °C)    Temp:  [97 7 °F (36 5 °C)-98 3 °F (36 8 °C)] 98 3 °F (36 8 °C)  HR:  [66-77] 77  Resp:  [18-20] 18  BP: (127-175)/() 154/101  SpO2:  [93 %-98 %] 95 %  Body mass index is 41 32 kg/m²  Input and Output Summary (last 24 hours):     No intake or output data in the 24 hours ending 19 1407    Physical Exam:     Physical Exam   Constitutional:   Currently on continuous EEG monitoring     HENT: Head: Normocephalic and atraumatic  Mouth/Throat: Oropharynx is clear and moist and mucous membranes are normal    Eyes: No scleral icterus  Cardiovascular: Normal rate and regular rhythm  No murmur heard  Pulmonary/Chest: Breath sounds normal  He has no wheezes  He has no rales  He exhibits no tenderness  Abdominal: Soft  Bowel sounds are normal  He exhibits no distension  There is no tenderness  Musculoskeletal: Normal range of motion  He exhibits no edema  Skin: Skin is warm and dry  No rash noted  Psychiatric: He has a normal mood and affect  Vitals reviewed  Additional Data:   Labs:  Results from last 7 days   Lab Units 12/09/19  0516   WBC Thousand/uL 6 37   HEMOGLOBIN g/dL 13 7   HEMATOCRIT % 42 8   PLATELETS Thousands/uL 209   NEUTROS PCT % 63   LYMPHS PCT % 24   MONOS PCT % 8   EOS PCT % 4     Results from last 7 days   Lab Units 12/09/19  0515 12/08/19  1732   POTASSIUM mmol/L 3 8 4 3   CHLORIDE mmol/L 112* 108   CO2 mmol/L 26 27   BUN mg/dL 13 15   CREATININE mg/dL 0 79 0 89   CALCIUM mg/dL 8 4 8 5   ALK PHOS U/L  --  60   ALT U/L  --  21   AST U/L  --  28           * I Have Reviewed All Lab Data Listed Above  * Additional Pertinent Lab Tests Reviewed: All Labs Within Last 24 Hours Reviewed    Imaging:    Imaging Reports Reviewed Today Include: None new    Cultures:   Blood Culture:   Lab Results   Component Value Date    BLOODCX No Growth After 5 Days  09/21/2019    BLOODCX No Growth After 5 Days   09/21/2019     Urine Culture:   Lab Results   Component Value Date    URINECX No Growth <1000 cfu/mL 09/21/2019     Sputum Culture: No components found for: SPUTUMCX  Wound Culture: No results found for: WOUNDCULT    Last 24 Hours Medication List:     Current Facility-Administered Medications:  acetaminophen 650 mg Oral Q6H PRN Clearance MD Betty   aluminum-magnesium hydroxide-simethicone 30 mL Oral Q6H PRN Clearance MD Betty   divalproex sodium 750 mg Oral Q8H [facility] Clearance MD Betty docusate sodium 100 mg Oral BID PRN Rogelio Salcido MD   enoxaparin 40 mg Subcutaneous Daily Rogelio Salcido MD   hydrochlorothiazide 25 mg Oral Daily Rogelio Salcido MD   insulin aspart protamine-insulin aspart 15 Units Subcutaneous Before Sarai Angles, MD   insulin aspart protamine-insulin aspart 25 Units Subcutaneous Daily Before Breakfast Rogelio Salcido MD   insulin lispro 1-5 Units Subcutaneous HS Rogelio Salcido MD   insulin lispro 2-12 Units Subcutaneous TID [facility] Rogelio Salcido MD   levETIRAcetam 1,000 mg Oral Q12H [facility] Rogelio Salcido MD   lisinopril 40 mg Oral Daily Rogelio Salcido MD   LORazepam 2 mg Intravenous Q8H PRN Rogelio Salcido MD   metFORMIN 500 mg Oral BID With Meals Rogelio Salcido MD   metoprolol tartrate 100 mg Oral Q12H [facility] Rogelio Salcido MD   ondansetron 4 mg Intravenous Q6H PRN Rogelio Salcido MD   phenytoin 300 mg Oral Q12H Anshu Montenegro MD        Today, Patient Was Seen By: Estrella Ibrahim PA-C    ** Please Note: Dragon 360 Dictation voice to text software may have been used in the creation of this document   **

## 2019-12-09 NOTE — SPEECH THERAPY NOTE
Speech therapy consult received  Patient passed RN dysphagia screen and is tolerating a regular diet and thin liquids well with good intake  RN does not report any concerns re: speech skills  Neurology note states the following:   Oriented to person, place, and time  Oriented to city  Oriented to year and month  Speech: speech is normal   Level of consciousness: alert  Knowledge: good  Able to name object  Able to repeat  Normal comprehension  Alert and oriented x 3  Able to name objects, but only able to remember 1/3 objects at five minutes  Follows all commands  Normal thought content, attention, and concentration  Stated that he had auditory hallucinations at the beginning of the exam, but these slowly dissipated  Formal evaluation not completed at this time  Please re-consult with concerns

## 2019-12-09 NOTE — PLAN OF CARE
Problem: Potential for Falls  Goal: Patient will remain free of falls  Description  INTERVENTIONS:  - Assess patient frequently for physical needs  -  Identify cognitive and physical deficits and behaviors that affect risk of falls    -  [place] fall precautions as indicated by assessment   - Educate patient/family on patient safety including physical limitations  - Instruct patient to call for assistance with activity based on assessment  - Modify environment to reduce risk of injury  - Consider OT/PT consult to assist with strengthening/mobility  Outcome: Progressing     Problem: Prexisting or High Potential for Compromised Skin Integrity  Goal: Skin integrity is maintained or improved  Description  INTERVENTIONS:  - Identify patients at risk for skin breakdown  - Assess and monitor skin integrity  - Assess and monitor nutrition and hydration status  - Monitor labs   - Assess for incontinence   - Turn and reposition patient  - Assist with mobility/ambulation  - Relieve pressure over bony prominences  - Avoid friction and shearing  - Provide appropriate hygiene as needed including keeping skin clean and dry  - Evaluate need for skin moisturizer/barrier cream  - Collaborate with interdisciplinary team   - Patient/family teaching  - Consider wound care consult   Outcome: Progressing     Problem: PAIN - ADULT  Goal: Verbalizes/displays adequate comfort level or baseline comfort level  Description  Interventions:  - Encourage patient to monitor pain and request assistance  - Assess pain using appropriate pain scale  - Administer analgesics based on type and severity of pain and evaluate response  - Implement non-pharmacological measures as appropriate and evaluate response  - Consider cultural and social influences on pain and pain management  - Notify physician/advanced practitioner if interventions unsuccessful or patient reports new pain  Outcome: Progressing     Problem: INFECTION - ADULT  Goal: Absence or prevention of progression during hospitalization  Description  INTERVENTIONS:  - Assess and monitor for signs and symptoms of infection  - Monitor lab/diagnostic results  - Monitor all insertion sites, i e  indwelling lines, tubes, and drains  - Monitor endotracheal if appropriate and nasal secretions for changes in amount and color  - [place] appropriate cooling/warming therapies per order  - Administer medications as ordered  - Instruct and encourage patient and family to use good hand hygiene technique  - Identify and instruct in appropriate isolation precautions for identified infection/condition  Outcome: Progressing  Goal: Absence of fever/infection during neutropenic period  Description  INTERVENTIONS:  - Monitor WBC    Outcome: Progressing     Problem: SAFETY ADULT  Goal: Patient will remain free of falls  Description  INTERVENTIONS:  - Assess patient frequently for physical needs  -  Identify cognitive and physical deficits and behaviors that affect risk of falls  -  [place] fall precautions as indicated by assessment   - Educate patient/family on patient safety including physical limitations  - Instruct patient to call for assistance with activity based on assessment  - Modify environment to reduce risk of injury  - Consider OT/PT consult to assist with strengthening/mobility  Outcome: Progressing  Goal: Maintain or return to baseline ADL function  Description  INTERVENTIONS:  - Assess patient frequently for physical needs  -  Identify cognitive and physical deficits and behaviors that affect risk of falls    -  [place] fall precautions as indicated by assessment   - Educate patient/family on patient safety including physical limitations  - Instruct patient to call for assistance with activity based on assessment  - Modify environment to reduce risk of injury  - Consider OT/PT consult to assist with strengthening/mobility  Outcome: Progressing  Goal: Maintain or return mobility status to optimal level  Description  INTERVENTIONS:  -  Assess patient's ability to carry out ADLs; assess patient's baseline for ADL function and identify physical deficits which impact ability to perform ADLs (bathing, care of mouth/teeth, toileting, grooming, dressing, etc )  - Assess/evaluate cause of self-care deficits   - Assess range of motion  - Assess patient's mobility; develop plan if impaired  - Assess patient's need for assistive devices and provide as appropriate  - Encourage maximum independence but intervene and supervise when necessary  - Involve family in performance of ADLs  - Assess for home care needs following discharge   - Consider OT consult to assist with ADL evaluation and planning for discharge  - Provide patient education as appropriate  Outcome: Progressing     Problem: DISCHARGE PLANNING  Goal: Discharge to home or other facility with appropriate resources  Description  INTERVENTIONS:  - Identify barriers to discharge w/patient and caregiver  - Arrange for needed discharge resources and transportation as appropriate  - Identify discharge learning needs (meds, wound care, etc )  - Arrange for interpretive services to assist at discharge as needed  - Refer to Case Management Department for coordinating discharge planning if the patient needs post-hospital services based on physician/advanced practitioner order or complex needs related to functional status, cognitive ability, or social support system  Outcome: Progressing     Problem: Knowledge Deficit  Goal: Patient/family/caregiver demonstrates understanding of disease process, treatment plan, medications, and discharge instructions  Description  Complete learning assessment and assess knowledge base    Interventions:  - Provide teaching at level of understanding  - Provide teaching via preferred learning methods  Outcome: Progressing     Problem: NEUROSENSORY - ADULT  Goal: Achieves stable or improved neurological status  Description  INTERVENTIONS  - Monitor and report changes in neurological status  - Monitor vital signs such as temperature, blood pressure, glucose, and any other labs ordered   - Initiate measures to prevent increased intracranial pressure  - Monitor for seizure activity and implement precautions if appropriate      Outcome: Progressing  Goal: Remains free of injury related to seizures activity  Description  INTERVENTIONS  - Maintain airway, patient safety  and administer oxygen as ordered  - Monitor patient for seizure activity, document and report duration and description of seizure to physician/advanced practitioner  - If seizure occurs,  ensure patient safety during seizure  - Reorient patient post seizure  - Seizure pads on all 4 side rails  - Instruct patient/family to notify RN of any seizure activity including if an aura is experienced  - Instruct patient/family to call for assistance with activity based on nursing assessment  - Administer anti-seizure medications if ordered    Outcome: Progressing  Goal: Achieves maximal functionality and self care  Description  INTERVENTIONS  - Monitor swallowing and airway patency with patient fatigue and changes in neurological status  - Encourage and assist patient to increase activity and self care     - Encourage visually impaired, hearing impaired and aphasic patients to use assistive/communication devices  Outcome: Progressing     Problem: CARDIOVASCULAR - ADULT  Goal: Maintains optimal cardiac output and hemodynamic stability  Description  INTERVENTIONS:  - Monitor I/O, vital signs and rhythm  - Monitor for S/S and trends of decreased cardiac output  - Administer and titrate ordered vasoactive medications to optimize hemodynamic stability  - Assess quality of pulses, skin color and temperature  - Assess for signs of decreased coronary artery perfusion  - Instruct patient to report change in severity of symptoms  Outcome: Progressing  Goal: Absence of cardiac dysrhythmias or at baseline rhythm  Description  INTERVENTIONS:  - Continuous cardiac monitoring, vital signs, obtain 12 lead EKG if ordered  - Administer antiarrhythmic and heart rate control medications as ordered  - Monitor electrolytes and administer replacement therapy as ordered  Outcome: Progressing     Problem: RESPIRATORY - ADULT  Goal: Achieves optimal ventilation and oxygenation  Description  INTERVENTIONS:  - Assess for changes in respiratory status  - Assess for changes in mentation and behavior  - Position to facilitate oxygenation and minimize respiratory effort  - Oxygen administered by appropriate delivery if ordered  - Initiate smoking cessation education as indicated  - Encourage broncho-pulmonary hygiene including cough, deep breathe, Incentive Spirometry  - Assess the need for suctioning and aspirate as needed  - Assess and instruct to report SOB or any respiratory difficulty  - Respiratory Therapy support as indicated  Outcome: Progressing     Problem: GASTROINTESTINAL - ADULT  Goal: Minimal or absence of nausea and/or vomiting  Description  INTERVENTIONS:  - Administer IV fluids if ordered to ensure adequate hydration  - Maintain NPO status until nausea and vomiting are resolved  - Nasogastric tube if ordered  - Administer ordered antiemetic medications as needed  - Provide nonpharmacologic comfort measures as appropriate  - Advance diet as tolerated, if ordered  - Consider nutrition services referral to assist patient with adequate nutrition and appropriate food choices  Outcome: Progressing  Goal: Maintains or returns to baseline bowel function  Description  INTERVENTIONS:  - Assess bowel function  - Encourage oral fluids to ensure adequate hydration  - Administer IV fluids if ordered to ensure adequate hydration  - Administer ordered medications as needed  - Encourage mobilization and activity  - Consider nutritional services referral to assist patient with adequate nutrition and appropriate food choices  Outcome: Progressing  Goal: Maintains adequate nutritional intake  Description  INTERVENTIONS:  - Monitor percentage of each meal consumed  - Identify factors contributing to decreased intake, treat as appropriate  - Assist with meals as needed  - Monitor I&O, weight, and lab values if indicated  - Obtain nutrition services referral as needed  Outcome: Progressing  Goal: Establish and maintain optimal ostomy function  Description  INTERVENTIONS:  - Assess bowel function  - Encourage oral fluids to ensure adequate hydration  - Administer IV fluids if ordered to ensure adequate hydration   - Administer ordered medications as needed  - Encourage mobilization and activity  - Nutrition services referral to assist patient with appropriate food choices  - Assess stoma site  - Consider wound care consult   Outcome: Progressing     Problem: GENITOURINARY - ADULT  Goal: Maintains or returns to baseline urinary function  Description  INTERVENTIONS:  - Assess urinary function  - Encourage oral fluids to ensure adequate hydration if ordered  - Administer IV fluids as ordered to ensure adequate hydration  - Administer ordered medications as needed  - Offer frequent toileting  - Follow urinary retention protocol if ordered  Outcome: Progressing  Goal: Absence of urinary retention  Description  INTERVENTIONS:  - Assess patients ability to void and empty bladder  - Monitor I/O  - Bladder scan as needed  - Discuss with physician/AP medications to alleviate retention as needed  - Discuss catheterization for long term situations as appropriate  Outcome: Progressing  Goal: Urinary catheter remains patent  Description  INTERVENTIONS:  - Assess patency of urinary catheter  - If patient has a chronic paul, consider changing catheter if non-functioning  - Follow guidelines for intermittent irrigation of non-functioning urinary catheter  Outcome: Progressing     Problem: METABOLIC, FLUID AND ELECTROLYTES - ADULT  Goal: Electrolytes maintained within normal limits  Description  INTERVENTIONS:  - Monitor labs and assess patient for signs and symptoms of electrolyte imbalances  - Administer electrolyte replacement as ordered  - Monitor response to electrolyte replacements, including repeat lab results as appropriate  - Instruct patient on fluid and nutrition as appropriate  Outcome: Progressing  Goal: Fluid balance maintained  Description  INTERVENTIONS:  - Monitor labs   - Monitor I/O and WT  - Instruct patient on fluid and nutrition as appropriate  - Assess for signs & symptoms of volume excess or deficit  Outcome: Progressing  Goal: Glucose maintained within target range  Description  INTERVENTIONS:  - Monitor Blood Glucose as ordered  - Assess for signs and symptoms of hyperglycemia and hypoglycemia  - Administer ordered medications to maintain glucose within target range  - Assess nutritional intake and initiate nutrition service referral as needed  Outcome: Progressing     Problem: SKIN/TISSUE INTEGRITY - ADULT  Goal: Skin integrity remains intact  Description  INTERVENTIONS  - Identify patients at risk for skin breakdown  - Assess and monitor skin integrity  - Assess and monitor nutrition and hydration status  - Monitor labs (i e  albumin)  - Assess for incontinence   - Turn and reposition patient  - Assist with mobility/ambulation  - Relieve pressure over bony prominences  - Avoid friction and shearing  - Provide appropriate hygiene as needed including keeping skin clean and dry  - Evaluate need for skin moisturizer/barrier cream  - Collaborate with interdisciplinary team (i e  Nutrition, Rehabilitation, etc )   - Patient/family teaching  Outcome: Progressing  Goal: Incision(s), wounds(s) or drain site(s) healing without S/S of infection  Description  INTERVENTIONS  - Assess and document risk factors for skin impairment   - Assess and document dressing, incision, wound bed, drain sites and surrounding tissue  - Consider nutrition services referral as needed  - Oral mucous membranes remain intact  - Provide patient/ family education  Outcome: Progressing  Goal: Oral mucous membranes remain intact  Description  INTERVENTIONS  - Assess oral mucosa and hygiene practices  - Implement preventative oral hygiene regimen  - Implement oral medicated treatments as ordered  - Initiate Nutrition services referral as needed  Outcome: Progressing     Problem: HEMATOLOGIC - ADULT  Goal: Maintains hematologic stability  Description  INTERVENTIONS  - Assess for signs and symptoms of bleeding or hemorrhage  - Monitor labs  - Administer supportive blood products/factors as ordered and appropriate  Outcome: Progressing     Problem: MUSCULOSKELETAL - ADULT  Goal: Maintain or return mobility to safest level of function  Description  INTERVENTIONS:  - Assess patient's ability to carry out ADLs; assess patient's baseline for ADL function and identify physical deficits which impact ability to perform ADLs (bathing, care of mouth/teeth, toileting, grooming, dressing, etc )  - Assess/evaluate cause of self-care deficits   - Assess range of motion  - Assess patient's mobility  - Assess patient's need for assistive devices and provide as appropriate  - Encourage maximum independence but intervene and supervise when necessary  - Involve family in performance of ADLs  - Assess for home care needs following discharge   - Consider OT consult to assist with ADL evaluation and planning for discharge  - Provide patient education as appropriate  Outcome: Progressing  Goal: Maintain proper alignment of affected body part  Description  INTERVENTIONS:  - Support, maintain and protect limb and body alignment  - Provide patient/ family with appropriate education  Outcome: Progressing

## 2019-12-09 NOTE — EMTALA/ACUTE CARE TRANSFER
[address] [facility] [address]  [address] Christopher[address]ie Patrick  [place] [address] [address]  Dept: [phone]      EMTALA TRANSFER CONSENT    NAME [facility] 1970                              MRN [id number]    I have been informed of my rights regarding examination, treatment, and transfer   by Dr Shubham Becker MD    Benefits: Specialized equipment and/or services available at the receiving facility (Include comment)________________________(Prolonged EEG monitoring)    Risks: Potential for delay in receiving treatment, Potential deterioration of medical condition, Loss of IV, Increased discomfort during transfer, Possible worsening of condition or death during transfer      Transfer Request   I acknowledge that my medical condition has been evaluated and explained to me by the emergency department physician or other qualified medical person and/or my attending physician who has recommended and offered to me further medical examination and treatment  I understand the Hospital's obligation with respect to the treatment and stabilization of my emergency medical condition  I nevertheless request to be transferred  I release the Hospital, the doctor, and any other persons caring for me from all responsibility or liability for any injury or ill effects that may result from my transfer and agree to accept all responsibility for the consequences of my choice to transfer, rather than receive stabilizing treatment at the Hospital  I understand that because the transfer is my request, my insurance may not provide reimbursement for the services  The Hospital will assist and direct me and my family in how to make arrangements for transfer, but the hospital is not liable for any fees charged by the transport service    In spite of this understanding, I refuse to consent to further medical examination and treatment which has been offered to me, and request transfer to Accepting Facility Name, [address] : [facility]  I authorize the performance of emergency medical procedures and treatments upon me in both transit and upon arrival at the receiving facility  Additionally, I authorize the release of any and all medical records to the receiving facility and request they be transported with me, if possible  I authorize the performance of emergency medical procedures and treatments upon me in both transit and upon arrival at the receiving facility  Additionally, I authorize the release of any and all medical records to the receiving facility and request they be transported with me, if possible  I understand that the safest mode of transportation during a medical emergency is an ambulance and that the Hospital advocates the use of this mode of transport  Risks of traveling to the receiving facility by car, including absence of medical control, life sustaining equipment, such as oxygen, and medical personnel has been explained to me and I fully understand them  (JENNIFER CORRECT BOX BELOW)  [  ]  I consent to the stated transfer and to be transported by ambulance/helicopter  [  ]  I consent to the stated transfer, but refuse transportation by ambulance and accept full responsibility for my transportation by car  I understand the risks of non-ambulance transfers and I exonerate the Hospital and its staff from any deterioration in my condition that results from this refusal     X___________________________________________    DATE  19  TIME________  Signature of patient or legally responsible individual signing on patient behalf           RELATIONSHIP TO PATIENT_________________________          Provider Certification    NAME Mahsa Garcia   1970                              MRN [id number]    A medical screening exam was performed on the above named patient    Based on the examination:    Condition Necessitating Transfer The primary encounter diagnosis was Auditory hallucinations  A diagnosis of Seizure-like activity ([facility] [address] ) was also pertinent to this visit  Patient Condition: The patient has been stabilized such that within reasonable medical probability, no material deterioration of the patient condition or the condition of the unborn child(isa) is likely to result from the transfer    Reason for Transfer: Level of Care needed not available at this facility    Transfer Requirements: [place] [address]   · Space available and qualified personnel available for treatment as acknowledged by Beckie Shelton  · Agreed to accept transfer and to provide appropriate medical treatment as acknowledged by       Dr Kailey Leonardo  · Appropriate medical records of the examination and treatment of the patient are provided at the time of transfer   [address] [facility], [address] _______  · Transfer will be performed by qualified personnel from    and appropriate transfer equipment as required, including the use of necessary and appropriate life support measures      Provider Certification: I have examined the patient and explained the following risks and benefits of being transferred/refusing transfer to the patient/family:  General risk, such as traffic hazards, adverse weather conditions, rough terrain or turbulence, possible failure of equipment (including vehicle or aircraft), or consequences of actions of persons outside the control of the transport personnel, Unanticipated needs of medical equipment and personnel during transport, Risk of worsening condition, The possibility of a transport vehicle being unavailable      Based on these reasonable risks and benefits to the patient and/or the unborn child(isa), and based upon the information available at the time of the patients examination, I certify that the medical benefits reasonably to be expected from the provision of appropriate medical treatments at another medical facility outweigh the increasing risks, if any, to the individuals medical condition, and in the case of labor to the unborn child, from effecting the transfer      X____________________________________________ DATE 12/08/19        TIME_______      ORIGINAL - SEND TO MEDICAL RECORDS   COPY - SEND WITH PATIENT DURING TRANSFER

## 2019-12-09 NOTE — ASSESSMENT & PLAN NOTE
· Psychiatry consult pending  · Concern with Keppra as psychosis can be a side effect  The timeline is not quite clear when these hallucinations began; however, is it possible that Keppra could be causing progression of the symptoms  Will defer to psychiatry  · First documentation of hallucinations noted on 9/17/19  Patient was loaded with Keppra on 9/18/19

## 2019-12-09 NOTE — ASSESSMENT & PLAN NOTE
· Traumatic brain injury is stable and was a result of a fall backwards with subdural and subarachnoid hemorrhage  Currently with seizure disorder and auditory hallucinations

## 2019-12-09 NOTE — ASSESSMENT & PLAN NOTE
Would need video EEG as discussed with Neurology (Dr Sanam Patel)  Seizure precaution  Neuro checks per protocol  Neurology opinion    Continue Dilantin 300 mg ER Q 12  Keppra 1 g Q 12  Depakote 750 mg Q 8

## 2019-12-09 NOTE — TELEPHONE ENCOUNTER
Thank you for very much for your help, I do not know why they transferred the call to you, since we do take call for our group,I  informed my  to look into that so that it is not an inconvenience to you,  Thank you once again,  Philip Osuna

## 2019-12-09 NOTE — SOCIAL WORK
Met with pt & explained Cm role  Pt lives with wife in 2 sty home with 10 jose  Bedroom on 2nd flr  Bath on both levels  Was [employer]PT[employer], unemployed & does not drive  Family transports  DME cane  No h/o vna or rhb  Denies any MH,D&A tx  Uses [facility] in [place]  No POA  Emergency contact, wife Ramana Toro [phone]  Will have ride home  Cm to follow  CM reviewed d/c planning process including the following: identifying help at home, patient preference for d/c planning needs, Discharge Lounge, [facility] Meds to Bed program, availability of treatment team to discuss questions or concerns patient and/or family may have regarding understanding medications and recognizing signs and symptoms once discharged  CM also encouraged patient to follow up with all recommended appointments after discharge  Patient advised of importance for patient and family to participate in managing patients medical well being

## 2019-12-09 NOTE — ASSESSMENT & PLAN NOTE
Traumatic brain injury is stable and was a result of a fall backwards with subdural and subarachnoid hemorrhage  Currently with seizure disorder and auditory hallucinations
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
## 2019-12-09 NOTE — ASSESSMENT & PLAN NOTE
Lab Results   Component Value Date    HGBA1C 13 2 (H) 09/18/2019   Hemoglobin A1c  Insulin 70/30 25 units in the morning and 15 units at dinnertime  Insulin sliding scale with Accu-Cheks per protocol  Diabetic diet      Recent Labs     12/08/19 2023   POCGLU 102       Blood Sugar Average: Last 72 hrs:

## 2019-12-09 NOTE — ASSESSMENT & PLAN NOTE
· No evidence of seizure activity on EEG thus far  Per neurology, will continue for another 24 hours  · Continue with seizure precautions  · Continue Dilantin 300 mg ER Q 12   · Keppra 1g Q 12  Could this be contributing to patient's psychosis? Psychiatry has been consulted    · Depakote 750 mg Q 8

## 2019-12-09 NOTE — ASSESSMENT & PLAN NOTE
· With hypertensive urgency  · Continue metoprolol 100 mg Q 12  · Increase Zestril to 60 mg daily  · Continue Hydrochlorothiazide 25 mg q day

## 2019-12-09 NOTE — ASSESSMENT & PLAN NOTE
Lab Results   Component Value Date    HGBA1C 6 6 (H) 12/09/2019     Recent Labs     12/08/19 2023 12/09/19  0655 12/09/19  1103 12/09/19  1215   POCGLU 102 67 59* 84     Blood Sugar Average: Last 72 hrs:  (P) 70     · Blood sugars a little on the low side  Will decrease morning insulin to 20 units  Continue 15 units at dinnertime  · Insulin sliding scale with Accu-Cheks per protocol  · Diabetic diet

## 2019-12-09 NOTE — ASSESSMENT & PLAN NOTE
May be manifestation of worsening seizures  As of the moment, Dr Joan Low recommends patient be transferred to [facility], video EEG  Seizure protocol  Continue anti epileptics

## 2019-12-10 ENCOUNTER — APPOINTMENT (INPATIENT)
Dept: NEUROLOGY | Facility: CLINIC | Age: 49
DRG: 757 | End: 2019-12-10
Payer: COMMERCIAL

## 2019-12-10 LAB
ANION GAP SERPL CALCULATED.3IONS-SCNC: 6 MMOL/L (ref 4–13)
BASOPHILS # BLD AUTO: 0.05 THOUSANDS/ΜL (ref 0–0.1)
BASOPHILS NFR BLD AUTO: 1 % (ref 0–1)
BUN SERPL-MCNC: 14 MG/DL (ref 5–25)
CALCIUM SERPL-MCNC: 8.5 MG/DL (ref 8.3–10.1)
CHLORIDE SERPL-SCNC: 111 MMOL/L (ref 100–108)
CO2 SERPL-SCNC: 27 MMOL/L (ref 21–32)
CREAT SERPL-MCNC: 0.88 MG/DL (ref 0.6–1.3)
EOSINOPHIL # BLD AUTO: 0.25 THOUSAND/ΜL (ref 0–0.61)
EOSINOPHIL NFR BLD AUTO: 4 % (ref 0–6)
ERYTHROCYTE [DISTWIDTH] IN BLOOD BY AUTOMATED COUNT: 13.8 % (ref 11.6–15.1)
GFR SERPL CREATININE-BSD FRML MDRD: 99 ML/MIN/1.73SQ M
GLUCOSE SERPL-MCNC: 75 MG/DL (ref 65–140)
GLUCOSE SERPL-MCNC: 77 MG/DL (ref 65–140)
GLUCOSE SERPL-MCNC: 78 MG/DL (ref 65–140)
GLUCOSE SERPL-MCNC: 81 MG/DL (ref 65–140)
GLUCOSE SERPL-MCNC: 87 MG/DL (ref 65–140)
HCT VFR BLD AUTO: 44.7 % (ref 36.5–49.3)
HGB BLD-MCNC: 14.5 G/DL (ref 12–17)
IMM GRANULOCYTES # BLD AUTO: 0.03 THOUSAND/UL (ref 0–0.2)
IMM GRANULOCYTES NFR BLD AUTO: 0 % (ref 0–2)
LYMPHOCYTES # BLD AUTO: 1.68 THOUSANDS/ΜL (ref 0.6–4.47)
LYMPHOCYTES NFR BLD AUTO: 24 % (ref 14–44)
MCH RBC QN AUTO: 29.1 PG (ref 26.8–34.3)
MCHC RBC AUTO-ENTMCNC: 32.4 G/DL (ref 31.4–37.4)
MCV RBC AUTO: 90 FL (ref 82–98)
MONOCYTES # BLD AUTO: 0.64 THOUSAND/ΜL (ref 0.17–1.22)
MONOCYTES NFR BLD AUTO: 9 % (ref 4–12)
NEUTROPHILS # BLD AUTO: 4.42 THOUSANDS/ΜL (ref 1.85–7.62)
NEUTS SEG NFR BLD AUTO: 62 % (ref 43–75)
NRBC BLD AUTO-RTO: 0 /100 WBCS
PLATELET # BLD AUTO: 220 THOUSANDS/UL (ref 149–390)
PMV BLD AUTO: 9.5 FL (ref 8.9–12.7)
POTASSIUM SERPL-SCNC: 3.4 MMOL/L (ref 3.5–5.3)
RBC # BLD AUTO: 4.99 MILLION/UL (ref 3.88–5.62)
SODIUM SERPL-SCNC: 144 MMOL/L (ref 136–145)
WBC # BLD AUTO: 7.07 THOUSAND/UL (ref 4.31–10.16)

## 2019-12-10 PROCEDURE — 95951 PR EEG MONITORING/VIDEORECORD: CPT | Performed by: PSYCHIATRY & NEUROLOGY

## 2019-12-10 PROCEDURE — 99233 SBSQ HOSP IP/OBS HIGH 50: CPT | Performed by: PSYCHIATRY & NEUROLOGY

## 2019-12-10 PROCEDURE — 99232 SBSQ HOSP IP/OBS MODERATE 35: CPT | Performed by: PHYSICIAN ASSISTANT

## 2019-12-10 PROCEDURE — 80186 ASSAY OF PHENYTOIN FREE: CPT | Performed by: PHYSICIAN ASSISTANT

## 2019-12-10 PROCEDURE — G8987 SELF CARE CURRENT STATUS: HCPCS

## 2019-12-10 PROCEDURE — 80048 BASIC METABOLIC PNL TOTAL CA: CPT | Performed by: PHYSICIAN ASSISTANT

## 2019-12-10 PROCEDURE — 85025 COMPLETE CBC W/AUTO DIFF WBC: CPT | Performed by: PHYSICIAN ASSISTANT

## 2019-12-10 PROCEDURE — 97163 PT EVAL HIGH COMPLEX 45 MIN: CPT

## 2019-12-10 PROCEDURE — G8979 MOBILITY GOAL STATUS: HCPCS

## 2019-12-10 PROCEDURE — 97166 OT EVAL MOD COMPLEX 45 MIN: CPT

## 2019-12-10 PROCEDURE — 82948 REAGENT STRIP/BLOOD GLUCOSE: CPT

## 2019-12-10 PROCEDURE — G8978 MOBILITY CURRENT STATUS: HCPCS

## 2019-12-10 PROCEDURE — G8988 SELF CARE GOAL STATUS: HCPCS

## 2019-12-10 PROCEDURE — 99232 SBSQ HOSP IP/OBS MODERATE 35: CPT | Performed by: PSYCHIATRY & NEUROLOGY

## 2019-12-10 PROCEDURE — 95951 HB EEG MONITORING/VIDEORECORD: CPT

## 2019-12-10 RX ORDER — RISPERIDONE 1 MG/1
1 TABLET, FILM COATED ORAL
Status: DISCONTINUED | OUTPATIENT
Start: 2019-12-10 | End: 2019-12-11

## 2019-12-10 RX ORDER — ACETAMINOPHEN 325 MG/1
650 TABLET ORAL EVERY 6 HOURS PRN
Status: DISCONTINUED | OUTPATIENT
Start: 2019-12-10 | End: 2019-12-14 | Stop reason: HOSPADM

## 2019-12-10 RX ORDER — POTASSIUM CHLORIDE 20 MEQ/1
40 TABLET, EXTENDED RELEASE ORAL ONCE
Status: COMPLETED | OUTPATIENT
Start: 2019-12-10 | End: 2019-12-10

## 2019-12-10 RX ORDER — RISPERIDONE 0.25 MG/1
0.5 TABLET, FILM COATED ORAL ONCE
Status: COMPLETED | OUTPATIENT
Start: 2019-12-10 | End: 2019-12-10

## 2019-12-10 RX ADMIN — METOPROLOL TARTRATE 100 MG: 50 TABLET, FILM COATED ORAL at 21:04

## 2019-12-10 RX ADMIN — DIVALPROEX SODIUM 750 MG: 500 TABLET, DELAYED RELEASE ORAL at 14:00

## 2019-12-10 RX ADMIN — LISINOPRIL 60 MG: 20 TABLET ORAL at 08:23

## 2019-12-10 RX ADMIN — DIVALPROEX SODIUM 750 MG: 500 TABLET, DELAYED RELEASE ORAL at 22:27

## 2019-12-10 RX ADMIN — RISPERIDONE 1 MG: 1 TABLET ORAL at 22:27

## 2019-12-10 RX ADMIN — PHENYTOIN SODIUM 300 MG: 100 CAPSULE ORAL at 08:26

## 2019-12-10 RX ADMIN — ACETAMINOPHEN 650 MG: 325 TABLET ORAL at 10:53

## 2019-12-10 RX ADMIN — ENOXAPARIN SODIUM 40 MG: 40 INJECTION SUBCUTANEOUS at 08:23

## 2019-12-10 RX ADMIN — PHENYTOIN SODIUM 300 MG: 100 CAPSULE ORAL at 21:05

## 2019-12-10 RX ADMIN — METFORMIN HYDROCHLORIDE 500 MG: 500 TABLET ORAL at 17:20

## 2019-12-10 RX ADMIN — RISPERIDONE 0.5 MG: 0.25 TABLET ORAL at 10:56

## 2019-12-10 RX ADMIN — METOPROLOL TARTRATE 100 MG: 50 TABLET, FILM COATED ORAL at 08:24

## 2019-12-10 RX ADMIN — HYDROCHLOROTHIAZIDE 25 MG: 25 TABLET ORAL at 08:24

## 2019-12-10 RX ADMIN — METFORMIN HYDROCHLORIDE 500 MG: 500 TABLET ORAL at 08:24

## 2019-12-10 RX ADMIN — DIVALPROEX SODIUM 750 MG: 500 TABLET, DELAYED RELEASE ORAL at 05:35

## 2019-12-10 RX ADMIN — INSULIN ASPART 20 UNITS: 100 INJECTION, SUSPENSION SUBCUTANEOUS at 08:23

## 2019-12-10 RX ADMIN — POTASSIUM CHLORIDE 40 MEQ: 1500 TABLET, EXTENDED RELEASE ORAL at 10:52

## 2019-12-10 NOTE — PROGRESS NOTES
12/10/19 1400   Clinical Encounter Type   Visited With Patient   Surgical Visit Post-op   Mormon Encounters   Mormon Needs Prayer   Sacramental Encounters   Sacrament of Sick-Anointing Anointed

## 2019-12-10 NOTE — ASSESSMENT & PLAN NOTE
· Psychiatry consulted and appreciate their input  · Recommended Risperidone 1 mg at bedtime, which will be added  · Concern with Keppra as psychosis can be a side effect  The timeline is not quite clear when these hallucinations began; however, is it possible that Keppra could be causing progression of the symptoms  Will defer to psychiatry  · First documentation of hallucinations noted on 9/17/19  Patient was loaded with Keppra on 9/18/19

## 2019-12-10 NOTE — OCCUPATIONAL THERAPY NOTE
[address] Evaluation     Patient Name: Berenice Pena  Today's Date: 12/10/2019  Problem List  Principal Problem:    Seizure [facility])  Active Problems:    Essential hypertension    Type 2 diabetes mellitus without complication, with long-term current use of insulin (HCC)    TBI (traumatic brain injury) ([facility] )    Bizarre behavior    Past Medical History  Past Medical History:   Diagnosis Date    Chemical exposure     Diabetes mellitus ([facility] )     H/O bone graft     Head injury     August 25, 2019 fell backwards hit head on a boulder w/ LOC    Head injury     as an air [place] with the [employer] - jumped out of a plane parachute disfunction    Hypertension      Past Surgical History  Past Surgical History:   Procedure Laterality Date    KNEE ARTHROSCOPY W/ MENISCECTOMY Left     LEG SURGERY      TONSILLECTOMY          12/10/19 1349   Note Type   Note type Eval only   Restrictions/Precautions   Weight Bearing Precautions Per Order No   Other Precautions Cognitive;Telemetry;Multiple lines; Seizure  (EMU, hallucinations)   Pain Assessment   Pain Assessment No/denies pain   Pain Score No Pain   Home Living   Type of Home House  [facility] with 10STE)   Home Layout Two level   Bathroom Equipment   (no bathroom DME PTA)   Home Equipment Cane   Additional Comments pt resides with his wife and 2 kids in a [facility] with 10STE  Pt reports that he is never alone at home   Prior Function   Level of [place] Independent with ADLs and functional mobility   Lives With Spouse; Family   Receives Help From Family   ADL Assistance Independent   IADLs Needs assistance   Falls in the last 6 months 0   Vocational Unemployed   Lifestyle   Autonomy Pt being I with ADLs, functional mobility with PRN use of spc PTA  Pt is not a  due to recent seizures    Wife manages pt's meds   Reciprocal Relationships supportive family able to provide 24/7 supervision   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, used to drive for work   Intrinsic Gratification watches tv, sedentary   Psychosocial   Psychosocial (WDL) WDL   Patient Behaviors/Mood Hallucinations  (hearings kids crying persistently)   Subjective   Subjective Spoke with pt regarding d/c recommendations and pt receptive  ADL   Eating Assistance 5  Supervision/Setup   Grooming Assistance 5  Supervision/Setup   UB Bathing Assistance 5  Supervision/Setup   LB Bathing Assistance 5  Supervision/Setup   UB Dressing Assistance 5  Supervision/Setup   LB Dressing Assistance 5  Supervision/Setup   LB Dressing Deficit Don/doff R sock; Don/doff L sock   Toileting Assistance  5  Supervision/Setup   Bed Mobility   Additional Comments Pt seated in recliner upon therapist entry   Transfers   Sit to Stand 5  Supervision   Stand to Sit 5  Supervision   Functional Mobility   Functional Mobility 5  Supervision   Additional Comments No use of DME to ambulate within the room  Distance limited by EMU   Balance   Static Sitting Good   Dynamic Sitting Good   Static Standing Fair +   Dynamic Standing Fair +   Ambulatory Fair +   Activity Tolerance   Activity Tolerance Patient tolerated treatment well   Medical Staff Made Aware Spoke with PT and CM regarding d/c recommendations   Nurse Made Aware Pt cleared by ANNIE Perry for OT evaluation and OOB mobility   RUE Assessment   RUE Assessment WFL   LUE Assessment   LUE Assessment WFL  (minimally weaker than R UE)   Hand Function   Gross Motor Coordination Functional   Fine Motor Coordination Functional   Sensation   Light Touch No apparent deficits   Vision - Complex Assessment   Ocular Range of Motion [facility]   Additional Comments Reports decreased acuity following TBI  Hasn't updated his glasses prescription since TBI  Able to read ID badge, but reports it's blurry   Perception   Inattention/Neglect Appears intact   Cognition   Overall Cognitive Status Impaired   Arousal/Participation Alert; Responsive; Cooperative   Attention Attends with cues to redirect   Orientation Level Oriented X4   Memory Decreased recall of precautions;Decreased recall of recent events;Decreased short term memory   Following Commands Follows one step commands without difficulty   Comments Mildly impulsive, hallucinating  Plan to complete formal cognitive assessment during pt's POC   Assessment   Limitation Decreased cognition   Prognosis Fair   Assessment Pt is a 44year old male seen for initial OT evaluation s/p admission to [facility] 12/9/19 with auditory hallucinations with seizure  PMHx includes: recent fall resulting in [facility] and SDH, bizarre behavior, DM and HTN  Pt with active OT orders and cleared by [facility] for OT evaluation and OOB mobility  Pt reports that he resides in a [facility] with 10STE  Pt reports being I with ADLs, IADLs (other than meds), and functional mobility with PRN use of spc PTA  Pt is not a  and family provides transport  Pt is currently functioning at/close to his functional baseline, and only requires 1 additional OT session for formal cognitive assessment  From an OT standpoint, recommend OP OT and shower chair upon d/c  Pt is to continue to benefit from skilled occupational therapy services while in the hospital to maximize functioning and independence with daily activities  See below for OT goals to be addressed during pt's POC     Goals   Patient Goals to stop hearing hallucinations   Plan   Treatment Interventions Cognitive reorientation;Continued evaluation   Goal Expiration Date 12/15/19   OT Treatment Day 1   OT Frequency   (1 additional session for formal cognitive assessment)   Recommendation   OT Discharge Recommendation Outpatient OT   Equipment Recommended Tub seat with back   OT - OK to Discharge Yes  (when medically stable)   Barthel Index   Feeding 10   Bathing 0   Grooming Score 5   Dressing Score 10   Bladder Score 10   Bowels Score 10   Toilet Use Score 10   Transfers (Bed/Chair) Score 10   Mobility (Level Surface) Score 0   Stairs Score 0   Barthel Index Score 65     Goals:  Pt will be attentive for 100% of formal cognitive assessment for safe discharge/planning      Niru Zapata, MOT, OTR/L

## 2019-12-10 NOTE — UTILIZATION REVIEW
Initial Clinical Review    Admission: Date/Time/Statement: Inpatient Admission Orders (From admission, onward)     Ordered        12/09/19 0248  Inpatient Admission  Once         12/09/19 0248  Inpatient Admission  Once                   Orders Placed This Encounter   Procedures    Inpatient Admission     Standing Status:   Standing     Number of Occurrences:   1     Order Specific Question:   Admitting Physician     Answer:   Harrison Gates [[id number]]     Order Specific Question:   Level of Care     Answer:   Med Surg [[id number]]     Order Specific Question:   Estimated length of stay     Answer:   More than 2 Midnights     Order Specific Question:   Certification     Answer:   I certify that inpatient services are medically necessary for this patient for a duration of greater than two midnights  See H&P and MD Progress Notes for additional information about the patient's course of treatment   Inpatient Admission     Standing Status:   Standing     Number of Occurrences:   1     Order Specific Question:   Admitting Physician     Answer:   Harrison Gates [[id number]]     Order Specific Question:   Level of Care     Answer:   Med Surg [16]     Order Specific Question:   Estimated length of stay     Answer:   More than 2 Midnights     Order Specific Question:   Certification     Answer:   I certify that inpatient services are medically necessary for this patient for a duration of greater than two midnights  See H&P and MD Progress Notes for additional information about the patient's course of treatment  No chief complaint on file  Assessment/Plan: 44 yr old male transferred from [facility] ed after presented there with increased confusion  Forgetful, difficulty recognizing family members and increased instances of auditory hallucinations and the family noticed him having staring spells over the past 2 days, unsteady gait, dizzy sensation   Inpatient admission at [facility] with seizure- will need video eeg, neurology consult, seizure precautions, continue dilantin , keppa and depakote  The bizarre behaviours may be a manifestation of worsening seizures which is why he is he transferred to [facility] with subdural and subarachnoid hemorrhage  Neurology consult-- asked psychiatry to help - describes multiple voices forming hallucination and wonder unmasked psychiatric disease-- consult psychiatry  Will hold keppra while on eeg  respiridone recommended by psychiatry q hs  Will monitor with veeg, frequent neuro checks , medical mgmt  he is an inpatient with seizures    12/10- no evidence of seizure will continue with eeg  ED Triage Vitals   Temperature Pulse Respirations Blood Pressure SpO2   12/09/19 0236 12/09/19 0236 12/09/19 0236 12/09/19 0236 12/09/19 0236   97 7 °F (36 5 °C) 76 18 154/100 94 %      Temp src Heart Rate Source Patient Position - Orthostatic VS BP Location FiO2 (%)   -- -- -- -- --             Pain Score       12/09/19 0318       No Pain        Wt Readings from Last 1 Encounters:   12/09/19 127 kg (279 lb 12 8 oz)     Additional Vital Signs:     98 °F (36 7 °C)  80  16  130/95  107  92 %     12/09/19 22:50:10  98 3 °F (36 8 °C)  74  16  150/100  117  93 %     12/09/19 18:54:37  99 5 °F (37 5 °C)  82    154/101Abnormal   119  92 %     12/09/19 1200              None (Room air)   12/09/19 07:20:59  98 3 °F (36 8 °C)  77    154/101Abnormal   119  95 %     12/09/19 0300              None (Room        Pertinent Labs/Diagnostic Test Results:   Results from last 7 days   Lab Units 12/10/19  0454 12/09/19  0516 12/08/19  1732   WBC Thousand/uL 7 07 6 37 6 68   HEMOGLOBIN g/dL 14 5 13 7 14 8   HEMATOCRIT % 44 7 42 8 46 0   PLATELETS Thousands/uL 220 209 203   NEUTROS ABS Thousands/µL 4 42 4 06 4 28         Results from last 7 days   Lab Units 12/10/19  0453 12/09/19  0515 12/08/19  1732   SODIUM mmol/L 144 143 144   POTASSIUM mmol/L 3 4* 3 8 4 3   CHLORIDE mmol/L 111* 112* 108   CO2 mmol/L 27 26 27   ANION GAP mmol/L 6 5 9   BUN mg/dL 14 13 15   CREATININE mg/dL 0 88 0 79 0 89   EGFR ml/min/1 73sq m 99 104 99   CALCIUM mg/dL 8 5 8 4 8 5   MAGNESIUM mg/dL  --  2 2  --      Results from last 7 days   Lab Units 12/08/19  1732   AST U/L 28   ALT U/L 21   ALK PHOS U/L 60   TOTAL PROTEIN g/dL 7 2   ALBUMIN g/dL 3 5   TOTAL BILIRUBIN mg/dL 0 20     Results from last 7 days   Lab Units 12/10/19  1053 12/10/19  0618 12/09/19  2053 12/09/19  1755 12/09/19  1602 12/09/19  1215 12/09/19  1103 12/09/19  0655 12/08/19  2023   POC GLUCOSE mg/dl 78 75 80 99 83 84 59* 67 102     Results from last 7 days   Lab Units 12/10/19  0453 12/09/19  0515 12/08/19  1732   GLUCOSE RANDOM mg/dL 77 86 82         Results from last 7 days   Lab Units 12/09/19  0516   HEMOGLOBIN A1C % 6 6*   EAG mg/dl 143     BETA-HYDROXYBUTYRATE   Date Value Ref Range Status   09/17/2019 0 2 <0 6 mmol/L Final      Results from last 7 days   Lab Units 12/09/19  0515   TSH 3RD GENERATON uIU/mL 2 520     Results from last 7 days   Lab Units 12/09/19  0821   CLARITY UA  Clear   COLOR UA  Yellow   SPEC GRAV UA  1 023   PH UA  6 5   GLUCOSE UA mg/dl Negative   KETONES UA mg/dl Negative   BLOOD UA  Negative   PROTEIN UA mg/dl Negative   NITRITE UA  Negative   BILIRUBIN UA  Negative   UROBILINOGEN UA E U /dl 1 0   LEUKOCYTES UA  Negative         Past Medical History:   Diagnosis Date    Chemical exposure     Diabetes mellitus ([facility] )     H/O bone graft     Head injury     August 25, 2019 fell backwards hit head on a boulder w/ LOC    Head injury     as an air [place] with the [employer] - jumped out of a plane parachute disfunction    Hypertension      Present on Admission:   TBI (traumatic brain injury) ([facility] )   Essential hypertension      Admitting Diagnosis: Seizure ([place] [address] ) [R56 [id number]]  Age/Sex: 44 y o  male  Admission Orders:  Scheduled Medications:    Medications:  divalproex sodium 750 mg Oral Q8H [address]   enoxaparin 40 mg Subcutaneous Daily hydrochlorothiazide 25 mg Oral Daily   insulin aspart protamine-insulin aspart 10 Units Subcutaneous Before Dinner   insulin aspart protamine-insulin aspart 20 Units Subcutaneous Daily Before Breakfast   insulin lispro 1-5 Units Subcutaneous HS   insulin lispro 2-12 Units Subcutaneous TID AC   lisinopril 60 mg Oral Daily   metFORMIN 500 mg Oral BID With Meals   metoprolol tartrate 100 mg Oral Q12H [facility]   phenytoin 300 mg Oral Q12H [facility]   risperiDONE 1 mg Oral HS     Continuous IV Infusions:     PRN Meds:    acetaminophen 650 mg Oral Q6H PRN   aluminum-magnesium hydroxide-simethicone 30 mL Oral Q6H PRN   docusate sodium 100 mg Oral BID PRN   LORazepam 2 mg Intravenous Q8H PRN   ondansetron 4 mg Intravenous Q6H PRN   spinal tap with csf to [facility] for eval/  fs glucose 4 x daily  Is q1 hr  Emu protocol  Mri of brain   Pt/ot  IP CONSULT TO NEUROLOGY  IP CONSULT TO PSYCHIATRY  IP CONSULT TO NEUROPSYCHOLOGY    Network Utilization Review Department  [email or web address]  ATTENTION: Please call with any questions or concerns to [phone] and carefully listen to the prompts so that you are directed to the right person  All voicemails are confidential   Chrystine Can all requests for admission clinical reviews, approved or denied determinations and any other requests to dedicated fax number below belonging to the campus where the patient is receiving treatment   List of dedicated fax numbers for the Facilities:  FACILITY NAME UR FAX NUMBER   ADMISSION DENIALS (Administrative/Medical Necessity) [phone]   [address]  (Maternity/NICU/Pediatrics) [phone]   Elian Congress [phone]   [facility] [phone]   Vannessa Kline [address] [place] [address] [address] [facility] [phone]   [address] [place] [address] [phone]

## 2019-12-10 NOTE — ASSESSMENT & PLAN NOTE
· No evidence of seizure activity on EEG thus far  · Continue with seizure precautions  · Continue Dilantin 300 mg ER Q 12   · Keppra is being held while on EEG monitoring  Keppra may contribute to psychosis    · Depakote 750 mg Q 8

## 2019-12-10 NOTE — PLAN OF CARE
Problem: OCCUPATIONAL THERAPY ADULT  Goal: Performs self-care activities at highest level of function for planned discharge setting  See evaluation for individualized goals  Description  Treatment Interventions: Cognitive reorientation, Continued evaluation  Equipment Recommended: Tub seat with back       See flowsheet documentation for full assessment, interventions and recommendations  Note:   Limitation: Decreased cognition  Prognosis: Fair  Assessment: Pt is a 44year old male seen for initial OT evaluation s/p admission to [facility] 12/9/19 with auditory hallucinations with seizure  PMHx includes: recent fall resulting in [address] and SDH, bizarre behavior, DM and HTN  Pt with active OT orders and cleared by [facility] for OT evaluation and OOB mobility  Pt reports that he resides in a [facility] with 10STE  Pt reports being I with ADLs, IADLs (other than meds), and functional mobility with PRN use of spc PTA  Pt is not a  and family provides transport  Pt is currently functioning at/close to his functional baseline, and only requires 1 additional OT session for formal cognitive assessment  From an OT standpoint, recommend OP OT and shower chair upon d/c  Pt is to continue to benefit from skilled occupational therapy services while in the hospital to maximize functioning and independence with daily activities  See below for OT goals to be addressed during pt's POC       OT Discharge Recommendation: Outpatient OT  OT - OK to Discharge: Yes(when medically stable)

## 2019-12-10 NOTE — PHYSICAL THERAPY NOTE
Physical Therapy Evaluation     Patient's Name: [facility]  Admitting Diagnosis  Seizure ([facility] ) [R56 [id number]]    Problem List  Patient Active Problem List   Diagnosis    Essential hypertension    Type 2 diabetes mellitus without complication, with long-term current use of insulin ([facility] )    Dizziness    Seizure ([facility] )    Hyperglycemia    TBI (traumatic brain injury) ([facility] )    Bizarre behavior       Past Medical History  Past Medical History:   Diagnosis Date    Chemical exposure     Diabetes mellitus ([facility] )     H/O bone graft     Head injury     August 25, 2019 fell backwards hit head on a boulder w/ LOC    Head injury     as an air [place] with [employer] - jumped out of a plane parachute disfunction    Hypertension        Past Surgical History  Past Surgical History:   Procedure Laterality Date    KNEE ARTHROSCOPY W/ MENISCECTOMY Left     LEG SURGERY      TONSILLECTOMY            12/10/19 1350   Note Type   Note type Eval only   Pain Assessment   Pain Assessment No/denies pain   Pain Score No Pain   Home Living   Type of [address] Two level  (10STE)   Home Equipment Cane   Additional Comments Pt reports use of cane PRN PTA  Prior Function   Level of [place] Independent with ADLs and functional mobility   Lives With Spouse; Family   ADL Assistance Independent   IADLs Needs assistance   Falls in the last 6 months 1 to 4   Restrictions/Precautions   Weight Bearing Precautions Per Order No   Braces or Orthoses   (none)   Other Precautions Cognitive;Telemetry; Seizure;Multiple lines; Fall Risk  (EMU, hallucination)   General   Family/Caregiver Present No   Cognition   Overall Cognitive Status Impaired   Arousal/Participation Cooperative   Attention Attends with cues to redirect   Orientation Level Oriented X4   Memory Decreased recall of precautions;Decreased recall of recent events;Decreased short term memory   Following Commands Follows one step commands without difficulty   Comments Pt reports he constantly hears children crying  RLE Assessment   RLE Assessment   (grossly 4/5)   LLE Assessment   LLE Assessment   (grossly 4-/5)   Bed Mobility   Additional Comments OOB in chair upon PT arrival   Pt left upright in bedside chair with all needs in reach at end of therapy session  Transfers   Sit to Stand 5  Supervision   Stand to Sit 5  Supervision   Additional Comments Transfers without AD  Ambulation/Elevation   Gait pattern Excessively slow; Short stride   Gait Assistance 4  Minimal assist  (CGA)   Additional items Assist x 1   Assistive Device None   Distance 10 ft x 2   Stair Management Assistance Not tested   Balance   Static Sitting Good   Dynamic Sitting Good   Static Standing Fair +   Dynamic Standing Fair +   Ambulatory Fair +   Endurance Deficit   Endurance Deficit No   Activity Tolerance   Activity Tolerance Patient tolerated treatment well   Medical Staff Made Aware OT [facility]   Nurse Made Aware RN cleared pt to be seen by PT   Assessment   Prognosis Good   Problem List Decreased strength;Decreased endurance; Impaired balance;Decreased mobility; Impaired judgement;Decreased safety awareness;Decreased cognition   Assessment Pt seen for high complexity PT evaluation due to decrease in functional mobility status compared to baseline  Pt with active PT eval/treat orders at this time  Pt is a 44 y o  yo M who [facility] to [facility] with auditory hallucinations with seizure on 12/09/19  Pt  has a past medical history of Chemical exposure, Diabetes mellitus ([address] ), H/O bone graft, Head injury, Head injury, and Hypertension  Pt reports he has near constant auditory hallucinations and others that come and go  [facility] and [facility] and reports I PTA with use of cane as needed for mobility  Pt presents with decreased strength, balance, endurance that contribute to limitations in functional transfers and functional mobility    Pt requires supervision for STS and CGA for ambulation 10 ft x 2 without AD at this time  Pt left upright in bedside chair with all needs in reach  Pt will benefit from skilled therapy in order to address current impairments and functional limitations  PT to follow pt and recommending home with family support and OPPT once medically cleared  Goals   Patient Goals to stop hearing hallucination   STG Expiration Date 12/24/19   Short Term Goal #1 1  Pt will demonstrate ability to perform all aspects of bed mobility with I in order to increase independence and decrease burden on caregivers  2  Pt will demonstrate ability to perform functional transfers with Mod I in order to increase independence and decrease burden on caregivers  3  Pt will demonstrate ability to ambulate 200 ft with least restrictive AD with Mod I in order to return to mobility safely  4  Pt will demonstrate ability to negotiate full flight steps with/without HR and Mod I in order to return to household/community mobility safely  5  Pt will demonstrate improved balance by one grade order to decrease risk of falls  6  Pt will increase b/l LE strength by 1 grade in order to increase ease of functional mobility and transfers  Plan   Treatment/Interventions Functional transfer training;LE strengthening/ROM; Therapeutic exercise; Endurance training;Patient/family training;Equipment eval/education; Bed mobility;Gait training;Spoke to nursing   PT Frequency Other (Comment)  (3-5x/wk)   Recommendation   Recommendation Home with family support; Outpatient PT   Equipment Recommended   (TBD)   PT - OK to Discharge No  (pending increased ambulation)   Modified [place] Scale   Modified [place] Scale 4   Barthel Index   Feeding 10   Bathing 0   Grooming Score 5   Dressing Score 10   Bladder Score 10   Bowels Score 10   Toilet Use Score 10   Transfers (Bed/Chair) Score 10   Mobility (Level Surface) Score 0   Stairs Score 0   Barthel Index Score 65         Kaye Mercer PT, DPT

## 2019-12-10 NOTE — PROGRESS NOTES
Progress Note - [facility]  44 y o  male MRN: [id number]  Unit/Bed#: [facility] 716-01 Encounter: [id number]      I came to see the patient continuation of care, patient states that he continued to hear voices, he was without hearing voices for at least 20 minutes  When I saw the patient he states that he only was hearing a male voice but he denies any command  type at this moment  He states that he slept good last night he denies any other issues  He is overwhelmed with what is going on  Behavior over the last 24 hours:  unchanged  Sleep: normal  Appetite: normal  Medication side effects: No  ROS: no complaints    Mental Status Evaluation:  Appearance:  age appropriate   Behavior:  normal   Speech:  normal pitch and normal volume   Mood:  anxious   Affect:  mood-congruent   Language: naming objects and repeating phrases   Thought Process:  goal directed   Associations: intact associations   Thought Content:  normal   Perceptual Disturbances: Auditory hallucinations of people talking and baby crying   Risk Potential: He denies any suicidal ideation plan or intent   Sensorium:  person, place, time/date, situation, day of week and month of year   Memory:  recent and remote memory grossly intact   Cognition:  grossly intact   Consciousness:  alert and awake    Attention: attention span and concentration were age appropriate   Intellect: within normal limits   Fund of Knowledge: awareness of current events: Fair, past history: Fair and vocabulary: Fair   Insight:  fair   Judgment: fair   Muscle Strength and Tone: Within normal limits   Gait/Station: normal gait/station and normal balance   Motor Activity: no abnormal movements         Assessment/Plan  Steve Ann  is a 44 y o  male with a history of TBI, diabetes mellitus, hypertension presented with seizures and auditory hallucination, patient does not have any prior psychiatric a history before he had a TBI    Patient was in [employer] and this medication was discontinued yesterday  He continued to have auditory hallucination denies any command type  He states that he was without hallucination for 20 minutes but the voice come back  He denies any suicidal plan or intent, he does not have any delusional thinking  Diagnosis:  Psychotic disorder unspecified type    Recommended Treatment:   Continue medical management and all medical treatment  Risperidone 1 mg p o   HS  Discussed this case with primary team again  I will follow up  Patient agree with treatment plan at this moment      Medications:   current meds:   Current Facility-Administered Medications   Medication Dose Route Frequency    acetaminophen (TYLENOL) tablet 650 mg  650 mg Oral Q6H PRN    aluminum-magnesium hydroxide-simethicone (MYLANTA) 200-200-20 mg/5 mL oral suspension 30 mL  30 mL Oral Q6H PRN    divalproex sodium (DEPAKOTE) EC tablet 750 mg  750 mg Oral Q8H [address]    docusate sodium (COLACE) capsule 100 mg  100 mg Oral BID PRN    enoxaparin (LOVENOX) subcutaneous injection 40 mg  40 mg Subcutaneous Daily    hydrochlorothiazide (HYDRODIURIL) tablet 25 mg  25 mg Oral Daily    insulin aspart protamine-insulin aspart (NovoLOG 70/30) 100 units/mL subcutaneous injection 10 Units  10 Units Subcutaneous Before Dinner    insulin aspart protamine-insulin aspart (NovoLOG 70/30) 100 units/mL subcutaneous injection 20 Units  20 Units Subcutaneous Daily Before Breakfast    insulin lispro (HumaLOG) 100 units/mL subcutaneous injection 1-5 Units  1-5 Units Subcutaneous HS    insulin lispro (HumaLOG) 100 units/mL subcutaneous injection 2-12 Units  2-12 Units Subcutaneous TID AC    lisinopril (ZESTRIL) tablet 60 mg  60 mg Oral Daily    LORazepam (ATIVAN) 2 mg/mL injection 2 mg  2 mg Intravenous Q8H PRN    metFORMIN (GLUCOPHAGE) tablet 500 mg  500 mg Oral BID With Meals    metoprolol tartrate (LOPRESSOR) tablet 100 mg  100 mg Oral Q12H [address]    ondansetron (ZOFRAN) injection 4 mg  4 mg Intravenous Q6H PRN    phenytoin (DILANTIN) ER capsule 300 mg  300 mg Oral Q12H [address]    risperiDONE (RisperDAL) tablet 1 mg  1 mg Oral HS         Risks, benefits and possible side effects of Medications:     Risks, benefits, and possible side effects of medications explained to patient and patient verbalizes understanding  Labs: I have personally reviewed all pertinent laboratory results  I have personally reviewed all pertinent laboratory/tests results  Labs in last 72 hours:   Recent Labs     12/08/19  1732 12/09/19  0515  12/10/19  0453 12/10/19  0454   WBC 6 68  --    < >  --  7 07   RBC 5 08  --    < >  --  4 99   HGB 14 8  --    < >  --  14 5   HCT 46 0  --    < >  --  44 7     --    < >  --  220   RDW 13 7  --    < >  --  13 8   NEUTROABS 4 28  --    < >  --  4 42   SODIUM 144 143  --  144  --    K 4 3 3 8  --  3 4*  --     112*  --  111*  --    CO2 27 26  --  27  --    BUN 15 13  --  14  --    CREATININE 0 89 0 79  --  0 88  --    GLUC 82 86  --  77  --    CALCIUM 8 5 8 4  --  8 5  --    AST 28  --   --   --   --    ALT 21  --   --   --   --    ALKPHOS 60  --   --   --   --    TP 7 2  --   --   --   --    ALB 3 5  --   --   --   --    TBILI 0 20  --   --   --   --    VALPROICTOT 61  --   --   --   --    [id number]  --  2 520  --   --   --     < > = values in this interval not displayed           Pauline Em MD

## 2019-12-10 NOTE — ASSESSMENT & PLAN NOTE
· With hypertensive urgency  · Increase Zestril to 60 mg daily (12/9/19) with improving BPs  Continue to monitor  · Continue metoprolol 100 mg Q 12  · Continue Hydrochlorothiazide 25 mg q day
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
## 2019-12-10 NOTE — PROGRESS NOTES
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, which resulted in auditory hallucinations and seizures who presented with worsening auditory hallucinations  CT head showed no acute intracranial abnormality  Video EEG monitoring has not shown any epileptiform activity off of Keppra, however patient continues to experience auditory hallucinations multiple times throughout the day that continue to get worse  Plan:  - Psychiatry recommends starting risperidone 1 mg QHS if patient continues to have auditory hallucinations despite being off of Keppra  Since the hallucinations are getting worse, started Risperidone  0 5 mg once now and then 1 mg QHS  - MRI brain with and without contrast pending  EEG leads can be removed for the imaging study   - Serum Paraneoplastic panel, RPR, ESR/CRP, Thyroglobulin, and anti-microsomal antibody pending  - Consider LP with Paraneoplastic study if MRI normal  - Continue to hold Keppra at this time to determine if the auditory hallucinations are a side effect  We will continue to monitor for any epileptiform abnormalities on video EEG for 24 hours  Will discontinue tomorrow  - Continue Depakote 750 mg TID  - Continue Dilantin 300 mg BID  - Ativan PRN  - Phenytoin level is pending  - PT/OT/ST  - Recommend outpatient neuropsychology evaluation in 4-6 weeks; Order placed  - Frequent neuro checks  Continue to monitor and notify Neurology with any changes  - Medical management and supportive care per primary team   Correction of any metabolic or infectious disturbances  Results:  CT head: no acute intracranial abnormality    Subjective:   Patient seen and examined at bedside  No acute events reported overnight  Patient states that he "doesn't feel right" today    He notes that his brain feels fatigued, which he attributes to talking to so many people yesterday and trying to block out the voices  He did not have any seizure-like events overnight while being off of Keppra, but he continues to hear multiple voices  He continues to hear a male voice talking/swearing, a constant slow laugh, and throughout this examination he heard children crying  These voices can last anywhere from seconds to hours  There is not a specific time that makes the voices better or worse  Today he has an aching pain throughout his entire head  He has blurred vision at baseline, but denies any new visual disturbances, numbness/tingling, arm/leg weakness, dizziness, lightheadedness, chest pain, shortness of breath, and abdominal pain  Past Medical History:   Diagnosis Date    Chemical exposure     Diabetes mellitus ([facility] [address] )     H/O bone graft     Head injury     August 25, 2019 fell backwards hit head on a boulder w/ LOC    Head injury     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    Hypertension      Past Surgical History:   Procedure Laterality Date    KNEE ARTHROSCOPY W/ MENISCECTOMY Left     LEG SURGERY      TONSILLECTOMY       Family History   Problem Relation Age of Onset    Diabetes Mother     Hypertension Mother     Asthma Mother     Brain cancer Father     No Known Problems Sister     No Known Problems Brother     No Known Problems Brother      Social History     Socioeconomic History    Marital status: Single     Spouse name: None    Number of children: None    Years of education: None    Highest education level: None   Occupational History    None   Social Needs    Financial resource strain: None    Food insecurity:     Worry: None     Inability: None    Transportation needs:     Medical: None     Non-medical: None   Tobacco Use    Smoking status: Never Smoker    Smokeless tobacco: Never Used   Substance and Sexual Activity    Alcohol use:  Yes Alcohol/week: 3 0 standard drinks     Types: 3 Cans of beer per week     Frequency: Monthly or less     Comment: occassionally    Drug use: No    Sexual activity: None   Lifestyle    Physical activity:     Days per week: None     Minutes per session: None    Stress: None   Relationships    Social connections:     Talks on phone: None     Gets together: None     Attends L[employer] service: None     Active member of club or organization: None     Attends meetings of clubs or organizations: None     Relationship status: None    Intimate partner violence:     Fear of current or ex partner: None     Emotionally abused: None     Physically abused: None     Forced sexual activity: None   Other Topics Concern    None   Social History Narrative    None         Medications:   All current active meds have been reviewed and current meds:  Scheduled Meds:  Current Facility-Administered Medications:  acetaminophen 650 mg Oral Q6H PRN Sophie Rodrigez MD   aluminum-magnesium hydroxide-simethicone 30 mL Oral Q6H PRN Sophie Rodrigez MD   divalproex sodium 750 mg Oral [facility] Sophie Rodrigez MD   docusate sodium 100 mg Oral BID PRN Sophie Rodrigez MD   enoxaparin 40 mg Subcutaneous Daily Sophie Rodrigez MD   hydrochlorothiazide 25 mg Oral Daily Sophie Rodrigez MD   insulin aspart protamine-insulin aspart 10 Units Subcutaneous Before Obinna MD Sudeep   insulin aspart protamine-insulin aspart 20 Units Subcutaneous Daily Before Breakfast Leandrew Schilder, PA-C   insulin lispro 1-5 Units Subcutaneous HS Sophie Rodrigez MD   insulin lispro 2-12 Units Subcutaneous TID AC Sophie Rodrigez MD   lisinopril 60 mg Oral Daily Leandrew Schilder, PA-C   LORazepam 2 mg Intravenous Q8H PRN Sophie Rodrigez MD   metFORMIN 500 mg Oral BID With Meals Sophie Rodrigez MD   metoprolol tartrate 100 mg Oral Q12H [address] Sophie Rodrigez MD   ondansetron 4 mg Intravenous Q6H PRN Sophie Rodrigez MD   phenytoin 300 mg Oral Q12H [place]land Melissa Álvarez MD     Continuous Infusions:   PRN Meds:   acetaminophen    aluminum-magnesium hydroxide-simethicone    docusate sodium    LORazepam    ondansetron       ROS:   Review of Systems   Constitutional: Positive for fatigue  Negative for appetite change, chills, diaphoresis and fever  HENT: Negative for trouble swallowing and voice change  Eyes: Positive for visual disturbance (blurred vision (chronic))  Negative for photophobia  Respiratory: Negative for chest tightness and shortness of breath  Cardiovascular: Negative for chest pain and palpitations  Gastrointestinal: Negative for abdominal pain, constipation, diarrhea, nausea and vomiting  Genitourinary: Negative for difficulty urinating and dysuria  Musculoskeletal: Negative for back pain, gait problem, myalgias and neck pain  Skin: Negative for color change  Neurological: Positive for headaches  Negative for dizziness, tremors, speech difficulty, weakness, light-headedness and numbness  Psychiatric/Behavioral: Positive for decreased concentration and hallucinations (auditory)  Negative for agitation, confusion and sleep disturbance  The patient is not nervous/anxious  Vitals:   /95   Pulse 80   Temp 98 °F (36 7 °C)   Resp 16   Ht 5' 9" (1 753 m)   Wt 127 kg (279 lb 12 8 oz)   SpO2 92%   BMI 41 32 kg/m²     Physical Exam:   Physical Exam   Constitutional: He is oriented to person, place, and time  He appears well-developed and well-nourished  No distress  Patient lying comfortably in bed, however appears fatigued today  HENT:   Head: Normocephalic and atraumatic  Right Ear: External ear normal    Left Ear: External ear normal    Nose: Nose normal    Mouth/Throat: Oropharynx is clear and moist  No oropharyngeal exudate  EEG leads in place  Eyes: Pupils are equal, round, and reactive to light  Conjunctivae and EOM are normal    Neck: Normal range of motion  Neck supple     Cardiovascular: Normal rate and regular rhythm  No murmur heard  Pulmonary/Chest: Effort normal and breath sounds normal    Abdominal: Soft  Bowel sounds are normal    Musculoskeletal: Normal range of motion  Neurological: He is alert and oriented to person, place, and time  He has a normal Finger-Nose-Finger Test    Skin: Skin is warm and dry  Capillary refill takes less than 2 seconds  He is not diaphoretic  Psychiatric: He has a normal mood and affect  His speech is normal and behavior is normal  Judgment normal  He is actively hallucinating  He exhibits abnormal recent memory  Nursing note and vitals reviewed  Neurologic Exam     Mental Status   Oriented to person, place, and time  Oriented to city and area  Oriented to year, month and date  Attention: normal    Speech: speech is normal   Level of consciousness: alert  Knowledge: good  Able to name object  Normal comprehension  Able to name low and high frequency objects, but unable to recall any at 5 minutes  Follows all commands  Cranial Nerves     CN II   Visual fields full to confrontation  CN III, IV, VI   Pupils are equal, round, and reactive to light  Extraocular motions are normal    Right pupil: Size: 4 mm  Shape: regular  Reactivity: brisk  Consensual response: intact  Left pupil: Size: 4 mm  Shape: regular  Reactivity: brisk  Consensual response: intact  Nystagmus: none   Diplopia: none    CN V   Right facial sensation deficit: none  Left facial sensation deficit: forehead and cheeks (decreased sensation to light touch and temperature in the V1 and V2 distribution)    CN VII   Facial expression full, symmetric       CN VIII   CN VIII normal      CN IX, X   Palate: symmetric    CN XI   Right sternocleidomastoid strength: normal  Left sternocleidomastoid strength: normal  Right trapezius strength: normal  Left trapezius strength: normal    CN XII   Tongue: not atrophic  Fasciculations: absent  Tongue deviation: none    Motor Exam   Muscle bulk: normal  Overall muscle tone: normal  Right arm pronator drift: absent  Left arm pronator drift: absent  5/5 strength in UE and LE bilaterally  Sensory Exam   Decreased sensation to temperature in right UE, but normal sensation to light touch and vibration in UE bilaterally  Decreased sensation to light touch, vibration, and temperature in the left LE (chronic after knee surgery)  Gait, Coordination, and Reflexes     Gait  Gait: (deferred for patient safety)    Coordination   Finger to nose coordination: normal    Tremor   Resting tremor: absent  Action tremor: absent          Labs: I have personally reviewed pertinent reports     Recent Results (from the past 24 hour(s))   Fingerstick Glucose (POCT)    Collection Time: 12/09/19 11:03 AM   Result Value Ref Range    POC Glucose 59 (L) 65 - 140 mg/dl   Fingerstick Glucose (POCT)    Collection Time: 12/09/19 12:15 PM   Result Value Ref Range    POC Glucose 84 65 - 140 mg/dl   Fingerstick Glucose (POCT)    Collection Time: 12/09/19  4:02 PM   Result Value Ref Range    POC Glucose 83 65 - 140 mg/dl   Fingerstick Glucose (POCT)    Collection Time: 12/09/19  5:55 PM   Result Value Ref Range    POC Glucose 99 65 - 140 mg/dl   Fingerstick Glucose (POCT)    Collection Time: 12/09/19  8:53 PM   Result Value Ref Range    POC Glucose 80 65 - 140 mg/dl   Basic metabolic panel    Collection Time: 12/10/19  4:53 AM   Result Value Ref Range    Sodium 144 136 - 145 mmol/L    Potassium 3 4 (L) 3 5 - 5 3 mmol/L    Chloride 111 (H) 100 - 108 mmol/L    CO2 27 21 - 32 mmol/L    ANION GAP 6 4 - 13 mmol/L    BUN 14 5 - 25 mg/dL    Creatinine 0 88 0 60 - 1 30 mg/dL    Glucose 77 65 - 140 mg/dL    Calcium 8 5 8 3 - 10 1 mg/dL    eGFR 99 ml/min/1 73sq m   CBC and differential    Collection Time: 12/10/19  4:54 AM   Result Value Ref Range    WBC 7 07 4 31 - 10 16 Thousand/uL    RBC 4 99 3 88 - 5 62 Million/uL    Hemoglobin 14 5 12 0 - 17 0 g/dL    Hematocrit 44 7 36 5 - 49 3 %    MCV 90 82 - 98 fL    MCH 29 1 26 8 - 34 3 pg    MCHC 32 4 31 4 - 37 4 g/dL    RDW 13 8 11 6 - 15 1 %    MPV 9 5 8 9 - 12 7 fL    Platelets 539 905 - 379 Thousands/uL    nRBC 0 /100 WBCs    Neutrophils Relative 62 43 - 75 %    Immat GRANS % 0 0 - 2 %    Lymphocytes Relative 24 14 - 44 %    Monocytes Relative 9 4 - 12 %    Eosinophils Relative 4 0 - 6 %    Basophils Relative 1 0 - 1 %    Neutrophils Absolute 4 42 1 85 - 7 62 Thousands/µL    Immature Grans Absolute 0 03 0 00 - 0 20 Thousand/uL    Lymphocytes Absolute 1 68 0 60 - 4 47 Thousands/µL    Monocytes Absolute 0 64 0 17 - 1 22 Thousand/µL    Eosinophils Absolute 0 25 0 00 - 0 61 Thousand/µL    Basophils Absolute 0 05 0 00 - 0 10 Thousands/µL   Fingerstick Glucose (POCT)    Collection Time: 12/10/19  6:18 AM   Result Value Ref Range    POC Glucose 75 65 - 140 mg/dl       Imaging: I have personally reviewed pertinent imaging in PACS, including CT head,  and I have personally reviewed PACS reports  EKG, Pathology, and Other Studies: I have personally reviewed pertinent reports  VTE Prophylaxis: Sequential compression device (Venodyne)  and Enoxaparin (Lovenox)      Counseling / Coordination of Care  Total time spent today 20 minutes  Greater than 50% of total time was spent with the patient and/or family counseling and/or coordination of care  A description of the counseling/coordination of care:  Patient was seen and evaluated  Discussed with attending  Chart reviewed thoroughly including laboratory and imaging studies    Plan of care discussed with patient and primary team

## 2019-12-10 NOTE — MALNUTRITION/BMI
This medical record reflects one or more clinical indicators suggestive of morbid obesity  BMI Findings:  BMI Classifications: Morbid Obesity 40-44 9     Body mass index is 41 32 kg/m²  See Nutrition note dated 12/10/2019   for additional details  Completed nutrition assessment is viewable in the nutrition documentation

## 2019-12-10 NOTE — ASSESSMENT & PLAN NOTE
Lab Results   Component Value Date    HGBA1C 6 6 (H) 12/09/2019     Recent Labs     12/09/19  1602 12/09/19  1755 12/09/19  2053 12/10/19  0618   POCGLU 83 99 80 75     Blood Sugar Average: Last 72 hrs:  (P) 78     · Blood sugars a little on the low side  Decrease morning insulin to 20 units today  Continue 15 units at dinnertime  · Insulin sliding scale with Accu-Cheks per protocol  · Diabetic diet

## 2019-12-10 NOTE — PROGRESS NOTES
Progress Note - Alyssia Alfonso  1970, 44 y o  male MRN: [id number]  [facility] Encounter: [id number]  Primary Care Provider: Maegan Hester   Date and time admitted to hospital: 12/9/2019  2:31 AM    * Seizure [facility])  Assessment & Plan  · No evidence of seizure activity on EEG thus far  · Continue with seizure precautions  · Continue Dilantin 300 mg ER Q 12   · Keppra is being held while on EEG monitoring  Keppra may contribute to psychosis  · Depakote 750 mg Q 8  Bizarre behavior  Assessment & Plan  · Psychiatry consulted and appreciate their input  · Recommended Risperidone 1 mg at bedtime, which will be added  · Concern with Keppra as psychosis can be a side effect  The timeline is not quite clear when these hallucinations began; however, is it possible that Keppra could be causing progression of the symptoms  Will defer to psychiatry  · First documentation of hallucinations noted on 9/17/19  Patient was loaded with Keppra on 9/18/19  TBI (traumatic brain injury) ([facility] [address] )  Assessment & Plan  · Traumatic brain injury is stable and was a result of a fall backwards with subdural and subarachnoid hemorrhage  Currently with seizure disorder and auditory hallucinations  Type 2 diabetes mellitus without complication, with long-term current use of insulin [facility])  Assessment & Plan  Lab Results   Component Value Date    HGBA1C 6 6 (H) 12/09/2019     Recent Labs     12/09/19  1602 12/09/19  1755 12/09/19  2053 12/10/19  0618   POCGLU 83 99 80 75     Blood Sugar Average: Last 72 hrs:  (P) 78     · Blood sugars a little on the low side  Decrease morning insulin to 20 units today  Continue 15 units at dinnertime  · Insulin sliding scale with Accu-Cheks per protocol  · Diabetic diet  Essential hypertension  Assessment & Plan  · With hypertensive urgency  · Increase Zestril to 60 mg daily (12/9/19) with improving BPs  Continue to monitor    · Continue metoprolol 100 mg Q 12  · Continue Hydrochlorothiazide 25 mg q day  VTE Pharmacologic Prophylaxis:   Pharmacologic: Enoxaparin (Lovenox)  Mechanical: Mechanical VTE prophylaxis in place  Patient Centered Rounds: I have performed bedside rounds with nursing staff today  Discussions with Specialists or Other Care Team Provider: Neurology  Education and Discussions with Family / Patient: All patient questions answered to the best of my ability  Time Spent for Care: 20 minutes  More than 50% of total time spent on counseling and coordination of care as described above  Current Length of Stay: 1 day(s)  Current Patient Status: Inpatient   Certification Statement: The patient will continue to require additional inpatient hospital stay due to need for continued monitoring of hallucinations  Discharge Plan: Patient is not medically stable for discharge  Code Status: Level 1 - Full Code    Subjective:   Patient is extremely distracted by the auditory hallucinations  He reports they are "getting louder" and describes children laughing and a man's voice  The man's voice told him yesterday that psychiatry "didn't believe him"  He denies any suicidal or homicidal ideation  Objective:   Vitals:   Temp (24hrs), Av 6 °F (37 °C), Min:98 °F (36 7 °C), Max:99 5 °F (37 5 °C)    Temp:  [98 °F (36 7 °C)-99 5 °F (37 5 °C)] 98 °F (36 7 °C)  HR:  [74-82] 80  Resp:  [16] 16  BP: (130-154)/() 130/95  SpO2:  [92 %-93 %] 92 %  Body mass index is 41 32 kg/m²  Input and Output Summary (last 24 hours): Intake/Output Summary (Last 24 hours) at 12/10/2019 09  Last data filed at 12/10/2019 0801  Gross per 24 hour   Intake 780 ml   Output    Net 780 ml       Physical Exam:     Physical Exam   Constitutional:   (+) EEG monitor in place  HENT:   Head: Normocephalic and atraumatic  Mouth/Throat: Oropharynx is clear and moist and mucous membranes are normal    Eyes: No scleral icterus     Cardiovascular: Normal rate and regular rhythm  No murmur heard  Pulmonary/Chest: Breath sounds normal  He has no wheezes  He has no rales  He exhibits no tenderness  Abdominal: Soft  Bowel sounds are normal  He exhibits no distension  There is no tenderness  Musculoskeletal: Normal range of motion  He exhibits no edema  Skin: Skin is warm and dry  No rash noted  Psychiatric: He has a normal mood and affect  Patient is tearful when I enter the room and is meditating reporting that he is hearing a man's voice and children laughing  Vitals reviewed  Additional Data:   Labs:  Results from last 7 days   Lab Units 12/10/19  0454   WBC Thousand/uL 7 07   HEMOGLOBIN g/dL 14 5   HEMATOCRIT % 44 7   PLATELETS Thousands/uL 220   NEUTROS PCT % 62   LYMPHS PCT % 24   MONOS PCT % 9   EOS PCT % 4     Results from last 7 days   Lab Units 12/10/19  0453  12/08/19  1732   POTASSIUM mmol/L 3 4*   < > 4 3   CHLORIDE mmol/L 111*   < > 108   CO2 mmol/L 27   < > 27   BUN mg/dL 14   < > 15   CREATININE mg/dL 0 88   < > 0 89   CALCIUM mg/dL 8 5   < > 8 5   ALK PHOS U/L  --   --  60   ALT U/L  --   --  21   AST U/L  --   --  28    < > = values in this interval not displayed  * I Have Reviewed All Lab Data Listed Above  * Additional Pertinent Lab Tests Reviewed: All Labs Within Last 24 Hours Reviewed    Imaging:    Imaging Reports Reviewed Today Include: None new    Cultures:   Blood Culture:   Lab Results   Component Value Date    BLOODCX No Growth After 5 Days  09/21/2019    BLOODCX No Growth After 5 Days   09/21/2019     Urine Culture:   Lab Results   Component Value Date    URINECX No Growth <1000 cfu/mL 09/21/2019     Sputum Culture: No components found for: SPUTUMCX  Wound Culture: No results found for: WOUNDCULT    Last 24 Hours Medication List:     Current Facility-Administered Medications:  acetaminophen 650 mg Oral Q6H PRN Andre Holm MD   aluminum-magnesium hydroxide-simethicone 30 mL Oral Q6H PRN Andre Holm MD divalproex sodium 750 mg Oral [facility] Haroldo Anderson MD   docusate sodium 100 mg Oral BID PRN Haroldo Anderson MD   enoxaparin 40 mg Subcutaneous Daily Haroldo Anderson MD   hydrochlorothiazide 25 mg Oral Daily Haroldo Anderson MD   insulin aspart protamine-insulin aspart 10 Units Subcutaneous Before Venjarrod Quijano MD   insulin aspart protamine-insulin aspart 20 Units Subcutaneous Daily Before Breakfast Charles Mccabe PA-C   insulin lispro 1-5 Units Subcutaneous HS Haroldo Anderson MD   insulin lispro 2-12 Units Subcutaneous TID AC Haroldo Anderson MD   lisinopril 60 mg Oral Daily Charles Mccabe PA-C   LORazepam 2 mg Intravenous Q8H PRN Haroldo Anderson MD   metFORMIN 500 mg Oral BID With Meals Haroldo Anderson MD   metoprolol tartrate 100 mg Oral Q12H [address] Haroldo Anderson MD   ondansetron 4 mg Intravenous Q6H PRN Haroldo Anderson MD   phenytoin 300 mg Oral Q12H [address] Haroldo Anderson MD        Today, Patient Was Seen By: Charles Mccabe PA-C    ** Please Note: Dragon 360 Dictation voice to text software may have been used in the creation of this document   **

## 2019-12-10 NOTE — PLAN OF CARE
Problem: Potential for Falls  Goal: Patient will remain free of falls  Description  INTERVENTIONS:  - Assess patient frequently for physical needs  -  Identify cognitive and physical deficits and behaviors that affect risk of falls    -  [place] fall precautions as indicated by assessment   - Educate patient/family on patient safety including physical limitations  - Instruct patient to call for assistance with activity based on assessment  - Modify environment to reduce risk of injury  - Consider OT/PT consult to assist with strengthening/mobility  Outcome: Progressing     Problem: Prexisting or High Potential for Compromised Skin Integrity  Goal: Skin integrity is maintained or improved  Description  INTERVENTIONS:  - Identify patients at risk for skin breakdown  - Assess and monitor skin integrity  - Assess and monitor nutrition and hydration status  - Monitor labs   - Assess for incontinence   - Turn and reposition patient  - Assist with mobility/ambulation  - Relieve pressure over bony prominences  - Avoid friction and shearing  - Provide appropriate hygiene as needed including keeping skin clean and dry  - Evaluate need for skin moisturizer/barrier cream  - Collaborate with interdisciplinary team   - Patient/family teaching  - Consider wound care consult   Outcome: Progressing     Problem: PAIN - ADULT  Goal: Verbalizes/displays adequate comfort level or baseline comfort level  Description  Interventions:  - Encourage patient to monitor pain and request assistance  - Assess pain using appropriate pain scale  - Administer analgesics based on type and severity of pain and evaluate response  - Implement non-pharmacological measures as appropriate and evaluate response  - Consider cultural and social influences on pain and pain management  - Notify physician/advanced practitioner if interventions unsuccessful or patient reports new pain  Outcome: Progressing     Problem: INFECTION - ADULT  Goal: Absence or prevention of progression during hospitalization  Description  INTERVENTIONS:  - Assess and monitor for signs and symptoms of infection  - Monitor lab/diagnostic results  - Monitor all insertion sites, i e  indwelling lines, tubes, and drains  - Monitor endotracheal if appropriate and nasal secretions for changes in amount and color  - [place] appropriate cooling/warming therapies per order  - Administer medications as ordered  - Instruct and encourage patient and family to use good hand hygiene technique  - Identify and instruct in appropriate isolation precautions for identified infection/condition  Outcome: Progressing  Goal: Absence of fever/infection during neutropenic period  Description  INTERVENTIONS:  - Monitor WBC    Outcome: Progressing     Problem: SAFETY ADULT  Goal: Patient will remain free of falls  Description  INTERVENTIONS:  - Assess patient frequently for physical needs  -  Identify cognitive and physical deficits and behaviors that affect risk of falls    -  [place] fall precautions as indicated by assessment   - Educate patient/family on patient safety including physical limitations  - Instruct patient to call for assistance with activity based on assessment  - Modify environment to reduce risk of injury  - Consider OT/PT consult to assist with strengthening/mobility  Outcome: Progressing  Goal: Maintain or return to baseline ADL function  Description  INTERVENTIONS:  -  Assess patient's ability to carry out ADLs; assess patient's baseline for ADL function and identify physical deficits which impact ability to perform ADLs (bathing, care of mouth/teeth, toileting, grooming, dressing, etc )  - Assess/evaluate cause of self-care deficits   - Assess range of motion  - Assess patient's mobility; develop plan if impaired  - Assess patient's need for assistive devices and provide as appropriate  - Encourage maximum independence but intervene and supervise when necessary  - Involve family in performance of ADLs  - Assess for home care needs following discharge   - Consider OT consult to assist with ADL evaluation and planning for discharge  - Provide patient education as appropriate  Outcome: Progressing  Goal: Maintain or return mobility status to optimal level  Description  INTERVENTIONS:  - Assess patient's baseline mobility status (ambulation, transfers, stairs, etc )    - Identify cognitive and physical deficits and behaviors that affect mobility  - Identify mobility aids required to assist with transfers and/or ambulation (gait belt, sit-to-stand, lift, walker, cane, etc )  - [place] fall precautions as indicated by assessment  - Record patient progress and toleration of activity level on Mobility SBAR; progress patient to next Phase/Stage  - Instruct patient to call for assistance with activity based on assessment  - Consider rehabilitation consult to assist with strengthening/weightbearing, etc   Outcome: Progressing     Problem: DISCHARGE PLANNING  Goal: Discharge to home or other facility with appropriate resources  Description  INTERVENTIONS:  - Identify barriers to discharge w/patient and caregiver  - Arrange for needed discharge resources and transportation as appropriate  - Identify discharge learning needs (meds, wound care, etc )  - Arrange for interpretive services to assist at discharge as needed  - Refer to Case Management Department for coordinating discharge planning if the patient needs post-hospital services based on physician/advanced practitioner order or complex needs related to functional status, cognitive ability, or social support system  Outcome: Progressing     Problem: Knowledge Deficit  Goal: Patient/family/caregiver demonstrates understanding of disease process, treatment plan, medications, and discharge instructions  Description  Complete learning assessment and assess knowledge base    Interventions:  - Provide teaching at level of understanding  - Provide teaching via preferred learning methods  Outcome: Progressing     Problem: NEUROSENSORY - ADULT  Goal: Achieves stable or improved neurological status  Description  INTERVENTIONS  - Monitor and report changes in neurological status  - Monitor vital signs such as temperature, blood pressure, glucose, and any other labs ordered   - Initiate measures to prevent increased intracranial pressure  - Monitor for seizure activity and implement precautions if appropriate      Outcome: Progressing  Goal: Remains free of injury related to seizures activity  Description  INTERVENTIONS  - Maintain airway, patient safety  and administer oxygen as ordered  - Monitor patient for seizure activity, document and report duration and description of seizure to physician/advanced practitioner  - If seizure occurs,  ensure patient safety during seizure  - Reorient patient post seizure  - Seizure pads on all 4 side rails  - Instruct patient/family to notify RN of any seizure activity including if an aura is experienced  - Instruct patient/family to call for assistance with activity based on nursing assessment  - Administer anti-seizure medications if ordered    Outcome: Progressing  Goal: Achieves maximal functionality and self care  Description  INTERVENTIONS  - Monitor swallowing and airway patency with patient fatigue and changes in neurological status  - Encourage and assist patient to increase activity and self care     - Encourage visually impaired, hearing impaired and aphasic patients to use assistive/communication devices  Outcome: Progressing     Problem: CARDIOVASCULAR - ADULT  Goal: Maintains optimal cardiac output and hemodynamic stability  Description  INTERVENTIONS:  - Monitor I/O, vital signs and rhythm  - Monitor for S/S and trends of decreased cardiac output  - Administer and titrate ordered vasoactive medications to optimize hemodynamic stability  - Assess quality of pulses, skin color and temperature  - Assess for signs of decreased coronary artery perfusion  - Instruct patient to report change in severity of symptoms  Outcome: Progressing  Goal: Absence of cardiac dysrhythmias or at baseline rhythm  Description  INTERVENTIONS:  - Continuous cardiac monitoring, vital signs, obtain 12 lead EKG if ordered  - Administer antiarrhythmic and heart rate control medications as ordered  - Monitor electrolytes and administer replacement therapy as ordered  Outcome: Progressing     Problem: RESPIRATORY - ADULT  Goal: Achieves optimal ventilation and oxygenation  Description  INTERVENTIONS:  - Assess for changes in respiratory status  - Assess for changes in mentation and behavior  - Position to facilitate oxygenation and minimize respiratory effort  - Oxygen administered by appropriate delivery if ordered  - Initiate smoking cessation education as indicated  - Encourage broncho-pulmonary hygiene including cough, deep breathe, Incentive Spirometry  - Assess the need for suctioning and aspirate as needed  - Assess and instruct to report SOB or any respiratory difficulty  - Respiratory Therapy support as indicated  Outcome: Progressing     Problem: GASTROINTESTINAL - ADULT  Goal: Minimal or absence of nausea and/or vomiting  Description  INTERVENTIONS:  - Administer IV fluids if ordered to ensure adequate hydration  - Maintain NPO status until nausea and vomiting are resolved  - Nasogastric tube if ordered  - Administer ordered antiemetic medications as needed  - Provide nonpharmacologic comfort measures as appropriate  - Advance diet as tolerated, if ordered  - Consider nutrition services referral to assist patient with adequate nutrition and appropriate food choices  Outcome: Progressing  Goal: Maintains or returns to baseline bowel function  Description  INTERVENTIONS:  - Assess bowel function  - Encourage oral fluids to ensure adequate hydration  - Administer IV fluids if ordered to ensure adequate hydration  - Administer ordered medications as needed  - Encourage mobilization and activity  - Consider nutritional services referral to assist patient with adequate nutrition and appropriate food choices  Outcome: Progressing  Goal: Maintains adequate nutritional intake  Description  INTERVENTIONS:  - Monitor percentage of each meal consumed  - Identify factors contributing to decreased intake, treat as appropriate  - Assist with meals as needed  - Monitor I&O, weight, and lab values if indicated  - Obtain nutrition services referral as needed  Outcome: Progressing  Goal: Establish and maintain optimal ostomy function  Description  INTERVENTIONS:  - Assess bowel function  - Encourage oral fluids to ensure adequate hydration  - Administer IV fluids if ordered to ensure adequate hydration   - Administer ordered medications as needed  - Encourage mobilization and activity  - Nutrition services referral to assist patient with appropriate food choices  - Assess stoma site  - Consider wound care consult   Outcome: Progressing     Problem: GENITOURINARY - ADULT  Goal: Maintains or returns to baseline urinary function  Description  INTERVENTIONS:  - Assess urinary function  - Encourage oral fluids to ensure adequate hydration if ordered  - Administer IV fluids as ordered to ensure adequate hydration  - Administer ordered medications as needed  - Offer frequent toileting  - Follow urinary retention protocol if ordered  Outcome: Progressing  Goal: Absence of urinary retention  Description  INTERVENTIONS:  - Assess patients ability to void and empty bladder  - Monitor I/O  - Bladder scan as needed  - Discuss with physician/AP medications to alleviate retention as needed  - Discuss catheterization for long term situations as appropriate  Outcome: Progressing  Goal: Urinary catheter remains patent  Description  INTERVENTIONS:  - Assess patency of urinary catheter  - If patient has a chronic paul, consider changing catheter if non-functioning  - Follow guidelines for intermittent irrigation of non-functioning urinary catheter  Outcome: Progressing     Problem: METABOLIC, FLUID AND ELECTROLYTES - ADULT  Goal: Electrolytes maintained within normal limits  Description  INTERVENTIONS:  - Monitor labs and assess patient for signs and symptoms of electrolyte imbalances  - Administer electrolyte replacement as ordered  - Monitor response to electrolyte replacements, including repeat lab results as appropriate  - Instruct patient on fluid and nutrition as appropriate  Outcome: Progressing  Goal: Fluid balance maintained  Description  INTERVENTIONS:  - Monitor labs   - Monitor I/O and WT  - Instruct patient on fluid and nutrition as appropriate  - Assess for signs & symptoms of volume excess or deficit  Outcome: Progressing  Goal: Glucose maintained within target range  Description  INTERVENTIONS:  - Monitor Blood Glucose as ordered  - Assess for signs and symptoms of hyperglycemia and hypoglycemia  - Administer ordered medications to maintain glucose within target range  - Assess nutritional intake and initiate nutrition service referral as needed  Outcome: Progressing     Problem: SKIN/TISSUE INTEGRITY - ADULT  Goal: Skin integrity remains intact  Description  INTERVENTIONS  - Identify patients at risk for skin breakdown  - Assess and monitor skin integrity  - Assess and monitor nutrition and hydration status  - Monitor labs (i e  albumin)  - Assess for incontinence   - Turn and reposition patient  - Assist with mobility/ambulation  - Relieve pressure over bony prominences  - Avoid friction and shearing  - Provide appropriate hygiene as needed including keeping skin clean and dry  - Evaluate need for skin moisturizer/barrier cream  - Collaborate with interdisciplinary team (i e  Nutrition, Rehabilitation, etc )   - Patient/family teaching  Outcome: Progressing  Goal: Incision(s), wounds(s) or drain site(s) healing without S/S of infection  Description  INTERVENTIONS  - Assess and document risk factors for skin impairment   - Assess and document dressing, incision, wound bed, drain sites and surrounding tissue  - Consider nutrition services referral as needed  - Oral mucous membranes remain intact  - Provide patient/ family education  Outcome: Progressing  Goal: Oral mucous membranes remain intact  Description  INTERVENTIONS  - Assess oral mucosa and hygiene practices  - Implement preventative oral hygiene regimen  - Implement oral medicated treatments as ordered  - Initiate Nutrition services referral as needed  Outcome: Progressing     Problem: HEMATOLOGIC - ADULT  Goal: Maintains hematologic stability  Description  INTERVENTIONS  - Assess for signs and symptoms of bleeding or hemorrhage  - Monitor labs  - Administer supportive blood products/factors as ordered and appropriate  Outcome: Progressing     Problem: MUSCULOSKELETAL - ADULT  Goal: Maintain or return mobility to safest level of function  Description  INTERVENTIONS:  - Assess patient's ability to carry out ADLs; assess patient's baseline for ADL function and identify physical deficits which impact ability to perform ADLs (bathing, care of mouth/teeth, toileting, grooming, dressing, etc )  - Assess/evaluate cause of self-care deficits   - Assess range of motion  - Assess patient's mobility  - Assess patient's need for assistive devices and provide as appropriate  - Encourage maximum independence but intervene and supervise when necessary  - Involve family in performance of ADLs  - Assess for home care needs following discharge   - Consider OT consult to assist with ADL evaluation and planning for discharge  - Provide patient education as appropriate  Outcome: Progressing  Goal: Maintain proper alignment of affected body part  Description  INTERVENTIONS:  - Support, maintain and protect limb and body alignment  - Provide patient/ family with appropriate education  Outcome: Progressing     Problem: Nutrition/Hydration-ADULT  Goal: Nutrient/Hydration intake appropriate for improving, restoring or maintaining nutritional needs  Description  Monitor and assess patient's nutrition/hydration status for malnutrition  Collaborate with interdisciplinary team and initiate plan and interventions as ordered  Monitor patient's weight and dietary intake as ordered or per policy  Utilize nutrition screening tool and intervene as necessary  Determine patient's food preferences and provide high-protein, high-caloric foods as appropriate       INTERVENTIONS:  - Monitor oral intake, urinary output, labs, and treatment plans  - Assess nutrition and hydration status and recommend course of action  - Evaluate amount of meals eaten  - Assist patient with eating if necessary   - Allow adequate time for meals  - Recommend/ encourage appropriate diets, oral nutritional supplements, and vitamin/mineral supplements  - Order, calculate, and assess calorie counts as needed  - Recommend, monitor, and adjust tube feedings and TPN/PPN based on assessed needs  - Assess need for intravenous fluids  - Provide specific nutrition/hydration education as appropriate  - Include patient/family/caregiver in decisions related to nutrition  Outcome: Progressing
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
## 2019-12-11 ENCOUNTER — APPOINTMENT (INPATIENT)
Dept: RADIOLOGY | Facility: HOSPITAL | Age: 49
DRG: 757 | End: 2019-12-11
Payer: COMMERCIAL

## 2019-12-11 PROBLEM — F29 PSYCHOTIC DISORDER (HCC): Status: ACTIVE | Noted: 2019-12-08

## 2019-12-11 LAB
ANION GAP SERPL CALCULATED.3IONS-SCNC: 3 MMOL/L (ref 4–13)
BUN SERPL-MCNC: 13 MG/DL (ref 5–25)
CALCIUM SERPL-MCNC: 8.6 MG/DL (ref 8.3–10.1)
CHLORIDE SERPL-SCNC: 113 MMOL/L (ref 100–108)
CO2 SERPL-SCNC: 30 MMOL/L (ref 21–32)
CREAT SERPL-MCNC: 0.92 MG/DL (ref 0.6–1.3)
GFR SERPL CREATININE-BSD FRML MDRD: 96 ML/MIN/1.73SQ M
GLUCOSE SERPL-MCNC: 125 MG/DL (ref 65–140)
GLUCOSE SERPL-MCNC: 76 MG/DL (ref 65–140)
GLUCOSE SERPL-MCNC: 83 MG/DL (ref 65–140)
GLUCOSE SERPL-MCNC: 83 MG/DL (ref 65–140)
GLUCOSE SERPL-MCNC: 95 MG/DL (ref 65–140)
GLUCOSE SERPL-MCNC: 96 MG/DL (ref 65–140)
POTASSIUM SERPL-SCNC: 3.7 MMOL/L (ref 3.5–5.3)
SODIUM SERPL-SCNC: 146 MMOL/L (ref 136–145)

## 2019-12-11 PROCEDURE — 95951 PR EEG MONITORING/VIDEORECORD: CPT | Performed by: PSYCHIATRY & NEUROLOGY

## 2019-12-11 PROCEDURE — 99232 SBSQ HOSP IP/OBS MODERATE 35: CPT | Performed by: PSYCHIATRY & NEUROLOGY

## 2019-12-11 PROCEDURE — 82948 REAGENT STRIP/BLOOD GLUCOSE: CPT

## 2019-12-11 PROCEDURE — 70553 MRI BRAIN STEM W/O & W/DYE: CPT

## 2019-12-11 PROCEDURE — 80048 BASIC METABOLIC PNL TOTAL CA: CPT | Performed by: PHYSICIAN ASSISTANT

## 2019-12-11 PROCEDURE — A9585 GADOBUTROL INJECTION: HCPCS | Performed by: PSYCHIATRY & NEUROLOGY

## 2019-12-11 PROCEDURE — 99232 SBSQ HOSP IP/OBS MODERATE 35: CPT | Performed by: INTERNAL MEDICINE

## 2019-12-11 PROCEDURE — 97530 THERAPEUTIC ACTIVITIES: CPT

## 2019-12-11 PROCEDURE — 99233 SBSQ HOSP IP/OBS HIGH 50: CPT | Performed by: PSYCHIATRY & NEUROLOGY

## 2019-12-11 RX ORDER — RISPERIDONE 1 MG/1
2 TABLET, FILM COATED ORAL
Status: DISCONTINUED | OUTPATIENT
Start: 2019-12-11 | End: 2019-12-12

## 2019-12-11 RX ADMIN — METFORMIN HYDROCHLORIDE 500 MG: 500 TABLET ORAL at 08:18

## 2019-12-11 RX ADMIN — METOPROLOL TARTRATE 100 MG: 50 TABLET, FILM COATED ORAL at 08:18

## 2019-12-11 RX ADMIN — ACETAMINOPHEN 650 MG: 325 TABLET ORAL at 21:31

## 2019-12-11 RX ADMIN — DIVALPROEX SODIUM 750 MG: 500 TABLET, DELAYED RELEASE ORAL at 05:10

## 2019-12-11 RX ADMIN — PHENYTOIN SODIUM 300 MG: 100 CAPSULE ORAL at 08:18

## 2019-12-11 RX ADMIN — DIVALPROEX SODIUM 750 MG: 500 TABLET, DELAYED RELEASE ORAL at 21:28

## 2019-12-11 RX ADMIN — DIVALPROEX SODIUM 750 MG: 500 TABLET, DELAYED RELEASE ORAL at 14:48

## 2019-12-11 RX ADMIN — ENOXAPARIN SODIUM 40 MG: 40 INJECTION SUBCUTANEOUS at 08:18

## 2019-12-11 RX ADMIN — INSULIN ASPART 20 UNITS: 100 INJECTION, SUSPENSION SUBCUTANEOUS at 08:24

## 2019-12-11 RX ADMIN — LISINOPRIL 60 MG: 20 TABLET ORAL at 08:18

## 2019-12-11 RX ADMIN — METOPROLOL TARTRATE 100 MG: 50 TABLET, FILM COATED ORAL at 21:28

## 2019-12-11 RX ADMIN — PHENYTOIN SODIUM 300 MG: 100 CAPSULE ORAL at 21:30

## 2019-12-11 RX ADMIN — RISPERIDONE 2 MG: 1 TABLET ORAL at 21:28

## 2019-12-11 RX ADMIN — GADOBUTROL 13 ML: 604.72 INJECTION INTRAVENOUS at 20:44

## 2019-12-11 RX ADMIN — HYDROCHLOROTHIAZIDE 25 MG: 25 TABLET ORAL at 08:17

## 2019-12-11 NOTE — ASSESSMENT & PLAN NOTE
· Psychiatry consulted and appreciate their input  · Recommend increasing Risperdal to 2 mg HS  · Keppra discontinued

## 2019-12-11 NOTE — PROGRESS NOTES
12/11/19 1300   Clinical Encounter Type   Visited With Patient   Routine Visit Follow-up   Restorationist Encounters   Restorationist Needs Prayer   Sacramental Encounters   Sacrament of Sick-Anointing Anointed

## 2019-12-11 NOTE — PROGRESS NOTES
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 presented with worsening auditory hallucinations  CT head showed no acute intracranial abnormalities  Video EEG monitoring has not shown any epileptiform activity off of Keppra, however patient continues to experience auditory hallucinations multiple times throughout the day  Risperidone was started yesterday, but no improvement in his symptoms  Video EEG monitoring discontinued  Etiology for auditory hallucinations unknown  Differentials include atypical presentation after TBI vs organic pathology vs psychotic disorder  Plan:  - Psychiatry recommends increasing risperidone from 1 mg QHS to 2mg QHS  - MRI brain with and without contrast pending  Originally scheduled for outpatient MRI brain for 12/12 at 4 pm   - Discontinue video EEG monitoring today  - Labs pending:  Serum paraneoplastic panel, RPR, ESR/CRP, thyroglobulin, anti microsomal antibody  - Phenytoin levels pending  - Consider LP with paraneoplastic study if MRI normal  - Continue to hold Keppra  - Continue Depakote 750 mg TID  - Continue Dilantin 300 mg BID  - Continue Ativan PRN  - PT/OT/ST  - Recommend outpatient neuropsychology evaluation 4-6 weeks; Order placed  - Frequent neuro checks  Continue to monitor and notify Neurology with any changes  - Medical management and supportive care per primary team   Correction of any metabolic or infectious disturbances  Results:  CT head:  No acute intracranial abnormalities      Subjective:   Patient seen and examined at bedside while the EEG techs were removing the EEG leads  He is feeling fatigued today despite sleeping through the night  We discussed the fall back in August in more detail    He was at home cleaning his car, and he tried to sit down on a small stool but lost his balance  He quickly stood up and then fell backwards, hitting his head at least 2 times against a large boulder in his yard  After that event, he began hearing "rustling" noises  He does have a history of hearing loss in the left ear after being in the [facility] years ago  However, he notes that after hitting his head, he noticed the hearing in his left ear slightly improved  He also noticed his blood pressure was under better control post-head trauma  He continues to have blurred vision since the fall, but denies diplopia and any new visual disturbances  Today he still hears the voices  He reports that there are two main voices  One is an "aggressive black male voice" and the other voice is a "calm and logical voice "  He can hear the voices in either ear, but when he turns his head to "look at the voice," the voice moves to the other ear  Throughout today's exam, he heard children crying and a "scratching" noise  He denies any headache, new numbness, weakness, tingling, lightheadedness, dizziness, chest pain, shortness of breath, and abdominal pain          Past Medical History:   Diagnosis Date    Chemical exposure     Diabetes mellitus ([facility] [address] )     H/O bone graft     Head injury     August 25, 2019 fell backwards hit head on a boulder w/ LOC    Head injury     as an air [place] with the [employer] - jumped out of a plane parachute disfunction    Hypertension      Past Surgical History:   Procedure Laterality Date    KNEE ARTHROSCOPY W/ MENISCECTOMY Left     LEG SURGERY      TONSILLECTOMY       Family History   Problem Relation Age of Onset    Diabetes Mother     Hypertension Mother     Asthma Mother     Brain cancer Father     No Known Problems Sister     No Known Problems Brother     No Known Problems Brother      Social History     Socioeconomic History    Marital status: Single     Spouse name: None    Number of children: None    Years of education: None    Highest education level: None   Occupational History    None   Social Needs    Financial resource strain: None    Food insecurity:     Worry: None     Inability: None    Transportation needs:     Medical: None     Non-medical: None   Tobacco Use    Smoking status: Never Smoker    Smokeless tobacco: Never Used   Substance and Sexual Activity    Alcohol use: Yes     Alcohol/week: 3 0 standard drinks     Types: 3 Cans of beer per week     Frequency: Monthly or less     Comment: occassionally    Drug use: No    Sexual activity: None   Lifestyle    Physical activity:     Days per week: None     Minutes per session: None    Stress: None   Relationships    Social connections:     Talks on phone: None     Gets together: None     Attends Buddhism service: None     Active member of club or organization: None     Attends meetings of clubs or organizations: None     Relationship status: None    Intimate partner violence:     Fear of current or ex partner: None     Emotionally abused: None     Physically abused: None     Forced sexual activity: None   Other Topics Concern    None   Social History Narrative    None         Medications:   All current active meds have been reviewed and current meds:  Scheduled Meds:  Current Facility-Administered Medications:  acetaminophen 650 mg Oral Q6H PRN Cammy Orr PA-C   aluminum-magnesium hydroxide-simethicone 30 mL Oral Q6H PRN Valencia Arreola MD   divalproex sodium 750 mg Oral [facility] Valencia Arreola MD   docusate sodium 100 mg Oral BID PRN Valencia Arreola MD   enoxaparin 40 mg Subcutaneous Daily Valencia Arreola MD   hydrochlorothiazide 25 mg Oral Daily Valencia Arreola MD   insulin aspart protamine-insulin aspart 10 Units Subcutaneous Before Tad MD Abdulaziz   insulin aspart protamine-insulin aspart 20 Units Subcutaneous Daily Before Breakfast Cammy Orr PA-C   insulin lispro 1-5 Units Subcutaneous HS Rogelio Salcido MD   insulin lispro 2-12 Units Subcutaneous TID AC Rogelio Salcido MD   lisinopril 60 mg Oral Daily Estrella Ibrahim PA-C   LORazepam 2 mg Intravenous Q8H PRN Rogelio Salcido MD   metFORMIN 500 mg Oral BID With Meals Rogelio Salcido MD   metoprolol tartrate 100 mg Oral Q12H [address] Rogelio Salcido MD   ondansetron 4 mg Intravenous Q6H PRN Rogelio Salcido MD   phenytoin 300 mg Oral Q12H [address] Rogelio Salcido MD   risperiDONE 1 mg Oral HS Margot Mora PA-C     Continuous Infusions:   PRN Meds:   acetaminophen    aluminum-magnesium hydroxide-simethicone    docusate sodium    LORazepam    ondansetron       ROS:   Review of Systems   Constitutional: Positive for appetite change (decreased appetite) and fatigue  Negative for activity change, chills, diaphoresis, fever and unexpected weight change  HENT: Positive for hearing loss (left (chronic))  Negative for trouble swallowing and voice change  Eyes: Positive for visual disturbance (blurred vision)  Negative for photophobia  Respiratory: Negative for cough, chest tightness and shortness of breath  Cardiovascular: Negative for chest pain and palpitations  Gastrointestinal: Negative for abdominal pain, constipation, diarrhea, nausea and vomiting  Endocrine: Negative for polydipsia, polyphagia and polyuria  Genitourinary: Negative for difficulty urinating, dysuria and frequency  Musculoskeletal: Negative for back pain, gait problem, neck pain and neck stiffness  Neurological: Negative for dizziness, speech difficulty, weakness, light-headedness, numbness and headaches  Psychiatric/Behavioral: Positive for hallucinations (auditory)  Negative for agitation, behavioral problems, confusion, decreased concentration and sleep disturbance  The patient is not nervous/anxious                Vitals:   /92   Pulse 69   Temp 97 9 °F (36 6 °C)   Resp 16   Ht 5' 9" (1 753 m)   Wt 127 kg (279 lb 12 8 oz)   SpO2 91%   BMI 41 32 kg/m²     Physical Exam:   Physical Exam   Constitutional: He is oriented to person, place, and time  He appears well-developed and well-nourished  No distress  Patient lying comfortably in bed  HENT:   Head: Normocephalic and atraumatic  Right Ear: External ear normal    Left Ear: External ear normal    Nose: Nose normal    Mouth/Throat: Oropharynx is clear and moist  No oropharyngeal exudate  Eyes: Pupils are equal, round, and reactive to light  Conjunctivae and EOM are normal    Neck: Normal range of motion  Neck supple  Cardiovascular: Normal rate, regular rhythm and normal heart sounds  Pulmonary/Chest: Effort normal and breath sounds normal    Abdominal: Soft  Bowel sounds are normal    Musculoskeletal: Normal range of motion  Neurological: He is alert and oriented to person, place, and time  He has a normal Finger-Nose-Finger Test    Skin: Skin is warm and dry  Capillary refill takes less than 2 seconds  He is not diaphoretic  Psychiatric: He has a normal mood and affect  His speech is normal and behavior is normal  Judgment normal    Auditory hallucinations   Nursing note and vitals reviewed  Neurologic Exam     Mental Status   Oriented to person, place, and time  Oriented to city and area  Oriented to year, month and date  Registration: recalls 3 of 3 objects  Recall at 5 minutes: recalls 3 of 3 objects  Follows 2 step commands  Attention: normal  Concentration: normal    Speech: speech is normal   Level of consciousness: alert  Knowledge: good  Able to name object  Able to repeat  Normal comprehension  Cranial Nerves     CN II   Visual fields full to confrontation  CN III, IV, VI   Pupils are equal, round, and reactive to light  Extraocular motions are normal    Right pupil: Size: 3 mm  Shape: regular  Reactivity: brisk  Consensual response: intact  Accommodation: intact  Left pupil: Size: 3 mm  Shape: regular  Reactivity: brisk  Consensual response: intact  Accommodation: intact  Nystagmus: none   Diplopia: none    CN V   Right facial sensation deficit: none  Left facial sensation deficit: forehead and cheeks (decreased sensation to light touch and temperature in V1 and V2 distribution)    CN VII   Facial expression full, symmetric  CN VIII   Right Rinne: AC > BC  Left Rinne: AC > BC  Torres: lateralizes right     CN IX, X   Palate: symmetric    CN XI   Right sternocleidomastoid strength: normal  Left sternocleidomastoid strength: normal  Right trapezius strength: normal  Left trapezius strength: normal    CN XII   Tongue: not atrophic  Fasciculations: absent  Tongue deviation: none    Motor Exam   Muscle bulk: normal  Overall muscle tone: normal  Right arm pronator drift: absent  Left arm pronator drift: absent  5/5 strength in UE and LE bilaterally  Sensory Exam   Increased temperature sense in left UE and left LE  Decreased sensation to light touch and vibration in distal to left patella (chronic after knee surgery)  Normal sensation to light touch and vibration in UE bilaterally and at the patellas  Gait, Coordination, and Reflexes     Gait  Gait: (deferred)    Coordination   Finger to nose coordination: normal    Tremor   Resting tremor: absent  Action tremor: absent          Labs: I have personally reviewed pertinent reports     Recent Results (from the past 24 hour(s))   Fingerstick Glucose (POCT)    Collection Time: 12/10/19 10:53 AM   Result Value Ref Range    POC Glucose 78 65 - 140 mg/dl   Fingerstick Glucose (POCT)    Collection Time: 12/10/19  3:59 PM   Result Value Ref Range    POC Glucose 81 65 - 140 mg/dl   Fingerstick Glucose (POCT)    Collection Time: 12/10/19  9:01 PM   Result Value Ref Range    POC Glucose 87 65 - 140 mg/dl   Basic metabolic panel    Collection Time: 12/11/19  4:52 AM   Result Value Ref Range    Sodium 146 (H) 136 - 145 mmol/L    Potassium 3 7 3 5 - 5 3 mmol/L    Chloride 113 (H) 100 - 108 mmol/L    CO2 30 21 - 32 mmol/L    ANION GAP 3 (L) 4 - 13 mmol/L    BUN 13 5 - 25 mg/dL    Creatinine 0 92 0 60 - 1 30 mg/dL    Glucose 76 65 - 140 mg/dL    Calcium 8 6 8 3 - 10 1 mg/dL    eGFR 96 ml/min/1 73sq m   Fingerstick Glucose (POCT)    Collection Time: 12/11/19  7:22 AM   Result Value Ref Range    POC Glucose 96 65 - 140 mg/dl       Imaging: I have personally reviewed pertinent imaging in PACS, including CT head,  and I have personally reviewed PACS reports  EKG, Pathology, and Other Studies: I have personally reviewed pertinent reports  VTE Prophylaxis: Sequential compression device (Venodyne)  and Enoxaparin (Lovenox)        Counseling / Coordination of Care  Total time spent today 20 minutes  Greater than 50% of total time was spent with the patient and/or family counseling and/or coordination of care  A description of the counseling/coordination of care:  Patient was seen and evaluated  Discussed with attending  Chart reviewed thoroughly including laboratory and imaging studies    Plan of care discussed with patient and primary team

## 2019-12-11 NOTE — ASSESSMENT & PLAN NOTE
· Blood pressure well controlled  · Continue lisinopril, metoprolol on hydrochlorothiazide at current doses

## 2019-12-11 NOTE — SOCIAL WORK
Pt discussed with SLIM during care coordination rounds  Awaiting neurology plan of care  For possible dc Sunday  Need OP therapy script signed

## 2019-12-11 NOTE — PROGRESS NOTES
Progress Note - [facility]  44 y o  male MRN: [id number]  Unit/Bed#: [facility] 716-01 Encounter: [id number]        I came to the patient continuation of care he states that he feels better at this moment, he had not hear the voices again for the last 20 minutes  He states that in the morning he was hearing voices telling him to go home, he continued to hear the baby cried and  noises outside the room  He states that he is scared that he will not get better  He continued to feel overwhelmed  Behavior over the last 24 hours:  unchanged  Sleep: normal  Appetite: normal  Medication side effects: No  ROS: no complaints    Mental Status Evaluation:  Appearance:  age appropriate   Behavior:  normal   Speech:  soft   Mood:  anxious   Affect:  mood-congruent   Language: naming objects and repeating phrases   Thought Process:  goal directed   Associations: intact associations   Thought Content:  normal   Perceptual Disturbances: Auditory hallucinations of baby crying and sometime here positive voices and other time negative voices   Risk Potential: He denies suicidal ideation plan or intent   Sensorium:  person, place and time/date   Memory:  recent and remote memory grossly intact   Cognition:  grossly intact   Consciousness:  alert and awake    Attention: attention span and concentration were age appropriate   Intellect: within normal limits   Fund of Knowledge: awareness of current events: Fair, past history: fair and vocabulary: fair   Insight:  fair   Judgment: fair   Muscle Strength and Tone: Within normal limits   Gait/Station: normal gait/station and normal balance   Motor Activity: no abnormal movements         Assessment/Plan  Steve Ann  is a 44 y o  male with a history of TBI, diabetes mellitus, hypertension presented to the hospital with seizure an auditory hallucination that started after he had a TBI    Patient had been in the hospital for few days his Keppra was discontinued and he was started on risperidone  He states that he has a short period of time without hearing the voices  He feels overwhelm  He denies any suicidal thoughts plans or intent, he denies any hallucinations when I went to see him in the room was not hearing anything for the last 20 minutes  Diagnosis:  Psychotic disorder unspecified    Recommended Treatment:   Continue medical management  Increase risperidone 2 mg p o   HS  Discussed with Neurology  I will follow up        Medications:   current meds:   Current Facility-Administered Medications   Medication Dose Route Frequency    acetaminophen (TYLENOL) tablet 650 mg  650 mg Oral Q6H PRN    aluminum-magnesium hydroxide-simethicone (MYLANTA) 200-200-20 mg/5 mL oral suspension 30 mL  30 mL Oral Q6H PRN    divalproex sodium (DEPAKOTE) EC tablet 750 mg  750 mg Oral Q8H [address]    docusate sodium (COLACE) capsule 100 mg  100 mg Oral BID PRN    enoxaparin (LOVENOX) subcutaneous injection 40 mg  40 mg Subcutaneous Daily    hydrochlorothiazide (HYDRODIURIL) tablet 25 mg  25 mg Oral Daily    insulin aspart protamine-insulin aspart (NovoLOG 70/30) 100 units/mL subcutaneous injection 10 Units  10 Units Subcutaneous Before Dinner    insulin aspart protamine-insulin aspart (NovoLOG 70/30) 100 units/mL subcutaneous injection 20 Units  20 Units Subcutaneous Daily Before Breakfast    insulin lispro (HumaLOG) 100 units/mL subcutaneous injection 1-5 Units  1-5 Units Subcutaneous HS    insulin lispro (HumaLOG) 100 units/mL subcutaneous injection 2-12 Units  2-12 Units Subcutaneous TID AC    lisinopril (ZESTRIL) tablet 60 mg  60 mg Oral Daily    LORazepam (ATIVAN) 2 mg/mL injection 2 mg  2 mg Intravenous Q8H PRN    metFORMIN (GLUCOPHAGE) tablet 500 mg  500 mg Oral BID With Meals    metoprolol tartrate (LOPRESSOR) tablet 100 mg  100 mg Oral Q12H [address]    ondansetron (ZOFRAN) injection 4 mg  4 mg Intravenous Q6H PRN    phenytoin (DILANTIN) ER capsule 300 mg  300 mg Oral Q12H [address]  risperiDONE (RisperDAL) tablet 2 mg  2 mg Oral HS         Risks, benefits and possible side effects of Medications:     Risks, benefits, and possible side effects of medications explained to patient and patient verbalizes understanding  Labs: I have personally reviewed all pertinent laboratory results  I have personally reviewed all pertinent laboratory/tests results  Labs in last 72 hours:   Recent Labs     12/08/19  1732 12/09/19  0515  12/10/19  0454 12/11/19  0452   WBC 6 68  --    < > 7 07  --    RBC 5 08  --    < > 4 99  --    HGB 14 8  --    < > 14 5  --    HCT 46 0  --    < > 44 7  --      --    < > 220  --    RDW 13 7  --    < > 13 8  --    NEUTROABS 4 28  --    < > 4 42  --    SODIUM 144 143   < >  --  146*   K 4 3 3 8   < >  --  3 7    112*   < >  --  113*   CO2 27 26   < >  --  30   BUN 15 13   < >  --  13   CREATININE 0 89 0 79   < >  --  0 92   GLUC 82 86   < >  --  76   CALCIUM 8 5 8 4   < >  --  8 6   AST 28  --   --   --   --    ALT 21  --   --   --   --    ALKPHOS 60  --   --   --   --    TP 7 2  --   --   --   --    ALB 3 5  --   --   --   --    TBILI 0 20  --   --   --   --    VALPROICTOT 61  --   --   --   --    [id number]  --  2 520  --   --   --     < > = values in this interval not displayed             Adriel Huffman MD

## 2019-12-11 NOTE — ASSESSMENT & PLAN NOTE
· No evidence of seizure activity on EEG thus far  · Continue with seizure precautions  · Continue Dilantin 300 mg ER Q 12   · Keppra is being held while on EEG monitoring  Keppra may contribute to psychosis  · Depakote 750 mg Q 8    · Plan for MRI brain with and without contrast and if unremarkable for a lumbar puncture per Neurology

## 2019-12-11 NOTE — PHYSICAL THERAPY NOTE
Physical Therapy Progress Note     12/11/19 1522   Pain Assessment   Pain Assessment No/denies pain   Pain Score No Pain   Restrictions/Precautions   Weight Bearing Precautions Per Order No   Other Precautions Fall Risk   General   Family/Caregiver Present No   Cognition   Overall Cognitive Status WFL   Arousal/Participation Alert; Cooperative   Bed Mobility   Supine to Sit 5  Supervision   Additional items Assist x 1   Transfers   Sit to Stand 5  Supervision   Additional items Assist x 1   Stand to Sit 5  Supervision   Additional items Assist x 1   Ambulation/Elevation   Gait pattern   (slow, short step length)   Gait Assistance 5  Supervision   Additional items Assist x 1   Assistive Device None   Distance 100 feet   Balance   Static Sitting Fair +   Dynamic Sitting Fair   Static Standing Fair +   Dynamic Standing Fair   Ambulatory Fair   Activity Tolerance   Activity Tolerance Patient limited by fatigue   Nurse [address] to see per RN Bambi Knowles   Assessment   Prognosis Good   Problem List Decreased strength;Decreased endurance;Decreased mobility; Decreased coordination   Assessment Pt is making steady progress with functional mobility  Limited by fatigue and weakness  Gait is slow but steady with occasional use of the hallway rail  Without loss of balance  Declined BLE exercises stating "I've been keeping up with it "  Pt would benefit from continued physical therapy to maximize functional independence  Goals   Patient Goals Get stronger   STG Expiration Date 12/24/19   Short Term Goal #1 1  Pt will demonstrate ability to perform all aspects of bed mobility with I in order to increase independence and decrease burden on caregivers  2  Pt will demonstrate ability to perform functional transfers with Mod I in order to increase independence and decrease burden on caregivers  3  Pt will demonstrate ability to ambulate 200 ft with least restrictive AD with Mod I in order to return to mobility safely   4  Pt will demonstrate ability to negotiate full flight steps with/without HR and Mod I in order to return to household/community mobility safely  5  Pt will demonstrate improved balance by one grade order to decrease risk of falls  6  Pt will increase b/l LE strength by 1 grade in order to increase ease of functional mobility and transfers  PT Treatment Day 1   Plan   Treatment/Interventions Functional transfer training;LE strengthening/ROM; Elevations; Therapeutic exercise; Endurance training;Patient/family training;Bed mobility;Gait training;Spoke to nursing   Progress Progressing toward goals   PT Frequency   (3-5x/week)   Recommendation   Recommendation Outpatient PT; Home with family support     Garrick Moscoso, [place]

## 2019-12-11 NOTE — PROGRESS NOTES
Progress Note - Pineda Naranjo  1970, 44 y o  male MRN: [id number]    [facility] Encounter: [id number]    Primary Care Provider: Jordan Lowe   Date and time admitted to hospital: 2019  2:31 AM        * Seizure [facility])  Assessment & Plan  · No evidence of seizure activity on EEG thus far  · Continue with seizure precautions  · Continue Dilantin 300 mg ER Q 12   · Keppra is being held while on EEG monitoring  Keppra may contribute to psychosis  · Depakote 750 mg Q 8    · Plan for MRI brain with and without contrast and if unremarkable for a lumbar puncture per Neurology  Psychotic disorder [facility])  Assessment & Plan  · Psychiatry consulted and appreciate their input  · Recommend increasing Risperdal to 2 mg HS  · Keppra discontinued  TBI (traumatic brain injury) ([facility] [address] )  Assessment & Plan  · Traumatic brain injury is stable and was a result of a fall backwards with subdural and subarachnoid hemorrhage  Currently with seizure disorder and auditory hallucinations  Type 2 diabetes mellitus without complication, with long-term current use of insulin [facility])  Assessment & Plan  Lab Results   Component Value Date    HGBA1C 6 6 (H) 2019     Recent Labs     19  0722 19  1125 19  1212 19  1540   POCGLU 96 83 95 83     Blood Sugar Average: Last 72 hrs:  (P) 78     · Blood sugars a little on the low side  · Patient has not been getting the insulin at dinner time so will discontinue  · Discontinue metformin  · Continue 70/30, 20 units in the morning  Essential hypertension  Assessment & Plan  · Blood pressure well controlled  · Continue lisinopril, metoprolol on hydrochlorothiazide at current doses  Subjective:   Pt seen and examined by me this morning  Pt denies any complaints      Objective:     Vitals:   Temp (24hrs), Av 3 °F (36 8 °C), Min:97 9 °F (36 6 °C), Max:98 8 °F (37 1 °C)    Temp:  [97 9 °F (36 6 °C)-98 8 °F (37 1 °C)] 98 8 °F (37 1 °C)  HR:  [69-83] 74  Resp:  [16] 16  BP: (132-136)/(90-92) 136/92  SpO2:  [91 %-94 %] 94 %  Body mass index is 41 32 kg/m²  Input and Output Summary (last 24 hours): Intake/Output Summary (Last 24 hours) at 12/11/2019 1642  Last data filed at 12/11/2019 1300  Gross per 24 hour   Intake 958 ml   Output    Net 958 ml       Physical Exam:     Physical Exam    Constitutional: Pt appears comfortable  Not in any acute distress  HENT:   Head: Normocephalic and atraumatic  Eyes: EOM are normal    Neck: Neck supple  Cardiovascular: Normal rate, regular rhythm, normal heart sounds  No murmur heard  Pulmonary/Chest: Effort normal, air entry b/l equal  No respiratory distress  Pt has no wheezes or crackles  Abdominal: Soft  Non-distended, Non-tender  Bowel sounds are normal    Musculoskeletal: Normal range of motion  Neurological: alert and oriented to person, place, and time  Moving all extremities spontaneously  Psychiatric: normal mood and affect  Additional Data:     Labs:    Results from last 7 days   Lab Units 12/10/19  0454   WBC Thousand/uL 7 07   HEMOGLOBIN g/dL 14 5   HEMATOCRIT % 44 7   PLATELETS Thousands/uL 220   NEUTROS PCT % 62   LYMPHS PCT % 24   MONOS PCT % 9   EOS PCT % 4     Results from last 7 days   Lab Units 12/11/19  0452  12/08/19  1732   SODIUM mmol/L 146*   < > 144   POTASSIUM mmol/L 3 7   < > 4 3   CHLORIDE mmol/L 113*   < > 108   CO2 mmol/L 30   < > 27   BUN mg/dL 13   < > 15   CREATININE mg/dL 0 92   < > 0 89   ANION GAP mmol/L 3*   < > 9   CALCIUM mg/dL 8 6   < > 8 5   ALBUMIN g/dL  --   --  3 5   TOTAL BILIRUBIN mg/dL  --   --  0 20   ALK PHOS U/L  --   --  60   ALT U/L  --   --  21   AST U/L  --   --  28   GLUCOSE RANDOM mg/dL 76   < > 82    < > = values in this interval not displayed           Results from last 7 days   Lab Units 12/11/19  1540 12/11/19  1212 12/11/19  1125 12/11/19  3881 12/10/19  2101 12/10/19  1559 12/10/19  1053 12/10/19  0618 12/09/19  0429 12/09/19  1755 12/09/19  1602 12/09/19  1215   POC GLUCOSE mg/dl 83 95 83 96 87 81 78 75 80 99 83 84     Results from last 7 days   Lab Units 12/09/19  0516   HEMOGLOBIN A1C % 6 6*               * I Have Reviewed All Lab Data Listed Above  * Additional Pertinent Lab Tests Reviewed: [address] Admission Reviewed    Imaging:    Imaging Reports Reviewed Today Include:   Imaging Personally Reviewed by Myself Includes:      Recent Cultures (last 7 days):           Last 24 Hours Medication List:     Current Facility-Administered Medications:  acetaminophen 650 mg Oral Q6H PRN Praneeth Hendricks PA-C   aluminum-magnesium hydroxide-simethicone 30 mL Oral Q6H PRN Ortiz Granados MD   divalproex sodium 750 mg Oral Q8H [address] Ortiz Granados MD   docusate sodium 100 mg Oral BID PRN Ortiz Granados MD   enoxaparin 40 mg Subcutaneous Daily Ortiz Granados MD   hydrochlorothiazide 25 mg Oral Daily Ortiz Granados MD   insulin aspart protamine-insulin aspart 20 Units Subcutaneous Daily Before Breakfast Praneeth Hendricks PA-C   insulin lispro 1-5 Units Subcutaneous HS Ortiz Granados MD   insulin lispro 2-12 Units Subcutaneous TID AC Ortiz Granados MD   lisinopril 60 mg Oral Daily Praneeth Hendricks PA-C   LORazepam 2 mg Intravenous Q8H PRN Ortiz Granados MD   metoprolol tartrate 100 mg Oral Q12H [address] Ortiz Granados MD   ondansetron 4 mg Intravenous Q6H PRN Ortiz Granados MD   phenytoin 300 mg Oral Q12H [address] Ortiz Granados MD   risperiDONE 2 mg Oral HS Jasmine Hawthorne PA-C        Today, Patient Was Seen By: Tyrell Banerjee MD    ** Please Note: Dictation voice to text software may have been used in the creation of this document   **

## 2019-12-11 NOTE — ASSESSMENT & PLAN NOTE
Lab Results   Component Value Date    HGBA1C 6 6 (H) 12/09/2019     Recent Labs     12/11/19  0722 12/11/19  1125 12/11/19  1212 12/11/19  1540   POCGLU 96 83 95 83     Blood Sugar Average: Last 72 hrs:  (P) 78     · Blood sugars a little on the low side  · Patient has not been getting the insulin at dinner time so will discontinue  · Discontinue metformin  · Continue 70/30, 20 units in the morning

## 2019-12-11 NOTE — PLAN OF CARE
Problem: PHYSICAL THERAPY ADULT  Goal: Performs mobility at highest level of function for planned discharge setting  See evaluation for individualized goals  Description  Treatment/Interventions: Functional transfer training, LE strengthening/ROM, Therapeutic exercise, Endurance training, Patient/family training, Equipment eval/education, Bed mobility, Gait training, Spoke to nursing  Equipment Recommended: (TBD)       See flowsheet documentation for full assessment, interventions and recommendations  Outcome: Progressing  Note:   Prognosis: Good  Problem List: Decreased strength, Decreased endurance, Decreased mobility, Decreased coordination  Assessment: Pt is making steady progress with functional mobility  Limited by fatigue and weakness  Gait is slow but steady with occasional use of the hallway rail  Without loss of balance  Declined BLE exercises stating "I've been keeping up with it "  Pt would benefit from continued physical therapy to maximize functional independence  Recommendation: Outpatient PT, Home with family support     PT - OK to Discharge: No(pending increased ambulation)    See flowsheet documentation for full assessment

## 2019-12-12 LAB
CRP SERPL QL: 17.4 MG/L
ERYTHROCYTE [SEDIMENTATION RATE] IN BLOOD: 14 MM/HOUR (ref 0–10)
GLUCOSE SERPL-MCNC: 126 MG/DL (ref 65–140)
GLUCOSE SERPL-MCNC: 87 MG/DL (ref 65–140)
GLUCOSE SERPL-MCNC: 90 MG/DL (ref 65–140)
GLUCOSE SERPL-MCNC: 95 MG/DL (ref 65–140)
PHENYTOIN FREE SERPL-MCNC: 0.8 UG/ML (ref 1–2)

## 2019-12-12 PROCEDURE — 99232 SBSQ HOSP IP/OBS MODERATE 35: CPT | Performed by: INTERNAL MEDICINE

## 2019-12-12 PROCEDURE — 86038 ANTINUCLEAR ANTIBODIES: CPT | Performed by: PHYSICIAN ASSISTANT

## 2019-12-12 PROCEDURE — 86592 SYPHILIS TEST NON-TREP QUAL: CPT | Performed by: PHYSICIAN ASSISTANT

## 2019-12-12 PROCEDURE — 85652 RBC SED RATE AUTOMATED: CPT | Performed by: PHYSICIAN ASSISTANT

## 2019-12-12 PROCEDURE — 86800 THYROGLOBULIN ANTIBODY: CPT | Performed by: PHYSICIAN ASSISTANT

## 2019-12-12 PROCEDURE — 99232 SBSQ HOSP IP/OBS MODERATE 35: CPT | Performed by: PSYCHIATRY & NEUROLOGY

## 2019-12-12 PROCEDURE — 86140 C-REACTIVE PROTEIN: CPT | Performed by: PHYSICIAN ASSISTANT

## 2019-12-12 PROCEDURE — 82948 REAGENT STRIP/BLOOD GLUCOSE: CPT

## 2019-12-12 PROCEDURE — 86376 MICROSOMAL ANTIBODY EACH: CPT | Performed by: PHYSICIAN ASSISTANT

## 2019-12-12 PROCEDURE — 99233 SBSQ HOSP IP/OBS HIGH 50: CPT | Performed by: PSYCHIATRY & NEUROLOGY

## 2019-12-12 PROCEDURE — 84432 ASSAY OF THYROGLOBULIN: CPT | Performed by: PHYSICIAN ASSISTANT

## 2019-12-12 RX ORDER — RISPERIDONE 1 MG/1
3 TABLET, FILM COATED ORAL
Status: DISCONTINUED | OUTPATIENT
Start: 2019-12-12 | End: 2019-12-14 | Stop reason: HOSPADM

## 2019-12-12 RX ORDER — INSULIN ASPART 100 [IU]/ML
15 INJECTION, SUSPENSION SUBCUTANEOUS
Status: DISCONTINUED | OUTPATIENT
Start: 2019-12-13 | End: 2019-12-14 | Stop reason: HOSPADM

## 2019-12-12 RX ORDER — RISPERIDONE 1 MG/1
1 TABLET, FILM COATED ORAL DAILY
Status: DISCONTINUED | OUTPATIENT
Start: 2019-12-12 | End: 2019-12-14 | Stop reason: HOSPADM

## 2019-12-12 RX ADMIN — HYDROCHLOROTHIAZIDE 25 MG: 25 TABLET ORAL at 08:13

## 2019-12-12 RX ADMIN — DIVALPROEX SODIUM 750 MG: 500 TABLET, DELAYED RELEASE ORAL at 06:41

## 2019-12-12 RX ADMIN — PHENYTOIN SODIUM 300 MG: 100 CAPSULE ORAL at 08:15

## 2019-12-12 RX ADMIN — RISPERIDONE 1 MG: 1 TABLET ORAL at 16:31

## 2019-12-12 RX ADMIN — ACETAMINOPHEN 650 MG: 325 TABLET ORAL at 08:20

## 2019-12-12 RX ADMIN — DIVALPROEX SODIUM 750 MG: 500 TABLET, DELAYED RELEASE ORAL at 21:57

## 2019-12-12 RX ADMIN — METOPROLOL TARTRATE 100 MG: 50 TABLET, FILM COATED ORAL at 21:57

## 2019-12-12 RX ADMIN — DIVALPROEX SODIUM 750 MG: 500 TABLET, DELAYED RELEASE ORAL at 13:07

## 2019-12-12 RX ADMIN — INSULIN ASPART 20 UNITS: 100 INJECTION, SUSPENSION SUBCUTANEOUS at 08:14

## 2019-12-12 RX ADMIN — RISPERIDONE 3 MG: 1 TABLET ORAL at 21:57

## 2019-12-12 RX ADMIN — METOPROLOL TARTRATE 100 MG: 50 TABLET, FILM COATED ORAL at 08:13

## 2019-12-12 RX ADMIN — PHENYTOIN SODIUM 300 MG: 100 CAPSULE ORAL at 21:57

## 2019-12-12 RX ADMIN — ENOXAPARIN SODIUM 40 MG: 40 INJECTION SUBCUTANEOUS at 08:14

## 2019-12-12 NOTE — PROGRESS NOTES
Progress Note - Madi Nephew  1970, 44 y o  male MRN: [id number]    Unit/Bed#: [facility] 716-01 Encounter: [id number]    Primary Care Provider: Regine Marques   Date and time admitted to hospital: 12/9/2019  2:31 AM        * Seizure [facility])  Assessment & Plan  · No evidence of seizure activity on EEG thus far  · Continue with seizure precautions  · Continue Dilantin 300 mg ER Q 12   · Keppra is being held while on EEG monitoring  Keppra may contribute to psychosis  · Depakote 750 mg Q 8    · MRI brain unremarkable  Plan for lumbar puncture per Neurology  Psychotic disorder [facility])  Assessment & Plan  · Psychiatry consulted and appreciate their input  · Risperdal 2 mg HS  · Keppra discontinued  TBI (traumatic brain injury) ([facility] [address] )  Assessment & Plan  · Traumatic brain injury is stable and was a result of a fall backwards with subdural and subarachnoid hemorrhage  Currently with seizure disorder and auditory hallucinations  Type 2 diabetes mellitus without complication, with long-term current use of insulin [facility])  Assessment & Plan  Lab Results   Component Value Date    HGBA1C 6 6 (H) 12/09/2019     Recent Labs     12/11/19  1540 12/11/19  2053 12/12/19  0632 12/12/19  1123   POCGLU 83 125 95 90     Blood Sugar Average: Last 72 hrs:  (P) 78     · Blood sugars a little on the low side  · Patient has not been getting the insulin at dinner time so will discontinue  · Discontinue metformin  · Continue 70/30, decreased to 15 units in morning  Essential hypertension  Assessment & Plan  · Blood pressure well controlled  · Continue lisinopril, metoprolol on hydrochlorothiazide at current doses  VTE Pharmacologic Prophylaxis:   Pharmacologic: Held for lumbar puncture  Mechanical VTE Prophylaxis in Place: Yes    Patient Centered Rounds: I have performed bedside rounds with nursing staff today      Discussions with Specialists or Other Care Team Provider: neurology    Education and Discussions with Family / Patient: pt    Time Spent for Care: 30 minutes  More than 50% of total time spent on counseling and coordination of care as described above  Current Length of Stay: 3 day(s)    Current Patient Status: Inpatient   Certification Statement: The patient will continue to require additional inpatient hospital stay due to above    Discharge Plan: pending LP    Code Status: Level 1 - Full Code      Subjective:   Pt seen and examined by me this morning  Patient complained of headache  Still has occasional auditory hallucinations  Abel Mackay he was very concerned about his condition and as we do not know what's causing this  Objective:     Vitals:   Temp (24hrs), Av 7 °F (37 1 °C), Min:98 3 °F (36 8 °C), Max:98 9 °F (37 2 °C)    Temp:  [98 3 °F (36 8 °C)-98 9 °F (37 2 °C)] 98 3 °F (36 8 °C)  HR:  [74-84] 75  Resp:  [18] 18  BP: (121-136)/(87-92) 124/87  SpO2:  [93 %-95 %] 93 %  Body mass index is 41 32 kg/m²  Input and Output Summary (last 24 hours): Intake/Output Summary (Last 24 hours) at 2019 1336  Last data filed at 2019 0820  Gross per 24 hour   Intake 720 ml   Output 200 ml   Net 520 ml       Physical Exam:     Physical Exam    Constitutional: Pt appears comfortable  Not in any acute distress  Cardiovascular: Normal rate, regular rhythm, normal heart sounds  No murmur heard  Pulmonary/Chest: Effort normal, air entry b/l equal  No respiratory distress  Pt has no wheezes or crackles  Abdominal: Soft  Non-distended, Non-tender  Bowel sounds are normal    Musculoskeletal: Normal range of motion  Neurological: alert and oriented to person, place, and time  Moving all extremities spontaneously  Psychiatric: normal mood and affect        Additional Data:     Labs:    Results from last 7 days   Lab Units 12/10/19  0454   WBC Thousand/uL 7 07   HEMOGLOBIN g/dL 14 5   HEMATOCRIT % 44 7   PLATELETS Thousands/uL 220   NEUTROS PCT % 62   LYMPHS PCT % 24   MONOS PCT % 9   EOS PCT % 4 Results from last 7 days   Lab Units 12/11/19  0452  12/08/19  1732   SODIUM mmol/L 146*   < > 144   POTASSIUM mmol/L 3 7   < > 4 3   CHLORIDE mmol/L 113*   < > 108   CO2 mmol/L 30   < > 27   BUN mg/dL 13   < > 15   CREATININE mg/dL 0 92   < > 0 89   ANION GAP mmol/L 3*   < > 9   CALCIUM mg/dL 8 6   < > 8 5   ALBUMIN g/dL  --   --  3 5   TOTAL BILIRUBIN mg/dL  --   --  0 20   ALK PHOS U/L  --   --  60   ALT U/L  --   --  21   AST U/L  --   --  28   GLUCOSE RANDOM mg/dL 76   < > 82    < > = values in this interval not displayed  Results from last 7 days   Lab Units 12/12/19  1123 12/12/19  0632 12/11/19  2053 12/11/19  1540 12/11/19  1212 12/11/19  1125 12/11/19  0722 12/10/19  2101 12/10/19  1559 12/10/19  1053 12/10/19  0618 12/09/19  2053   POC GLUCOSE mg/dl 90 95 125 83 95 83 96 87 81 78 75 80     Results from last 7 days   Lab Units 12/09/19  0516   HEMOGLOBIN A1C % 6 6*               * I Have Reviewed All Lab Data Listed Above  * Additional Pertinent Lab Tests Reviewed:  Mj 66 Admission Reviewed    Imaging:    Imaging Reports Reviewed Today Include:   Imaging Personally Reviewed by Myself Includes:     Recent Cultures (last 7 days):           Last 24 Hours Medication List:     Current Facility-Administered Medications:  acetaminophen 650 mg Oral Q6H PRN Juan Lewis PA-C   aluminum-magnesium hydroxide-simethicone 30 mL Oral Q6H PRN Hyacinth Snowden MD   divalproex sodium 750 mg Oral Q8H [facility] Hyacinth Snowden MD   docusate sodium 100 mg Oral BID PRN Hyacinth Snowden MD   hydrochlorothiazide 25 mg Oral Daily Hyacinth Snowden MD   [START ON 12/13/2019] insulin aspart protamine-insulin aspart 15 Units Subcutaneous Daily Before Breakfast Guzman Ríos MD   insulin lispro 1-5 Units Subcutaneous HS Hyacinth Snowden MD   insulin lispro 2-12 Units Subcutaneous TID AC Hyacinth Snowden MD   lisinopril 60 mg Oral Daily Juan Lewis PA-C   LORazepam 2 mg Intravenous Q8H PRN Mynor Thakkar MD   metoprolol tartrate 100 mg Oral Q12H [address] Mynor Thakkar MD   ondansetron 4 mg Intravenous Q6H PRN Mynor Thakkar MD   phenytoin 300 mg Oral Q12H [address] Mynor Thakkar MD   risperiDONE 2 mg Oral HS Soheila Maya PA-C        Today, Patient Was Seen By: Nitesh Lopez MD    ** Please Note: Dictation voice to text software may have been used in the creation of this document   **

## 2019-12-12 NOTE — ASSESSMENT & PLAN NOTE
· No evidence of seizure activity on EEG thus far  · Continue with seizure precautions  · Continue Dilantin 300 mg ER Q 12   · Keppra is being held while on EEG monitoring  Keppra may contribute to psychosis  · Depakote 750 mg Q 8    · MRI brain unremarkable  Plan for lumbar puncture per Neurology

## 2019-12-12 NOTE — PROGRESS NOTES
Progress Note - [address] [facility]  44 y o  male MRN: [id number]  Unit/Bed#: [facility] 716-01 Encounter: [id number]        I came to the patient continuation of care, patient states that he has some improvement, he is hearing only a child crying, a man laughing and somebody  scrashing the bed  He denies any command hallucination  He also states that he does not feel aggressive  He is less anxious  He states that he being able to sleep better  He had been able to tolerate the medication  The voices have decreased in intensity  He denies suicidal thoughts plans or intent  Behavior over the last 24 hours:  improved  Sleep: normal  Appetite: normal  Medication side effects: No  ROS: no complaints    Mental Status Evaluation:  Appearance:  age appropriate and tattooed   Behavior:  normal   Speech:  normal pitch and normal volume   Mood:  sad   Affect:  mood-congruent   Language: naming objects and repeating phrases   Thought Process:  goal directed   Associations: intact associations   Thought Content:  normal   Perceptual Disturbances:  Auditory hallucinations a baby crying, person laughing, somebody scratch in the bed, he denies any command hallucination   Risk Potential: He denies any suicidal or homicidal ideation plan or intent   Sensorium:  person, place and time/date   Memory:  recent and remote memory grossly intact   Cognition:  grossly intact   Consciousness:  alert and awake    Attention: attention span and concentration were age appropriate   Intellect: within normal limits   Fund of Knowledge: awareness of current events: Fair, past history: Fair and vocabulary: Fair   Insight:  fair   Judgment: fair   Muscle Strength and Tone: Within normal limits   Gait/Station: normal gait/station and normal balance   Motor Activity: no abnormal movements         Assessment/Plan  Steve Ann  is a 44 y o  male with history of TB I, diabetes mellitus, hypertension presented to the hospital with seizures and auditory hallucination  Patient had no prior psychiatric history  He started having this hallucination after he had TBI He has in the hospital for 5 days  Patient had been receiving risperidone and the way he describes the auditory hallucination today appeared that the improvement but patient still feeling overwhelm  He denies suicidal thoughts plans or intent  He denies suicidal ideation plan or intent  He states that he does not feel aggressive, he feels more calm   Diagnosis:  Psychotic disorder unspecified type    Recommended Treatment:   Continue medical management  Increase risperidone 1 mg p o   In the morning and 3 mg p o  HS  Patient was seen with Neurology  Discussed with primary team  I will follow up  Patient agree with treatment plan      Medications:   current meds:   Current Facility-Administered Medications   Medication Dose Route Frequency    acetaminophen (TYLENOL) tablet 650 mg  650 mg Oral Q6H PRN    aluminum-magnesium hydroxide-simethicone (MYLANTA) 200-200-20 mg/5 mL oral suspension 30 mL  30 mL Oral Q6H PRN    divalproex sodium (DEPAKOTE) EC tablet 750 mg  750 mg Oral Q8H [address]    docusate sodium (COLACE) capsule 100 mg  100 mg Oral BID PRN    hydrochlorothiazide (HYDRODIURIL) tablet 25 mg  25 mg Oral Daily    [START ON 12/13/2019] insulin aspart protamine-insulin aspart (NovoLOG 70/30) 100 units/mL subcutaneous injection 15 Units  15 Units Subcutaneous Daily Before Breakfast    insulin lispro (HumaLOG) 100 units/mL subcutaneous injection 1-5 Units  1-5 Units Subcutaneous HS    insulin lispro (HumaLOG) 100 units/mL subcutaneous injection 2-12 Units  2-12 Units Subcutaneous TID AC    lisinopril (ZESTRIL) tablet 60 mg  60 mg Oral Daily    LORazepam (ATIVAN) 2 mg/mL injection 2 mg  2 mg Intravenous Q8H PRN    metoprolol tartrate (LOPRESSOR) tablet 100 mg  100 mg Oral Q12H [address]    ondansetron (ZOFRAN) injection 4 mg  4 mg Intravenous Q6H PRN    phenytoin (DILANTIN) ER capsule 300 mg  300 mg Oral Q12H [facility]    risperiDONE (RisperDAL) tablet 1 mg  1 mg Oral Daily    risperiDONE (RisperDAL) tablet 3 mg  3 mg Oral HS         Risks, benefits and possible side effects of Medications:     Risks, benefits, and possible side effects of medications explained to patient and patient verbalizes understanding  Labs: I have personally reviewed all pertinent laboratory results  I have personally reviewed all pertinent laboratory/tests results    Labs in last 72 hours:   Recent Labs     12/10/19  0454 12/11/19  0452   WBC 7 07  --    RBC 4 99  --    HGB 14 5  --    HCT 44 7  --      --    RDW 13 8  --    NEUTROABS 4 42  --    SODIUM  --  146*   K  --  3 7   CL  --  113*   CO2  --  30   BUN  --  13   CREATININE  --  0 92   GLUC  --  76   CALCIUM  --  8 6           Adriel Huffman MD

## 2019-12-12 NOTE — SOCIAL WORK
CM provided pt with outpatient therapy script  CM discussed recommendation for shower chair, pt stated that his mother in law has one that he will be borrowing

## 2019-12-12 NOTE — PROGRESS NOTES
Patient has been resting comfortably in bed with no signs of distress  Vitals are stable and has no complaints of pain or discomfort

## 2019-12-12 NOTE — ASSESSMENT & PLAN NOTE
Lab Results   Component Value Date    HGBA1C 6 6 (H) 12/09/2019     Recent Labs     12/11/19  1540 12/11/19  2053 12/12/19  0632 12/12/19  1123   POCGLU 83 125 95 90     Blood Sugar Average: Last 72 hrs:  (P) 78     · Blood sugars a little on the low side  · Patient has not been getting the insulin at dinner time so will discontinue  · Discontinue metformin  · Continue 70/30, decreased to 15 units in morning

## 2019-12-12 NOTE — PROGRESS NOTES
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, presented with worsening auditory hallucinations  CT head showed no acute intracranial abnormalities  Nonspecific white matter T2 hyperintensity seen on MRI, but no acute intracranial abnormalities or abnormal enhancement  Video EEG monitoring discontinued on 12/11 due to no epileptiform activity  Keppra discontinued and risperidone increased to 2 mg QHS, however patient continues to experience auditory hallucinations  Etiology for auditory hallucinations unknown  Differentials include atypical presentation after TBI vs organic pathology vs psychotic disorder  LP and inflammatory/infectious/autoimmune labs pending  Plan:  - Lumbar puncture pending  CSF labs ordered  Please hold Lovenox until LP is completed  - CRP: 17 4 (elevated)  - ESR: 14 (elevated)  - Serum Labs pending:  ENS-2, anti-microsomal antibody, RPR, thyroid globulin, Lyme, AMOL, ANCA, phenytoin levels  - Continue risperidone 2 mg QHS  - Continue to hold Keppra  - Continue Depakote 750 mg TID  - Continue Dilantin 300 mg BID  - Continue Ativan PRN seizures  - PT/OT  - Recommend outpatient neuropsychology evaluation in 4-6 weeks after discharge; Order placed  - Discussed the possibility of inpatient psychiatry if all labs are normal   Would appreciate psychiatry follow-up to determine if this would be beneficial for the patient  - Frequent neuro checks  Continue to monitor and notify Neurology with any changes  - Medical management and supportive care per primary team   Correction of any metabolic or infectious disturbances  Results:  - MRI brain: No acute intracranial abnormality  No restricted diffusion to suggest acute ischemia   No abnormal enhancement within the brain parenchyma  Minimal periventricular and subcortical foci of white matter T2 hyperintensity which is nonspecific and most likely related to chronic small vessel ischemic changes  Other less likely etiologies include demyelinating disease, vasculitis, Lyme and   Migraines  - CT head: No acute intracranial abnormality   - EEG: Normal 25 hours continuous video EEG recording  The event of auditory hallucination is not associated with ictal EEG activity, semiology of event is suspicious for nonepileptic event  Subjective:   Patient was seen and examined at bedside  He is feeling tearful and overwhelmed today  He has been hearing children crying, a sinister laugh, and a person screaming" on and off for the past 4 hours  He is feeling fatigued due to trying to block out the voices  There has been no change since discontinuing the Keppra and starting risperidone  He has a headache "all over" his head, and Tylenol has not been helping  Denies any new neurologic changes          Past Medical History:   Diagnosis Date    Chemical exposure     Diabetes mellitus ([facility] [address] )     H/O bone graft     Head injury     August 25, 2019 fell backwards hit head on a boulder w/ LOC    Head injury     as an air [place] with the [employer] - jumped out of a plane parachute disfunction    Hypertension      Past Surgical History:   Procedure Laterality Date    KNEE ARTHROSCOPY W/ MENISCECTOMY Left     LEG SURGERY      TONSILLECTOMY       Family History   Problem Relation Age of Onset    Diabetes Mother     Hypertension Mother     Asthma Mother     Brain cancer Father     No Known Problems Sister     No Known Problems Brother     No Known Problems Brother      Social History     Socioeconomic History    Marital status: Single     Spouse name: None    Number of children: None    Years of education: None    Highest education level: None   Occupational History    None   Social Needs    Financial resource strain: None    Food insecurity:     Worry: None     Inability: None    Transportation needs:     Medical: None     Non-medical: None   Tobacco Use    Smoking status: Never Smoker    Smokeless tobacco: Never Used   Substance and Sexual Activity    Alcohol use: Yes     Alcohol/week: 3 0 standard drinks     Types: 3 Cans of beer per week     Frequency: Monthly or less     Comment: occassionally    Drug use: No    Sexual activity: None   Lifestyle    Physical activity:     Days per week: None     Minutes per session: None    Stress: None   Relationships    Social connections:     Talks on phone: None     Gets together: None     Attends [employer] service: None     Active member of club or organization: None     Attends meetings of clubs or organizations: None     Relationship status: None    Intimate partner violence:     Fear of current or ex partner: None     Emotionally abused: None     Physically abused: None     Forced sexual activity: None   Other Topics Concern    None   Social History Narrative    None         Medications:   All current active meds have been reviewed and current meds:  Scheduled Meds:  Current Facility-Administered Medications:  acetaminophen 650 mg Oral Q6H PRN Juan Lewis PA-C   aluminum-magnesium hydroxide-simethicone 30 mL Oral Q6H PRN Hyacinth Snowden MD   divalproex sodium 750 mg Oral [facility] Hyacinth Snowden MD   docusate sodium 100 mg Oral BID PRN Hyacinth Snowden MD   enoxaparin 40 mg Subcutaneous Daily Hyacinth Snowden MD   hydrochlorothiazide 25 mg Oral Daily Hyacinth Snowden MD   insulin aspart protamine-insulin aspart 20 Units Subcutaneous Daily Before Breakfast Juan Lewis PA-C   insulin lispro 1-5 Units Subcutaneous HS Hyacinth Snowden MD   insulin lispro 2-12 Units Subcutaneous TID AC Hyacinth Snowden MD   lisinopril 60 mg Oral Daily Juan Lewis PA-C   LORazepam 2 mg Intravenous Q8H PRN Hyacinth Snowden MD   metoprolol tartrate 100 mg Oral Q12H [address] Mynor Thakkar MD   ondansetron 4 mg Intravenous Q6H PRN Mynor Thakkar MD   phenytoin 300 mg Oral Q12H [address] Mynor Thakkar MD   risperiDONE 2 mg Oral HS Soheila Maya PA-C     Continuous Infusions:   PRN Meds:   acetaminophen    aluminum-magnesium hydroxide-simethicone    docusate sodium    LORazepam    ondansetron       ROS:   Review of Systems   Constitutional: Positive for fatigue  Negative for appetite change, diaphoresis and fever  HENT: Negative for trouble swallowing and voice change  Eyes: Positive for visual disturbance (blurred vision (chronic since August))  Negative for photophobia  Respiratory: Negative for chest tightness and shortness of breath  Cardiovascular: Negative for chest pain and palpitations  Gastrointestinal: Negative for abdominal pain, constipation, diarrhea, nausea and vomiting  Genitourinary: Negative for difficulty urinating and dysuria  Musculoskeletal: Negative for back pain, gait problem and neck pain  Neurological: Positive for numbness (left leg distal to patella (chronic)) and headaches  Negative for dizziness, facial asymmetry, speech difficulty, weakness and light-headedness  Psychiatric/Behavioral: Positive for hallucinations  Negative for confusion, decreased concentration and sleep disturbance  Vitals:   /87   Pulse 75   Temp 98 3 °F (36 8 °C)   Resp 18   Ht 5' 9" (1 753 m)   Wt 127 kg (279 lb 12 8 oz)   SpO2 93%   BMI 41 32 kg/m²     Physical Exam:   Physical Exam   Constitutional: He is oriented to person, place, and time  He appears well-developed and well-nourished  He appears distressed (slightly distressed due to the auditory hallucinations)  HENT:   Head: Normocephalic and atraumatic  Right Ear: External ear normal    Left Ear: External ear normal    Nose: Nose normal    Mouth/Throat: Oropharynx is clear and moist  No oropharyngeal exudate  Eyes: Pupils are equal, round, and reactive to light  Conjunctivae and EOM are normal    Neck: Normal range of motion  Neck supple  Cardiovascular: Normal rate, regular rhythm and normal heart sounds  Pulmonary/Chest: Effort normal and breath sounds normal    Abdominal: Soft  Bowel sounds are normal    Musculoskeletal: Normal range of motion  Neurological: He is alert and oriented to person, place, and time  He has a normal Finger-Nose-Finger Test  Gait normal    Skin: Skin is warm and dry  Capillary refill takes less than 2 seconds  He is not diaphoretic  Psychiatric: His speech is normal and behavior is normal  Judgment normal  He is actively hallucinating (auditory)  Cognition and memory are normal    Nursing note and vitals reviewed  Neurologic Exam     Mental Status   Oriented to person, place, and time  Oriented to city  Oriented to year, month and date  Registration: recalls 3 of 3 objects  Recall at 5 minutes: recalls 3 of 3 objects  Follows 2 step commands  Attention: normal  Concentration: normal    Speech: speech is normal   Level of consciousness: alert  Knowledge: good  Able to name object  Normal comprehension  Cranial Nerves     CN II   Visual fields full to confrontation  CN III, IV, VI   Pupils are equal, round, and reactive to light  Extraocular motions are normal    Right pupil: Size: 3 mm  Shape: regular  Reactivity: brisk  Consensual response: intact  Accommodation: intact  Left pupil: Size: 3 mm  Shape: regular  Reactivity: brisk  Consensual response: intact  Accommodation: intact  Nystagmus: none   Diplopia: none    CN V   Right facial sensation deficit: none  Left facial sensation deficit: forehead and cheeks    CN VII   Facial expression full, symmetric       CN VIII   CN VIII normal      CN IX, X   Palate: symmetric    CN XI   Right sternocleidomastoid strength: normal  Left sternocleidomastoid strength: normal  Right trapezius strength: normal  Left trapezius strength: normal    CN XII   Tongue: not atrophic  Fasciculations: absent  Tongue deviation: none    Motor Exam   Muscle bulk: normal  Overall muscle tone: normal  Right arm pronator drift: absent  Left arm pronator drift: absent  5/5 strength in UE and LE bilaterally  Sensory Exam   Decreased sensation to light touch and vibration distal to left patella (chronic after knee surgery)  Normal sensation to light touch, temperature, and vibration in UE bilaterally  Gait, Coordination, and Reflexes     Gait  Gait: normal    Coordination   Finger to nose coordination: normal    Tremor   Resting tremor: absent  Action tremor: absent    Reflexes   Right ankle clonus: absent  Left ankle clonus: absent          Labs: I have personally reviewed pertinent reports  Recent Results (from the past 24 hour(s))   Fingerstick Glucose (POCT)    Collection Time: 12/11/19 12:12 PM   Result Value Ref Range    POC Glucose 95 65 - 140 mg/dl   Fingerstick Glucose (POCT)    Collection Time: 12/11/19  3:40 PM   Result Value Ref Range    POC Glucose 83 65 - 140 mg/dl   Fingerstick Glucose (POCT)    Collection Time: 12/11/19  8:53 PM   Result Value Ref Range    POC Glucose 125 65 - 140 mg/dl   Fingerstick Glucose (POCT)    Collection Time: 12/12/19  6:32 AM   Result Value Ref Range    POC Glucose 95 65 - 140 mg/dl       Imaging: I have personally reviewed pertinent imaging in PACS, including MRI brain, CT head, EEG,  and I have personally reviewed PACS reports  EKG, Pathology, and Other Studies: I have personally reviewed pertinent reports  VTE Prophylaxis: Sequential compression device (Venodyne)  and Enoxaparin (Lovenox)      Counseling / Coordination of Care  Total time spent today 20 minutes  Greater than 50% of total time was spent with the patient and/or family counseling and/or coordination of care  A description of the counseling/coordination of care:  Patient was seen and evaluated  Discussed with attending    Chart reviewed thoroughly including laboratory and imaging studies    Plan of care discussed with patient and primary team

## 2019-12-12 NOTE — PROGRESS NOTES
12/12/19 1500   Clinical Encounter Type   Visited With Patient   Routine Visit Follow-up   Yarsanism Encounters   Yarsanism Needs Prayer   Sacramental Encounters   Sacrament of Sick-Anointing Anointed

## 2019-12-12 NOTE — PROGRESS NOTES
Patient is resting in bed, does not need anything at this time  Call hudson is within reach, will continue to monitor

## 2019-12-13 ENCOUNTER — APPOINTMENT (OUTPATIENT)
Dept: RADIOLOGY | Facility: HOSPITAL | Age: 49
DRG: 757 | End: 2019-12-13
Payer: COMMERCIAL

## 2019-12-13 LAB
APPEARANCE CSF: NORMAL
C GATTII+NEOFOR DNA CSF QL NAA+NON-PROBE: NOT DETECTED
CMV DNA CSF QL NAA+NON-PROBE: NOT DETECTED
E COLI K1 DNA CSF QL NAA+NON-PROBE: NOT DETECTED
ERYTHROCYTE [DISTWIDTH] IN BLOOD BY AUTOMATED COUNT: 14 % (ref 11.6–15.1)
EV RNA CSF QL NAA+NON-PROBE: NOT DETECTED
GLUCOSE CSF-MCNC: 66 MG/DL (ref 50–80)
GLUCOSE SERPL-MCNC: 112 MG/DL (ref 65–140)
GLUCOSE SERPL-MCNC: 140 MG/DL (ref 65–140)
GLUCOSE SERPL-MCNC: 84 MG/DL (ref 65–140)
GLUCOSE SERPL-MCNC: 91 MG/DL (ref 65–140)
GP B STREP DNA CSF QL NAA+NON-PROBE: NOT DETECTED
GRAM STN SPEC: NORMAL
HAEM INFLU DNA CSF QL NAA+NON-PROBE: NOT DETECTED
HCT VFR BLD AUTO: 44.7 % (ref 36.5–49.3)
HGB BLD-MCNC: 14.4 G/DL (ref 12–17)
HHV6 DNA CSF QL NAA+NON-PROBE: NOT DETECTED
HSV1 DNA CSF QL NAA+NON-PROBE: NOT DETECTED
HSV2 DNA CSF QL NAA+NON-PROBE: NOT DETECTED
INR PPP: 1.15 (ref 0.84–1.19)
L MONOCYTOG DNA CSF QL NAA+NON-PROBE: NOT DETECTED
LEVETIRACETAM SERPL-MCNC: 17.4 UG/ML (ref 10–40)
MCH RBC QN AUTO: 29 PG (ref 26.8–34.3)
MCHC RBC AUTO-ENTMCNC: 32.2 G/DL (ref 31.4–37.4)
MCV RBC AUTO: 90 FL (ref 82–98)
N MEN DNA CSF QL NAA+NON-PROBE: NOT DETECTED
PARECHOVIRUS A RNA CSF QL NAA+NON-PROBE: NOT DETECTED
PLATELET # BLD AUTO: 182 THOUSANDS/UL (ref 149–390)
PMV BLD AUTO: 9.3 FL (ref 8.9–12.7)
PROT CSF-MCNC: 54 MG/DL (ref 15–45)
PROTHROMBIN TIME: 14.3 SECONDS (ref 11.6–14.5)
RBC # BLD AUTO: 4.96 MILLION/UL (ref 3.88–5.62)
RBC # CSF MANUAL: 3 UL (ref 0–10)
RPR SER QL: NORMAL
S PNEUM DNA CSF QL NAA+NON-PROBE: NOT DETECTED
THYROGLOB AB SERPL-ACNC: <1 IU/ML (ref 0–0.9)
THYROGLOB SERPL-MCNC: 41 NG/ML (ref 1.4–29.2)
THYROPEROXIDASE AB SERPL-ACNC: 11 IU/ML (ref 0–34)
TOTAL CELLS COUNTED BLD: NO
TUBE # CSF: 4
VZV DNA CSF QL NAA+NON-PROBE: NOT DETECTED
WBC # BLD AUTO: 6.13 THOUSAND/UL (ref 4.31–10.16)
WBC # CSF AUTO: 0 /UL (ref 0–5)

## 2019-12-13 PROCEDURE — 82948 REAGENT STRIP/BLOOD GLUCOSE: CPT

## 2019-12-13 PROCEDURE — 86341 ISLET CELL ANTIBODY: CPT

## 2019-12-13 PROCEDURE — G0515 COGNITIVE SKILLS DEVELOPMENT: HCPCS

## 2019-12-13 PROCEDURE — 82945 GLUCOSE OTHER FLUID: CPT | Performed by: PHYSICIAN ASSISTANT

## 2019-12-13 PROCEDURE — 85610 PROTHROMBIN TIME: CPT | Performed by: PHYSICIAN ASSISTANT

## 2019-12-13 PROCEDURE — 97530 THERAPEUTIC ACTIVITIES: CPT

## 2019-12-13 PROCEDURE — 99232 SBSQ HOSP IP/OBS MODERATE 35: CPT | Performed by: INTERNAL MEDICINE

## 2019-12-13 PROCEDURE — 009U3ZX DRAINAGE OF SPINAL CANAL, PERCUTANEOUS APPROACH, DIAGNOSTIC: ICD-10-PCS | Performed by: RADIOLOGY

## 2019-12-13 PROCEDURE — 87070 CULTURE OTHR SPECIMN AEROBIC: CPT | Performed by: PHYSICIAN ASSISTANT

## 2019-12-13 PROCEDURE — 62270 DX LMBR SPI PNXR: CPT

## 2019-12-13 PROCEDURE — 99232 SBSQ HOSP IP/OBS MODERATE 35: CPT | Performed by: PSYCHIATRY & NEUROLOGY

## 2019-12-13 PROCEDURE — 89050 BODY FLUID CELL COUNT: CPT | Performed by: PHYSICIAN ASSISTANT

## 2019-12-13 PROCEDURE — 86255 FLUORESCENT ANTIBODY SCREEN: CPT | Performed by: PHYSICIAN ASSISTANT

## 2019-12-13 PROCEDURE — 85027 COMPLETE CBC AUTOMATED: CPT | Performed by: PHYSICIAN ASSISTANT

## 2019-12-13 PROCEDURE — 89051 BODY FLUID CELL COUNT: CPT | Performed by: PHYSICIAN ASSISTANT

## 2019-12-13 PROCEDURE — 84157 ASSAY OF PROTEIN OTHER: CPT | Performed by: PHYSICIAN ASSISTANT

## 2019-12-13 PROCEDURE — 97110 THERAPEUTIC EXERCISES: CPT

## 2019-12-13 PROCEDURE — 87483 CNS DNA AMP PROBE TYPE 12-25: CPT | Performed by: PHYSICIAN ASSISTANT

## 2019-12-13 RX ADMIN — RISPERIDONE 1 MG: 1 TABLET ORAL at 09:17

## 2019-12-13 RX ADMIN — METOPROLOL TARTRATE 100 MG: 50 TABLET, FILM COATED ORAL at 21:07

## 2019-12-13 RX ADMIN — INSULIN ASPART 15 UNITS: 100 INJECTION, SUSPENSION SUBCUTANEOUS at 09:20

## 2019-12-13 RX ADMIN — DIVALPROEX SODIUM 750 MG: 500 TABLET, DELAYED RELEASE ORAL at 21:07

## 2019-12-13 RX ADMIN — PHENYTOIN SODIUM 300 MG: 100 CAPSULE ORAL at 21:08

## 2019-12-13 RX ADMIN — DIVALPROEX SODIUM 750 MG: 500 TABLET, DELAYED RELEASE ORAL at 13:32

## 2019-12-13 RX ADMIN — DIVALPROEX SODIUM 750 MG: 500 TABLET, DELAYED RELEASE ORAL at 05:43

## 2019-12-13 RX ADMIN — PHENYTOIN SODIUM 300 MG: 100 CAPSULE ORAL at 09:17

## 2019-12-13 RX ADMIN — RISPERIDONE 3 MG: 1 TABLET ORAL at 21:07

## 2019-12-13 NOTE — PROGRESS NOTES
Progress Note - Mahsa Garcia  1970, 44 y o  male MRN: [id number]    [facility] Encounter: [id number]    Primary Care Provider: Red Morris   Date and time admitted to hospital: 12/9/2019  2:31 AM        * Seizure [facility])  Assessment & Plan  · No evidence of seizure activity on EEG thus far  · Continue with seizure precautions  · Continue Dilantin 300 mg ER Q 12   · Keppra is being held while on EEG monitoring  Keppra may contribute to psychosis  · Depakote 750 mg Q 8    · MRI brain unremarkable  Status post lumbar puncture per Neurology  Psychotic disorder [facility])  Assessment & Plan  · Psychiatry following  · Patient reports improvement in the auditory hallucinations today  Said he was able to sleep well last night  · Risperdal increased to 1 mg the morning and 3 mg HS  · Keppra discontinued  Type 2 diabetes mellitus without complication, with long-term current use of insulin [facility])  Assessment & Plan  Lab Results   Component Value Date    HGBA1C 6 6 (H) 12/09/2019     Recent Labs     12/12/19  1547 12/12/19  2055 12/13/19  0641 12/13/19  1116   POCGLU 87 126 91 84     Blood Sugar Average: Last 72 hrs:  (P) 78     · Blood sugars a little on the low side  · Patient has not been getting the insulin at dinner time so will discontinue  · Discontinue metformin  · Continue 70/30, 15 units in morning  Essential hypertension  Assessment & Plan  · Blood pressure well controlled  · Continue lisinopril, metoprolol on hydrochlorothiazide at current doses  VTE Pharmacologic Prophylaxis:   Pharmacologic: Held for lumbar puncture  Mechanical VTE Prophylaxis in Place: Yes     Patient Centered Rounds: I have performed bedside rounds with nursing staff today      Discussions with Specialists or Other Care Team Provider:      Education and Discussions with Family / Patient: pt     Time Spent for Care: 30 minutes    More than 50% of total time spent on counseling and coordination of care as described above      Current Length of Stay: 4 day(s)     Current Patient Status: Inpatient   Certification Statement: The patient will continue to require additional inpatient hospital stay due to above     Discharge Plan: pending neuro and psych clearance     Code Status: Level 1 - Full Code    Subjective:   Pt seen and examined by me this afternoon after he came back from LP  Denied any complaints  Reports decrease in the frequency of the auditory hallucinations  Also said that he was able to sleep well last night which she has not had for long time  Objective:     Vitals:   Temp (24hrs), Av 3 °F (36 8 °C), Min:98 2 °F (36 8 °C), Max:98 3 °F (36 8 °C)    Temp:  [98 2 °F (36 8 °C)-98 3 °F (36 8 °C)] 98 3 °F (36 8 °C)  HR:  [71-91] 91  Resp:  [16-18] 16  BP: ()/(62-89) 125/85  SpO2:  [93 %-94 %] 94 %  Body mass index is 41 32 kg/m²  Input and Output Summary (last 24 hours): Intake/Output Summary (Last 24 hours) at 2019 1352  Last data filed at 2019 1237  Gross per 24 hour   Intake 960 ml   Output    Net 960 ml       Physical Exam:     Physical Exam    Constitutional: Pt appears comfortable  Not in any acute distress  Cardiovascular: Normal rate, regular rhythm, normal heart sounds  No murmur heard  Pulmonary/Chest: Effort normal, air entry b/l equal  No respiratory distress  Pt has no wheezes or crackles  Abdominal: Soft  Non-distended, Non-tender  Bowel sounds are normal    Musculoskeletal: Normal range of motion  Neurological: alert and oriented to person, place, and time  Moving all extremities spontaneously      Additional Data:     Labs:    Results from last 7 days   Lab Units 19  0654 12/10/19  0454   WBC Thousand/uL 6 13 7 07   HEMOGLOBIN g/dL 14 4 14 5   HEMATOCRIT % 44 7 44 7   PLATELETS Thousands/uL 182 220   NEUTROS PCT %  --  62   LYMPHS PCT %  --  24   MONOS PCT %  --  9   EOS PCT %  --  4     Results from last 7 days   Lab Units 19  0452 12/08/19  1732   SODIUM mmol/L 146*   < > 144   POTASSIUM mmol/L 3 7   < > 4 3   CHLORIDE mmol/L 113*   < > 108   CO2 mmol/L 30   < > 27   BUN mg/dL 13   < > 15   CREATININE mg/dL 0 92   < > 0 89   ANION GAP mmol/L 3*   < > 9   CALCIUM mg/dL 8 6   < > 8 5   ALBUMIN g/dL  --   --  3 5   TOTAL BILIRUBIN mg/dL  --   --  0 20   ALK PHOS U/L  --   --  60   ALT U/L  --   --  21   AST U/L  --   --  28   GLUCOSE RANDOM mg/dL 76   < > 82    < > = values in this interval not displayed  Results from last 7 days   Lab Units 12/13/19  0653   INR  1 15     Results from last 7 days   Lab Units 12/13/19  1116 12/13/19  0641 12/12/19  2055 12/12/19  1547 12/12/19  1123 12/12/19  0632 12/11/19  2053 12/11/19  1540 12/11/19  1212 12/11/19  1125 12/11/19  0722 12/10/19  2101   POC GLUCOSE mg/dl 84 91 126 87 90 95 125 83 95 83 96 87     Results from last 7 days   Lab Units 12/09/19  0516   HEMOGLOBIN A1C % 6 6*               * I Have Reviewed All Lab Data Listed Above  * Additional Pertinent Lab Tests Reviewed:  [address] Admission Reviewed    Imaging:    Imaging Reports Reviewed Today Include:   Imaging Personally Reviewed by Myself Includes:      Recent Cultures (last 7 days):           Last 24 Hours Medication List:     Current Facility-Administered Medications:  acetaminophen 650 mg Oral Q6H PRN Cammy Orr PA-C   aluminum-magnesium hydroxide-simethicone 30 mL Oral Q6H PRN Valencia Arreola MD   divalproex sodium 750 mg Oral Q8H [address] Valencia Arreola MD   docusate sodium 100 mg Oral BID PRN Valencia Arreola MD   hydrochlorothiazide 25 mg Oral Daily Valencia Arreola MD   insulin aspart protamine-insulin aspart 15 Units Subcutaneous Daily Before Breakfast Ravindra Loza MD   insulin lispro 1-5 Units Subcutaneous HS Valencia Arreola MD   insulin lispro 2-12 Units Subcutaneous TID AC Valencia Arreola MD   lisinopril 60 mg Oral Daily Cammy Orr PA-C   LORazepam 2 mg Intravenous Q8H PRN Valencia Arreola, MD   metoprolol tartrate 100 mg Oral Q12H [address] Bharati Nesbitt MD   ondansetron 4 mg Intravenous Q6H PRN Bharati Nesbitt MD   phenytoin 300 mg Oral Q12H Kamilla Sams MD   risperiDONE 1 mg Oral Daily Smooth Mayorga MD   risperiDONE 3 mg Oral HS Smooth Mayorga MD        Today, Patient Was Seen By: Malissa Chowdhury MD    ** Please Note: Dictation voice to text software may have been used in the creation of this document   **

## 2019-12-13 NOTE — ASSESSMENT & PLAN NOTE
Lab Results   Component Value Date    HGBA1C 6 6 (H) 12/09/2019     Recent Labs     12/12/19  1547 12/12/19  2055 12/13/19  0641 12/13/19  1116   POCGLU 87 126 91 84     Blood Sugar Average: Last 72 hrs:  (P) 78     · Blood sugars a little on the low side  · Patient has not been getting the insulin at dinner time so will discontinue  · Discontinue metformin  · Continue 70/30, 15 units in morning

## 2019-12-13 NOTE — PROGRESS NOTES
Progress Note - [facility]  44 y o  male MRN: [id number]  Unit/Bed#: [facility] 716-01 Encounter: [id number]        I came to see the patient continuation of care he states that he feels better today he was able to sleep the whole night  He denies hearing any voices today or last night, he is looking forward to go home  He states that being able to tolerate the medication and denies any side effects  He feels that treatment has been helpful  He states that his family came last night and that also help  He denies any suicidal thoughts plans or intent  Behavior over the last 24 hours:  improved  Sleep: normal  Appetite: normal  Medication side effects: No  ROS: no complaints    Mental Status Evaluation:  Appearance:  age appropriate and disheveled   Behavior:  Cooperative   Speech:  normal pitch and normal volume   Mood:  euthymic   Affect:  mood-congruent   Language: naming objects and repeating phrases   Thought Process:  goal directed   Associations: intact associations   Thought Content:  normal   Perceptual Disturbances: None   Risk Potential: None   Sensorium:  person, place, time/date, situation, day of week and month of year   Memory:  recent and remote memory grossly intact   Cognition:  grossly intact   Consciousness:  alert and awake    Attention: attention span and concentration were age appropriate   Intellect: within normal limits   Fund of Knowledge: awareness of current events: Fair, past history: Fair and vocabulary: Fair   Insight:  fair   Judgment: fair   Muscle Strength and Tone: Within normal limits   Gait/Station: normal gait/station and normal balance   Motor Activity: no abnormal movements         Assessment/Plan  Steve Ann  is a 44 y o  male with history of TBI, hypertension and diabetes mellitus who had been admitted to the hospital for seizures and auditory hallucination  Patient had no prior psychiatric history before the TBI    Patient had been on risperidone and yesterday we increased the risperidone to 3 mg p o  HS and 1 mg in the morning  He states that his family came in yesterday, he was able to sleep the whole night without hearing voices, he did not hear any voices today  He feels better and he wants to go home  He denies any suicidal or homicidal ideation plan or intent  He denies any psychotic symptoms today  Diagnosis:  Psychotic disorder unspecified type    Recommended Treatment:     Continue medical management  Continue risperidone 1 mg p o   In the morning and 3 mg p o  HS  Patient can be follow-up the primary physician upon discharge  I discussed this case with primary team and Neurology  No other intervention at this time  I will sign off    Medications:   current meds:   Current Facility-Administered Medications   Medication Dose Route Frequency    acetaminophen (TYLENOL) tablet 650 mg  650 mg Oral Q6H PRN    aluminum-magnesium hydroxide-simethicone (MYLANTA) 200-200-20 mg/5 mL oral suspension 30 mL  30 mL Oral Q6H PRN    divalproex sodium (DEPAKOTE) EC tablet 750 mg  750 mg Oral Q8H [address]    docusate sodium (COLACE) capsule 100 mg  100 mg Oral BID PRN    hydrochlorothiazide (HYDRODIURIL) tablet 25 mg  25 mg Oral Daily    insulin aspart protamine-insulin aspart (NovoLOG 70/30) 100 units/mL subcutaneous injection 15 Units  15 Units Subcutaneous Daily Before Breakfast    insulin lispro (HumaLOG) 100 units/mL subcutaneous injection 1-5 Units  1-5 Units Subcutaneous HS    insulin lispro (HumaLOG) 100 units/mL subcutaneous injection 2-12 Units  2-12 Units Subcutaneous TID AC    lisinopril (ZESTRIL) tablet 60 mg  60 mg Oral Daily    LORazepam (ATIVAN) 2 mg/mL injection 2 mg  2 mg Intravenous Q8H PRN    metoprolol tartrate (LOPRESSOR) tablet 100 mg  100 mg Oral Q12H [address]    ondansetron (ZOFRAN) injection 4 mg  4 mg Intravenous Q6H PRN    phenytoin (DILANTIN) ER capsule 300 mg  300 mg Oral Q12H [address]    risperiDONE (RisperDAL) tablet 1 mg 1 mg Oral Daily    risperiDONE (RisperDAL) tablet 3 mg  3 mg Oral HS         Risks, benefits and possible side effects of Medications:     Risks, benefits, and possible side effects of medications explained to patient and patient verbalizes understanding  Labs: I have personally reviewed all pertinent laboratory results  I have personally reviewed all pertinent laboratory/tests results    Labs in last 72 hours:   Recent Labs     12/11/19  0452 12/12/19  1111 12/13/19  0654   WBC  --   --  6 13   RBC  --   --  4 96   HGB  --   --  14 4   HCT  --   --  44 7   PLT  --   --  182   RDW  --   --  14 0   SODIUM 146*  --   --    K 3 7  --   --    *  --   --    CO2 30  --   --    BUN 13  --   --    CREATININE 0 92  --   --    GLUC 76  --   --    CALCIUM 8 6  --   --    RPR  --  Non-Reactive  --            Beti Jett MD

## 2019-12-13 NOTE — ASSESSMENT & PLAN NOTE
· No evidence of seizure activity on EEG thus far  · Continue with seizure precautions  · Continue Dilantin 300 mg ER Q 12   · Keppra is being held while on EEG monitoring  Keppra may contribute to psychosis  · Depakote 750 mg Q 8    · MRI brain unremarkable  Status post lumbar puncture per Neurology

## 2019-12-13 NOTE — PROGRESS NOTES
12/13/19 1400   Clinical Encounter Type   Visited With Patient   Restorationist Encounters   Restorationist Needs Prayer   Sacramental Encounters   Sacrament of Sick-Anointing Anointed

## 2019-12-13 NOTE — DISCHARGE INSTRUCTIONS
Lumbar Puncture   WHAT YOU NEED TO KNOW:   Lumbar puncture (LP) is a procedure in which a needle is inserted in your back and into your spinal canal  This is usually done to collect cerebrospinal fluid (CSF) to check for an infection, inflammation, bleeding, or other conditions that affect the brain  CSF is a clear, protective fluid that flows around the brain and inside the spinal canal  LP may also be done to remove CSF to reduce pressure in the brain  WHILE YOU ARE HERE:   Before your procedure:  · Informed consent  is a legal document that explains the tests, treatments, or procedures that you may need  Informed consent means you understand what will be done and can make decisions about what you want  You give your permission when you sign the consent form  You can have someone sign this form for you if you are not able to sign it  You have the right to understand your medical care in words you know  Before you sign the consent form, understand the risks and benefits of what will be done  Make sure all your questions are answered  During your procedure:  · You will be asked to lie on your side or to sit up  If you are on your side, your knees will be bent and drawn up toward your chest  Your neck will be tucked toward your chest and you may be given a pillow  If you are sitting, you will need to bend forward with your neck tucked toward your chest  You may be given medicine to help you relax or make you drowsy  · Your healthcare provider will feel your lower spine to look for the best place to do the LP  He will esua this position using a marker  Your lower back is cleaned  You may be given one or more shots of numbing medicine under the skin  A needle is inserted between the vertebrae (spine) in your lower back  You may feel some pushing or discomfort as the needle enters your back  Tell your healthcare provider if you have pain that does not stop within a few seconds   Your healthcare provider may need to pull out, reinsert, or change the position of the needle  · Your healthcare provider may take readings of your CSF pressure  This is done by connecting a measuring device to the needle  After the pressure is measured, the device is removed and CSF is allowed to flow out of the needle  Samples of your CSF may be taken and placed in sterile bottles  The needle is then taken out and the area will be covered with a bandage  After your procedure: You should lie flat in bed until your healthcare provider says it is okay to get up  When healthcare providers see that you are okay, you may be allowed to go home  If healthcare providers want you to stay in the hospital, you will be taken back to your hospital room  Tell a healthcare provider if you have a headache, back pain, or tingling, numbness, or weakness below your waist   · Medicines:      ¨ Acetaminophen: This medicine decreases pain and lowers a fever  Acetaminophen can cause liver damage  ¨ NSAIDs:  These medicines decrease swelling, pain, and fever  NSAIDs can cause stomach bleeding or kidney problems if not taken correctly  ¨ Pain medicine: You may be given a prescription medicine to decrease severe pain  Do not wait until the pain is severe before you ask for more pain medicine  · Post-lumbar puncture headache: You may develop a headache during the first few hours after your LP that may last for several days  The headache may be mild to severe and may get worse when you sit or stand  The following may be used to treat a post-lumbar puncture headache:    ¨ Liquids: You may be asked to drink more liquid than usual after your LP  Ask your healthcare provider how much liquid is right for you  ¨ Caffeine:  Caffeine may be used to treat a LP headache  Caffeine may be given in drinks, such as coffee or soda, every 4 to 6 hours  You may also be given caffeine through an IV      · Monitoring:      ¨ Vital signs:  Caregivers will check your blood pressure, heart rate, breathing rate, and temperature  They will also ask about your pain  These vital signs give caregivers information about your current health  ¨ Neurologic exam:  This is also called neuro signs, neuro checks, or neuro status  A neurologic exam can show caregivers how well your brain works after an injury or illness  Caregivers will check how your pupils (black dots in the center of each eye) react to light  They may check your memory and how easily you wake up  Your hand grasp and balance may also be tested  RISKS:   You may have a headache that gets worse when you sit or stand  You may also have neck or back pain  There may be bleeding, infection, or injury to a disk in your spine  Spinal fluid may leak from the puncture site  Your nerves or spinal cord may be damaged  Patients who have blood disorders or who are taking certain medicines are at a higher risk for problems  CARE AGREEMENT:   You have the right to help plan your care  Learn about your health condition and how it may be treated  Discuss treatment options with your caregivers to decide what care you want to receive  You always have the right to refuse treatment  © 2017 [employer] Information is for End User's use only and may not be sold, redistributed or otherwise used for commercial purposes  All illustrations and images included in CareNotes® are the copyrighted property of A D A M [employer] Inc  or [employer]  The above information is an  only  It is not intended as medical advice for individual conditions or treatments  Talk to your doctor, nurse or pharmacist before following any medical regimen to see if it is safe and effective for you

## 2019-12-13 NOTE — OCCUPATIONAL THERAPY NOTE
Occupational Therapy Treatment Note:         19 1526   Restrictions/Precautions   Other Precautions Cognitive;Seizure; Fall Risk   Pain Assessment   Pain Assessment 0-10   Pain Score No Pain   ADL   Where Assessed Edge of bed   Cognition   Overall Cognitive Status Impaired   Arousal/Participation Alert; Cooperative   Attention Within functional limits   Orientation Level Oriented X4   Memory Other (Comment)  (reports difficulty "finding words")   Following Commands Follows one step commands without difficulty   Comments pleasant and motivated   Cognition Assessment Tools MOCA   Score 26   Activity Tolerance   Activity Tolerance Patient tolerated treatment well   Medical Staff Made Aware patient cleared for OT by RN   Assessment   Assessment Patient participated in skilled OT with focus on formal cognitive assessment with use of the [address] [place] [address], [place] and functional performance  Patient presents seated EOB agreeable to engage in OT  Patient identified by name and   Patient seated EOB while performing MOCA with a score of 26 out of 30 with detailed standardized results attahced below  Patient cooperative and motivated throughout  Patient verbalizes frustration with certain cognitive limitations and verbalizes also noting improvement  Refer to attachment below  Plan   Treatment Interventions Cognitive reorientation; Activityengagement   Goal Expiration Date 12/15/19   OT Treatment Day 2   OT Frequency   (OT goals set on IE completed)   Recommendation   OT Discharge Recommendation Outpatient OT   Equipment Recommended Tub seat with back   OT - OK to Discharge   (when medically stable)    [facility]  SCORED  26/30 INDICATING __mild_________ COGNITIVE IMPAIRMENT FOR AGE/EDUCATION  SCORES ARE AS FOLLOWS:    VISUOSPATIAL/EXECUTIVE FUNCTION: 4/5  Pt was unable to complete trail  Pt was able to copy cube   Pt was able to draw a clock, accurately place the numbers, and properly place the hands at ten past eleven  NAMING: 3/3  Pt able to name 3/3 animals  ATTENTION: 6/6  Pt was able to repeat sequence of numbers forwards and backwards  Pt able to attend to sequence of letters  Pt was able to correctly subtract 7 from 100 /4 times  LANGUAGE: 3/3  Pt was able to repeat sentences back to therapist  Pt was able to produce N of words starting with the letter F in 1 minute  ABSTRACTION: 2/2  Pt was able to identify the similarity of two items x2 trials  DELAYED RECALL: 2/5  Pt recalled 2/5 words without cues  Pt recalled 2/5 words with category cue  Pt recalled 1/5 words with multiple choice options  ORIENTATION: 6/6  Pt is oriented to date, month, year, day, place and city       ELLIOT Harvey

## 2019-12-13 NOTE — PHYSICAL THERAPY NOTE
Physical Therapy Progress Note     12/13/19 0922   Pain Assessment   Pain Assessment No/denies pain   Pain Score No Pain   Restrictions/Precautions   Weight Bearing Precautions Per Order No   Other Precautions Fall Risk   General   Family/Caregiver Present No   Cognition   Overall Cognitive Status WFL   Arousal/Participation Alert; Cooperative   Bed Mobility   Supine to Sit 6  Modified independent   Transfers   Sit to Stand 6  Modified independent   Stand to Sit 6  Modified independent   Ambulation/Elevation   Gait pattern   (slow, short step length)   Gait Assistance 5  Supervision   Additional items Assist x 1   Assistive Device None   Distance 400 feet   Stair Management Assistance 5  Supervision   Additional items Assist x 1   Stair Management Technique One rail R;One rail L;Alternating pattern   Number of Stairs 14   Balance   Static Sitting Fair +   Dynamic Sitting Fair   Static Standing Fair +   Dynamic Standing Fair   Ambulatory Fair   Activity Tolerance   Activity Tolerance Patient tolerated treatment well   Nurse [address] Santos to see per RN [facility]   Exercises   Hip Flexion Standing;15 reps;AROM; Bilateral   Ankle Pumps Standing;20 reps  (heel raise)   Squat Standing;20 reps  (partial knee bend)   Assessment   Prognosis Good   Problem List Decreased strength;Decreased endurance;Decreased mobility   Assessment Pt is progressing well with functional mobility  Gait is slow but steady and without loss of balance  Completed stairs with B handrails  Without loss of balance on stair trial   Completed standing BLE exercises with support of a rolling walker  Pt would benefit from continued physical therapy to maximize functional independence  Goals   Patient Goals Get stronger   STG Expiration Date 12/24/19   Short Term Goal #1 1  Pt will demonstrate ability to perform all aspects of bed mobility with I in order to increase independence and decrease burden on caregivers   2  Pt will demonstrate ability to perform functional transfers with Mod I in order to increase independence and decrease burden on caregivers  3  Pt will demonstrate ability to ambulate 200 ft with least restrictive AD with Mod I in order to return to mobility safely  4  Pt will demonstrate ability to negotiate full flight steps with/without HR and Mod I in order to return to household/community mobility safely  5  Pt will demonstrate improved balance by one grade order to decrease risk of falls  6  Pt will increase b/l LE strength by 1 grade in order to increase ease of functional mobility and transfers  PT Treatment Day 2   Plan   Treatment/Interventions Functional transfer training;LE strengthening/ROM; Elevations; Therapeutic exercise; Endurance training;Patient/family training;Bed mobility;Gait training;Spoke to nursing   Progress Progressing toward goals   PT Frequency   (3-5x/week)   Recommendation   Recommendation Home with family support; Outpatient PT     Macrina Alexandra, PTA

## 2019-12-13 NOTE — PLAN OF CARE
Problem: OCCUPATIONAL THERAPY ADULT  Goal: Performs self-care activities at highest level of function for planned discharge setting  See evaluation for individualized goals  Description  Treatment Interventions: Cognitive reorientation, Continued evaluation  Equipment Recommended: Tub seat with back       See flowsheet documentation for full assessment, interventions and recommendations  2019 1234 by ELLIOT Cole  Note:        Progressing    2019 [id number] by ELLIOT Cole  Note:   Limitation: Decreased cognition  Prognosis: Fair  Assessment: Patient participated in skilled OT with focus on formal cognitive assessment with use of the [facility] and functional performance  Patient presents seated EOB agreeable to engage in OT  Patient identified by name and   Patient seated EOB while performing MOCA with a score of 26 out of 30 with detailed standardized results attahced below  Patient cooperative and motivated throughout  Patient verbalizes frustration with certain cognitive limitations and verbalizes also noting improvement  Refer to attachment below  OT Discharge Recommendation: Outpatient OT  OT - OK to Discharge: (when medically stable)  PT SEEN FOR REFUGIO COGNITIVE ASSESSMENT  SCORED  26/30 INDICATING __mild_________ COGNITIVE IMPAIRMENT FOR AGE/EDUCATION  SCORES ARE AS FOLLOWS:     VISUOSPATIAL/EXECUTIVE FUNCTION: 4/5  Pt was unable to complete trail  Pt was able to copy cube  Pt was able to draw a clock, accurately place the numbers, and properly place the hands at ten past eleven  NAMING: 3/3  Pt able to name 3/3 animals  ATTENTION: 6/6  Pt was able to repeat sequence of numbers forwards and backwards  Pt able to attend to sequence of letters  Pt was able to correctly subtract 7 from 100 /4 times  LANGUAGE: 3/3  Pt was able to repeat sentences back to therapist  Pt was able to produce N of words starting with the letter F in 1 minute  ABSTRACTION: 2/2  Pt was able to identify the similarity of two items x2 trials  DELAYED RECALL: 2/5  Pt recalled 2/5 words without cues  Pt recalled 2/5 words with category cue  Pt recalled 1/5 words with multiple choice options  ORIENTATION: 6/6  Pt is oriented to date, month, year, day, place and city        Chinita Romberg, COTA

## 2019-12-13 NOTE — ASSESSMENT & PLAN NOTE
· Psychiatry following  · Patient reports improvement in the auditory hallucinations today  Said he was able to sleep well last night  · Risperdal increased to 1 mg the morning and 3 mg HS  · Keppra discontinued

## 2019-12-13 NOTE — PROGRESS NOTES
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, presented with worsening auditory hallucinations  CT head revealed no acute intracranial abnormalities  Mild nonspecific white matter T2 hyperintensities seen on MRI, but no acute intracranial abnormalities or abnormal enhancement  No epileptiform activity on video EEG monitoring  LP results pending, but initial CSF labs WNL  Patient is no longer experiencing auditory hallucinations after stopping Keppra and increasing risperidone dose to 3 mg QHS and 1 mg QAM   Etiology unknown, but 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 vs Keppra side effect  Plan:  - Labs pending:   - CSF labs:  ENC-2, culture, gram stain   - Serum labs: ENS-2, ANCA, AMOL, Lyme  - Continue risperidone 3 mg QHS and 1 mg QAM  - Continue Depakote 750 mg TID, Dilantin 200 mg BID, and Ativan PRN seizures  - Do not restart Keppra  - Cleared by PT/OT  - Outpatient neuropsychology evaluation 4-6 weeks after discharge  - No further inpatient neuro recommendations  - If patient begins to have more auditory hallucinations or feels unsafe at home, the patient should come back to the hosptial and be admitted to inpatient psychiatry  - All questions and concerns were addressed to the patient's satisfaction  - Medical management and supportive care per primary team   Correction of any metabolic or infectious disturbances  Results:  - MRI brain:  No acute intracranial abnormality    Minimal periventricular and subcortical foci of white matter T2 hyperintensity which is nonspecific   - CT head:  No acute intracranial abnormalities  - EEG:  No epileptiform activity  - CSF studies:     - Protein 54   - WNL:  RBC, WBC, glucose, meningitis/encephalitis   - no polyps or bacteria seen on Gram stain  - WNL: RPR, Thyroid microsomal antibody  - Thyroglobulin elevated (41)  - CRP 17 4  - ESR: 14    Subjective:   Patient seen and examined at bedside  He is feeling great today  His family came and visited him yesterday, he slept through the night, and he is no longer having any severe auditory hallucinations  Last night he heard some crying, but the nurse told him that was another person on the floor  He continues to hear a slight "scratching" noise, but no command auditory hallucinations  Denies any side effects from the medications  Denies headache, visual disturbances, numbness/tingling, arm/leg weakness, lightheadedness, dizziness, chest pain, shortness of breath, and abdominal pain  Patient is ready to be discharged home          Past Medical History:   Diagnosis Date    Chemical exposure     Diabetes mellitus ([facility] [address] )     H/O bone graft     Head injury     August 25, 2019 fell backwards hit head on a boulder w/ LOC    Head injury     as an air [place] with the [employer] - jumped out of a plane parachute disfunction    Hypertension      Past Surgical History:   Procedure Laterality Date    FL LUMBAR PUNCTURE  12/13/2019    KNEE ARTHROSCOPY W/ MENISCECTOMY Left     LEG SURGERY      TONSILLECTOMY       Family History   Problem Relation Age of Onset    Diabetes Mother     Hypertension Mother     Asthma Mother     Brain cancer Father     No Known Problems Sister     No Known Problems Brother     No Known Problems Brother      Social History     Socioeconomic History    Marital status: Single     Spouse name: None    Number of children: None    Years of education: None    Highest education level: None   Occupational History    None   Social Needs    Financial resource strain: None    Food insecurity:     Worry: None     Inability: None    Transportation needs:     Medical: None     Non-medical: None   Tobacco Use    Smoking status: Never Smoker    Smokeless tobacco: Never Used   Substance and Sexual Activity    Alcohol use: Yes     Alcohol/week: 3 0 standard drinks     Types: 3 Cans of beer per week     Frequency: Monthly or less     Comment: occassionally    Drug use: No    Sexual activity: None   Lifestyle    Physical activity:     Days per week: None     Minutes per session: None    Stress: None   Relationships    Social connections:     Talks on phone: None     Gets together: None     Attends Buddhism service: None     Active member of club or organization: None     Attends meetings of clubs or organizations: None     Relationship status: None    Intimate partner violence:     Fear of current or ex partner: None     Emotionally abused: None     Physically abused: None     Forced sexual activity: None   Other Topics Concern    None   Social History Narrative    None         Medications:   All current active meds have been reviewed and current meds:  Scheduled Meds:  Current Facility-Administered Medications:  acetaminophen 650 mg Oral Q6H PRN Merline Vang PA-C   aluminum-magnesium hydroxide-simethicone 30 mL Oral Q6H PRN Chente Greene MD   divalproex sodium 750 mg Oral [facility] Chente Greene MD   docusate sodium 100 mg Oral BID PRN Chente Greene MD   hydrochlorothiazide 25 mg Oral Daily Chente Greene MD   insulin aspart protamine-insulin aspart 15 Units Subcutaneous Daily Before Breakfast Scott Sutherland MD   insulin lispro 1-5 Units Subcutaneous HS Chente Greene MD   insulin lispro 2-12 Units Subcutaneous TID AC Chente Greene MD   lisinopril 60 mg Oral Daily Merline Vang PA-C   LORazepam 2 mg Intravenous Q8H PRN Chente Greene MD   metoprolol tartrate 100 mg Oral Q12H [address] Chente Greene MD   ondansetron 4 mg Intravenous Q6H PRN Chente Greene MD   phenytoin 300 mg Oral Q12H [address] Chente Greene MD   risperiDONE 1 mg Oral Daily Sherice Ayers MD   risperiDONE 3 mg Oral HS Sherice Ayers MD     Continuous Infusions:   PRN Meds:   acetaminophen    aluminum-magnesium hydroxide-simethicone    docusate sodium    LORazepam    ondansetron       ROS:   Review of Systems   Constitutional: Negative for appetite change, chills, diaphoresis, fatigue and fever  HENT: Negative for trouble swallowing and voice change  Eyes: Negative for photophobia and visual disturbance  Respiratory: Negative for chest tightness and shortness of breath  Cardiovascular: Negative for chest pain and palpitations  Gastrointestinal: Negative for abdominal pain, diarrhea, nausea and vomiting  Genitourinary: Negative for difficulty urinating and dysuria  Musculoskeletal: Negative for back pain, gait problem, myalgias and neck stiffness  Neurological: Negative for dizziness, tremors, speech difficulty, weakness, light-headedness, numbness and headaches  Psychiatric/Behavioral: Negative for behavioral problems, confusion, decreased concentration and hallucinations  Vitals:   /85   Pulse 91   Temp 98 3 °F (36 8 °C)   Resp 16   Ht 5' 9" (1 753 m)   Wt 127 kg (279 lb 12 8 oz)   SpO2 94%   BMI 41 32 kg/m²     Physical Exam:   Physical Exam   Constitutional: He is oriented to person, place, and time  He appears well-developed and well-nourished  No distress  Patient lying comfortably in bed  HENT:   Head: Normocephalic and atraumatic  Right Ear: External ear normal    Left Ear: External ear normal    Nose: Nose normal    Mouth/Throat: Oropharynx is clear and moist  No oropharyngeal exudate  Eyes: Pupils are equal, round, and reactive to light  Conjunctivae and EOM are normal    Neck: Normal range of motion  Neck supple  Cardiovascular: Normal rate, regular rhythm and normal heart sounds  Pulmonary/Chest: Effort normal and breath sounds normal    Abdominal: Soft  Bowel sounds are normal    Musculoskeletal: Normal range of motion  Neurological: He is alert and oriented to person, place, and time   He has a normal Finger-Nose-Finger Test  Gait normal    Skin: Skin is warm and dry  Capillary refill takes less than 2 seconds  He is not diaphoretic  Psychiatric: He has a normal mood and affect  His speech is normal and behavior is normal  Judgment and thought content normal    Nursing note and vitals reviewed  Neurologic Exam     Mental Status   Oriented to person, place, and time  Oriented to city  Oriented to year, month and date  Registration: recalls 3 of 3 objects  Recall at 5 minutes: recalls 3 of 3 objects  Follows 2 step commands  Attention: normal  Concentration: normal    Speech: speech is normal   Level of consciousness: alert  Knowledge: good  Able to name object  Able to repeat  Normal comprehension  Cranial Nerves     CN II   Visual fields full to confrontation  CN III, IV, VI   Pupils are equal, round, and reactive to light  Extraocular motions are normal    Right pupil: Size: 3 mm  Shape: regular  Reactivity: brisk  Consensual response: intact  Accommodation: intact  Left pupil: Size: 3 mm  Shape: regular  Reactivity: brisk  Consensual response: intact  Accommodation: intact  Nystagmus: none   Diplopia: none    CN V   Right facial sensation deficit: none  Left facial sensation deficit: forehead and cheeks    CN VII   Facial expression full, symmetric  CN VIII   CN VIII normal      CN IX, X   Palate: symmetric    CN XI   Right sternocleidomastoid strength: normal  Left sternocleidomastoid strength: normal  Right trapezius strength: normal  Left trapezius strength: normal    CN XII   Tongue: not atrophic  Fasciculations: absent  Tongue deviation: none    Motor Exam   Muscle bulk: normal  Overall muscle tone: normal  Right arm pronator drift: absent  Left arm pronator drift: absent  5/5 strength in upper extremities and lower extremities bilaterally  Sensory Exam   Decreased sensation in left lower extremity distal to the patella (chronic)    Normal sensation to light touch, temperature, and vibration in upper extremities and right lower extremity  Gait, Coordination, and Reflexes     Gait  Gait: normal    Coordination   Finger to nose coordination: normal    Tremor   Resting tremor: absent    Reflexes   Right plantar: normal  Left plantar: normal  Right ankle clonus: absent  Left ankle clonus: absent          Labs: I have personally reviewed pertinent reports     Recent Results (from the past 24 hour(s))   Fingerstick Glucose (POCT)    Collection Time: 12/12/19  3:47 PM   Result Value Ref Range    POC Glucose 87 65 - 140 mg/dl   Fingerstick Glucose (POCT)    Collection Time: 12/12/19  8:55 PM   Result Value Ref Range    POC Glucose 126 65 - 140 mg/dl   Fingerstick Glucose (POCT)    Collection Time: 12/13/19  6:41 AM   Result Value Ref Range    POC Glucose 91 65 - 140 mg/dl   Protime-INR    Collection Time: 12/13/19  6:53 AM   Result Value Ref Range    Protime 14 3 11 6 - 14 5 seconds    INR 1 15 0 84 - 1 19   CBC and Platelet    Collection Time: 12/13/19  6:54 AM   Result Value Ref Range    WBC 6 13 4 31 - 10 16 Thousand/uL    RBC 4 96 3 88 - 5 62 Million/uL    Hemoglobin 14 4 12 0 - 17 0 g/dL    Hematocrit 44 7 36 5 - 49 3 %    MCV 90 82 - 98 fL    MCH 29 0 26 8 - 34 3 pg    MCHC 32 2 31 4 - 37 4 g/dL    RDW 14 0 11 6 - 15 1 %    Platelets 086 859 - 148 Thousands/uL    MPV 9 3 8 9 - 12 7 fL   CSF white cell count with differential    Collection Time: 12/13/19 10:31 AM   Result Value Ref Range    Appearance, CSF clear colorless     Tube Number, CSF 4     WBC, CSF 0 0 - 5 /uL    Xanthochromia No No   STAT Gram Stain    Collection Time: 12/13/19 10:32 AM   Result Value Ref Range    Gram Stain Result No Polys or Bacteria seen    Meningitis/Encephalitis (ME) Panel    Collection Time: 12/13/19 10:32 AM   Result Value Ref Range    C NEOFORMANS/GATTII Not Detected Not Detected    CYTOMEGALOVIRUS Not Detected Not Detected    ENTEROVIRUS Not Detected Not Detected    E COLI K1 Not Detected Not Detected    H INFLUENZAE Not Detected Not Detected    H SIMPLEX 1 Not Detected Not Detected    H SIMPLEX 2 Not Detected Not Detected    HERPES VIRUS 6 Not Detected Not Detected    PARECHOVIRUS Not Detected Not Detected    L  MONOCYTOGENES Not Detected Not Detected    N MENINGITIDIS Not Detected Not Detected    S AGALACTIAE Not Detected Not Detected    S  PNEUMONIAE Not Detected Not Detected    V ZOSTER Not Detected Not Detected   Glucose, CSF    Collection Time: 12/13/19 10:33 AM   Result Value Ref Range    Glucose, CSF 66 50 - 80 mg/dL   RBC count,CSF    Collection Time: 12/13/19 10:33 AM   Result Value Ref Range    RBC, CSF 3 0 - 10 uL   Total Protein, CSF    Collection Time: 12/13/19 10:33 AM   Result Value Ref Range    Protein, CSF 54 (H) 15 - 45 mg/dL   Fingerstick Glucose (POCT)    Collection Time: 12/13/19 11:16 AM   Result Value Ref Range    POC Glucose 84 65 - 140 mg/dl       Imaging: I have personally reviewed pertinent imaging in PACS, including CT head, MRI brain, and I have personally reviewed PACS reports  EKG, Pathology, and Other Studies: I have personally reviewed pertinent reports  VTE Prophylaxis: Sequential compression device (Venodyne)       Counseling / Coordination of Care  Total time spent today 20 minutes  Greater than 50% of total time was spent with the patient and/or family counseling and/or coordination of care  A description of the counseling/coordination of care:  Patient was seen and evaluated  Discussed with attending  Chart reviewed thoroughly including laboratory and imaging studies    Plan of care discussed with patient and primary team

## 2019-12-13 NOTE — BRIEF OP NOTE (RAD/CATH)
FLUOROSCOPICALLY GUIDED LUMBAR PUNCTURE     INDICATION:  History of traumatic brain injury in August 2019 with worsening auditory hallucinations and headaches  FLUOROSCOPY TIME:  0 18 MFT    IMAGES:  4      TECHNIQUE:       Consent was obtained after fully explaining the procedure to the patient  Risks and benefits of procedure were described and understood  Precautions to avoid spinal headache were reviewed  1% lidocaine was infiltrated at the puncture site  Utilizing Left paravertebral approach, a 20 gauge 6 inch spinal needle was advanced under fluoroscopic guidance into the subarachnoid space at the L2-L3 level, utilizing sterile technique  Once in position, the patient was then placed in a left lateral decubitus position  Opening pressure was 13 cm H2O  Approximately 15 5 cc of clear, colorless CSF were removed and placed into 4 tubes which subsequently were transported to the lab for requested analysis  Closing pressure was not obtained due to normal opening pressure  The needle was removed  The patient tolerated the procedure well  The patient was discharged from the department with appropriate instructions  IMPRESSION:    Successful fluoroscopically guided lumbar puncture with an opening pressure of 13 cm H2O  Approximately 15 5 mL's of clear colorless CSF was removed and sent to lab for requested analysis  Closing pressure was not obtained due to normal opening pressure  I reviewed the above findings and procedure with Dr Migdalia Miner       PERFORMED, DICTATED AND SIGNED BY: Luz Stewart PA-C

## 2019-12-13 NOTE — PLAN OF CARE
Problem: PHYSICAL THERAPY ADULT  Goal: Performs mobility at highest level of function for planned discharge setting  See evaluation for individualized goals  Description  Treatment/Interventions: Functional transfer training, LE strengthening/ROM, Therapeutic exercise, Endurance training, Patient/family training, Equipment eval/education, Bed mobility, Gait training, Spoke to nursing  Equipment Recommended: (TBD)       See flowsheet documentation for full assessment, interventions and recommendations  Outcome: Progressing  Note:   Prognosis: Good  Problem List: Decreased strength, Decreased endurance, Decreased mobility  Assessment: Pt is progressing well with functional mobility  Gait is slow but steady and without loss of balance  Completed stairs with B handrails  Without loss of balance on stair trial   Completed standing BLE exercises with support of a rolling walker  Pt would benefit from continued physical therapy to maximize functional independence  Recommendation: Home with family support, Outpatient PT         See flowsheet documentation for full assessment

## 2019-12-14 VITALS
HEIGHT: 69 IN | TEMPERATURE: 98 F | HEART RATE: 76 BPM | BODY MASS INDEX: 41.44 KG/M2 | SYSTOLIC BLOOD PRESSURE: 144 MMHG | OXYGEN SATURATION: 94 % | DIASTOLIC BLOOD PRESSURE: 92 MMHG | RESPIRATION RATE: 18 BRPM | WEIGHT: 279.8 LBS

## 2019-12-14 LAB
GLUCOSE SERPL-MCNC: 89 MG/DL (ref 65–140)
GLUCOSE SERPL-MCNC: 96 MG/DL (ref 65–140)

## 2019-12-14 PROCEDURE — 99239 HOSP IP/OBS DSCHRG MGMT >30: CPT | Performed by: INTERNAL MEDICINE

## 2019-12-14 PROCEDURE — 82948 REAGENT STRIP/BLOOD GLUCOSE: CPT

## 2019-12-14 RX ORDER — RISPERIDONE 1 MG/1
1 TABLET, FILM COATED ORAL EVERY MORNING
Qty: 30 TABLET | Refills: 1 | Status: SHIPPED | OUTPATIENT
Start: 2019-12-14 | End: 2020-01-09 | Stop reason: SDUPTHER

## 2019-12-14 RX ORDER — RISPERIDONE 3 MG/1
3 TABLET, FILM COATED ORAL
Qty: 30 TABLET | Refills: 1 | Status: SHIPPED | OUTPATIENT
Start: 2019-12-14 | End: 2020-01-09 | Stop reason: SDUPTHER

## 2019-12-14 RX ORDER — METOPROLOL TARTRATE 100 MG/1
100 TABLET ORAL EVERY 12 HOURS SCHEDULED
Qty: 60 TABLET | Refills: 0 | Status: SHIPPED | OUTPATIENT
Start: 2019-12-14 | End: 2020-03-01

## 2019-12-14 RX ADMIN — RISPERIDONE 1 MG: 1 TABLET ORAL at 09:36

## 2019-12-14 RX ADMIN — PHENYTOIN SODIUM 300 MG: 100 CAPSULE ORAL at 09:37

## 2019-12-14 RX ADMIN — DIVALPROEX SODIUM 750 MG: 500 TABLET, DELAYED RELEASE ORAL at 05:50

## 2019-12-14 RX ADMIN — LISINOPRIL 60 MG: 20 TABLET ORAL at 09:38

## 2019-12-14 RX ADMIN — HYDROCHLOROTHIAZIDE 25 MG: 25 TABLET ORAL at 09:39

## 2019-12-14 RX ADMIN — INSULIN ASPART 15 UNITS: 100 INJECTION, SUSPENSION SUBCUTANEOUS at 09:37

## 2019-12-14 RX ADMIN — DIVALPROEX SODIUM 750 MG: 500 TABLET, DELAYED RELEASE ORAL at 14:16

## 2019-12-14 RX ADMIN — METOPROLOL TARTRATE 100 MG: 50 TABLET, FILM COATED ORAL at 09:39

## 2019-12-14 NOTE — ASSESSMENT & PLAN NOTE
· Psychiatry following  · Patient reports significant improvement in the hallucinations  Able to sleep well last night  Just using some scratching noises now  Denies any nausea eyes is off people talking to him  · Risperdal 1 mg the morning and 3 mg HS  · Keppra discontinued

## 2019-12-14 NOTE — ASSESSMENT & PLAN NOTE
· No evidence of seizure activity on EEG thus far  · Continue with seizure precautions  · Continue Dilantin 300 mg ER Q 12   · Keppra is being held while on EEG monitoring  Keppra may contribute to psychosis  · Depakote 750 mg Q 8    · MRI brain unremarkable  Status post lumbar puncture - preliminary tests unremarkable  Outpatient follow-up with Neurology To follow up on the pending results

## 2019-12-14 NOTE — ASSESSMENT & PLAN NOTE
Lab Results   Component Value Date    HGBA1C 6 6 (H) 12/09/2019     Recent Labs     12/13/19  1549 12/13/19  2104 12/14/19  0737 12/14/19  1133   POCGLU 140 112 96 89     Blood Sugar Average: Last 72 hrs:  (P) 78     · Blood glucose still on the lower side  · Will discharge the patient on 70 30 insulin 10 units with breakfast   Hold metformin on discharge  · Patient advised to monitor his glucose closely and follow up with PCP

## 2019-12-14 NOTE — DISCHARGE SUMMARY
Discharge- Demetri  1970, 44 y o  male MRN: [id number]    [facility] Encounter: [id number]    Primary Care Provider: Bernard Frederick   Date and time admitted to hospital: 12/9/2019  2:31 AM        * Seizure [facility])  Assessment & Plan  · No evidence of seizure activity on EEG thus far  · Continue with seizure precautions  · Continue Dilantin 300 mg ER Q 12   · Keppra is being held while on EEG monitoring  Keppra may contribute to psychosis  · Depakote 750 mg Q 8    · MRI brain unremarkable  Status post lumbar puncture - preliminary tests unremarkable  Outpatient follow-up with Neurology To follow up on the pending results  Psychotic disorder [facility])  Assessment & Plan  · Psychiatry following  · Patient reports significant improvement in the hallucinations  Able to sleep well last night  Just using some scratching noises now  Denies any nausea eyes is off people talking to him  · Risperdal 1 mg the morning and 3 mg HS  · Keppra discontinued  Type 2 diabetes mellitus without complication, with long-term current use of insulin [facility])  Assessment & Plan  Lab Results   Component Value Date    HGBA1C 6 6 (H) 12/09/2019     Recent Labs     12/13/19  1549 12/13/19  2104 12/14/19  0737 12/14/19  1133   POCGLU 140 112 96 89     Blood Sugar Average: Last 72 hrs:  (P) 78     · Blood glucose still on the lower side  · Will discharge the patient on 70 30 insulin 10 units with breakfast   Hold metformin on discharge  · Patient advised to monitor his glucose closely and follow up with PCP  Essential hypertension  Assessment & Plan  · Blood pressure well controlled    · Continue lisinopril, metoprolol on hydrochlorothiazide          Discharging Physician / Practitioner: Wade Pollard MD  PCP: Bernard Frederick  Admission Date:   Admission Orders (From admission, onward)     Ordered        12/09/19 0248  Inpatient Admission  Once         12/09/19 0248  Inpatient Admission  Once Discharge Date: 12/14/19    Resolved Problems  Date Reviewed: 12/13/2019    None          Consultations During Hospital Stay:  Neurology, Psychiatry    Procedures Performed:   · LP, vEEG    Significant Findings / Test Results:     FL lumbar puncture   Final Result by Calli Wayne MD (12/13 9333)      Successful fluoroscopically guided lumbar puncture with an opening pressure of 13 cm H2O  Approximately 15 5 mL's of clear colorless CSF was removed and sent to lab for requested analysis  Closing pressure was not obtained due to normal opening    pressure  I reviewed the above findings and procedure with Dr Dangelo Suh  PERFORMED, DICTATED AND SIGNED BY: Lizeth Duran PA-C         Workstation performed: [id number]         MRI brain w wo contrast   Final Result by Malissa Tillman MD (12/11 7606)      No acute intracranial abnormality  No restricted diffusion to suggest acute ischemia  No abnormal enhancement within the brain parenchyma  Minimal periventricular and subcortical foci of white matter T2 hyperintensity which is nonspecific and most likely related to chronic small vessel ischemic changes  Other less likely etiologies include demyelinating disease, vasculitis, Lyme and    migraines  Workstation performed: [id number]                  Incidental Findings:   · none     Test Results Pending at Discharge (will require follow up): · Studies pending on CSF - FU with Neurology     Outpatient Tests Requested:  · none    Complications:  none    Reason for Admission: seizures and auditory hallucinations    Hospital Course:     Yissel Corea  is a 44 y o  male patient who originally presented to the hospital on 12/9/2019 due to auditory hallucinations and staring spells  Patient has known history of traumatic brain injury  He since then patient has been having auditory hallucination along with seizures  Patient was admitted  Video EEG was negative for seizure    Keppra was stopped  Patient was evaluate by Psychiatry  Started on risperidone - auditory hallucinations improved significantly after increasing risperidone  Please see above list of diagnoses and related plan for additional information  Condition at Discharge: good     Discharge Day Visit / Exam:     Subjective:  Pt seen and examined by me this morning  Pt denies any complaints  Feeling much better  Was able to sleep again last night very well  Denies any hallucinations of people talking to him  Still has occasional scratching noise that he hears but it has also slowed down significantly  Vitals: Blood Pressure: 144/92 (12/14/19 0937)  Pulse: 76 (12/14/19 0937)  Temperature: 98 °F (36 7 °C) (12/14/19 0840)  Respirations: 18 (12/14/19 0840)  Height: 5' 9" (175 3 cm) (12/09/19 0236)  Weight - Scale: 127 kg (279 lb 12 8 oz) (12/09/19 0236)  SpO2: 94 % (12/14/19 0937)     Exam:   Physical Exam    Constitutional: Pt appears comfortable  Not in any acute distress  HENT:   Head: Normocephalic and atraumatic  Eyes: EOM are normal    Neck: Neck supple  Cardiovascular: Normal rate, regular rhythm, normal heart sounds  No murmur heard  Pulmonary/Chest: Effort normal, air entry b/l equal  No respiratory distress  Pt has no wheezes or crackles  Abdominal: Soft  Non-distended, Non-tender  Bowel sounds are normal    Musculoskeletal: Normal range of motion  Neurological: alert and oriented to person, place, and time  Moving all extremities spontaneously  Psychiatric: normal mood and affect  Discussion with Family: wife at bedside    Discharge instructions/Information to patient and family:   See after visit summary for information provided to patient and family  Provisions for Follow-Up Care:  See after visit summary for information related to follow-up care and any pertinent home health orders        Disposition:     Home    For Discharges to [facility] SNF:   · Not Applicable to this Patient - Not Applicable to this Patient    Planned Readmission: no     Discharge Statement:  I spent 35 minutes discharging the patient  This time was spent on the day of discharge  I had direct contact with the patient on the day of discharge  Greater than 50% of the total time was spent examining patient, answering all patient questions, arranging and discussing plan of care with patient as well as directly providing post-discharge instructions  Additional time then spent on discharge activities  Discharge Medications:  See after visit summary for reconciled discharge medications provided to patient and family        ** Please Note: This note has been constructed using a voice recognition system **

## 2019-12-14 NOTE — PLAN OF CARE
Problem: Potential for Falls  Goal: Patient will remain free of falls  Description  INTERVENTIONS:  - Assess patient frequently for physical needs  -  Identify cognitive and physical deficits and behaviors that affect risk of falls    -  [place] fall precautions as indicated by assessment   - Educate patient/family on patient safety including physical limitations  - Instruct patient to call for assistance with activity based on assessment  - Modify environment to reduce risk of injury  - Consider OT/PT consult to assist with strengthening/mobility  Outcome: Progressing     Problem: Prexisting or High Potential for Compromised Skin Integrity  Goal: Skin integrity is maintained or improved  Description  INTERVENTIONS:  - Identify patients at risk for skin breakdown  - Assess and monitor skin integrity  - Assess and monitor nutrition and hydration status  - Monitor labs   - Assess for incontinence   - Turn and reposition patient  - Assist with mobility/ambulation  - Relieve pressure over bony prominences  - Avoid friction and shearing  - Provide appropriate hygiene as needed including keeping skin clean and dry  - Evaluate need for skin moisturizer/barrier cream  - Collaborate with interdisciplinary team   - Patient/family teaching  - Consider wound care consult   Outcome: Progressing     Problem: PAIN - ADULT  Goal: Verbalizes/displays adequate comfort level or baseline comfort level  Description  Interventions:  - Encourage patient to monitor pain and request assistance  - Assess pain using appropriate pain scale  - Administer analgesics based on type and severity of pain and evaluate response  - Implement non-pharmacological measures as appropriate and evaluate response  - Consider cultural and social influences on pain and pain management  - Notify physician/advanced practitioner if interventions unsuccessful or patient reports new pain  Outcome: Progressing     Problem: INFECTION - ADULT  Goal: Absence or prevention of progression during hospitalization  Description  INTERVENTIONS:  - Assess and monitor for signs and symptoms of infection  - Monitor lab/diagnostic results  - Monitor all insertion sites, i e  indwelling lines, tubes, and drains  - Monitor endotracheal if appropriate and nasal secretions for changes in amount and color  - [place] appropriate cooling/warming therapies per order  - Administer medications as ordered  - Instruct and encourage patient and family to use good hand hygiene technique  - Identify and instruct in appropriate isolation precautions for identified infection/condition  Outcome: Progressing  Goal: Absence of fever/infection during neutropenic period  Description  INTERVENTIONS:  - Monitor WBC    Outcome: Progressing     Problem: SAFETY ADULT  Goal: Patient will remain free of falls  Description  INTERVENTIONS:  - Assess patient frequently for physical needs  -  Identify cognitive and physical deficits and behaviors that affect risk of falls    -  [place] fall precautions as indicated by assessment   - Educate patient/family on patient safety including physical limitations  - Instruct patient to call for assistance with activity based on assessment  - Modify environment to reduce risk of injury  - Consider OT/PT consult to assist with strengthening/mobility  Outcome: Progressing  Goal: Maintain or return to baseline ADL function  Description  INTERVENTIONS:  -  Assess patient's ability to carry out ADLs; assess patient's baseline for ADL function and identify physical deficits which impact ability to perform ADLs (bathing, care of mouth/teeth, toileting, grooming, dressing, etc )  - Assess/evaluate cause of self-care deficits   - Assess range of motion  - Assess patient's mobility; develop plan if impaired  - Assess patient's need for assistive devices and provide as appropriate  - Encourage maximum independence but intervene and supervise when necessary  - Involve family in performance of ADLs  - Assess for home care needs following discharge   - Consider OT consult to assist with ADL evaluation and planning for discharge  - Provide patient education as appropriate  Outcome: Progressing  Goal: Maintain or return mobility status to optimal level  Description  INTERVENTIONS:  - Assess patient's baseline mobility status (ambulation, transfers, stairs, etc )    - Identify cognitive and physical deficits and behaviors that affect mobility  - Identify mobility aids required to assist with transfers and/or ambulation (gait belt, sit-to-stand, lift, walker, cane, etc )  - [place] fall precautions as indicated by assessment  - Record patient progress and toleration of activity level on Mobility SBAR; progress patient to next Phase/Stage  - Instruct patient to call for assistance with activity based on assessment  - Consider rehabilitation consult to assist with strengthening/weightbearing, etc   Outcome: Progressing     Problem: DISCHARGE PLANNING  Goal: Discharge to home or other facility with appropriate resources  Description  INTERVENTIONS:  - Identify barriers to discharge w/patient and caregiver  - Arrange for needed discharge resources and transportation as appropriate  - Identify discharge learning needs (meds, wound care, etc )  - Arrange for interpretive services to assist at discharge as needed  - Refer to Case Management Department for coordinating discharge planning if the patient needs post-hospital services based on physician/advanced practitioner order or complex needs related to functional status, cognitive ability, or social support system  Outcome: Progressing     Problem: NEUROSENSORY - ADULT  Goal: Achieves stable or improved neurological status  Description  INTERVENTIONS  - Monitor and report changes in neurological status  - Monitor vital signs such as temperature, blood pressure, glucose, and any other labs ordered   - Initiate measures to prevent increased intracranial pressure  - Monitor for seizure activity and implement precautions if appropriate      Outcome: Progressing  Goal: Remains free of injury related to seizures activity  Description  INTERVENTIONS  - Maintain airway, patient safety  and administer oxygen as ordered  - Monitor patient for seizure activity, document and report duration and description of seizure to physician/advanced practitioner  - If seizure occurs,  ensure patient safety during seizure  - Reorient patient post seizure  - Seizure pads on all 4 side rails  - Instruct patient/family to notify RN of any seizure activity including if an aura is experienced  - Instruct patient/family to call for assistance with activity based on nursing assessment  - Administer anti-seizure medications if ordered    Outcome: Progressing  Goal: Achieves maximal functionality and self care  Description  INTERVENTIONS  - Monitor swallowing and airway patency with patient fatigue and changes in neurological status  - Encourage and assist patient to increase activity and self care     - Encourage visually impaired, hearing impaired and aphasic patients to use assistive/communication devices  Outcome: Progressing     Problem: CARDIOVASCULAR - ADULT  Goal: Maintains optimal cardiac output and hemodynamic stability  Description  INTERVENTIONS:  - Monitor I/O, vital signs and rhythm  - Monitor for S/S and trends of decreased cardiac output  - Administer and titrate ordered vasoactive medications to optimize hemodynamic stability  - Assess quality of pulses, skin color and temperature  - Assess for signs of decreased coronary artery perfusion  - Instruct patient to report change in severity of symptoms  Outcome: Progressing  Goal: Absence of cardiac dysrhythmias or at baseline rhythm  Description  INTERVENTIONS:  - Continuous cardiac monitoring, vital signs, obtain 12 lead EKG if ordered  - Administer antiarrhythmic and heart rate control medications as ordered  - Monitor electrolytes and administer replacement therapy as ordered  Outcome: Progressing     Problem: CARDIOVASCULAR - ADULT  Goal: Maintains optimal cardiac output and hemodynamic stability  Description  INTERVENTIONS:  - Monitor I/O, vital signs and rhythm  - Monitor for S/S and trends of decreased cardiac output  - Administer and titrate ordered vasoactive medications to optimize hemodynamic stability  - Assess quality of pulses, skin color and temperature  - Assess for signs of decreased coronary artery perfusion  - Instruct patient to report change in severity of symptoms  Outcome: Progressing  Goal: Absence of cardiac dysrhythmias or at baseline rhythm  Description  INTERVENTIONS:  - Continuous cardiac monitoring, vital signs, obtain 12 lead EKG if ordered  - Administer antiarrhythmic and heart rate control medications as ordered  - Monitor electrolytes and administer replacement therapy as ordered  Outcome: Progressing

## 2019-12-16 LAB
BACTERIA CSF CULT: NO GROWTH
RYE IGE QN: NEGATIVE
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
## 2019-12-27 ENCOUNTER — HOSPITAL ENCOUNTER (EMERGENCY)
Facility: HOSPITAL | Age: 49
Discharge: HOME | End: 2019-12-27
Attending: EMERGENCY MEDICINE | Admitting: EMERGENCY MEDICINE
Payer: COMMERCIAL

## 2019-12-27 ENCOUNTER — APPOINTMENT (EMERGENCY)
Dept: CT IMAGING | Facility: HOSPITAL | Age: 49
End: 2019-12-27
Payer: COMMERCIAL

## 2019-12-27 VITALS
DIASTOLIC BLOOD PRESSURE: 83 MMHG | WEIGHT: 287.7 LBS | HEIGHT: 70 IN | SYSTOLIC BLOOD PRESSURE: 131 MMHG | TEMPERATURE: 98.3 F | OXYGEN SATURATION: 93 % | HEART RATE: 87 BPM | RESPIRATION RATE: 18 BRPM | BODY MASS INDEX: 41.19 KG/M2

## 2019-12-27 DIAGNOSIS — S09.90XA CHI (CLOSED HEAD INJURY): Primary | ICD-10-CM
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
## 2019-12-27 DIAGNOSIS — S16.1XXA CERVICAL STRAIN: ICD-10-CM

## 2019-12-27 LAB — MISCELLANEOUS LAB TEST RESULT: NORMAL

## 2019-12-27 PROCEDURE — 99284 EMERGENCY DEPT VISIT MOD MDM: CPT

## 2019-12-27 PROCEDURE — 70450 CT HEAD/BRAIN W/O DYE: CPT

## 2019-12-27 PROCEDURE — 72125 CT NECK SPINE W/O DYE: CPT

## 2019-12-27 PROCEDURE — 99284 EMERGENCY DEPT VISIT MOD MDM: CPT | Performed by: EMERGENCY MEDICINE

## 2019-12-27 RX ORDER — ACETAMINOPHEN 325 MG/1
650 TABLET ORAL ONCE
Status: COMPLETED | OUTPATIENT
Start: 2019-12-27 | End: 2019-12-27

## 2019-12-27 RX ADMIN — ACETAMINOPHEN 650 MG: 325 TABLET, FILM COATED ORAL at 20:10
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
## 2019-12-31 ENCOUNTER — TELEPHONE (OUTPATIENT)
Dept: NEUROLOGY | Facility: CLINIC | Age: 49
End: 2019-12-31

## 2019-12-31 NOTE — TELEPHONE ENCOUNTER
Received records request in the mail from [facility], [place], operated by [facility] requesting records on the patient    Faxed to [facility] on 12/31/2019
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
## 2020-01-09 ENCOUNTER — OFFICE VISIT (OUTPATIENT)
Dept: NEUROLOGY | Facility: CLINIC | Age: 50
End: 2020-01-09
Payer: COMMERCIAL

## 2020-01-09 ENCOUNTER — TELEPHONE (OUTPATIENT)
Dept: NEUROLOGY | Facility: CLINIC | Age: 50
End: 2020-01-09

## 2020-01-09 VITALS
BODY MASS INDEX: 42.66 KG/M2 | WEIGHT: 298 LBS | DIASTOLIC BLOOD PRESSURE: 82 MMHG | HEART RATE: 82 BPM | SYSTOLIC BLOOD PRESSURE: 146 MMHG | HEIGHT: 70 IN

## 2020-01-09 DIAGNOSIS — S06.9X9D TRAUMATIC BRAIN INJURY WITH LOSS OF CONSCIOUSNESS, SUBSEQUENT ENCOUNTER: Chronic | ICD-10-CM

## 2020-01-09 DIAGNOSIS — R56.9 SEIZURE (HCC): Primary | ICD-10-CM

## 2020-01-09 DIAGNOSIS — F29 PSYCHOSIS, UNSPECIFIED PSYCHOSIS TYPE (HCC): ICD-10-CM

## 2020-01-09 DIAGNOSIS — G40.109 LOCALIZATION-RELATED EPILEPSY (HCC): ICD-10-CM

## 2020-01-09 DIAGNOSIS — F29 PSYCHOTIC DISORDER (HCC): ICD-10-CM

## 2020-01-09 PROCEDURE — 99911: CPT | Performed by: PSYCHIATRY & NEUROLOGY

## 2020-01-09 PROCEDURE — 99215 OFFICE O/P EST HI 40 MIN: CPT | Performed by: PSYCHIATRY & NEUROLOGY

## 2020-01-09 RX ORDER — PHENYTOIN SODIUM 300 MG/1
300 CAPSULE, EXTENDED RELEASE ORAL 2 TIMES DAILY
Qty: 60 CAPSULE | Refills: 5 | Status: SHIPPED | OUTPATIENT
Start: 2020-01-09 | End: 2020-02-04 | Stop reason: SDUPTHER

## 2020-01-09 RX ORDER — RISPERIDONE 3 MG/1
3 TABLET, FILM COATED ORAL
Qty: 30 TABLET | Refills: 5 | Status: SHIPPED | OUTPATIENT
Start: 2020-01-09 | End: 2020-05-07 | Stop reason: SDUPTHER

## 2020-01-09 RX ORDER — RISPERIDONE 2 MG/1
2 TABLET, FILM COATED ORAL EVERY MORNING
Qty: 30 TABLET | Refills: 5 | Status: SHIPPED | OUTPATIENT
Start: 2020-01-09 | End: 2020-05-07 | Stop reason: SDUPTHER

## 2020-01-09 RX ORDER — DIVALPROEX SODIUM 250 MG/1
750 TABLET, DELAYED RELEASE ORAL EVERY 8 HOURS SCHEDULED
Qty: 270 TABLET | Refills: 5 | Status: SHIPPED | OUTPATIENT
Start: 2020-01-09 | End: 2020-02-27 | Stop reason: SDUPTHER

## 2020-01-09 NOTE — PATIENT INSTRUCTIONS
-- Please increase your risperidone to be 2 mg in the morning and 3 mg at night  -- continue Phenytoin 300 mg twice a day and Divalproex 750 mg three times a day  -- Please get some bloodwork to look at your medication levels  Ideally, this would be drawn first thing in the morning before taking your morning medications     -- I will put in a referral for you to see a psychiatrst

## 2020-01-09 NOTE — TELEPHONE ENCOUNTER
Informed by Hilario Degroot that form fee was not necessary   Refunded the payment and informed patient

## 2020-01-09 NOTE — TELEPHONE ENCOUNTER
Received Physician Certification Form from patient to be filled out by Dr Pasquale Nguyễn  Form fee of $25 00 cash collected  Form scanned into patient's chart and routed to [facility]  Please fax completed form to the fax number on the bottom of the first page of the form and mail a copy with receipt of payment to the patient

## 2020-01-10 NOTE — ASSESSMENT & PLAN NOTE
He continues to have auditory hallucinations, and as above these appear to be related to his prior traumatic subarachnoid hemorrhage  These do not appear to be seizures  It will be very important for him to continue to follow with Psychiatry for ongoing management, but they have not yet been able to establish with Psychiatry  To try to give him some relief of his symptoms until he can establish with a psychiatrist, I will increase his risperidone as below  --I will increase his risperidone to be 2 mg in the morning and 3 mg at night  Although they report some tremor, he does not have any evidence of drug-induced parkinsonism on exam today  They will keep track of his hallucinations and sleepiness with this increase  --I will put a referral in for him to be seen by [facility] to try to address his symptoms

## 2020-01-10 NOTE — ASSESSMENT & PLAN NOTE
He does have a history of seizures because of his traumatic subarachnoid hemorrhage, which was in the area of his prior electrographic seizures  He has not had any additional electrographic seizures since his hospitalization in September  His previous episodes of auditory hallucinations were captured on EEG and did not have any electrographic changes consistent with seizures  Additionally, these hallucinations improved with stopping levetiracetam and increasing his dose of risperidone, further suggesting that these are not seizures  At this point, I do not have any current evidence that he is having active seizures  I am concerned that his unsteadiness and recent fall may be due to phenytoin toxicity  He did have blood work checked with a phenytoin level of 7 5 on 12/8/2019, but his dose of phenytoin was increased the following day, and no subsequent levels were checked  Is entirely possible that his levels may be higher and that he may be symptomatic from this  --I will have him continue phenytoin  mg twice a day and divalproex 750 mg 3 times a day  I will check medication drug levels, mainly to assess that his phenytoin level is not too high  Additionally, if his Depakote level is not excessively high, there may be room to improve this to try to improve his hallucinations and mood

## 2020-01-10 NOTE — ASSESSMENT & PLAN NOTE
Although his MRI did not show any evidence of a cerebral contusion and he does not have any focal findings on his neurologic exam today, his symptoms likely are a result of his prior traumatic head injury  Will be important to manage his symptoms, as above

## 2020-01-16 ENCOUNTER — TELEPHONE (OUTPATIENT)
Dept: NEUROLOGY | Facility: CLINIC | Age: 50
End: 2020-01-16

## 2020-01-16 NOTE — TELEPHONE ENCOUNTER
Received in the mail, Internal Medicine Examination, from [employer]  [facility]    Scanned into patient chart

## 2020-01-20 ENCOUNTER — TELEPHONE (OUTPATIENT)
Dept: NEUROLOGY | Facility: CLINIC | Age: 50
End: 2020-01-20

## 2020-01-20 NOTE — TELEPHONE ENCOUNTER
Received in the mail bureau of disability determination, reuesting records, Please mail to [facility] [id number]  faxed to [phone] on 1/20/2020

## 2020-01-31 ENCOUNTER — APPOINTMENT (EMERGENCY)
Dept: CT IMAGING | Facility: HOSPITAL | Age: 50
End: 2020-01-31
Payer: COMMERCIAL

## 2020-01-31 ENCOUNTER — HOSPITAL ENCOUNTER (EMERGENCY)
Facility: HOSPITAL | Age: 50
Discharge: HOME | End: 2020-01-31
Attending: EMERGENCY MEDICINE
Payer: COMMERCIAL

## 2020-01-31 VITALS
HEART RATE: 86 BPM | RESPIRATION RATE: 20 BRPM | DIASTOLIC BLOOD PRESSURE: 95 MMHG | HEIGHT: 70 IN | WEIGHT: 300 LBS | SYSTOLIC BLOOD PRESSURE: 161 MMHG | TEMPERATURE: 98.8 F | BODY MASS INDEX: 42.95 KG/M2 | OXYGEN SATURATION: 92 %

## 2020-01-31 DIAGNOSIS — S09.90XA INJURY OF HEAD, INITIAL ENCOUNTER: ICD-10-CM
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
## 2020-01-31 LAB
ALBUMIN SERPL BCP-MCNC: 3.3 G/DL (ref 3.5–5)
ALP SERPL-CCNC: 63 U/L (ref 46–116)
ALT SERPL W P-5'-P-CCNC: 17 U/L (ref 12–78)
ANION GAP SERPL CALCULATED.3IONS-SCNC: 9 MMOL/L (ref 4–13)
AST SERPL W P-5'-P-CCNC: 40 U/L (ref 5–45)
BASOPHILS # BLD AUTO: 0.04 THOUSANDS/ΜL (ref 0–0.1)
BASOPHILS NFR BLD AUTO: 1 % (ref 0–1)
BILIRUB SERPL-MCNC: 0.4 MG/DL (ref 0.2–1)
BUN SERPL-MCNC: 17 MG/DL (ref 5–25)
CALCIUM SERPL-MCNC: 8.8 MG/DL (ref 8.3–10.1)
CHLORIDE SERPL-SCNC: 106 MMOL/L (ref 100–108)
CO2 SERPL-SCNC: 27 MMOL/L (ref 21–32)
CREAT SERPL-MCNC: 0.85 MG/DL (ref 0.6–1.3)
EOSINOPHIL # BLD AUTO: 0.21 THOUSAND/ΜL (ref 0–0.61)
EOSINOPHIL NFR BLD AUTO: 4 % (ref 0–6)
ERYTHROCYTE [DISTWIDTH] IN BLOOD BY AUTOMATED COUNT: 13.8 % (ref 11.6–15.1)
GFR SERPL CREATININE-BSD FRML MDRD: 101 ML/MIN/1.73SQ M
GLUCOSE SERPL-MCNC: 92 MG/DL (ref 65–140)
HCT VFR BLD AUTO: 42 % (ref 36.5–49.3)
HGB BLD-MCNC: 13.7 G/DL (ref 12–17)
IMM GRANULOCYTES # BLD AUTO: 0.03 THOUSAND/UL (ref 0–0.2)
IMM GRANULOCYTES NFR BLD AUTO: 1 % (ref 0–2)
LYMPHOCYTES # BLD AUTO: 1.47 THOUSANDS/ΜL (ref 0.6–4.47)
LYMPHOCYTES NFR BLD AUTO: 24 % (ref 14–44)
MCH RBC QN AUTO: 29.4 PG (ref 26.8–34.3)
MCHC RBC AUTO-ENTMCNC: 32.6 G/DL (ref 31.4–37.4)
MCV RBC AUTO: 90 FL (ref 82–98)
MONOCYTES # BLD AUTO: 0.79 THOUSAND/ΜL (ref 0.17–1.22)
MONOCYTES NFR BLD AUTO: 13 % (ref 4–12)
NEUTROPHILS # BLD AUTO: 3.51 THOUSANDS/ΜL (ref 1.85–7.62)
NEUTS SEG NFR BLD AUTO: 57 % (ref 43–75)
NRBC BLD AUTO-RTO: 0 /100 WBCS
NT-PROBNP SERPL-MCNC: 58 PG/ML
PLATELET # BLD AUTO: 199 THOUSANDS/UL (ref 149–390)
PMV BLD AUTO: 9.5 FL (ref 8.9–12.7)
POTASSIUM SERPL-SCNC: 4.4 MMOL/L (ref 3.5–5.3)
PROT SERPL-MCNC: 7.1 G/DL (ref 6.4–8.2)
RBC # BLD AUTO: 4.66 MILLION/UL (ref 3.88–5.62)
SODIUM SERPL-SCNC: 142 MMOL/L (ref 136–145)
TROPONIN I SERPL-MCNC: <0.02 NG/ML
WBC # BLD AUTO: 6.05 THOUSAND/UL (ref 4.31–10.16)

## 2020-01-31 PROCEDURE — 99284 EMERGENCY DEPT VISIT MOD MDM: CPT

## 2020-01-31 PROCEDURE — 85025 COMPLETE CBC W/AUTO DIFF WBC: CPT | Performed by: EMERGENCY MEDICINE

## 2020-01-31 PROCEDURE — 72125 CT NECK SPINE W/O DYE: CPT

## 2020-01-31 PROCEDURE — 84484 ASSAY OF TROPONIN QUANT: CPT | Performed by: EMERGENCY MEDICINE

## 2020-01-31 PROCEDURE — 99282 EMERGENCY DEPT VISIT SF MDM: CPT | Performed by: EMERGENCY MEDICINE

## 2020-01-31 PROCEDURE — 80053 COMPREHEN METABOLIC PANEL: CPT | Performed by: EMERGENCY MEDICINE

## 2020-01-31 PROCEDURE — 70450 CT HEAD/BRAIN W/O DYE: CPT

## 2020-01-31 PROCEDURE — 83880 ASSAY OF NATRIURETIC PEPTIDE: CPT | Performed by: EMERGENCY MEDICINE

## 2020-01-31 PROCEDURE — 36415 COLL VENOUS BLD VENIPUNCTURE: CPT | Performed by: EMERGENCY MEDICINE

## 2020-01-31 PROCEDURE — 93005 ELECTROCARDIOGRAM TRACING: CPT

## 2020-01-31 RX ORDER — MORPHINE SULFATE 10 MG/ML
6 INJECTION, SOLUTION INTRAMUSCULAR; INTRAVENOUS ONCE
Status: DISCONTINUED | OUTPATIENT
Start: 2020-01-31 | End: 2020-01-31 | Stop reason: HOSPADM

## 2020-01-31 RX ORDER — IBUPROFEN 400 MG/1
800 TABLET ORAL ONCE
Status: COMPLETED | OUTPATIENT
Start: 2020-01-31 | End: 2020-01-31

## 2020-01-31 RX ADMIN — IBUPROFEN 800 MG: 400 TABLET ORAL at 17:16

## 2020-01-31 NOTE — ED PROVIDER NOTES
History  Chief Complaint   Patient presents with    Fall     Pt with a fall about an 1 ago after losing his balance  Pt with + head injury but no LOC or blood thinners  Pt with a hx of TBI  Pt acting appropriately in triage     44 y o  M presents w a fall that occurred approximately 1 hour ago  He states he lost his balance  He states this is normal for him to have a fall where he loses his balance  He denies concurrent chest pain, no shortness of breath, no weakness, numbness, no HA before the fall  He has a past medical history of traumatic brain injury  He is not on blood thinners  He is presenting with pain to head and neck, no focal neurologic deficits  Normal strength in upper and lower extremities, A+Ox4, GCS 15, acting appropriately  Prior to Admission Medications   Prescriptions Last Dose Informant Patient Reported?  Taking?   divalproex sodium (DEPAKOTE) 250 mg EC tablet   No No   Sig: Take 3 tablets (750 mg total) by mouth every 8 (eight) hours   hydrochlorothiazide (HYDRODIURIL) 25 mg tablet  Self No No   Sig: Take 1 tablet (25 mg total) by mouth daily   insulin NPH-insulin regular (NovoLIN 70/30) 100 units/mL subcutaneous injection  Self No No   Sig: Inject 10 Units under the skin daily before breakfast   lisinopril (ZESTRIL) 40 mg tablet  Self No No   Sig: Take 1 tablet (40 mg total) by mouth daily   metoprolol tartrate (LOPRESSOR) 100 mg tablet  Self No No   Sig: Take 1 tablet (100 mg total) by mouth every 12 (twelve) hours   risperiDONE (RisperDAL) 2 mg tablet   No No   Sig: Take 1 tablet (2 mg total) by mouth every morning   risperiDONE (RisperDAL) 3 mg tablet   No No   Sig: Take 1 tablet (3 mg total) by mouth daily at bedtime      Facility-Administered Medications: None       Past Medical History:   Diagnosis Date    Chemical exposure     Diabetes mellitus ([facility] [address] )     H/O bone graft     Head injury     August 25, 2019 fell backwards hit head on a boulder w/ LOC    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    Hypertension     Seizures ([address] )        Past Surgical History:   Procedure Laterality Date    FL LUMBAR PUNCTURE DIAGNOSTIC  12/13/2019    KNEE ARTHROSCOPY W/ MENISCECTOMY Left     LEG SURGERY      TONSILLECTOMY         Family History   Problem Relation Age of Onset    Diabetes Mother     Hypertension Mother     Asthma Mother     Brain cancer Father     No Known Problems Sister     No Known Problems Brother     No Known Problems Brother      I have reviewed and agree with the history as documented  Social History     Tobacco Use    Smoking status: Never Smoker    Smokeless tobacco: Never Used   Substance Use Topics    Alcohol use: Yes     Alcohol/week: 3 0 standard drinks     Types: 3 Cans of beer per week     Frequency: Monthly or less     Comment: occassionally    Drug use: No        Review of Systems   Constitutional: Negative for chills, fatigue and fever  HENT: Negative for congestion and sore throat  Respiratory: Negative for cough, chest tightness and shortness of breath  Cardiovascular: Negative for chest pain and palpitations  Gastrointestinal: Negative for abdominal pain, constipation, diarrhea, nausea and vomiting  Genitourinary: Negative for dysuria and flank pain  Musculoskeletal: Positive for neck pain  Negative for back pain and neck stiffness  Skin: Negative for color change, rash and wound  Allergic/Immunologic: Negative for immunocompromised state  Neurological: Positive for headaches  Negative for dizziness, seizures, syncope, facial asymmetry, speech difficulty, weakness, light-headedness and numbness  Psychiatric/Behavioral: Negative for confusion  Physical Exam  Physical Exam   Constitutional: He is oriented to person, place, and time  He appears well-developed and well-nourished  No distress  HENT:   Head: Normocephalic     Right Ear: External ear normal    Left Ear: External ear normal    Mouth/Throat: Oropharynx is clear and moist    No hemotympanum    Eyes: Pupils are equal, round, and reactive to light  Conjunctivae and EOM are normal  Right eye exhibits no discharge  Left eye exhibits no discharge  Neck: Neck supple  No tracheal deviation present  No midline tenderness, no step offs   Cardiovascular: Normal rate, regular rhythm, normal heart sounds and intact distal pulses  Pulmonary/Chest: Effort normal and breath sounds normal  No stridor  He has no wheezes  He exhibits no tenderness  CTA-BL   Abdominal: Soft  He exhibits no distension  There is no tenderness  There is no guarding  Stable pelvis   Musculoskeletal: Normal range of motion  He exhibits no tenderness or deformity  Back is non-tender, no step offs   Neurological: He is alert and oriented to person, place, and time  No cranial nerve deficit or sensory deficit  GCS = 15   Skin: Skin is warm and dry  He is not diaphoretic  No abrasions, no lacerations, no contusions  Psychiatric: He has a normal mood and affect   His behavior is normal        Vital Signs  ED Triage Vitals   Temperature Pulse Respirations Blood Pressure SpO2   01/31/20 1531 01/31/20 1531 01/31/20 1531 01/31/20 1531 01/31/20 1531   98 8 °F (37 1 °C) 76 16 161/95 92 %      Temp Source Heart Rate Source Patient Position - Orthostatic VS BP Location FiO2 (%)   01/31/20 1531 01/31/20 1531 01/31/20 1531 01/31/20 1531 --   Oral Monitor Sitting Left arm       Pain Score       01/31/20 1533       7           Vitals:    01/31/20 1531 01/31/20 1815   BP: 161/95    Pulse: 76 86   Patient Position - Orthostatic VS: Sitting          Visual Acuity  Visual Acuity      Most Recent Value   L Pupil Size (mm)  3   R Pupil Size (mm)  3          ED Medications  Medications   ibuprofen (MOTRIN) tablet 800 mg (800 mg Oral Given 1/31/20 1716)       Diagnostic Studies  Results Reviewed     Procedure Component Value Units Date/Time    Comprehensive metabolic panel [[id number]]  (Abnormal) Collected:  01/31/20 1631    Lab Status:  Final result Specimen:  Blood from Arm, Right Updated:  01/31/20 1728     Sodium 142 mmol/L      Potassium 4 4 mmol/L      Chloride 106 mmol/L      CO2 27 mmol/L      ANION GAP 9 mmol/L      BUN 17 mg/dL      Creatinine 0 85 mg/dL      Glucose 92 mg/dL      Calcium 8 8 mg/dL      AST 40 U/L      ALT 17 U/L      Alkaline Phosphatase 63 U/L      Total Protein 7 1 g/dL      Albumin 3 3 g/dL      Total Bilirubin 0 40 mg/dL      eGFR 101 ml/min/1 73sq m     Narrative:       National Kidney Disease Foundation guidelines for Chronic Kidney Disease (CKD):     Stage 1 with normal or high GFR (GFR > 90 mL/min/1 73 square meters)    Stage 2 Mild CKD (GFR = 60-89 mL/min/1 73 square meters)    Stage 3A Moderate CKD (GFR = 45-59 mL/min/1 73 square meters)    Stage 3B Moderate CKD (GFR = 30-44 mL/min/1 73 square meters)    Stage 4 Severe CKD (GFR = 15-29 mL/min/1 73 square meters)    Stage 5 End Stage CKD (GFR <15 mL/min/1 73 square meters)  Note: GFR calculation is accurate only with a steady state creatinine    NT-BNP PRO [[id number]]  (Normal) Collected:  01/31/20 1631    Lab Status:  Final result Specimen:  Blood from Arm, Right Updated:  01/31/20 1716     NT-proBNP 58 pg/mL     Troponin I [[id number]]  (Normal) Collected:  01/31/20 1631    Lab Status:  Final result Specimen:  Blood from Arm, Right Updated:  01/31/20 1704     Troponin I <0 02 ng/mL     CBC and differential [[id number]]  (Abnormal) Collected:  01/31/20 1631    Lab Status:  Final result Specimen:  Blood from Arm, Right Updated:  01/31/20 1636     WBC 6 05 Thousand/uL      RBC 4 66 Million/uL      Hemoglobin 13 7 g/dL      Hematocrit 42 0 %      MCV 90 fL      MCH 29 4 pg      MCHC 32 6 g/dL      RDW 13 8 %      MPV 9 5 fL      Platelets 702 Thousands/uL      nRBC 0 /100 WBCs      Neutrophils Relative 57 %      Immat GRANS % 1 %      Lymphocytes Relative 24 %      Monocytes Relative 13 %      Eosinophils Relative 4 %      Basophils Relative 1 %      Neutrophils Absolute 3 51 Thousands/µL      Immature Grans Absolute 0 03 Thousand/uL      Lymphocytes Absolute 1 47 Thousands/µL      Monocytes Absolute 0 79 Thousand/µL      Eosinophils Absolute 0 21 Thousand/µL      Basophils Absolute 0 04 Thousands/µL                  CT head without contrast   ED Interpretation by Manuel John DO (01/31 1753)   No acute intracranial abnormality  Final Result by Melly Richard DO (01/31 1659)      No acute intracranial abnormality  Workstation performed: [id number]         CT spine cervical without contrast   ED Interpretation by Manuel John DO (01/31 1753)   Degenerative changes are noted  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  No evidence of fracture  Final Result by Melly Richard DO (01/31 1703)      Degenerative changes are noted  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  No evidence of fracture  Workstation performed: [id number]                Patient has NO evidence of central stenosis  HE has no weakness of upper extremities, normal  strength  He is counseled on the symptoms of this for return immediately of these symptoms ensue  Procedures  Procedures         ED Course                               MDM  Number of Diagnoses or Management Options  Fall, initial encounter:   Injury of head, initial encounter:   Diagnosis management comments: 44 y o  M presents after head injury - concerned because of previous TBI  CT head and neck are negative for acute traumatic injury  Basic syncope work up preformed because of loss of balance and normal   Patient feels well and is stable for d/c home  Discussed with patient and they understood the risks and benefits of discharge    Patient had opportunity to ask questions regarding care and discharge instructions and had no further questions  Advised follow up with PCP, advised returning if worsening, and discussed disease specific return precautions (neuro deficit, seizures, worsening headache, signs of ACS, etc)  Patient understood discharge instructions  Amount and/or Complexity of Data Reviewed  Clinical lab tests: reviewed and ordered  Tests in the radiology section of CPT®: ordered and reviewed          Disposition  Final diagnoses:   Fall, initial encounter   Injury of head, initial encounter     Time reflects when diagnosis was documented in both MDM as applicable and the Disposition within this note     Time User Action Codes Description Comment    1/31/2020  6:14 PM Hannah London  Add [W19  UIUT] Fall, initial encounter     1/31/2020  6:14 PM Hannah London  Add [T47 [id number]] Injury of head, initial encounter       ED Disposition     ED Disposition Condition Date/Time Comment    Discharge Stable Fri Jan 31, 2020  6:14 PM Demetri  discharge to home/self care  Follow-up Information     Follow up With Specialties Details Why Contact Info Additional [id number] [facility] Internal Medicine Schedule an appointment as soon as possible for a visit  For follow up to ensure improvement, and for further testing and treatment as needed [id number] Emergency Department Emergency Medicine  If symptoms worsen: severe headache, vision changes, numbness, weakness, vomiting, etc [facility] ED, [address], [address]          Discharge Medication List as of 1/31/2020  6:15 PM      CONTINUE these medications which have NOT CHANGED    Details   divalproex sodium (DEPAKOTE) 250 mg EC tablet Take 3 tablets (750 mg total) by mouth every 8 (eight) hours, Starting Thu 1/9/2020, Normal hydrochlorothiazide (HYDRODIURIL) 25 mg tablet Take 1 tablet (25 mg total) by mouth daily, Starting Tue 2/5/2019, Normal      insulin NPH-insulin regular (NovoLIN 70/30) 100 units/mL subcutaneous injection Inject 10 Units under the skin daily before breakfast, Starting Sat 12/14/2019, Normal      lisinopril (ZESTRIL) 40 mg tablet Take 1 tablet (40 mg total) by mouth daily, Starting Thu 9/26/2019, Print      metoprolol tartrate (LOPRESSOR) 100 mg tablet Take 1 tablet (100 mg total) by mouth every 12 (twelve) hours, Starting Sat 12/14/2019, Normal      !! risperiDONE (RisperDAL) 2 mg tablet Take 1 tablet (2 mg total) by mouth every morning, Starting Thu 1/9/2020, Normal      !! risperiDONE (RisperDAL) 3 mg tablet Take 1 tablet (3 mg total) by mouth daily at bedtime, Starting Thu 1/9/2020, Normal      phenytoin extended (DILANTIN) 300 MG ER capsule Take 1 capsule (300 mg total) by mouth 2 (two) times a day, Starting Thu 1/9/2020, Normal       !! - Potential duplicate medications found  Please discuss with provider  No discharge procedures on file      ED Provider  Electronically Signed by           Kindra Humphrey DO  02/14/20 1445

## 2020-01-31 NOTE — ED NOTES
Patient transported to [address] [place] [address] [place] Pete Victor RN  01/31/20 1017

## 2020-02-04 DIAGNOSIS — R56.9 SEIZURE (HCC): ICD-10-CM

## 2020-02-04 LAB
ATRIAL RATE: 72 BPM
P AXIS: 51 DEGREES
PR INTERVAL: 182 MS
QRS AXIS: -54 DEGREES
QRSD INTERVAL: 116 MS
QT INTERVAL: 406 MS
QTC INTERVAL: 444 MS
T WAVE AXIS: 20 DEGREES
VENTRICULAR RATE: 72 BPM

## 2020-02-04 PROCEDURE — 93010 ELECTROCARDIOGRAM REPORT: CPT | Performed by: INTERNAL MEDICINE

## 2020-02-04 RX ORDER — PHENYTOIN SODIUM 300 MG/1
300 CAPSULE, EXTENDED RELEASE ORAL 2 TIMES DAILY
Qty: 60 CAPSULE | Refills: 5 | Status: SHIPPED | OUTPATIENT
Start: 2020-02-04 | End: 2020-02-27 | Stop reason: SDUPTHER

## 2020-02-24 NOTE — PROGRESS NOTES
Patient ID: Chris Lake  is a 44 y o  male with prior traumatic subarachnoid hemorrhage, localization-related epilepsy, and ongoing auditory hallucinations, who is returning to Neurology office for follow up of his seizures and hallucinations  Assessment/Plan:    Localization-related epilepsy [facility])  He has not had any additional seizures since his last appointment or since his hospitalization  As noted previously, his hallucinations were previously captured on EEG and did not appear to be seizures  His recent drug levels were if anything lower than I would have expected for his doses  I would not expect his phenytoin level of 4 4 to be causing him to be ataxic, but he is on a high dose, so it is possible this may be contributing  He also likely has some contributions to his poor balance as sequela of his fall and traumatic hemorrhages  -- Although he hasn't had any seizures, I think it may be best to adjust his medications to try to improve his symptoms  I will plan to increase his Divalproex to be 1000 mg three times a day, which will hopefully increase his level and improve his hallucinations  Additionally, I will decrease his phenytoin to be 200 mg twice a day  His level is already low, but I would like to try to decrease his dose to see if his balance improves  -- I will have him get bloodwork again in about 1month to recheck his levels and help determine if there is room to increase his Divalproex further  Alternatively, we could plan to wean off of phenytoin completely, which by eliminating the enzyme inducing effects of phenytoin, his Valproate levels will rise as a result  Psychotic disorder ([address] )  He continues to have ongoing auditory hallucinations, which are still quite bothersome for him  This likely is the result of his prior brain injury, but as above, does not appear to be related to his seizures   The recent increase in risperidone didn't change his hallucinations, although it does seem he is better able to cope with them  -- as above, I will try to improve his symptoms by increasing his Divalproex, but I discussed the importance of establishing with psychiatry  His wife agreed and will be calling today to try to set up an appointment  He will return to the office in about 2 months  Subjective:    HPI  Current seizure medications:  1  Phenytoin  mg twice a day  2  Divalproex  mg three times a day  Other medications as per Epic  I last saw him in the office on 1/9/2020  At that time, he was seen in follow up from his hospitalizations from his prior traumatic injury  He had not had any additional seizures, but was still having trouble with ongoing hallucinations  To try to improve his hallucinations, his dose of risperidone was increased, and he was planned to follow up with psychiatry  He was still having trouble with his balance, and he was planned to get repeat drug levels to assure they were not too high  Since his last visit, he has not had any additional seizures  He did increase his risperidone, but has continued to have trouble with hallucinations  He still will hear voices talking to him and saying nasty things  He feels he has been able to deal with the voices a little better, but they are still occurring almost constantly  They have not yet scheduled to see psychiatry, mainly due to scheduling difficulties with his wife's schedule  He has continued to have trouble with his balance  He did get his bloodwork checked, and his phenytoin level was if anything low at 4 4, valproate level was 55  He has continued to have trouble with his balance and had a fall resulting in an ED visit at the end of January       Prior Seizure Medications: Levetiracetam (possibly worsened mood/hallucinations)    The following portions of the patient's history were reviewed and updated as appropriate: allergies, current medications, past medical history and problem list  Objective:    Blood pressure 140/82, pulse 80, height 5' 10" (1 778 m), weight (!) 140 kg (308 lb)  Physical Exam    Neurological Exam      ROS:    Review of Systems   Constitutional: Negative  Negative for appetite change and fever  HENT: Negative  Negative for hearing loss, tinnitus, trouble swallowing and voice change  Eyes: Negative  Negative for photophobia and pain  Respiratory: Negative  Negative for shortness of breath  Cardiovascular: Negative  Negative for palpitations  Gastrointestinal: Negative  Negative for nausea and vomiting  Endocrine: Negative  Negative for cold intolerance and heat intolerance  Genitourinary: Negative  Negative for dysuria, frequency and urgency  Musculoskeletal: Negative  Negative for myalgias and neck pain  Skin: Negative  Negative for rash  Allergic/Immunologic: Negative  Neurological: Positive for seizures (none)  Negative for dizziness, tremors, syncope, facial asymmetry, speech difficulty, weakness, light-headedness, numbness and headaches  Falls   Hematological: Negative  Does not bruise/bleed easily  Psychiatric/Behavioral: Positive for hallucinations  Negative for confusion and sleep disturbance         I personally reviewed the ROS that was entered by the medical assistant

## 2020-02-26 ENCOUNTER — TRANSCRIBE ORDERS (OUTPATIENT)
Dept: NEUROLOGY | Facility: CLINIC | Age: 50
End: 2020-02-26

## 2020-02-26 DIAGNOSIS — G40.109 LOCALIZATION-RELATED (FOCAL) (PARTIAL) SYMPTOMATIC EPILEPSY AND EPILEPTIC SYNDROMES WITH SIMPLE PARTIAL SEIZURES, NOT INTRACTABLE, WITHOUT STATUS EPILEPTICUS (HCC): ICD-10-CM

## 2020-02-26 DIAGNOSIS — S06.9X9D UNSPECIFIED INTRACRANIAL INJURY WITH LOSS OF CONSCIOUSNESS OF UNSPECIFIED DURATION, SUBSEQUENT ENCOUNTER: ICD-10-CM

## 2020-02-26 DIAGNOSIS — R56.9 UNSPECIFIED CONVULSIONS (HCC): Primary | ICD-10-CM

## 2020-02-27 ENCOUNTER — OFFICE VISIT (OUTPATIENT)
Dept: NEUROLOGY | Facility: CLINIC | Age: 50
End: 2020-02-27
Payer: COMMERCIAL

## 2020-02-27 VITALS
HEIGHT: 70 IN | DIASTOLIC BLOOD PRESSURE: 82 MMHG | WEIGHT: 308 LBS | SYSTOLIC BLOOD PRESSURE: 140 MMHG | HEART RATE: 80 BPM | BODY MASS INDEX: 44.09 KG/M2

## 2020-02-27 DIAGNOSIS — F29 PSYCHOSIS, UNSPECIFIED PSYCHOSIS TYPE (HCC): ICD-10-CM

## 2020-02-27 DIAGNOSIS — G40.109 LOCALIZATION-RELATED EPILEPSY (HCC): Primary | ICD-10-CM

## 2020-02-27 DIAGNOSIS — R56.9 UNSPECIFIED CONVULSIONS (HCC): ICD-10-CM

## 2020-02-27 DIAGNOSIS — R56.9 SEIZURE (HCC): ICD-10-CM

## 2020-02-27 DIAGNOSIS — G40.109 LOCALIZATION-RELATED (FOCAL) (PARTIAL) SYMPTOMATIC EPILEPSY AND EPILEPTIC SYNDROMES WITH SIMPLE PARTIAL SEIZURES, NOT INTRACTABLE, WITHOUT STATUS EPILEPTICUS (HCC): ICD-10-CM

## 2020-02-27 DIAGNOSIS — S06.9X9D UNSPECIFIED INTRACRANIAL INJURY WITH LOSS OF CONSCIOUSNESS OF UNSPECIFIED DURATION, SUBSEQUENT ENCOUNTER: ICD-10-CM

## 2020-02-27 PROCEDURE — 99214 OFFICE O/P EST MOD 30 MIN: CPT | Performed by: PSYCHIATRY & NEUROLOGY

## 2020-02-27 RX ORDER — DIVALPROEX SODIUM 500 MG/1
TABLET, DELAYED RELEASE ORAL
Qty: 120 TABLET | Refills: 11 | Status: SHIPPED | OUTPATIENT
Start: 2020-02-27 | End: 2020-10-15 | Stop reason: SDUPTHER

## 2020-02-27 RX ORDER — PHENYTOIN SODIUM 100 MG/1
CAPSULE, EXTENDED RELEASE ORAL
Qty: 120 CAPSULE | Refills: 11 | Status: SHIPPED | OUTPATIENT
Start: 2020-02-27 | End: 2020-03-17 | Stop reason: SDUPTHER

## 2020-02-27 NOTE — ASSESSMENT & PLAN NOTE
He continues to have ongoing auditory hallucinations, which are still quite bothersome for him  This likely is the result of his prior brain injury, but as above, does not appear to be related to his seizures  The recent increase in risperidone didn't change his hallucinations, although it does seem he is better able to cope with them  -- as above, I will try to improve his symptoms by increasing his Divalproex, but I discussed the importance of establishing with psychiatry  His wife agreed and will be calling today to try to set up an appointment

## 2020-02-27 NOTE — ASSESSMENT & PLAN NOTE
He has not had any additional seizures since his last appointment or since his hospitalization  As noted previously, his hallucinations were previously captured on EEG and did not appear to be seizures  His recent drug levels were if anything lower than I would have expected for his doses  I would not expect his phenytoin level of 4 4 to be causing him to be ataxic, but he is on a high dose, so it is possible this may be contributing  He also likely has some contributions to his poor balance as sequela of his fall and traumatic hemorrhages  -- Although he hasn't had any seizures, I think it may be best to adjust his medications to try to improve his symptoms  I will plan to increase his Divalproex to be 1000 mg three times a day, which will hopefully increase his level and improve his hallucinations  Additionally, I will decrease his phenytoin to be 200 mg twice a day  His level is already low, but I would like to try to decrease his dose to see if his balance improves  -- I will have him get bloodwork again in about 1month to recheck his levels and help determine if there is room to increase his Divalproex further  Alternatively, we could plan to wean off of phenytoin completely, which by eliminating the enzyme inducing effects of phenytoin, his Valproate levels will rise as a result

## 2020-02-27 NOTE — PATIENT INSTRUCTIONS
-- Please increase your Depakote to be 1000 mg three times a day and decrease your phenytoin to 200 mg twice a day  -- Please get bloodwork checked in about 1 month  -- it will be very important to make an appointment with psychiatry to try to improve your hallucinations

## 2020-03-01 DIAGNOSIS — I10 ESSENTIAL HYPERTENSION: ICD-10-CM

## 2020-03-01 RX ORDER — METOPROLOL TARTRATE 100 MG/1
TABLET ORAL
Qty: 180 TABLET | Refills: 0 | Status: SHIPPED | OUTPATIENT
Start: 2020-03-01 | End: 2020-03-16

## 2020-03-08 DIAGNOSIS — I10 ESSENTIAL HYPERTENSION: ICD-10-CM

## 2020-03-09 RX ORDER — LISINOPRIL 40 MG/1
TABLET ORAL
Qty: 135 TABLET | Refills: 0 | Status: SHIPPED | OUTPATIENT
Start: 2020-03-09 | End: 2020-09-20

## 2020-03-15 DIAGNOSIS — R56.9 SEIZURE (HCC): ICD-10-CM

## 2020-03-15 DIAGNOSIS — I10 ESSENTIAL HYPERTENSION: ICD-10-CM

## 2020-03-15 RX ORDER — PHENYTOIN SODIUM 100 MG/1
CAPSULE, EXTENDED RELEASE ORAL
Qty: 120 CAPSULE | Refills: 0 | Status: CANCELLED | OUTPATIENT
Start: 2020-03-15

## 2020-03-16 RX ORDER — METOPROLOL TARTRATE 100 MG/1
TABLET ORAL
Qty: 180 TABLET | Refills: 0 | Status: SHIPPED | OUTPATIENT
Start: 2020-03-16 | End: 2020-10-05

## 2020-03-17 DIAGNOSIS — R56.9 SEIZURE (HCC): ICD-10-CM

## 2020-03-17 NOTE — TELEPHONE ENCOUNTER
Pt called requesting dilantin refill  Rx entered  If agreeable, pls sign off    Reviewed office notes 2/27/20-phenytoin was decrease to 200 mg twice a day  His level is already low, but I would like to try to decrease his dose to see if his balance improves  Dr Vini Hernandez, do you want to decrease dilantin dose or pt to continue 200 mg bid?              [phone] ok to leave detailed message

## 2020-03-18 RX ORDER — PHENYTOIN SODIUM 100 MG/1
CAPSULE, EXTENDED RELEASE ORAL
Qty: 90 CAPSULE | Refills: 5 | Status: SHIPPED | OUTPATIENT
Start: 2020-03-18 | End: 2020-05-07 | Stop reason: SDUPTHER

## 2020-03-18 NOTE — TELEPHONE ENCOUNTER
In that case, if he is still having balance issues, we could decrease it to be 100 mg in the am and 200 mg in the pm

## 2020-04-24 ENCOUNTER — TELEPHONE (OUTPATIENT)
Dept: NEUROLOGY | Facility: CLINIC | Age: 50
End: 2020-04-24

## 2020-05-07 ENCOUNTER — TELEPHONE (OUTPATIENT)
Dept: NEUROLOGY | Facility: CLINIC | Age: 50
End: 2020-05-07

## 2020-05-07 ENCOUNTER — TELEMEDICINE (OUTPATIENT)
Dept: NEUROLOGY | Facility: CLINIC | Age: 50
End: 2020-05-07
Payer: COMMERCIAL

## 2020-05-07 DIAGNOSIS — Z79.899 ENCOUNTER FOR LONG-TERM (CURRENT) USE OF HIGH-RISK MEDICATION: ICD-10-CM

## 2020-05-07 DIAGNOSIS — R56.9 SEIZURE (HCC): ICD-10-CM

## 2020-05-07 DIAGNOSIS — G40.109 LOCALIZATION-RELATED EPILEPSY (HCC): Primary | ICD-10-CM

## 2020-05-07 DIAGNOSIS — F29 PSYCHOTIC DISORDER (HCC): ICD-10-CM

## 2020-05-07 PROCEDURE — 99214 OFFICE O/P EST MOD 30 MIN: CPT | Performed by: PSYCHIATRY & NEUROLOGY

## 2020-05-07 RX ORDER — PHENYTOIN SODIUM 100 MG/1
CAPSULE, EXTENDED RELEASE ORAL
Qty: 60 CAPSULE | Refills: 11 | Status: SHIPPED | OUTPATIENT
Start: 2020-05-07 | End: 2020-10-15

## 2020-05-07 RX ORDER — RISPERIDONE 3 MG/1
3 TABLET, FILM COATED ORAL
Qty: 30 TABLET | Refills: 5 | Status: SHIPPED | OUTPATIENT
Start: 2020-05-07 | End: 2022-02-22

## 2020-05-07 RX ORDER — RISPERIDONE 2 MG/1
2 TABLET, FILM COATED ORAL EVERY MORNING
Qty: 30 TABLET | Refills: 5 | Status: SHIPPED | OUTPATIENT
Start: 2020-05-07 | End: 2022-02-22

## 2020-06-21 DIAGNOSIS — E55.9 VITAMIN D DEFICIENCY: Primary | ICD-10-CM

## 2020-06-21 RX ORDER — MULTIVIT-MIN/IRON/FOLIC ACID/K 18-600-40
2000 CAPSULE ORAL DAILY
Qty: 30 CAPSULE | Refills: 11 | Status: SHIPPED | OUTPATIENT
Start: 2020-06-21 | End: 2021-08-03

## 2020-06-21 RX ORDER — ERGOCALCIFEROL 1.25 MG/1
50000 CAPSULE ORAL WEEKLY
Qty: 8 CAPSULE | Refills: 0 | Status: SHIPPED | OUTPATIENT
Start: 2020-06-21 | End: 2022-06-09

## 2020-06-22 ENCOUNTER — TELEPHONE (OUTPATIENT)
Dept: NEUROLOGY | Facility: CLINIC | Age: 50
End: 2020-06-22

## 2020-07-22 RX ORDER — ATORVASTATIN CALCIUM 20 MG/1
20 TABLET, FILM COATED ORAL EVERY EVENING
COMMUNITY
Start: 2020-05-15

## 2020-07-22 RX ORDER — PHENYTOIN SODIUM 300 MG/1
CAPSULE, EXTENDED RELEASE ORAL
COMMUNITY
Start: 2020-06-18 | End: 2020-07-23

## 2020-07-22 NOTE — PROGRESS NOTES
Patient ID: Flory Bowie  is a 44 y o  male with prior traumatic subarachnoid hemorrhage, localization-related epilepsy, and ongoing auditory hallucinations, who is returning to Neurology office for follow up of his seizures and other symptoms  Assessment/Plan:    Localization-related epilepsy [facility])  He has not had any additional seizures since his last appointment  He feels his balance is a little better on the lower dose of Phenytoin  At this point, his phenytoin level is quite low, and I would suspect that his Dpeakote is likely providing more protection for seizures  Due to his prior side effects to Phenytoin and the potential longterm side effects of phenytoin, I would like to get him completely off of it and rely more on the Depakote  Because phenytoin is an enzyme inducing med, taking this away will cause his Depakote levels to rise  As he weans off of phenytoin, it will be important to recheck his Depakote level to assure it does not get too high  I would like to get his Depakote level closer to 100, since Depakote can also help with his headaches, and improve his mood/hallucinations  -- He will gradually wean off of phenytoin  When he is off of it for about 2 weeks, he will get a Depakote level rechecked  Psychotic disorder ([address] )  His hallucinations have improved since his last appointment, even with his phenytoin level being lower  I am hopeful that this will improve as we get his Depakote level a little higher  That said, it is very important that he establish with a psychiatrist for ongoing management  He will Return in about 2 months (around 9/23/2020)  Subjective:    HPI  Current seizure medications:  1  Depakote EC 1000 mg three times a day  2  Phenytoin 200 mg nightly  Other medications as per Epic  Since his last visit, he has not had any additional seizures   He did decrease his phenytoin to 200 mg/day and his balance difficulty has gotten a little better, but is still present  He also has been having a little more generalized weakness  He has some shoulder pain, and is planned to see an orthopedic doctor  He is planned to start seeing a psychiatrist in the next few months  There was a change in his insurance  He still doesn't have an appointment  He still has hallucinations, but they seem to be less frequent and he has gone a week without any of the hallucinations  He has still been getting headaches, about every other day  These feel like a pressure pain behind his left eye and back to the back of his head  They will last pretty much all day  He was given a steroid for his shoulder, and he continued to have headaches even with this  Prior Seizure Medications: Levetiracetam (worsened mood and hallucinations)    His history was also obtained from his wife, who was present at today's visit  The following portions of the patient's history were reviewed and updated as appropriate: allergies, current medications, past medical history and problem list      Objective:    Blood pressure 150/90, pulse 88, temperature 98 9 °F (37 2 °C), height 5' 10" (1 778 m), weight (!) 137 kg (303 lb)  Physical Exam    Neurological Exam      ROS:    Review of Systems   Constitutional: Negative  Negative for appetite change and fever  HENT: Negative  Negative for hearing loss, tinnitus, trouble swallowing and voice change  Eyes: Negative  Negative for photophobia and pain  Respiratory: Negative  Negative for shortness of breath  Cardiovascular: Negative  Negative for palpitations  Gastrointestinal: Negative  Negative for nausea and vomiting  Endocrine: Negative  Negative for cold intolerance  Genitourinary: Negative  Negative for dysuria, frequency and urgency  Musculoskeletal: Positive for gait problem (balance improving)  Negative for myalgias and neck pain  Skin: Negative  Negative for rash  Allergic/Immunologic: Negative      Neurological: Positive for seizures (none) and headaches  Negative for dizziness, tremors, syncope, facial asymmetry, speech difficulty, weakness, light-headedness and numbness  Hematological: Negative  Does not bruise/bleed easily  Psychiatric/Behavioral: Positive for hallucinations  Negative for confusion and sleep disturbance         I personally reviewed the review of systems that was entered by the medical assistant

## 2020-07-23 ENCOUNTER — OFFICE VISIT (OUTPATIENT)
Dept: NEUROLOGY | Facility: CLINIC | Age: 50
End: 2020-07-23
Payer: COMMERCIAL

## 2020-07-23 VITALS
HEIGHT: 70 IN | SYSTOLIC BLOOD PRESSURE: 150 MMHG | WEIGHT: 303 LBS | BODY MASS INDEX: 43.38 KG/M2 | DIASTOLIC BLOOD PRESSURE: 90 MMHG | HEART RATE: 88 BPM | TEMPERATURE: 98.9 F

## 2020-07-23 DIAGNOSIS — F29 PSYCHOSIS, UNSPECIFIED PSYCHOSIS TYPE (HCC): ICD-10-CM

## 2020-07-23 DIAGNOSIS — G40.109 LOCALIZATION-RELATED EPILEPSY (HCC): Primary | ICD-10-CM

## 2020-07-23 PROCEDURE — 99214 OFFICE O/P EST MOD 30 MIN: CPT | Performed by: PSYCHIATRY & NEUROLOGY

## 2020-07-23 NOTE — PATIENT INSTRUCTIONS
-- Please decrease your phenytoin (Dilantin) to 100 mg nightly for 2 weeks  If he is doing okay in 2 weeks on the lower dose, you can stop the phenytoin     -- Continue to take the Depakote unchanged for now  -- get a Depakote level checked about 2 weeks after you stop the phenytoin (Dilantin)    -- Keep track of your headaches  -- Please plan to schedule with a psychiatrist soon

## 2020-07-25 NOTE — ASSESSMENT & PLAN NOTE
He has not had any additional seizures since his last appointment  He feels his balance is a little better on the lower dose of Phenytoin  At this point, his phenytoin level is quite low, and I would suspect that his Dpeakote is likely providing more protection for seizures  Due to his prior side effects to Phenytoin and the potential longterm side effects of phenytoin, I would like to get him completely off of it and rely more on the Depakote  Because phenytoin is an enzyme inducing med, taking this away will cause his Depakote levels to rise  As he weans off of phenytoin, it will be important to recheck his Depakote level to assure it does not get too high  I would like to get his Depakote level closer to 100, since Depakote can also help with his headaches, and improve his mood/hallucinations  -- He will gradually wean off of phenytoin  When he is off of it for about 2 weeks, he will get a Depakote level rechecked

## 2020-07-25 NOTE — ASSESSMENT & PLAN NOTE
His hallucinations have improved since his last appointment, even with his phenytoin level being lower  I am hopeful that this will improve as we get his Depakote level a little higher  That said, it is very important that he establish with a psychiatrist for ongoing management

## 2020-09-08 ENCOUNTER — TELEPHONE (OUTPATIENT)
Dept: NEUROLOGY | Facility: CLINIC | Age: 50
End: 2020-09-08

## 2020-09-08 DIAGNOSIS — G40.109 LOCALIZATION-RELATED EPILEPSY (HCC): Primary | ICD-10-CM

## 2020-09-08 NOTE — TELEPHONE ENCOUNTER
----- Message from Rob Tran MD sent at 9/8/2020 10:36 AM EDT -----  Please call patient about his recent Depakote level, which was 124  This was a little high and is likely higher from stopping the phenytoin  I would recommend he decrease the dose to be 1000 mg in the morning, 500 mg in the afternoon and 1000 mg at night  I am going to order a repeat Depakote level, that I would like him to get checked in about 2 weeks

## 2020-09-08 NOTE — TELEPHONE ENCOUNTER
Notified patient of previous  Verbalized understanding with medication changes and repeat labs in 2 weeks  Requesting lab script mailed to home  Printed and mailed as requested

## 2020-09-20 DIAGNOSIS — I10 ESSENTIAL HYPERTENSION: ICD-10-CM

## 2020-09-20 RX ORDER — LISINOPRIL 40 MG/1
TABLET ORAL
Qty: 135 TABLET | Refills: 0 | Status: SHIPPED | OUTPATIENT
Start: 2020-09-20 | End: 2021-01-10 | Stop reason: HOSPADM

## 2020-10-02 ENCOUNTER — TELEPHONE (OUTPATIENT)
Dept: NEUROLOGY | Facility: CLINIC | Age: 50
End: 2020-10-02

## 2020-10-04 DIAGNOSIS — I10 ESSENTIAL HYPERTENSION: ICD-10-CM

## 2020-10-05 RX ORDER — METOPROLOL TARTRATE 100 MG/1
TABLET ORAL
Qty: 180 TABLET | Refills: 0 | Status: SHIPPED | OUTPATIENT
Start: 2020-10-05 | End: 2021-02-08

## 2020-10-15 ENCOUNTER — OFFICE VISIT (OUTPATIENT)
Dept: NEUROLOGY | Facility: CLINIC | Age: 50
End: 2020-10-15
Payer: COMMERCIAL

## 2020-10-15 VITALS
TEMPERATURE: 97.8 F | SYSTOLIC BLOOD PRESSURE: 148 MMHG | WEIGHT: 301.8 LBS | BODY MASS INDEX: 43.2 KG/M2 | DIASTOLIC BLOOD PRESSURE: 82 MMHG | HEART RATE: 88 BPM | HEIGHT: 70 IN

## 2020-10-15 DIAGNOSIS — R56.9 SEIZURE (HCC): ICD-10-CM

## 2020-10-15 DIAGNOSIS — G40.109 LOCALIZATION-RELATED EPILEPSY (HCC): Primary | ICD-10-CM

## 2020-10-15 DIAGNOSIS — F29 PSYCHOSIS, UNSPECIFIED PSYCHOSIS TYPE (HCC): ICD-10-CM

## 2020-10-15 PROCEDURE — 99214 OFFICE O/P EST MOD 30 MIN: CPT | Performed by: PSYCHIATRY & NEUROLOGY

## 2020-10-15 RX ORDER — DIVALPROEX SODIUM 250 MG/1
TABLET, DELAYED RELEASE ORAL
Qty: 60 TABLET | Refills: 11 | Status: SHIPPED | OUTPATIENT
Start: 2020-10-15 | End: 2021-10-18

## 2020-10-15 RX ORDER — RISPERIDONE 1 MG/1
1 TABLET, FILM COATED ORAL
COMMUNITY
End: 2022-02-22

## 2020-10-15 RX ORDER — DIVALPROEX SODIUM 500 MG/1
TABLET, DELAYED RELEASE ORAL
Qty: 60 TABLET | Refills: 11 | Status: SHIPPED | OUTPATIENT
Start: 2020-10-15 | End: 2021-10-21 | Stop reason: SDUPTHER

## 2021-01-08 ENCOUNTER — HOSPITAL ENCOUNTER (INPATIENT)
Facility: HOSPITAL | Age: 51
LOS: 1 days | Discharge: HOME | DRG: 469 | End: 2021-01-10
Attending: EMERGENCY MEDICINE | Admitting: INTERNAL MEDICINE
Payer: COMMERCIAL

## 2021-01-08 ENCOUNTER — APPOINTMENT (OUTPATIENT)
Dept: MRI IMAGING | Facility: HOSPITAL | Age: 51
DRG: 469 | End: 2021-01-08
Payer: COMMERCIAL

## 2021-01-08 ENCOUNTER — APPOINTMENT (EMERGENCY)
Dept: CT IMAGING | Facility: HOSPITAL | Age: 51
DRG: 469 | End: 2021-01-08
Payer: COMMERCIAL

## 2021-01-08 ENCOUNTER — APPOINTMENT (EMERGENCY)
Dept: RADIOLOGY | Facility: HOSPITAL | Age: 51
DRG: 469 | End: 2021-01-08
Payer: COMMERCIAL

## 2021-01-08 DIAGNOSIS — H53.9 VISUAL DISTURBANCE: ICD-10-CM

## 2021-01-08 DIAGNOSIS — E11.65 HYPERGLYCEMIA DUE TO TYPE 2 DIABETES MELLITUS (HCC): ICD-10-CM

## 2021-01-08 DIAGNOSIS — R53.83 FATIGUE: ICD-10-CM

## 2021-01-08 DIAGNOSIS — E04.1 THYROID NODULE: ICD-10-CM

## 2021-01-08 DIAGNOSIS — R55 SYNCOPE: Primary | ICD-10-CM

## 2021-01-08 DIAGNOSIS — G40.109 LOCALIZATION-RELATED EPILEPSY (HCC): ICD-10-CM

## 2021-01-08 DIAGNOSIS — R25.1 TREMOR: ICD-10-CM

## 2021-01-08 DIAGNOSIS — N17.9 AKI (ACUTE KIDNEY INJURY) (HCC): ICD-10-CM

## 2021-01-08 PROBLEM — R29.90 STROKE-LIKE SYMPTOMS: Status: ACTIVE | Noted: 2021-01-08

## 2021-01-08 LAB
ALBUMIN SERPL BCP-MCNC: 3.2 G/DL (ref 3.5–5)
ALP SERPL-CCNC: 81 U/L (ref 46–116)
ALT SERPL W P-5'-P-CCNC: 23 U/L (ref 12–78)
ANION GAP SERPL CALCULATED.3IONS-SCNC: 9 MMOL/L (ref 4–13)
APTT PPP: 30 SECONDS (ref 23–37)
AST SERPL W P-5'-P-CCNC: 24 U/L (ref 5–45)
ATRIAL RATE: 65 BPM
BASOPHILS # BLD AUTO: 0.05 THOUSANDS/ΜL (ref 0–0.1)
BASOPHILS NFR BLD AUTO: 1 % (ref 0–1)
BILIRUB DIRECT SERPL-MCNC: 0.28 MG/DL (ref 0–0.2)
BILIRUB SERPL-MCNC: 0.9 MG/DL (ref 0.2–1)
BUN SERPL-MCNC: 13 MG/DL (ref 5–25)
CALCIUM SERPL-MCNC: 9 MG/DL (ref 8.3–10.1)
CHLORIDE SERPL-SCNC: 100 MMOL/L (ref 100–108)
CO2 SERPL-SCNC: 29 MMOL/L (ref 21–32)
CREAT SERPL-MCNC: 1.38 MG/DL (ref 0.6–1.3)
EOSINOPHIL # BLD AUTO: 0.14 THOUSAND/ΜL (ref 0–0.61)
EOSINOPHIL NFR BLD AUTO: 2 % (ref 0–6)
ERYTHROCYTE [DISTWIDTH] IN BLOOD BY AUTOMATED COUNT: 13.9 % (ref 11.6–15.1)
FLUAV RNA RESP QL NAA+PROBE: NEGATIVE
FLUBV RNA RESP QL NAA+PROBE: NEGATIVE
GFR SERPL CREATININE-BSD FRML MDRD: 58 ML/MIN/1.73SQ M
GLUCOSE SERPL-MCNC: 391 MG/DL (ref 65–140)
GLUCOSE SERPL-MCNC: 401 MG/DL (ref 65–140)
GLUCOSE SERPL-MCNC: 425 MG/DL (ref 65–140)
HCT VFR BLD AUTO: 48.6 % (ref 36.5–49.3)
HGB BLD-MCNC: 15.9 G/DL (ref 12–17)
IMM GRANULOCYTES # BLD AUTO: 0.03 THOUSAND/UL (ref 0–0.2)
IMM GRANULOCYTES NFR BLD AUTO: 0 % (ref 0–2)
INR PPP: 1.14 (ref 0.84–1.19)
LYMPHOCYTES # BLD AUTO: 1.57 THOUSANDS/ΜL (ref 0.6–4.47)
LYMPHOCYTES NFR BLD AUTO: 20 % (ref 14–44)
MAGNESIUM SERPL-MCNC: 1.9 MG/DL (ref 1.6–2.6)
MCH RBC QN AUTO: 29 PG (ref 26.8–34.3)
MCHC RBC AUTO-ENTMCNC: 32.7 G/DL (ref 31.4–37.4)
MCV RBC AUTO: 89 FL (ref 82–98)
MONOCYTES # BLD AUTO: 0.74 THOUSAND/ΜL (ref 0.17–1.22)
MONOCYTES NFR BLD AUTO: 9 % (ref 4–12)
NEUTROPHILS # BLD AUTO: 5.34 THOUSANDS/ΜL (ref 1.85–7.62)
NEUTS SEG NFR BLD AUTO: 68 % (ref 43–75)
NRBC BLD AUTO-RTO: 0 /100 WBCS
P AXIS: 55 DEGREES
PLATELET # BLD AUTO: 169 THOUSANDS/UL (ref 149–390)
PMV BLD AUTO: 10.8 FL (ref 8.9–12.7)
POTASSIUM SERPL-SCNC: 3.9 MMOL/L (ref 3.5–5.3)
PR INTERVAL: 182 MS
PROT SERPL-MCNC: 7.1 G/DL (ref 6.4–8.2)
PROTHROMBIN TIME: 14.9 SECONDS (ref 11.6–14.5)
QRS AXIS: -59 DEGREES
QRSD INTERVAL: 116 MS
QT INTERVAL: 414 MS
QTC INTERVAL: 430 MS
RBC # BLD AUTO: 5.49 MILLION/UL (ref 3.88–5.62)
RSV RNA RESP QL NAA+PROBE: NEGATIVE
SARS-COV-2 RNA RESP QL NAA+PROBE: NEGATIVE
SODIUM SERPL-SCNC: 138 MMOL/L (ref 136–145)
T WAVE AXIS: 11 DEGREES
TROPONIN I SERPL-MCNC: <0.02 NG/ML
TSH SERPL DL<=0.05 MIU/L-ACNC: 2.15 UIU/ML (ref 0.36–3.74)
VENTRICULAR RATE: 65 BPM
WBC # BLD AUTO: 7.87 THOUSAND/UL (ref 4.31–10.16)

## 2021-01-08 PROCEDURE — 70551 MRI BRAIN STEM W/O DYE: CPT

## 2021-01-08 PROCEDURE — G1004 CDSM NDSC: HCPCS

## 2021-01-08 PROCEDURE — 93005 ELECTROCARDIOGRAM TRACING: CPT

## 2021-01-08 PROCEDURE — 99220 PR INITIAL OBSERVATION CARE/DAY 70 MINUTES: CPT | Performed by: INTERNAL MEDICINE

## 2021-01-08 PROCEDURE — 36415 COLL VENOUS BLD VENIPUNCTURE: CPT | Performed by: EMERGENCY MEDICINE

## 2021-01-08 PROCEDURE — 0241U HB NFCT DS VIR RESP RNA 4 TRGT: CPT | Performed by: EMERGENCY MEDICINE

## 2021-01-08 PROCEDURE — 80076 HEPATIC FUNCTION PANEL: CPT | Performed by: EMERGENCY MEDICINE

## 2021-01-08 PROCEDURE — 70496 CT ANGIOGRAPHY HEAD: CPT

## 2021-01-08 PROCEDURE — 83735 ASSAY OF MAGNESIUM: CPT | Performed by: EMERGENCY MEDICINE

## 2021-01-08 PROCEDURE — 80048 BASIC METABOLIC PNL TOTAL CA: CPT | Performed by: EMERGENCY MEDICINE

## 2021-01-08 PROCEDURE — 82948 REAGENT STRIP/BLOOD GLUCOSE: CPT

## 2021-01-08 PROCEDURE — 84484 ASSAY OF TROPONIN QUANT: CPT | Performed by: EMERGENCY MEDICINE

## 2021-01-08 PROCEDURE — 96360 HYDRATION IV INFUSION INIT: CPT

## 2021-01-08 PROCEDURE — 84443 ASSAY THYROID STIM HORMONE: CPT | Performed by: EMERGENCY MEDICINE

## 2021-01-08 PROCEDURE — 99285 EMERGENCY DEPT VISIT HI MDM: CPT | Performed by: EMERGENCY MEDICINE

## 2021-01-08 PROCEDURE — 71045 X-RAY EXAM CHEST 1 VIEW: CPT

## 2021-01-08 PROCEDURE — 93010 ELECTROCARDIOGRAM REPORT: CPT | Performed by: INTERNAL MEDICINE

## 2021-01-08 PROCEDURE — 70498 CT ANGIOGRAPHY NECK: CPT

## 2021-01-08 PROCEDURE — 99285 EMERGENCY DEPT VISIT HI MDM: CPT

## 2021-01-08 PROCEDURE — 96361 HYDRATE IV INFUSION ADD-ON: CPT

## 2021-01-08 PROCEDURE — 85025 COMPLETE CBC W/AUTO DIFF WBC: CPT | Performed by: EMERGENCY MEDICINE

## 2021-01-08 PROCEDURE — 85730 THROMBOPLASTIN TIME PARTIAL: CPT | Performed by: EMERGENCY MEDICINE

## 2021-01-08 PROCEDURE — 85610 PROTHROMBIN TIME: CPT | Performed by: EMERGENCY MEDICINE

## 2021-01-08 RX ORDER — HEPARIN SODIUM 5000 [USP'U]/ML
5000 INJECTION, SOLUTION INTRAVENOUS; SUBCUTANEOUS EVERY 8 HOURS SCHEDULED
Status: DISCONTINUED | OUTPATIENT
Start: 2021-01-08 | End: 2021-01-10 | Stop reason: HOSPADM

## 2021-01-08 RX ORDER — RISPERIDONE 1 MG/1
1 TABLET, FILM COATED ORAL
Status: DISCONTINUED | OUTPATIENT
Start: 2021-01-08 | End: 2021-01-08

## 2021-01-08 RX ORDER — SODIUM CHLORIDE 9 MG/ML
75 INJECTION, SOLUTION INTRAVENOUS CONTINUOUS
Status: DISCONTINUED | OUTPATIENT
Start: 2021-01-08 | End: 2021-01-10

## 2021-01-08 RX ORDER — DOCUSATE SODIUM 100 MG/1
100 CAPSULE, LIQUID FILLED ORAL 2 TIMES DAILY
Status: DISCONTINUED | OUTPATIENT
Start: 2021-01-08 | End: 2021-01-10 | Stop reason: HOSPADM

## 2021-01-08 RX ORDER — METOPROLOL TARTRATE 50 MG/1
100 TABLET, FILM COATED ORAL EVERY 12 HOURS
Status: DISCONTINUED | OUTPATIENT
Start: 2021-01-08 | End: 2021-01-10 | Stop reason: HOSPADM

## 2021-01-08 RX ORDER — OLANZAPINE 10 MG/1
10 TABLET, ORALLY DISINTEGRATING ORAL
Status: DISCONTINUED | OUTPATIENT
Start: 2021-01-08 | End: 2021-01-10 | Stop reason: HOSPADM

## 2021-01-08 RX ORDER — ATORVASTATIN CALCIUM 40 MG/1
40 TABLET, FILM COATED ORAL EVERY EVENING
Status: DISCONTINUED | OUTPATIENT
Start: 2021-01-08 | End: 2021-01-10 | Stop reason: HOSPADM

## 2021-01-08 RX ORDER — ARIPIPRAZOLE 5 MG/1
10 TABLET ORAL DAILY
Status: DISCONTINUED | OUTPATIENT
Start: 2021-01-09 | End: 2021-01-10 | Stop reason: HOSPADM

## 2021-01-08 RX ORDER — ASPIRIN 81 MG/1
81 TABLET, CHEWABLE ORAL DAILY
Status: DISCONTINUED | OUTPATIENT
Start: 2021-01-09 | End: 2021-01-10 | Stop reason: HOSPADM

## 2021-01-08 RX ORDER — OLANZAPINE 5 MG/1
5 TABLET ORAL
COMMUNITY
End: 2022-02-22

## 2021-01-08 RX ORDER — MAGNESIUM HYDROXIDE/ALUMINUM HYDROXICE/SIMETHICONE 120; 1200; 1200 MG/30ML; MG/30ML; MG/30ML
30 SUSPENSION ORAL EVERY 6 HOURS PRN
Status: DISCONTINUED | OUTPATIENT
Start: 2021-01-08 | End: 2021-01-10 | Stop reason: HOSPADM

## 2021-01-08 RX ORDER — HYDROCHLOROTHIAZIDE 25 MG/1
25 TABLET ORAL DAILY
Status: DISCONTINUED | OUTPATIENT
Start: 2021-01-09 | End: 2021-01-08

## 2021-01-08 RX ORDER — INSULIN ASPART 100 [IU]/ML
10 INJECTION, SUSPENSION SUBCUTANEOUS
Status: DISCONTINUED | OUTPATIENT
Start: 2021-01-09 | End: 2021-01-10

## 2021-01-08 RX ORDER — ACETAMINOPHEN 325 MG/1
650 TABLET ORAL EVERY 6 HOURS PRN
Status: DISCONTINUED | OUTPATIENT
Start: 2021-01-08 | End: 2021-01-10 | Stop reason: HOSPADM

## 2021-01-08 RX ORDER — ONDANSETRON 2 MG/ML
4 INJECTION INTRAMUSCULAR; INTRAVENOUS EVERY 6 HOURS PRN
Status: DISCONTINUED | OUTPATIENT
Start: 2021-01-08 | End: 2021-01-10 | Stop reason: HOSPADM

## 2021-01-08 RX ADMIN — ATORVASTATIN CALCIUM 40 MG: 40 TABLET, FILM COATED ORAL at 22:10

## 2021-01-08 RX ADMIN — SODIUM CHLORIDE 75 ML/HR: 0.9 INJECTION, SOLUTION INTRAVENOUS at 20:47

## 2021-01-08 RX ADMIN — SODIUM CHLORIDE 1000 ML: 0.9 INJECTION, SOLUTION INTRAVENOUS at 14:44

## 2021-01-08 RX ADMIN — DIVALPROEX SODIUM 750 MG: 500 TABLET, DELAYED RELEASE ORAL at 22:10

## 2021-01-08 RX ADMIN — IOHEXOL 100 ML: 350 INJECTION, SOLUTION INTRAVENOUS at 16:20

## 2021-01-08 RX ADMIN — HEPARIN SODIUM 5000 UNITS: 5000 INJECTION INTRAVENOUS; SUBCUTANEOUS at 22:11

## 2021-01-08 RX ADMIN — INSULIN LISPRO 6 UNITS: 100 INJECTION, SOLUTION INTRAVENOUS; SUBCUTANEOUS at 22:18

## 2021-01-08 NOTE — ASSESSMENT & PLAN NOTE
Patient is complaining of some variable, nonspecific symptoms including headache, bilateral blurry vision, and slightly worsened left leg weakness  Head CTA head and neck is negative except for thyroid nodule  MRI ordered  Atorvastatin 40 mg, previously maintained on 20 mg  Aspirin  Echocardiogram  Lipid panel/hemoglobin A1c ordered  Neurology consult  NIH scale  Neurogenic

## 2021-01-08 NOTE — ASSESSMENT & PLAN NOTE
Reports an episode of syncope today  Cardiac verses neurologic  Telemetry  Echocardiogram ordered  Lack of cardiac history leads me to think this is more likely neurological  Orthostatics ordered

## 2021-01-08 NOTE — ASSESSMENT & PLAN NOTE
History of  With residual left leg weakness that is worsened today  [place] approximately year ago when he fell backwards striking his head on a rock  Resulted in absence seizures maintained on Depakote denies any recent seizure activity  Also reports patient has started hearing voices in sounds since his TBI

## 2021-01-08 NOTE — ASSESSMENT & PLAN NOTE
Blood sugar greater than 400 on admission  Hemoglobin A1c ordered  Home insulin regimen ordered in addition to sliding scale  Carb controlled diet  fingersticks

## 2021-01-08 NOTE — ED PROVIDER NOTES
History  Chief Complaint   Patient presents with    Syncope     sy;ncope no headache neg head strike      Patient is a 61-year-old male with past medical history of prior traumatic brain injury in which she suffered intracranial hemorrhage and residual left-leg weakness 1 year ago, diabetes, hypertension, hyperlipidemia, seizures, presents to the emergency department by ambulance after he had a syncopal episode  Patient reports that for the past several days he has felt intermittent buzzing over his head" as well as intermittent visual disturbance in which she is seeing silver floaters  Patient also has noticed for the past several days he feels as though his left leg is slightly weaker than normal   He also states that his left arm has been more tremulous than normal   He has felt some intermittent dizziness and nausea  Today he got up from the table to use the bathroom and blacked out  He does not eyes feeling lightheaded S before blacking out but he did pass out, fall to the ground and was unconscious for several seconds  He is unsure if he struck his head  The next thing he remembers was his son trying to wake him  It is unclear if there was any convulsion like activity  Denies any incontinence  Patient reports generally not feeling well for the past several days  On review of systems, he does report increased urinary frequency and increased thirst   His glucometer machine broke and he has not checked his sugar in a several days  He denies any fever, chills, headache, feeling dizzy or lightheaded currently, photophobia or eye pain, cough or URI symptoms, chest pain, palpitations, dyspnea, abdominal pain, vomiting, change in bowel habits, blood per rectum or melena, dysuria, hematuria, flank pain, skin rash or color change, facial asymmetry, paresthesias, slurring of speech or difficulty getting words  Denies being on any blood thinners        History provided by:  Patient   used: No    Syncope  Associated symptoms: dizziness, nausea and weakness    Associated symptoms: no chest pain, no confusion, no fever, no headaches, no palpitations, no seizures, no shortness of breath and no vomiting        Prior to Admission Medications   Prescriptions Last Dose Informant Patient Reported? Taking? ARIPiprazole (ABILIFY PO) Not Taking at Unknown time Self Yes No   Sig: Take by mouth daily   Cholecalciferol (VITAMIN D) 50 MCG (2000 UT) CAPS 1/8/2021 at Unknown time Self No Yes   Sig: Take 1 capsule (2,000 Units total) by mouth daily   OLANZapine (ZyPREXA) 10 mg tablet 1/7/2021 at Unknown time  Yes Yes   Sig: Take 10 mg by mouth daily at bedtime   atorvastatin (LIPITOR) 20 mg tablet 1/8/2021 at Unknown time Self Yes Yes   Sig: Take 20 mg by mouth every evening   divalproex sodium (DEPAKOTE) 250 mg EC tablet 1/8/2021 at Unknown time  No Yes   Sig: Take 1 tab twice a day with one 500 mg tab for total dose of 750 mg twice a day  divalproex sodium (DEPAKOTE) 500 mg EC tablet 1/8/2021 at Unknown time  No Yes   Sig: Take 1 tab twice a day with one 250 mg tab for total dose of 750 mg twice a day     ergocalciferol (VITAMIN D2) 50,000 units  Self No No   Sig: Take 1 capsule (50,000 Units total) by mouth once a week for 8 doses   hydrochlorothiazide (HYDRODIURIL) 25 mg tablet 1/8/2021 at Unknown time Self No Yes   Sig: Take 1 tablet (25 mg total) by mouth daily   insulin NPH-insulin regular (NovoLIN 70/30) 100 units/mL subcutaneous injection 1/7/2021 at Unknown time Self No Yes   Sig: Inject 10 Units under the skin daily before breakfast   lisinopril (ZESTRIL) 40 mg tablet 1/8/2021 at Unknown time Self No Yes   Sig: TAKE 1 & 1/2 (ONE & ONE-HALF) TABLETS BY MOUTH ONCE DAILY   metoprolol tartrate (LOPRESSOR) 100 mg tablet 1/8/2021 at Unknown time Self No Yes   Sig: TAKE 1 TABLET BY MOUTH EVERY 12 HOURS   risperiDONE (RisperDAL) 1 mg tablet Not Taking at Unknown time Self Yes No   Sig: Take 1 mg by mouth daily at bedtime   risperiDONE (RisperDAL) 2 mg tablet Not Taking at Unknown time Self No No   Sig: Take 1 tablet (2 mg total) by mouth every morning   Patient not taking: Reported on 10/15/2020   risperiDONE (RisperDAL) 3 mg tablet Not Taking at Unknown time Self No No   Sig: Take 1 tablet (3 mg total) by mouth daily at bedtime   Patient not taking: Reported on 10/15/2020      Facility-Administered Medications: None       Past Medical History:   Diagnosis Date    Chemical exposure     Diabetes mellitus ([facility] [place] [address] )     H/O bone graft     Head injury     August 25, 2019 fell backwards hit head on a boulder w/ LOC    Head injury     as an air [place] with the [employer] - jumped out of a plane parachute disfunction    Hypertension     Seizures ([facility] [place] [address] )        Past Surgical History:   Procedure Laterality Date    FL LUMBAR PUNCTURE DIAGNOSTIC  12/13/2019    KNEE ARTHROSCOPY W/ MENISCECTOMY Left     LEG SURGERY      TONSILLECTOMY         Family History   Problem Relation Age of Onset    Diabetes Mother     Hypertension Mother     Asthma Mother     Brain cancer Father     No Known Problems Sister     No Known Problems Brother     No Known Problems Brother      I have reviewed and agree with the history as documented  E-Cigarette/Vaping    E-Cigarette Use Never User      E-Cigarette/Vaping Substances     Social History     Tobacco Use    Smoking status: Never Smoker    Smokeless tobacco: Never Used   Substance Use Topics    Alcohol use: Yes     Alcohol/week: 3 0 standard drinks     Types: 3 Cans of beer per week     Frequency: Monthly or less     Comment: occassionally    Drug use: No       Review of Systems   Constitutional: Negative for chills and fever  HENT: Negative for congestion, ear pain, hearing loss, rhinorrhea, sore throat and tinnitus  Eyes: Positive for visual disturbance  Negative for photophobia and pain  Respiratory: Negative for cough, chest tightness, shortness of breath and wheezing  Cardiovascular: Positive for syncope  Negative for chest pain, palpitations and leg swelling  Gastrointestinal: Positive for nausea  Negative for abdominal distention, abdominal pain, blood in stool, constipation, diarrhea and vomiting  Endocrine: Positive for polydipsia and polyuria  Genitourinary: Positive for frequency  Negative for dysuria, flank pain and hematuria  Musculoskeletal: Negative for back pain, neck pain and neck stiffness  Skin: Negative for color change, pallor, rash and wound  Allergic/Immunologic: Negative for immunocompromised state  Neurological: Positive for dizziness, syncope and weakness  Negative for seizures, facial asymmetry, speech difficulty, light-headedness, numbness and headaches  +Head "buzzing"   Hematological: Negative for adenopathy  Does not bruise/bleed easily  Psychiatric/Behavioral: Negative for confusion and decreased concentration  All other systems reviewed and are negative  Physical Exam  Physical Exam  Vitals signs and nursing note reviewed  Constitutional:       General: He is not in acute distress  Appearance: Normal appearance  He is well-developed  He is not ill-appearing, toxic-appearing or diaphoretic  HENT:      Head: Normocephalic and atraumatic  Right Ear: External ear normal       Left Ear: External ear normal       Nose: Nose normal       Mouth/Throat:      Mouth: Mucous membranes are moist       Pharynx: Oropharynx is clear  No oropharyngeal exudate  Eyes:      Extraocular Movements: Extraocular movements intact  Conjunctiva/sclera: Conjunctivae normal       Pupils: Pupils are equal, round, and reactive to light  Neck:      Musculoskeletal: Normal range of motion and neck supple  No neck rigidity or muscular tenderness  Vascular: No JVD  Cardiovascular:      Rate and Rhythm: Normal rate and regular rhythm  Pulses: Normal pulses  Heart sounds: Normal heart sounds  No murmur   No friction rub  No gallop  Pulmonary:      Effort: Pulmonary effort is normal  No respiratory distress  Breath sounds: Normal breath sounds  No wheezing, rhonchi or rales  Abdominal:      General: There is no distension  Palpations: Abdomen is soft  Tenderness: There is no abdominal tenderness  There is no guarding or rebound  Musculoskeletal: Normal range of motion  General: No swelling or tenderness  Skin:     General: Skin is warm and dry  Coloration: Skin is not pale  Findings: No erythema or rash  Neurological:      General: No focal deficit present  Mental Status: He is alert and oriented to person, place, and time  Cranial Nerves: No cranial nerve deficit  Sensory: No sensory deficit  Motor: Weakness present  Comments: 5/5 strength bilateral upper extremities without upper extremity drift  4/5 strength in the left lower extremity compared to the right lower extremity which is 5/5 strength  No gross sensory deficits    Normal finger-to-nose exam    Psychiatric:         Mood and Affect: Mood normal          Behavior: Behavior normal          Vital Signs  ED Triage Vitals   Temperature Pulse Respirations Blood Pressure SpO2   01/08/21 1320 01/08/21 1320 01/08/21 1320 01/08/21 1320 01/08/21 1320   98 2 °F (36 8 °C) 64 19 124/82 98 %      Temp Source Heart Rate Source Patient Position - Orthostatic VS BP Location FiO2 (%)   01/08/21 1320 01/08/21 1320 01/08/21 1320 01/08/21 1320 --   Oral Monitor Lying Right arm       Pain Score       01/08/21 2005       No Pain           Vitals:    01/08/21 2000 01/08/21 2212 01/08/21 2343 01/09/21 0005   BP: 90/74 107/72 121/76 105/69   Pulse: 73 70 70 71   Patient Position - Orthostatic VS:             Visual Acuity      ED Medications  Medications   ARIPiprazole (ABILIFY) tablet 10 mg (has no administration in time range)   metoprolol tartrate (LOPRESSOR) tablet 100 mg (100 mg Oral Not Given 1/8/21 2000)   insulin aspart protamine-insulin aspart (NovoLOG 70/30) 100 units/mL subcutaneous injection 10 Units (has no administration in time range)   divalproex sodium (DEPAKOTE) EC tablet 750 mg (750 mg Oral Given 1/8/21 2210)   sodium chloride 0 9 % infusion (75 mL/hr Intravenous New Bag 1/8/21 2047)   acetaminophen (TYLENOL) tablet 650 mg (has no administration in time range)   docusate sodium (COLACE) capsule 100 mg (100 mg Oral Refused 1/8/21 2211)   aluminum-magnesium hydroxide-simethicone (MYLANTA) oral suspension 30 mL (has no administration in time range)   atorvastatin (LIPITOR) tablet 40 mg (40 mg Per NG Tube Given 1/8/21 2210)   aspirin chewable tablet 81 mg (has no administration in time range)   heparin (porcine) subcutaneous injection 5,000 Units (5,000 Units Subcutaneous Given 1/8/21 2211)   insulin lispro (HumaLOG) 100 units/mL subcutaneous injection 1-6 Units (has no administration in time range)   insulin lispro (HumaLOG) 100 units/mL subcutaneous injection 1-6 Units (6 Units Subcutaneous Given 1/8/21 2218)   ondansetron (ZOFRAN) injection 4 mg (has no administration in time range)   OLANZapine (ZyPREXA ZYDIS) dispersible tablet 10 mg (has no administration in time range)   sodium chloride 0 9 % bolus 1,000 mL (0 mL Intravenous Stopped 1/8/21 1910)   iohexol (OMNIPAQUE) 350 MG/ML injection (SINGLE-DOSE) 100 mL (100 mL Intravenous Given 1/8/21 1620)       Diagnostic Studies  Results Reviewed     Procedure Component Value Units Date/Time    Hepatic function panel [[id number]]  (Abnormal) Collected: 01/08/21 1513    Lab Status: Final result Specimen: Blood from Arm, Left Updated: 01/08/21 1543     Total Bilirubin 0 90 mg/dL      Bilirubin, Direct 0 28 mg/dL      Alkaline Phosphatase 81 U/L      AST 24 U/L      ALT 23 U/L      Total Protein 7 1 g/dL      Albumin 3 2 g/dL     TSH, 3rd generation with Free T4 reflex [[id number]]  (Normal) Collected: 01/08/21 1513    Lab Status: Final result Specimen: Blood from Arm, Left Updated: 01/08/21 1543     TSH 3RD GENERATON 2 154 uIU/mL     Narrative:      Patients undergoing fluorescein dye angiography may retain small amounts of fluorescein in the body for 48-72 hours post procedure  Samples containing fluorescein can produce falsely depressed TSH values  If the patient had this procedure,a specimen should be resubmitted post fluorescein clearance  Magnesium [[id number]]  (Normal) Collected: 01/08/21 1513    Lab Status: Final result Specimen: Blood from Arm, Left Updated: 01/08/21 1543     Magnesium 1 9 mg/dL     COVID19, Influenza A/B, RSV PCR, [facility] [[id number]]  (Normal) Collected: 01/08/21 1437    Lab Status: Final result Specimen: Nares from Nasopharyngeal Swab Updated: 01/08/21 1537     SARS-CoV-2 Negative     INFLUENZA A PCR Negative     INFLUENZA B PCR Negative     RSV PCR Negative    Narrative: This test has been authorized by FDA under an EUA (Emergency Use Assay) for use by authorized laboratories  Clinical caution and judgement should be used with the interpretation of these results with consideration of the clinical impression and other laboratory testing  Testing reported as "Positive" or "Negative" has been proven to be accurate according to standard laboratory validation requirements  All testing is performed with control materials showing appropriate reactivity at standard intervals      Basic metabolic panel [[id number]]  (Abnormal) Collected: 01/08/21 1513    Lab Status: Final result Specimen: Blood from Arm, Left Updated: 01/08/21 1532     Sodium 138 mmol/L      Potassium 3 9 mmol/L      Chloride 100 mmol/L      CO2 29 mmol/L      ANION GAP 9 mmol/L      BUN 13 mg/dL      Creatinine 1 38 mg/dL      Glucose 401 mg/dL      Calcium 9 0 mg/dL      eGFR 58 ml/min/1 73sq m     Narrative:      [place] guidelines for Chronic Kidney Disease (CKD):     Stage 1 with normal or high GFR (GFR > 90 mL/min/1 73 square meters)    Stage 2 Mild CKD (GFR = 60-89 mL/min/1 73 square meters)    Stage 3A Moderate CKD (GFR = 45-59 mL/min/1 73 square meters)    Stage 3B Moderate CKD (GFR = 30-44 mL/min/1 73 square meters)    Stage 4 Severe CKD (GFR = 15-29 mL/min/1 73 square meters)    Stage 5 End Stage CKD (GFR <15 mL/min/1 73 square meters)  Note: GFR calculation is accurate only with a steady state creatinine    APTT [[id number]]  (Normal) Collected: 01/08/21 1437    Lab Status: Final result Specimen: Blood from Arm, Left Updated: 01/08/21 1506     PTT 30 seconds     Protime-INR [[id number]]  (Abnormal) Collected: 01/08/21 1437    Lab Status: Final result Specimen: Blood from Arm, Left Updated: 01/08/21 1506     Protime 14 9 seconds      INR 1 14    Troponin I [[id number]]  (Normal) Collected: 01/08/21 1437    Lab Status: Final result Specimen: Blood from Arm, Left Updated: 01/08/21 1502     Troponin I <0 02 ng/mL     CBC and differential [[id number]] Collected: 01/08/21 1437    Lab Status: Final result Specimen: Blood from Arm, Left Updated: 01/08/21 1450     WBC 7 87 Thousand/uL      RBC 5 49 Million/uL      Hemoglobin 15 9 g/dL      Hematocrit 48 6 %      MCV 89 fL      MCH 29 0 pg      MCHC 32 7 g/dL      RDW 13 9 %      MPV 10 8 fL      Platelets 863 Thousands/uL      nRBC 0 /100 WBCs      Neutrophils Relative 68 %      Immat GRANS % 0 %      Lymphocytes Relative 20 %      Monocytes Relative 9 %      Eosinophils Relative 2 %      Basophils Relative 1 %      Neutrophils Absolute 5 34 Thousands/µL      Immature Grans Absolute 0 03 Thousand/uL      Lymphocytes Absolute 1 57 Thousands/µL      Monocytes Absolute 0 74 Thousand/µL      Eosinophils Absolute 0 14 Thousand/µL      Basophils Absolute 0 05 Thousands/µL     UA w Reflex to Microscopic w Reflex to Culture [[id number]]     Lab Status: No result Specimen: Urine     Fingerstick Glucose (POCT) [[id number]]  (Abnormal) Collected: 01/08/21 1320    Lab Status: Final result Updated: 01/08/21 1321     POC Glucose 425 mg/dl                  CTA head and neck with and without contrast   Final Result by Sumit Jason MD (01/08 1709)         1  No evidence of acute intracranial hemorrhage  2  No evidence of hemodynamic significant stenosis, aneurysm or dissection  3  2 cm left thyroid nodule  Incidental discovery of one or more thyroid nodule(s) measuring more than 1 5 cm and without suspicious features is noted in this patient who is above 28years old; according to guidelines published in the February 2015    white paper on incidental thyroid nodules in the Journal of the [employer] of Radiology Caleb Orr), further characterization with thyroid ultrasound is recommended  Workstation performed: [id number]         XR chest 1 view portable   ED Interpretation by Mere Fernandez DO (01/08 1423)   No acute abnormality in the chest       Final Result by Monica Reynoso MD (01/08 2584)   No acute cardiopulmonary disease  Stable except for tube removal               Workstation performed: [id number]         MRI brain wo contrast    (Results Pending)              Procedures  ECG 12 Lead Documentation Only    Date/Time: 1/8/2021 2:58 PM  Performed by: Mere Fernandez DO  Authorized by: Mere Fernandez DO     ECG reviewed by me, the ED Provider: yes    Patient location:  ED  Previous ECG:     Previous ECG:  Compared to current    Comparison ECG info:  1-; iRBBB no longer present  Rate:     ECG rate:  65    ECG rate assessment: normal    Rhythm:     Rhythm: sinus rhythm    Ectopy:     Ectopy: none    QRS:     QRS axis:  Left    QRS intervals:  Normal  Conduction:     Conduction: abnormal      Abnormal conduction: LAFB    ST segments:     ST segments:  Normal  T waves:     T waves: normal               ED Course  ED Course as of Jan 09 0010 Fri Jan 08, 2021   1613 Creatinine(!): 1 38   1613 Mild CORDELL present according to labs 11 months ago     eGFR: 62   1741 Updated patient and wife about workup thus far as well as incidental finding of thyroid nodule for which outpatient thyroid ultrasound is recommended  Patient is agreeable to staying for observation given his recent neurologic symptoms and syncopal episode  Wife at also feel more comfortable with patient being observed in the hospital overnight  SBIRT 20yo+      Most Recent Value   SBIRT (24 yo +)   In order to provide better care to our patients, we are screening all of our patients for alcohol and drug use  Would it be okay to ask you these screening questions? Yes Filed at: 01/08/2021 1323   Initial Alcohol Screen: US AUDIT-C    1  How often do you have a drink containing alcohol?  0 Filed at: 01/08/2021 1323   2  How many drinks containing alcohol do you have on a typical day you are drinking? 0 Filed at: 01/08/2021 1323   3a  Male UNDER 65: How often do you have five or more drinks on one occasion? 0 Filed at: 01/08/2021 1323   3b  FEMALE Any Age, or MALE 65+: How often do you have 4 or more drinks on one occassion? 0 Filed at: 01/08/2021 1323   Audit-C Score  0 Filed at: 01/08/2021 1323   RISHI: How many times in the past year have you    Used an illegal drug or used a prescription medication for non-medical reasons? Never Filed at: 01/08/2021 1323                    MDM  Number of Diagnoses or Management Options  Diagnosis management comments: 40-year-old male with history of prior TBI, diabetes, hypertension and hyperlipidemia, presents to the ED with several days of having had discomfort described as a buzzing sensation but denying headache  Also has had intermittent visual disturbance, worsening left leg weakness and left arm tremor  Today he had a syncopal episode which is unprovoked by any presyncopal symptoms    Differential includes dehydration, metabolic abnormalities such as hypo or hyperglycemia, electrolyte abnormality, stroke which would likely be nonacute given his symptoms have been several days in the process, cardiogenic cause such as arrhythmia  Will workup with CTA head and neck, cardiac labs, COVID swab and chest x-ray  Will most likely recommend admission for MRI brain and observation for cardiac monitoring         Amount and/or Complexity of Data Reviewed  Clinical lab tests: ordered and reviewed  Tests in the radiology section of CPT®: ordered and reviewed  Tests in the medicine section of CPT®: ordered and reviewed  Independent visualization of images, tracings, or specimens: yes        Disposition  Final diagnoses:   Syncope   Visual disturbance   [facility]LL [facility]acute kidney [facility]) [facility]   Fatigue   Thyroid nodule - incidental, needs follow up thyroid ultrasound   Hyperglycemia due to type 2 diabetes mellitus ([facility] )     Time reflects when diagnosis was documented in both MDM as applicable and the Disposition within this note     Time User Action Codes Description Comment    1/8/2021  5:42 PM Virgilio Gambles E Add [R55] Syncope     1/8/2021  5:42 PM Virgilio Gambles E Add [H53 9] Visual disturbance     1/8/2021  5:42 PM Virgilio Gambles E Add [N17 9] CORDELL (acute kidney injury) ([facility] )     1/8/2021  5:42 PM Virgilio Gambles E Add [R53 83] Fatigue     1/8/2021  5:42 PM Virgilio Gambles E Add [E04 1] Thyroid nodule     1/8/2021  5:43 PM Virgilio Gambles E Modify [E04 1] Thyroid nodule incidental, needs follow up thyroid ultrasound    1/8/2021  6:33 PM Sandhu Lightning Modify [R55] Syncope     1/8/2021  6:33 PM Sandhu Lightning Add [R25 1] Tremor     1/8/2021  6:33 PM Sandhu Lightning Modify [R55] Syncope     1/8/2021  6:33 PM Sandhu Lightning Add [R40 950] Localization-related epilepsy ([facility] )     1/8/2021  6:33 PM Sandhu Lightning Modify [R55] Syncope     1/8/2021  6:33 PM Sandhu Lightning Modify [R55] Syncope     1/9/2021 12:10 AM Virgilio Gambles E Add [E11 65] Hyperglycemia due to type 2 diabetes mellitus [facility])       ED Disposition     ED Disposition Condition Date/Time Comment    Admit Stable Fri Jan 8, 2021  5:42 PM Case was discussed with [facility] and the patient's admission status was agreed to be Admission Status: observation status to the service of Dr Dara Bautista   Follow-up Information    None         Current Discharge Medication List      CONTINUE these medications which have NOT CHANGED    Details   atorvastatin (LIPITOR) 20 mg tablet Take 20 mg by mouth every evening      Cholecalciferol (VITAMIN D) 50 MCG (2000 UT) CAPS Take 1 capsule (2,000 Units total) by mouth daily  Qty: 30 capsule, Refills: 11    Associated Diagnoses: Vitamin D deficiency      !! divalproex sodium (DEPAKOTE) 250 mg EC tablet Take 1 tab twice a day with one 500 mg tab for total dose of 750 mg twice a day  Qty: 60 tablet, Refills: 11    Associated Diagnoses: Seizure ([facility] )      ! ! divalproex sodium (DEPAKOTE) 500 mg EC tablet Take 1 tab twice a day with one 250 mg tab for total dose of 750 mg twice a day    Qty: 60 tablet, Refills: 11    Associated Diagnoses: [facility]      hydrochlorothiazide (HYDRODIURIL) 25 mg tablet Take 1 tablet (25 mg total) by mouth daily  Qty: 90 tablet, Refills: 1    Associated Diagnoses: Essential hypertension      insulin NPH-insulin regular (NovoLIN 70/30) 100 units/mL subcutaneous injection Inject 10 Units under the skin daily before breakfast  Qty: 3 mL, Refills: 0    Associated Diagnoses: Type 2 diabetes mellitus with complication, without long-term current use of insulin ([facility])      lisinopril (ZESTRIL) 40 mg tablet TAKE 1 & 1/2 (ONE & ONE-HALF) TABLETS BY MOUTH ONCE DAILY  Qty: 135 tablet, Refills: 0    Associated Diagnoses: Essential hypertension      metoprolol tartrate (LOPRESSOR) 100 mg tablet TAKE 1 TABLET BY MOUTH EVERY 12 HOURS  Qty: 180 tablet, Refills: 0    Associated Diagnoses: Essential hypertension      OLANZapine (ZyPREXA) 10 mg tablet Take 10 mg by mouth daily at bedtime      ARIPiprazole (ABILIFY PO) Take by mouth daily      ergocalciferol (VITAMIN D2) 50,000 units Take 1 capsule (50,000 Units total) by mouth once a week for 8 doses  Qty: 8 capsule, Refills: 0    Associated Diagnoses: Vitamin D deficiency      !! risperiDONE (RisperDAL) 1 mg tablet Take 1 mg by mouth daily at bedtime      !! risperiDONE (RisperDAL) 2 mg tablet Take 1 tablet (2 mg total) by mouth every morning  Qty: 30 tablet, Refills: 5    Associated Diagnoses: Psychotic disorder ([facility] [address] )      ! ! risperiDONE (RisperDAL) 3 mg tablet Take 1 tablet (3 mg total) by mouth daily at bedtime  Qty: 30 tablet, Refills: 5    Associated Diagnoses: Psychotic disorder ([facility] [email or web address] 75 )       ! ! - Potential duplicate medications found  Please discuss with provider  No discharge procedures on file      PDMP Review     None          ED Provider  Electronically Signed by           Kary Gregg DO  01/09/21 0011

## 2021-01-08 NOTE — ASSESSMENT & PLAN NOTE
Lab Results   Component Value Date    HGBA1C 9 9 (H) 12/15/2020       Recent Labs     01/08/21  1320   POCGLU 425*       Blood Sugar Average: Last 72 hrs:  (P) 425   Hemoglobin A1c shows out of control blood sugars  Carb controlled diet  Resume home insulin regimen with addition of sliding scales  Fingersticks

## 2021-01-08 NOTE — ASSESSMENT & PLAN NOTE
Baseline creatinine appears to be 0 8-0 9  1 38 on admission  Gentle IV fluids  Hold lisinopril  Repeat in morning

## 2021-01-09 LAB
BILIRUB UR QL STRIP: NEGATIVE
CHOLEST SERPL-MCNC: 105 MG/DL (ref 50–200)
CLARITY UR: CLEAR
COLOR UR: YELLOW
EST. AVERAGE GLUCOSE BLD GHB EST-MCNC: 298 MG/DL
GLUCOSE SERPL-MCNC: 245 MG/DL (ref 65–140)
GLUCOSE SERPL-MCNC: 250 MG/DL (ref 65–140)
GLUCOSE SERPL-MCNC: 263 MG/DL (ref 65–140)
GLUCOSE SERPL-MCNC: 299 MG/DL (ref 65–140)
GLUCOSE UR STRIP-MCNC: ABNORMAL MG/DL
HBA1C MFR BLD: 12 %
HDLC SERPL-MCNC: 31 MG/DL
HGB UR QL STRIP.AUTO: NEGATIVE
KETONES UR STRIP-MCNC: ABNORMAL MG/DL
LDLC SERPL CALC-MCNC: 38 MG/DL (ref 0–100)
LEUKOCYTE ESTERASE UR QL STRIP: NEGATIVE
NITRITE UR QL STRIP: NEGATIVE
PH UR STRIP.AUTO: 5.5 [PH]
PROT UR STRIP-MCNC: NEGATIVE MG/DL
SP GR UR STRIP.AUTO: 1.01 (ref 1–1.03)
TRIGL SERPL-MCNC: 180 MG/DL
UROBILINOGEN UR QL STRIP.AUTO: 0.2 E.U./DL

## 2021-01-09 PROCEDURE — 99225 PR SBSQ OBSERVATION CARE/DAY 25 MINUTES: CPT | Performed by: NURSE PRACTITIONER

## 2021-01-09 PROCEDURE — 82948 REAGENT STRIP/BLOOD GLUCOSE: CPT

## 2021-01-09 PROCEDURE — 83036 HEMOGLOBIN GLYCOSYLATED A1C: CPT | Performed by: NURSE PRACTITIONER

## 2021-01-09 PROCEDURE — 99245 OFF/OP CONSLTJ NEW/EST HI 55: CPT | Performed by: PSYCHIATRY & NEUROLOGY

## 2021-01-09 PROCEDURE — 81003 URINALYSIS AUTO W/O SCOPE: CPT | Performed by: EMERGENCY MEDICINE

## 2021-01-09 PROCEDURE — 80061 LIPID PANEL: CPT | Performed by: NURSE PRACTITIONER

## 2021-01-09 PROCEDURE — 97163 PT EVAL HIGH COMPLEX 45 MIN: CPT

## 2021-01-09 RX ADMIN — METOPROLOL TARTRATE 100 MG: 50 TABLET, FILM COATED ORAL at 20:09

## 2021-01-09 RX ADMIN — INSULIN LISPRO 3 UNITS: 100 INJECTION, SOLUTION INTRAVENOUS; SUBCUTANEOUS at 17:17

## 2021-01-09 RX ADMIN — HEPARIN SODIUM 5000 UNITS: 5000 INJECTION INTRAVENOUS; SUBCUTANEOUS at 22:41

## 2021-01-09 RX ADMIN — DIVALPROEX SODIUM 750 MG: 500 TABLET, DELAYED RELEASE ORAL at 22:34

## 2021-01-09 RX ADMIN — HEPARIN SODIUM 5000 UNITS: 5000 INJECTION INTRAVENOUS; SUBCUTANEOUS at 05:51

## 2021-01-09 RX ADMIN — DIVALPROEX SODIUM 750 MG: 500 TABLET, DELAYED RELEASE ORAL at 08:19

## 2021-01-09 RX ADMIN — INSULIN LISPRO 3 UNITS: 100 INJECTION, SOLUTION INTRAVENOUS; SUBCUTANEOUS at 22:42

## 2021-01-09 RX ADMIN — OLANZAPINE 10 MG: 10 TABLET, ORALLY DISINTEGRATING ORAL at 00:27

## 2021-01-09 RX ADMIN — SODIUM CHLORIDE 75 ML/HR: 0.9 INJECTION, SOLUTION INTRAVENOUS at 10:20

## 2021-01-09 RX ADMIN — ASPIRIN 81 MG: 81 TABLET, CHEWABLE ORAL at 08:28

## 2021-01-09 RX ADMIN — INSULIN ASPART 10 UNITS: 100 INJECTION, SUSPENSION SUBCUTANEOUS at 08:22

## 2021-01-09 RX ADMIN — SODIUM CHLORIDE 75 ML/HR: 0.9 INJECTION, SOLUTION INTRAVENOUS at 22:35

## 2021-01-09 RX ADMIN — OLANZAPINE 10 MG: 10 TABLET, ORALLY DISINTEGRATING ORAL at 22:36

## 2021-01-09 RX ADMIN — INSULIN LISPRO 3 UNITS: 100 INJECTION, SOLUTION INTRAVENOUS; SUBCUTANEOUS at 12:02

## 2021-01-09 RX ADMIN — METOPROLOL TARTRATE 100 MG: 50 TABLET, FILM COATED ORAL at 08:20

## 2021-01-09 RX ADMIN — INSULIN LISPRO 4 UNITS: 100 INJECTION, SOLUTION INTRAVENOUS; SUBCUTANEOUS at 08:20

## 2021-01-09 RX ADMIN — HEPARIN SODIUM 5000 UNITS: 5000 INJECTION INTRAVENOUS; SUBCUTANEOUS at 13:53

## 2021-01-09 RX ADMIN — ATORVASTATIN CALCIUM 40 MG: 40 TABLET, FILM COATED ORAL at 17:18

## 2021-01-09 NOTE — ASSESSMENT & PLAN NOTE
This patient's syncopal episode appears to be orthostatic in cardiac related  In addition to his own reports of the syncopal episode occurring after he had been sitting transition to a standing position and was walking a few feet before he syncopized, he also had positive orthostatics since the time of his admission  His presentation had blood pressures that were somewhat low, 804-221 systolic  There are no stigmata of neurologic symptoms on his reported event  He awoke immediately he had no confusion there were no postictal reports  He is on Depakote, and by his report is compliant with his meds  That this was a neurologic, specifically a seizure event  We would suggest that his orthostatics be recheck  Is that he be instructed by the nursing staff for the visiting nursing staff at his home how to begin to keep track of a home blood pressure monitoring program, and that possibly his BP meds may need to be re-evaluated  I have asked that we run a Depakote level off of his labs from the time he came in although it is likely that it may be a peak rather than a true trough level  He sees our epileptologist Dr Vini Hernandez in the office and will follow-up with him at his regularly scheduled January appointment later on in the month  At this point there is no further neurological workup necessary

## 2021-01-09 NOTE — PLAN OF CARE
Problem: Potential for Falls  Goal: Patient will remain free of falls  Description: INTERVENTIONS:  - Assess patient frequently for physical needs  -  Identify cognitive and physical deficits and behaviors that affect risk of falls    -  [place] fall precautions as indicated by assessment   - Educate patient/family on patient safety including physical limitations  - Instruct patient to call for assistance with activity based on assessment  - Modify environment to reduce risk of injury  - Consider OT/PT consult to assist with strengthening/mobility  Outcome: Progressing     Problem: PAIN - ADULT  Goal: Verbalizes/displays adequate comfort level or baseline comfort level  Description: Interventions:  - Encourage patient to monitor pain and request assistance  - Assess pain using appropriate pain scale  - Administer analgesics based on type and severity of pain and evaluate response  - Implement non-pharmacological measures as appropriate and evaluate response  - Consider cultural and social influences on pain and pain management  - Notify physician/advanced practitioner if interventions unsuccessful or patient reports new pain  Outcome: Progressing     Problem: INFECTION - ADULT  Goal: Absence or prevention of progression during hospitalization  Description: INTERVENTIONS:  - Assess and monitor for signs and symptoms of infection  - Monitor lab/diagnostic results  - Monitor all insertion sites, i e  indwelling lines, tubes, and drains  - Monitor endotracheal if appropriate and nasal secretions for changes in amount and color  - [place] appropriate cooling/warming therapies per order  - Administer medications as ordered  - Instruct and encourage patient and family to use good hand hygiene technique  - Identify and instruct in appropriate isolation precautions for identified infection/condition  Outcome: Progressing  Goal: Absence of fever/infection during neutropenic period  Description: INTERVENTIONS:  - Monitor WBC    Outcome: Progressing     Problem: SAFETY ADULT  Goal: Patient will remain free of falls  Description: INTERVENTIONS:  - Assess patient frequently for physical needs  -  Identify cognitive and physical deficits and behaviors that affect risk of falls    -  [place] fall precautions as indicated by assessment   - Educate patient/family on patient safety including physical limitations  - Instruct patient to call for assistance with activity based on assessment  - Modify environment to reduce risk of injury  - Consider OT/PT consult to assist with strengthening/mobility  Outcome: Progressing  Goal: Maintain or return to baseline ADL function  Description: INTERVENTIONS:  -  Assess patient's ability to carry out ADLs; assess patient's baseline for ADL function and identify physical deficits which impact ability to perform ADLs (bathing, care of mouth/teeth, toileting, grooming, dressing, etc )  - Assess/evaluate cause of self-care deficits   - Assess range of motion  - Assess patient's mobility; develop plan if impaired  - Assess patient's need for assistive devices and provide as appropriate  - Encourage maximum independence but intervene and supervise when necessary  - Involve family in performance of ADLs  - Assess for home care needs following discharge   - Consider OT consult to assist with ADL evaluation and planning for discharge  - Provide patient education as appropriate  Outcome: Progressing  Goal: Maintain or return mobility status to optimal level  Description: INTERVENTIONS:  - Assess patient's baseline mobility status (ambulation, transfers, stairs, etc )    - Identify cognitive and physical deficits and behaviors that affect mobility  - Identify mobility aids required to assist with transfers and/or ambulation (gait belt, sit-to-stand, lift, walker, cane, etc )  - [place] fall precautions as indicated by assessment  - Record patient progress and toleration of activity level on Mobility SBAR; progress patient to next Phase/Stage  - Instruct patient to call for assistance with activity based on assessment  - Consider rehabilitation consult to assist with strengthening/weightbearing, etc   Outcome: Progressing     Problem: DISCHARGE PLANNING  Goal: Discharge to home or other facility with appropriate resources  Description: INTERVENTIONS:  - Identify barriers to discharge w/patient and caregiver  - Arrange for needed discharge resources and transportation as appropriate  - Identify discharge learning needs (meds, wound care, etc )  - Arrange for interpretive services to assist at discharge as needed  - Refer to Case Management Department for coordinating discharge planning if the patient needs post-hospital services based on physician/advanced practitioner order or complex needs related to functional status, cognitive ability, or social support system  Outcome: Progressing     Problem: Knowledge Deficit  Goal: Patient/family/caregiver demonstrates understanding of disease process, treatment plan, medications, and discharge instructions  Description: Complete learning assessment and assess knowledge base  Interventions:  - Provide teaching at level of understanding  - Provide teaching via preferred learning methods  Outcome: Progressing     Problem: Nutrition/Hydration-ADULT  Goal: Nutrient/Hydration intake appropriate for improving, restoring or maintaining nutritional needs  Description: Monitor and assess patient's nutrition/hydration status for malnutrition  Collaborate with interdisciplinary team and initiate plan and interventions as ordered  Monitor patient's weight and dietary intake as ordered or per policy  Utilize nutrition screening tool and intervene as necessary  Determine patient's food preferences and provide high-protein, high-caloric foods as appropriate       INTERVENTIONS:  - Monitor oral intake, urinary output, labs, and treatment plans  - Assess nutrition and hydration status and recommend course of action  - Evaluate amount of meals eaten  - Assist patient with eating if necessary   - Allow adequate time for meals  - Recommend/ encourage appropriate diets, oral nutritional supplements, and vitamin/mineral supplements  - Order, calculate, and assess calorie counts as needed  - Recommend, monitor, and adjust tube feedings and TPN/PPN based on assessed needs  - Assess need for intravenous fluids  - Provide specific nutrition/hydration education as appropriate  - Include patient/family/caregiver in decisions related to nutrition  Outcome: Progressing     Problem: NEUROSENSORY - ADULT  Goal: Achieves stable or improved neurological status  Description: INTERVENTIONS  - Monitor and report changes in neurological status  - Monitor vital signs such as temperature, blood pressure, glucose, and any other labs ordered   - Initiate measures to prevent increased intracranial pressure  - Monitor for seizure activity and implement precautions if appropriate      Outcome: Progressing  Goal: Remains free of injury related to seizures activity  Description: INTERVENTIONS  - Maintain airway, patient safety  and administer oxygen as ordered  - Monitor patient for seizure activity, document and report duration and description of seizure to physician/advanced practitioner  - If seizure occurs,  ensure patient safety during seizure  - Reorient patient post seizure  - Seizure pads on all 4 side rails  - Instruct patient/family to notify RN of any seizure activity including if an aura is experienced  - Instruct patient/family to call for assistance with activity based on nursing assessment  - Administer anti-seizure medications if ordered    Outcome: Progressing  Goal: Achieves maximal functionality and self care  Description: INTERVENTIONS  - Monitor swallowing and airway patency with patient fatigue and changes in neurological status  - Encourage and assist patient to increase activity and self care     - Encourage visually impaired, hearing impaired and aphasic patients to use assistive/communication devices  Outcome: Progressing     Problem: CARDIOVASCULAR - ADULT  Goal: Maintains optimal cardiac output and hemodynamic stability  Description: INTERVENTIONS:  - Monitor I/O, vital signs and rhythm  - Monitor for S/S and trends of decreased cardiac output  - Administer and titrate ordered vasoactive medications to optimize hemodynamic stability  - Assess quality of pulses, skin color and temperature  - Assess for signs of decreased coronary artery perfusion  - Instruct patient to report change in severity of symptoms  Outcome: Progressing  Goal: Absence of cardiac dysrhythmias or at baseline rhythm  Description: INTERVENTIONS:  - Continuous cardiac monitoring, vital signs, obtain 12 lead EKG if ordered  - Administer antiarrhythmic and heart rate control medications as ordered  - Monitor electrolytes and administer replacement therapy as ordered  Outcome: Progressing     Problem: METABOLIC, FLUID AND ELECTROLYTES - ADULT  Goal: Electrolytes maintained within normal limits  Description: INTERVENTIONS:  - Monitor labs and assess patient for signs and symptoms of electrolyte imbalances  - Administer electrolyte replacement as ordered  - Monitor response to electrolyte replacements, including repeat lab results as appropriate  - Instruct patient on fluid and nutrition as appropriate  Outcome: Progressing  Goal: Fluid balance maintained  Description: INTERVENTIONS:  - Monitor labs   - Monitor I/O and WT  - Instruct patient on fluid and nutrition as appropriate  - Assess for signs & symptoms of volume excess or deficit  Outcome: Progressing  Goal: Glucose maintained within target range  Description: INTERVENTIONS:  - Monitor Blood Glucose as ordered  - Assess for signs and symptoms of hyperglycemia and hypoglycemia  - Administer ordered medications to maintain glucose within target range  - Assess nutritional intake and initiate nutrition service referral as needed  Outcome: Progressing     Problem: MUSCULOSKELETAL - ADULT  Goal: Maintain or return mobility to safest level of function  Description: INTERVENTIONS:  - Assess patient's ability to carry out ADLs; assess patient's baseline for ADL function and identify physical deficits which impact ability to perform ADLs (bathing, care of mouth/teeth, toileting, grooming, dressing, etc )  - Assess/evaluate cause of self-care deficits   - Assess range of motion  - Assess patient's mobility  - Assess patient's need for assistive devices and provide as appropriate  - Encourage maximum independence but intervene and supervise when necessary  - Involve family in performance of ADLs  - Assess for home care needs following discharge   - Consider OT consult to assist with ADL evaluation and planning for discharge  - Provide patient education as appropriate  Outcome: Progressing  Goal: Maintain proper alignment of affected body part  Description: INTERVENTIONS:  - Support, maintain and protect limb and body alignment  - Provide patient/ family with appropriate education  Outcome: Progressing     Problem: Potential for Falls  Goal: Patient will remain free of falls  Description: INTERVENTIONS:  - Assess patient frequently for physical needs  -  Identify cognitive and physical deficits and behaviors that affect risk of falls    -  [place] fall precautions as indicated by assessment   - Educate patient/family on patient safety including physical limitations  - Instruct patient to call for assistance with activity based on assessment  - Modify environment to reduce risk of injury  - Consider OT/PT consult to assist with strengthening/mobility  Outcome: Progressing     Problem: PAIN - ADULT  Goal: Verbalizes/displays adequate comfort level or baseline comfort level  Description: Interventions:  - Encourage patient to monitor pain and request assistance  - Assess pain using appropriate pain scale  - Administer analgesics based on type and severity of pain and evaluate response  - Implement non-pharmacological measures as appropriate and evaluate response  - Consider cultural and social influences on pain and pain management  - Notify physician/advanced practitioner if interventions unsuccessful or patient reports new pain  Outcome: Progressing     Problem: INFECTION - ADULT  Goal: Absence or prevention of progression during hospitalization  Description: INTERVENTIONS:  - Assess and monitor for signs and symptoms of infection  - Monitor lab/diagnostic results  - Monitor all insertion sites, i e  indwelling lines, tubes, and drains  - Monitor endotracheal if appropriate and nasal secretions for changes in amount and color  - [place] appropriate cooling/warming therapies per order  - Administer medications as ordered  - Instruct and encourage patient and family to use good hand hygiene technique  - Identify and instruct in appropriate isolation precautions for identified infection/condition  Outcome: Progressing  Goal: Absence of fever/infection during neutropenic period  Description: INTERVENTIONS:  - Monitor WBC    Outcome: Progressing     Problem: SAFETY ADULT  Goal: Patient will remain free of falls  Description: INTERVENTIONS:  - Assess patient frequently for physical needs  -  Identify cognitive and physical deficits and behaviors that affect risk of falls    -  [place] fall precautions as indicated by assessment   - Educate patient/family on patient safety including physical limitations  - Instruct patient to call for assistance with activity based on assessment  - Modify environment to reduce risk of injury  - Consider OT/PT consult to assist with strengthening/mobility  Outcome: Progressing  Goal: Maintain or return to baseline ADL function  Description: INTERVENTIONS:  -  Assess patient's ability to carry out ADLs; assess patient's baseline for ADL function and identify physical deficits which impact ability to perform ADLs (bathing, care of mouth/teeth, toileting, grooming, dressing, etc )  - Assess/evaluate cause of self-care deficits   - Assess range of motion  - Assess patient's mobility; develop plan if impaired  - Assess patient's need for assistive devices and provide as appropriate  - Encourage maximum independence but intervene and supervise when necessary  - Involve family in performance of ADLs  - Assess for home care needs following discharge   - Consider OT consult to assist with ADL evaluation and planning for discharge  - Provide patient education as appropriate  Outcome: Progressing  Goal: Maintain or return mobility status to optimal level  Description: INTERVENTIONS:  - Assess patient's baseline mobility status (ambulation, transfers, stairs, etc )    - Identify cognitive and physical deficits and behaviors that affect mobility  - Identify mobility aids required to assist with transfers and/or ambulation (gait belt, sit-to-stand, lift, walker, cane, etc )  - [place] fall precautions as indicated by assessment  - Record patient progress and toleration of activity level on Mobility SBAR; progress patient to next Phase/Stage  - Instruct patient to call for assistance with activity based on assessment  - Consider rehabilitation consult to assist with strengthening/weightbearing, etc   Outcome: Progressing     Problem: DISCHARGE PLANNING  Goal: Discharge to home or other facility with appropriate resources  Description: INTERVENTIONS:  - Identify barriers to discharge w/patient and caregiver  - Arrange for needed discharge resources and transportation as appropriate  - Identify discharge learning needs (meds, wound care, etc )  - Arrange for interpretive services to assist at discharge as needed  - Refer to Case Management Department for coordinating discharge planning if the patient needs post-hospital services based on physician/advanced practitioner order or complex needs related to functional status, cognitive ability, or social support system  Outcome: Progressing     Problem: Knowledge Deficit  Goal: Patient/family/caregiver demonstrates understanding of disease process, treatment plan, medications, and discharge instructions  Description: Complete learning assessment and assess knowledge base  Interventions:  - Provide teaching at level of understanding  - Provide teaching via preferred learning methods  Outcome: Progressing     Problem: Nutrition/Hydration-ADULT  Goal: Nutrient/Hydration intake appropriate for improving, restoring or maintaining nutritional needs  Description: Monitor and assess patient's nutrition/hydration status for malnutrition  Collaborate with interdisciplinary team and initiate plan and interventions as ordered  Monitor patient's weight and dietary intake as ordered or per policy  Utilize nutrition screening tool and intervene as necessary  Determine patient's food preferences and provide high-protein, high-caloric foods as appropriate       INTERVENTIONS:  - Monitor oral intake, urinary output, labs, and treatment plans  - Assess nutrition and hydration status and recommend course of action  - Evaluate amount of meals eaten  - Assist patient with eating if necessary   - Allow adequate time for meals  - Recommend/ encourage appropriate diets, oral nutritional supplements, and vitamin/mineral supplements  - Order, calculate, and assess calorie counts as needed  - Recommend, monitor, and adjust tube feedings and TPN/PPN based on assessed needs  - Assess need for intravenous fluids  - Provide specific nutrition/hydration education as appropriate  - Include patient/family/caregiver in decisions related to nutrition  Outcome: Progressing     Problem: NEUROSENSORY - ADULT  Goal: Achieves stable or improved neurological status  Description: INTERVENTIONS  - Monitor and report changes in neurological status  - Monitor vital signs such as temperature, blood pressure, glucose, and any other labs ordered   - Initiate measures to prevent increased intracranial pressure  - Monitor for seizure activity and implement precautions if appropriate      Outcome: Progressing  Goal: Remains free of injury related to seizures activity  Description: INTERVENTIONS  - Maintain airway, patient safety  and administer oxygen as ordered  - Monitor patient for seizure activity, document and report duration and description of seizure to physician/advanced practitioner  - If seizure occurs,  ensure patient safety during seizure  - Reorient patient post seizure  - Seizure pads on all 4 side rails  - Instruct patient/family to notify RN of any seizure activity including if an aura is experienced  - Instruct patient/family to call for assistance with activity based on nursing assessment  - Administer anti-seizure medications if ordered    Outcome: Progressing  Goal: Achieves maximal functionality and self care  Description: INTERVENTIONS  - Monitor swallowing and airway patency with patient fatigue and changes in neurological status  - Encourage and assist patient to increase activity and self care     - Encourage visually impaired, hearing impaired and aphasic patients to use assistive/communication devices  Outcome: Progressing     Problem: CARDIOVASCULAR - ADULT  Goal: Maintains optimal cardiac output and hemodynamic stability  Description: INTERVENTIONS:  - Monitor I/O, vital signs and rhythm  - Monitor for S/S and trends of decreased cardiac output  - Administer and titrate ordered vasoactive medications to optimize hemodynamic stability  - Assess quality of pulses, skin color and temperature  - Assess for signs of decreased coronary artery perfusion  - Instruct patient to report change in severity of symptoms  Outcome: Progressing  Goal: Absence of cardiac dysrhythmias or at baseline rhythm  Description: INTERVENTIONS:  - Continuous cardiac monitoring, vital signs, obtain 12 lead EKG if ordered  - Administer antiarrhythmic and heart rate control medications as ordered  - Monitor electrolytes and administer replacement therapy as ordered  Outcome: Progressing     Problem: METABOLIC, FLUID AND ELECTROLYTES - ADULT  Goal: Electrolytes maintained within normal limits  Description: INTERVENTIONS:  - Monitor labs and assess patient for signs and symptoms of electrolyte imbalances  - Administer electrolyte replacement as ordered  - Monitor response to electrolyte replacements, including repeat lab results as appropriate  - Instruct patient on fluid and nutrition as appropriate  Outcome: Progressing  Goal: Fluid balance maintained  Description: INTERVENTIONS:  - Monitor labs   - Monitor I/O and WT  - Instruct patient on fluid and nutrition as appropriate  - Assess for signs & symptoms of volume excess or deficit  Outcome: Progressing  Goal: Glucose maintained within target range  Description: INTERVENTIONS:  - Monitor Blood Glucose as ordered  - Assess for signs and symptoms of hyperglycemia and hypoglycemia  - Administer ordered medications to maintain glucose within target range  - Assess nutritional intake and initiate nutrition service referral as needed  Outcome: Progressing     Problem: MUSCULOSKELETAL - ADULT  Goal: Maintain or return mobility to safest level of function  Description: INTERVENTIONS:  - Assess patient's ability to carry out ADLs; assess patient's baseline for ADL function and identify physical deficits which impact ability to perform ADLs (bathing, care of mouth/teeth, toileting, grooming, dressing, etc )  - Assess/evaluate cause of self-care deficits   - Assess range of motion  - Assess patient's mobility  - Assess patient's need for assistive devices and provide as appropriate  - Encourage maximum independence but intervene and supervise when necessary  - Involve family in performance of ADLs  - Assess for home care needs following discharge   - Consider OT consult to assist with ADL evaluation and planning for discharge  - Provide patient education as appropriate  Outcome: Progressing  Goal: Maintain proper alignment of affected body part  Description: INTERVENTIONS:  - Support, maintain and protect limb and body alignment  - Provide patient/ family with appropriate education  Outcome: Progressing

## 2021-01-09 NOTE — ASSESSMENT & PLAN NOTE
This patient has a history of traumatic brain injury a recently from when he had a fall and hit his head on a rock but his original injury apparently occurred remotely when he was in the [employer] years ago  Because of his brain injury he would have a increased risk of seizures however he is maintained on his Depakote  He appears to be knowledgeable concerning his personal seizure history and denies that he has had any recent signs or symptoms suggestive of his seizure recurrence

## 2021-01-09 NOTE — H&P
H&P- Demetri  1970, 44 y o  male MRN: [id number]    Unit/Bed#: -01 Encounter: [id number]    Primary Care Provider: Bere Henao   Date and time admitted to hospital: 1/8/2021  1:16 PM    * Syncope  Assessment & Plan  Reports an episode of syncope today  Cardiac verses neurologic  Telemetry  Echocardiogram ordered  Lack of cardiac history leads me to think this is more likely neurological  Orthostatics ordered    Stroke-like symptoms  Assessment & Plan  Patient is complaining of some variable, nonspecific symptoms including headache, bilateral blurry vision, and slightly worsened left leg weakness  Head CTA head and neck is negative except for thyroid nodule  MRI ordered  Atorvastatin 40 mg, previously maintained on 20 mg  Aspirin  Echocardiogram  Lipid panel/hemoglobin A1c ordered  Neurology consult  NIH scale  Neurogenic    Acute kidney injury [facility])  Assessment & Plan  Baseline creatinine appears to be 0 8-0 9  1 38 on admission  Gentle IV fluids  Hold lisinopril  Repeat in morning    TBI (traumatic brain injury) ([facility] [address] )  Assessment & Plan  History of  With residual left leg weakness that is worsened today  Happen approximately year ago when he fell backwards striking his head on a rock  Resulted in absence seizures maintained on Depakote denies any recent seizure activity  Also reports patient has started hearing voices in sounds since his TBI    Hyperglycemia  Assessment & Plan  Blood sugar greater than 400 on admission  Hemoglobin A1c ordered  Home insulin regimen ordered in addition to sliding scale  Carb controlled diet  fingersticks    Type 2 diabetes mellitus without complication, with long-term current use of insulin [facility])  Assessment & Plan  Lab Results   Component Value Date    HGBA1C 9 9 (H) 12/15/2020       Recent Labs     01/08/21  1320   POCGLU 425*       Blood Sugar Average: Last 72 hrs:  (P) 425   Hemoglobin A1c shows out of control blood sugars  Carb controlled diet  Resume home insulin regimen with addition of sliding scales  Fingersticks       VTE Prophylaxis: Heparin  / sequential compression device   Code Status:  Full code  POLST: POLST form is not discussed and not completed at this time  Discussion with family:  Not available    Anticipated Length of Stay:  Patient will be admitted on an Observation basis with an anticipated length of stay of  < 2 midnights  Justification for Hospital Stay:  Evaluation by Neurology, echocardiogram, MRI, monitoring of patient's syncopal symptoms and arm numbness    Total Time for Visit, including Counseling / Coordination of Care: 1 hour  Greater than 50% of this total time spent on direct patient counseling and coordination of care  Chief Complaint:      Chief Complaint   Patient presents with    Syncope     sy;ncope no headache neg head strike      History of Present Illness:    Johann Schultz  is a 44 y o  male who presents with syncope, headache, blurry vision, left leg weakness  traumatic brain injury in which she suffered intracranial hemorrhage and residual left-leg weakness 1 year ago, diabetes, hypertension, hyperlipidemia, seizures, presents to the emergency department by ambulance after he had a syncopal episode  Patient reports that for the past several days he has felt intermittent buzzing over his head" as well as intermittent visual disturbance in which she is seeing silver floaters  Patient also has noticed for the past several days he feels as though his left leg is slightly weaker than normal   He also states that his left arm has been more tremulous than normal   He has felt some intermittent dizziness and nausea  Today he got up from the table to use the bathroom and blacked out  He denies feeling lightheaded before blacking out but he did pass out, fall to the ground and was unconscious for several seconds  He is unsure if he struck his head  The next thing he remembers was his son trying to wake him    It is unclear if there was any convulsion like activity  He reports that his typical seizures were absence seizures however he denies any recent activity like this reports that he is also hearing voices since his TBI and that is why he is on antipsychotics he denies hearing any voices at this point in time they do not tell him to harm himself or anyone else  Denies any incontinence  Patient reports generally not feeling well for the past several days  On review of systems, he does report increased urinary frequency and increased thirst   His glucometer machine broke and he has not checked his sugar in a several days  He did not take his insulin today as he reports he overslept  He denies any fever, chills, headache, feeling dizzy or lightheaded currently, photophobia or eye pain, cough or URI symptoms, chest pain, palpitations, dyspnea, abdominal pain, vomiting, change in bowel habits, blood per rectum or melena, dysuria, hematuria, flank pain, skin rash or color change, facial asymmetry, paresthesias, slurring of speech or difficulty getting words  Denies being on any blood thinners        Review of Systems:    Review of Systems   Constitutional: Positive for activity change and fatigue  Negative for fever  Eyes: Positive for visual disturbance  Diplopia   Respiratory: Negative  Cardiovascular: Negative  Gastrointestinal: Negative  Endocrine: Positive for polydipsia and polyuria  Musculoskeletal: Negative  Skin: Negative  Neurological: Positive for dizziness, tremors, seizures, syncope, weakness and headaches  Hematological: Negative  Psychiatric/Behavioral: Negative        Past Medical and Surgical History:     Past Medical History:   Diagnosis Date    Chemical exposure     Diabetes mellitus ([facility] [address] )     H/O bone graft     Head injury     August 25, 2019 fell backwards hit head on a boulder w/ LOC    Head injury     as an air [place] with the [employer] - jumped out of a plane parachute disfunction    Hypertension     Seizures (HCC)        Past Surgical History:   Procedure Laterality Date    FL LUMBAR PUNCTURE DIAGNOSTIC  12/13/2019    KNEE ARTHROSCOPY W/ MENISCECTOMY Left     LEG SURGERY      TONSILLECTOMY         Meds/Allergies:    Prior to Admission medications    Medication Sig Start Date End Date Taking? Authorizing Provider   ARIPiprazole (ABILIFY PO) Take by mouth daily    Historical Provider, MD   atorvastatin (LIPITOR) 20 mg tablet Take 20 mg by mouth every evening 5/15/20   Historical Provider, MD   Cholecalciferol (VITAMIN D) 50 MCG (2000 UT) CAPS Take 1 capsule (2,000 Units total) by mouth daily 6/21/20   Irma Srivastava MD   divalproex sodium (DEPAKOTE) 250 mg EC tablet Take 1 tab twice a day with one 500 mg tab for total dose of 750 mg twice a day  10/15/20   Irma Srivastava MD   divalproex sodium (DEPAKOTE) 500 mg EC tablet Take 1 tab twice a day with one 250 mg tab for total dose of 750 mg twice a day   10/15/20   Irma Srivastava MD   ergocalciferol (VITAMIN D2) 50,000 units Take 1 capsule (50,000 Units total) by mouth once a week for 8 doses 6/21/20 10/15/20  Irma Srivastava MD   hydrochlorothiazide (HYDRODIURIL) 25 mg tablet Take 1 tablet (25 mg total) by mouth daily 2/5/19   Shaq Leavitt MD   insulin NPH-insulin regular (NovoLIN 70/30) 100 units/mL subcutaneous injection Inject 10 Units under the skin daily before breakfast 12/14/19   Elle Sheikh MD   lisinopril (ZESTRIL) 40 mg tablet TAKE 1 & 1/2 (ONE & ONE-HALF) TABLETS BY MOUTH ONCE DAILY 9/20/20   Shaq Leavitt MD   metoprolol tartrate (LOPRESSOR) 100 mg tablet TAKE 1 TABLET BY MOUTH EVERY 12 HOURS 10/5/20   Shaq Leavitt MD   risperiDONE (RisperDAL) 1 mg tablet Take 1 mg by mouth daily at bedtime    Historical Provider, MD   risperiDONE (RisperDAL) 2 mg tablet Take 1 tablet (2 mg total) by mouth every morning  Patient not taking: Reported on 10/15/2020 5/7/20   Irma Srivastava MD   risperiDONE (RisperDAL) 3 mg tablet Take 1 tablet (3 mg total) by mouth daily at bedtime  Patient not taking: Reported on 10/15/2020 5/7/20   Carmen Moeller MD     I have reviewed home medications with patient personally  Allergies: No Known Allergies    Social History:     Marital Status: Single   Occupation:  Disabled  Patient Pre-hospital Living Situation:  Private residence  Patient Pre-hospital Level of Mobility:  Cane  Patient Pre-hospital Diet Restrictions:  Carb control  Substance Use History:   Social History     Substance and Sexual Activity   Alcohol Use Yes    Alcohol/week: 3 0 standard drinks    Types: 3 Cans of beer per week    Frequency: Monthly or less    Comment: occassionally     Social History     Tobacco Use   Smoking Status Never Smoker   Smokeless Tobacco Never Used     Social History     Substance and Sexual Activity   Drug Use No       Family History:    Family History   Problem Relation Age of Onset    Diabetes Mother     Hypertension Mother     Asthma Mother     Brain cancer Father     No Known Problems Sister     No Known Problems Brother     No Known Problems Brother        Physical Exam:     Vitals:   Blood Pressure: 123/78 (01/08/21 1900)  Pulse: 70 (01/08/21 1900)  Temperature: 98 2 °F (36 8 °C) (01/08/21 1320)  Temp Source: Oral (01/08/21 1320)  Respirations: 16 (01/08/21 1900)  Height: 5' 9" (175 3 cm) (01/08/21 1320)  Weight - Scale: 132 kg (290 lb 2 oz) (01/08/21 1320)  SpO2: 93 % (01/08/21 1900)    Physical Exam  Vitals signs and nursing note reviewed  Constitutional:       General: He is awake  He is not in acute distress  Appearance: He is obese  He is ill-appearing  HENT:      Head: Normocephalic  Neck:      Musculoskeletal: Normal range of motion  Cardiovascular:      Rate and Rhythm: Normal rate  Pulses: Normal pulses  Pulmonary:      Effort: Pulmonary effort is normal    Abdominal:      General: Abdomen is protuberant   Bowel sounds are normal       Palpations: Abdomen is soft    Musculoskeletal: Normal range of motion  Skin:     General: Skin is warm and dry  Neurological:      General: No focal deficit present  Mental Status: He is alert and oriented to person, place, and time  Mental status is at baseline  Cranial Nerves: No cranial nerve deficit  Sensory: Sensory deficit present  Motor: Weakness present  Psychiatric:         Mood and Affect: Mood normal          Behavior: Behavior normal  Behavior is cooperative  Thought Content: Thought content normal          Judgment: Judgment normal          Additional Data:     Lab Results: I have personally reviewed pertinent reports  Results from last 7 days   Lab Units 01/08/21  1437   WBC Thousand/uL 7 87   HEMOGLOBIN g/dL 15 9   HEMATOCRIT % 48 6   PLATELETS Thousands/uL 169   NEUTROS PCT % 68   LYMPHS PCT % 20   MONOS PCT % 9   EOS PCT % 2     Results from last 7 days   Lab Units 01/08/21  1513   SODIUM mmol/L 138   POTASSIUM mmol/L 3 9   CHLORIDE mmol/L 100   CO2 mmol/L 29   BUN mg/dL 13   CREATININE mg/dL 1 38*   ANION GAP mmol/L 9   CALCIUM mg/dL 9 0   ALBUMIN g/dL 3 2*   TOTAL BILIRUBIN mg/dL 0 90   ALK PHOS U/L 81   ALT U/L 23   AST U/L 24   GLUCOSE RANDOM mg/dL 401*     Results from last 7 days   Lab Units 01/08/21  1437   INR  1 14     Results from last 7 days   Lab Units 01/08/21  1320   POC GLUCOSE mg/dl 425*               Imaging: I have personally reviewed pertinent reports  CTA head and neck with and without contrast   Final Result by Zana Mann MD (01/08 1709)         1  No evidence of acute intracranial hemorrhage  2  No evidence of hemodynamic significant stenosis, aneurysm or dissection  3  2 cm left thyroid nodule    Incidental discovery of one or more thyroid nodule(s) measuring more than 1 5 cm and without suspicious features is noted in this patient who is above 28years old; according to guidelines published in the February 2015    white paper on incidental thyroid nodules in the Journal of the [employer] of Radiology [facility]), further characterization with thyroid ultrasound is recommended  Workstation performed: [id number]         XR chest 1 view portable   ED Interpretation by Baldev Murphy DO (01/08 1423)   No acute abnormality in the chest       Final Result by Rhonda lOea MD (01/08 5354)   No acute cardiopulmonary disease  Stable except for tube removal               Workstation performed: [id number]             EKG, Pathology, and Other Studies Reviewed on Admission:   · EKG:  Na    Allscripts / Epic Records Reviewed: Yes     ** Please Note: This note has been constructed using a voice recognition system   **

## 2021-01-09 NOTE — ASSESSMENT & PLAN NOTE
Lab Results   Component Value Date    HGBA1C 9 9 (H) 12/15/2020       Recent Labs     01/08/21  1320 01/08/21 2010 01/09/21  0555 01/09/21  1121   POCGLU 425* 391* 299* 250*       Blood Sugar Average: Last 72 hrs:  (P) 341 25     · Hemoglobin A1c shows out of control blood sugars  · Carb controlled diet  · Resume home insulin regimen with addition of sliding scales  · Fingersticks

## 2021-01-09 NOTE — PHYSICAL THERAPY NOTE
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]  Syncope [R55]  Tremor [R25 1]  Fatigue [R53 83]  Visual disturbance [H53 9]  CORDELL (acute kidney injury) ([facility] [address] ) [N17 9]    Problem List  Patient Active Problem List   Diagnosis    Essential hypertension    Type 2 diabetes mellitus without complication, with long-term current use of [facility]    Dizziness    [facility]ization-related [facility]    Hyperglycemia    TBI [facility]traumatic [facility]    Psychotic disorder ([facility]    Encounter for long-term (current) use of high-risk medication    Syncope    Stroke-like symptoms    Acute kidney injury [facility]     Past Medical History  Past Medical History:   Diagnosis Date    Chemical exposure     Diabetes mellitus ([facility] [address] )     H/O bone graft     Head injury     August 25, 2019 fell backwards hit head on a boulder w/ LOC    Head injury     as an air [place] with the [employer] - jumped out of a plane parachute disfunction    Hypertension     Seizures ([facility] [address] )      Past Surgical History  Past Surgical History:   Procedure Laterality Date    FL LUMBAR PUNCTURE DIAGNOSTIC  12/13/2019    KNEE ARTHROSCOPY W/ MENISCECTOMY Left     LEG SURGERY      TONSILLECTOMY        01/09/21 1149   PT Last Visit   PT Visit Date 01/09/21   Note Type   Note type Evaluation   Pain Assessment   Pain Assessment Tool 0-10   Pain Score No Pain   Home Living   Type of [address] [place] [address] One level;Stairs to enter with rails  ([address])   Bathroom Shower/Tub Tub/shower unit   [employer] Raised   Bathroom Equipment Grab bars in shower; Shower chair;Commode   Bathroom Accessibility Accessible   Home Equipment Walker;Cane   Additional Comments Pt primarily uses cane for ambulation   Prior Function   Level of [place] Independent with ADLs and functional mobility   Lives With Spouse  (Children)   Receives Help From Family   ADL Assistance Independent   IADLs Needs assistance   Falls in the last 6 months 1 to 4   Vocational On disability   Restrictions/Precautions   Weight Bearing Precautions Per Order No   Braces or Orthoses Other (Comment)  (none per patient)   Other Precautions Chair Alarm; Bed Alarm;Multiple lines;Telemetry; Fall Risk   General   Family/Caregiver Present No   Cognition   Overall Cognitive Status WFL   Arousal/Participation Alert   Orientation Level Oriented X4   Memory Within functional limits   Following Commands Follows all commands and directions without difficulty   Comments Pt agreeable to PT    RLE Assessment   RLE Assessment WFL  (grossly: 4/5)   LLE Assessment   LLE Assessment WFL  (grossly: 4-/5)   Light Touch   RLE Light Touch Grossly intact   LLE Light Touch Grossly intact   Bed Mobility   Supine to Sit 4  Minimal assistance   Additional items Assist x 1;HOB elevated; Bedrails; Increased time required;Verbal cues;LE management   Sit to Supine 4  Minimal assistance   Additional items Assist x 1;HOB elevated; Bedrails; Increased time required;Verbal cues;LE management   Transfers   Sit to Stand 4  Minimal assistance   Additional items Assist x 1; Increased time required;Verbal cues   Stand to Sit 4  Minimal assistance   Additional items Assist x 1; Increased time required;Verbal cues   Ambulation/Elevation   Gait pattern Step to;Short stride; Shuffling;Decreased foot clearance   Gait Assistance 4  Minimal assist   Additional items Assist x 1;Verbal cues   Assistive Device Rolling walker   Distance 20 feet   Stair Management Assistance Not tested   Balance   Static Sitting Fair +   Dynamic Sitting Fair   Static Standing Fair -   Dynamic Standing Poor +   Ambulatory Poor +   Endurance Deficit   Endurance Deficit Yes   Activity Tolerance   Activity Tolerance Patient limited by fatigue   Nurse Made Aware Discussed case with ANNIE Avilez   Post session pt was left supine in bed in NAD, all belongings within reach, +bed alarm Assessment   Prognosis Good   Problem List Decreased strength;Decreased endurance; Impaired balance;Decreased mobility   Assessment Pt is 44year old male seen for PT evaluation s/p admit to [facility] on 1/8/2021 with Syncope  PT consulted to assess pt's functional mobility and d/c needs  Order placed for PT eval and tx, with up with assist order  Comorbidities affecting pt's physical performance at time of assessment include type 2 DM, hyperglycemia, history of TBI, stroke like symptoms, and CORDELL  PTA, pt was independent with all functional mobility with a cane  Personal factors affecting pt at time of IE include: inaccessible home environment, ambulating with assistive device, stairs to enter home, inability to navigate community distances, inability to navigate level surfaces without external assistance, unable to perform dynamic tasks in community, and positive fall history  Please find objective findings from PT assessment regarding body systems outlined above with impairments and limitations including weakness, impaired balance, decreased endurance, gait deviations, decreased activity tolerance, and decreased functional mobility tolerance  The following objective measures performed on IE also reveal limitations: Barthel Index: 60/100 and Modified Sahji: 4 (moderate/severe disability)  Pt's clinical presentation is currently unstable/unpredictable seen in pt's presentation of need for ongoing medical management/monitoring and pt requires cues/assist for safety with functional mobility  Pt to benefit from continued PT tx to address deficits as defined above and maximize level of functional independent mobility and consistency  From PT/mobility standpoint, recommendation at time of d/c would be Home PT with family support pending progress in order to facilitate return to PLOF     Barriers to Discharge Inaccessible home environment   Goals   Patient Goals to return home   STG Expiration Date 01/19/21 Short Term Goal #1 In 7-10 days: Increase bilateral LE strength 1/2 grade to facilitate independent mobility, Perform all bed mobility tasks modified independent to decrease caregiver burden, Perform all transfers modified independent to improve independence, Ambulate > 150 ft  with least restrictive assistive device modified independent w/o LOB and w/ normalized gait pattern 100% of the time, Navigate 10 stairs modified independent with unilateral handrail to facilitate return to previous living environment and Increase all balance 1/2 grade to decrease risk for falls   Plan   Treatment/Interventions Functional transfer training;LE strengthening/ROM; Elevations; Therapeutic exercise; Endurance training;Patient/family training;Bed mobility;Gait training;Spoke to nursing   PT Frequency Other (Comment)  (3-5x/wk)   Recommendation   PT Discharge Recommendation Home with skilled therapy   PT - OK to Discharge No   Modified [place] Scale   Modified [place] Scale 4   Barthel Index   Feeding 10   Bathing 0   Grooming Score 5   Dressing Score 10   Bladder Score 10   Bowels Score 10   Toilet Use Score 5   Transfers (Bed/Chair) Score 10   Mobility (Level Surface) Score 0   Stairs Score 0   Barthel Index Score 60     Jac England, PT, DPT

## 2021-01-09 NOTE — ASSESSMENT & PLAN NOTE
· Patient is complaining of some variable, nonspecific symptoms including headache, bilateral blurry vision, and slightly worsened left leg weakness  · Head CTA head and neck is negative except for thyroid nodule  · MRI ordered; results pending  · Atorvastatin 40 mg, previously maintained on 20 mg  · Lipid panel relatively normal; triglycerides elevated at 180  · Aspirin  · Echocardiogram  · Neurology consult  · Unlikely that this is a stroke; likely these symptoms related to drop in blood pressure  · MRI results still pending

## 2021-01-09 NOTE — CONSULTS
Neurology Consult- Manlius  1970, 44 y o  male   MRN: [id number] Unit/Bed#: -01 Encounter: [id number]      Inpatient consult to Neurology  Consult performed by: ROCKY Jules  Consult ordered by: Pedro Pettit      Reason for Consult / Principal Problem:  Syncopal episode  Hx and PE limited by:  None  Review of previous medical records was completed  Family, was in a present at the bedside for history and examination  The patient was deemed to be reliable witness  * Syncope  Assessment & Plan  This patient's syncopal episode appears to be orthostatic in cardiac related  In addition to his own reports of the syncopal episode occurring after he had been sitting transition to a standing position and was walking a few feet before he syncopized, he also had positive orthostatics since the time of his admission  His presentation had blood pressures that were somewhat low, 059-565 systolic  There are no stigmata of neurologic symptoms on his reported event  He awoke immediately he had no confusion there were no postictal reports  He is on Depakote, and by his report is compliant with his meds  That this was a neurologic, specifically a seizure event  We would suggest that his orthostatics be recheck  Is that he be instructed by the nursing staff for the visiting nursing staff at his home how to begin to keep track of a home blood pressure monitoring program, and that possibly his BP meds may need to be re-evaluated  I have asked that we run a Depakote level off of his labs from the time he came in although it is likely that it may be a peak rather than a true trough level  He sees our epileptologist Dr Placido Germain in the office and will follow-up with him at his regularly scheduled January appointment later on in the month  At this point there is no further neurological workup necessary      Stroke-like symptoms  Assessment & Plan  This patient's syncopal episode appears to have been the triggering event for this admission  To this examiner he did not endorse any neurovascular symptoms  I suspect that the dizziness he reports maybe related to hypotension specifically orthostatic hypotension  His MRI, on my review only as radiology report is not yet available and CTA at this time are all negative  TBI (traumatic brain injury) [facility])  Assessment & Plan  This patient has a history of traumatic brain injury a recently from when he had a fall and hit his head on a rock but his original injury apparently occurred remotely when he was in the [employer] years ago  Because of his brain injury he would have a increased risk of seizures however he is maintained on his Depakote  He appears to be knowledgeable concerning his personal seizure history and denies that he has had any recent signs or symptoms suggestive of his seizure recurrence  This patient has a follow-up appointment with his epileptologist Dr Car olvera in our office, here in [facility] [place] it in on January 28th at 2:00 p m     This is a previously scheduled appointment he should keep this appointment  HPI: Malik Acosta  is a right handed  44 y o  male who  has a previous history of actually to remote brain injuries  The 1st was significant and result of a parachute misfunction apparently when he was in the [employer] several years ago  The 2nd occurred in 2018 or 2019 when apparently the patient fell and had a head strike on a rock on the posterior aspect  He follows with Dr Sam Ascencio of our epilepsy practice on a routine basis  He is maintained on Depakote on a daily basis  The patient reported to the best of his knowledge she has had no seizures recently  He feels as if he has been doing well    He does report that yesterday, the day of admission, he was at home and he had been sitting on the couch apparently he elected to get up and move from the couch the kitchen apparently was in the process of moving form from 1 room to the other when he reports he had the visual sensation of visual flashes, like "silverfish" occur 1st   He reports that within a few seconds of seen these visual disturbances or Silver fish is when he reports he went down  Patsi Reil Apparently his son was nearby in able to help him up immediately  He reports and the notes apparently indicate that the son notes reports syncopal event lasted only few seconds  Apparently the patient immediately recognized where he was or what had happened  He had no apparently postictal confusion  There were no tongue bites no urinary incontinence no residual neurologic symptoms  He is brought to the hospital via EMS  He reports that he feels well today  ROS: 12 system cued query:  No complaints of pain  He has had no further syncopal events here  He reports on a few other occasions he has had this sensation where he has had this dizziness with what he reports to be the silver fissure light flashes in his visual field but he has never actually syncopized or passed out  No new lateralizing weakness  No chest pain no shortness of breath  The remainder of his query was negative  Historical Information     Past Medical History:   Diagnosis Date    Chemical exposure     Diabetes mellitus ([facility] [address] )     H/O bone graft     Head injury     August 25, 2019 fell backwards hit head on a boulder w/ LOC    Head injury     as an air [place] with the [employer] - jumped out of a plane parachute disfunction    Hypertension     Seizures ([address] )      Past Surgical History:   Procedure Laterality Date    FL LUMBAR PUNCTURE DIAGNOSTIC  12/13/2019    KNEE ARTHROSCOPY W/ MENISCECTOMY Left     LEG SURGERY      TONSILLECTOMY         Social History  he is a nonsmoker no alcohol no recreational is          Family History:   Family History   Problem Relation Age of Onset    Diabetes Mother     Hypertension Mother     Asthma Mother     Brain cancer Father     No Known Problems Sister  No Known Problems Brother     No Known Problems Brother          No Known Allergies  Meds:all current active meds have been reviewed    Scheduled Meds:  Medication Dose Route Frequency    acetaminophen  650 mg Oral Q6H PRN    aluminum-magnesium hydroxide-simethicone  30 mL Oral Q6H PRN    ARIPiprazole  10 mg Oral Daily    aspirin  81 mg Oral Daily    atorvastatin  40 mg Per NG Tube QPM    divalproex sodium  750 mg Oral Q12H [facility]    docusate sodium  100 mg Oral BID    heparin (porcine)  5,000 Units Subcutaneous Q8H [address]    insulin aspart protamine-insulin aspart  10 Units Subcutaneous Daily Before Breakfast    insulin lispro  1-6 Units Subcutaneous TID AC    insulin lispro  1-6 Units Subcutaneous HS    metoprolol tartrate  100 mg Oral Q12H    OLANZapine  10 mg Oral HS    ondansetron  4 mg Intravenous Q6H PRN    sodium chloride  75 mL/hr Intravenous Continuous     PRN Meds:   acetaminophen    aluminum-magnesium hydroxide-simethicone    ondansetron      Physical Exam:   Objective   Vitals:Blood pressure 108/64, pulse 73, temperature 97 8 °F (36 6 °C), resp  rate 20, height 5' 9" (1 753 m), weight 132 kg (290 lb 2 oz), SpO2 (!) 89 %  ,Body mass index is 42 84 kg/m²  He had 1 set of orthostatic blood pressures were he drop approximately 22mmHg from a sitting to a standing position  Patient was examined in bed there is no family present    General: alert, appears stated age and cooperative  Head: Normocephalic, without obvious abnormality, atraumatic  Oral exam: lips, mucosa, and tongue moist;   Neck: no carotid bruit,   Lungs: clear to auscultation ant  bilaterally  Heart: regular rate and rhythm, S1, S2 normal, no murmur appreciated,   Abdomen: soft, +BS    Extremities: atraumatic, no cyanosis or edema    Neurologic:   Mental status: Alert, oriented, thought content appropriate, no aphasia or dysarthria  CN Exam: ABRAHAM, EOM's I, VF full, Gaze conjugate No sensory or motor lateralizations (W PP on face), Hearing I B, CNIX-XII I B  Motor: full power, age appropriate x 4 limbs, right 4+/5 distal,  5/5 lowers   Sensory: intact  X 4 limbs, 4 mod inc lt, temp, w  PP testing  Cerebellar: no past pointing or drift from modified Romberg position, no ataxia w maneuvers,   DTR's: Age appropriate, WNL; Plantars: downgoing bilaterally  Gait:  He was not gaited at this time           Lab Results:   I have personally reviewed pertinent reports  , CBC:   Results from last 7 days   Lab Units 01/08/21  1437   WBC Thousand/uL 7 87   RBC Million/uL 5 49   HEMOGLOBIN g/dL 15 9   HEMATOCRIT % 48 6   MCV fL 89   PLATELETS Thousands/uL 169   , BMP/CMP:   Results from last 7 days   Lab Units 01/08/21  1513   SODIUM mmol/L 138   POTASSIUM mmol/L 3 9   CHLORIDE mmol/L 100   CO2 mmol/L 29   BUN mg/dL 13   CREATININE mg/dL 1 38*   CALCIUM mg/dL 9 0   AST U/L 24   ALT U/L 23   ALK PHOS U/L 81   EGFR ml/min/1 73sq m 58   , Vitamin B12:   , HgBA1C:   , TSH:   Results from last 7 days   Lab Units 01/08/21  1513   TSH 3RD GENERATON uIU/mL 2 154   , Coagulation:   Results from last 7 days   Lab Units 01/08/21  1437   INR  1 14   , Lipid Profile:   Results from last 7 days   Lab Units 01/09/21  0530   HDL mg/dL 31*   LDL CALC mg/dL 38   TRIGLYCERIDES mg/dL 180*        Imaging Studies: I have personally reviewed pertinent films in PACS and I note as radiology does at there is no acute pathology appreciated  EEG, Echo, Pathology, and Other Studies: He has had previous long-term video EEG monitoring done recently in December of 2019  This was done in conjunction with Dr Apple Platt his epileptologist   I refer the reader to December 10th 2019 for those details  Counseling / Coordination of Care  Total time spent today Greater than 30 minutes  Greater than 50% of total time was spent with the patient and / or family counseling and / or coordination of care  A description of the counseling / coordination of care:  All of the above was discussed and reviewed with the patient the bedside  He had several questions I believe they were all answered to his satisfaction at this time      Dictation voice to text software has been used in the creation of this document  Please consider this in light of any contextual or grammatical errors

## 2021-01-09 NOTE — ASSESSMENT & PLAN NOTE
· Reports an episode of syncope today; patient reports feeling a intermittent buzzing over his head for the past several days; as well as some visual disturbances such as silver floaters  · Cardiac verses neurologic  · Appears to be cardiac in nature as patient did have positive orthostatic vital signs  · Telemetry, no events noted  · Echocardiogram ordered  · Orthostatics + with a significant drop from laying to sitting, including his heart rate    · Cardiology consult

## 2021-01-09 NOTE — ASSESSMENT & PLAN NOTE
· History of;  With residual left leg weakness that is worsened on admission  · Happened approximately 1 year ago when he fell backwards striking his head on a rock  · Resulted in absence seizures maintained on Depakote; denies any recent seizure activity  · Also reports patient has started hearing voices in sounds since his TBI

## 2021-01-09 NOTE — ASSESSMENT & PLAN NOTE
· Blood sugar greater than 400 on admission  · Hemoglobin A1c pending  · Home insulin regimen ordered in addition to sliding scale  · Carb controlled diet  · Monitor blood glucose AC/HS

## 2021-01-09 NOTE — INCIDENTAL FINDINGS
The following findings require follow up:  Radiographic finding   Findin cm left thyroid nodule    Incidental discovery of one or more thyroid nodule(s) measuring more than 1 5 cm and without suspicious features is noted in this patient who is above 28years old; according to guidelines published in the 2015   white paper on incidental thyroid nodules in the Journal of the [employer] of Radiology Yara Hinkle), further characterization with thyroid ultrasound is recommended     Follow up required: Yes   Follow up should be done within 1 week(s)    Please notify the following clinician to assist with the follow up:   PCP Dysphagia 2, mechanical soft-nectar consistency   Renal diet   Nepro cans 3x daily

## 2021-01-09 NOTE — PROGRESS NOTES
Progress Note - Tara Amaya  1970, 44 y o  male MRN: [id number]    Unit/Bed#: -01 Encounter: [id number]    Primary Care Provider: Jose Chandler   Date and time admitted to hospital: 1/8/2021  1:16 PM    * Syncope  Assessment & Plan  · Reports an episode of syncope today; patient reports feeling a intermittent buzzing over his head for the past several days; as well as some visual disturbances such as silver floaters  · Cardiac verses neurologic  · Appears to be cardiac in nature as patient did have positive orthostatic vital signs  · Telemetry, no events noted  · Echocardiogram ordered  · Orthostatics + with a significant drop from laying to sitting, including his heart rate  · Cardiology consult    Stroke-like symptoms  Assessment & Plan  · Patient is complaining of some variable, nonspecific symptoms including headache, bilateral blurry vision, and slightly worsened left leg weakness  · Head CTA head and neck is negative except for thyroid nodule  · MRI ordered; results pending  · Atorvastatin 40 mg, previously maintained on 20 mg  · Lipid panel relatively normal; triglycerides elevated at 180  · Aspirin  · Echocardiogram  · Neurology consult  · Unlikely that this is a stroke; likely these symptoms related to drop in blood pressure  · MRI results still pending  Acute kidney injury ([address] )  Assessment & Plan  · Baseline creatinine appears to be 0 8-0 9  · 1 38 on admission  · Gentle IV fluids  · Hold lisinopril  · Check BMP in am     TBI (traumatic brain injury) ([address] )  Assessment & Plan  · History of;  With residual left leg weakness that is worsened on admission  · Happened approximately 1 year ago when he fell backwards striking his head on a rock  · Resulted in absence seizures maintained on Depakote; denies any recent seizure activity  · Also reports patient has started hearing voices in sounds since his TBI    Hyperglycemia  Assessment & Plan  · Blood sugar greater than 400 on admission  · Hemoglobin A1c pending  · Home insulin regimen ordered in addition to sliding scale  · Carb controlled diet  · Monitor blood glucose AC/HS    Type 2 diabetes mellitus without complication, with long-term current use of insulin [facility])  Assessment & Plan  Lab Results   Component Value Date    HGBA1C 9 9 (H) 12/15/2020       Recent Labs     21  1320 21  0555 21  1121   POCGLU 425* 391* 299* 250*       Blood Sugar Average: Last 72 hrs:  (P) 341 25     · Hemoglobin A1c shows out of control blood sugars  · Carb controlled diet  · Resume home insulin regimen with addition of sliding scales  · Fingersticks    VTE Pharmacologic Prophylaxis:   Pharmacologic: Heparin  Mechanical VTE Prophylaxis in Place: Yes    Patient Centered Rounds: I have performed bedside rounds with nursing staff today  Discussions with Specialists or Other Care Team Provider: Neurology note reviewed    Education and Discussions with Family / Patient: Patient    Time Spent for Care: 20 minutes  More than 50% of total time spent on counseling and coordination of care as described above  Current Length of Stay: 0 day(s)    Current Patient Status: Observation   Certification Statement: The patient will continue to require additional inpatient hospital stay due to ongoing treatment in setting of syncope, orthostatic hypotension  Discharge Plan: Anticipate 24 hours  Code Status: Level 1 - Full Code      Subjective:   Patient resting in bed; denies complaints of pain, shortness of breath, fever, or chills  No complaints of dizziness, lightheadedness, or headaches  Objective:     Vitals:   Temp (24hrs), Av °F (36 7 °C), Min:97 8 °F (36 6 °C), Max:98 3 °F (36 8 °C)    Temp:  [97 8 °F (36 6 °C)-98 3 °F (36 8 °C)] 97 8 °F (36 6 °C)  HR:  [62-95] 73  Resp:  [16-22] 20  BP: ()/(64-96) 108/64  SpO2:  [85 %-96 %] 89 %  Body mass index is 42 84 kg/m²       Input and Output Summary (last 24 hours): Intake/Output Summary (Last 24 hours) at 1/9/2021 1333  Last data filed at 1/9/2021 0910  Gross per 24 hour   Intake 1120 ml   Output 500 ml   Net 620 ml       Physical Exam:     Physical Exam  Constitutional:       Appearance: He is obese  He is not ill-appearing (chronic)  Cardiovascular:      Rate and Rhythm: Normal rate  Pulses: Normal pulses  Heart sounds: Normal heart sounds  Pulmonary:      Effort: Pulmonary effort is normal       Breath sounds: Normal breath sounds  Abdominal:      General: Bowel sounds are normal  There is no distension  Palpations: Abdomen is soft  Tenderness: There is no abdominal tenderness  Musculoskeletal: Normal range of motion  General: No swelling or tenderness  Right lower leg: No edema  Left lower leg: No edema  Skin:     General: Skin is warm and dry  Capillary Refill: Capillary refill takes less than 2 seconds  Neurological:      Mental Status: He is alert  Mental status is at baseline     Psychiatric:         Mood and Affect: Mood normal        Additional Data:     Labs:    Results from last 7 days   Lab Units 01/08/21  1437   WBC Thousand/uL 7 87   HEMOGLOBIN g/dL 15 9   HEMATOCRIT % 48 6   PLATELETS Thousands/uL 169   NEUTROS PCT % 68   LYMPHS PCT % 20   MONOS PCT % 9   EOS PCT % 2     Results from last 7 days   Lab Units 01/08/21  1513   SODIUM mmol/L 138   POTASSIUM mmol/L 3 9   CHLORIDE mmol/L 100   CO2 mmol/L 29   BUN mg/dL 13   CREATININE mg/dL 1 38*   ANION GAP mmol/L 9   CALCIUM mg/dL 9 0   ALBUMIN g/dL 3 2*   TOTAL BILIRUBIN mg/dL 0 90   ALK PHOS U/L 81   ALT U/L 23   AST U/L 24   GLUCOSE RANDOM mg/dL 401*     Results from last 7 days   Lab Units 01/08/21  1437   INR  1 14     Results from last 7 days   Lab Units 01/09/21  1121 01/09/21  0555 01/08/21 2010 01/08/21  1320   POC GLUCOSE mg/dl 250* 299* 391* 425*     Results from last 7 days   Lab Units 01/09/21  0530   HEMOGLOBIN A1C % 12 0*           * I Have Reviewed All Lab Data Listed Above  * Additional Pertinent Lab Tests Reviewed: All Labs Within Last 24 Hours Reviewed    Recent Cultures (last 7 days):           Last 24 Hours Medication List:   Current Facility-Administered Medications   Medication Dose Route Frequency Provider Last Rate    acetaminophen  650 mg Oral Q6H PRN Zully Bidding, CRNP      aluminum-magnesium hydroxide-simethicone  30 mL Oral Q6H PRN Zully Bidding, CRNP      ARIPiprazole  10 mg Oral Daily Zully Bidding, CRNP      aspirin  81 mg Oral Daily Zully Bidding, CRNP      atorvastatin  40 mg Per NG Tube QPM Zully Bidding, CRNP      divalproex sodium  750 mg Oral Q12H [address] Zully Bidding, CRNP      docusate sodium  100 mg Oral BID Zully Bidding, CRNP      heparin (porcine)  5,000 Units Subcutaneous [facility] Zully Bidding, CRNP      insulin aspart protamine-insulin aspart  10 Units Subcutaneous Daily Before Breakfast Zully Bidding, CRNP      insulin lispro  1-6 Units Subcutaneous TID AC Zully Bidding, CRNP      insulin lispro  1-6 Units Subcutaneous HS Zully Bidding, CRNP      metoprolol tartrate  100 mg Oral Q12H Zully Bidding, CRNP      OLANZapine  10 mg Oral HS Tony Morales PA-C      ondansetron  4 mg Intravenous Q6H PRN Zully Bidding, CRNP      sodium chloride  75 mL/hr Intravenous Continuous Zully Bidding, CRNP 75 mL/hr (01/09/21 1020)        Today, Patient Was Seen By: ROCKY Chow    ** Please Note: Dictation voice to text software may have been used in the creation of this document   **

## 2021-01-09 NOTE — ASSESSMENT & PLAN NOTE
This patient's syncopal episode appears to have been the triggering event for this admission  To this examiner he did not endorse any neurovascular symptoms  I suspect that the dizziness he reports maybe related to hypotension specifically orthostatic hypotension  His MRI, on my review only as radiology report is not yet available and CTA at this time are all negative

## 2021-01-09 NOTE — PLAN OF CARE
Problem: PHYSICAL THERAPY ADULT  Goal: Performs mobility at highest level of function for planned discharge setting  See evaluation for individualized goals  Description: Treatment/Interventions: Functional transfer training, LE strengthening/ROM, Elevations, Therapeutic exercise, Endurance training, Patient/family training, Bed mobility, Gait training, Spoke to nursing          See flowsheet documentation for full assessment, interventions and recommendations  Note: Prognosis: Good  Problem List: Decreased strength, Decreased endurance, Impaired balance, Decreased mobility  Assessment: Pt is 44year old male seen for PT evaluation s/p admit to [facility] 1/8/2021 with Syncope  PT consulted to assess pt's functional mobility and d/c needs  Order placed for PT eval and tx, with up with assist order  Comorbidities affecting pt's physical performance at time of assessment include type 2 DM, hyperglycemia, history of TBI, stroke like symptoms, and CORDELL  PTA, pt was independent with all functional mobility with a cane  Personal factors affecting pt at time of IE include: inaccessible home environment, ambulating with assistive device, stairs to enter home, inability to navigate community distances, inability to navigate level surfaces without external assistance, unable to perform dynamic tasks in community, and positive fall history  Please find objective findings from PT assessment regarding body systems outlined above with impairments and limitations including weakness, impaired balance, decreased endurance, gait deviations, decreased activity tolerance, and decreased functional mobility tolerance  The following objective measures performed on IE also reveal limitations: Barthel Index: 60/100 and Modified [place]: 4 (moderate/severe disability)   Pt's clinical presentation is currently unstable/unpredictable seen in pt's presentation of need for ongoing medical management/monitoring and pt requires cues/assist for safety with functional mobility  Pt to benefit from continued PT tx to address deficits as defined above and maximize level of functional independent mobility and consistency  From PT/mobility standpoint, recommendation at time of d/c would be Home PT with family support pending progress in order to facilitate return to PLOF  Barriers to Discharge: Inaccessible home environment     PT Discharge Recommendation: Home with skilled therapy     PT - OK to Discharge: No    See flowsheet documentation for full assessment

## 2021-01-09 NOTE — UTILIZATION REVIEW
Initial Clinical Review    Admission: Date/Time/Statement:   Admission Orders (From admission, onward)     Ordered        01/08/21 1743  Place in Observation  Once                   Orders Placed This Encounter   Procedures    Place in Observation     Standing Status:   Standing     Number of Occurrences:   1     Order Specific Question:   Admitting Physician     Answer:   Idalmis Weber [[id number]]     Order Specific Question:   Level of Care     Answer:   Med Surg [16]     Order Specific Question:   Bed request comments     Answer:   tele     ED Arrival Information     Expected Arrival 70 Lo Yenifer Stephenson of Arrival Escorted By Service Admission Type    - 1/8/2021 13:15 Emergent Ambulance [address] Emergency    Arrival Complaint    2001 [facility]        Chief Complaint   Patient presents with    Syncope     sy;ncope no headache neg head strike      Assessment/Plan:   45y Male to ED presents with syncope, headache, blurry vision, left leg weakness and TBI  In addition c/o intermittent visual disturbance, seeing silver floaters and intermittent dizziness and nausea  Pt also stated he got up from table to use bathroom and blacked out  Intracranial hemorrhage with residual left-leg weakness x1 yr ago  Unsure of head strike  Diabetes, HTN, HLD, Seizures  Admit Observation level of care for Syncope, Stroke-like symptoms, CORDELL, TBI and Hyperglycemia  Lack of cardiac history leads me to think this is more likely neurological  Tele monitoring  Echo  Aspirin  Neurology consult  MRI Brain  Baseline creat 0 8-0 9, 1 98 at admit  IVFs  Hold lisinipril  Bld sugar greater than 400 on admit - continue home insulin regimen  Carb diet  1/9 Progress notes; Echo pending  Orthostatics + with a significant drop from laying to sitting, including his heart rate  Tele monitoring  Cardiology consults pending  Creat 1 38 today and continue IVFs  Hold lisinopril   Check BMP in am 1/10    1/9 Neurology cons; syncopal episode appears to be orthostatic in cardiac related  On [facility]  ED Triage Vitals   Temperature Pulse Respirations Blood Pressure SpO2   01/08/21 1320 01/08/21 1320 01/08/21 1320 01/08/21 1320 01/08/21 1320   98 2 °F (36 8 °C) 64 19 124/82 98 %      Temp Source Heart Rate Source Patient Position - Orthostatic VS BP Location FiO2 (%)   01/08/21 1320 01/08/21 1320 01/08/21 1320 01/08/21 1320 --   Oral Monitor Lying Right arm       Pain Score       01/08/21 2005       No Pain          Wt Readings from Last 1 Encounters:   01/08/21 132 kg (290 lb 2 oz)     Additional Vital Signs:   01/09/21 09:07:43  97 8 °F (36 6 °C)  73  20  108/64  79  89 %Abnormal            01/09/21 0900    71  20  103/65            Lying   01/09/21 08:18:24    76    139/86  104  92 %           01/09/21 05:23:40    95    126/82  97  91 %        Standing - Orthostatic VS   01/09/21 05:21:50    79    149/96  114  85 %Abnormal         Sitting - Orthostatic VS   01/09/21 05:20:26    80    127/86  100  87 %Abnormal         Lying - Orthostatic VS   01/09/21 0500        123/84               01/09/21 0400    77    139/80               01/09/21 03:03:53  97 9 °F (36 6 °C)  77    128/79  95  86 %Abnormal            01/09/21 01:35:41    69    135/81  99  86 %Abnormal            01/09/21 01:34:43    69    118/78  91  90 %             Pertinent Labs/Diagnostic Test Results:   1/8 MRI Brain - No acute intracranial pathology  1/8 CTA Head and Neck - 1  No evidence of acute intracranial hemorrhage  2  No evidence of hemodynamic significant stenosis, aneurysm or dissection  3  2 cm left thyroid nodule    Incidental discovery of one or more thyroid nodule(s) measuring more than 1 5 cm and without suspicious features is noted in this patient who is above 28years old; according to guidelines published in the February 2015   white paper on incidental thyroid nodules in the Journal of the [employer] of Radiology [facility], further characterization with thyroid ultrasound is recommended       Results from last 7 days   Lab Units 01/08/21  1437   SARS-COV-2  Negative     Results from last 7 days   Lab Units 01/08/21  1437   WBC Thousand/uL 7 87   HEMOGLOBIN g/dL 15 9   HEMATOCRIT % 48 6   PLATELETS Thousands/uL 169   NEUTROS ABS Thousands/µL 5 34         Results from last 7 days   Lab Units 01/08/21  1513   SODIUM mmol/L 138   POTASSIUM mmol/L 3 9   CHLORIDE mmol/L 100   CO2 mmol/L 29   ANION GAP mmol/L 9   BUN mg/dL 13   CREATININE mg/dL 1 38*   EGFR ml/min/1 73sq m 58   CALCIUM mg/dL 9 0   MAGNESIUM mg/dL 1 9     Results from last 7 days   Lab Units 01/08/21  1513   AST U/L 24   ALT U/L 23   ALK PHOS U/L 81   TOTAL PROTEIN g/dL 7 1   ALBUMIN g/dL 3 2*   TOTAL BILIRUBIN mg/dL 0 90   BILIRUBIN DIRECT mg/dL 0 28*     Results from last 7 days   Lab Units 01/09/21  0555 01/08/21 2010 01/08/21  1320   POC GLUCOSE mg/dl 299* 391* 425*     Results from last 7 days   Lab Units 01/08/21  1513   GLUCOSE RANDOM mg/dL 401*       BETA-HYDROXYBUTYRATE   Date Value Ref Range Status   09/17/2019 0 2 <0 6 mmol/L Final     Results from last 7 days   Lab Units 01/08/21  1437   TROPONIN I ng/mL <0 02         Results from last 7 days   Lab Units 01/08/21  1437   PROTIME seconds 14 9*   INR  1 14   PTT seconds 30     Results from last 7 days   Lab Units 01/08/21  1513   TSH 3RD GENERATON uIU/mL 2 154       Results from last 7 days   Lab Units 01/09/21  0053   CLARITY UA  Clear   COLOR UA  Yellow   SPEC GRAV UA  1 010   PH UA  5 5   GLUCOSE UA mg/dl 500 (1/2%)*   KETONES UA mg/dl Trace*   BLOOD UA  Negative   PROTEIN UA mg/dl Negative   NITRITE UA  Negative   BILIRUBIN UA  Negative   UROBILINOGEN UA E U /dl 0 2   LEUKOCYTES UA  Negative     Results from last 7 days   Lab Units 01/08/21  1437   INFLUENZA A PCR  Negative   INFLUENZA B PCR  Negative   RSV PCR  Negative       ED Treatment:   Medication Administration from 01/08/2021 1315 to 01/08/2021 1937       Date/Time Order Dose Route Action     01/08/2021 1444 sodium chloride 0 9 % bolus 1,000 mL 1,000 mL Intravenous New Bag     01/08/2021 1620 iohexol (OMNIPAQUE) 350 MG/ML injection (SINGLE-DOSE) 100 mL 100 mL Intravenous Given        Past Medical History:   Diagnosis Date    Chemical exposure     Diabetes mellitus ([facility] )     H/O bone graft     Head injury     August 25, 2019 fell backwards hit head on a boulder w/ LOC    Head injury     as an air [place] with the [employer] - jumped out of a plane parachute disfunction    Hypertension     Seizures ([facility] )      Present on Admission:   Syncope   TBI (traumatic brain injury) ([place] [address] )   Hyperglycemia   Stroke-like symptoms   Acute kidney injury ([place] [address] )      Admitting Diagnosis: Thyroid nodule [E04 1]  Localization-related epilepsy ([place] [address] ) [G40 109]  Syncope [R55]  Tremor [R25 1]  Fatigue [R53 83]  Visual disturbance [H53 9]  CORDELL (acute kidney injury) ([place] [address] ) [N17 9]  Age/Sex: 44 y o  male     Admission Orders:  Scheduled Medications:  ARIPiprazole, 10 mg, Oral, Daily  aspirin, 81 mg, Oral, Daily  atorvastatin, 40 mg, Per NG Tube, QPM  divalproex sodium, 750 mg, Oral, Q12H [facility]  docusate sodium, 100 mg, Oral, BID  heparin (porcine), 5,000 Units, Subcutaneous, Q8H [facility]  insulin aspart protamine-insulin aspart, 10 Units, Subcutaneous, Daily Before Breakfast  insulin lispro, 1-6 Units, Subcutaneous, TID AC  insulin lispro, 1-6 Units, Subcutaneous, HS  metoprolol tartrate, 100 mg, Oral, Q12H  OLANZapine, 10 mg, Oral, HS      Continuous IV Infusions:  sodium chloride, 75 mL/hr, Intravenous, Continuous      PRN Meds:  acetaminophen, 650 mg, Oral, Q6H PRN  aluminum-magnesium hydroxide-simethicone, 30 mL, Oral, Q6H PRN  ondansetron, 4 mg, Intravenous, Q6H PRN      Echo  Tele monitoring  Neuro checks Every 1 hour x 4 hours, then every 2 hours x 8 hours, then every 4 hours x 72 hours  Seizure precautions  IP CONSULT TO NEUROLOGY    Network Utilization Review Department  ATTENTION: Please call with any questions or concerns to [phone] and carefully listen to the prompts so that you are directed to the right person  All voicemails are confidential   Lorena Jenkins all requests for admission clinical reviews, approved or denied determinations and any other requests to dedicated fax number below belonging to the campus where the patient is receiving treatment   List of dedicated fax numbers for the Facilities:  [address] DENIALS (Administrative/Medical Necessity) [phone]   [address] (Maternity/NICU/Pediatrics) [phone]   [address] Chata Otero 7094 (  Patrick Cantu "Marj" 103) [address] [address] Alysha Hollingsworth 1481 [address] [place]) [address] [place] [address] [phone]

## 2021-01-10 ENCOUNTER — APPOINTMENT (OUTPATIENT)
Dept: NON INVASIVE DIAGNOSTICS | Facility: HOSPITAL | Age: 51
DRG: 469 | End: 2021-01-10
Payer: COMMERCIAL

## 2021-01-10 VITALS
SYSTOLIC BLOOD PRESSURE: 145 MMHG | HEIGHT: 69 IN | WEIGHT: 290.13 LBS | TEMPERATURE: 97.5 F | OXYGEN SATURATION: 90 % | HEART RATE: 61 BPM | BODY MASS INDEX: 42.97 KG/M2 | RESPIRATION RATE: 19 BRPM | DIASTOLIC BLOOD PRESSURE: 92 MMHG

## 2021-01-10 DIAGNOSIS — R55 SYNCOPE, UNSPECIFIED SYNCOPE TYPE: Primary | ICD-10-CM

## 2021-01-10 LAB
ANION GAP SERPL CALCULATED.3IONS-SCNC: 7 MMOL/L (ref 4–13)
BUN SERPL-MCNC: 13 MG/DL (ref 5–25)
CALCIUM SERPL-MCNC: 8.4 MG/DL (ref 8.3–10.1)
CHLORIDE SERPL-SCNC: 106 MMOL/L (ref 100–108)
CO2 SERPL-SCNC: 28 MMOL/L (ref 21–32)
CREAT SERPL-MCNC: 0.98 MG/DL (ref 0.6–1.3)
ERYTHROCYTE [DISTWIDTH] IN BLOOD BY AUTOMATED COUNT: 14 % (ref 11.6–15.1)
GFR SERPL CREATININE-BSD FRML MDRD: 88 ML/MIN/1.73SQ M
GLUCOSE SERPL-MCNC: 225 MG/DL (ref 65–140)
GLUCOSE SERPL-MCNC: 265 MG/DL (ref 65–140)
GLUCOSE SERPL-MCNC: 272 MG/DL (ref 65–140)
HCT VFR BLD AUTO: 42.4 % (ref 36.5–49.3)
HGB BLD-MCNC: 13.6 G/DL (ref 12–17)
MCH RBC QN AUTO: 28.9 PG (ref 26.8–34.3)
MCHC RBC AUTO-ENTMCNC: 32.1 G/DL (ref 31.4–37.4)
MCV RBC AUTO: 90 FL (ref 82–98)
PLATELET # BLD AUTO: 124 THOUSANDS/UL (ref 149–390)
PMV BLD AUTO: 10.9 FL (ref 8.9–12.7)
POTASSIUM SERPL-SCNC: 3.4 MMOL/L (ref 3.5–5.3)
RBC # BLD AUTO: 4.71 MILLION/UL (ref 3.88–5.62)
SODIUM SERPL-SCNC: 141 MMOL/L (ref 136–145)
VALPROATE SERPL-MCNC: 94 UG/ML (ref 50–100)
WBC # BLD AUTO: 4.36 THOUSAND/UL (ref 4.31–10.16)

## 2021-01-10 PROCEDURE — 93306 TTE W/DOPPLER COMPLETE: CPT

## 2021-01-10 PROCEDURE — 80164 ASSAY DIPROPYLACETIC ACD TOT: CPT | Performed by: NURSE PRACTITIONER

## 2021-01-10 PROCEDURE — 93306 TTE W/DOPPLER COMPLETE: CPT | Performed by: INTERNAL MEDICINE

## 2021-01-10 PROCEDURE — 80048 BASIC METABOLIC PNL TOTAL CA: CPT | Performed by: NURSE PRACTITIONER

## 2021-01-10 PROCEDURE — 99254 IP/OBS CNSLTJ NEW/EST MOD 60: CPT | Performed by: INTERNAL MEDICINE

## 2021-01-10 PROCEDURE — 85027 COMPLETE CBC AUTOMATED: CPT | Performed by: NURSE PRACTITIONER

## 2021-01-10 PROCEDURE — 99239 HOSP IP/OBS DSCHRG MGMT >30: CPT | Performed by: NURSE PRACTITIONER

## 2021-01-10 PROCEDURE — 82948 REAGENT STRIP/BLOOD GLUCOSE: CPT

## 2021-01-10 RX ORDER — ASPIRIN 81 MG/1
81 TABLET, CHEWABLE ORAL DAILY
Qty: 30 TABLET | Refills: 0 | Status: SHIPPED | OUTPATIENT
Start: 2021-01-11 | End: 2022-06-09

## 2021-01-10 RX ORDER — INSULIN ASPART 100 [IU]/ML
12 INJECTION, SUSPENSION SUBCUTANEOUS
Status: DISCONTINUED | OUTPATIENT
Start: 2021-01-11 | End: 2021-01-10 | Stop reason: HOSPADM

## 2021-01-10 RX ADMIN — HEPARIN SODIUM 5000 UNITS: 5000 INJECTION INTRAVENOUS; SUBCUTANEOUS at 14:09

## 2021-01-10 RX ADMIN — METOPROLOL TARTRATE 100 MG: 50 TABLET, FILM COATED ORAL at 07:45

## 2021-01-10 RX ADMIN — ASPIRIN 81 MG: 81 TABLET, CHEWABLE ORAL at 09:43

## 2021-01-10 RX ADMIN — INSULIN LISPRO 2 UNITS: 100 INJECTION, SOLUTION INTRAVENOUS; SUBCUTANEOUS at 07:44

## 2021-01-10 RX ADMIN — HEPARIN SODIUM 5000 UNITS: 5000 INJECTION INTRAVENOUS; SUBCUTANEOUS at 05:51

## 2021-01-10 RX ADMIN — INSULIN LISPRO 3 UNITS: 100 INJECTION, SOLUTION INTRAVENOUS; SUBCUTANEOUS at 11:34

## 2021-01-10 RX ADMIN — DIVALPROEX SODIUM 750 MG: 500 TABLET, DELAYED RELEASE ORAL at 09:43

## 2021-01-10 RX ADMIN — INSULIN ASPART 10 UNITS: 100 INJECTION, SUSPENSION SUBCUTANEOUS at 07:44

## 2021-01-10 NOTE — UTILIZATION REVIEW
Initial Clinical Review    Admission: Date/Time/Statement:   Admission Orders: Observation 1/8 @ 1743 and changed to Inpatient on 1/10 @ 1007  Pt required continue stay for Syncope/ CORDELL and Stroke-like symptoms  Ordered        01/10/21 1007  Inpatient Admission  Once         01/08/21 1743  Place in Observation  Once                   Orders Placed This Encounter   Procedures    Inpatient Admission     Standing Status:   Standing     Number of Occurrences:   1     Order Specific Question:   Admitting Physician     Answer:   Jordyn Singleton [[id number]]     Order Specific Question:   Level of Care     Answer:   Med Surg [16]     Order Specific Question:   Estimated length of stay     Answer:   More than 2 Midnights     Order Specific Question:   Certification     Answer:   I certify that inpatient services are medically necessary for this patient for a duration of greater than two midnights  See H&P and MD Progress Notes for additional information about the patient's course of treatment  ED Arrival Information     Expected Arrival Acuity Means of Arrival Escorted By Service Admission Type    - 1/8/2021 13:15 Emergent Ambulance [facility] Emergency    Arrival Complaint    2001 [facility]        Chief Complaint   Patient presents with    Syncope     sy;ncope no headache neg head strike      Assessment/Plan:   45y Male to ED presents with syncope, headache, blurry vision, left leg weakness and TBI  In addition c/o intermittent visual disturbance, seeing silver floaters and intermittent dizziness and nausea  Pt also stated he got up from table to use bathroom and blacked out  Intracranial hemorrhage with residual left-leg weakness x1 yr ago  Unsure of head strike  Diabetes, HTN, HLD, Seizures  Admit Observation level of care for Syncope, Stroke-like symptoms, CORDELL, TBI and Hyperglycemia  Lack of cardiac history leads me to think this is more likely neurological  Tele monitoring  Echo  Aspirin  Neurology consult  MRI Brain  Baseline creat 0 8-0 9, 1 98 at admit  IVFs  Hold lisinipril  Bld sugar greater than 400 on admit - continue home insulin regimen  Carb diet  1/9 Progress notes; Echo pending  Orthostatics + with a significant drop from laying to sitting, including his heart rate  Tele monitoring  Cardiology consults pending  Creat 1 38 today and continue IVFs  Hold lisinopril  Check BMP in am 1/10    1/9 Neurology cons; syncopal episode appears to be orthostatic in cardiac related  On Depakote  1/10 Cardiology cons; Syncope  associated with postural change, as he was getting up from couch  Orthostatic blood pressures positive in the ED  TTE pending  Continue tele monitoring       ED Triage Vitals   Temperature Pulse Respirations Blood Pressure SpO2   01/08/21 1320 01/08/21 1320 01/08/21 1320 01/08/21 1320 01/08/21 1320   98 2 °F (36 8 °C) 64 19 124/82 98 %      Temp Source Heart Rate Source Patient Position - Orthostatic VS BP Location FiO2 (%)   01/08/21 1320 01/08/21 1320 01/08/21 1320 01/08/21 1320 --   Oral Monitor Lying Right arm       Pain Score       01/08/21 2005       No Pain          Wt Readings from Last 1 Encounters:   01/08/21 132 kg (290 lb 2 oz)     Additional Vital Signs:   01/10/21 07:44:46    67    139/97  111  85 %Abnormal            01/10/21 0609            95 %      None (Room air)     01/10/21 0604            92 %           01/10/21 0600            92 %           01/10/21 05:55:45    64  18  127/80  96  89 %Abnormal         Lying   01/10/21 03:04:57  97 9 °F (36 6 °C)  58  17  141/97  112  86 %Abnormal            01/09/21 23:09:05  98 °F (36 7 °C)  69  16  142/89  107  88 %Abnormal            01/09/21 2300            90 %      None (Room        01/09/21 09:07:43  97 8 °F (36 6 °C)  73  20  108/64  79  89 %Abnormal            01/09/21 0900    71  20  103/65            Lying   01/09/21 08:18:24    76   139/86  104  92 %           01/09/21 05:23:40    95    126/82  97  91 %        Standing - Orthostatic VS   01/09/21 05:21:50    79    149/96  114  85 %Abnormal         Sitting - Orthostatic VS   01/09/21 05:20:26    80    127/86  100  87 %Abnormal         Lying - Orthostatic VS   01/09/21 0500        123/84               01/09/21 0400    77    139/80               01/09/21 03:03:53  97 9 °F (36 6 °C)  77    128/79  95  86 %Abnormal            01/09/21 01:35:41    69    135/81  99  86 %Abnormal            01/09/21 01:34:43    69    118/78  91  90 %             Pertinent Labs/Diagnostic Test Results:   1/8 MRI Brain - No acute intracranial pathology  1/8 CTA Head and Neck - 1  No evidence of acute intracranial hemorrhage  2  No evidence of hemodynamic significant stenosis, aneurysm or dissection  3  2 cm left thyroid nodule    Incidental discovery of one or more thyroid nodule(s) measuring more than 1 5 cm and without suspicious features is noted in this patient who is above 28years old; according to guidelines published in the February 2015   white paper on incidental thyroid nodules in the Journal of the [employer] of Radiology [facility]), further characterization with thyroid ultrasound is recommended       Results from last 7 days   Lab Units 01/08/21  1437   SARS-COV-2  Negative     Results from last 7 days   Lab Units 01/10/21  0547 01/08/21  1437   WBC Thousand/uL 4 36 7 87   HEMOGLOBIN g/dL 13 6 15 9   HEMATOCRIT % 42 4 48 6   PLATELETS Thousands/uL 124* 169   NEUTROS ABS Thousands/µL  --  5 34         Results from last 7 days   Lab Units 01/10/21  0547 01/08/21  1513   SODIUM mmol/L 141 138   POTASSIUM mmol/L 3 4* 3 9   CHLORIDE mmol/L 106 100   CO2 mmol/L 28 29   ANION GAP mmol/L 7 9   BUN mg/dL 13 13   CREATININE mg/dL 0 98 1 38*   EGFR ml/min/1 73sq m 88 58   CALCIUM mg/dL 8 4 9 0   MAGNESIUM mg/dL  --  1 9     Results from last 7 days   Lab Units 01/08/21  1513   AST U/L 24   ALT U/L 23   ALK PHOS U/L 81   TOTAL PROTEIN g/dL 7 1   ALBUMIN g/dL 3 2*   TOTAL BILIRUBIN mg/dL 0 90   BILIRUBIN DIRECT mg/dL 0 28*     Results from last 7 days   Lab Units 01/10/21  1050 01/10/21  0549 01/09/21  2058 01/09/21  1515 01/09/21  1121 01/09/21  0555 01/08/21 2010 01/08/21  1320   POC GLUCOSE mg/dl 265* 225* 263* 245* 250* 299* 391* 425*     Results from last 7 days   Lab Units 01/10/21  0547 01/08/21  1513   GLUCOSE RANDOM mg/dL 272* 401*       BETA-HYDROXYBUTYRATE   Date Value Ref Range Status   09/17/2019 0 2 <0 6 mmol/L Final     Results from last 7 days   Lab Units 01/08/21  1437   TROPONIN I ng/mL <0 02         Results from last 7 days   Lab Units 01/08/21  1437   PROTIME seconds 14 9*   INR  1 14   PTT seconds 30     Results from last 7 days   Lab Units 01/08/21  1513   TSH 3RD GENERATON uIU/mL 2 154       Results from last 7 days   Lab Units 01/09/21  0053   CLARITY UA  Clear   COLOR UA  Yellow   SPEC GRAV UA  1 010   PH UA  5 5   GLUCOSE UA mg/dl 500 (1/2%)*   KETONES UA mg/dl Trace*   BLOOD UA  Negative   PROTEIN UA mg/dl Negative   NITRITE UA  Negative   BILIRUBIN UA  Negative   UROBILINOGEN UA E U /dl 0 2   LEUKOCYTES UA  Negative     Results from last 7 days   Lab Units 01/08/21  1437   INFLUENZA A PCR  Negative   INFLUENZA B PCR  Negative   RSV PCR  Negative       ED Treatment:   Medication Administration from 01/08/2021 1315 to 01/08/2021 1937       Date/Time Order Dose Route Action     01/08/2021 1444 sodium chloride 0 9 % bolus 1,000 mL 1,000 mL Intravenous New Bag     01/08/2021 1620 iohexol (OMNIPAQUE) 350 MG/ML injection (SINGLE-DOSE) 100 mL 100 mL Intravenous Given        Past Medical History:   Diagnosis Date    Chemical exposure     Diabetes mellitus ([address] )     H/O bone graft     Head injury     August 25, 2019 fell backwards hit head on a boulder w/ LOC    Head injury     as an air [place] with the [employer] - jumped out of a plane parachute disfunction    Hypertension     Seizures (HCC)      Present on Admission:   Syncope   TBI [facility]traumatic [facility] injury) ([facility] )   Hyperglycemia   Stroke-like symptoms   Acute kidney injury ([place] [address] )      Admitting Diagnosis: Thyroid nodule [E04 1]  Localization-related epilepsy ([place] [address] ) [G40 109]  Syncope [R55]  Tremor [R25 1]  Fatigue [R53 83]  Visual disturbance [H53 9]  CORDELL (acute kidney injury) ([place] [address] ) [N17 9]  Age/Sex: 44 y o  male     Admission Orders:  Scheduled Medications:  ARIPiprazole, 10 mg, Oral, Daily  aspirin, 81 mg, Oral, Daily  atorvastatin, 40 mg, Per NG Tube, QPM  divalproex sodium, 750 mg, Oral, Q12H [facility]  docusate sodium, 100 mg, Oral, BID  heparin (porcine), 5,000 Units, Subcutaneous, Q8H [facility]  [START ON 1/11/2021] insulin aspart protamine-insulin aspart, 12 Units, Subcutaneous, Daily Before Breakfast  insulin lispro, 1-6 Units, Subcutaneous, TID AC  insulin lispro, 1-6 Units, Subcutaneous, HS  metoprolol tartrate, 100 mg, Oral, Q12H  OLANZapine, 10 mg, Oral, HS      Continuous IV Infusions:    sodium chloride 0 9 % infusion   Rate: 75 mL/hr Dose: 75 mL/hr  Freq: Continuous Route: IV  Last Dose: 75 mL/hr (01/09/21 2235)  Start: 01/08/21 2000 End: 01/10/21 0756    PRN Meds:  acetaminophen, 650 mg, Oral, Q6H PRN  aluminum-magnesium hydroxide-simethicone, 30 mL, Oral, Q6H PRN  ondansetron, 4 mg, Intravenous, Q6H PRN      Echo  Tele monitoring  Neuro checks Every 1 hour x 4 hours, then every 2 hours x 8 hours, then every 4 hours x 72 hours  Seizure precautions  IP CONSULT TO NEUROLOGY  IP CONSULT TO CARDIOLOGY    Network Utilization Review Department  ATTENTION: Please call with any questions or concerns to [phone] and carefully listen to the prompts so that you are directed to the right person   All voicemails are confidential   Priya Christian all requests for admission clinical reviews, approved or denied determinations and any other requests to dedicated fax number below belonging to the campus where the patient is receiving treatment   List of dedicated fax numbers for the Facilities:  [address] [place] [address] DENIALS (Administrative/Medical Necessity) [phone]   [address]  (Maternity/NICU/Pediatrics) [phone]   [address] Chata Otero 4537 (  [facility] [address] [place] [address] Bailee Hollingsworth 1481 [address] [place], [place] [address] [phone]

## 2021-01-10 NOTE — ASSESSMENT & PLAN NOTE
· History of; With residual left leg weakness that is worsened on admission  · Initially happened when in the [employer] many years ago    · Re-injured himself approximately 1 year ago when he fell backwards striking his head on a rock  · Resulted in absence seizures maintained on Depakote; denies any recent seizure activity  · Also reports patient has started hearing voices in sounds since his TBI

## 2021-01-10 NOTE — DISCHARGE INSTRUCTIONS
Acute Kidney Injury   WHAT YOU NEED TO KNOW:   Acute kidney injury (CORDELL) is also called acute kidney failure, or acute renal failure  CORDELL happens when your kidneys suddenly stop working correctly  Normally, the kidneys remove fluid, chemicals, and waste from your blood  These wastes are turned into urine by your kidneys  CORDELL usually happens over hours or days  When you have CORDELL, your kidneys do not remove the waste, chemicals, or extra fluid from your body  A normal amount of urine is not produced  CORDELL is usually temporary, it can take days to months to recover  CORDELL can also become a chronic kidney condition  DISCHARGE INSTRUCTIONS:   Call 911 if:   · You have sudden chest pain or trouble breathing  Seek care immediately if:   · Your symptoms get worse  Contact your healthcare provider if:   · Your symptoms return  · Your blood sugar or blood pressure level is not within the range your healthcare provider recommends  · You have questions or concerns about your condition or care  Nutrition:  Your healthcare provider may tell you to eat food low in sodium (salt), potassium, phosphorus, or protein  A dietitian can help you plan your meals  Drink liquids as directed: Your healthcare provider may recommend that you drink a certain amount of liquids  This will help your kidneys work better and decrease your risk for dehydration  Ask how much liquid to drink each day and which liquids are best for you  Prevent acute kidney injury:   · Manage other health conditions  such as diabetes, high blood pressure, or heart disease  These conditions increase your risk for acute kidney injury  Take your medicines for these conditions as directed  Also, monitor your blood sugar and blood pressure levels as directed  Contact your healthcare provider if your levels are not in the range he or she says it should be  · Talk to your healthcare provider before you take over-the-counter-medicine    NSAIDs, stomach medicine, or laxatives may harm your kidneys and increase your risk for acute kidney injury  If it is okay to take the medicine, follow the directions on the package  Do not take more than directed  · Tell healthcare providers you have had acute kidney injury  before you get contrast liquid for an x-ray or CT scan  Your healthcare provider may give you medicine to prevent kidney problems caused by the liquid  Follow up with your healthcare provider as directed: You will need to return for more tests to make sure your kidneys are working properly  You may also be referred to a kidney specialist  Write down your questions so you remember to ask them during your visits  © Copyright [address] Information is for End User's use only and may not be sold, redistributed or otherwise used for commercial purposes  All illustrations and images included in CareNotes® are the copyrighted property of A D A M , Inc  or [facility] [employer]   The above information is an  only  It is not intended as medical advice for individual conditions or treatments  Talk to your doctor, nurse or pharmacist before following any medical regimen to see if it is safe and effective for you  Diabetes and Nutrition   WHAT YOU NEED TO KNOW:   Nutrition plans help with healthy eating patterns that improve health  Nutrition plans and regular exercise help keep your blood sugar levels steady  They also help delay or prevent complications of diabetes, such as diabetic kidney disease  DISCHARGE INSTRUCTIONS:   Call your local emergency number (911 in the [address] [place] [address],[address]) if:   · You have any of the following signs of a heart attack:      ?  Squeezing, pressure, or pain in your chest    ? You may  also have any of the following:     § Discomfort or pain in your back, neck, jaw, stomach, or arm    § Shortness of breath    § Nausea or vomiting    § Lightheadedness or a sudden cold sweat      Seek care immediately if:   · You have a low blood sugar level and it does not improve with treatment  Symptoms are trouble thinking, a pounding heartbeat, and sweating  · Your blood sugar level is above 240 mg/dL and does not come down within 15 minutes of treatment  · You have ketones in your blood or urine  · You have nausea or are vomiting and cannot keep any food or liquid down  · You have blurred or double vision  · Your breath has a fruity, sweet smell, or your breathing is shallow  Call your doctor or diabetes care team if:   · Your blood sugar levels are higher than your target goals  · You often have low blood sugar levels  · You have trouble coping with diabetes, or you feel anxious or depressed  · You have questions or concerns about your condition or care  A dietitian will help you create a nutrition plan  to meet your needs and your family's needs  The goal is for you to reach or maintain healthy weight, blood sugar, blood pressure, and lipid levels  You should meet with the dietitian at least 1 time each year  You will learn the following:  · How food affects your blood sugar levels    · How to create healthy eating habits    · How to make food choices based on your activity level, weight, and glucose levels    · How your favorite foods may fit into your plan    · How to keep track of carbohydrates    · Correct portion sizes for each food    · Changes you can make to your plan if you get pregnant or are breastfeeding    Do not skip meals: The goal is to keep your blood sugar level steady  Blood sugar levels may drop too low if you have received insulin and do not eat  Eat more high-fiber foods:  High-fiber foods include fresh or frozen fruits and vegetables, whole-grain breads, and beans  Fiber helps control or lower blood sugar and cholesterol levels  Choose whole fruits instead of fruit juice as much as possible  Sugar may be added to juice, and fiber may be removed       Choose heart-healthy fats:  Foods high in heart-healthy fats include olive oil, nuts, avocados, and fatty fish, such as salmon and tuna  Foods high in unhealthy fats include red meat, full-fat dairy products, and soft margarine  Unhealthy fats can increase your risk for heart disease, increase bad cholesterol, and lower good cholesterol  Choose complex carbohydrates:  Foods with complex carbohydrates include brown rice, whole-grain breads and cereals, and cooked beans  Foods with simple carbohydrates include white bread, white rice, most cold cereals, and snack foods  Your plan will include the amount of carbohydrate to have at one time or in a day  Your blood sugar level can get too high if you eat too much carbohydrate at one time  Blood sugar levels do not spike as high or drop as quickly with complex carbohydrates as with simple carbohydrates  Choose complex carbohydrates whenever possible  Have less sodium (salt): The risk for high blood pressure (BP) increases with high-sodium foods  Limit high-sodium foods, such as soy sauce, potato chips, and canned soup  Do not add salt to food you cook  Limit your use of table salt  Read labels to have no more than 2,300 milligrams of sodium in one day  Limit artificial sweeteners: These may be found in food or drinks, such as diet soft drinks or other low-calorie beverages  Artificial sweeteners are low in calories  They may help you lower your overall calories and carbohydrates  It is important not to have more calories from other foods to make up for the calories saved  Artificial sweeteners do not have any nutrition  Eat whole foods and drink water as much as possible  Your plan may include beverages with artificial sweeteners for a short time  These can help you transition from high-sugar beverages to water  Use the plate method for each meal:  This method can help you eat the right amount of carbohydrates and keep your blood sugar levels under control    · Draw an imaginary line down the middle of a 9-inch dinner plate  On one side, draw another line to divide that section in half  Your plate will have one large section and 2 small sections  · Fill the largest section with non-starchy vegetables  These include broccoli, spinach, cucumbers, peppers, cauliflower, and tomatoes  · Add a starch to one of the small sections  Starches include pasta, rice, whole-grain bread, tortillas, corn, potatoes, and beans  · Add meat or another source of protein to the other small section  Examples include chicken or turkey without skin, fish, lean beef or pork, low-fat cheese, tofu, and eggs  · Add dairy products or fruit next to your plate if your meal plan allows  Examples of dairy include skim or 1% milk and low-fat yogurt  If you do not drink milk or eat dairy products, you may be able to add another serving of starchy food instead  · Have a low-calorie or calorie-free drink with your meal  Examples include water or unsweetened tea or coffee  Know the risks if you choose to drink alcohol:  Alcohol can cause hypoglycemia (low blood sugar level), especially if you use insulin  Alcohol can cause high blood sugar and BP levels, and weight gain if you drink too much  Women 21 years or older and men 72 years or older should limit alcohol to 1 drink a day  Men aged 24 to 59 years should limit alcohol to 2 drinks a day  A drink of alcohol is 12 ounces of beer, 5 ounces of wine, or 1½ ounces of liquor  Hypoglycemia can happen hours after you drink alcohol  Check your blood sugar level for several hours after you drink alcohol  Have a source of fast-acting carbohydrates with you in case your level goes too low  You need immediate care if you have signs or symptoms of hypoglycemia, such as sweating, confusion, or fainting  Maintain a healthy weight:  A healthy weight can help you control your diabetes  You can maintain a healthy weight with a nutrition plan and exercise   Ask your healthcare provider how much you should weigh  Ask him or her to help you create a weight loss plan if you are overweight  Together you can set weight loss and maintenance goals  Follow up with your doctor or diabetes team as directed:  Write down your questions so you remember to ask them during your visits  © Copyright [address] Information is for End User's use only and may not be sold, redistributed or otherwise used for commercial purposes  All illustrations and images included in CareNotes® are the copyrighted property of A D A M , Inc  or [facility] [employer]   The above information is an  only  It is not intended as medical advice for individual conditions or treatments  Talk to your doctor, nurse or pharmacist before following any medical regimen to see if it is safe and effective for you  Diabetes and Your Skin   WHAT YOU NEED TO KNOW:   Diabetes can affect every part of your body, including your skin  Diabetes that is not well controlled can damage blood vessels and nerves  Damage to blood vessels can make it hard for blood to flow to tissues and organs  A lack of blood flow to your skin can cause ulcers that are difficult to heal  Skin ulcers are also called sores  People with diabetes can also have more bacterial skin infections than other people  Most skin conditions can be prevented with good blood sugar control  Skin sores can be hard to heal, or become life or limb-threatening, if not treated early  DISCHARGE INSTRUCTIONS:   Call your doctor or care team provider if:   · You get a severe burn or cut  · You have a sore that is painful, warm to the touch, or has redness around it  · Your sore does not get better or seems to get worse  · You have a fever  · Your blood sugar levels continue to be higher than they should be  · You have questions or concerns about your condition or care  Prevent skin sores:   · Keep your blood sugars within target range    Your care team provider will tell you what your blood sugar levels should be  High blood sugar levels increase your risk for skin infections and poor wound healing  · Keep your skin clean  Do not take hot baths or showers  They can cause your skin to get dry  Do not take a bubble bath if you have dry skin  Use moisturizing soaps  · Keep your skin from becoming too dry  Apply moisturizing lotion after baths or showers, especially in cold, dry weather  When you scratch dry, itchy skin, you can cause your skin to be open to infection  Bathe less during cold weather and use lotion to moisturize  Do not put lotion between your toes  Moisture between your toes could lead to skin breakdown  Use a humidifier to keep air in your home from being dry  · Keep areas where skin touches skin dry  Use talcum powder in areas such as armpits and groin  You may also need it under your breasts, and between your toes  Moisture in these areas can cause a fungal infection  · Treat cuts immediately  Clean minor cuts with soap and water  Cover them with sterile gauze  Follow up with your doctor or diabetes care team providers as directed:  Write down your questions so you remember to ask them during your visits  © Copyright [address] Information is for End User's use only and may not be sold, redistributed or otherwise used for commercial purposes  All illustrations and images included in CareNotes® are the copyrighted property of [employer]  or [facility] [employer]et [employer]   The above information is an  only  It is not intended as medical advice for individual conditions or treatments  Talk to your doctor, nurse or pharmacist before following any medical regimen to see if it is safe and effective for you  Diabetic Hyperglycemia   WHAT YOU NEED TO KNOW:   Diabetic hyperglycemia is a blood glucose (sugar) level that is higher than your diabetes care team provider recommends   You may have increased thirst and urinate more often than usual    DISCHARGE INSTRUCTIONS:   Call your local emergency number (911 in the [address]) for any of the following:   · You have a seizure  · You begin to breathe fast or are short of breath  · You become weak and confused  Seek care immediately if:   · Your blood sugar level is over 240 mg/dL and  you have ketones in your urine  · Your breath smells fruity  · You have nausea and are vomiting  · You have symptoms of dehydration, such as dark yellow urine, dry mouth and lips, and dry skin  Call your care team provider if:   · You continue to have higher blood sugar levels than your care team provider recommends  · You have questions or concerns about your condition or care  Medicines:   · Medicines , such as insulin and diabetes pills, decrease blood sugar levels  · Take your medicine as directed  Contact your healthcare provider if you think your medicine is not helping or if you have side effects  Tell him or her if you are allergic to any medicine  Keep a list of the medicines, vitamins, and herbs you take  Include the amounts, and when and why you take them  Bring the list or the pill bottles to follow-up visits  Carry your medicine list with you in case of an emergency  Manage diabetic hyperglycemia:   · If you take diabetes medicine or insulin, take it as directed  Missed or wrong doses can cause your blood sugar to go up  · Tell your care team provider if you continue to have trouble managing your blood sugar  He or she may change the type, amount, or timing of your diabetes medicine or insulin  If you do not take diabetes medicine or insulin, you may need to start  · Work with your care team provider to develop a sick day plan  Illness can cause your blood sugar to rise  A sick day plan helps you control your blood sugar level when you are sick  Prevent diabetic hyperglycemia:   · Check your blood sugar levels regularly    Ask your care team provider how often to check your blood sugar and what your levels should be  · Follow your meal plan  Your blood sugar can go up if you eat a large meal or you eat more carbohydrates than recommended  Work with a dietitian to develop a meal plan that is right for you  · Exercise as directed  Physical activity, such as exercise, can help lower your blood sugar when it is high  It can also keep your blood sugar levels steady over time  Be active for at least 30 minutes, 5 days a week  Include muscle strengthening activities 2 days each week  Do not sit for longer than 30 minutes at a time  Work with your care team provider to create an activity plan  Children should get at least 60 minutes of physical activity each day  · Check your ketones before exercise  if your blood sugar level is above 240 mg/dL  Do not exercise if you have ketones in your urine  because your blood sugar level may rise even more  Ask your healthcare provider how to lower your blood sugar when you have ketones  Follow up with your care team provider as directed: Your care team provider may refer you to a dietitian  He or she can help you manage your blood sugar levels  Write down your questions so you remember to ask them during your visits  © Copyright [address] [place] [address] Drive Information is for End User's use only and may not be sold, redistributed or otherwise used for commercial purposes  All illustrations and images included in CareNotes® are the copyrighted property of [employer] M , Inc  or [facility] [employer]et Castro   The above information is an  only  It is not intended as medical advice for individual conditions or treatments  Talk to your doctor, nurse or pharmacist before following any medical regimen to see if it is safe and effective for you

## 2021-01-10 NOTE — CASE MANAGEMENT
LOS 2 DAYS  RISK OF UNPLANNED READMISSION SCORE N/A  30 DAY READMISSION: NO  BUNDLE: NO    CM met with patient bedside  Patient reports living with spouse and children in a 1-story home with 10 FLORY  Patient states that he has a shower chair, BSC, RW, and cane at home  Patient states he uses cane at baseline for ambulation  Patient states he was IPTA with most ADLs, however patient's family provides assistance with cleaning and cooking  Patient states that his wife is paid caregiver for 8 hours/day as patient collects SSD  Patient is an [employer]   No Hx VNA, STR, MH, or SA identified during this assessment  PCP: Ervin Berg    Preferred Pharmacy: [facility], no barriers identified to obtaining Rx from that location  CM reviewed discharge planning process including the following: identifying help at home, patient preference for discharge planning needs, pharmacy preference, and availability of treatment team to discuss questions or concerns patient and/or family may have regarding understanding medications and recognizing signs and symptoms once discharged  CM also encouraged patient to follow up with all recommended appointments after discharge  Patient advised of importance for patient and family to participate in managing patients medical well being  CM name and role reviewed  Discharge Checklist reviewed and CM will continue to monitor for progress toward discharge goals in nursing and provider rounds  CM reviewed recommendation for HHPT  Patient requesting referrals sent with no preference for agency  Patient requesting CM find VNA that is covered by insurance  CM pended referrals on [facility] and will continue to follow  Patient states that his wife will transport home at discharge

## 2021-01-10 NOTE — CASE MANAGEMENT
Per chart review, patient written for discharge  No accepting VNA at this time  CM s/w patient by phone  Patient stating that he no longer wants VNA  Patient states he feels safe to return home with support of family

## 2021-01-10 NOTE — ASSESSMENT & PLAN NOTE
· Baseline creatinine appears to be 0 8-0 9  · 1 38 on admission  · Creatinine now resolved and within baseline, 0 98  · Continue to hold lisinopril  · Resolved

## 2021-01-10 NOTE — ASSESSMENT & PLAN NOTE
· Blood sugar greater than 400 on admission  · Hemoglobin A1c 12  · Resume home medication regimen on discharge  · Follow up with Endocrinology outpatient    · Carb controlled diet

## 2021-01-10 NOTE — ASSESSMENT & PLAN NOTE
Lab Results   Component Value Date    HGBA1C 12 0 (H) 01/09/2021       Recent Labs     01/09/21  1121 01/09/21  1515 01/09/21  2058 01/10/21  0549   POCGLU 250* 245* 263* 225*       Blood Sugar Average: Last 72 hrs:  (P) 299 [id number]     · Hemoglobin A1c shows out of control blood sugars, hemoglobin A1C 12 0  · Carb controlled diet  · Resume home insulin regimen with addition of sliding scales  · Fingersticks  · Patient doesn't appear to follow up very well with his diabetes; would recommend Endocrinology consult outpatient

## 2021-01-10 NOTE — ASSESSMENT & PLAN NOTE
· Patient is complaining of some variable, nonspecific symptoms including headache, bilateral blurry vision, and slightly worsened left leg weakness  · Head CTA head and neck is negative except for thyroid nodule  · MRI Brain (1/8/21): No acute intracranial pathology  · Atorvastatin 40 mg, previously maintained on 20 mg  · Lipid panel relatively normal; triglycerides elevated at 180  · Aspirin  · Echocardiogram pending for today  · Neurology consult  · Signed off; unlikely that this is a stroke    · MRI normal

## 2021-01-10 NOTE — SPEECH THERAPY NOTE
Speech Language/Pathology    Speech/Language Pathology Progress Note    Patient Name: Chris Lake  Today's Date: 1/9/2021     Problem List  Principal Problem:    Syncope  Active Problems:    Type 2 diabetes mellitus without complication, with long-term current use of insulin (HCC)    Hyperglycemia    TBI (traumatic brain injury) ([facility] [address] )    Stroke-like symptoms    Acute kidney injury [facility])       Past Medical History  Past Medical History:   Diagnosis Date    Chemical exposure     Diabetes mellitus ([facility] )     H/O bone graft     Head injury     August 25, 2019 fell backwards hit head on a boulder w/ LOC    Head injury     as an air [place] with the [employer] - jumped out of a plane parachute disfunction    Hypertension     Seizures ([facility] [address] )         Past Surgical History  Past Surgical History:   Procedure Laterality Date    FL LUMBAR PUNCTURE DIAGNOSTIC  12/13/2019    KNEE ARTHROSCOPY W/ MENISCECTOMY Left     LEG SURGERY      TONSILLECTOMY         Consult received, medical chart reviewed for updates  Patient is on a regular textured diet with thin liquids  Spoke with Arnoldo Lundberg RN who reported no swallowing difficulty with medications or at current diet level  Screening complete and formal evaluation deferred  Please reconsult if patient has s/s of dysphagia and/or communication deficits       Lucia Weston MA CCC-SLP  01/09/21  8:16 PM

## 2021-01-10 NOTE — CONSULTS
Consultation - Cardiology Team One  [facility]  44 y o  male MRN: [id number]  Unit/Bed#: -01 Encounter: [id number]    Inpatient consult to Cardiology  Consult performed by: ROCKY Oden  Consult ordered by: Barney Gandhi [address] [place] [address]          Physician Requesting Consult: Barbara Mas MD  Reason for Consult / Principal Problem: syncope     Assessment/Plan:    1  Syncope   -associated with postural change, as he was getting up from couch   -orthostatic blood pressures positive in the emergency room  -TTE ordered and pending  -continue to monitor on telemetry  -if TTE unrevealing, patient to complete an outpatient cardiac event monitor after discharge    2  Hypertension  -blood pressure currently controlled  -continue metoprolol tartrate 100 mg b i d   -continue to monitor blood pressures    3  Hyperlipidemia  -continue atorvastatin 40 mg daily    4  History of TBI with intracranial hemorrhage and left-sided weakness  -care per primary team    5  History of seizure disorder  -continue Depakote  -care per Neurology and primary team    HPI: Cardiologist Bridget Finder  is a 44y o  year old male who has a history of 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, diabetes type 2,  hypertension, hyperlipidemia, seizure disorder who presented to the emergency room via EMS for syncopal episode  Patient states that over the past several days he has felt intermittent "buzzing over his head",  visual disturbances with silver floaters, increased left leg weakness and left arm tremors as well as intermittent dizziness and nausea  Today he got up from lying on the couch to go to the bathroom and had sudden loss of consciousness  He denies any feeling of lightheadedness prior to his loss of consciousness  He denies any seizure-like activity or loss of bowel or bladder with this episode    When he arrived to the emergency room his blood sugar was noted to be greater than 400  Denies fever, chills, chest pain, shortness of breath, orthopnea, vomiting, diarrhea, abdominal pain  CTH and MRI of the brain both were negative for any acute findings,   Troponin less than 0 02 x 1  A static blood pressures positive from sitting to standing with a 20 mm drop  He has followed with Cardiology in the past was last seen in February of 2019 for hospital follow-up for chest pain  He had a pharmacological stress test at that time which was abnormal but due to a moderate-sized moderately severe fixed myocardial perfusion defect and a TTE was also performed at that time which revealed normal systolic function with an EF of 60%, mild concentric hypertrophy and grade 1 diastolic dysfunction      REVIEW OF SYSTEMS:  Constitutional:  Denies fever or chills, + syncopal event, +dizziness   Eyes:  Denies change in visual acuity   HENT:  Denies nasal congestion or sore throat   Respiratory:  Denies cough, orthopnea, PND or shortness of breath   Cardiovascular:  Denies chest pain, palpitations or edema   GI:  Denies abdominal pain, nausea, vomiting, bloody stools or diarrhea   :  Denies dysuria, frequency, difficulty in micturition and nocturia  Musculoskeletal:  Denies back pain or joint pain   Neurologic:  Denies headache, + left-sided focal increased weakness, + visual disturbances  Endocrine:  Denies polyuria or polydipsia   Lymphatic:  Denies swollen glands   Psychiatric:  Denies depression or anxiety     Historical Information   Past Medical History:   Diagnosis Date    Chemical exposure     Diabetes mellitus ([address] )     H/O bone graft     Head injury     August 25, 2019 fell backwards hit head on a boulder w/ LOC    Head injury     as an air [place] with the [employer] - jumped out of a plane parachute disfunction    Hypertension     Seizures ([address] )      Past Surgical History:   Procedure Laterality Date    FL LUMBAR PUNCTURE DIAGNOSTIC  12/13/2019    KNEE ARTHROSCOPY W/ MENISCECTOMY Left     LEG SURGERY      TONSILLECTOMY       Social History     Substance and Sexual Activity   Alcohol Use Yes    Alcohol/week: 3 0 standard drinks    Types: 3 Cans of beer per week    Frequency: Monthly or less    Comment: occassionally     Social History     Substance and Sexual Activity   Drug Use No     Social History     Tobacco Use   Smoking Status Never Smoker   Smokeless Tobacco Never Used       Family History:   Family History   Problem Relation Age of Onset    Diabetes Mother     Hypertension Mother     Asthma Mother     Brain cancer Father     No Known Problems Sister     No Known Problems Brother     No Known Problems Brother        MEDS & ALLERGIES:  all current active meds have been reviewed and current meds:   Current Facility-Administered Medications   Medication Dose Route Frequency    acetaminophen (TYLENOL) tablet 650 mg  650 mg Oral Q6H PRN    aluminum-magnesium hydroxide-simethicone (MYLANTA) oral suspension 30 mL  30 mL Oral Q6H PRN    ARIPiprazole (ABILIFY) tablet 10 mg  10 mg Oral Daily    aspirin chewable tablet 81 mg  81 mg Oral Daily    atorvastatin (LIPITOR) tablet 40 mg  40 mg Per NG Tube QPM    divalproex sodium (DEPAKOTE) EC tablet 750 mg  750 mg Oral Q12H [facility]    docusate sodium (COLACE) capsule 100 mg  100 mg Oral BID    heparin (porcine) subcutaneous injection 5,000 Units  5,000 Units Subcutaneous Q8H [facility]    insulin aspart protamine-insulin aspart (NovoLOG 70/30) 100 units/mL subcutaneous injection 10 Units  10 Units Subcutaneous Daily Before Breakfast    insulin lispro (HumaLOG) 100 units/mL subcutaneous injection 1-6 Units  1-6 Units Subcutaneous TID AC    insulin lispro (HumaLOG) 100 units/mL subcutaneous injection 1-6 Units  1-6 Units Subcutaneous HS    metoprolol tartrate (LOPRESSOR) tablet 100 mg  100 mg Oral Q12H    OLANZapine (ZyPREXA ZYDIS) dispersible tablet 10 mg  10 mg Oral HS    ondansetron (ZOFRAN) injection 4 mg  4 mg Intravenous Q6H PRN        No Known Allergies    OBJECTIVE:  Vitals:   Vitals:    01/10/21 0849   BP: 125/90   Pulse: 62   Resp: 19   Temp: 97 5 °F (36 4 °C)   SpO2: (!) 89%     Body mass index is 42 84 kg/m²  Systolic (75NJG), DP , Min:108 , GRX:510     Diastolic (69OHQ), JNS:68, Min:64, Max:97      Intake/Output Summary (Last 24 hours) at 1/10/2021 0904  Last data filed at 1/10/2021 0849  Gross per 24 hour   Intake 2683 75 ml   Output 450 ml   Net 2233 75 ml     Weight (last 2 days)     Date/Time   Weight    21 1320   132 (290 13)            Invasive Devices     Peripheral Intravenous Line            Peripheral IV 21 Left Antecubital 1 day                PHYSICAL EXAMS:  General:  Patient is not in acute distress, laying in the bed comfortably, awake, alert   Head: Normocephalic, Atraumatic     HEENT: White sclera, pink conjunctiva  Neck:  Supple, no neck vein distention  Respiratory: clear to P/A  Cardiovascular:  PMI normal, S1-S2 normal, no murmurs, thrills, gallops, rubs, regular rhythm  GI:  Abdomen soft, non-tender, non-distended  Extremities: No edema, normal pulses  Integument:  No skin rashes or ulceration  Lymphatic:  No cervical or inguinal lymphadenopathy  Neurologic:  Patient is awake alert, responding to command, oriented to person, place and time     LABORATORY RESULTS:  Results from last 7 days   Lab Units 21  1437   TROPONIN I ng/mL <0 02     CBC with diff:   Results from last 7 days   Lab Units 01/10/21  0547 21  1437   WBC Thousand/uL 4 36 7 87   HEMOGLOBIN g/dL 13 6 15 9   HEMATOCRIT % 42 4 48 6   MCV fL 90 89   PLATELETS Thousands/uL 124* 169   MCH pg 28 9 29 0   MCHC g/dL 32 1 32 7   RDW % 14 0 13 9   MPV fL 10 9 10 8   NRBC AUTO /100 WBCs  --  0       CMP:  Results from last 7 days   Lab Units 01/10/21  0547 21  1513   POTASSIUM mmol/L 3 4* 3 9   CHLORIDE mmol/L 106 100   CO2 mmol/L 28 29   BUN mg/dL 13 13   CREATININE mg/dL 0 98 1 38*   CALCIUM mg/dL 8 4 9  0   AST U/L  --  24   ALT U/L  --  23   ALK PHOS U/L  --  81   EGFR ml/min/1 73sq m 88 58       BMP:  Results from last 7 days   Lab Units 01/10/21  0547 21  1513   POTASSIUM mmol/L 3 4* 3 9   CHLORIDE mmol/L 106 100   CO2 mmol/L 28 29   BUN mg/dL 13 13   CREATININE mg/dL 0 98 1 38*   CALCIUM mg/dL 8 4 9 0            Results from last 7 days   Lab Units 21  1513   MAGNESIUM mg/dL 1 9     Results from last 7 days   Lab Units 21  0530   HEMOGLOBIN A1C % 12 0*     Results from last 7 days   Lab Units 21  1513   TSH 3RD GENERATON uIU/mL 2 154     Results from last 7 days   Lab Units 21  1437   INR  1 14       Lipid Profile:   No results found for: CHOL  Lab Results   Component Value Date    HDL 31 (L) 2021    HDL 33 (L) 2019    HDL 34 (L) 2017     Lab Results   Component Value Date    LDLCALC 38 2021    LDLCALC 52 2019    LDLCALC 59 2017     Lab Results   Component Value Date    TRIG 180 (H) 2021    TRIG 107 2019    TRIG 137 2017       Cardiac testing:   Results for orders placed during the hospital encounter of 19   Echo complete with contrast if indicated    Narrative [address] [place] [phone]    Transthoracic Echocardiogram  2D, M-mode, Doppler, and Color Doppler    Study date:  2019    Patient: Cameron Abrams  MR number: [id number]  Account number: [de-identified]  : 1970  Age: 46 years  Gender: Male  Status: Outpatient  Location: Bedside  Height: 70 in  Weight: 329 lb  BP: 177/ 106 mmHg    Indications: Hypertensive heart disease, Assess left ventricular function      Diagnoses: I11 9 - Hypertensive heart disease without heart failure    Sonographer:  Patito Butt  Referring Physician:  Freda Rubio MD  Group:  [facility]  Interpreting Physician:  Mari North MD    SUMMARY    PROCEDURE INFORMATION:  This was a technically difficult study  Echocardiographic views were limited due to poor acoustic window availability, decreased penetration, and lung interference  LEFT VENTRICLE:  Systolic function was normal  Ejection fraction was estimated to be 60 %  This study was inadequate for the evaluation of regional wall motion  Wall thickness was mildly increased  There was mild concentric hypertrophy  Doppler parameters were consistent with abnormal left ventricular relaxation (grade 1 diastolic dysfunction)  RIGHT VENTRICLE:  The size was normal   Systolic function was normal     MITRAL VALVE:  There was trace regurgitation  AORTIC VALVE:  There was mild regurgitation  TRICUSPID VALVE:  There was trace regurgitation  HISTORY: Symptoms: chest pain  PRIOR HISTORY: Risk factors: hypertension and morbid obesity  PROCEDURE: The procedure was performed at the bedside  This was a routine study  The transthoracic approach was used  The study included complete 2D imaging, M-mode, complete spectral Doppler, and color Doppler  The heart rate was 69 bpm,  at the start of the study  Images were obtained from the parasternal, apical, subcostal, and suprasternal notch acoustic windows  Echocardiographic views were limited due to poor acoustic window availability, decreased penetration, and  lung interference  This was a technically difficult study  LEFT VENTRICLE: Size was normal  Systolic function was normal  Ejection fraction was estimated to be 60 %  This study was inadequate for the evaluation of regional wall motion  Wall thickness was mildly increased  There was mild concentric  hypertrophy  No evidence of apical thrombus  DOPPLER: Doppler parameters were consistent with abnormal left ventricular relaxation (grade 1 diastolic dysfunction)      RIGHT VENTRICLE: The size was normal  Systolic function was normal  Wall thickness was normal     LEFT ATRIUM: Size was normal     RIGHT ATRIUM: Size was normal     MITRAL VALVE: Valve structure was normal  There was normal leaflet separation  DOPPLER: The transmitral velocity was within the normal range  There was no evidence for stenosis  There was trace regurgitation  AORTIC VALVE: The valve was trileaflet  Leaflets exhibited mildly increased thickness and normal cuspal separation  DOPPLER: Transaortic velocity was within the normal range  There was no evidence for stenosis  There was mild  regurgitation  TRICUSPID VALVE: The valve structure was normal  There was normal leaflet separation  DOPPLER: The transtricuspid velocity was within the normal range  There was no evidence for stenosis  There was trace regurgitation  PULMONIC VALVE: Leaflets exhibited normal thickness, no calcification, and normal cuspal separation  DOPPLER: The transpulmonic velocity was within the normal range  There was no significant regurgitation  PERICARDIUM: There was no pericardial effusion  The pericardium was normal in appearance  AORTA: The root exhibited dilatation - 4 2 cm  SYSTEMIC VEINS: IVC: The inferior vena cava was normal in size      SYSTEM MEASUREMENT TABLES    2D  %FS: 44 2 %  Ao Diam: 4 2 cm  EDV(Teich): 64 2 ml  EF(Teich): 76 1 %  ESV(Teich): 15 4 ml  IVSd: 1 9 cm  LA Area: 16 cm2  LA Diam: 3 2 cm  LVEDV MOD A4C: 117 5 ml  LVEF MOD A4C: 56 2 %  LVESV MOD A4C: 51 5 ml  LVIDd: 3 9 cm  LVIDs: 2 2 cm  LVLd A4C: 8 4 cm  LVLs A4C: 7 cm  LVPWd: 1 3 cm  RA Area: 14 4 cm2  RVIDd: 3 1 cm  SV MOD A4C: 66 ml  SV(Teich): 48 9 ml    CW  AR Dec Baca: 1 1 m/s2  AR Dec Time: 3372 ms  AR PHT: 977 9 ms  AR Vmax: 3 6 m/s  AR maxP 9 mmHg  TR Vmax: 2 3 m/s  TR maxP 8 mmHg    MM  TAPSE: 2 cm    PW  E': 0 1 m/s  E/E': 9  MV A Ketan: 0 8 m/s  MV Dec Baca: 2 9 m/s2  MV DecT: 232 5 ms  MV E Ketan: 0 7 m/s  MV E/A Ratio: 0 9  MV PHT: 67 4 ms  MVA By PHT: 3 3 cm2    [facility] Accredited Echocardiography Laboratory    Prepared and electronically signed by    Lucy Gallardo MD  Signed 14-Jan-2019 19:14:26       No results found for this or any previous visit  No procedure found  No results found for this or any previous visit  Imaging:   I have personally reviewed pertinent reports          EKG reviewed personally:  Normal sinus rhythm with left anterior fascicular block          Code Status: Level 1 - Full Code      [facility]  1/10/2021,9:04 AM

## 2021-01-10 NOTE — PLAN OF CARE
Problem: Potential for Falls  Goal: Patient will remain free of falls  Description: INTERVENTIONS:  - Assess patient frequently for physical needs  -  Identify cognitive and physical deficits and behaviors that affect risk of falls    -  [place] fall precautions as indicated by assessment   - Educate patient/family on patient safety including physical limitations  - Instruct patient to call for assistance with activity based on assessment  - Modify environment to reduce risk of injury  - Consider OT/PT consult to assist with strengthening/mobility  Outcome: Progressing     Problem: PAIN - ADULT  Goal: Verbalizes/displays adequate comfort level or baseline comfort level  Description: Interventions:  - Encourage patient to monitor pain and request assistance  - Assess pain using appropriate pain scale  - Administer analgesics based on type and severity of pain and evaluate response  - Implement non-pharmacological measures as appropriate and evaluate response  - Consider cultural and social influences on pain and pain management  - Notify physician/advanced practitioner if interventions unsuccessful or patient reports new pain  Outcome: Progressing     Problem: INFECTION - ADULT  Goal: Absence or prevention of progression during hospitalization  Description: INTERVENTIONS:  - Assess and monitor for signs and symptoms of infection  - Monitor lab/diagnostic results  - Monitor all insertion sites, i e  indwelling lines, tubes, and drains  - Monitor endotracheal if appropriate and nasal secretions for changes in amount and color  - [place] appropriate cooling/warming therapies per order  - Administer medications as ordered  - Instruct and encourage patient and family to use good hand hygiene technique  - Identify and instruct in appropriate isolation precautions for identified infection/condition  Outcome: Progressing  Goal: Absence of fever/infection during neutropenic period  Description: INTERVENTIONS:  - Monitor WBC    Outcome: Progressing     Problem: SAFETY ADULT  Goal: Patient will remain free of falls  Description: INTERVENTIONS:  - Assess patient frequently for physical needs  -  Identify cognitive and physical deficits and behaviors that affect risk of falls    -  [place] fall precautions as indicated by assessment   - Educate patient/family on patient safety including physical limitations  - Instruct patient to call for assistance with activity based on assessment  - Modify environment to reduce risk of injury  - Consider OT/PT consult to assist with strengthening/mobility  Outcome: Progressing  Goal: Maintain or return to baseline ADL function  Description: INTERVENTIONS:  -  Assess patient's ability to carry out ADLs; assess patient's baseline for ADL function and identify physical deficits which impact ability to perform ADLs (bathing, care of mouth/teeth, toileting, grooming, dressing, etc )  - Assess/evaluate cause of self-care deficits   - Assess range of motion  - Assess patient's mobility; develop plan if impaired  - Assess patient's need for assistive devices and provide as appropriate  - Encourage maximum independence but intervene and supervise when necessary  - Involve family in performance of ADLs  - Assess for home care needs following discharge   - Consider OT consult to assist with ADL evaluation and planning for discharge  - Provide patient education as appropriate  Outcome: Progressing  Goal: Maintain or return mobility status to optimal level  Description: INTERVENTIONS:  - Assess patient's baseline mobility status (ambulation, transfers, stairs, etc )    - Identify cognitive and physical deficits and behaviors that affect mobility  - Identify mobility aids required to assist with transfers and/or ambulation (gait belt, sit-to-stand, lift, walker, cane, etc )  - [place] fall precautions as indicated by assessment  - Record patient progress and toleration of activity level on Mobility SBAR; progress patient to next Phase/Stage  - Instruct patient to call for assistance with activity based on assessment  - Consider rehabilitation consult to assist with strengthening/weightbearing, etc   Outcome: Progressing     Problem: DISCHARGE PLANNING  Goal: Discharge to home or other facility with appropriate resources  Description: INTERVENTIONS:  - Identify barriers to discharge w/patient and caregiver  - Arrange for needed discharge resources and transportation as appropriate  - Identify discharge learning needs (meds, wound care, etc )  - Arrange for interpretive services to assist at discharge as needed  - Refer to Case Management Department for coordinating discharge planning if the patient needs post-hospital services based on physician/advanced practitioner order or complex needs related to functional status, cognitive ability, or social support system  Outcome: Progressing     Problem: Knowledge Deficit  Goal: Patient/family/caregiver demonstrates understanding of disease process, treatment plan, medications, and discharge instructions  Description: Complete learning assessment and assess knowledge base  Interventions:  - Provide teaching at level of understanding  - Provide teaching via preferred learning methods  Outcome: Progressing     Problem: Nutrition/Hydration-ADULT  Goal: Nutrient/Hydration intake appropriate for improving, restoring or maintaining nutritional needs  Description: Monitor and assess patient's nutrition/hydration status for malnutrition  Collaborate with interdisciplinary team and initiate plan and interventions as ordered  Monitor patient's weight and dietary intake as ordered or per policy  Utilize nutrition screening tool and intervene as necessary  Determine patient's food preferences and provide high-protein, high-caloric foods as appropriate       INTERVENTIONS:  - Monitor oral intake, urinary output, labs, and treatment plans  - Assess nutrition and hydration status and recommend course of action  - Evaluate amount of meals eaten  - Assist patient with eating if necessary   - Allow adequate time for meals  - Recommend/ encourage appropriate diets, oral nutritional supplements, and vitamin/mineral supplements  - Order, calculate, and assess calorie counts as needed  - Recommend, monitor, and adjust tube feedings and TPN/PPN based on assessed needs  - Assess need for intravenous fluids  - Provide specific nutrition/hydration education as appropriate  - Include patient/family/caregiver in decisions related to nutrition  Outcome: Progressing     Problem: NEUROSENSORY - ADULT  Goal: Achieves stable or improved neurological status  Description: INTERVENTIONS  - Monitor and report changes in neurological status  - Monitor vital signs such as temperature, blood pressure, glucose, and any other labs ordered   - Initiate measures to prevent increased intracranial pressure  - Monitor for seizure activity and implement precautions if appropriate      Outcome: Progressing  Goal: Remains free of injury related to seizures activity  Description: INTERVENTIONS  - Maintain airway, patient safety  and administer oxygen as ordered  - Monitor patient for seizure activity, document and report duration and description of seizure to physician/advanced practitioner  - If seizure occurs,  ensure patient safety during seizure  - Reorient patient post seizure  - Seizure pads on all 4 side rails  - Instruct patient/family to notify RN of any seizure activity including if an aura is experienced  - Instruct patient/family to call for assistance with activity based on nursing assessment  - Administer anti-seizure medications if ordered    Outcome: Progressing  Goal: Achieves maximal functionality and self care  Description: INTERVENTIONS  - Monitor swallowing and airway patency with patient fatigue and changes in neurological status  - Encourage and assist patient to increase activity and self care     - Encourage visually impaired, hearing impaired and aphasic patients to use assistive/communication devices  Outcome: Progressing     Problem: CARDIOVASCULAR - ADULT  Goal: Maintains optimal cardiac output and hemodynamic stability  Description: INTERVENTIONS:  - Monitor I/O, vital signs and rhythm  - Monitor for S/S and trends of decreased cardiac output  - Administer and titrate ordered vasoactive medications to optimize hemodynamic stability  - Assess quality of pulses, skin color and temperature  - Assess for signs of decreased coronary artery perfusion  - Instruct patient to report change in severity of symptoms  Outcome: Progressing  Goal: Absence of cardiac dysrhythmias or at baseline rhythm  Description: INTERVENTIONS:  - Continuous cardiac monitoring, vital signs, obtain 12 lead EKG if ordered  - Administer antiarrhythmic and heart rate control medications as ordered  - Monitor electrolytes and administer replacement therapy as ordered  Outcome: Progressing     Problem: METABOLIC, FLUID AND ELECTROLYTES - ADULT  Goal: Electrolytes maintained within normal limits  Description: INTERVENTIONS:  - Monitor labs and assess patient for signs and symptoms of electrolyte imbalances  - Administer electrolyte replacement as ordered  - Monitor response to electrolyte replacements, including repeat lab results as appropriate  - Instruct patient on fluid and nutrition as appropriate  Outcome: Progressing  Goal: Fluid balance maintained  Description: INTERVENTIONS:  - Monitor labs   - Monitor I/O and WT  - Instruct patient on fluid and nutrition as appropriate  - Assess for signs & symptoms of volume excess or deficit  Outcome: Progressing  Goal: Glucose maintained within target range  Description: INTERVENTIONS:  - Monitor Blood Glucose as ordered  - Assess for signs and symptoms of hyperglycemia and hypoglycemia  - Administer ordered medications to maintain glucose within target range  - Assess nutritional intake and initiate nutrition service referral as needed  Outcome: Progressing     Problem: MUSCULOSKELETAL - ADULT  Goal: Maintain or return mobility to safest level of function  Description: INTERVENTIONS:  - Assess patient's ability to carry out ADLs; assess patient's baseline for ADL function and identify physical deficits which impact ability to perform ADLs (bathing, care of mouth/teeth, toileting, grooming, dressing, etc )  - Assess/evaluate cause of self-care deficits   - Assess range of motion  - Assess patient's mobility  - Assess patient's need for assistive devices and provide as appropriate  - Encourage maximum independence but intervene and supervise when necessary  - Involve family in performance of ADLs  - Assess for home care needs following discharge   - Consider OT consult to assist with ADL evaluation and planning for discharge  - Provide patient education as appropriate  Outcome: Progressing  Goal: Maintain proper alignment of affected body part  Description: INTERVENTIONS:  - Support, maintain and protect limb and body alignment  - Provide patient/ family with appropriate education  Outcome: Progressing     Problem: Neurological Deficit  Goal: Neurological status is stable or improving  Description: Interventions:  - Monitor and assess patient's level of consciousness, motor function, sensory function, and level of assistance needed for ADLs  - Monitor and report changes from baseline  Collaborate with interdisciplinary team to initiate plan and implement interventions as ordered  - Provide and maintain a safe environment  - Consider seizure precautions  - Consider fall precautions  - Consider aspiration precautions  - Consider bleeding precautions  Outcome: Progressing     Problem:  Activity Intolerance/Impaired Mobility  Goal: Mobility/activity is maintained at optimum level for patient  Description: Interventions:  - Assess and monitor patient  barriers to mobility and need for assistive/adaptive devices  - Assess patient's emotional response to limitations  - Collaborate with interdisciplinary team and initiate plans and interventions as ordered  - Encourage independent activity per ability   - Maintain proper body alignment  - Perform active/passive rom as tolerated/ordered  - Plan activities to conserve energy   - Turn patient as appropriate  Outcome: Progressing     Problem: Communication Impairment  Goal: Ability to express needs and understand communication  Description: Assess patient's communication skills and ability to understand information  Patient will demonstrate use of effective communication techniques, alternative methods of communication and understanding even if not able to speak  - Encourage communication and provide alternate methods of communication as needed  - Collaborate with case management/ for discharge needs  - Include patient/family/caregiver in decisions related to communication  Outcome: Progressing     Problem: Potential for Aspiration  Goal: Non-ventilated patient's risk of aspiration is minimized  Description: Assess and monitor vital signs, respiratory status, and labs (WBC)  Monitor for signs of aspiration (tachypnea, cough, rales, wheezing, cyanosis, fever)  - Assess and monitor patient's ability to swallow  - Place patient up in chair to eat if possible  - HOB up at 90 degrees to eat if unable to get patient up into chair   - Supervise patient during oral intake  - Instruct patient/ family to take small bites  - Instruct patient/ family to take small single sips when taking liquids  - Follow patient-specific strategies generated by speech pathologist   Outcome: Progressing  Goal: Ventilated patient's risk of aspiration is minimized  Description: Assess and monitor vital signs, respiratory status, airway cuff pressure, and labs (WBC)    Monitor for signs of aspiration (tachypnea, cough, rales, wheezing, cyanosis, fever)  - Elevate head of bed 30 degrees if patient has tube feeding   - Monitor tube feeding  Outcome: Progressing     Problem: Nutrition  Goal: Nutrition/Hydration status is improving  Description: Monitor and assess patient's nutrition/hydration status for malnutrition (ex- brittle hair, bruises, dry skin, pale skin and conjunctiva, muscle wasting, smooth red tongue, and disorientation)  Collaborate with interdisciplinary team and initiate plan and interventions as ordered  Monitor patient's weight and dietary intake as ordered or per policy  Utilize nutrition screening tool and intervene per policy  Determine patient's food preferences and provide high-protein, high-caloric foods as appropriate  - Assist patient with eating   - Allow adequate time for meals   - Encourage patient to take dietary supplement as ordered  - Collaborate with clinical nutritionist   - Include patient/family/caregiver in decisions related to nutrition    Outcome: Progressing

## 2021-01-10 NOTE — PLAN OF CARE
Problem: Potential for Falls  Goal: Patient will remain free of falls  Description: INTERVENTIONS:  - Assess patient frequently for physical needs  -  Identify cognitive and physical deficits and behaviors that affect risk of falls    -  [place] fall precautions as indicated by assessment   - Educate patient/family on patient safety including physical limitations  - Instruct patient to call for assistance with activity based on assessment  - Modify environment to reduce risk of injury  - Consider OT/PT consult to assist with strengthening/mobility  Outcome: Adequate for Discharge     Problem: PAIN - ADULT  Goal: Verbalizes/displays adequate comfort level or baseline comfort level  Description: Interventions:  - Encourage patient to monitor pain and request assistance  - Assess pain using appropriate pain scale  - Administer analgesics based on type and severity of pain and evaluate response  - Implement non-pharmacological measures as appropriate and evaluate response  - Consider cultural and social influences on pain and pain management  - Notify physician/advanced practitioner if interventions unsuccessful or patient reports new pain  Outcome: Adequate for Discharge     Problem: INFECTION - ADULT  Goal: Absence or prevention of progression during hospitalization  Description: INTERVENTIONS:  - Assess and monitor for signs and symptoms of infection  - Monitor lab/diagnostic results  - Monitor all insertion sites, i e  indwelling lines, tubes, and drains  - Monitor endotracheal if appropriate and nasal secretions for changes in amount and color  - [place] appropriate cooling/warming therapies per order  - Administer medications as ordered  - Instruct and encourage patient and family to use good hand hygiene technique  - Identify and instruct in appropriate isolation precautions for identified infection/condition  Outcome: Adequate for Discharge  Goal: Absence of fever/infection during neutropenic period  Description: INTERVENTIONS:  - Monitor WBC    Outcome: Adequate for Discharge     Problem: SAFETY ADULT  Goal: Patient will remain free of falls  Description: INTERVENTIONS:  - Assess patient frequently for physical needs  -  Identify cognitive and physical deficits and behaviors that affect risk of falls    -  [place] fall precautions as indicated by assessment   - Educate patient/family on patient safety including physical limitations  - Instruct patient to call for assistance with activity based on assessment  - Modify environment to reduce risk of injury  - Consider OT/PT consult to assist with strengthening/mobility  Outcome: Adequate for Discharge  Goal: Maintain or return to baseline ADL function  Description: INTERVENTIONS:  -  Assess patient's ability to carry out ADLs; assess patient's baseline for ADL function and identify physical deficits which impact ability to perform ADLs (bathing, care of mouth/teeth, toileting, grooming, dressing, etc )  - Assess/evaluate cause of self-care deficits   - Assess range of motion  - Assess patient's mobility; develop plan if impaired  - Assess patient's need for assistive devices and provide as appropriate  - Encourage maximum independence but intervene and supervise when necessary  - Involve family in performance of ADLs  - Assess for home care needs following discharge   - Consider OT consult to assist with ADL evaluation and planning for discharge  - Provide patient education as appropriate  Outcome: Adequate for Discharge  Goal: Maintain or return mobility status to optimal level  Description: INTERVENTIONS:  - Assess patient's baseline mobility status (ambulation, transfers, stairs, etc )    - Identify cognitive and physical deficits and behaviors that affect mobility  - Identify mobility aids required to assist with transfers and/or ambulation (gait belt, sit-to-stand, lift, walker, cane, etc )  - [place] fall precautions as indicated by assessment  - Record patient progress and toleration of activity level on Mobility SBAR; progress patient to next Phase/Stage  - Instruct patient to call for assistance with activity based on assessment  - Consider rehabilitation consult to assist with strengthening/weightbearing, etc   Outcome: Adequate for Discharge     Problem: Knowledge Deficit  Goal: Patient/family/caregiver demonstrates understanding of disease process, treatment plan, medications, and discharge instructions  Description: Complete learning assessment and assess knowledge base  Interventions:  - Provide teaching at level of understanding  - Provide teaching via preferred learning methods  Outcome: Adequate for Discharge     Problem: Nutrition/Hydration-ADULT  Goal: Nutrient/Hydration intake appropriate for improving, restoring or maintaining nutritional needs  Description: Monitor and assess patient's nutrition/hydration status for malnutrition  Collaborate with interdisciplinary team and initiate plan and interventions as ordered  Monitor patient's weight and dietary intake as ordered or per policy  Utilize nutrition screening tool and intervene as necessary  Determine patient's food preferences and provide high-protein, high-caloric foods as appropriate       INTERVENTIONS:  - Monitor oral intake, urinary output, labs, and treatment plans  - Assess nutrition and hydration status and recommend course of action  - Evaluate amount of meals eaten  - Assist patient with eating if necessary   - Allow adequate time for meals  - Recommend/ encourage appropriate diets, oral nutritional supplements, and vitamin/mineral supplements  - Order, calculate, and assess calorie counts as needed  - Recommend, monitor, and adjust tube feedings and TPN/PPN based on assessed needs  - Assess need for intravenous fluids  - Provide specific nutrition/hydration education as appropriate  - Include patient/family/caregiver in decisions related to nutrition  Outcome: Adequate for Discharge     Problem: NEUROSENSORY - ADULT  Goal: Achieves stable or improved neurological status  Description: INTERVENTIONS  - Monitor and report changes in neurological status  - Monitor vital signs such as temperature, blood pressure, glucose, and any other labs ordered   - Initiate measures to prevent increased intracranial pressure  - Monitor for seizure activity and implement precautions if appropriate      Outcome: Adequate for Discharge  Goal: Remains free of injury related to seizures activity  Description: INTERVENTIONS  - Maintain airway, patient safety  and administer oxygen as ordered  - Monitor patient for seizure activity, document and report duration and description of seizure to physician/advanced practitioner  - If seizure occurs,  ensure patient safety during seizure  - Reorient patient post seizure  - Seizure pads on all 4 side rails  - Instruct patient/family to notify RN of any seizure activity including if an aura is experienced  - Instruct patient/family to call for assistance with activity based on nursing assessment  - Administer anti-seizure medications if ordered    Outcome: Adequate for Discharge  Goal: Achieves maximal functionality and self care  Description: INTERVENTIONS  - Monitor swallowing and airway patency with patient fatigue and changes in neurological status  - Encourage and assist patient to increase activity and self care     - Encourage visually impaired, hearing impaired and aphasic patients to use assistive/communication devices  Outcome: Adequate for Discharge     Problem: CARDIOVASCULAR - ADULT  Goal: Maintains optimal cardiac output and hemodynamic stability  Description: INTERVENTIONS:  - Monitor I/O, vital signs and rhythm  - Monitor for S/S and trends of decreased cardiac output  - Administer and titrate ordered vasoactive medications to optimize hemodynamic stability  - Assess quality of pulses, skin color and temperature  - Assess for signs of decreased coronary artery perfusion  - Instruct patient to report change in severity of symptoms  Outcome: Adequate for Discharge  Goal: Absence of cardiac dysrhythmias or at baseline rhythm  Description: INTERVENTIONS:  - Continuous cardiac monitoring, vital signs, obtain 12 lead EKG if ordered  - Administer antiarrhythmic and heart rate control medications as ordered  - Monitor electrolytes and administer replacement therapy as ordered  Outcome: Adequate for Discharge     Problem: METABOLIC, FLUID AND ELECTROLYTES - ADULT  Goal: Electrolytes maintained within normal limits  Description: INTERVENTIONS:  - Monitor labs and assess patient for signs and symptoms of electrolyte imbalances  - Administer electrolyte replacement as ordered  - Monitor response to electrolyte replacements, including repeat lab results as appropriate  - Instruct patient on fluid and nutrition as appropriate  Outcome: Adequate for Discharge  Goal: Fluid balance maintained  Description: INTERVENTIONS:  - Monitor labs   - Monitor I/O and WT  - Instruct patient on fluid and nutrition as appropriate  - Assess for signs & symptoms of volume excess or deficit  Outcome: Adequate for Discharge  Goal: Glucose maintained within target range  Description: INTERVENTIONS:  - Monitor Blood Glucose as ordered  - Assess for signs and symptoms of hyperglycemia and hypoglycemia  - Administer ordered medications to maintain glucose within target range  - Assess nutritional intake and initiate nutrition service referral as needed  Outcome: Adequate for Discharge     Problem: MUSCULOSKELETAL - ADULT  Goal: Maintain or return mobility to safest level of function  Description: INTERVENTIONS:  - Assess patient's ability to carry out ADLs; assess patient's baseline for ADL function and identify physical deficits which impact ability to perform ADLs (bathing, care of mouth/teeth, toileting, grooming, dressing, etc )  - Assess/evaluate cause of self-care deficits   - Assess range of motion  - Assess patient's mobility  - Assess patient's need for assistive devices and provide as appropriate  - Encourage maximum independence but intervene and supervise when necessary  - Involve family in performance of ADLs  - Assess for home care needs following discharge   - Consider OT consult to assist with ADL evaluation and planning for discharge  - Provide patient education as appropriate  Outcome: Adequate for Discharge  Goal: Maintain proper alignment of affected body part  Description: INTERVENTIONS:  - Support, maintain and protect limb and body alignment  - Provide patient/ family with appropriate education  Outcome: Adequate for Discharge     Problem: Activity Intolerance/Impaired Mobility  Goal: Mobility/activity is maintained at optimum level for patient  Description: Interventions:  - Assess and monitor patient  barriers to mobility and need for assistive/adaptive devices  - Assess patient's emotional response to limitations  - Collaborate with interdisciplinary team and initiate plans and interventions as ordered  - Encourage independent activity per ability   - Maintain proper body alignment  - Perform active/passive rom as tolerated/ordered  - Plan activities to conserve energy   - Turn patient as appropriate  Outcome: Adequate for Discharge     Problem: Communication Impairment  Goal: Ability to express needs and understand communication  Description: Assess patient's communication skills and ability to understand information  Patient will demonstrate use of effective communication techniques, alternative methods of communication and understanding even if not able to speak  - Encourage communication and provide alternate methods of communication as needed  - Collaborate with case management/ for discharge needs  - Include patient/family/caregiver in decisions related to communication    Outcome: Adequate for Discharge     Problem: Potential for Aspiration  Goal: Non-ventilated patient's risk of aspiration is minimized  Description: Assess and monitor vital signs, respiratory status, and labs (WBC)  Monitor for signs of aspiration (tachypnea, cough, rales, wheezing, cyanosis, fever)  - Assess and monitor patient's ability to swallow  - Place patient up in chair to eat if possible  - HOB up at 90 degrees to eat if unable to get patient up into chair   - Supervise patient during oral intake  - Instruct patient/ family to take small bites  - Instruct patient/ family to take small single sips when taking liquids  - Follow patient-specific strategies generated by speech pathologist   Outcome: Adequate for Discharge  Goal: Ventilated patient's risk of aspiration is minimized  Description: Assess and monitor vital signs, respiratory status, airway cuff pressure, and labs (WBC)  Monitor for signs of aspiration (tachypnea, cough, rales, wheezing, cyanosis, fever)  - Elevate head of bed 30 degrees if patient has tube feeding   - Monitor tube feeding  Outcome: Adequate for Discharge     Problem: Nutrition  Goal: Nutrition/Hydration status is improving  Description: Monitor and assess patient's nutrition/hydration status for malnutrition (ex- brittle hair, bruises, dry skin, pale skin and conjunctiva, muscle wasting, smooth red tongue, and disorientation)  Collaborate with interdisciplinary team and initiate plan and interventions as ordered  Monitor patient's weight and dietary intake as ordered or per policy  Utilize nutrition screening tool and intervene per policy  Determine patient's food preferences and provide high-protein, high-caloric foods as appropriate  - Assist patient with eating   - Allow adequate time for meals   - Encourage patient to take dietary supplement as ordered  - Collaborate with clinical nutritionist   - Include patient/family/caregiver in decisions related to nutrition    Outcome: Adequate for Discharge

## 2021-01-11 ENCOUNTER — TELEPHONE (OUTPATIENT)
Dept: CARDIOLOGY CLINIC | Facility: CLINIC | Age: 51
End: 2021-01-11

## 2021-01-11 NOTE — TELEPHONE ENCOUNTER
----- Message from [address] [place] [address] sent at 1/10/2021 10:49 AM EST -----  Regarding: event monitor  Please set patient up for 2 week event monitor  Shravan Soliz is ordering MD  Thank you!

## 2021-01-11 NOTE — UTILIZATION REVIEW
Notification of Inpatient Admission/Inpatient Authorization Request   This is a Notification of Inpatient Admission for [address]  Be advised that this patient was admitted to our facility under Inpatient Status  Contact Annika Lund at [phone] for additional admission information  [facility] DEPT DEDICATED Ernesto Grade [phone]  Patient Name:   [facility]  YOB: 1970       [address]   [address]:   [address]   Tax ID: [id number]  NPI: [id number] Attending Provider/NPI:  Phone:  Address: Patricio [facility], [address] [place] [[id number]]  [phone]  Same as BEAU/Sarwat Guerra 1106 of Service Code: 24     Place of Service Name:  [address] [place] [address]   Start Date: 1/10/21 1007 Discharge Date & Time: 1/10/2021  6:09 PM    Type of Admission: Inpatient Status Discharge Disposition   (if discharged): Home/Self Care   Patient Diagnoses: Thyroid nodule [E04 1]  Localization-related epilepsy ([facility] [address] ) [G40 109]  Syncope [R55]  Tremor [R25 1]  Fatigue [R53 83]  Visual disturbance [H53 9]  CORDELL (acute kidney injury) ([facility] [address] ) [N17 9]     Orders: Admission Orders (From admission, onward)     Ordered        01/10/21 1007  Inpatient Admission  Once         01/08/21 1743  Place in Observation  Once                    Assigned Utilization Review Contact: Annika Lund  Utilization   Network Utilization Review Department  Phone: [phone]; Fax [phone]  Email: [email or web address]   ATTENTION PAYERS: Please call the assigned Utilization  directly with any questions or concerns ALL voicemails in the department are confidential  Send all requests for admission clinical reviews, approved or denied determinations and any other requests to dedicated fax number belonging to the campus where the patient is receiving treatment

## 2021-01-11 NOTE — TELEPHONE ENCOUNTER
[address] : 1970   F/ U in 6-8 weeks after cardiac event monitor completed, with Dr Manny Cobb, can you please set pt up to receive event monitor & send back to clerical to schedule follow up? Thanks!

## 2021-01-14 NOTE — TELEPHONE ENCOUNTER
Pt spouse called and was aware to the event monitor was order and pt needs a fu 6-8 weeks after  Pt states she received the monitor yesterday and will apply it today, she will also call his previous cardio at [facility] for a follow up because we are non par with patient's insurance

## 2021-01-28 ENCOUNTER — OFFICE VISIT (OUTPATIENT)
Dept: NEUROLOGY | Facility: CLINIC | Age: 51
End: 2021-01-28
Payer: COMMERCIAL

## 2021-01-28 VITALS
BODY MASS INDEX: 40.09 KG/M2 | WEIGHT: 280 LBS | DIASTOLIC BLOOD PRESSURE: 84 MMHG | HEIGHT: 70 IN | SYSTOLIC BLOOD PRESSURE: 122 MMHG

## 2021-01-28 DIAGNOSIS — G40.109 LOCALIZATION-RELATED EPILEPSY (HCC): Primary | ICD-10-CM

## 2021-01-28 DIAGNOSIS — F29 PSYCHOSIS, UNSPECIFIED PSYCHOSIS TYPE (HCC): ICD-10-CM

## 2021-01-28 PROCEDURE — 99214 OFFICE O/P EST MOD 30 MIN: CPT | Performed by: PSYCHIATRY & NEUROLOGY

## 2021-01-28 NOTE — PROGRESS NOTES
Patient ID: Chris Lake  is a 44 y o  male with prior traumatic subarachnoid hemorrhage, localization-related epilepsy, and ongoing auditory hallucinations, who is returning to Neurology office for follow up of his seizures  Assessment/Plan:    Localization-related epilepsy [facility])  He has not had any additional seizures since his last appointment, and is doing better on the lower dose of Depakote with improvement in his tremor  This is still present, but not as problematic and more intermittent the had been previously  --at this point because he is not having any additional definite seizures, I will not make any changes to his medications today  He is still having active hallucinations  These previously were captured on continuous EEG, and were not felt to be seizures as they were without any EEG change  In order to evaluate for the possibility that he may be having subclinical seizures contributing to his symptoms, I will have him get a routine EEG  Depending on the results of this, may benefit from ambulatory EEG in the future  Psychotic disorder [facility])  He is still having active hallucinations ever since his injury  As noted above, these do not appear to be related to seizures, so will be very important for him to continue to follow with Psychiatry for ongoing management  We did discuss the possibility of using lamotrigine, which can help stabilize mood  This would not be likely to worsen extrapyramidal side effects        He will Return in about 3 months (around 4/28/2021)  Subjective:    HPI  Current seizure medications:  1  Divalproex  mg twice a day  Other medications as per Epic  Since his last visit, he did decrease the Depakote as instructed, and does feel that his tremor is a little better  He still is having problems with hearing voices, which if anything has been a litle worse than before   He continues to follow with a psychiatrist and psychologist, but even despite the current medication adjustments  He was just hearing noises, but over the last week, the hallucinations have been voices again  He did just have some adjustments in his medications, and talks with his psychiatrist every 2 weeks  Prior Seizure Medications: Levetiracetam (worsened mood and hallucinations)    His history was also obtained from his wife, who was present at today's visit  I reviewed notes from his recent hospitalization, as [facility] Epic[facility], and summarized above  The following portions of the patient's history were reviewed and updated as appropriate: allergies, current medications, past medical history and problem list      Objective:    Blood pressure 122/84, height 5' 10" (1 778 m), weight 127 kg (280 lb)  Physical Exam    Neurological Exam      ROS:    Review of Systems   Constitutional: Negative  Negative for appetite change and fever  HENT: Negative  Negative for hearing loss, tinnitus, trouble swallowing and voice change  Eyes: Negative  Negative for photophobia and pain  Respiratory: Negative  Negative for shortness of breath  Cardiovascular: Negative  Negative for palpitations  Gastrointestinal: Negative  Negative for nausea and vomiting  Endocrine: Negative  Negative for cold intolerance  Genitourinary: Negative  Negative for dysuria, frequency and urgency  Musculoskeletal: Positive for gait problem  Negative for myalgias and neck pain  "I feel like I walk like someone who had a stroke"   Skin: Negative  Negative for rash  Neurological: Positive for dizziness, syncope and light-headedness  Negative for tremors, seizures, facial asymmetry, speech difficulty, weakness, numbness and headaches  Hematological: Negative  Does not bruise/bleed easily  Psychiatric/Behavioral: Negative  Negative for confusion, hallucinations and sleep disturbance          Voices in my head are getting worse  Still hears noises I personally reviewed the ROS that was entered by the medical assistant

## 2021-01-28 NOTE — PATIENT INSTRUCTIONS
-- continue to take your Depakote unchanged  -- Please get a routine EEG before your next appointment  -- one other medication to consider would be lamotrigine (Lamictal)       -- It will be important to continue to follow with your psychiatrist

## 2021-01-30 NOTE — ASSESSMENT & PLAN NOTE
He is still having active hallucinations ever since his injury  As noted above, these do not appear to be related to seizures, so will be very important for him to continue to follow with Psychiatry for ongoing management  We did discuss the possibility of using lamotrigine, which can help stabilize mood    This would not be likely to worsen extrapyramidal side effects

## 2021-01-30 NOTE — ASSESSMENT & PLAN NOTE
He has not had any additional seizures since his last appointment, and is doing better on the lower dose of Depakote with improvement in his tremor  This is still present, but not as problematic and more intermittent the had been previously  --at this point because he is not having any additional definite seizures, I will not make any changes to his medications today  He is still having active hallucinations  These previously were captured on continuous EEG, and were not felt to be seizures as they were without any EEG change  In order to evaluate for the possibility that he may be having subclinical seizures contributing to his symptoms, I will have him get a routine EEG  Depending on the results of this, may benefit from ambulatory EEG in the future

## 2021-02-03 ENCOUNTER — TELEPHONE (OUTPATIENT)
Dept: NEUROLOGY | Facility: CLINIC | Age: 51
End: 2021-02-03

## 2021-02-03 NOTE — TELEPHONE ENCOUNTER
Nurse Meño Wang called regarding Dr Javier Stern patients medication  She stated patient still having hallucinations and she wanted to know if patient was prescribed a new meication by Dr Rivera Pope       I stated to her that patient as of know is taking Lamotrigine (Lamictal)

## 2021-02-05 ENCOUNTER — CLINICAL SUPPORT (OUTPATIENT)
Dept: CARDIOLOGY CLINIC | Facility: CLINIC | Age: 51
End: 2021-02-05
Payer: COMMERCIAL

## 2021-02-05 DIAGNOSIS — R55 SYNCOPE, UNSPECIFIED SYNCOPE TYPE: ICD-10-CM

## 2021-02-05 PROCEDURE — 93248 EXT ECG>7D<15D REV&INTERPJ: CPT | Performed by: INTERNAL MEDICINE

## 2021-02-07 DIAGNOSIS — I10 ESSENTIAL HYPERTENSION: ICD-10-CM

## 2021-02-08 RX ORDER — METOPROLOL TARTRATE 100 MG/1
TABLET ORAL
Qty: 180 TABLET | Refills: 0 | Status: SHIPPED | OUTPATIENT
Start: 2021-02-08 | End: 2021-03-07

## 2021-03-07 DIAGNOSIS — I10 ESSENTIAL HYPERTENSION: ICD-10-CM

## 2021-03-07 RX ORDER — METOPROLOL TARTRATE 100 MG/1
TABLET ORAL
Qty: 180 TABLET | Refills: 0 | Status: SHIPPED | OUTPATIENT
Start: 2021-03-07 | End: 2022-01-11

## 2021-04-05 ENCOUNTER — TELEPHONE (OUTPATIENT)
Dept: NEUROLOGY | Facility: CLINIC | Age: 51
End: 2021-04-05

## 2021-04-05 NOTE — TELEPHONE ENCOUNTER
Patient was informed that he needed to get his EEG done that was ordered by Dr Hua Daughters  I provided him with the phone number and he will call to schedule an appointment   He wants to keep his appointment for Thursday

## 2021-04-23 ENCOUNTER — HOSPITAL ENCOUNTER (OUTPATIENT)
Dept: NEUROLOGY | Facility: HOSPITAL | Age: 51
Discharge: HOME | End: 2021-04-23
Attending: PSYCHIATRY & NEUROLOGY
Payer: COMMERCIAL

## 2021-04-23 DIAGNOSIS — G40.109 LOCALIZATION-RELATED EPILEPSY (HCC): ICD-10-CM

## 2021-04-23 PROCEDURE — 95816 EEG AWAKE AND DROWSY: CPT

## 2021-04-23 PROCEDURE — 95816 EEG AWAKE AND DROWSY: CPT | Performed by: PSYCHIATRY & NEUROLOGY

## 2021-05-07 ENCOUNTER — HOSPITAL ENCOUNTER (EMERGENCY)
Facility: HOSPITAL | Age: 51
Discharge: HOME | End: 2021-05-07
Attending: EMERGENCY MEDICINE
Payer: COMMERCIAL

## 2021-05-07 ENCOUNTER — APPOINTMENT (EMERGENCY)
Dept: CT IMAGING | Facility: HOSPITAL | Age: 51
End: 2021-05-07
Payer: COMMERCIAL

## 2021-05-07 VITALS
WEIGHT: 216 LBS | HEIGHT: 70 IN | HEART RATE: 69 BPM | RESPIRATION RATE: 19 BRPM | SYSTOLIC BLOOD PRESSURE: 128 MMHG | DIASTOLIC BLOOD PRESSURE: 85 MMHG | TEMPERATURE: 98.2 F | BODY MASS INDEX: 30.92 KG/M2 | OXYGEN SATURATION: 95 %

## 2021-05-07 DIAGNOSIS — R51.9 HEADACHE: Primary | ICD-10-CM

## 2021-05-07 PROCEDURE — 99284 EMERGENCY DEPT VISIT MOD MDM: CPT | Performed by: EMERGENCY MEDICINE

## 2021-05-07 PROCEDURE — 70450 CT HEAD/BRAIN W/O DYE: CPT

## 2021-05-07 PROCEDURE — G1004 CDSM NDSC: HCPCS

## 2021-05-07 PROCEDURE — 99285 EMERGENCY DEPT VISIT HI MDM: CPT

## 2021-05-07 NOTE — ED PROVIDER NOTES
History  Chief Complaint   Patient presents with    Headache     pt c/o headache and audible hallucinations sinnce his traunatic brain injury in 2019, however pt states it has been getting worse in the past week   Hallucinations     HPI patient is a 47-year-old male he reports a traumatic brain injury in 2019  Apparently had a fall with significant head injury at that time period patient reports since that time he has had persistent headaches and he has had auditory hallucinations  Patient reports he sees a neurologist and between a neurologist and his psychiatrist they tried to balance his headaches and also his auditory hallucinations  Patient reports that they believe that possibly the head injury and do schizophrenia  Patient reports over the last week or so the auditory hallucinations have become worse  He denies any hallucinations which drive him to do anything  Denies any suicidal or homicidal behavior  Denies any focal weakness  Patient reports primarily he was concerned because his headache seem much worse than his normal headaches and he reports many more voices that he normally has  His wife is here with him and she is concerned that he may have additional pathology in his brain  Again denies any difficulty walking  There is no focal weakness  Denies any new head injury  There is no vomiting  Past medical history of traumatic brain injury, diabetes, seizure disorder controlled  Family history noncontributory  Social history nonsmoker no history of drug abuse    Prior to Admission Medications   Prescriptions Last Dose Informant Patient Reported? Taking?    ARIPiprazole (ABILIFY PO)  Self Yes No   Sig: Take by mouth daily   Cholecalciferol (VITAMIN D) 50 MCG (2000 UT) CAPS  Self No No   Sig: Take 1 capsule (2,000 Units total) by mouth daily   OLANZapine (ZyPREXA) 10 mg tablet   Yes No   Sig: Take 5 mg by mouth daily at bedtime    aspirin 81 mg chewable tablet   No No   Sig: Chew 1 tablet (81 mg total) daily   atorvastatin (LIPITOR) 20 mg tablet  Self Yes No   Sig: Take 20 mg by mouth every evening   divalproex sodium (DEPAKOTE) 250 mg EC tablet   No No   Sig: Take 1 tab twice a day with one 500 mg tab for total dose of 750 mg twice a day  divalproex sodium (DEPAKOTE) 500 mg EC tablet   No No   Sig: Take 1 tab twice a day with one 250 mg tab for total dose of 750 mg twice a day     ergocalciferol (VITAMIN D2) 50,000 units  Self No No   Sig: Take 1 capsule (50,000 Units total) by mouth once a week for 8 doses   insulin NPH-insulin regular (NovoLIN 70/30) 100 units/mL subcutaneous injection  Self No No   Sig: Inject 10 Units under the skin daily before breakfast   Patient taking differently: Inject 12 Units under the skin daily before breakfast    metoprolol tartrate (LOPRESSOR) 100 mg tablet   No No   Sig: TAKE 1 TABLET BY MOUTH EVERY 12 HOURS   risperiDONE (RisperDAL) 1 mg tablet  Self Yes No   Sig: Take 1 mg by mouth daily at bedtime   risperiDONE (RisperDAL) 2 mg tablet  Self No No   Sig: Take 1 tablet (2 mg total) by mouth every morning   Patient not taking: Reported on 10/15/2020   risperiDONE (RisperDAL) 3 mg tablet  Self No No   Sig: Take 1 tablet (3 mg total) by mouth daily at bedtime   Patient not taking: Reported on 10/15/2020      Facility-Administered Medications: None       Past Medical History:   Diagnosis Date    Chemical exposure     Diabetes mellitus ([address] )     H/O bone graft     Head injury     August 25, 2019 fell backwards hit head on a boulder w/ LOC    Head injury     as an air [place] with the [employer] - jumped out of a plane parachute disfunction    Hypertension     Seizures ([address] )        Past Surgical History:   Procedure Laterality Date    FL LUMBAR PUNCTURE DIAGNOSTIC  12/13/2019    KNEE ARTHROSCOPY W/ MENISCECTOMY Left     LEG SURGERY      TONSILLECTOMY         Family History   Problem Relation Age of Onset    Diabetes Mother     Hypertension Mother     Asthma Mother     Brain cancer Father     No Known Problems Sister     No Known Problems Brother     No Known Problems Brother      I have reviewed and agree with the history as documented  E-Cigarette/Vaping    E-Cigarette Use Never User      E-Cigarette/Vaping Substances     Social History     Tobacco Use    Smoking status: Never Smoker    Smokeless tobacco: Never Used   Substance Use Topics    Alcohol use: Yes     Alcohol/week: 3 0 standard drinks     Types: 3 Cans of beer per week     Frequency: Monthly or less     Comment: occassionally    Drug use: No       Review of Systems   Constitutional: Negative for diaphoresis, fatigue and fever  HENT: Negative for congestion, ear pain, nosebleeds and sore throat  Eyes: Negative for photophobia, pain, discharge and visual disturbance  Respiratory: Negative for cough, choking, chest tightness, shortness of breath and wheezing  Cardiovascular: Negative for chest pain and palpitations  Gastrointestinal: Negative for abdominal distention, abdominal pain, diarrhea and vomiting  Genitourinary: Negative for dysuria, flank pain and frequency  Musculoskeletal: Negative for back pain, gait problem and joint swelling  Skin: Negative for color change and rash  Neurological: Positive for headaches  Negative for dizziness and syncope  Psychiatric/Behavioral: Negative for agitation, behavioral problems and confusion  The patient is not nervous/anxious  All other systems reviewed and are negative  Physical Exam  Physical Exam  Vitals signs and nursing note reviewed  Constitutional:       Appearance: He is well-developed  HENT:      Head: Normocephalic  Right Ear: External ear normal       Left Ear: External ear normal       Nose: Nose normal    Eyes:      General: Lids are normal       Pupils: Pupils are equal, round, and reactive to light  Neck:      Musculoskeletal: Normal range of motion and neck supple     Cardiovascular:      Rate and Rhythm: Normal rate and regular rhythm  Pulses: Normal pulses  Heart sounds: Normal heart sounds  Pulmonary:      Effort: Pulmonary effort is normal  No respiratory distress  Breath sounds: Normal breath sounds  Musculoskeletal: Normal range of motion  General: No deformity  Skin:     General: Skin is warm and dry  Neurological:      Mental Status: He is alert and oriented to person, place, and time  GCS: GCS eye subscore is 4  GCS verbal subscore is 5  GCS motor subscore is 6  Cranial Nerves: Cranial nerves are intact  Sensory: Sensation is intact  Motor: Motor function is intact  Coordination: Coordination is intact  Vital Signs  ED Triage Vitals [05/07/21 1842]   Temperature Pulse Respirations Blood Pressure SpO2   98 2 °F (36 8 °C) 69 19 128/85 95 %      Temp Source Heart Rate Source Patient Position - Orthostatic VS BP Location FiO2 (%)   Temporal Monitor Sitting Left arm --      Pain Score       8           Vitals:    05/07/21 1842   BP: 128/85   Pulse: 69   Patient Position - Orthostatic VS: Sitting         Visual Acuity      ED Medications  Medications - No data to display    Diagnostic Studies  Results Reviewed     None                 CT head without contrast   Final Result by Purvi Judd MD (05/07 2005)      No acute intracranial abnormality  Workstation performed: [id number]                    Procedures  Procedures         ED Course                                           MDM  Medical decision making 61-year-old male with recurrent headaches and auditory hallucinations since a head injury in 2019  Apparently worsening symptoms over the last few weeks  Symptoms seem much worse over the last 7 days  Patient denies any new trauma  Family was concerned that the patient had additional brain pathology  CT showed no acute change  I gave him a copy the CT scan    We discussed outpatient follow-up with her neurologist to possibly changes medications  We discussed indications to return  Disposition  Final diagnoses:   Headache     Time reflects when diagnosis was documented in both MDM as applicable and the Disposition within this note     Time User Action Codes Description Comment    5/7/2021  8:09 PM Jeff Keller Add [R51 9] Headache       ED Disposition     ED Disposition Condition Date/Time Comment    Discharge Stable Fri May 7, 2021  8:08 PM Demetri  discharge to home/self care              Follow-up Information    None         Discharge Medication List as of 5/7/2021  8:09 PM      CONTINUE these medications which have NOT CHANGED    Details   ARIPiprazole (ABILIFY PO) Take by mouth daily, Historical Med      aspirin 81 mg chewable tablet Chew 1 tablet (81 mg total) daily, Starting Mon 1/11/2021, Until Wed 2/10/2021, Normal      atorvastatin (LIPITOR) 20 mg tablet Take 20 mg by mouth every evening, Starting Fri 5/15/2020, Historical Med      Cholecalciferol (VITAMIN D) 50 MCG (2000 UT) CAPS Take 1 capsule (2,000 Units total) by mouth daily, Starting Sun 6/21/2020, Normal      !! divalproex sodium (DEPAKOTE) 250 mg EC tablet Take 1 tab twice a day with one 500 mg tab for total dose of 750 mg twice a day , Normal      !! divalproex sodium (DEPAKOTE) 500 mg EC tablet Take 1 tab twice a day with one 250 mg tab for total dose of 750 mg twice a day , Normal      ergocalciferol (VITAMIN D2) 50,000 units Take 1 capsule (50,000 Units total) by mouth once a week for 8 doses, Starting Sun 6/21/2020, Until Thu 1/28/2021, Normal      insulin NPH-insulin regular (NovoLIN 70/30) 100 units/mL subcutaneous injection Inject 10 Units under the skin daily before breakfast, Starting Sat 12/14/2019, Normal      metoprolol tartrate (LOPRESSOR) 100 mg tablet TAKE 1 TABLET BY MOUTH EVERY 12 HOURS, Normal      OLANZapine (ZyPREXA) 10 mg tablet Take 5 mg by mouth daily at bedtime , Historical Med      !! risperiDONE (RisperDAL) 1 mg tablet Take 1 mg by mouth daily at bedtime, Historical Med      !! risperiDONE (RisperDAL) 2 mg tablet Take 1 tablet (2 mg total) by mouth every morning, Starting Thu 5/7/2020, Normal      !! risperiDONE (RisperDAL) 3 mg tablet Take 1 tablet (3 mg total) by mouth daily at bedtime, Starting Thu 5/7/2020, Normal       !! - Potential duplicate medications found  Please discuss with provider  No discharge procedures on file      PDMP Review     None          ED Provider  Electronically Signed by           Chance Martinez MD  05/07/21 7869

## 2021-05-08 NOTE — ED NOTES
Discharged reviewed with pt  Pt verbalized understanding and has no further questions at this time  Pt ambulatory off unit with steady gait       Lin Wilkerson RN  05/07/21 2019

## 2021-05-08 NOTE — DISCHARGE INSTRUCTIONS
Follow-up with your neurologist  Call as soon as possible  Return worsening symptoms, increasing pain or any problems

## 2021-05-13 ENCOUNTER — OFFICE VISIT (OUTPATIENT)
Dept: NEUROLOGY | Facility: CLINIC | Age: 51
End: 2021-05-13
Payer: COMMERCIAL

## 2021-05-13 VITALS
DIASTOLIC BLOOD PRESSURE: 84 MMHG | SYSTOLIC BLOOD PRESSURE: 110 MMHG | HEART RATE: 70 BPM | BODY MASS INDEX: 38.22 KG/M2 | HEIGHT: 70 IN | WEIGHT: 267 LBS

## 2021-05-13 DIAGNOSIS — F29 PSYCHOSIS, UNSPECIFIED PSYCHOSIS TYPE (HCC): ICD-10-CM

## 2021-05-13 DIAGNOSIS — G40.109 LOCALIZATION-RELATED EPILEPSY (HCC): Primary | ICD-10-CM

## 2021-05-13 PROCEDURE — 99214 OFFICE O/P EST MOD 30 MIN: CPT | Performed by: PSYCHIATRY & NEUROLOGY

## 2021-05-13 RX ORDER — LAMOTRIGINE 25 MG/1
TABLET ORAL
Qty: 240 TABLET | Refills: 11 | Status: SHIPPED | OUTPATIENT
Start: 2021-05-13 | End: 2021-10-21 | Stop reason: SDUPTHER

## 2021-05-13 RX ORDER — EMPAGLIFLOZIN 25 MG/1
25 TABLET, FILM COATED ORAL DAILY
COMMUNITY
Start: 2021-04-25

## 2021-05-13 RX ORDER — ASPIRIN 81 MG/1
81 TABLET, CHEWABLE ORAL DAILY
COMMUNITY
Start: 2021-04-22

## 2021-05-13 RX ORDER — AMLODIPINE BESYLATE 10 MG/1
10 TABLET ORAL DAILY
COMMUNITY
Start: 2021-04-04

## 2021-05-13 RX ORDER — HYDROXYZINE HYDROCHLORIDE 25 MG/1
25 TABLET, FILM COATED ORAL 2 TIMES DAILY PRN
COMMUNITY
Start: 2021-04-29 | End: 2022-02-22

## 2021-05-13 RX ORDER — IBUPROFEN 800 MG/1
TABLET ORAL DAILY PRN
COMMUNITY
Start: 2021-03-29

## 2021-05-13 RX ORDER — OLANZAPINE 7.5 MG/1
20 TABLET ORAL ONCE
COMMUNITY
Start: 2021-05-12 | End: 2022-02-22

## 2021-05-13 RX ORDER — LISINOPRIL 40 MG/1
1 TABLET ORAL DAILY
COMMUNITY
Start: 2021-03-29

## 2021-05-13 NOTE — PROGRESS NOTES
Patient ID: Denys Madsen  is a 46 y o  male with prior traumatic subarachnoid hemorrhage, localization-related epilepsy, and ongoing auditory hallucinations, who is returning to Neurology office for follow up of his seizures  Assessment/Plan:    Localization-related epilepsy [facility])    He has not had any additional seizures since his last appointment  He did get a routine EEG since his last appointment which was normal, which would also further suggest that the episodes he is experiencing of hallucinations are likely not seizures  These previously had been captured on EEG and did not have any EEG change, and if anything these were worsened when he was on levetiracetam     It does not appear to be having any active seizures  He will continue Depakote unchanged  Psychotic disorder ([facility] [address] )    He continues to have difficulty with auditory hallucinations which do not appear to be from a primary psychiatric disorder, but most likely related to his traumatic head injury  It is unclear what is causing this phenomena, but we must continue to focus on symptomatic management  I do appreciate the help of his psychiatrist, and did send communication that I do not have any objections any of the medications that she is considering, but will work to try to get him started on lamotrigine  Overall lamotrigine is very effective medication for seizures and is commonly used in conjunction with Depakote  The 2 medications have a synergistic effect for epilepsy, this likely would get some benefit for his hallucinations as well  I did ask that if they have any difficulty with getting the medications started, they give our office a call so that we can give the pharmacy further guidance  -- I will have him start taking lamotrigine 25 mg nightly and increase by 25 mg/ day every 2 weeks until he is at the goal dose of 100 mg twice a day      I will see him back in the office around the time when he is getting to this goal dose   We will likely check a lamotrigine level when he is at a higher dose to see if he has room to increase this further  He will Return in about 3 months (around 8/13/2021)  Subjective:    HPI  Current seizure medications:  1  Divalproex  mg twice a day  Other medications as per Epic  Since his last visit, he has not had any additional seizures  He has been having ongoing difficulty with auditory hallucinations  He is willing with psychiatrist, and has been having difficulty getting these under control  He has had multiple medication adjustments, which have not fully benefit him  He has been noticing that these have been more problematic over the last few weeks  I did receive a communication from [place], who is his Psychiatry provider  She also expressed the difficulty getting his symptoms under control  I did review her note and listed medications, which is scanned into epic  they report that he did have some difficulty with attempting to start lamotrigine in the past, mainly because the pharmacy was concerned that he is also on Depakote  This was never started  He did get a routine EEG on 4/23/2021 which was normal    Prior Seizure Medications: Levetiracetam (worsened mood and hallucinations)    His history was also obtained from his wife, who was present at today's visit  Objective:    Blood pressure 110/84, pulse 70, height 5' 10" (1 778 m), weight 121 kg (267 lb)  Physical Exam    Neurological Exam      ROS:    Review of Systems   Constitutional: Positive for activity change, appetite change and fatigue  Negative for fever  HENT: Positive for tinnitus  Negative for hearing loss, trouble swallowing and voice change  Eyes: Positive for visual disturbance (when he stands up rapidly)  Negative for photophobia and pain  Respiratory: Negative  Negative for shortness of breath  Cardiovascular: Negative  Negative for palpitations  Gastrointestinal: Negative  Negative for nausea and vomiting  Endocrine: Negative  Negative for cold intolerance  Genitourinary: Negative  Negative for dysuria, frequency and urgency  Musculoskeletal: Positive for gait problem and neck pain  Negative for myalgias  Skin: Negative  Negative for rash  Neurological: Positive for dizziness and headaches  Negative for tremors, seizures, syncope, facial asymmetry, speech difficulty, weakness, light-headedness and numbness  Hematological: Negative  Does not bruise/bleed easily  Psychiatric/Behavioral: Positive for confusion, decreased concentration, hallucinations (auditory - increased hallucinations) and sleep disturbance (unable to stay asleep)         I personally reviewed the ROS that was entered by the medical assistant

## 2021-05-13 NOTE — ASSESSMENT & PLAN NOTE
He continues to have difficulty with auditory hallucinations which do not appear to be from a primary psychiatric disorder, but most likely related to his traumatic head injury  It is unclear what is causing this phenomena, but we must continue to focus on symptomatic management  I do appreciate the help of his psychiatrist, and did send communication that I do not have any objections any of the medications that she is considering, but will work to try to get him started on lamotrigine  Overall lamotrigine is very effective medication for seizures and is commonly used in conjunction with Depakote  The 2 medications have a synergistic effect for epilepsy, this likely would get some benefit for his hallucinations as well  I did ask that if they have any difficulty with getting the medications started, they give our office a call so that we can give the pharmacy further guidance  -- I will have him start taking lamotrigine 25 mg nightly and increase by 25 mg/ day every 2 weeks until he is at the goal dose of 100 mg twice a day  I will see him back in the office around the time when he is getting to this goal dose  We will likely check a lamotrigine level when he is at a higher dose to see if he has room to increase this further

## 2021-05-13 NOTE — PATIENT INSTRUCTIONS
Lamotrigine instructions:    Please start taking Lamictal as follows  - Start taking one tablet (25 mg) at bedtime for 2 weeks  - Then take one tablet (25 mg) twice a day for 2 weeks  - Then take one tablet (25 mg) in the morning and two tablets (50 mg) at night for 2 weeks  - Then take two tablets (50 mg) twice a day for 2 weeks  - Then take two tablets (50 mg) in the morning and three tablets (75 mg) at night for 2 weeks  - Then take three tablets (75 mg) twice a day for 2 weeks  - Then take three tablets (75 mg) in the morning and 4 tablets (100 mg) at night for 2 weeks  - Then take 4 tablets (100 mg) twice a day    Lamotrigine has the possibility of causing a rash  This is rare, but if ignored, it could progress and be dangerous  If you notice a rash while taking Lamotrigine, please call the Neurology office right away  We can help look for other causes or discuss if the medication needs to be stopped  -- Continue the Depakote unchanged  -- I will refer you to see Neuropsychology

## 2021-05-13 NOTE — LETTER
5/13/2021     Kieran Talbot,     Thank you so much for reaching out to me regarding [facility]    I am actually seeing him in the office today  He has also mentioned that he has been having more problematic symptoms with hallucinations  I do appreciate you keeping me in the loop and I don't have any objections to using any of the medications you mentioned  I do think he could potentially have some benefit with lamotrigine and this commonly is used with Depakote  In fact, when used for seizures, the two medications have a synergistic effect, so this is a very good combination  I am planning to start Lamotrigine today  As you know, this has a slow titration, but we will work with his pharmacy to assure he can get the medication and get it started  I will also be referring him to see our Neuropsychologist to see if they can give any additional insight       Thanks again for all your help,      Nina Nelson MD

## 2021-05-13 NOTE — ASSESSMENT & PLAN NOTE
He has not had any additional seizures since his last appointment  He did get a routine EEG since his last appointment which was normal, which would also further suggest that the episodes he is experiencing of hallucinations are likely not seizures  These previously had been captured on EEG and did not have any EEG change, and if anything these were worsened when he was on levetiracetam     It does not appear to be having any active seizures  He will continue Depakote unchanged

## 2021-05-13 NOTE — LETTER
2021     To whom it may concern,    [facility]  ( 1970) is under my neurologic care  He is in the process of medication adjustments, has frequent office visits, and is going to be seeing Neuropsychology  It will continue to be necessary for him to have aids at home and extra assistance to assure his safety  Please reach out to our office if any questions arise  Thank you for your consideration       Sincerely,      Ivet Hernandez MD

## 2021-05-14 DIAGNOSIS — G40.109 LOCALIZATION-RELATED EPILEPSY (HCC): Primary | ICD-10-CM

## 2021-05-18 ENCOUNTER — TELEPHONE (OUTPATIENT)
Dept: NEUROLOGY | Facility: CLINIC | Age: 51
End: 2021-05-18

## 2021-05-18 NOTE — TELEPHONE ENCOUNTER
Neuropsychological Evaluation  [facility]  [address]  [address]  P: [phone] F: [phone]    Intake Note  Date of Referral to Neuropsychology: 05/14/2021  Referring Provider: Dr Amparo Benitez to conduct screening prior to scheduling neuropsychological evaluation  Spoke to patient directly to answer intake questions  Explained nature of neuropsychological evaluation  Patient is agreeable to evaluation  The following questions were answered  1 ) Does the patient have the ability to do a virtual intake? No    2 ) Does the patient have any problems that would limit his ability to participate in testing that requires interaction with another person, such as hearing or language impairments? No    3 ) Does the patient have any problems that would limit his ability to complete testing during one appointment that can last for more than 2 hours? (e g , severe fatigue, pain, or other debility) No    4 ) Does the patient have a preference between completing the evaluation in one session, or over the course of a few sessions? No    5 ) Does the patient have difficulties ambulating (e g , does he need a walker, cane, or wheelchair)? Yes, sometimes cane    6 ) Would the patient be available for a last minute appointment if the opportunity arises? Yes, mostly available    Referral will be reviewed by providers and we will call to schedule as appropriate  Referral is pending insurance review  Insurance company will be contacted to verify participation of providers and authorization/pre-certification for Neuropsychological evaluation  Patient has been made aware of this

## 2021-09-22 ENCOUNTER — TELEPHONE (OUTPATIENT)
Dept: NEUROLOGY | Facility: CLINIC | Age: 51
End: 2021-09-22

## 2021-09-22 NOTE — TELEPHONE ENCOUNTER
Spoke with patient offering tomorrow at [address] [place] [address] with Dr Radha Driver in the [facility] office   Patient declined said he could not do tomorrow but still would like to be kept on waitlist

## 2021-10-08 ENCOUNTER — TELEPHONE (OUTPATIENT)
Dept: NEUROLOGY | Facility: CLINIC | Age: 51
End: 2021-10-08

## 2021-10-21 ENCOUNTER — OFFICE VISIT (OUTPATIENT)
Dept: NEUROLOGY | Facility: CLINIC | Age: 51
End: 2021-10-21
Payer: MEDICARE

## 2021-10-21 VITALS
BODY MASS INDEX: 38.08 KG/M2 | HEIGHT: 70 IN | DIASTOLIC BLOOD PRESSURE: 80 MMHG | WEIGHT: 266 LBS | SYSTOLIC BLOOD PRESSURE: 120 MMHG

## 2021-10-21 DIAGNOSIS — F29 PSYCHOSIS, UNSPECIFIED PSYCHOSIS TYPE (HCC): ICD-10-CM

## 2021-10-21 DIAGNOSIS — G40.109 LOCALIZATION-RELATED (FOCAL) (PARTIAL) SYMPTOMATIC EPILEPSY AND EPILEPTIC SYNDROMES WITH SIMPLE PARTIAL SEIZURES, NOT INTRACTABLE, WITHOUT STATUS EPILEPTICUS (HCC): ICD-10-CM

## 2021-10-21 DIAGNOSIS — R56.9 SEIZURE (HCC): ICD-10-CM

## 2021-10-21 DIAGNOSIS — G40.109 LOCALIZATION-RELATED EPILEPSY (HCC): ICD-10-CM

## 2021-10-21 PROCEDURE — 99215 OFFICE O/P EST HI 40 MIN: CPT | Performed by: PSYCHIATRY & NEUROLOGY

## 2021-10-21 RX ORDER — DIAZEPAM 2 MG/1
TABLET ORAL
COMMUNITY
Start: 2021-10-12 | End: 2022-06-08 | Stop reason: SDUPTHER

## 2021-10-21 RX ORDER — DIVALPROEX SODIUM 500 MG/1
TABLET, DELAYED RELEASE ORAL
Qty: 60 TABLET | Refills: 11 | Status: SHIPPED | OUTPATIENT
Start: 2021-10-21

## 2021-10-21 RX ORDER — ZALEPLON 5 MG/1
CAPSULE ORAL
COMMUNITY
Start: 2021-10-12 | End: 2022-02-22 | Stop reason: SDUPTHER

## 2021-10-21 RX ORDER — LAMOTRIGINE 100 MG/1
TABLET ORAL
Qty: 60 TABLET | Refills: 11 | Status: SHIPPED | OUTPATIENT
Start: 2021-10-21 | End: 2022-06-09 | Stop reason: SDUPTHER

## 2021-10-22 ENCOUNTER — TELEPHONE (OUTPATIENT)
Dept: NEUROLOGY | Facility: CLINIC | Age: 51
End: 2021-10-22

## 2021-10-22 DIAGNOSIS — G40.109 LOCALIZATION-RELATED (FOCAL) (PARTIAL) SYMPTOMATIC EPILEPSY AND EPILEPTIC SYNDROMES WITH SIMPLE PARTIAL SEIZURES, NOT INTRACTABLE, WITHOUT STATUS EPILEPTICUS (HCC): Primary | ICD-10-CM

## 2021-12-14 NOTE — PROGRESS NOTES
Neuropsychological Evaluation  [facility]  [address]  [place] [address]  P: [phone] F: [phone]    Patient Name: EDIN RIOS  Age: 46 y o  MRN: [id number]   : 1970  DOS: 2021     Referral/Presenting Information:   Mr EDIN RIOS  is a 46 y o , right-handed, man with a  GED level of education (~11 years equivalency) who presents for neuropsychological evaluation upon kind referral from his neurology provider, Nina Nelson MD, for assessment of cognitive functioning in the context of head injury (2019) with resultant SAD/SAH, seizures and auditory hallucinations  He also has a history of DM2 and high blood pressure which are reportedly well controlled on medications at this time  The patient was accompanied to today's appointment by his partner, Ms Bridget Padilla, who participated in the clinical interview with patient permission  The history, as reported below, incorporates information obtained through medical record review, patient report, and clinical observation, as well as informant report and outside record review as applicable  History of Presenting Problem(s):  Per review of the EMR, Mr Alisha Yee sustained a head injury when in the [employer] many years ago  When asked directly, Mr Alisha Yee reported that he jumped from an airplane and his parachute malfunctioned  He landed poorly and injured his neck  He reported that he stood up and ran after he landed and subsequently blacked out  He did not know if there was any other workup specifically related to head injury in this instance  He sustained an additional closed head injury on 2019 when he fell backwards and hit his head on a rock with brief LOC  He was found to have GCS of 15 upon arrival to [facility] ED  CT Head demonstrated right-sided SAH focal to the temporoparietal region, 7 mm in thickness with regional mass effect and no midline shift or herniation   He was discharged home on 08/26/19 in stable condition  He was subsequently evaluated at the ED at [facility] for dizziness, staring spells, and headache on 09/17/2019  Routine EEG was notable for several electrographic seizures stemming from the R hemisphere and he was transferred to [facility] for further management  He was noted to 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 requiring Versed drip, intubation, and ICU level of care  While admitted, he was having auditory hallucinations and was also found to have DM2 with an A1c of 13  He was discharged home on Keppra, Depakote, and Dilantin on 09/25/2019  He was seen by [facility] neurology for outpatient follow-up on 11/04/2019 at which time he was recommended to have MRI, EEG, and blood work for Depakote, Keppra, and Dilantin levels  Video EEG monitoring from 12/09/19 to 12/11/19 was normal  MRI (12/11/2019) demonstrated "minimal periventricular and subcortical foci of white matter T2 hyperintensity which is nonspecific and most likely related to chronic small vessel ischemic changes" as well as a "punctate focus of susceptibility artifact in the left parietal lobe likely representing hemosiderin tube position from a tiny old microbleed" that was also documented on previous imaging studies  On 12/08/2019, the patient presented to [facility] ED with worsening auditory hallucinations that had progressed from sounds and nonspecific voices to command hallucinations telling him to hurt other people  There was no reported history of auditory hallucinations prior to his 2019 head injury  There was also concerns of intermittent episodes of confusion including one episode in which the patient did not recognize a close family member  He was transferred to [facility] for concerns that symptoms could be a manifestation of worsening seizures  Video EEG was negative for seizure and Keppra was stopped due to concerns for hallucinations as a side effect of this medication   He was seen by Dr Denis Gunter for inpatient neuropsychology consult (12/10/19) and findings suggested "mild weaknesses in information processing speed, visual recognition, and auditory narrative recall " He was started on Risperidone with some improvement in hallucinations and was discharged home on 12/14/2019  Interval history has been notable for difficulties managing headaches and auditory hallucinations  Medications have been inconsistently effective  He has been in treatment with psychiatry and is currently taking Olanzapine (for hallucinations) and Zaleplon (for sleep)  Seizures appear to be well controlled on Divalproex EC and Lamotrigine at this time  The patient is scheduled for EMU stay in late December to evaluate episodes of "spasms" on his left side that occur episodically  The patient did report that these spasms seem to be related to periods of high stress as well  He was referred for neuropsychological evaluation to better characterize cognitive strengths and weaknesses        Current Medications:    amLODIPine (NORVASC) 10 mg tablet, Take 10 mg by mouth daily, Disp: , Rfl:     ARIPiprazole (ABILIFY PO), Take by mouth daily (Patient not taking: Reported on 10/21/2021), Disp: , Rfl:     aspirin 81 mg chewable tablet, Chew 1 tablet (81 mg total) daily, Disp: 30 tablet, Rfl: 0    aspirin 81 mg chewable tablet, Chew 81 mg daily, Disp: , Rfl:     atorvastatin (LIPITOR) 20 mg tablet, Take 20 mg by mouth every evening, Disp: , Rfl:     diazepam (VALIUM) 2 mg tablet, , Disp: , Rfl:     divalproex sodium (DEPAKOTE) 250 mg EC tablet, TAKE 1 TABLET BY MOUTH TWICE DAILY WITH  ONE  500  MG  TAB  FOR  TOAL  DOSE  OF  750  MG  TWICE  A  DAY, Disp: 60 tablet, Rfl: 11    divalproex sodium (DEPAKOTE) 500 mg EC tablet, Take 1 tab twice a day with one 250 mg tab for total dose of 750 mg twice a day , Disp: 60 tablet, Rfl: 11    ergocalciferol (VITAMIN D2) 50,000 units, Take 1 capsule (50,000 Units total) by mouth once a week for 8 doses, Disp: 8 capsule, Rfl: 0    hydrOXYzine HCL (ATARAX) 25 mg tablet, Take 25 mg by mouth 2 (two) times a day as needed (Patient not taking: Reported on 10/21/2021), Disp: , Rfl:     ibuprofen (MOTRIN) 800 mg tablet, daily as needed, Disp: , Rfl:     insulin NPH-insulin regular (NovoLIN 70/30) 100 units/mL subcutaneous injection, Inject 10 Units under the skin daily before breakfast (Patient taking differently: Inject 12 Units under the skin daily before breakfast ), Disp: 3 mL, Rfl: 0    Jardiance 25 MG TABS, Take 25 mg by mouth daily, Disp: , Rfl:     lamoTRIgine (LaMICtal) 100 mg tablet, Increase as instructed to goal dose of 100 mg twice a day, Disp: 60 tablet, Rfl: 11    lisinopril (ZESTRIL) 40 mg tablet, Take 1 tablet by mouth daily, Disp: , Rfl:     metFORMIN (GLUCOPHAGE) 1000 MG tablet, Take 1,000 mg by mouth 2 (two) times a day with meals, Disp: , Rfl:     metoprolol tartrate (LOPRESSOR) 100 mg tablet, TAKE 1 TABLET BY MOUTH EVERY 12 HOURS, Disp: 180 tablet, Rfl: 0    OLANZapine (ZyPREXA) 5 mg tablet, Take 5 mg by mouth daily at bedtime  (Patient not taking: Reported on 10/21/2021), Disp: , Rfl:     OLANZapine (ZyPREXA) 7 5 mg tablet, 20 mg once At bedtime, Disp: , Rfl:     risperiDONE (RisperDAL) 1 mg tablet, Take 1 mg by mouth daily at bedtime (Patient not taking: Reported on 10/21/2021), Disp: , Rfl:     risperiDONE (RisperDAL) 2 mg tablet, Take 1 tablet (2 mg total) by mouth every morning (Patient not taking: Reported on 10/15/2020), Disp: 30 tablet, Rfl: 5    risperiDONE (RisperDAL) 3 mg tablet, Take 1 tablet (3 mg total) by mouth daily at bedtime (Patient not taking: Reported on 10/15/2020), Disp: 30 tablet, Rfl: 5    Vitamin D, Cholecalciferol, 50 MCG (2000 UT) CAPS, Take 1 capsule by mouth once daily, Disp: 30 capsule, Rfl: 5    zaleplon (SONATA) 5 MG capsule, , Disp: , Rfl:    Other medications (per patient report):  · Patient reported different medication regimen (see medication list attached to this encounter)  · Mr Willette Kocher reported that he does not currently take Risperdal, Atarax, or Abilify    Review of Current Symptoms: At the time of the present evaluation, the patient reported primary cognitive symptoms that include word-finding difficulties and occasional problems with short-term memory  He noted that he may occasionally forget conversations or names of people he should know  He has also noticed that he will sometimes mistake one family member for another (e g , may confuse his wife/partner with their daughter)  He is not generally unaccompanied when outside the house, but he feels he might be at risk for getting lost in familiar places if he were alone  He denied any problems misplacing things or remembering things from long ago  He notes increased difficulties interacting with technology and learning new things  Additional cognitive symptoms include problems with attention/distractibility and occasional difficulties with receptive language, both thought attributable, at least in part, to persistent auditory hallucinations  The patient denied any concerns about his executive functioning  Cognitive symptoms seemed to come on after the time of his head injury and have not notably improved since their onset  Emotionally, the patient reported severe depression that he attributes to a loss of his pre-injury identity  He reported that his experience of ongoing and near-constant auditory hallucinations often makes him feel hopeless about any possibility of improvement in the future  He stated that he feels he "can't live like this forever " Ms Hernan Mcintosh conveyed her concerns when she hears her  talk about this and works to try to give him things to look forward to, like date night and talking walks together, etc  Mr Willette Kocher endorsed additional symptoms that include low motivation, apathy, and some degree of anhedonia   In terms of anxiety, he reported that this seems to come over him in waves and without clear precipitant  He reported that anxiety comes on acutely and is alleviated with use of valium as needed  Episodes of spasms are thought potentially related to heightened anxiety  He denied symptoms of panic attack  There is no concern for increased irritability since his head injury  Mr Amish Saavedra has been in treatment with a psychiatrist until approximately 2 months ago when his provider stopped accepting his insurance  He is seeking to establish care with a new provider  Mr Amish Saavedra reported ongoing and sometimes debilitating hallucinations that manifest auditorily as easily identifiable sounds (e g , paper crinkling, the door handle jiggling, muffled voices outside) and also as clear voices that sometimes tell him to do things  He reported that these commands are generally egodystonic and he is able to differentiate these commands from internal self-talk  His auditory hallucinations occur throughout the day and night, often waking him up  He does not have visual hallucinations or delusions, and hallucinations only started after the time of his head injury in 2019  The patient endorsed some thoughts of death, but did not report active suicidal ideation  He denied any experience of homicidal ideation  Protective factors were noted to include his family  The patient remains independent for ADLs  He is able to make simple meals using the stove and does not have problems remembering to turn off burners  He can do simple chores around the house as needed but sometimes has difficulties due to poor balance  He assists his wife with household shopping at times  Ms Mary Carmen Miller reported that she manages the patient's medications due to forgetfulness  They work together to manage finances  Mr Amish Saavedra is not currently driving and has not held a job since his head injury  Physically, Mr Amish Saavedra reported some degree of difficulty with walking and balance for which he sometimes uses a cane   He reported that he has fine motor difficulties related to weakness that started after his head injury  He is still able to fasten buttons/zippers, but struggles to hold a pen/pencil  He has some tremor in bilateral hands but reported that he was told this is likely a medication side effect  He has ongoing headaches that are not typically improved with medication  He has dizziness on occasion  Pain typically occurs in his shoulder and low back  Mr Ger Bueno denied any GI problems or incontinence  He has not noticed any changes in his sense of smell/taste  He reported some difficulties with visual acuity for which he is prescribed glasses; however, he does not use them often and does not have them with him today  He had not noticed any hearing loss  Sleep was reported to be of poor quality  He tends to go to bed late and wake up late  He takes Zaleplon and Valium to help him sleep and this causes morning grogginess  He finds he is able to fall asleep with use of medications but wakes up frequently and often due to hearing voices  He does experience symptoms of sleep paralysis a few times per week and also has a history of dream reenactment since his head injury  Energy during the day was described as being low and his appetite is normal and unchanged  Psychosocial History:  Mr Ger Bueno was born and raised in [place], [place]  He was raised by his biological mother alongside 2 brothers and 1 sister  Developmental history is thought to be noncontributory  He described his childhood home as generally supportive, and he denied any history of abuse/neglect  English is the patient's first and only language  He currently resides with his wife/partner of 25 years and daughter in a private residence  He has four biological children  Mr Ger Bueno completed 10th grade and left school to enlist in the [employer]  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  During his academic career, he was generally a good student and did well in classes  There is no history of LD/ID, academic retention, attention problems, behavior problems, or participation in remedial/special education classes  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  He had to leave his job as a result of his head injury  Previous vocational history is notable for work in general [employer]  Medical history is as above  Mr Yousif Wray denied any known history of exposures to chemicals/heavy metals  Psychiatric history prior to his head injury is only notable for mild depression for which he never required treatment  Since his head injury, he has been in and out of mental health therapy and is working to re-establish care with a psychiatrist at this time  Substance use history is notable for smoking cigars (~1x per month)  He does not use any other tobacco/nicotine products  He reported that he drinks only rarely at this time  He denied any history of problematic alcohol use  There is no history of illicit substance use or misuse of prescription medications  Family history is notable for his father with brain cancer  There is otherwise no known neurological or psychiatric family history of note  Behavioral Observations/Mental Status:   Mr Yousif Wray was awake, alert, and oriented x3  He appeared his stated age and was of average stature  He was adequately groomed and hygenic  His clothing was appropriate to the setting and weather conditions  He demonstrated somewhat slow gait with slightly reduced arm swing  Seated posture was WNL  There were no other notable motor abnormalities  He did not wear glasses/contacts or hearing aids  He did not use a cane/walker in clinic  Socially, he was pleasant and cooperative throughout the evaluation process  He demonstrated good comportment and conversational reciprocity  Eye contact was WNL   Facial expressiveness was slightly reduced  Mood was reported to be "depressed" and he demonstrated restricted affect  Thought processes were linear, logical, and goal-directed  He reported auditory hallucinations during the clinical interview but did not demonstrate significant difficulties in conversation as a result of this  Speech was of normal volume, clarity, rate, and tone  The patient was mildly dysprosodic  Receptive language appeared to be adequate for the purposes of conversation  Insight was adequate  Mr Kelsey Zamudio was awake and alert throughout testing  He did not complain of significant fatigue but reported that he had taken a valium due to anxiety about the evaluation at around 6 AM  He reported some degree of neck and shoulder pain (6/10) that is typical of his daily experience  Despite these factors, Mr Kelsey Zamudio requested to complete the testing  He was cooperative with the evaluation and developed adequate testing rapport with the examiner  He grasped test instructions easily, demonstrated appropriate persistence on difficult items/tasks, worked at a steady pace, and appeared to be engaged in testing throughout  He did not appear to demonstrate awareness of poor performances and resultantly did not self-correct often  He was occasionally distractible but was able to be redirected to the task easily  He appeared to put forth good effort throughout the evaluation  Testing Procedures/Sources of Information:   Mr Latoya Oliver  presented for comprehensive neuropsychological evaluation  Clinical interview was completed by the attending neuropsychologist  Test administration and scoring was conducted by a trained psychometrician under supervision and direct instruction of the attending provider  Tests were interpreted by the attending neuropsychologist with consideration given to the clinical history as described above  Findings are presented with qualitative labels relative to age-corrected normative data  Other demographic corrections for education and sex are also applied where possible  Score labels are provided based upon the following guidelines from AACN  A full score summary is available upon direct request     Percentile Range Descriptor   ? 98 Exceptionally High    91-97 Above Average   75-90 High Average   25-74 Average   9-24 Low Average   2-8 Below Average   <2 Exceptionally Low      Sources of information are as follows:  [employer] (ACS): Test of Premorbid Functioning (TOPF), Animal Naming Fluency, Ritter Anxiety Inventory (MILTON), Ritter Depression Inventory-2 (BDI-2), Clinical Interview, Controlled Oral Word Association Test (COWAT), Dot Counting Test (DCT), [place] Multiphasic Personality Bkswvtkgg-5-Xxbdwopxllpw Form (MMPI-2-RF), Neuropsychological Assessment Battery - Form 1 (NAB; Selected Subtests), Trail Making Test (TMT), The Quick Dementia Rating System (QDRS; Completed by Ms Reyes)    Test Results: On measures of performance validity, Mr Lincoln score on a free-standing measure was within the valid range  On an embedded index comprised of weighted scores based on performance across two tasks, Mr  Curt Anaya score was within the valid range  Performance on these measures in the context of observations consistent with adequate engagement in the evaluation suggest that test results are likely a reasonable estimate of the patients current cognitive abilities  Estimates of premorbid functioning based on demographic factors as well as his performance on measures that are resilient to neuropathological change suggest average range premorbid abilities  An estimate of general intelligence fell within the average range  Based on these estimates, Mr Paul Leyva is thought likely to have been performing within the average range prior to the onset of any cognitive changes related to genuine neurological dysfunction   Of note, intraindividual strengths and weaknesses are normal and not inherently suggestive of cognitive dysfunction  In reference to attention and information processing speed, a score on a measure of simple auditory attention was found to be within the average range  Working memory for auditory information was average  Simple visual attention and visual scanning was within the exceptionally low range  Visual working memory was found to be within the average range  Visual attention to detail and selective attention was within the average range  On a set of individual tests designed to measure psychomotor speed and various aspects of complex attention, his scores were predominantly lower than expected  Specific scores within this set of tests suggest primary difficulties with psychomotor speed, selective attention, and divided attention  In terms of language skills, fluent speech output was within the average range for description of a scene presented pictorially  Associative verbal fluency for words based on phonemic prompts was low average, and his score on a measure of associative fluency based on semantic category was average  Confrontation picture naming was within normal limits  Regarding visuospatial functioning, his score on a measure of simple visual perception was within the low average range  His score on a measure of visuoconstruction requiring the matching of plastic forms of varying shapes to a series of increasingly complex visual designs was within the average range  Verbal learning and memory were assessed using two primary tasks  On a list-learning task, his overall learning score for performance across three trials was within the low average range  At best performance, he recalled 7/12 words from the list  His ability to recall the list of words after a brief distractor task was within the average range (5/12 words)  His ability to recall words from the list after a longer delay was within the average range (5/12 words)   When controlling for his initial learning performance, his free recall for the words was within the average range and is not suggestive of significant forgetting over time  His recognition memory for the words when presented individually alongside distractors was within the low average range, and his ability to discriminate between target and non-target words was low average  Relative to his long delayed recall score, he demonstrated slightly weaker than expected improvement in memory for the words with recognition cueing  On a story-learning task, his immediate recall for an aurally presented short-story across two trials was within the average range  His ability to recall details of the story after a delay was within the high average range  When controlling for his initial learning performance, his free recall for the story was within the high average range and is not suggestive of significant forgetting over time  Visual learning and memory were assessed using a task with minimal motor demands  His ability to learn a series of single- and multi-shape items across three total learning trials was within the low average range  His memory for the shapes after a delay was within the low average range  On a forced-choice recognition trial, his score was within the low average range, and his ability to differentiate between target and non-target shapes was within the low average range  On a learning and memory task that has both verbal and visual demands and incorporates information often encountered in daily life, Mr Angel Arnold immediate memory for the information was within the low average range  His delayed memory for the information was within the average range  His score on a multiple-choice recognition task was within the below average range  Relative to his long delayed recall score, he demonstrated worse than expected improvement in memory for individual components of the information with recognition cueing      In terms of executive functioning, his score on a measure of visual planning, impulse control, and psychomotor speed was within the below average  range  Visual scanning and set shifting was within the exceptionally low range  On a measure of concept formation, mental flexibility, and generativity, his score fell within the average range  His score on a measure of general verbal fluency and generativity was within the average range, and he did not demonstrate patterns suggestive of perseverative responding  On a similar measure of associative fluency based upon phonemic (letter) prompts that incorporates aspects of executive control, his score fell within the low average range  Emotionally, Mr Krunal Beltran responses on brief self-report measures designed to screen for common psychological symptoms suggests a moderate degree of depressive symptomology and a severe degree of anxious distress  On a comprehensive self-report inventory designed to assess clinically relevant degrees of emotional and behavioral dysfunction, Mr Nahed Damon responded to questions in a consistent and cooperative manger  There were indications of possible over-reporting of symptomology; however, given the nature of symptoms reported during the clinical interview, it is reasonable to consider the protocol valid in the context of severe psychological distress  His profile of scores on substantive scales was suggestive of significant emotional dysfunction characterized by dissatisfaction with life circumstances, anhedonia, anger-proneness, excessive worry, feelings of helplessness and hopelessness, and frequent experiences of agitation  There were also indications of significant thought dysfunction characterized by unusual perceptual experiences, as well as a tendency to be distrustful of others and feeling alienated from others  In terms of characterological tendencies, there were indications that the patient may prefer to avoid social situations and favor engaging in solitary activities  Impressions/Recommendations:   Neuropsychological test results are abnormal due to the presence of lower-than-expected scores in areas that include processing speed, complex attention, and aspects of executive functioning  The number and extent of low scores evidenced on testing in the context of estimated premorbid abilities is thought to reflect probable cognitive impairment, as fewer than 10% of healthy adults obtained the same number of low scores below given cutoff points on measures of attention and processing speed  In the context of his reported level of independence for day-to-day tasks, these results are thought to be most consistent with a diagnosis of mild cognitive impairment (MCI)  Test data suggest primarily subcortical dysfunction that is thought to be broadly consistent with the patients history of traumatic brain injury and associated sequelae thereof  His experience of auditory hallucinations is also thought likely to be associated with post-traumatic psychosis, for which several risk factors were present (e g , damage to temporoparietal areas, closed head injury, and posttraumatic epilepsy)  Mr Neil Zepeda experience of depression and anxiety is likely related to a combination of factors that include damage to areas of the brain involved in emotion regulation as well as psychological adjustment to posttraumatic identity  Memory dysfunction was not well evidenced on testing  Patterns of performance were suggestive of intermittently weak learning (encoding processes) with good memory for information overall  His experience of memory dysfunction may be better attributed to problems with executive control and attention lapses that can have a negative downstream effect on memory performance  With regard to his further care, my recommendations are as follows       1  While there is evidence of cognitive dysfunction on testing, Mr Neil Zepeda quality of life appears to be more substantially impacted by his experience of psychiatric symptoms  Ongoing treatment with psychiatry is strongly recommended  He may also benefit from working with a psychologist/mental health therapist with a focus on confronting his adjustment reaction through acceptance and commitment strategies and the development of adaptive coping skills  2  The patient is currently prescribed several medications that can be associated with cognitive side-effects  He may wish to discuss treatment alternatives with his medical provider(s)  3  The patient reported questions of poor sleep quality despite adequate sleep quantity  He may wish to incorporate behavioral strategies for sleep into his daily routine  A good summary of commonly used behavioral strategies for sleep improvement can be found at http://sleepeducation  org/essentials-in-sleep/healthy-sleep-habits  4  The patient may experience cognitive inefficiencies in his day-to-day life  As such, he may benefit from incorporating the following compensatory strategies into his routine practices  a  Write information down, rather than relying upon your memory alone (e g , use a notepad by the phone, Post-It notes, or a dry erase board set up in a central location in the home)     b  Set alarms, timers, or reminders for important events or repeating occurrences on a cell phone, watch, or other electronic device (e g , alarm to remember to take medications, reminder for daily tasks, create an event on your electronic calendar to remember birthdays/appointments)  c  It can be helpful to set up a central location where items used on a daily basis (e g , keys, wallet) are always placed  d  Repetition can be helpful in forming new memories  Recite important information several times to encourage retention of information  5  There is some research that suggests cognitive benefit from regular engagement in cognitively stimulating activities   The patient may wish to consider participating in conventional [address] such as sudoku puzzles, word searches, crossword puzzles, or other activities available in puzzle books or on websites (such as [employer] or [employer])  Additionally, other, novel activities that require cognitive flexibility and new skill building (e g , learning a new language, learning to play an instrument) are also likely to provide mental stimulation  6  Regular physical activity can have a significant impact on physical, emotional, and cognitive health  The patient is encouraged to incorporate aspects of cardiovascular and strength-building exercise into his life  Discussion of current medical status with health care providers is strongly encouraged before starting any new exercise regimen  7  Research supports eating healthy foods that have been shown to have a positive impact on brain health  The patient may wish to consider incorporating these sorts of foods into his diet  The MIND diet is a popular option that combines the health benefits of the Mediterranean and DASH diets and has been proven to boost brain health and potentially reduce your risk of cognitive decline  A good summary of the MIND diet can be found at: [employer]  8  Should the patient, his family members, or his medical providers have ongoing concerns regarding his cognitive functioning, repeat evaluation can be conducted no sooner than one year from the time of the present evaluation (December 2022)  Elyssa Lau PsyD  Neuropsychologist  [place] Licensed Psychologist  Lic  #[id number]      Tasks Conducted Total Time Spent Associated CPT Codes   Clinical Interview 62 min  75552 x 1 unit  96121 x 3 units   Report Writing 198 min  Neuropsychological Test Administration (PhD/PsyD) 0 min  75627 x 0 unit  96137 x 0 units   Scoring (PhD/PsyD) 0 min  Neuropsychological Test Administration [facility] ) 186 min   10893 x 1 unit  96139 x 7 units   Scoring (Tech ) 60 min  Chart Review 55 min  96132 x 1 unit  96133 x 0 units   Interpretation of Test Data 34 min  Feedback to Patient/Family Members 0 min

## 2021-12-15 ENCOUNTER — OFFICE VISIT (OUTPATIENT)
Dept: NEUROLOGY | Facility: CLINIC | Age: 51
End: 2021-12-15
Payer: MEDICARE

## 2021-12-15 DIAGNOSIS — G40.109 LOCALIZATION-RELATED (FOCAL) (PARTIAL) SYMPTOMATIC EPILEPSY AND EPILEPTIC SYNDROMES WITH SIMPLE PARTIAL SEIZURES, NOT INTRACTABLE, WITHOUT STATUS EPILEPTICUS (HCC): Primary | ICD-10-CM

## 2021-12-15 PROCEDURE — 96139 PSYCL/NRPSYC TST TECH EA: CPT | Performed by: CLINICAL NEUROPSYCHOLOGIST

## 2021-12-15 PROCEDURE — 96132 NRPSYC TST EVAL PHYS/QHP 1ST: CPT | Performed by: CLINICAL NEUROPSYCHOLOGIST

## 2021-12-15 PROCEDURE — 96138 PSYCL/NRPSYC TECH 1ST: CPT | Performed by: CLINICAL NEUROPSYCHOLOGIST

## 2021-12-15 PROCEDURE — NC001 PR NO CHARGE

## 2021-12-15 PROCEDURE — 96121 NUBHVL XM PHY/QHP EA ADDL HR: CPT | Performed by: CLINICAL NEUROPSYCHOLOGIST

## 2021-12-15 PROCEDURE — 96116 NUBHVL XM PHYS/QHP 1ST HR: CPT | Performed by: CLINICAL NEUROPSYCHOLOGIST

## 2021-12-15 NOTE — LETTER
2022     Ivet Hernandez, [address] [facility] 12 [facility] [address]    Patient: Matilda Abdullahi  YOB: 1970   Date of Visit: 12/15/2021       Dear Dr Grace Clements: Thank you for referring Annemarie John to me for evaluation  Below are my notes for this consultation  If you have questions, please do not hesitate to call me  Please note that the patient did not complete a feedback appointment regarding the results of this evaluation  He is welcome to reach out to reschedule this appointment if he has further questions or concerns  Sincerely,        Radha Zhou PSYD        CC: No Recipients  Julieta Arayaon  2022  4:02 PM  Sign when Signing Visit  Neuropsychological Evaluation  [facility]  [address] [place] [address]  P: [phone] F: [phone]    Patient Name: Matilda Abdullahi  Age: 46 y o  MRN: [id number]   : 1970  DOS: 2021     Referral/Presenting Information:   Mr Matilda Abdullahi  is a 46 y o , right-handed, man with a  GED level of education (~11 years equivalency) who presents for neuropsychological evaluation upon kind referral from his neurology provider, Ivet Hernandez MD, for assessment of cognitive functioning in the context of head injury (2019) with resultant SAD/SAH, seizures and auditory hallucinations  He also has a history of DM2 and high blood pressure which are reportedly well controlled on medications at this time  The patient was accompanied to today's appointment by his partner, Ms  Neehmias Dian, who participated in the clinical interview with patient permission  The history, as reported below, incorporates information obtained through medical record review, patient report, and clinical observation, as well as informant report and outside record review as applicable  History of Presenting Problem(s):  Per review of the EMR, Mr Paul Leyva sustained a head injury when in the [employer] many years ago  When asked directly, Mr Paul Leyva reported that he jumped from an airplane and his parachute malfunctioned  He landed poorly and injured his neck  He reported that he stood up and ran after he landed and subsequently blacked out  He did not know if there was any other workup specifically related to head injury in this instance  He sustained an additional closed head injury on 08/25/2019 when he fell backwards and hit his head on a rock with brief LOC  He was found to have GCS of 15 upon arrival to [facility] ED  CT Head demonstrated right-sided SAH focal to the temporoparietal region, 7 mm in thickness with regional mass effect and no midline shift or herniation  He was discharged home on 08/26/19 in stable condition  He was subsequently evaluated at the ED at [facility] for dizziness, staring spells, and headache on 09/17/2019  Routine EEG was notable for several electrographic seizures stemming from the R hemisphere and he was transferred to [facility] for further management  He was noted to 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 requiring Versed drip, intubation, and ICU level of care  While admitted, he was having auditory hallucinations and was also found to have DM2 with an A1c of 13  He was discharged home on Keppra, Depakote, and Dilantin on 09/25/2019  He was seen by [facility] neurology for outpatient follow-up on 11/04/2019 at which time he was recommended to have MRI, EEG, and blood work for Depakote, Keppra, and Dilantin levels  Video EEG monitoring from 12/09/19 to 12/11/19 was normal  MRI (12/11/2019) demonstrated "minimal periventricular and subcortical foci of white matter T2 hyperintensity which is nonspecific and most likely related to chronic small vessel ischemic changes" as well as a "punctate focus of susceptibility artifact in the left parietal lobe likely representing hemosiderin tube position from a tiny old microbleed" that was also documented on previous imaging studies       On 12/08/2019, the patient presented to [facility] ED with worsening auditory hallucinations that had progressed from sounds and nonspecific voices to command hallucinations telling him to hurt other people  There was no reported history of auditory hallucinations prior to his 2019 head injury  There was also concerns of intermittent episodes of confusion including one episode in which the patient did not recognize a close family member  He was transferred to [facility] for concerns that symptoms could be a manifestation of worsening seizures  Video EEG was negative for seizure and Keppra was stopped due to concerns for hallucinations as a side effect of this medication  He was seen by Dr Lori Brown for inpatient neuropsychology consult (12/10/19) and findings suggested "mild weaknesses in information processing speed, visual recognition, and auditory narrative recall " He was started on Risperidone with some improvement in hallucinations and was discharged home on 12/14/2019  Interval history has been notable for difficulties managing headaches and auditory hallucinations  Medications have been inconsistently effective  He has been in treatment with psychiatry and is currently taking Olanzapine (for hallucinations) and Zaleplon (for sleep)  Seizures appear to be well controlled on Divalproex EC and Lamotrigine at this time  The patient is scheduled for EMU stay in late December to evaluate episodes of "spasms" on his left side that occur episodically  The patient did report that these spasms seem to be related to periods of high stress as well  He was referred for neuropsychological evaluation to better characterize cognitive strengths and weaknesses        Current Medications:    amLODIPine (NORVASC) 10 mg tablet, Take 10 mg by mouth daily, Disp: , Rfl:     ARIPiprazole (ABILIFY PO), Take by mouth daily (Patient not taking: Reported on 10/21/2021), Disp: , Rfl:     aspirin 81 mg chewable tablet, Chew 1 tablet (81 mg total) daily, Disp: 30 tablet, Rfl: 0    aspirin 81 mg chewable tablet, Chew 81 mg daily, Disp: , Rfl:     atorvastatin (LIPITOR) 20 mg tablet, Take 20 mg by mouth every evening, Disp: , Rfl:     diazepam (VALIUM) 2 mg tablet, , Disp: , Rfl:     divalproex sodium (DEPAKOTE) 250 mg EC tablet, TAKE 1 TABLET BY MOUTH TWICE DAILY WITH  ONE  500  MG  TAB  FOR  TOAL  DOSE  OF  750  MG  TWICE  A  DAY, Disp: 60 tablet, Rfl: 11    divalproex sodium (DEPAKOTE) 500 mg EC tablet, Take 1 tab twice a day with one 250 mg tab for total dose of 750 mg twice a day , Disp: 60 tablet, Rfl: 11    ergocalciferol (VITAMIN D2) 50,000 units, Take 1 capsule (50,000 Units total) by mouth once a week for 8 doses, Disp: 8 capsule, Rfl: 0    hydrOXYzine HCL (ATARAX) 25 mg tablet, Take 25 mg by mouth 2 (two) times a day as needed (Patient not taking: Reported on 10/21/2021), Disp: , Rfl:     ibuprofen (MOTRIN) 800 mg tablet, daily as needed, Disp: , Rfl:     insulin NPH-insulin regular (NovoLIN 70/30) 100 units/mL subcutaneous injection, Inject 10 Units under the skin daily before breakfast (Patient taking differently: Inject 12 Units under the skin daily before breakfast ), Disp: 3 mL, Rfl: 0    Jardiance 25 MG TABS, Take 25 mg by mouth daily, Disp: , Rfl:     lamoTRIgine (LaMICtal) 100 mg tablet, Increase as instructed to goal dose of 100 mg twice a day, Disp: 60 tablet, Rfl: 11    lisinopril (ZESTRIL) 40 mg tablet, Take 1 tablet by mouth daily, Disp: , Rfl:     metFORMIN (GLUCOPHAGE) 1000 MG tablet, Take 1,000 mg by mouth 2 (two) times a day with meals, Disp: , Rfl:     metoprolol tartrate (LOPRESSOR) 100 mg tablet, TAKE 1 TABLET BY MOUTH EVERY 12 HOURS, Disp: 180 tablet, Rfl: 0    OLANZapine (ZyPREXA) 5 mg tablet, Take 5 mg by mouth daily at bedtime  (Patient not taking: Reported on 10/21/2021), Disp: , Rfl:     OLANZapine (ZyPREXA) 7 5 mg tablet, 20 mg once At bedtime, Disp: , Rfl:     risperiDONE (RisperDAL) 1 mg tablet, Take 1 mg by mouth daily at bedtime (Patient not taking: Reported on 10/21/2021), Disp: , Rfl:     risperiDONE (RisperDAL) 2 mg tablet, Take 1 tablet (2 mg total) by mouth every morning (Patient not taking: Reported on 10/15/2020), Disp: 30 tablet, Rfl: 5    risperiDONE (RisperDAL) 3 mg tablet, Take 1 tablet (3 mg total) by mouth daily at bedtime (Patient not taking: Reported on 10/15/2020), Disp: 30 tablet, Rfl: 5    Vitamin D, Cholecalciferol, 50 MCG (2000 UT) CAPS, Take 1 capsule by mouth once daily, Disp: 30 capsule, Rfl: 5    zaleplon (SONATA) 5 MG capsule, , Disp: , Rfl:    Other medications (per patient report):  · Patient reported different medication regimen (see medication list attached to this encounter)  · Mr Josette Causey reported that he does not currently take Risperdal, Atarax, or Abilify    Review of Current Symptoms: At the time of the present evaluation, the patient reported primary cognitive symptoms that include word-finding difficulties and occasional problems with short-term memory  He noted that he may occasionally forget conversations or names of people he should know  He has also noticed that he will sometimes mistake one family member for another (e g , may confuse his wife/partner with their daughter)  He is not generally unaccompanied when outside the house, but he feels he might be at risk for getting lost in familiar places if he were alone  He denied any problems misplacing things or remembering things from long ago  He notes increased difficulties interacting with technology and learning new things  Additional cognitive symptoms include problems with attention/distractibility and occasional difficulties with receptive language, both thought attributable, at least in part, to persistent auditory hallucinations  The patient denied any concerns about his executive functioning  Cognitive symptoms seemed to come on after the time of his head injury and have not notably improved since their onset       Emotionally, the patient reported severe depression that he attributes to a loss of his pre-injury identity  He reported that his experience of ongoing and near-constant auditory hallucinations often makes him feel hopeless about any possibility of improvement in the future  He stated that he feels he "can't live like this forever " Ms Daniel Barron conveyed her concerns when she hears her  talk about this and works to try to give him things to look forward to, like date night and talking walks together, etc  Mr Ger Bueno endorsed additional symptoms that include low motivation, apathy, and some degree of anhedonia  In terms of anxiety, he reported that this seems to come over him in waves and without clear precipitant  He reported that anxiety comes on acutely and is alleviated with use of valium as needed  Episodes of spasms are thought potentially related to heightened anxiety  He denied symptoms of panic attack  There is no concern for increased irritability since his head injury  Mr Ger Bueno has been in treatment with a psychiatrist until approximately 2 months ago when his provider stopped accepting his insurance  He is seeking to establish care with a new provider  Mr Ger Bueno reported ongoing and sometimes debilitating hallucinations that manifest auditorily as easily identifiable sounds (e g , paper crinkling, the door handle jiggling, muffled voices outside) and also as clear voices that sometimes tell him to do things  He reported that these commands are generally egodystonic and he is able to differentiate these commands from internal self-talk  His auditory hallucinations occur throughout the day and night, often waking him up  He does not have visual hallucinations or delusions, and hallucinations only started after the time of his head injury in 2019  The patient endorsed some thoughts of death, but did not report active suicidal ideation  He denied any experience of homicidal ideation   Protective factors were noted to include his family  The patient remains independent for ADLs  He is able to make simple meals using the stove and does not have problems remembering to turn off burners  He can do simple chores around the house as needed but sometimes has difficulties due to poor balance  He assists his wife with household shopping at times  Ms Mary Carmen Miller reported that she manages the patient's medications due to forgetfulness  They work together to manage finances  Mr Amish Saavedra is not currently driving and has not held a job since his head injury  Physically, Mr Amish Saavedra reported some degree of difficulty with walking and balance for which he sometimes uses a cane  He reported that he has fine motor difficulties related to weakness that started after his head injury  He is still able to fasten buttons/zippers, but struggles to hold a pen/pencil  He has some tremor in bilateral hands but reported that he was told this is likely a medication side effect  He has ongoing headaches that are not typically improved with medication  He has dizziness on occasion  Pain typically occurs in his shoulder and low back  Mr Amish Saavedra denied any GI problems or incontinence  He has not noticed any changes in his sense of smell/taste  He reported some difficulties with visual acuity for which he is prescribed glasses; however, he does not use them often and does not have them with him today  He had not noticed any hearing loss  Sleep was reported to be of poor quality  He tends to go to bed late and wake up late  He takes Zaleplon and Valium to help him sleep and this causes morning grogginess  He finds he is able to fall asleep with use of medications but wakes up frequently and often due to hearing voices  He does experience symptoms of sleep paralysis a few times per week and also has a history of dream reenactment since his head injury  Energy during the day was described as being low and his appetite is normal and unchanged         Psychosocial History:  Mr  John Orlando was born and raised in [place], [place]  He was raised by his biological mother alongside 2 brothers and 1 sister  Developmental history is thought to be noncontributory  He described his childhood home as generally supportive, and he denied any history of abuse/neglect  English is the patient's first and only language  He currently resides with his wife/partner of 25 years and daughter in a private residence  He has four biological children  Mr John Orlando completed 10th grade and left school to enlist in the [employer]  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  During his academic career, he was generally a good student and did well in classes  There is no history of LD/ID, academic retention, attention problems, behavior problems, or participation in remedial/special education classes  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  He had to leave his job as a result of his head injury  Previous vocational history is notable for work in [employer]  Medical history is as above  Mr John Orlando denied any known history of exposures to chemicals/heavy metals  Psychiatric history prior to his head injury is only notable for mild depression for which he never required treatment  Since his head injury, he has been in and out of mental health therapy and is working to re-establish care with a psychiatrist at this time  Substance use history is notable for smoking cigars (~1x per month)  He does not use any other tobacco/nicotine products  He reported that he drinks only rarely at this time  He denied any history of problematic alcohol use  There is no history of illicit substance use or misuse of prescription medications  Family history is notable for his father with brain cancer   There is otherwise no known neurological or psychiatric family history of note  Behavioral Observations/Mental Status:   Mr Amish Saavedra was awake, alert, and oriented x3  He appeared his stated age and was of average stature  He was adequately groomed and hygenic  His clothing was appropriate to the setting and weather conditions  He demonstrated somewhat slow gait with slightly reduced arm swing  Seated posture was WNL  There were no other notable motor abnormalities  He did not wear glasses/contacts or hearing aids  He did not use a cane/walker in clinic  Socially, he was pleasant and cooperative throughout the evaluation process  He demonstrated good comportment and conversational reciprocity  Eye contact was WNL  Facial expressiveness was slightly reduced  Mood was reported to be "depressed" and he demonstrated restricted affect  Thought processes were linear, logical, and goal-directed  He reported auditory hallucinations during the clinical interview but did not demonstrate significant difficulties in conversation as a result of this  Speech was of normal volume, clarity, rate, and tone  The patient was mildly dysprosodic  Receptive language appeared to be adequate for the purposes of conversation  Insight was adequate  Mr Amish Saavedra was awake and alert throughout testing  He did not complain of significant fatigue but reported that he had taken a valium due to anxiety about the evaluation at around 6 AM  He reported some degree of neck and shoulder pain (6/10) that is typical of his daily experience  Despite these factors, Mr Amish Saavedra requested to complete the testing  He was cooperative with the evaluation and developed adequate testing rapport with the examiner  He grasped test instructions easily, demonstrated appropriate persistence on difficult items/tasks, worked at a steady pace, and appeared to be engaged in testing throughout  He did not appear to demonstrate awareness of poor performances and resultantly did not self-correct often   He was occasionally distractible but was able to be redirected to the task easily  He appeared to put forth good effort throughout the evaluation  Testing Procedures/Sources of Information:   Mr Zahida Hoyt  presented for comprehensive neuropsychological evaluation  Clinical interview was completed by the attending neuropsychologist  Test administration and scoring was conducted by a trained psychometrician under supervision and direct instruction of the attending provider  Tests were interpreted by the attending neuropsychologist with consideration given to the clinical history as described above  Findings are presented with qualitative labels relative to age-corrected normative data  Other demographic corrections for education and sex are also applied where possible  Score labels are provided based upon the following guidelines from AACN  A full score summary is available upon direct request     Percentile Range Descriptor   ? 98 Exceptionally High    91-97 Above Average   75-90 High Average   25-74 Average   9-24 Low Average   2-8 Below Average   <2 Exceptionally Low      Sources of information are as follows:  [employer] (ACS): Test of Premorbid Functioning (TOPF), Animal Naming Fluency, Ritter Anxiety Inventory (MILTON), Ritter Depression Inventory-2 (BDI-2), Clinical Interview, Controlled Oral Word Association Test (COWAT), Dot Counting Test (DCT), [place] Multiphasic Personality Dsvyaiejk-8-Phnjdzyerplt Form (MMPI-2-RF), Neuropsychological Assessment Battery - Form 1 (NAB; Selected Subtests), Trail Making Test (TMT), The Quick Dementia Rating System (QDRS; Completed by Ms Reyes)    Test Results: On measures of performance validity, Mr Lincoln score on a free-standing measure was within the valid range  On an embedded index comprised of weighted scores based on performance across two tasks, Mr  Almaz Chikis score was within the valid range   Performance on these measures in the context of observations consistent with adequate engagement in the evaluation suggest that test results are likely a reasonable estimate of the patients current cognitive abilities  Estimates of premorbid functioning based on demographic factors as well as his performance on measures that are resilient to neuropathological change suggest average range premorbid abilities  An estimate of general intelligence fell within the average range  Based on these estimates, Mr John Orlando is thought likely to have been performing within the average range prior to the onset of any cognitive changes related to genuine neurological dysfunction  Of note, intraindividual strengths and weaknesses are normal and not inherently suggestive of cognitive dysfunction  In reference to attention and information processing speed, a score on a measure of simple auditory attention was found to be within the average range  Working memory for auditory information was average  Simple visual attention and visual scanning was within the exceptionally low range  Visual working memory was found to be within the average range  Visual attention to detail and selective attention was within the average range  On a set of individual tests designed to measure psychomotor speed and various aspects of complex attention, his scores were predominantly lower than expected  Specific scores within this set of tests suggest primary difficulties with psychomotor speed, selective attention, and divided attention  In terms of language skills, fluent speech output was within the average range for description of a scene presented pictorially  Associative verbal fluency for words based on phonemic prompts was low average, and his score on a measure of associative fluency based on semantic category was average  Confrontation picture naming was within normal limits  Regarding visuospatial functioning, his score on a measure of simple visual perception was within the low average range   His score on a measure of visuoconstruction requiring the matching of plastic forms of varying shapes to a series of increasingly complex visual designs was within the average range  Verbal learning and memory were assessed using two primary tasks  On a list-learning task, his overall learning score for performance across three trials was within the low average range  At best performance, he recalled 7/12 words from the list  His ability to recall the list of words after a brief distractor task was within the average range (5/12 words)  His ability to recall words from the list after a longer delay was within the average range (5/12 words)  When controlling for his initial learning performance, his free recall for the words was within the average range and is not suggestive of significant forgetting over time  His recognition memory for the words when presented individually alongside distractors was within the low average range, and his ability to discriminate between target and non-target words was low average  Relative to his long delayed recall score, he demonstrated slightly weaker than expected improvement in memory for the words with recognition cueing  On a story-learning task, his immediate recall for an aurally presented short-story across two trials was within the average range  His ability to recall details of the story after a delay was within the high average range  When controlling for his initial learning performance, his free recall for the story was within the high average range and is not suggestive of significant forgetting over time  Visual learning and memory were assessed using a task with minimal motor demands  His ability to learn a series of single- and multi-shape items across three total learning trials was within the low average range  His memory for the shapes after a delay was within the low average range   On a forced-choice recognition trial, his score was within the low average range, and his ability to differentiate between target and non-target shapes was within the low average range  On a learning and memory task that has both verbal and visual demands and incorporates information often encountered in daily life, Mr Sandra Minor immediate memory for the information was within the low average range  His delayed memory for the information was within the average range  His score on a multiple-choice recognition task was within the below average range  Relative to his long delayed recall score, he demonstrated worse than expected improvement in memory for individual components of the information with recognition cueing  In terms of executive functioning, his score on a measure of visual planning, impulse control, and psychomotor speed was within the below average  range  Visual scanning and set shifting was within the exceptionally low range  On a measure of concept formation, mental flexibility, and generativity, his score fell within the average range  His score on a measure of general verbal fluency and generativity was within the average range, and he did not demonstrate patterns suggestive of perseverative responding  On a similar measure of associative fluency based upon phonemic (letter) prompts that incorporates aspects of executive control, his score fell within the low average range  Emotionally, Mr Sandra Minor responses on brief self-report measures designed to screen for common psychological symptoms suggests a moderate degree of depressive symptomology and a severe degree of anxious distress  On a comprehensive self-report inventory designed to assess clinically relevant degrees of emotional and behavioral dysfunction, Mr Dana Kussmaul responded to questions in a consistent and cooperative manger   There were indications of possible over-reporting of symptomology; however, given the nature of symptoms reported during the clinical interview, it is reasonable to consider the protocol valid in the context of severe psychological distress  His profile of scores on substantive scales was suggestive of significant emotional dysfunction characterized by dissatisfaction with life circumstances, anhedonia, anger-proneness, excessive worry, feelings of helplessness and hopelessness, and frequent experiences of agitation  There were also indications of significant thought dysfunction characterized by unusual perceptual experiences, as well as a tendency to be distrustful of others and feeling alienated from others  In terms of characterological tendencies, there were indications that the patient may prefer to avoid social situations and favor engaging in solitary activities  Impressions/Recommendations:   Neuropsychological test results are abnormal due to the presence of lower-than-expected scores in areas that include processing speed, complex attention, and aspects of executive functioning  The number and extent of low scores evidenced on testing in the context of estimated premorbid abilities is thought to reflect probable cognitive impairment, as fewer than 10% of healthy adults obtained the same number of low scores below given cutoff points on measures of attention and processing speed  In the context of his reported level of independence for day-to-day tasks, these results are thought to be most consistent with a diagnosis of mild cognitive impairment (MCI)  Test data suggest primarily subcortical dysfunction that is thought to be broadly consistent with the patients history of traumatic brain injury and associated sequelae thereof  His experience of auditory hallucinations is also thought likely to be associated with post-traumatic psychosis, for which several risk factors were present (e g , damage to temporoparietal areas, closed head injury, and posttraumatic epilepsy)   Mr Castillo First experience of depression and anxiety is likely related to a combination of factors that include damage to areas of the brain involved in emotion regulation as well as psychological adjustment to posttraumatic identity  Memory dysfunction was not well evidenced on testing  Patterns of performance were suggestive of intermittently weak learning (encoding processes) with good memory for information overall  His experience of memory dysfunction may be better attributed to problems with executive control and attention lapses that can have a negative downstream effect on memory performance  With regard to his further care, my recommendations are as follows  1  While there is evidence of cognitive dysfunction on testing, Mr Chemo Mcpherson quality of life appears to be more substantially impacted by his experience of psychiatric symptoms  Ongoing treatment with psychiatry is strongly recommended  He may also benefit from working with a psychologist/mental health therapist with a focus on confronting his adjustment reaction through acceptance and commitment strategies and the development of adaptive coping skills  2  The patient is currently prescribed several medications that can be associated with cognitive side-effects  He may wish to discuss treatment alternatives with his medical provider(s)  3  The patient reported questions of poor sleep quality despite adequate sleep quantity  He may wish to incorporate behavioral strategies for sleep into his daily routine  A good summary of commonly used behavioral strategies for sleep improvement can be found at http://sleepeducation  org/essentials-in-sleep/healthy-sleep-habits  4  The patient may experience cognitive inefficiencies in his day-to-day life  As such, he may benefit from incorporating the following compensatory strategies into his routine practices  a  Write information down, rather than relying upon your memory alone (e g , use a notepad by the phone, Post-It notes, or a dry erase board set up in a central location in the home)     b   Set alarms, timers, or reminders for important events or repeating occurrences on a cell phone, watch, or other electronic device (e g , alarm to remember to take medications, reminder for daily tasks, create an event on your electronic calendar to remember birthdays/appointments)  c  It can be helpful to set up a central location where items used on a daily basis (e g , keys, wallet) are always placed  d  Repetition can be helpful in forming new memories  Recite important information several times to encourage retention of information  5  There is some research that suggests cognitive benefit from regular engagement in cognitively stimulating activities  The patient may wish to consider participating in conventional [address] such as sudoku puzzles, word searches, crossword puzzles, or other activities available in puzzle books or on websites (such as [employer] or [employer])  Additionally, other, novel activities that require cognitive flexibility and new skill building (e g , learning a new language, learning to play an instrument) are also likely to provide mental stimulation  6  Regular physical activity can have a significant impact on physical, emotional, and cognitive health  The patient is encouraged to incorporate aspects of cardiovascular and strength-building exercise into his life  Discussion of current medical status with health care providers is strongly encouraged before starting any new exercise regimen  7  Research supports eating healthy foods that have been shown to have a positive impact on brain health  The patient may wish to consider incorporating these sorts of foods into his diet  The MIND diet is a popular option that combines the health benefits of the Mediterranean and DASH diets and has been proven to boost brain health and potentially reduce your risk of cognitive decline  A good summary of the MIND diet can be found at: [employer]      8  Should the patient, his family members, or his medical providers have ongoing concerns regarding his cognitive functioning, repeat evaluation can be conducted no sooner than one year from the time of the present evaluation (December 2022)  Nat Harris PsyD  Neuropsychologist  [place] Licensed Psychologist  [facility]  #[id number]      Tasks Conducted Total Time Spent Associated CPT Codes   Clinical Interview 62 min  84579 x 1 unit  96121 x 3 units   Report Writing 198 min  Neuropsychological Test Administration (PhD/PsyD) 0 min  89719 x 0 unit  96137 x 0 units   Scoring (PhD/PsyD) 0 min  Neuropsychological Test Administration [facility] ) 186 min  56036 x 1 unit  96139 x 7 units   Scoring (Tech ) 60 min  Chart Review 55 min  96132 x 1 unit  96133 x 0 units   Interpretation of Test Data 34 min  Feedback to Patient/Family Members 0 min

## 2021-12-15 NOTE — LETTER
2022     Itzel Wu, [address]    Patient: Rony Be  YOB: 1970   Date of Visit: 12/15/2021       Dear Dr Mere Nguyen: Thank you for referring Dena Joseph to me for evaluation  Below are my notes for this consultation  If you have questions, please do not hesitate to call me  Please note that the patient did not complete a feedback appointment regarding the results of this evaluation  He is welcome to reach out to reschedule this appointment if he has further questions or concerns  Sincerely,        Ozzy Jacobsen PSYD        CC: No Recipients  Halle Stricklandmerlin  2022  3:56 PM  Sign when Signing Visit  Neuropsychological Evaluation  [facility]  [address] [place] [address]  P: [phone] F: [phone]    Patient Name: Rony Be  Age: 46 y o  MRN: [id number]   : 1970  DOS: 2021     Referral/Presenting Information:   Mr Rony Be  is a 46 y o , right-handed, man with a  GED level of education (~11 years equivalency) who presents for neuropsychological evaluation upon kind referral from his neurology provider, Itzel Wu MD, for assessment of cognitive functioning in the context of head injury (2019) with resultant SAD/SAH, seizures and auditory hallucinations  He also has a history of DM2 and high blood pressure which are reportedly well controlled on medications at this time  The patient was accompanied to today's appointment by his partner, Ms Calderon Baker, who participated in the clinical interview with patient permission  The history, as reported below, incorporates information obtained through medical record review, patient report, and clinical observation, as well as informant report and outside record review as applicable  History of Presenting Problem(s):  Per review of the EMR, Mr Juan M Muñiz sustained a head injury when in the [employer] many years ago  When asked directly, Mr Wilbert Lebron reported that he jumped from an airplane and his parachute malfunctioned  He landed poorly and injured his neck  He reported that he stood up and ran after he landed and subsequently blacked out  He did not know if there was any other workup specifically related to head injury in this instance  He sustained an additional closed head injury on 08/25/2019 when he fell backwards and hit his head on a rock with brief LOC  He was found to have GCS of 15 upon arrival to [facility] AT THE [facility] ED  CT Head demonstrated right-sided SAH focal to the temporoparietal region, 7 mm in thickness with regional mass effect and no midline shift or herniation  He was discharged home on 08/26/19 in stable condition  He was subsequently evaluated at the ED at [facility] for dizziness, staring spells, and headache on 09/17/2019  Routine EEG was notable for several electrographic seizures stemming from the R hemisphere and he was transferred to [facility] for further management  He was noted to 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 requiring Versed drip, intubation, and ICU level of care  While admitted, he was having auditory hallucinations and was also found to have DM2 with an A1c of 13  He was discharged home on Keppra, Depakote, and Dilantin on 09/25/2019  He was seen by [facility] neurology for outpatient follow-up on 11/04/2019 at which time he was recommended to have MRI, EEG, and blood work for Depakote, Keppra, and Dilantin levels  Video EEG monitoring from 12/09/19 to 12/11/19 was normal  MRI (12/11/2019) demonstrated "minimal periventricular and subcortical foci of white matter T2 hyperintensity which is nonspecific and most likely related to chronic small vessel ischemic changes" as well as a "punctate focus of susceptibility artifact in the left parietal lobe likely representing hemosiderin tube position from a tiny old microbleed" that was also documented on previous imaging studies       On 12/08/2019, the patient presented to [facility] ED with worsening auditory hallucinations that had progressed from sounds and nonspecific voices to command hallucinations telling him to hurt other people  There was no reported history of auditory hallucinations prior to his 2019 head injury  There was also concerns of intermittent episodes of confusion including one episode in which the patient did not recognize a close family member  He was transferred to [facility] for concerns that symptoms could be a manifestation of worsening seizures  Video EEG was negative for seizure and Keppra was stopped due to concerns for hallucinations as a side effect of this medication  He was seen by Dr Jovana Alonso for inpatient neuropsychology consult (12/10/19) and findings suggested "mild weaknesses in information processing speed, visual recognition, and auditory narrative recall " He was started on Risperidone with some improvement in hallucinations and was discharged home on 12/14/2019  Interval history has been notable for difficulties managing headaches and auditory hallucinations  Medications have been inconsistently effective  He has been in treatment with psychiatry and is currently taking Olanzapine (for hallucinations) and Zaleplon (for sleep)  Seizures appear to be well controlled on Divalproex EC and Lamotrigine at this time  The patient is scheduled for EMU stay in late December to evaluate episodes of "spasms" on his left side that occur episodically  The patient did report that these spasms seem to be related to periods of high stress as well  He was referred for neuropsychological evaluation to better characterize cognitive strengths and weaknesses        Current Medications:    amLODIPine (NORVASC) 10 mg tablet, Take 10 mg by mouth daily, Disp: , Rfl:     ARIPiprazole (ABILIFY PO), Take by mouth daily (Patient not taking: Reported on 10/21/2021), Disp: , Rfl:     aspirin 81 mg chewable tablet, Chew 1 tablet (81 mg total) daily, Disp: 30 tablet, Rfl: 0    aspirin 81 mg chewable tablet, Chew 81 mg daily, Disp: , Rfl:     atorvastatin (LIPITOR) 20 mg tablet, Take 20 mg by mouth every evening, Disp: , Rfl:     diazepam (VALIUM) 2 mg tablet, , Disp: , Rfl:     divalproex sodium (DEPAKOTE) 250 mg EC tablet, TAKE 1 TABLET BY MOUTH TWICE DAILY WITH  ONE  500  MG  TAB  FOR  TOAL  DOSE  OF  750  MG  TWICE  A  DAY, Disp: 60 tablet, Rfl: 11    divalproex sodium (DEPAKOTE) 500 mg EC tablet, Take 1 tab twice a day with one 250 mg tab for total dose of 750 mg twice a day , Disp: 60 tablet, Rfl: 11    ergocalciferol (VITAMIN D2) 50,000 units, Take 1 capsule (50,000 Units total) by mouth once a week for 8 doses, Disp: 8 capsule, Rfl: 0    hydrOXYzine HCL (ATARAX) 25 mg tablet, Take 25 mg by mouth 2 (two) times a day as needed (Patient not taking: Reported on 10/21/2021), Disp: , Rfl:     ibuprofen (MOTRIN) 800 mg tablet, daily as needed, Disp: , Rfl:     insulin NPH-insulin regular (NovoLIN 70/30) 100 units/mL subcutaneous injection, Inject 10 Units under the skin daily before breakfast (Patient taking differently: Inject 12 Units under the skin daily before breakfast ), Disp: 3 mL, Rfl: 0    Jardiance 25 MG TABS, Take 25 mg by mouth daily, Disp: , Rfl:     lamoTRIgine (LaMICtal) 100 mg tablet, Increase as instructed to goal dose of 100 mg twice a day, Disp: 60 tablet, Rfl: 11    lisinopril (ZESTRIL) 40 mg tablet, Take 1 tablet by mouth daily, Disp: , Rfl:     metFORMIN (GLUCOPHAGE) 1000 MG tablet, Take 1,000 mg by mouth 2 (two) times a day with meals, Disp: , Rfl:     metoprolol tartrate (LOPRESSOR) 100 mg tablet, TAKE 1 TABLET BY MOUTH EVERY 12 HOURS, Disp: 180 tablet, Rfl: 0    OLANZapine (ZyPREXA) 5 mg tablet, Take 5 mg by mouth daily at bedtime  (Patient not taking: Reported on 10/21/2021), Disp: , Rfl:     OLANZapine (ZyPREXA) 7 5 mg tablet, 20 mg once At bedtime, Disp: , Rfl:     risperiDONE (RisperDAL) 1 mg tablet, Take 1 mg by mouth daily at bedtime (Patient not taking: Reported on 10/21/2021), Disp: , Rfl:     risperiDONE (RisperDAL) 2 mg tablet, Take 1 tablet (2 mg total) by mouth every morning (Patient not taking: Reported on 10/15/2020), Disp: 30 tablet, Rfl: 5    risperiDONE (RisperDAL) 3 mg tablet, Take 1 tablet (3 mg total) by mouth daily at bedtime (Patient not taking: Reported on 10/15/2020), Disp: 30 tablet, Rfl: 5    Vitamin D, Cholecalciferol, 50 MCG (2000 UT) CAPS, Take 1 capsule by mouth once daily, Disp: 30 capsule, Rfl: 5    zaleplon (SONATA) 5 MG capsule, , Disp: , Rfl:    Other medications (per patient report):  · Patient reported different medication regimen (see medication list attached to this encounter)  · Mr Gaby Leung reported that he does not currently take Risperdal, Atarax, or Abilify    Review of Current Symptoms: At the time of the present evaluation, the patient reported primary cognitive symptoms that include word-finding difficulties and occasional problems with short-term memory  He noted that he may occasionally forget conversations or names of people he should know  He has also noticed that he will sometimes mistake one family member for another (e g , may confuse his wife/partner with their daughter)  He is not generally unaccompanied when outside the house, but he feels he might be at risk for getting lost in familiar places if he were alone  He denied any problems misplacing things or remembering things from long ago  He notes increased difficulties interacting with technology and learning new things  Additional cognitive symptoms include problems with attention/distractibility and occasional difficulties with receptive language, both thought attributable, at least in part, to persistent auditory hallucinations  The patient denied any concerns about his executive functioning  Cognitive symptoms seemed to come on after the time of his head injury and have not notably improved since their onset       Emotionally, the patient reported severe depression that he attributes to a loss of his pre-injury identity  He reported that his experience of ongoing and near-constant auditory hallucinations often makes him feel hopeless about any possibility of improvement in the future  He stated that he feels he "can't live like this forever " Ms Isha Mohamud conveyed her concerns when she hears her  talk about this and works to try to give him things to look forward to, like date night and talking walks together, etc  Mr Kyle Menendez endorsed additional symptoms that include low motivation, apathy, and some degree of anhedonia  In terms of anxiety, he reported that this seems to come over him in waves and without clear precipitant  He reported that anxiety comes on acutely and is alleviated with use of valium as needed  Episodes of spasms are thought potentially related to heightened anxiety  He denied symptoms of panic attack  There is no concern for increased irritability since his head injury  Mr Kyle Menendez has been in treatment with a psychiatrist until approximately 2 months ago when his provider stopped accepting his insurance  He is seeking to establish care with a new provider  Mr Kyle Menendez reported ongoing and sometimes debilitating hallucinations that manifest auditorily as easily identifiable sounds (e g , paper crinkling, the door handle jiggling, muffled voices outside) and also as clear voices that sometimes tell him to do things  He reported that these commands are generally egodystonic and he is able to differentiate these commands from internal self-talk  His auditory hallucinations occur throughout the day and night, often waking him up  He does not have visual hallucinations or delusions, and hallucinations only started after the time of his head injury in 2019  The patient endorsed some thoughts of death, but did not report active suicidal ideation  He denied any experience of homicidal ideation   Protective factors were noted to include his family  The patient remains independent for ADLs  He is able to make simple meals using the stove and does not have problems remembering to turn off burners  He can do simple chores around the house as needed but sometimes has difficulties due to poor balance  He assists his wife with household shopping at times  Ms Sheela Irizarry reported that she manages the patient's medications due to forgetfulness  They work together to manage finances  Mr Patti Palmer is not currently driving and has not held a job since his head injury  Physically, Mr Patti Palmer reported some degree of difficulty with walking and balance for which he sometimes uses a cane  He reported that he has fine motor difficulties related to weakness that started after his head injury  He is still able to fasten buttons/zippers, but struggles to hold a pen/pencil  He has some tremor in bilateral hands but reported that he was told this is likely a medication side effect  He has ongoing headaches that are not typically improved with medication  He has dizziness on occasion  Pain typically occurs in his shoulder and low back  Mr Patti Palmer denied any GI problems or incontinence  He has not noticed any changes in his sense of smell/taste  He reported some difficulties with visual acuity for which he is prescribed glasses; however, he does not use them often and does not have them with him today  He had not noticed any hearing loss  Sleep was reported to be of poor quality  He tends to go to bed late and wake up late  He takes Zaleplon and Valium to help him sleep and this causes morning grogginess  He finds he is able to fall asleep with use of medications but wakes up frequently and often due to hearing voices  He does experience symptoms of sleep paralysis a few times per week and also has a history of dream reenactment since his head injury  Energy during the day was described as being low and his appetite is normal and unchanged         Psychosocial History:    Amish Saavedra was born and raised in [place], [place]  He was raised by his biological mother alongside 2 brothers and 1 sister  Developmental history is thought to be noncontributory  He described his childhood home as generally supportive, and he denied any history of abuse/neglect  English is the patient's first and only language  He currently resides with his wife/partner of 25 years and daughter in a private residence  He has four biological children  Mr Amish Saavedra completed 10th grade and left school to enlist in the [employer]  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  During his academic career, he was generally a good student and did well in classes  There is no history of LD/ID, academic retention, attention problems, behavior problems, or participation in remedial/special education classes  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  He had to leave his job as a result of his head injury  Previous vocational history is notable for work in [employer]  Medical history is as above  Mr Amish Saavedra denied any known history of exposures to chemicals/heavy metals  Psychiatric history prior to his head injury is only notable for mild depression for which he never required treatment  Since his head injury, he has been in and out of mental health therapy and is working to re-establish care with a psychiatrist at this time  Substance use history is notable for smoking cigars (~1x per month)  He does not use any other tobacco/nicotine products  He reported that he drinks only rarely at this time  He denied any history of problematic alcohol use  There is no history of illicit substance use or misuse of prescription medications  Family history is notable for his father with brain cancer   There is otherwise no known neurological or psychiatric family history of note  Behavioral Observations/Mental Status:   Mr Aleena Garza was awake, alert, and oriented x3  He appeared his stated age and was of average stature  He was adequately groomed and hygenic  His clothing was appropriate to the setting and weather conditions  He demonstrated somewhat slow gait with slightly reduced arm swing  Seated posture was WNL  There were no other notable motor abnormalities  He did not wear glasses/contacts or hearing aids  He did not use a cane/walker in clinic  Socially, he was pleasant and cooperative throughout the evaluation process  He demonstrated good comportment and conversational reciprocity  Eye contact was WNL  Facial expressiveness was slightly reduced  Mood was reported to be "depressed" and he demonstrated restricted affect  Thought processes were linear, logical, and goal-directed  He reported auditory hallucinations during the clinical interview but did not demonstrate significant difficulties in conversation as a result of this  Speech was of normal volume, clarity, rate, and tone  The patient was mildly dysprosodic  Receptive language appeared to be adequate for the purposes of conversation  Insight was adequate  Mr Aleena Garza was awake and alert throughout testing  He did not complain of significant fatigue but reported that he had taken a valium due to anxiety about the evaluation at around 6 AM  He reported some degree of neck and shoulder pain (6/10) that is typical of his daily experience  Despite these factors, Mr Aleena Garza requested to complete the testing  He was cooperative with the evaluation and developed adequate testing rapport with the examiner  He grasped test instructions easily, demonstrated appropriate persistence on difficult items/tasks, worked at a steady pace, and appeared to be engaged in testing throughout  He did not appear to demonstrate awareness of poor performances and resultantly did not self-correct often   He was occasionally distractible but was able to be redirected to the task easily  He appeared to put forth good effort throughout the evaluation  Testing Procedures/Sources of Information:   Mr Groves Press  presented for comprehensive neuropsychological evaluation  Clinical interview was completed by the attending neuropsychologist  Test administration and scoring was conducted by a trained psychometrician under supervision and direct instruction of the attending provider  Tests were interpreted by the attending neuropsychologist with consideration given to the clinical history as described above  Findings are presented with qualitative labels relative to age-corrected normative data  Other demographic corrections for education and sex are also applied where possible  Score labels are provided based upon the following guidelines from AACN  A full score summary is available upon direct request     Percentile Range Descriptor   ? 98 Exceptionally High    91-97 Above Average   75-90 High Average   25-74 Average   9-24 Low Average   2-8 Below Average   <2 Exceptionally Low      Sources of information are as follows:  [employer] (ACS): Test of Premorbid Functioning (TOPF), Animal Naming Fluency, Ritter Anxiety Inventory (MILTON), Ritter Depression Inventory-2 (BDI-2), Clinical Interview, Controlled Oral Word Association Test (COWAT), Dot Counting Test (DCT), [place] Multiphasic Personality Ecxfkdyvv-8-Vrducclnmllr Form (MMPI-2-RF), Neuropsychological Assessment Battery - Form 1 (NAB; Selected Subtests), Trail Making Test (TMT), The Quick Dementia Rating System (QDRS; Completed by Ms Reyes)    Test Results: On measures of performance validity, Mr Lincoln score on a free-standing measure was within the valid range  On an embedded index comprised of weighted scores based on performance across two tasks, Mr Donald Mas score was within the valid range   Performance on these measures in the context of observations consistent with adequate engagement in the evaluation suggest that test results are likely a reasonable estimate of the patients current cognitive abilities  Estimates of premorbid functioning based on demographic factors as well as his performance on measures that are resilient to neuropathological change suggest average range premorbid abilities  An estimate of general intelligence fell within the average range  Based on these estimates, Mr Yousif Wray is thought likely to have been performing within the average range prior to the onset of any cognitive changes related to genuine neurological dysfunction  Of note, intraindividual strengths and weaknesses are normal and not inherently suggestive of cognitive dysfunction  In reference to attention and information processing speed, a score on a measure of simple auditory attention was found to be within the average range  Working memory for auditory information was average  Simple visual attention and visual scanning was within the exceptionally low range  Visual working memory was found to be within the average range  Visual attention to detail and selective attention was within the average range  On a set of individual tests designed to measure psychomotor speed and various aspects of complex attention, his scores were predominantly lower than expected  Specific scores within this set of tests suggest primary difficulties with psychomotor speed, selective attention, and divided attention  In terms of language skills, fluent speech output was within the average range for description of a scene presented pictorially  Associative verbal fluency for words based on phonemic prompts was low average, and his score on a measure of associative fluency based on semantic category was average  Confrontation picture naming was within normal limits  Regarding visuospatial functioning, his score on a measure of simple visual perception was within the low average range   His score on a measure of visuoconstruction requiring the matching of plastic forms of varying shapes to a series of increasingly complex visual designs was within the average range  Verbal learning and memory were assessed using two primary tasks  On a list-learning task, his overall learning score for performance across three trials was within the low average range  At best performance, he recalled 7/12 words from the list  His ability to recall the list of words after a brief distractor task was within the average range (5/12 words)  His ability to recall words from the list after a longer delay was within the average range (5/12 words)  When controlling for his initial learning performance, his free recall for the words was within the average range and is not suggestive of significant forgetting over time  His recognition memory for the words when presented individually alongside distractors was within the low average range, and his ability to discriminate between target and non-target words was low average  Relative to his long delayed recall score, he demonstrated slightly weaker than expected improvement in memory for the words with recognition cueing  On a story-learning task, his immediate recall for an aurally presented short-story across two trials was within the average range  His ability to recall details of the story after a delay was within the high average range  When controlling for his initial learning performance, his free recall for the story was within the high average range and is not suggestive of significant forgetting over time  Visual learning and memory were assessed using a task with minimal motor demands  His ability to learn a series of single- and multi-shape items across three total learning trials was within the low average range  His memory for the shapes after a delay was within the low average range   On a forced-choice recognition trial, his score was within the low average range, and his ability to differentiate between target and non-target shapes was within the low average range  On a learning and memory task that has both verbal and visual demands and incorporates information often encountered in daily life, Mr Obed Zepeda immediate memory for the information was within the low average range  His delayed memory for the information was within the average range  His score on a multiple-choice recognition task was within the below average range  Relative to his long delayed recall score, he demonstrated worse than expected improvement in memory for individual components of the information with recognition cueing  In terms of executive functioning, his score on a measure of visual planning, impulse control, and psychomotor speed was within the below average  range  Visual scanning and set shifting was within the exceptionally low range  On a measure of concept formation, mental flexibility, and generativity, his score fell within the average range  His score on a measure of general verbal fluency and generativity was within the average range, and he did not demonstrate patterns suggestive of perseverative responding  On a similar measure of associative fluency based upon phonemic (letter) prompts that incorporates aspects of executive control, his score fell within the low average range  Emotionally, Mr Obed Zepeda responses on brief self-report measures designed to screen for common psychological symptoms suggests a moderate degree of depressive symptomology and a severe degree of anxious distress  On a comprehensive self-report inventory designed to assess clinically relevant degrees of emotional and behavioral dysfunction, Mr Tong Leyva responded to questions in a consistent and cooperative manger   There were indications of possible over-reporting of symptomology; however, given the nature of symptoms reported during the clinical interview, it is reasonable to consider the protocol valid in the context of severe psychological distress  His profile of scores on substantive scales was suggestive of significant emotional dysfunction characterized by dissatisfaction with life circumstances, anhedonia, anger-proneness, excessive worry, feelings of helplessness and hopelessness, and frequent experiences of agitation  There were also indications of significant thought dysfunction characterized by unusual perceptual experiences, as well as a tendency to be distrustful of others and feeling alienated from others  In terms of characterological tendencies, there were indications that the patient may prefer to avoid social situations and favor engaging in solitary activities  Impressions/Recommendations:   Neuropsychological test results are abnormal due to the presence of lower-than-expected scores in areas that include processing speed, complex attention, and aspects of executive functioning  The number and extent of low scores evidenced on testing in the context of estimated premorbid abilities is thought to reflect probable cognitive impairment, as fewer than 10% of healthy adults obtained the same number of low scores below given cutoff points on measures of attention and processing speed  In the context of his reported level of independence for day-to-day tasks, these results are thought to be most consistent with a diagnosis of mild cognitive impairment (MCI)  Test data suggest primarily subcortical dysfunction that is thought to be broadly consistent with the patients history of traumatic brain injury and associated sequelae thereof  His experience of auditory hallucinations is also thought likely to be associated with post-traumatic psychosis, for which several risk factors were present (e g , damage to temporoparietal areas, closed head injury, and posttraumatic epilepsy)   Mr Aicha Wolff experience of depression and anxiety is likely related to a combination of factors that include damage to areas of the brain involved in emotion regulation as well as psychological adjustment to posttraumatic identity  Memory dysfunction was not well evidenced on testing  Patterns of performance were suggestive of intermittently weak learning (encoding processes) with good memory for information overall  His experience of memory dysfunction may be better attributed to problems with executive control and attention lapses that can have a negative downstream effect on memory performance  With regard to his further care, my recommendations are as follows  1  While there is evidence of cognitive dysfunction on testing, Mr Cheryle Oliphant quality of life appears to be more substantially impacted by his experience of psychiatric symptoms  Ongoing treatment with psychiatry is strongly recommended  He may also benefit from working with a psychologist/mental health therapist with a focus on confronting his adjustment reaction through acceptance and commitment strategies and the development of adaptive coping skills  2  The patient is currently prescribed several medications that can be associated with cognitive side-effects  He may wish to discuss treatment alternatives with his medical provider(s)  3  The patient reported questions of poor sleep quality despite adequate sleep quantity  He may wish to incorporate behavioral strategies for sleep into his daily routine  A good summary of commonly used behavioral strategies for sleep improvement can be found at http://sleepeducation  org/essentials-in-sleep/healthy-sleep-habits  4  The patient may experience cognitive inefficiencies in his day-to-day life  As such, he may benefit from incorporating the following compensatory strategies into his routine practices  a  Write information down, rather than relying upon your memory alone (e g , use a notepad by the phone, Post-It notes, or a dry erase board set up in a central location in the home)     b   Set alarms, timers, or reminders for important events or repeating occurrences on a cell phone, watch, or other electronic device (e g , alarm to remember to take medications, reminder for daily tasks, create an event on your electronic calendar to remember birthdays/appointments)  c  It can be helpful to set up a central location where items used on a daily basis (e g , keys, wallet) are always placed  d  Repetition can be helpful in forming new memories  Recite important information several times to encourage retention of information  5  There is some research that suggests cognitive benefit from regular engagement in cognitively stimulating activities  The patient may wish to consider participating in conventional [address] such as sudoku puzzles, word searches, crossword puzzles, or other activities available in puzzle books or on websites (such as [employer] or [employer])  Additionally, other, novel activities that require cognitive flexibility and new skill building (e g , learning a new language, learning to play an instrument) are also likely to provide mental stimulation  6  Regular physical activity can have a significant impact on physical, emotional, and cognitive health  The patient is encouraged to incorporate aspects of cardiovascular and strength-building exercise into his life  Discussion of current medical status with health care providers is strongly encouraged before starting any new exercise regimen  7  Research supports eating healthy foods that have been shown to have a positive impact on brain health  The patient may wish to consider incorporating these sorts of foods into his diet  The MIND diet is a popular option that combines the health benefits of the Mediterranean and DASH diets and has been proven to boost brain health and potentially reduce your risk of cognitive decline  A good summary of the MIND diet can be found at: [employer]      8  Should the patient, his family members, or his medical providers have ongoing concerns regarding his cognitive functioning, repeat evaluation can be conducted no sooner than one year from the time of the present evaluation (December 2022)  Donald Win PsyD  Neuropsychologist  [place] Licensed Psychologist  Lic  #[id number]

## 2021-12-16 NOTE — PROGRESS NOTES
[facility]  was seen by leslye Ordonez, neuropsychometrist, for a neuropsychological assesment on 12/15/21

## 2022-01-09 DIAGNOSIS — I10 ESSENTIAL HYPERTENSION: ICD-10-CM

## 2022-01-11 RX ORDER — METOPROLOL TARTRATE 100 MG/1
TABLET ORAL
Qty: 180 TABLET | Refills: 0 | Status: SHIPPED | OUTPATIENT
Start: 2022-01-11 | End: 2022-03-21

## 2022-02-18 ENCOUNTER — TELEPHONE (OUTPATIENT)
Dept: NEUROLOGY | Facility: CLINIC | Age: 52
End: 2022-02-18

## 2022-02-21 NOTE — TELEPHONE ENCOUNTER
Patient calling to state he has been waiting for virtual appt to begin with Stacy Scott for 115  Upon chart review, patient is actually scheduled to see Jani Cordova in office today at 115  Spoke to Sea, who states Jani Free will not see patient now due to patient being "late"  (This was at 1:25pm) She also noted that per Jani Cordova, patient needs to be seen in office with Stacy Scott  Spoke to Mary Hardwick, who stated due to schedule conflict, appt was moved to Calli's schedule, appt was not changed to virtual     Patient will need to be rescheduled with a provider

## 2022-02-21 NOTE — TELEPHONE ENCOUNTER
I am happy to see the patient at another time  I was informed after his appt time that patient thought it was a virtual visit, not in person (which is why he didn't show up to the office--it was scheduled as in-person)  The appt was placed on my schedule late Friday as an in-person visit, there was no documentation as to why it was rescheduled from Dr Marilyn Crockett  I cannot accommodate him today at another time, as my schedule is full  I said that he usually sees Dr Marilyn Crockett and was on his schedule for today, but saw it was changed to mine, so I said to see if Dr Marilyn Crockett had another opening to see him  If Dr Marilyn Crockett does not have any openings, I am happy to see him

## 2022-02-22 ENCOUNTER — TELEPHONE (OUTPATIENT)
Dept: NEUROLOGY | Facility: CLINIC | Age: 52
End: 2022-02-22

## 2022-02-22 ENCOUNTER — TELEMEDICINE (OUTPATIENT)
Dept: NEUROLOGY | Facility: CLINIC | Age: 52
End: 2022-02-22
Payer: MEDICARE

## 2022-02-22 DIAGNOSIS — G40.109 LOCALIZATION-RELATED EPILEPSY (HCC): ICD-10-CM

## 2022-02-22 DIAGNOSIS — F29 PSYCHOSIS, UNSPECIFIED PSYCHOSIS TYPE (HCC): Primary | ICD-10-CM

## 2022-02-22 PROCEDURE — 99214 OFFICE O/P EST MOD 30 MIN: CPT | Performed by: PSYCHIATRY & NEUROLOGY

## 2022-02-22 RX ORDER — OLANZAPINE 20 MG/1
20 TABLET ORAL
Qty: 30 TABLET | Refills: 5 | Status: SHIPPED | OUTPATIENT
Start: 2022-02-22

## 2022-02-22 RX ORDER — ZALEPLON 5 MG/1
5 CAPSULE ORAL
Qty: 30 CAPSULE | Refills: 5 | Status: SHIPPED | OUTPATIENT
Start: 2022-02-22 | End: 2022-08-01 | Stop reason: SDUPTHER

## 2022-02-22 NOTE — TELEPHONE ENCOUNTER
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      Thanks,    Mario Skinner MD

## 2022-02-22 NOTE — TELEPHONE ENCOUNTER
MSW phoned patient to confirm insurances: He reported that Medicare is his primary and [facility] secondary  List can be sent to him via [employer]  Willing to have Psychotherapy as well  [address] [facility] office     [phone]   -lvm to learn if accepting new  patients for Psychiatry  [address] [facility], [address] [facility], [address] [place]: [phone]  - lvm to learn if accepting new clients also if accepting Medicare insurance and [employer]  [address]  [place]: [phone]  -lvm to learn if accepting Medicare an [employer]  Please call back

## 2022-02-22 NOTE — PATIENT INSTRUCTIONS
-- Continue to take Depakote and Lamotrigine unchanged  -- I did put in a referral to [facility]   It is very important that you re-establish with a psychiatrist      -- Please call our office if you need help arranging care with a psychiatrist

## 2022-02-22 NOTE — PROGRESS NOTES
Virtual Regular Visit    Verification of patient location:    Patient is located in the following state in which I hold an active license [place]      Assessment/Plan:    Patient ID: Charles Sanches  is a 46 y o  male with prior traumatic subarachnoid hemorrhage, localization-related epilepsy, and ongoing auditory hallucinations, who is returning for follow-up of his seizures  Assessment/Plan:    Localization-related epilepsy [facility])  He has not had any definite seizures since his last appointment  He is still taking Depakote and lamotrigine and tolerating them without side effects  He was not able to come into the epilepsy monitoring unit to having  We had previously wanted to do continuous video EEG in order to capture and characterize his episodes of arm /leg stiffening  At this point, it may be helpful to arrange for this admission, but he is currently without a psychiatrist having much more significant problems with his hallucinations and mood, and we should focus on stabilizing this at this point and can revisit his episodes in the future  --she will continue Depakote and lamotrigine unchanged  Psychotic disorder ([facility] )    He continues to have very significant auditory hallucinations and mood changes since his traumatic head injury  He was doing better when taking olanzapine and zaleplon, which were prescribed by his psychiatrist previously  As noted above, he has unfortunately between psychiatrists now due to change of his insurance, and has been without his medications for few weeks  I discussed that I will refill his medications for now in order to provide him some benefit until he reestablishes with another psychologist/psychiatrist   I did reach out to our social work department to try to help arrange psychiatric follow-up      He will Return in about 3 months (around 5/22/2022)            Reason for visit is   Chief Complaint   Patient presents with    Virtual Regular Visit Encounter provider David Claire MD    Provider located at [address] [address] [address] [place] [address]  [phone]      Recent Visits  Date Type Provider Dept   02/22/22 Telemedicine David Claire MD [facility] [address] [facility] recent visits within past 7 days and meeting all other requirements  Future Appointments  No visits were found meeting these conditions  Showing future appointments within next 150 days and meeting all other requirements       The patient was identified by name and date of birth  [facility]  was informed that this is a telemedicine visit and that the visit is being conducted through [facility] and patient was informed this is a secure, HIPAA-complaint platform  He agrees to proceed     My office door was closed  The patient was notified the following individuals were present in the room , Dr Stacey Valencia  He acknowledged consent and understanding of privacy and security of the video platform  The patient has agreed to participate and understands they can discontinue the visit at any time  Patient is aware this is a billable service  Subjective  Yan Duvalia Louann  is a 46 y o  male     Current seizure medications:  1  Divalproex  mg twice a day  2  Lamotrigine 100 mg twice a day  Other medications as per Epic  Since his last visit, he has not had any additional definite seizures  They haven't been noticing his episodes of leg stiffening as much as they had been in the past      Unfortunately, he has not been able to continue to follow with his Psychiatrist  There was an insurance issue and he has not been able to follow up with their office  As a results, he has been off of his psychiatric medications for the last few weeks  With this change, his anxiety is "really bad", his auditory hallucinations have gotten really bad again with being off of his medications    This continues to wear on him and he has difficulty coping at times  We had planned on arranging for admission to the EMU, but he was not able to be admitted due to being sick with COVID around that time  He also did have neuropsychologic testing  This did show some cognitive changes, likely related to his prior TBI, but also significant impact on his quality of life due to his mood disorder  Prior Medications: Levetiracetam (worsened mood and hallucinations)    His history was also obtained from his wife, who was also present via [facility]     Past Medical History:   Diagnosis Date    Chemical exposure     Diabetes mellitus ([facility] [address] )     H/O bone graft     Head injury     August 25, 2019 fell backwards hit head on a boulder w/ LOC    Head injury     as an air [place] with the [employer] - jumped out of a plane parachute disfunction    Hypertension     Seizures ([facility] [address] )        Past Surgical History:   Procedure Laterality Date    FL LUMBAR PUNCTURE DIAGNOSTIC  12/13/2019    KNEE ARTHROSCOPY W/ MENISCECTOMY Left     LEG SURGERY      TONSILLECTOMY         Current Outpatient Medications   Medication Sig Dispense Refill    amLODIPine (NORVASC) 10 mg tablet Take 10 mg by mouth daily      aspirin 81 mg chewable tablet Chew 1 tablet (81 mg total) daily 30 tablet 0    aspirin 81 mg chewable tablet Chew 81 mg daily      atorvastatin (LIPITOR) 20 mg tablet Take 20 mg by mouth every evening      diazepam (VALIUM) 2 mg tablet       divalproex sodium (DEPAKOTE) 250 mg EC tablet TAKE 1 TABLET BY MOUTH TWICE DAILY WITH  ONE  500  MG  TAB  FOR  TOAL  DOSE  OF  750  MG  TWICE  A  DAY 60 tablet 11    divalproex sodium (DEPAKOTE) 500 mg EC tablet Take 1 tab twice a day with one 250 mg tab for total dose of 750 mg twice a day   60 tablet 11    ergocalciferol (VITAMIN D2) 50,000 units Take 1 capsule (50,000 Units total) by mouth once a week for 8 doses 8 capsule 0    ibuprofen (MOTRIN) 800 mg tablet daily as needed      insulin NPH-insulin regular (NovoLIN 70/30) 100 units/mL subcutaneous injection Inject 10 Units under the skin daily before breakfast (Patient taking differently: Inject 12 Units under the skin daily before breakfast ) 3 mL 0    Jardiance 25 MG TABS Take 25 mg by mouth daily      lamoTRIgine (LaMICtal) 100 mg tablet Increase as instructed to goal dose of 100 mg twice a day 60 tablet 11    lisinopril (ZESTRIL) 40 mg tablet Take 1 tablet by mouth daily      metFORMIN (GLUCOPHAGE) 1000 MG tablet Take 1,000 mg by mouth 2 (two) times a day with meals      metoprolol tartrate (LOPRESSOR) 100 mg tablet TAKE 1 TABLET BY MOUTH EVERY 12 HOURS 180 tablet 0    Vitamin D, Cholecalciferol, 50 MCG (2000 UT) CAPS Take 1 capsule by mouth once daily 30 capsule 5    OLANZapine (ZyPREXA) 20 MG tablet Take 1 tablet (20 mg total) by mouth daily at bedtime 30 tablet 5    zaleplon (SONATA) 5 MG capsule Take 1 capsule (5 mg total) by mouth daily at bedtime 30 capsule 5     No current facility-administered medications for this visit  No Known Allergies    Review of Systems   Constitutional: Negative  Negative for appetite change and fever  HENT: Negative  Negative for hearing loss, tinnitus, trouble swallowing and voice change  Eyes: Negative  Negative for photophobia and pain  Respiratory: Negative  Negative for shortness of breath  Cardiovascular: Negative  Negative for palpitations  Gastrointestinal: Positive for nausea  Negative for vomiting  Endocrine: Negative  Negative for cold intolerance  Genitourinary: Negative  Negative for dysuria, frequency and urgency  Musculoskeletal: Negative  Negative for myalgias and neck pain  Skin: Negative  Negative for rash  Neurological: Positive for tremors (hands)  Negative for dizziness, seizures, syncope, facial asymmetry, speech difficulty, weakness, light-headedness, numbness and headaches  Hematological: Negative  Does not bruise/bleed easily  Psychiatric/Behavioral: Positive for hallucinations (auditory )  Negative for confusion and sleep disturbance  All other systems reviewed and are negative  Video Exam    There were no vitals filed for this visit  Physical Exam     I spent 15 minutes directly with the patient during this visit    [facility]  verbally agrees to participate in [employer]  Pt is aware that [employer] could be limited without vital signs or the ability to perform a full hands-on physical exam  Yan Delcua  understands he or the provider may request at any time to terminate the video visit and request the patient to seek care or treatment in person

## 2022-02-23 ENCOUNTER — TELEPHONE (OUTPATIENT)
Dept: PSYCHIATRY | Facility: CLINIC | Age: 52
End: 2022-02-23

## 2022-02-23 NOTE — ASSESSMENT & PLAN NOTE
He has not had any definite seizures since his last appointment  He is still taking Depakote and lamotrigine and tolerating them without side effects  He was not able to come into the epilepsy monitoring unit to having  We had previously wanted to do continuous video EEG in order to capture and characterize his episodes of arm /leg stiffening  At this point, it may be helpful to arrange for this admission, but he is currently without a psychiatrist having much more significant problems with his hallucinations and mood, and we should focus on stabilizing this at this point and can revisit his episodes in the future  --she will continue Depakote and lamotrigine unchanged

## 2022-02-23 NOTE — ASSESSMENT & PLAN NOTE
He continues to have very significant auditory hallucinations and mood changes since his traumatic head injury  He was doing better when taking olanzapine and zaleplon, which were prescribed by his psychiatrist previously  As noted above, he has unfortunately between psychiatrists now due to change of his insurance, and has been without his medications for few weeks        I discussed that I will refill his medications for now in order to provide him some benefit until he reestablishes with another psychologist/psychiatrist   I did reach out to our social work department to try to help arrange psychiatric follow-up

## 2022-02-24 NOTE — TELEPHONE ENCOUNTER
MSW spoke with Sheri Connolly from   [address] [facility] office            [phone]  Only accepting patients for med management $50 deposit needed once pt attends to appt then is reinbursed  Referral and ovn can be faxed to :  [phone]  $50 deposit  Only accepting medicare, not SHUKRI Aguilar [address] [place] [address]: [phone]  Do not accept Medicare if primary insurance

## 2022-02-25 NOTE — TELEPHONE ENCOUNTER
Psychiatry providers in network with Medicare    Cata Isabel MD  MULTIPLE GROUP AFFILIATIONS  [address] [facility], [address]  [phone]  Number not in service

## 2022-02-28 ENCOUNTER — TELEPHONE (OUTPATIENT)
Dept: PSYCHIATRY | Facility: CLINIC | Age: 52
End: 2022-02-28

## 2022-02-28 NOTE — TELEPHONE ENCOUNTER
Behavorial Health Outpatient Intake Questions    Referred by: Neurology    Please advised interviewee that they need to answer all questions truthfully to allow for best care and any misrepresentations of information may affect their ability to be seen at this clinic   => Was this discussed? Yes     Behavorial Health Outpatient Intake History -     Presenting Problem (in patient's words):   Patient states he had an accident over two years ago where he hurt his head. After that, pt has been hearing voices and sounds that haven't stopped. Are there any developmental disabilities? ? If yes, can they speak to you on the phone? If they are too limited to speak to you on phone, refer out No    Are you taking any psychiatric medications? Yes    => If yes, who prescribes? If yes, are they injectable medications? Does the patient have a language barrier or hearing impairment? No    Have you been treated at [facility] by a therapist or a doctor in the past? If yes, who? No    Has the patient been hospitalized for mental health? No   If yes, how long ago was last hospitalization and where was it? Do you actively use alcohol or marijuana or illegal substances? If yes, what and how much - refer out to Drug and alcohol treatment if use is excessive or daily use of illegal substances No concerns of substance abuse are reported. Do you have a community treatment team or ? No    Legal History-     Does the patient have any history of arrests, long-term/[facility] time, or DUIs? No  If Yes-  1) What types of charges? 2) When were they last incarcerated? 3) Are they currently on parole or probation? Minor Child-    Who has custody of the child? Is there a custody agreement? If there is a custody agreement remind parent that they must bring a copy to the first appt or they will not be seen.      Intake Team, please check with provider before scheduling if flags come up such as:  - complex case  - legal history (other than DUI)  - communication barrier concerns are present  - if, in your judgment, this needs further review    ACCEPTED as a patient Yes  => Appointment Date: 05/11/2022 at 1:00 p.m with Anisha Cordova    Referred Elsewhere? No    Name of Insurance Co: Medicare A and B; Darinel Mendoza ID# [id number]; [id number]  Insurance Phone #  If ins is primary or secondary  If patient is a minor, parents information such as Name, D. O.B of guarantor.

## 2022-03-01 NOTE — TELEPHONE ENCOUNTER
MSW phoned patient who reported that he has an appt for Psychiatry with Matt Zeng on  05/11/2022 at 1:00 p m with Jennifer Brown    MSW informed patient that   [facility] Accepting Medicare for Psychiatry  Patient will keep his appt with Matt Zeng  Still interested in a counselor in network with both insurances

## 2022-03-01 NOTE — TELEPHONE ENCOUNTER
MSW phoned [facility]   [phone] and spoke with   Ana Rojas  He confirmed that Medicare is primary insurance and [employer] is secondary  He provided list of [place] [address] clinics that accept [facility] listed below    Preventative measures   [phone]  -MSW phoned clinic and spoke with Isabel who informed that they are not in network with Medicare  Professional counseling [facility] human [facility] - [facility]  [phone]  -left  to intake department to learn if accepting medicare and [facility] services associates   [address], [place], [facility]   [phone]  Accepting Medicare for Psychiatry   Patient to call to schedule appointment and insurance info     Scheduling new patients in April   No counselors in network with Brittany Farooq

## 2022-03-02 NOTE — TELEPHONE ENCOUNTER
MSW phoned [facility] at  [phone] and lvm to the intake department to learn if accepting new clients and accepting Medicare A and B and [address] [place]   Please call back

## 2022-03-03 NOTE — TELEPHONE ENCOUNTER
Incoming call from [facility] they accept patient's insurances and will be calling him to schedule appointment for counseling

## 2022-03-08 NOTE — TELEPHONE ENCOUNTER
MSW phoned patient who reported that he has appt with [facility] for counseling  MSW remains available for any questions or future social needs

## 2022-03-21 DIAGNOSIS — I10 ESSENTIAL HYPERTENSION: ICD-10-CM

## 2022-03-21 RX ORDER — METOPROLOL TARTRATE 100 MG/1
TABLET ORAL
Qty: 180 TABLET | Refills: 0 | Status: SHIPPED | OUTPATIENT
Start: 2022-03-21

## 2022-04-14 NOTE — TELEPHONE ENCOUNTER
PT gave verbal consent to speak with wife. PT was calling for address of appointment, confirmed with pt appointment is 5/11.

## 2022-05-03 ENCOUNTER — HOSPITAL ENCOUNTER (EMERGENCY)
Facility: HOSPITAL | Age: 52
Discharge: HOME | End: 2022-05-03
Attending: EMERGENCY MEDICINE
Payer: MEDICARE

## 2022-05-03 VITALS
TEMPERATURE: 98.3 F | RESPIRATION RATE: 18 BRPM | DIASTOLIC BLOOD PRESSURE: 78 MMHG | HEART RATE: 77 BPM | OXYGEN SATURATION: 99 % | SYSTOLIC BLOOD PRESSURE: 130 MMHG

## 2022-05-03 DIAGNOSIS — R44.0 AUDITORY HALLUCINATIONS: Primary | ICD-10-CM

## 2022-05-03 LAB
AMPHETAMINES SERPL QL SCN: NEGATIVE
BARBITURATES UR QL: NEGATIVE
BENZODIAZ UR QL: POSITIVE
COCAINE UR QL: NEGATIVE
ETHANOL EXG-MCNC: 0 MG/DL
FLUAV RNA RESP QL NAA+PROBE: NEGATIVE
FLUBV RNA RESP QL NAA+PROBE: NEGATIVE
METHADONE UR QL: NEGATIVE
OPIATES UR QL SCN: NEGATIVE
OXYCODONE+OXYMORPHONE UR QL SCN: NEGATIVE
PCP UR QL: NEGATIVE
RSV RNA RESP QL NAA+PROBE: NEGATIVE
SARS-COV-2 RNA RESP QL NAA+PROBE: NEGATIVE
THC UR QL: NEGATIVE

## 2022-05-03 PROCEDURE — 0241U HB NFCT DS VIR RESP RNA 4 TRGT: CPT | Performed by: EMERGENCY MEDICINE

## 2022-05-03 PROCEDURE — 99284 EMERGENCY DEPT VISIT MOD MDM: CPT | Performed by: EMERGENCY MEDICINE

## 2022-05-03 PROCEDURE — 99285 EMERGENCY DEPT VISIT HI MDM: CPT

## 2022-05-03 PROCEDURE — 80307 DRUG TEST PRSMV CHEM ANLYZR: CPT | Performed by: EMERGENCY MEDICINE

## 2022-05-03 PROCEDURE — 82075 ASSAY OF BREATH ETHANOL: CPT | Performed by: EMERGENCY MEDICINE

## 2022-05-03 NOTE — ED NOTES
Spoke with patient via the phone (wife at bedside) and completed the crisis intake assessment as well as the safety risk assessment  Patient arrived to the ER via private vehicle  " I was hoping to see a psychiatrist and get put on some medications to help with these voices " Patient reports falling 3 years ago, and due the fall ended up with a TBI  Since the TBI patient has experienced auditory hallucinations  Patient states the auditory hallucinations do not go away and have actually worsened in the last week  Patient states the hallucinations are command in nature, telling him not to go outside, that no one likes him, and that he is no good  Patient at current however denies SI/HI as well as visual hallucinations  Patient does however report some disturbances with sleep and appetite as well as lack of concentration and motivation  Patient is not seeking inpatient treatment and is hopeful for discharge home with outpatient services  Case was reviewed with ER MD who is in agreement with discharge home with outpatient services  Patient was given a list of local outpatient resources and was also educated if symptoms continue or worsen to call 911 or go to the nearest ER and is in agreement to do as such

## 2022-05-03 NOTE — ED PROVIDER NOTES
Pt Name: [facility]  MRN: [id number]  Birthdate 1970  Age/Sex: 45 y o  male  Date of evaluation: 5/3/2022  PCP: [facility]    Chief Complaint   Patient presents with    Hallucinations     Patient states " I suffered from a TBI 3 years ago and for the past week I have been having auditory halluciantions " Denies SI/HI  HPI    45 y o  male presenting with auditory hallucinations  Patient states he suffered a significant head injury 3 years ago, was diagnosed with a traumatic brain injury, has had auditory hallucinations of voices since then  He states that over the past week he has had worsening auditory hallucinations, states they become more intense and more bothersome  He hears 2 distinct voices, 1 which is merely comments the other of which tends to tell him what to do, typically telling him that other people are bad and that he should not trust them  He has no intention of hurting anyone else, denies any suicidal ideation or plan or intent, denies any visual hallucinations  He states that the more intense hallucinations have been disturbing his sleep as well  He is under the care of a psychiatrist outpatient, recently had his medications changed without improvement  His next visit is scheduled for June of this year  He denies fever, nausea, vomiting, diarrhea, chest pain, shortness of breath, numbness, weakness, other symptoms      HPI      Past Medical and Surgical History    Past Medical History:   Diagnosis Date    Chemical exposure     Diabetes mellitus ([address] )     H/O bone graft     Head injury     August 25, 2019 fell backwards hit head on a boulder w/ LOC    Head injury     as an air Born [place] with the [employer] - jumped out of a plane parachute disfunction    Hypertension     Seizures ([address] )        Past Surgical History:   Procedure Laterality Date    FL LUMBAR PUNCTURE DIAGNOSTIC  12/13/2019    KNEE ARTHROSCOPY W/ MENISCECTOMY Left     LEG SURGERY      TONSILLECTOMY         Family History   Problem Relation Age of Onset    Diabetes Mother     Hypertension Mother     Asthma Mother     Brain cancer Father     No Known Problems Sister     No Known Problems Brother     No Known Problems Brother        Social History     Tobacco Use    Smoking status: Never Smoker    Smokeless tobacco: Never Used   Vaping Use    Vaping Use: Never used   Substance Use Topics    Alcohol use: Not Currently     Alcohol/week: 3 0 standard drinks     Types: 3 Cans of beer per week    Drug use: Never           Allergies    No Known Allergies    Home Medications    Prior to Admission medications    Medication Sig Start Date End Date Taking? Authorizing Provider   amLODIPine (NORVASC) 10 mg tablet Take 10 mg by mouth daily 4/4/21   Historical Provider, MD   aspirin 81 mg chewable tablet Chew 1 tablet (81 mg total) daily 1/11/21 2/22/22  ROCKY Thomas   aspirin 81 mg chewable tablet Chew 81 mg daily 4/22/21   Historical Provider, MD   atorvastatin (LIPITOR) 20 mg tablet Take 20 mg by mouth every evening 5/15/20   Historical Provider, MD   diazepam (VALIUM) 2 mg tablet  10/12/21   Historical Provider, MD   divalproex sodium (DEPAKOTE) 250 mg EC tablet TAKE 1 TABLET BY MOUTH TWICE DAILY WITH  ONE  500  MG  TAB  FOR  TOAL  DOSE  OF  750  MG  TWICE  A  DAY 10/18/21   Debra Billy MD   divalproex sodium (DEPAKOTE) 500 mg EC tablet Take 1 tab twice a day with one 250 mg tab for total dose of 750 mg twice a day   10/21/21   Debra Billy MD   ergocalciferol (VITAMIN D2) 50,000 units Take 1 capsule (50,000 Units total) by mouth once a week for 8 doses 6/21/20 2/22/22  Debra Billy MD   ibuprofen (MOTRIN) 800 mg tablet daily as needed 3/29/21   Historical Provider, MD   insulin NPH-insulin regular (NovoLIN 70/30) 100 units/mL subcutaneous injection Inject 10 Units under the skin daily before breakfast  Patient taking differently: Inject 12 Units under the skin daily before breakfast  12/14/19   Arnie Morgan MD   Jardiance 25 MG TABS Take 25 mg by mouth daily 4/25/21   Historical Provider, MD   lamoTRIgine (LaMICtal) 100 mg tablet Increase as instructed to goal dose of 100 mg twice a day 10/21/21   Guicho Sosa MD   lisinopril (ZESTRIL) 40 mg tablet Take 1 tablet by mouth daily 3/29/21   Historical Provider, MD   metFORMIN (GLUCOPHAGE) 1000 MG tablet Take 1,000 mg by mouth 2 (two) times a day with meals 3/11/21   Historical Provider, MD   metoprolol tartrate (LOPRESSOR) 100 mg tablet TAKE 1 TABLET BY MOUTH EVERY 12 HOURS 3/21/22   Kathy Cotto MD   OLANZapine (ZyPREXA) 20 MG tablet Take 1 tablet (20 mg total) by mouth daily at bedtime 2/22/22   Guicho Sosa MD   Vitamin D, Cholecalciferol, 50 MCG (2000 UT) CAPS Take 1 capsule by mouth once daily 8/3/21   Anca Portillo MD   zaleplon (SONATA) 5 MG capsule Take 1 capsule (5 mg total) by mouth daily at bedtime 2/22/22   Guicho Sosa MD           Review of Systems    Review of Systems   Constitutional: Negative for appetite change, chills and diaphoresis  HENT: Negative for drooling, facial swelling, trouble swallowing and voice change  Respiratory: Negative for apnea, shortness of breath and wheezing  Cardiovascular: Negative for chest pain and leg swelling  Gastrointestinal: Negative for abdominal distention, abdominal pain, diarrhea, nausea and vomiting  Genitourinary: Negative for dysuria and urgency  Musculoskeletal: Negative for arthralgias, back pain, gait problem and neck pain  Skin: Negative for color change, rash and wound  Neurological: Negative for seizures, speech difficulty, weakness and headaches  Psychiatric/Behavioral: Positive for hallucinations  Negative for agitation, behavioral problems and dysphoric mood  The patient is not nervous/anxious  All other systems reviewed and negative      Physical Exam      ED Triage Vitals   Temperature Pulse Respirations Blood Pressure SpO2 05/03/22 1527 05/03/22 1526 05/03/22 1526 05/03/22 1526 05/03/22 1526   98 3 °F (36 8 °C) 77 20 132/95 94 %      Temp Source Heart Rate Source Patient Position - Orthostatic VS BP Location FiO2 (%)   05/03/22 1526 05/03/22 1526 05/03/22 1526 05/03/22 1526 --   Oral Monitor Sitting Left arm       Pain Score       05/03/22 1736       No Pain               Physical Exam  Vitals and nursing note reviewed  Constitutional:       General: He is not in acute distress  Appearance: He is well-developed  He is not ill-appearing or toxic-appearing  HENT:      Head: Normocephalic and atraumatic  Right Ear: External ear normal       Left Ear: External ear normal    Eyes:      Conjunctiva/sclera: Conjunctivae normal       Pupils: Pupils are equal, round, and reactive to light  Neck:      Trachea: No tracheal deviation  Cardiovascular:      Rate and Rhythm: Normal rate and regular rhythm  Heart sounds: Normal heart sounds  No murmur heard  Pulmonary:      Effort: Pulmonary effort is normal  No respiratory distress  Breath sounds: Normal breath sounds  No stridor  No wheezing or rales  Abdominal:      General: There is no distension  Palpations: Abdomen is soft  Tenderness: There is no abdominal tenderness  There is no guarding or rebound  Musculoskeletal:         General: No deformity  Normal range of motion  Cervical back: Normal range of motion and neck supple  Skin:     General: Skin is warm and dry  Findings: No rash  Neurological:      Mental Status: He is alert and oriented to person, place, and time  Cranial Nerves: No cranial nerve deficit  Motor: No weakness        Coordination: Coordination normal       Gait: Gait normal    Psychiatric:         Behavior: Behavior normal          Judgment: Judgment normal       Comments: Patient appropriately dressed and well groomed, attentive to the conversation, displays linear thinking, good judgment, behaving normally  Makes good eye contact  Mood seems appropriate and is concordant with stated symptoms  Thought content remarkable for hallucinations as above  Diagnostic Results      Labs:    Results Reviewed     Procedure Component Value Units Date/Time    COVID/FLU/RSV - 2 hour TAT [[id number]]  (Normal) Collected: 05/03/22 1641    Lab Status: Final result Specimen: Nares from Nose Updated: 05/03/22 2020     SARS-CoV-2 Negative     INFLUENZA A PCR Negative     INFLUENZA B PCR Negative     RSV PCR Negative    Narrative:      FOR PEDIATRIC PATIENTS - copy/paste COVID Guidelines URL to browser: [email or web address]  [email or web address]    SARS-CoV-2 assay is a Nucleic Acid Amplification assay intended for the  qualitative detection of nucleic acid from SARS-CoV-2 in nasopharyngeal  swabs  Results are for the presumptive identification of SARS-CoV-2 RNA  Positive results are indicative of infection with SARS-CoV-2, the virus  causing COVID-19, but do not rule out bacterial infection or co-infection  with other viruses  Laboratories within the [place] and its  territories are required to report all positive results to the appropriate  public health authorities  Negative results do not preclude SARS-CoV-2  infection and should not be used as the sole basis for treatment or other  patient management decisions  Negative results must be combined with  clinical observations, patient history, and epidemiological information  This test has not been FDA cleared or approved  This test has been authorized by FDA under an Emergency Use Authorization  (EUA)  This test is only authorized for the duration of time the  declaration that circumstances exist justifying the authorization of the  emergency use of an in vitro diagnostic tests for detection of SARS-CoV-2  virus and/or diagnosis of COVID-19 infection under section 564(b)(1) of  the Act, 21 U  S C  873WGI-8(S)(3), unless the authorization is terminated  or revoked sooner  The test has been validated but independent review by FDA  and CLIA is pending  Test performed using [employer] GeneXpert: This RT-PCR assay targets N2,  a region unique to SARS-CoV-2  A conserved region in the E-gene was chosen  for pan-Sarbecovirus detection which includes SARS-CoV-2  Rapid drug screen, urine [[id number]]  (Abnormal) Collected: 05/03/22 1641    Lab Status: Final result Specimen: Urine, Clean Catch Updated: 05/03/22 1658     Amph/Meth UR Negative     Barbiturate Ur Negative     Benzodiazepine Urine Positive     Cocaine Urine Negative     Methadone Urine Negative     Opiate Urine Negative     PCP Ur Negative     THC Urine Negative     Oxycodone Urine Negative    Narrative:      Presumptive report  If requested, specimen will be sent to reference lab for confirmation  FOR MEDICAL PURPOSES ONLY  IF CONFIRMATION NEEDED PLEASE CONTACT THE LAB WITHIN 5 DAYS  Drug Screen Cutoff Levels:  AMPHETAMINE/METHAMPHETAMINES  1000 ng/mL  BARBITURATES     200 ng/mL  BENZODIAZEPINES     200 ng/mL  COCAINE      300 ng/mL  METHADONE      300 ng/mL  OPIATES      300 ng/mL  PHENCYCLIDINE     25 ng/mL  THC       50 ng/mL  OXYCODONE      100 ng/mL    POCT alcohol breath test [[id number]]  (Normal) Resulted: 05/03/22 1641    Lab Status: Final result Updated: 05/03/22 1641     EXTBreath Alcohol 0 00          All labs reviewed and utilized in the medical decision making process    Radiology:    No orders to display       All radiology studies independently viewed by me and interpreted by the radiologist     Procedure    Procedures        ED Course of Care and Re-Assessments      After brief medical clearance, discussed case with crisis on-call, patient interviewed by phone as no was available in person, plan formed for is still close outpatient follow-up with either intensive outpatient or partial residential program, which seems reasonable at this time    Patient interested in trying this approach prior to inpatient and does not clearly meet inpatient criteria at this time, certainly does not meet criteria for involuntary commitment under 302  Caretaker also comfortable with this plan  Medications - No data to display        FINAL IMPRESSION    Final diagnoses: Auditory hallucinations         DISPOSITION/PLAN    Presentation as above with worsening auditory hallucinations  Vital signs reassuring, examination likewise reassuring with no abnormalities other than reported hallucinations  Patient otherwise functioning well  Very low suspicion for intoxication, sepsis, meningitis, encephalitis, other medical cause of current symptoms  Recent titration of meds possibly contributory  After consultation with crisis, discharged strict return precautions, follow up with outpatient behavioral health resources, encouraged to return for any worsening or concerning symptoms  Time reflects when diagnosis was documented in both MDM as applicable and the Disposition within this note     Time User Action Codes Description Comment    5/3/2022  6:34 PM Katya Harmon Add [R44 0] Auditory hallucinations       ED Disposition     ED Disposition Condition Date/Time Comment    Discharge Stable Tue May 3, 2022  6:34 PM [facility]  discharge to home/self care              MD Documentation      Most Recent Value   Sending MD Sonja Ramirez MD      Follow-up Information     Follow up With Specialties Details Why Contact Info Additional [address] [facility] Emergency Department Emergency Medicine Go to  If symptoms worsen [facility] Emergency Department, [address] [place], [place], [address] [facility] Internal Medicine Call  As needed [address] [place] [address]  [address] [phone]       Your psychiatrist  Call in 1 day To discuss this visit schedule close outpatient follow-up      Outpatient psychiatric resources  Call in 1 day To discuss this visit and schedule close outpatient follow-up, including the possibility of intensive outpatient or partial hospitalization  See attached packet             PATIENT REFERRED TO:    [facility] Emergency Department  [address] [address] [phone]  Go to   If symptoms worsen    [address]  [phone]    Call   As needed    Your psychiatrist    Call in 1 day  To discuss this visit schedule close outpatient follow-up    Outpatient psychiatric resources  See attached packet  Call in 1 day  To discuss this visit and schedule close outpatient follow-up, including the possibility of intensive outpatient or partial hospitalization        DISCHARGE MEDICATIONS:    Discharge Medication List as of 5/3/2022  6:35 PM      CONTINUE these medications which have NOT CHANGED    Details   amLODIPine (NORVASC) 10 mg tablet Take 10 mg by mouth daily, Starting Sun 4/4/2021, Historical Med      aspirin 81 mg chewable tablet Chew 81 mg daily, Starting Thu 4/22/2021, Historical Med      atorvastatin (LIPITOR) 20 mg tablet Take 20 mg by mouth every evening, Starting Fri 5/15/2020, Historical Med      diazepam (VALIUM) 2 mg tablet Starting Tue 10/12/2021, Historical Med      !! divalproex sodium (DEPAKOTE) 250 mg EC tablet TAKE 1 TABLET BY MOUTH TWICE DAILY WITH  ONE  500  MG  TAB  FOR  TOAL  DOSE  OF  750  MG  TWICE  A  DAY, Normal      !! divalproex sodium (DEPAKOTE) 500 mg EC tablet Take 1 tab twice a day with one 250 mg tab for total dose of 750 mg twice a day , Normal      ergocalciferol (VITAMIN D2) 50,000 units Take 1 capsule (50,000 Units total) by mouth once a week for 8 doses, Starting Sun 6/21/2020, Until Tue 2/22/2022, Normal      ibuprofen (MOTRIN) 800 mg tablet daily as needed, Starting Mon 3/29/2021, Historical Med      insulin NPH-insulin regular (NovoLIN 70/30) 100 units/mL subcutaneous injection Inject 10 Units under the skin daily before breakfast, Starting Sat 12/14/2019, Normal      Jardiance 25 MG TABS Take 25 mg by mouth daily, Starting Sun 4/25/2021, Historical Med      lamoTRIgine (LaMICtal) 100 mg tablet Increase as instructed to goal dose of 100 mg twice a day, Normal      lisinopril (ZESTRIL) 40 mg tablet Take 1 tablet by mouth daily, Starting Mon 3/29/2021, Historical Med      metFORMIN (GLUCOPHAGE) 1000 MG tablet Take 1,000 mg by mouth 2 (two) times a day with meals, Starting Thu 3/11/2021, Historical Med      metoprolol tartrate (LOPRESSOR) 100 mg tablet TAKE 1 TABLET BY MOUTH EVERY 12 HOURS, Normal      OLANZapine (ZyPREXA) 20 MG tablet Take 1 tablet (20 mg total) by mouth daily at bedtime, Starting Tue 2/22/2022, Normal      Vitamin D, Cholecalciferol, 50 MCG (2000 UT) CAPS Take 1 capsule by mouth once daily, Normal      zaleplon (SONATA) 5 MG capsule Take 1 capsule (5 mg total) by mouth daily at bedtime, Starting Tue 2/22/2022, Normal       !! - Potential duplicate medications found  Please discuss with provider  No discharge procedures on file  Tong Almanzar MD    Portions of the record may have been created with voice recognition software  Occasional wrong word or "sound alike" substitutions may have occurred due to the inherent limitations of voice recognition software    Please read the chart carefully and recognize, using context, where substitutions have occurred     Tong Almanzar MD  05/03/22 1592

## 2022-05-03 NOTE — ED NOTES
Crisis worker (Roger Masterson) conducting phone consult with pt  pts spouse bedside     Malou Banegas RN  05/03/22 4847

## 2022-05-03 NOTE — ED NOTES
This writer discussed the patients current presentation and recommended discharge plan with Dr Johnna Mckeon  They agree with the patient being discharged at this time with referrals and/or information about outpatient mental health services in BE[facility]IORAL [facility]IC[facility] AT [facility]  The patient was Instructed to follow up with their neurologist and PCP who are currently providing medication management  The patient was provided with referral information for: outpatient mental health services in [facility]IC[facility] AT [facility]    This writer and the patient completed a safety plan  The patient was provided with a copy of their safety plan with encouragement to utilize the plan following discharge  In addition, the patient was instructed to call [facility] crisis, other crisis services, 911 or to go to the nearest ER immediately if their situation changes at any time  Discussion was held with family, patient wife Jarrett Vizcaino regarding the process of completing a 36 petition in their county if necessary  This writer discussed discharge plans with the patient and family, who agrees with and understands the discharge plans           SAFETY PLAN  Warning Signs (thoughts, images, mood, behavior, situations) of a potential crisis: increased depression and isolation      Coping Skills (what can I do to take my mind off the problem, or to keep myself safe): talk with my wife or go for a walk      Outside Support (who can I reach out to for support and help) my wife Leena Wales Suicide Prevention Hotline:  7-603.478.9014    [facility] [address] [phone] - [facility] [address]: [facility]: [address] [facility] [phone] - Crisis   [phone] - Peer Support Talk Line (1-9pm daily)  [address] (1-9pm daily)  [phone] [facility] [address] [place] ([address] [place] [address]) [phone] - [facility]

## 2022-06-06 ENCOUNTER — TELEPHONE (OUTPATIENT)
Dept: NEUROLOGY | Facility: CLINIC | Age: 52
End: 2022-06-06

## 2022-06-08 ENCOUNTER — TELEMEDICINE (OUTPATIENT)
Dept: PSYCHIATRY | Facility: CLINIC | Age: 52
End: 2022-06-08
Payer: MEDICARE

## 2022-06-08 ENCOUNTER — TELEPHONE (OUTPATIENT)
Dept: PSYCHIATRY | Facility: CLINIC | Age: 52
End: 2022-06-08

## 2022-06-08 DIAGNOSIS — F32.A ANXIETY AND DEPRESSION: ICD-10-CM

## 2022-06-08 DIAGNOSIS — F41.9 ANXIETY AND DEPRESSION: ICD-10-CM

## 2022-06-08 DIAGNOSIS — F29 PSYCHOSIS, UNSPECIFIED PSYCHOSIS TYPE (HCC): Primary | ICD-10-CM

## 2022-06-08 DIAGNOSIS — S06.9X9D TRAUMATIC BRAIN INJURY WITH LOSS OF CONSCIOUSNESS, SUBSEQUENT ENCOUNTER: Chronic | ICD-10-CM

## 2022-06-08 PROBLEM — M54.12 CERVICAL RADICULOPATHY: Status: ACTIVE | Noted: 2020-10-20

## 2022-06-08 PROBLEM — E66.812 CLASS 2 SEVERE OBESITY DUE TO EXCESS CALORIES WITH SERIOUS COMORBIDITY IN ADULT (HCC): Status: ACTIVE | Noted: 2018-11-14

## 2022-06-08 PROBLEM — S06.5XAA SDH (SUBDURAL HEMATOMA): Status: ACTIVE | Noted: 2019-08-26

## 2022-06-08 PROBLEM — E66.01 CLASS 2 SEVERE OBESITY DUE TO EXCESS CALORIES WITH SERIOUS COMORBIDITY IN ADULT (HCC): Status: ACTIVE | Noted: 2018-11-14

## 2022-06-08 PROBLEM — S06.5X9A SDH (SUBDURAL HEMATOMA) (HCC): Status: ACTIVE | Noted: 2019-08-26

## 2022-06-08 PROCEDURE — 90792 PSYCH DIAG EVAL W/MED SRVCS: CPT

## 2022-06-08 RX ORDER — SERTRALINE HYDROCHLORIDE 25 MG/1
25 TABLET, FILM COATED ORAL DAILY
Qty: 60 TABLET | Refills: 0 | Status: SHIPPED | OUTPATIENT
Start: 2022-06-08 | End: 2022-07-26 | Stop reason: SDUPTHER

## 2022-06-08 RX ORDER — DIAZEPAM 2 MG/1
2 TABLET ORAL DAILY PRN
Qty: 30 TABLET | Refills: 0 | Status: SHIPPED | OUTPATIENT
Start: 2022-06-08

## 2022-06-08 NOTE — BH TREATMENT PLAN
TREATMENT PLAN (Medication Management Only)        [facility]    Name and Date of Birth:  Aj Conception  45 y o  1970  Date of Treatment Plan: June 8, 2022  Diagnosis/Diagnoses:    1  Psychosis, unspecified psychosis type ([facility] [address] )    2  Traumatic brain injury with loss of consciousness, subsequent encounter      Strengths/Personal Resources for Self-Care: supportive family  Area/Areas of need (in own words): hallucinations  1  Long Term Goal: continue improvement in psychotic symptoms  Target Date:6 months - 12/8/2022  Person/Persons responsible for completion of goal: Yan  2  Short Term Objective (s) - How will we reach this goal?:   A  Provider new recommended medication/dosage changes and/or continue medication(s): initiate Zoloft 50mg  B  Attend medication management appointments regularly  C  Take psychiatric medications responsibly  Target Date:6 months - 12/8/2022  Person/Persons Responsible for Completion of Goal: Yan and mitesh conley  Progress Towards Goals: starting treatment  Treatment Modality: medication management every 4 weeks  Review due 180 days from date of this plan: 6 months - 12/8/2022  Expected length of service: maintenance  My Physician/PA/NP and I have developed this plan together and I agree to work on the goals and objectives  I understand the treatment goals that were developed for my treatment
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
## 2022-06-08 NOTE — PSYCH
[facility]    Name and Date of Birth:  Mahsa Garcia  45 y o  1970    Date of Referral: June 8, 2022    Presenting Symptoms and Stressors:      Symptoms:  depression, anxiety, psychotic symptoms and auditory hallucinations  Stressors:  TBI 3 yrs ago causing auditory hallucinations    Access to Weapons:  No    Smoking Status: denies use    Substance Use:  None    Suicidal Ideation: None    Homicidal Ideation: None    Depressed Mood: Yes, depressed mood, anhedonia, sadness, low energy, low motivation, decreased interest    Jodi/Hypomania: None    Psychosis: auditory hallucinations    Agitation: periods of anxiety/agitation when experiencing AH, treated wiht prn valium 2mg    Appetite Changes: normal appetite    Sleep Disturbance: decreased sleep, difficulty sleeping, fluctuating sleep pattern, difficulty falling asleep, disrupted sleep, interrupted sleep    Diagnoses:  1   TBI induced psychosis    Current Psychiatrist or Therapist:    Psychiatrist: Nurse Practitioner with [address] [address] in [facility]  Therapist: None    Do they Require Ambulatory Assistance: No    Communication Assistance: declined     Legal Issues: No current legal problems        ROCKY Rowe

## 2022-06-09 ENCOUNTER — OFFICE VISIT (OUTPATIENT)
Dept: NEUROLOGY | Facility: CLINIC | Age: 52
End: 2022-06-09
Payer: MEDICARE

## 2022-06-09 VITALS
WEIGHT: 279 LBS | HEART RATE: 74 BPM | SYSTOLIC BLOOD PRESSURE: 126 MMHG | DIASTOLIC BLOOD PRESSURE: 88 MMHG | HEIGHT: 70 IN | BODY MASS INDEX: 39.94 KG/M2

## 2022-06-09 DIAGNOSIS — G40.109 LOCALIZATION-RELATED EPILEPSY (HCC): ICD-10-CM

## 2022-06-09 DIAGNOSIS — F29 PSYCHOSIS, UNSPECIFIED PSYCHOSIS TYPE (HCC): ICD-10-CM

## 2022-06-09 DIAGNOSIS — Z79.899 ENCOUNTER FOR LONG-TERM (CURRENT) USE OF HIGH-RISK MEDICATION: ICD-10-CM

## 2022-06-09 DIAGNOSIS — S06.9X9D TRAUMATIC BRAIN INJURY WITH LOSS OF CONSCIOUSNESS, SUBSEQUENT ENCOUNTER: Primary | ICD-10-CM

## 2022-06-09 DIAGNOSIS — G40.109 LOCALIZATION-RELATED (FOCAL) (PARTIAL) SYMPTOMATIC EPILEPSY AND EPILEPTIC SYNDROMES WITH SIMPLE PARTIAL SEIZURES, NOT INTRACTABLE, WITHOUT STATUS EPILEPTICUS (HCC): ICD-10-CM

## 2022-06-09 PROCEDURE — 99214 OFFICE O/P EST MOD 30 MIN: CPT | Performed by: PSYCHIATRY & NEUROLOGY

## 2022-06-09 RX ORDER — LAMOTRIGINE 100 MG/1
TABLET ORAL
Qty: 180 TABLET | Refills: 3 | Status: SHIPPED | OUTPATIENT
Start: 2022-06-09
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
## 2022-06-09 NOTE — PATIENT INSTRUCTIONS
-- Continue to take your medications unchanged for today  -- Please get some blood work before your next appointment  I did order some additional tests today in addition to the ones ordered by your other doctors  -- I did order physical therapy to help build up your strength and balance
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
## 2022-06-20 NOTE — ASSESSMENT & PLAN NOTE
He has not had any additional episodes concerning for seizure and has been doing well in this regard on combination of divalproex and lamotrigine  The previous episodes of leg shaking that he was experiencing have also no longer occurred  We had discussed possibly having him come into the epilepsy monitoring unit to capture and characterize these events, but since they are no longer occurring, we don't need to pursue admission at this point  --he will continue divalproex and lamotrigine unchanged  If he would have additional seizures, we likely could increase his dose of lamotrigine

## 2022-08-01 ENCOUNTER — OFFICE VISIT (OUTPATIENT)
Dept: PSYCHIATRY | Facility: CLINIC | Age: 52
End: 2022-08-01
Payer: MEDICARE

## 2022-08-01 DIAGNOSIS — S06.9X9D TRAUMATIC BRAIN INJURY WITH LOSS OF CONSCIOUSNESS, SUBSEQUENT ENCOUNTER: ICD-10-CM

## 2022-08-01 DIAGNOSIS — F29 PSYCHOSIS, UNSPECIFIED PSYCHOSIS TYPE (HCC): Primary | ICD-10-CM

## 2022-08-01 PROCEDURE — 99214 OFFICE O/P EST MOD 30 MIN: CPT

## 2022-08-01 RX ORDER — ZALEPLON 5 MG/1
5 CAPSULE ORAL
Qty: 30 CAPSULE | Refills: 5 | Status: SHIPPED | OUTPATIENT
Start: 2022-08-01

## 2022-08-01 NOTE — PSYCH
Regular Visit    Problem List Items Addressed This Visit        Nervous and Auditory    TBI (traumatic brain injury) ([facility] [address] ) (Chronic)       Other    Psychotic disorder ([facility] [address] ) - Primary             Encounter provider ROCKY Hurst    Provider located at    [address] [place] [address] [phone]    Recent Visits  No visits were found meeting these conditions  Showing recent visits within past 7 days and meeting all other requirements  Today's Visits  Date Type Provider Dept   08/01/22 Office Visit ROCKY Hurst  [facility]   Showing today's visits and meeting all other requirements  Future Appointments  No visits were found meeting these conditions  Showing future appointments within next 150 days and meeting all other requirements       HPI     Current Outpatient Medications   Medication Sig Dispense Refill    amLODIPine (NORVASC) 10 mg tablet Take 10 mg by mouth daily      aspirin 81 mg chewable tablet Chew 1 tablet (81 mg total) daily 30 tablet 0    aspirin 81 mg chewable tablet Chew 81 mg daily      atorvastatin (LIPITOR) 20 mg tablet Take 20 mg by mouth every evening      diazepam (VALIUM) 2 mg tablet Take 1 tablet (2 mg total) by mouth daily as needed for anxiety (severe anxiety only) 30 tablet 0    divalproex sodium (DEPAKOTE) 250 mg EC tablet TAKE 1 TABLET BY MOUTH TWICE DAILY WITH  ONE  500  MG  TAB  FOR  TOAL  DOSE  OF  750  MG  TWICE  A  DAY 60 tablet 11    divalproex sodium (DEPAKOTE) 500 mg EC tablet Take 1 tab twice a day with one 250 mg tab for total dose of 750 mg twice a day   60 tablet 11    ergocalciferol (VITAMIN D2) 50,000 units Take 1 capsule (50,000 Units total) by mouth once a week for 8 doses (Patient not taking: Reported on 6/9/2022) 8 capsule 0    ibuprofen (MOTRIN) 800 mg tablet daily as needed      insulin NPH-insulin regular (NovoLIN 70/30) 100 units/mL subcutaneous injection Inject 10 Units under the skin daily before breakfast (Patient taking differently: Inject 12 Units under the skin daily before breakfast) 3 mL 0    Jardiance 25 MG TABS Take 25 mg by mouth daily (Patient not taking: Reported on 6/9/2022)      lamoTRIgine (LaMICtal) 100 mg tablet Take 1 tab (100 mg) twice a day 180 tablet 3    lisinopril (ZESTRIL) 40 mg tablet Take 1 tablet by mouth daily (Patient not taking: Reported on 6/9/2022)      metFORMIN (GLUCOPHAGE) 1000 MG tablet Take 1,000 mg by mouth 2 (two) times a day with meals (Patient not taking: Reported on 6/9/2022)      metoprolol tartrate (LOPRESSOR) 100 mg tablet TAKE 1 TABLET BY MOUTH EVERY 12 HOURS 180 tablet 0    OLANZapine (ZyPREXA) 20 MG tablet Take 1 tablet (20 mg total) by mouth daily at bedtime 30 tablet 5    sertraline (Zoloft) 25 mg tablet Take 1 tablet (25 mg total) by mouth daily 60 tablet 0    Vitamin D, Cholecalciferol, 50 MCG (2000 UT) CAPS Take 1 capsule by mouth once daily 30 capsule 5    zaleplon (SONATA) 5 MG capsule Take 1 capsule (5 mg total) by mouth daily at bedtime 30 capsule 5     No current facility-administered medications for this visit  Review of Systems        MEDICATION MANAGEMENT NOTE        [facility]    Name and Date of Birth:  Ivette Galvez  45 y o  1970 MRN: [id number]    Date of Visit: August 1, 2022    No Known Allergies  SUBJECTIVE:    Tanya Kd is seen today for a follow up for psychosis and TBI  He continues to experience ongoing symptoms since the last visit  See previous note on 06/08/2022 for more detailed assessment  Patient had two follow-up appointments scheduled previously but had difficulty making it to the appointments and difficulty with virtual visits    Patient was referred to the partial program during initial visit however got confused regarding the ability to attend partial program and see this provider at the same time  After explaining it to patient during today's encounter, provider reached out to director of the partial program  to hopefully get patient initiated in the partial hospitalization program      Patient reports no changes in his chronic hallucinations caused by the TBI 3 years ago, auditory hallucinations persist and continue to be negative in nature such as "you are no good", "nobody will ever help you"  Admits to the voices occasionally telling him to hurt himself but patient has no intent to act on these voices and denies all evidence of suicidal or homicidal ideation during today's encounter  Agrees to go to the ER if the voices worsen or cause patient to feel suicidal   Voices continue to occur on a daily basis, reports some days they are infrequent and minimal and other days they are consistent and more noticeable  Maintained on Zyprexa 20 mg daily for about a year now by his PCP with little relief in symptoms  EKG pending to potentially consider an additional antipsychotic medication  Patient agreed to plan and was handed the EKG printout today  Depression and anxiety symptoms persist related to the continued hallucinations, increase Zoloft to 50 mg daily today, tolerating well without side effects  Maintaining sleep remains difficult due to AH  Has remained seizure free since last visit and is being maintained on Depakote 750 milligrams b i d  and Lamictal 100 milligrams b i d  for seizure disorder, neurologist prescribing and maintaining these medications  Pt was given the phone number to the brain injury association of [facility] for further resources and support regarding his condition  We also discussed initiating with a TBI specialist  He is appreciative of the help provided and is looking forward to participating in partial  Continues to lives with his supportive wife and 2 adult children, has a strong relationship with his family  Reports his wife is his main support system   Denies SI and HI      He reports hair loss from Depakote which is Neurologist manages and prescribes    PLAN:    -Referred to partial program  -Continue Zyprexa 20 mg daily for auditory hallucinations caused by TBI 3 years ago  Complete pending EKG and labs before considering an additional antipsychotic such as Haldol  -Continue Zaleplon 5mg at hs for insomnia  -Increase  Zoloft to 50mg daily for persistent depression and anxiety symptoms related to chronic hallucinations since head trauma occurred      - continue p r n  Valium 2 milligrams daily p r n  for severe anxiety/agitation which occasionally occurs due to patient's auditory hallucinations  Using infrequently according to [facility] and 30 day supply of medication given   Discussed with patient the risks of sedation, respiratory depression, impairment of ability to drive and potential for abuse and addiction related to treatment with benzodiazepine medications   The patient understands risk of treatment with benzodiazepine medications, agrees to not drive if feels impaired and agrees to take medications as prescribed       - neurologist prescribing Depakote 750 mg b i d  and Lamictal 100 mg BID for the management of epilepsy disorder, has remained free from seizures 3 years,                Aware of 24 hour and weekend coverage for urgent situations accessed by calling [facility] main practice number  Referral for individual psychotherapy  Referral to Partial Hospitalization Program    Diagnoses and all orders for this visit:    Psychosis, unspecified psychosis type ([facility] [address] )    Traumatic brain injury with loss of consciousness, subsequent encounter        Current Outpatient Medications on File Prior to Visit   Medication Sig Dispense Refill    amLODIPine (NORVASC) 10 mg tablet Take 10 mg by mouth daily      aspirin 81 mg chewable tablet Chew 1 tablet (81 mg total) daily 30 tablet 0    aspirin 81 mg chewable tablet Chew 81 mg daily      atorvastatin (LIPITOR) 20 mg tablet Take 20 mg by mouth every evening      diazepam (VALIUM) 2 mg tablet Take 1 tablet (2 mg total) by mouth daily as needed for anxiety (severe anxiety only) 30 tablet 0    divalproex sodium (DEPAKOTE) 250 mg EC tablet TAKE 1 TABLET BY MOUTH TWICE DAILY WITH  ONE  500  MG  TAB  FOR  TOAL  DOSE  OF  750  MG  TWICE  A  DAY 60 tablet 11    divalproex sodium (DEPAKOTE) 500 mg EC tablet Take 1 tab twice a day with one 250 mg tab for total dose of 750 mg twice a day   60 tablet 11    ergocalciferol (VITAMIN D2) 50,000 units Take 1 capsule (50,000 Units total) by mouth once a week for 8 doses (Patient not taking: Reported on 6/9/2022) 8 capsule 0    ibuprofen (MOTRIN) 800 mg tablet daily as needed      insulin NPH-insulin regular (NovoLIN 70/30) 100 units/mL subcutaneous injection Inject 10 Units under the skin daily before breakfast (Patient taking differently: Inject 12 Units under the skin daily before breakfast) 3 mL 0    Jardiance 25 MG TABS Take 25 mg by mouth daily (Patient not taking: Reported on 6/9/2022)      lamoTRIgine (LaMICtal) 100 mg tablet Take 1 tab (100 mg) twice a day 180 tablet 3    lisinopril (ZESTRIL) 40 mg tablet Take 1 tablet by mouth daily (Patient not taking: Reported on 6/9/2022)      metFORMIN (GLUCOPHAGE) 1000 MG tablet Take 1,000 mg by mouth 2 (two) times a day with meals (Patient not taking: Reported on 6/9/2022)      metoprolol tartrate (LOPRESSOR) 100 mg tablet TAKE 1 TABLET BY MOUTH EVERY 12 HOURS 180 tablet 0    OLANZapine (ZyPREXA) 20 MG tablet Take 1 tablet (20 mg total) by mouth daily at bedtime 30 tablet 5    sertraline (Zoloft) 25 mg tablet Take 1 tablet (25 mg total) by mouth daily 60 tablet 0    Vitamin D, Cholecalciferol, 50 MCG (2000 UT) CAPS Take 1 capsule by mouth once daily 30 capsule 5    zaleplon (SONATA) 5 MG capsule Take 1 capsule (5 mg total) by mouth daily at bedtime 30 capsule 5     No current facility-administered medications on file prior to visit  HPI ROS Appetite Changes and Sleep:     He reports difficulty falling asleep, adequate appetite, adequate energy level   Denies homicidal ideation, denies suicidal ideation    Review Of Systems:      HPI ROS:               Medication Side Effects:  Hair loss due to Depakote    Depression (10 worst): 9/10    Anxiety (10 worst): 6/10    Safety concerns (SI, HI, etc): Denies si and hi    Sleep: Fluctuating sleep pattern    Energy: fair    Appetite: fair    Weight Change: denies        Mental Status Evaluation:    Appearance Adequate hygiene and grooming   Behavior calm and cooperative   Mood anxious and depressed  Depression Scale - 9 of 10 (0 = No depression)  Anxiety Scale - 6 of 10 (0 = No anxiety)   Speech slow   Affect constricted   Thought Processes Goal directed and coherent   Thought Content Does not verbalize delusional material   Associations Tightly connected   Perceptual Disturbances Auditory hallucinations with commands pt denies suicidal and homicdal ideation   Risk Potential Suicidal/Homicidal Ideation - No evidence of suicidal or homicidal ideation and patient does not verbalize suicidal or homicidal ideation  Risk of Violence - No evidence of risk for violence found on assessment  Risk of Self Mutilation - No evidence of risk for self mutilation found on assessment   Orientation oriented to person, place, time/date and situation   Memory recent and remote memory grossly intact   Consciousness alert and awake   Attention/Concentration attention span and concentration appear shorter than expected for age   Insight partial   Judgement partial   Muscle Strength and Gait decreased muscle strength   Motor Activity no abnormal movements   Language no difficulty naming common objects, no difficulty repeating a phrase, no difficulty writing a sentence   Fund of Knowledge adequate knowledge of current events  adequate fund of knowledge regarding past history  adequate fund of knowledge regarding vocabulary      Past Psychiatric History Update:     Inpatient Psychiatric Admission Since Last Encounter:   no  Changes to Outpatient Psychiatric Treatment Team:    no  Suicide Attempt Or Self Mutilation Since Last Encounter:   no  Incidence of Violent Behavior Since Last Encounter:   no    Traumatic History Update:     New Onset of Abuse Since Last Encounter:   no  Traumatic Events Since Last Encounter:   no    Past Medical History:    Past Medical History:   Diagnosis Date    Chemical exposure     Diabetes mellitus ([facility] [address] )     H/O bone graft     Head injury     August 25, 2019 fell backwards hit head on a boulder w/ LOC    Head injury     as an air Born [place] with the [employer] - jumped out of a plane parachute disfunction    Hypertension     Seizures ([facility] [address] )         Past Surgical History:   Procedure Laterality Date    FL LUMBAR PUNCTURE DIAGNOSTIC  12/13/2019    KNEE ARTHROSCOPY W/ MENISCECTOMY Left     LEG SURGERY      TONSILLECTOMY       No Known Allergies  Substance Abuse History:    Social History     Substance and Sexual Activity   Alcohol Use Not Currently    Alcohol/week: 3 0 standard drinks    Types: 3 Cans of beer per week     Social History     Substance and Sexual Activity   Drug Use Never     Social History:    Social History     Socioeconomic History    Marital status: Single     Spouse name: Not on file    Number of children: Not on file    Years of education: Not on file    Highest education level: Not on file   Occupational History    Not on file   Tobacco Use    Smoking status: Never Smoker    Smokeless tobacco: Never Used   Vaping Use    Vaping Use: Never used   Substance and Sexual Activity    Alcohol use: Not Currently     Alcohol/week: 3 0 standard drinks     Types: 3 Cans of beer per week    Drug use: Never    Sexual activity: Not on file   Other Topics Concern    Not on file   Social History Narrative    Not on file     Social Determinants of Health Financial Resource Strain: Not on file   Food Insecurity: Not on file   Transportation Needs: Not on file   Physical Activity: Not on file   Stress: Not on file   Social Connections: Not on file   Intimate Partner Violence: Not on file   Housing Stability: Not on file     Family Psychiatric History:     Family History   Problem Relation Age of Onset    Diabetes Mother     Hypertension Mother     Asthma Mother     Brain cancer Father     No Known Problems Sister     No Known Problems Brother     No Known Problems Brother      History Review: The following portions of the patient's history were reviewed and updated as appropriate: allergies, current medications, past family history, past medical history, past social history, past surgical history and problem list     OBJECTIVE:     Vital signs in last 24 hours: There were no vitals filed for this visit  Laboratory Results:   Recent Labs (last 2 months):   No visits with results within 2 Month(s) from this visit  Latest known visit with results is:   Admission on 05/03/2022, Discharged on 05/03/2022   Component Date Value    EXTBreath Alcohol 05/03/2022 0 00     Amph/Meth UR 05/03/2022 Negative     Barbiturate Ur 05/03/2022 Negative     Benzodiazepine Urine 05/03/2022 Positive (A)    Cocaine Urine 05/03/2022 Negative     Methadone Urine 05/03/2022 Negative     Opiate Urine 05/03/2022 Negative     PCP Ur 05/03/2022 Negative     THC Urine 05/03/2022 Negative     Oxycodone Urine 05/03/2022 Negative     SARS-CoV-2 05/03/2022 Negative     INFLUENZA A PCR 05/03/2022 Negative     INFLUENZA B PCR 05/03/2022 Negative     RSV PCR 05/03/2022 Negative      I have personally reviewed all pertinent laboratory/tests results      Suicide/Homicide Risk Assessment:    Risk of Harm to Self:  Protective Factors: no current suicidal ideation, access to mental health treatment, compliant with medications, compliant with mental health treatment, connection to community, having a desire to be alive  Based on today's assessment, Tanya Yi presents the following risk of harm to self: minimal    Risk of Harm to Others:  Based on today's assessment, Tanya Yi presents the following risk of harm to others: minimal    The following interventions are recommended: refrred to Partial Program for intensive psychiatric monitoring    Medications Risks/Benefits:      Risks, Benefits And Possible Side Effects Of Medications:    Discussed risks and benefits of treatment with patient including risk of suicidality, serotonin syndrome, increased QTc interval and SIADH related to treatment with antidepressants; Risk of induction of manic symptoms in certain patient populations, risk of parkinsonian symptoms, metabolic syndrome, tardive dyskinesia and neuroleptic malignant syndrome related to treatment with antipsychotic medications, risks of misuse, abuse or dependence, sedation and respiratory depression related to treatment with benzodiazepine medications, risk of rash related to treatment with Lamictal, risk of liver impairment related to treatment with Depakote and risk of impaired next-day mental alertness, complex sleep-related behavior and dependence related to treatment with hypnotic medications     Controlled Medication Discussion:     Tanya Yi has been filling controlled prescriptions on time as prescribed according to [address]  Discussed with Tanya Yi the risks of sedation, respiratory depression, impairment of ability to drive and potential for abuse and addiction related to treatment with benzodiazepine medications  He understands risk of treatment with benzodiazepine medications, agrees to not drive if feels impaired and agrees to take medications as prescribed  Discussed with [facility] warning on concurrent use of benzodiazepines and opioid medications including sedation, respiratory depression, coma and death   He understands the risk of treatment with benzodiazepines in addition to opioids and wants to continue taking those medications  Discussed with Joanne Pascal increased risk of impairment related to concurrent use of benzodiazepines and hypnotic medications including excessive sedation, psychomotor impairment and respiratory depression  He understands the risk of treatment with benzodiazepines in addition to hypnotic medications and wants to continue taking those medications    Treatment Plan:    Due for update/Updated:   no  Last treatment plan done 6/8/22 by mitesh conley  Treatment Plan due on 12/8/22      ROCKY Elliott 08/01/22

## 2022-08-02 ENCOUNTER — TELEPHONE (OUTPATIENT)
Dept: PSYCHIATRY | Facility: CLINIC | Age: 52
End: 2022-08-02

## 2022-08-02 NOTE — TELEPHONE ENCOUNTER
contacted patient in regards to referral and in attempts to add patient to proper waitlist  LVM for patient to contact intake dept 
Vascular and Interventional Radiology
Vascular and Interventional Radiology

## 2022-08-26 ENCOUNTER — TELEPHONE (OUTPATIENT)
Dept: PSYCHIATRY | Facility: CLINIC | Age: 52
End: 2022-08-26

## 2022-08-26 NOTE — TELEPHONE ENCOUNTER
contacted patient in regards to referral and in attempts to add patient to proper waitlist  spoke w  patient whom stated they are interested in both med mgmt and tlk therapy  prefers virtual,and okay w  [place]  location

## 2022-09-03 DIAGNOSIS — I10 ESSENTIAL HYPERTENSION: ICD-10-CM

## 2022-09-06 ENCOUNTER — TELEPHONE (OUTPATIENT)
Dept: NEUROLOGY | Facility: CLINIC | Age: 52
End: 2022-09-06

## 2022-09-06 RX ORDER — METOPROLOL TARTRATE 100 MG/1
TABLET ORAL
Qty: 180 TABLET | Refills: 0 | Status: SHIPPED | OUTPATIENT
Start: 2022-09-06

## 2022-09-06 NOTE — TELEPHONE ENCOUNTER
----- Message from Tim Abebe RN sent at 9/6/2022  8:11 AM EDT -----  Regarding: FW: Refills    ----- Message -----  From: Charles Sanches  Sent: 9/5/2022   5:36 PM EDT  To: Neurology [facility]  Subject: Madyson Hard Dr Denise Stock is Rufina Lu on behalf of [facility]  [facility] to [facility] on September 8,2022 at  8am   He called to inform them he is disabled however they are requesting a letter stating he is disabled due to a TBI and is unable to handle the juror responsibility  (From his neurologists)    I hope all is well on your end looking forward to your response  [email or web address] is Magui Alexander' email  Many thanks,  Mimi Stephenson on behalf of [facility]

## 2022-10-03 DIAGNOSIS — F41.9 ANXIETY AND DEPRESSION: ICD-10-CM

## 2022-10-03 DIAGNOSIS — F32.A ANXIETY AND DEPRESSION: ICD-10-CM

## 2022-10-12 PROBLEM — E04.2 MULTINODULAR GOITER: Status: ACTIVE | Noted: 2021-03-11

## 2022-10-12 PROBLEM — I60.9 SUBARACHNOID HEMORRHAGE (HCC): Status: ACTIVE | Noted: 2019-08-26

## 2022-10-12 PROBLEM — E78.5 HYPERLIPIDEMIA ASSOCIATED WITH TYPE 2 DIABETES MELLITUS (HCC): Status: ACTIVE | Noted: 2021-02-11

## 2022-10-12 PROBLEM — E11.69 HYPERLIPIDEMIA ASSOCIATED WITH TYPE 2 DIABETES MELLITUS (HCC): Status: ACTIVE | Noted: 2021-02-11

## 2022-10-25 NOTE — PROGRESS NOTES
Patient ID: Ny Ware  is a 45 y o  male with prior traumatic subarachnoid hemorrhage, localization-related epilepsy, and ongoing auditory hallucinations, who is returning to Neurology office for follow up of his seizures and hallucinations  Assessment/Plan:    No problem-specific Assessment & Plan notes found for this encounter  He will No follow-ups on file  Subjective:    HPI  Current seizure medications:  1  ***Divalproex  mg twice a day  2  ***Lamotrigine 100 mg twice a day  Other medications as per [facility]  Since his last visit, ***    Prior Seizure Medications:  Levetiracetam (worsened mood and hallucinations)    His history was also obtained from his ***, who {was/were} present at today's visit  *** I personally reviewed his {test name} from ***    *** I reviewed ***, as documented in Epic/[employer], and summarized above  Objective: There were no vitals taken for this visit  Physical Exam    Neurological Exam      ROS:    Review of Systems   Constitutional: Negative  Negative for appetite change and fever  HENT: Negative  Negative for hearing loss, tinnitus, trouble swallowing and voice change  Eyes: Negative  Negative for photophobia, pain and visual disturbance  Respiratory: Negative  Negative for shortness of breath  Cardiovascular: Negative  Negative for palpitations  Gastrointestinal: Negative  Negative for nausea and vomiting  Endocrine: Negative  Negative for cold intolerance  Genitourinary: Negative  Negative for dysuria, frequency and urgency  Musculoskeletal: Negative  Negative for gait problem, myalgias and neck pain  Skin: Negative  Negative for rash  Allergic/Immunologic: Negative  Neurological: Negative  Negative for dizziness, tremors, seizures, syncope, facial asymmetry, speech difficulty, weakness, light-headedness, numbness and headaches  Hematological: Negative  Does not bruise/bleed easily  Psychiatric/Behavioral: Positive for hallucinations (hearing voices)  Negative for confusion and sleep disturbance  All other systems reviewed and are negative  I personally reviewed the ROS that was entered by the medical assistant    Voice recognition software was used in the generation of this note  There may be unintentional errors including grammatical errors, spelling errors, or pronoun errors

## 2022-10-28 ENCOUNTER — TELEMEDICINE (OUTPATIENT)
Dept: NEUROLOGY | Facility: CLINIC | Age: 52
End: 2022-10-28

## 2022-10-28 DIAGNOSIS — R56.9 SEIZURE (HCC): ICD-10-CM

## 2022-10-28 DIAGNOSIS — S06.9X9D TRAUMATIC BRAIN INJURY WITH LOSS OF CONSCIOUSNESS, SUBSEQUENT ENCOUNTER: Primary | ICD-10-CM

## 2022-10-28 DIAGNOSIS — F29 PSYCHOSIS, UNSPECIFIED PSYCHOSIS TYPE (HCC): ICD-10-CM

## 2022-10-28 DIAGNOSIS — G40.109 LOCALIZATION-RELATED EPILEPSY (HCC): ICD-10-CM

## 2022-10-28 RX ORDER — DIVALPROEX SODIUM 500 MG/1
TABLET, DELAYED RELEASE ORAL
Qty: 60 TABLET | Refills: 11 | Status: SHIPPED | OUTPATIENT
Start: 2022-10-28

## 2022-10-28 RX ORDER — DIVALPROEX SODIUM 250 MG/1
TABLET, DELAYED RELEASE ORAL
Qty: 60 TABLET | Refills: 11 | Status: SHIPPED | OUTPATIENT
Start: 2022-10-28

## 2022-10-28 NOTE — PATIENT INSTRUCTIONS
-- Continue to take Divalproex and Lamotrigine unchanged  -- continue to work with your psychiatrist for ongoing management of your hallucinations  -- I will order some physical therapy  It will be important to stay active to help build up your strength

## 2022-10-28 NOTE — LETTER
10/28/2022     To whom it may concern,     [facility]  ( 1970) is under my neurologic care  Mr Meaghan Joshua previously suffered a traumatic head injury which has resulted in significant neurologic and psychologic deficits as a result of this injury  Because of his neurologic and psychologic effects, he is not able to serve for [facility]       Sincerely,     Glenna Moore MD

## 2022-10-28 NOTE — PROGRESS NOTES
Virtual Regular Visit    Verification of patient location:    Patient is located in the following state in which I hold an active license [place]      Assessment/Plan:    Patient ID: Edgar Mancilla  is a 45 y o  male with prior traumatic subarachnoid hemorrhage, localization-related epilepsy, and ongoing auditory hallucinations, who is returning for follow-up of his seizures  Assessment/Plan:    Localization-related epilepsy [facility])  He still has not had any additional seizures  He has been tolerating lamotrigine and divalproex unchanged  These medications have been effective in keeping him without any additional seizures, but have not been helpful significantly with controlling his mood  --He will continue divalproex and lamotrigine unchanged  --He has been having more difficulty with his mobility, which is likely due to deconditioning  I will order some physical therapy to try to help build up his strength  Psychosis ([facility] [address] )  He continues to have difficulty with hallucinations which have been unchanged since his traumatic head injury  These do not appear to be related to seizures since they had not been associated with EEG changes in the past   These also have not responded to seizure medications thus far  Will be very important for him to continue to follow with his psychiatrist for ongoing management      He will Return in about 4 months (around 2/28/2023)  Reason for visit is   Chief Complaint   Patient presents with   • Virtual Regular Visit        Encounter provider Lanre Thomas MD    Provider located at [facility] [id number]      Recent Visits  No visits were found meeting these conditions  Showing recent visits within past 7 days and meeting all other requirements  Future Appointments  No visits were found meeting these conditions    Showing future appointments within next 150 days and meeting all other requirements       The patient was identified by name and date of birth  [facility]  was informed that this is a telemedicine visit and that the visit is being conducted through the [employer]  He agrees to proceed     My office door was closed  The patient was notified the following individuals were present in the room Marie Lima (medical student)  He acknowledged consent and understanding of privacy and security of the video platform  The patient has agreed to participate and understands they can discontinue the visit at any time  Patient is aware this is a billable service  Subjective  Yan Jefferson  is a 45 y o  male     Current seizure medications:  1  Divalproex  mg twice a day  2  Lamotrigine 100 mg twice a day  Other medications as per [facility]  Since his last visit, he hasn't had any seizures, but the voices have gotten worse  He is taking sertraline 50 mg daily  He unfortunately missed his last appointment with his psychiatrist  He continues to be very bothered by the voices he hears  His wife feels that he has been stable  He is not very active and as a result, does have issues with weakness and poor balance  She tries to make him get up and be active as much as possible  Prior Medications: Levetiracetam (worsened mood and hallucinations)    His history was also obtained from his wife, who was also present via [facility]     Past Medical History:   Diagnosis Date   • Chemical exposure    • Diabetes mellitus (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   • Hypertension    • Seizures ([address] )        Past Surgical History:   Procedure Laterality Date   • FL LUMBAR PUNCTURE DIAGNOSTIC  12/13/2019   • KNEE ARTHROSCOPY W/ MENISCECTOMY Left    • LEG SURGERY     • TONSILLECTOMY         Current Outpatient Medications   Medication Sig Dispense Refill   • amLODIPine (NORVASC) 10 mg tablet Take 10 mg by mouth daily     • aspirin 81 mg chewable tablet Chew 81 mg daily     • atorvastatin (LIPITOR) 20 mg tablet Take 20 mg by mouth every evening     • diazepam (VALIUM) 2 mg tablet Take 1 tablet (2 mg total) by mouth daily as needed for anxiety (severe anxiety only) 30 tablet 0   • divalproex sodium (DEPAKOTE) 250 mg EC tablet TAKE 1 TABLET BY MOUTH TWICE DAILY WITH  ONE  500  MG  TAB  FOR  TOAL  DOSE  OF  750  MG  TWICE  A  DAY 60 tablet 11   • divalproex sodium (DEPAKOTE) 500 mg EC tablet Take 1 tab twice a day with one 250 mg tab for total dose of 750 mg twice a day   60 tablet 11   • ibuprofen (MOTRIN) 800 mg tablet daily as needed     • insulin NPH-insulin regular (NovoLIN 70/30) 100 units/mL subcutaneous injection Inject 10 Units under the skin daily before breakfast (Patient taking differently: Inject 12 Units under the skin daily before breakfast) 3 mL 0   • lamoTRIgine (LaMICtal) 100 mg tablet Take 1 tab (100 mg) twice a day 180 tablet 3   • metoprolol tartrate (LOPRESSOR) 100 mg tablet TAKE 1 TABLET BY MOUTH EVERY 12 HOURS 180 tablet 0   • Vitamin D, Cholecalciferol, 50 MCG (2000 UT) CAPS Take 1 capsule by mouth once daily 30 capsule 5   • zaleplon (SONATA) 5 MG capsule Take 1 capsule (5 mg total) by mouth daily at bedtime 30 capsule 5   • amoxicillin (AMOXIL) 500 mg capsule TAKE 1 CAPSULE BY MOUTH TWICE DAILY FOR 10 DAYS     • diazepam (VALIUM) 5 mg tablet Take 1 tablet (5 mg total) by mouth every 8 (eight) hours as needed for anxiety for up to 5 days 15 tablet 0   • ergocalciferol (VITAMIN D2) 50,000 units Take 1 capsule (50,000 Units total) by mouth once a week for 8 doses 8 capsule 0   • Jardiance 25 MG TABS Take 25 mg by mouth daily     • lisinopril (ZESTRIL) 40 mg tablet Take 1 tablet by mouth daily     • metFORMIN (GLUCOPHAGE) 1000 MG tablet Take 1,000 mg by mouth 2 (two) times a day with meals     • OLANZapine (ZyPREXA) 20 MG tablet 1/2 tab po q bedtime until home supply completed     • sertraline (Zoloft) 100 mg tablet Take 1 tablet (100 mg total) by mouth daily 30 tablet 1   • Ventolin  (90 Base) MCG/ACT inhaler Inhale 2 puffs every 6 (six) hours as needed     • ziprasidone (GEODON) 40 mg capsule One po q am with breakfast x 5 days then one po bid with breakfast and dinner 60 capsule 0     No current facility-administered medications for this visit  No Known Allergies    Review of Systems    Video Exam    There were no vitals filed for this visit      Physical Exam     I spent 20 minutes directly with the patient during this visit

## 2022-11-07 ENCOUNTER — TELEMEDICINE (OUTPATIENT)
Dept: PSYCHIATRY | Facility: CLINIC | Age: 52
End: 2022-11-07

## 2022-11-07 DIAGNOSIS — F32.A ANXIETY AND DEPRESSION: ICD-10-CM

## 2022-11-07 DIAGNOSIS — E11.9 TYPE 2 DIABETES MELLITUS WITHOUT COMPLICATION, WITH LONG-TERM CURRENT USE OF INSULIN (HCC): ICD-10-CM

## 2022-11-07 DIAGNOSIS — S06.9X9D TRAUMATIC BRAIN INJURY WITH LOSS OF CONSCIOUSNESS, SUBSEQUENT ENCOUNTER: ICD-10-CM

## 2022-11-07 DIAGNOSIS — F29 PSYCHOSIS, UNSPECIFIED PSYCHOSIS TYPE (HCC): Primary | ICD-10-CM

## 2022-11-07 DIAGNOSIS — Z79.4 TYPE 2 DIABETES MELLITUS WITHOUT COMPLICATION, WITH LONG-TERM CURRENT USE OF INSULIN (HCC): ICD-10-CM

## 2022-11-07 DIAGNOSIS — F41.9 ANXIETY AND DEPRESSION: ICD-10-CM

## 2022-11-07 PROBLEM — M48.10 DISH (DIFFUSE IDIOPATHIC SKELETAL HYPEROSTOSIS): Status: ACTIVE | Noted: 2020-11-05

## 2022-11-07 PROBLEM — E11.59 HYPERTENSION ASSOCIATED WITH DIABETES (HCC): Status: ACTIVE | Noted: 2017-09-12

## 2022-11-07 PROBLEM — I15.2 HYPERTENSION ASSOCIATED WITH DIABETES (HCC): Status: ACTIVE | Noted: 2017-09-12

## 2022-11-07 RX ORDER — AMOXICILLIN 500 MG/1
CAPSULE ORAL
COMMUNITY
Start: 2022-10-31

## 2022-11-07 NOTE — PSYCH
Virtual Regular Visit    Verification of patient location:    Patient is located in the following state in which I hold an active license [place]    Problem List Items Addressed This Visit        Nervous and Auditory    TBI (traumatic brain injury) (Chronic)       Other    Psychotic disorder ([facility] [address] ) - Primary      Other Visit Diagnoses     Anxiety and depression                 Encounter provider ROCKY Turcios    Provider located at    [id number] [facility] [address]  [address] [address] [phone]    Recent Visits  No visits were found meeting these conditions  Showing recent visits within past 7 days and meeting all other requirements  Today's Visits  Date Type Provider Dept   11/07/22 Telemedicine ROCKY Turcios [facility]   Showing today's visits and meeting all other requirements  Future Appointments  No visits were found meeting these conditions  Showing future appointments within next 150 days and meeting all other requirements         The patient was identified by name and date of birth  [facility]  was informed that this is a telemedicine visit and that the visit is being conducted throughthe [facility]  He agrees to proceed     My office door was closed  No one else was in the room  He acknowledged consent and understanding of privacy and security of the video platform  The patient has agreed to participate and understands they can discontinue the visit at any time  Patient is aware this is a billable service       HPI     Current Outpatient Medications   Medication Sig Dispense Refill   • amLODIPine (NORVASC) 10 mg tablet Take 10 mg by mouth daily     • aspirin 81 mg chewable tablet Chew 1 tablet (81 mg total) daily 30 tablet 0   • aspirin 81 mg chewable tablet Chew 81 mg daily     • atorvastatin (LIPITOR) 20 mg tablet Take 20 mg by mouth every evening     • diazepam (VALIUM) 2 mg tablet Take 1 tablet (2 mg total) by mouth daily as needed for anxiety (severe anxiety only) 30 tablet 0   • divalproex sodium (DEPAKOTE) 250 mg EC tablet TAKE 1 TABLET BY MOUTH TWICE DAILY WITH  ONE  500  MG  TAB  FOR  TOAL  DOSE  OF  750  MG  TWICE  A  DAY 60 tablet 11   • divalproex sodium (DEPAKOTE) 500 mg EC tablet Take 1 tab twice a day with one 250 mg tab for total dose of 750 mg twice a day  60 tablet 11   • ergocalciferol (VITAMIN D2) 50,000 units Take 1 capsule (50,000 Units total) by mouth once a week for 8 doses (Patient not taking: No sig reported) 8 capsule 0   • ibuprofen (MOTRIN) 800 mg tablet daily as needed     • insulin NPH-insulin regular (NovoLIN 70/30) 100 units/mL subcutaneous injection Inject 10 Units under the skin daily before breakfast (Patient taking differently: Inject 12 Units under the skin daily before breakfast) 3 mL 0   • Jardiance 25 MG TABS Take 25 mg by mouth daily (Patient not taking: No sig reported)     • lamoTRIgine (LaMICtal) 100 mg tablet Take 1 tab (100 mg) twice a day 180 tablet 3   • lisinopril (ZESTRIL) 40 mg tablet Take 1 tablet by mouth daily (Patient not taking: No sig reported)     • metFORMIN (GLUCOPHAGE) 1000 MG tablet Take 1,000 mg by mouth 2 (two) times a day with meals (Patient not taking: No sig reported)     • metoprolol tartrate (LOPRESSOR) 100 mg tablet TAKE 1 TABLET BY MOUTH EVERY 12 HOURS 180 tablet 0   • OLANZapine (ZyPREXA) 20 MG tablet TAKE 1 TABLET BY MOUTH ONCE DAILY AT BEDTIME 30 tablet 5   • sertraline (Zoloft) 50 mg tablet Take 1 tablet (50 mg total) by mouth daily 90 tablet 0   • Vitamin D, Cholecalciferol, 50 MCG (2000 UT) CAPS Take 1 capsule by mouth once daily 30 capsule 5   • zaleplon (SONATA) 5 MG capsule Take 1 capsule (5 mg total) by mouth daily at bedtime 30 capsule 5     No current facility-administered medications for this visit  Review of Systems  Video Exam    There were no vitals filed for this visit      Physical Exam   As a result of this visit, I have referred the patient for further respiratory evaluation  No      VIRTUAL VISIT DISCLAIMER    [facility]  acknowledges that he has consented to an online visit or consultation  He understands that the online visit is based solely on information provided by him, and that, in the absence of a face-to-face physical evaluation by the physician, the diagnosis he receives is both limited and provisional in terms of accuracy and completeness  This is not intended to replace a full medical face-to-face evaluation by the physician  Romina Garcia understands and accepts these terms  MEDICATION MANAGEMENT NOTE        [address]    Name and Date of Birth:  Romina Garcia  45 y o  1970 MRN: [id number]    Date of Visit: November 7, 2022    No Known Allergies  SUBJECTIVE:    Yvonne Chaidez is seen today for a follow up for psychosis and TBI  He reports that he continues to experience ongoing symptoms since the last visit  Patient recently saw his neurologist, has remained seizure-free, remains on Depakote 750 mg b i d  and Lamictal 100 mg BID for the management of epilepsy disorder  Reports being seizure-free for over three years now  See previous note on 8/1/22 where patient was again referred to the partial program for the second time after non-compliance during our initial intake on 6/8/22  Auditory hallucinations remain on-going since enduring a TBI 3 years ago, they continue to be negative voices such as telling patient he is no good and further derogatory comments about himself  States the voices occasionally go away for three days but always returned  Of concern, occasional command in nature however patient states he is always able to ignore them and not take any action   His wife who was present during today's visit states  patient recently expressed voices telling him to take additional pills than he is supposed to, patient's wife has control of his medications and keeps him medication out of reach  However they are not locked up, provider encouraged her to get a lock box as soon as possible and lock medications up, she agreed to this plan  Patient denies all current or recent suicidal ideation, homicidal ideation, visual hallucinations  Agrees to go to the emergency room if he becomes acutely suicidal or ever develops a plan  His wife is the primary caregiver and has a watchful eye over patient  Patient continues to be a poor historian and collateral obtained by his wife  He is lying on the couch, appears depressed, hopeless, helpless, dysphoric  States "you are my last chance with these voices, I hope you can help"  Patient has yet to comply with pending EKG which was ordered on 8/1/22, plan was to potentially increase Zyprexa vs switch to Geodon considering diabetic concerns vs addition of Haldol if EKG was within normal limits, must weigh risks of lowering seizure threshold with persistent auditory hallucinations  Provider previously gave pt resources regarding initiating care with a TBI specialist given his current symptoms, patient has yet to reach out  Will again reach out to the director of the partial program for potential admission into the program as patient expresses desire to participate  Lives with his supportive wife and 2 adult children, all are his main support system  Remains on psychotherapy wait list  Denies current SI and HI  He denies any side effects from medications      PLAN:    -Encourage compliance with mental health treatment, complete pending EKG from 08/01/2022, complete pending labs from 6/8/22   -Referred to partial program for the 3rd time, he has yet to comply despite expressing a strong desire, message sent to  on 11/7/22   -Continue Zyprexa 20 mg daily for auditory hallucinations caused by TBI 3 years ago  Complete pending EKG and blood-work before considering adjustments to Zyprexa/change to Geodon vs possible addition of Haldol, must weigh risks of lowering seizure threshold and worsening diabetes with persistent auditory hallucinations  Patient follow with his medical team to manage diabetes  -Continue Zaleplon 5mg at hs for insomnia  -Continue Zoloft to 50mg daily for persistent depression and anxiety symptoms related to chronic hallucinations   -Continue p r n  Valium 2 milligrams daily p r n  for severe anxiety/agitation which occasionally occurs due to patient's auditory hallucinations  Using infrequently according to PDMP  Discussed with patient the risks of sedation, respiratory depression, impairment of ability to drive and potential for abuse and addiction related to treatment with benzodiazepine medications   The patient understands risk of treatment with benzodiazepine medications, agrees to not drive if feels impaired and agrees to take medications as prescribed       - neurologist prescribing Depakote 750 mg b i d  and Lamictal 100 mg BID for the management of epilepsy disorder, has remained free from seizures 3 years,             Aware of 24 hour and weekend coverage for urgent situations accessed by calling [facility] main practice number  Referral for individual psychotherapy    Diagnoses and all orders for this visit:    Psychosis, unspecified psychosis type ([facility] [address] )    Traumatic brain injury with loss of consciousness, subsequent encounter    Anxiety and depression        Current Outpatient Medications on File Prior to Visit   Medication Sig Dispense Refill   • amLODIPine (NORVASC) 10 mg tablet Take 10 mg by mouth daily     • aspirin 81 mg chewable tablet Chew 1 tablet (81 mg total) daily 30 tablet 0   • aspirin 81 mg chewable tablet Chew 81 mg daily     • atorvastatin (LIPITOR) 20 mg tablet Take 20 mg by mouth every evening     • diazepam (VALIUM) 2 mg tablet Take 1 tablet (2 mg total) by mouth daily as needed for anxiety (severe anxiety only) 30 tablet 0   • divalproex sodium (DEPAKOTE) 250 mg EC tablet TAKE 1 TABLET BY MOUTH TWICE DAILY WITH  ONE  500  MG  TAB  FOR  TOAL  DOSE  OF  750  MG  TWICE  A  DAY 60 tablet 11   • divalproex sodium (DEPAKOTE) 500 mg EC tablet Take 1 tab twice a day with one 250 mg tab for total dose of 750 mg twice a day  60 tablet 11   • ergocalciferol (VITAMIN D2) 50,000 units Take 1 capsule (50,000 Units total) by mouth once a week for 8 doses (Patient not taking: No sig reported) 8 capsule 0   • ibuprofen (MOTRIN) 800 mg tablet daily as needed     • insulin NPH-insulin regular (NovoLIN 70/30) 100 units/mL subcutaneous injection Inject 10 Units under the skin daily before breakfast (Patient taking differently: Inject 12 Units under the skin daily before breakfast) 3 mL 0   • Jardiance 25 MG TABS Take 25 mg by mouth daily (Patient not taking: No sig reported)     • lamoTRIgine (LaMICtal) 100 mg tablet Take 1 tab (100 mg) twice a day 180 tablet 3   • lisinopril (ZESTRIL) 40 mg tablet Take 1 tablet by mouth daily (Patient not taking: No sig reported)     • metFORMIN (GLUCOPHAGE) 1000 MG tablet Take 1,000 mg by mouth 2 (two) times a day with meals (Patient not taking: No sig reported)     • metoprolol tartrate (LOPRESSOR) 100 mg tablet TAKE 1 TABLET BY MOUTH EVERY 12 HOURS 180 tablet 0   • OLANZapine (ZyPREXA) 20 MG tablet TAKE 1 TABLET BY MOUTH ONCE DAILY AT BEDTIME 30 tablet 5   • sertraline (Zoloft) 50 mg tablet Take 1 tablet (50 mg total) by mouth daily 90 tablet 0   • Vitamin D, Cholecalciferol, 50 MCG (2000 UT) CAPS Take 1 capsule by mouth once daily 30 capsule 5   • zaleplon (SONATA) 5 MG capsule Take 1 capsule (5 mg total) by mouth daily at bedtime 30 capsule 5     No current facility-administered medications on file prior to visit  Psychotherapy Provided:     Individual psychotherapy provided: Yes  Counseling was provided during the session today for 16 minutes  Supportive counseling provided    Medication changes discussed with Yan   Medication education provided to Abeba  Educated on importance of medication and treatment compliance  Reassurance and supportive therapy provided  HPI ROS Appetite Changes and Sleep:     He reports difficulty falling asleep, adequate appetite, fluctuating energy levels   Denies homicidal ideation, denies suicidal ideation    Review Of Systems:      HPI ROS:               Medication Side Effects:  denies    Depression (10 worst): 8/10    Anxiety (10 worst): 7/10    Safety concerns (SI, HI, etc): Denies si and hi    Sleep: 5-9 hrs, usually a good night sleep when he takes prescriped Zaleplon    Energy: low    Appetite: fluctuates    Weight Change: denies        Mental Status Evaluation:    Appearance Marginal/poor hygiene   Behavior guarded   Mood anxious and depressed  Depression Scale - 8 of 10 (0 = No depression)  Anxiety Scale - 7 of 10 (0 = No anxiety)   Speech Soft   Affect constricted   Thought Processes Poverty of thoughts   Thought Content Does not verbalize delusional material   Associations concrete associations   Perceptual Disturbances Auditory hallucinations without commands occasional command which he ignores, per wife she will lock up his pills   Risk Potential Suicidal/Homicidal Ideation - No evidence of suicidal or homicidal ideation and patient does not verbalize suicidal or homicidal ideation  Risk of Violence - No evidence of risk for violence found on assessment  Risk of Self Mutilation - No evidence of risk for self mutilation found on assessment   Orientation oriented to person, place, time/date and situation   Memory recent memory mildly impaired   Consciousness alert and awake   Attention/Concentration attention span and concentration appear shorter than expected for age   Insight partial   Judgement partial   Muscle Strength and Gait normal muscle strength and normal muscle tone, normal gait/station and normal balance   Motor Activity no abnormal movements   Language no difficulty naming common objects, no difficulty repeating a phrase, no difficulty writing a sentence   Fund of Knowledge adequate knowledge of current events  adequate fund of knowledge regarding past history  adequate fund of knowledge regarding vocabulary          Past Medical History:    Past Medical History:   Diagnosis Date   • Chemical exposure    • Diabetes mellitus ([facility] [address] )    • H/O bone graft    • Head injury     August 25, 2019 fell backwards hit head on a [place] w/ LOC   • Head injury     as an air [place] with the [employer] - jumped out of a plane parachute disfunction   • Hypertension    • Seizures ([facility] [address] )         Past Surgical History:   Procedure Laterality Date   • FL LUMBAR PUNCTURE DIAGNOSTIC  12/13/2019   • KNEE ARTHROSCOPY W/ MENISCECTOMY Left    • LEG SURGERY     • TONSILLECTOMY       No Known Allergies  Substance Abuse History:    Social History     Substance and Sexual Activity   Alcohol Use Not Currently   • Alcohol/week: 3 0 standard drinks   • Types: 3 Cans of beer per week     Social History     Substance and Sexual Activity   Drug Use Never     Social History:    Social History     Socioeconomic History   • Marital status: Single     Spouse name: Not on file   • Number of children: Not on file   • Years of education: Not on file   • Highest education level: Not on file   Occupational History   • Not on file   Tobacco Use   • Smoking status: Never Smoker   • Smokeless tobacco: Never Used   Vaping Use   • Vaping Use: Never used   Substance and Sexual Activity   • Alcohol use: Not Currently     Alcohol/week: 3 0 standard drinks     Types: 3 Cans of beer per week   • Drug use: Never   • Sexual activity: Not on file   Other Topics Concern   • Not on file   Social History Narrative   • Not on file     Social Determinants of Health     Financial Resource Strain: Not on file   Food Insecurity: Not on file   Transportation Needs: Not on file   Physical Activity: Not on file   Stress: Not on file Social Connections: Not on file   Intimate Partner Violence: Not on file   Housing Stability: Not on file     Family Psychiatric History:     Family History   Problem Relation Age of Onset   • Diabetes Mother    • Hypertension Mother    • Asthma Mother    • Brain cancer Father    • No Known Problems Sister    • No Known Problems Brother    • No Known Problems Brother      History Review: The following portions of the patient's history were reviewed and updated as appropriate: allergies, current medications, past family history, past medical history, past social history, past surgical history and problem list     OBJECTIVE:     Vital signs in last 24 hours: There were no vitals filed for this visit  Laboratory Results:   Recent Labs (last 2 months):   No visits with results within 2 Month(s) from this visit  Latest known visit with results is:   Admission on 05/03/2022, Discharged on 05/03/2022   Component Date Value   • EXTBreath Alcohol 05/03/2022 0 00    • Amph/Meth UR 05/03/2022 Negative    • Barbiturate Ur 05/03/2022 Negative    • Benzodiazepine Urine 05/03/2022 Positive (A)   • Cocaine Urine 05/03/2022 Negative    • Methadone Urine 05/03/2022 Negative    • Opiate Urine 05/03/2022 Negative    • PCP Ur 05/03/2022 Negative    • THC Urine 05/03/2022 Negative    • Oxycodone Urine 05/03/2022 Negative    • SARS-CoV-2 05/03/2022 Negative    • INFLUENZA A PCR 05/03/2022 Negative    • INFLUENZA B PCR 05/03/2022 Negative    • RSV PCR 05/03/2022 Negative      I have personally reviewed all pertinent laboratory/tests results      Suicide/Homicide Risk Assessment:    Risk of Harm to Self:  Protective Factors: no current suicidal ideation, access to mental health treatment, compliant with medications, compliant with mental health treatment, contact with caregivers, having a desire to live, having a sense of purpose or meaning in life, personal beliefs, resiliency, stable living environment, strong relationships  Based on today's assessment, Tra Antoine presents the following risk of harm to self: low    Risk of Harm to Others:  Based on today's assessment, Tra Antoine presents the following risk of harm to others: minimal    The following interventions are recommended: refrred to Partial Program for intensive psychiatric monitoring    Medications Risks/Benefits:      Risks, Benefits And Possible Side Effects Of Medications:    Discussed risks and benefits of treatment with patient including risk of parkinsonian symptoms, metabolic syndrome, tardive dyskinesia and neuroleptic malignant syndrome related to treatment with antipsychotic medications, risks of misuse, abuse or dependence, sedation and respiratory depression related to treatment with benzodiazepine medications, risk of rash related to treatment with Lamictal, risk of liver impairment related to treatment with Depakote and risk of impaired next-day mental alertness, complex sleep-related behavior and dependence related to treatment with hypnotic medications     Controlled Medication Discussion:     Tra Antoine has been filling controlled prescriptions on time as prescribed according to [address]  Discussed with Tra Antoine the risks of sedation, respiratory depression, impairment of ability to drive and potential for abuse and addiction related to treatment with benzodiazepine medications  He understands risk of treatment with benzodiazepine medications, agrees to not drive if feels impaired and agrees to take medications as prescribed  Discussed with [facility] warning on concurrent use of benzodiazepines and opioid medications including sedation, respiratory depression, coma and death   He understands the risk of treatment with benzodiazepines in addition to opioids and wants to continue taking those medications  Discussed with Tra Antoine increased risk of impairment related to concurrent use of benzodiazepines and hypnotic medications including excessive sedation, psychomotor impairment and respiratory depression  He understands the risk of treatment with benzodiazepines in addition to hypnotic medications and wants to continue taking those medications    Treatment Plan:    Due for update/Updated:   no  Last treatment plan done 6/8/22 by mitesh conley  Treatment Plan due on 12/8/22      ROCKY Scott 11/07/22    This note was not shared with the patient due to reasonable likelihood of causing patient harm   Visit Time    Visit Start Time: 2pm  Visit Stop Time: 2:15pm  Total Visit Duration: 15 minutes

## 2022-11-15 ENCOUNTER — OFFICE VISIT (OUTPATIENT)
Dept: PSYCHOLOGY | Facility: CLINIC | Age: 52
End: 2022-11-15

## 2022-11-15 ENCOUNTER — TELEMEDICINE (OUTPATIENT)
Dept: PSYCHIATRY | Facility: CLINIC | Age: 52
End: 2022-11-15

## 2022-11-15 DIAGNOSIS — F29 PSYCHOSIS, UNSPECIFIED PSYCHOSIS TYPE (HCC): Primary | ICD-10-CM

## 2022-11-15 DIAGNOSIS — F32.9 REACTIVE DEPRESSION: ICD-10-CM

## 2022-11-15 DIAGNOSIS — S06.9X9D TRAUMATIC BRAIN INJURY WITH LOSS OF CONSCIOUSNESS, SUBSEQUENT ENCOUNTER: ICD-10-CM

## 2022-11-15 PROBLEM — S06.9X9A TRAUMATIC BRAIN INJURY WITH LOSS OF CONSCIOUSNESS (HCC): Status: ACTIVE | Noted: 2019-09-19

## 2022-11-15 RX ORDER — HALOPERIDOL 5 MG/1
5 TABLET ORAL DAILY
Qty: 30 TABLET | Refills: 1 | Status: SHIPPED | OUTPATIENT
Start: 2022-11-15

## 2022-11-15 RX ORDER — SERTRALINE HYDROCHLORIDE 100 MG/1
100 TABLET, FILM COATED ORAL DAILY
Qty: 30 TABLET | Refills: 1 | Status: SHIPPED | OUTPATIENT
Start: 2022-11-15
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
## 2022-11-15 NOTE — PSYCH
Other No pre authorization required as per  due to [facility] insurance  Case Management Note    Merline Beck, MT-BC    Current suicide risk : Low    ([id number]) Met with [facility]    Reviewed program and given on call number  he completed releases and OP providers/ PCP notified of admission and health care coordination form completed  Completed initial psycho-social evaluation and initial treatment goals discussed  Medications changes/added/denied? Yes  - See Dr Tang Lemons' Note  Treatment session number: Assessment only    Individual Case Management Visit provided today? No    [facility] follow up physician's orders: Admit to PHP - See Dr Salma Gayle note
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
## 2022-11-15 NOTE — BH TREATMENT PLAN
Assessment/Plan:      Diagnoses and all orders for this visit:    Psychosis, unspecified psychosis type ([address] )    Reactive depression    Traumatic brain injury with loss of consciousness, subsequent encounter          Subjective:     Patient ID: Julia Benavidez  is a 45 y o  male  [facility] Treatment Plan   AREAS OF NEED: Psychosis, anxiety and depression as evidenced by auditory hallucinations, shortness of breath, isolation, helplessness, low motivation due to auditory hallucinations, inability to manage illness  Date Initiated: 11/15/22    Strengths: "helps everybody"     LONG TERM GOAL:   Date Initiated: 11/15/22  1 0 I will identify three ways that my overall well being has improved since attending [facility]  Target Date: 12/13/22  Completion Date:       SHORT TERM OBJECTIVES:     Date Initiated: 11/15/22  1 1 I will identify three coping skills to use each week in order to manage symptoms in a healthy way  Revision Date:   Target Date: 11/28/22  Completion Date:     Date Initiated: 11/15/22  1 2 I will practice at least one emotional regulation skill each week in order to understand and manage symptoms  Revision Date:   Target Date: 11/28/22  Completion Date:    Date Initiated: 11/15/22  1 3 I will take medications as prescribed and share questions and concerns if arise  Revision Date:  Target Date: 11/28/22  Completion Date:     Date Initiated: 11/15/22  1 4 I will identify 3 ways my supports can assist in my recovery and agree to staff/support contact as indicated      Revision Date:  Target Date: 11/28/22  Completion Date:          7 DAY REVISION:    Date Initiated:  Revision Date:   Target Date:   Completion Date:      PSYCHIATRY:  Date Initiated:  11/15/22  Medication Management and Education       Revision Date:       The person(s) responsible for carrying out the plan is Dr Salma Gayle, DO    NURSING/SYMPTOM EDUCATION:  Date Initiated: 11/15/22       1 1, 1 2  1 3, 1 4 Provide wellness/symptoms and skill education groups three to five days weekly to educate [facility]  on signs and symptoms of diagnoses, skills to manage stressors, and medication questions that will be addressed by the treatment team         Revision date: The person(s) responsible for carrying out the plan is Sue Zayas, RN, BSN    PSYCHOLOGY:   Date Initiated: 11/15/22       1 1, 1 2, 1 4 Provide psychotherapy group 5 times per week to allow opportunity for [facility]   to explore stressors and ways of coping  Revision Date:   The person(s) responsible for carrying out the plan is Bill Meraz    ALLIED THERAPY:   Date Initiated: 11/15/22  1 1,1 2 [address]  in AT group 5 times daily to encourage development and use of wellness tools to decrease symptoms and promote recovery through meaningful activity  Revision Date:       The person(s) responsible for carrying out the plan is HAJA Torres, HAJA Baez    CASE MANAGEMENT:   Date Initiated: 11/15/22      1 0 This  will meet with [facility]   3-4 times weekly to assess treatment progress, discharge planning, connection to community supports and UR as indicated  Revision Date:   The person(s) responsible for carrying out the plan is HAJA Torres    TREATMENT REVIEW/COMMENTS:     DISCHARGE CRITERIA: Identify 3 signs of progress and complete relapse prevention plan  DISCHARGE PLAN: Connect with identified outpatient providers  Estimated Length of Stay: 10 treatment days       Diagnosis and Treatment Plan explained to [facility] relates understanding diagnosis and is agreeable to Treatment Plan  CLIENT COMMENTS / Please share your thoughts, feelings, need and/or experiences regarding your treatment plan:     Signatures can be found in the [facility] Treatment Plan consents

## 2022-11-16 ENCOUNTER — OFFICE VISIT (OUTPATIENT)
Dept: PSYCHOLOGY | Facility: CLINIC | Age: 52
End: 2022-11-16

## 2022-11-16 DIAGNOSIS — S06.9X9D TRAUMATIC BRAIN INJURY WITH LOSS OF CONSCIOUSNESS, SUBSEQUENT ENCOUNTER: ICD-10-CM

## 2022-11-16 DIAGNOSIS — F32.9 REACTIVE DEPRESSION: ICD-10-CM

## 2022-11-16 DIAGNOSIS — F29 PSYCHOSIS, UNSPECIFIED PSYCHOSIS TYPE (HCC): Primary | ICD-10-CM

## 2022-11-16 NOTE — PSYCH
Subjective:     Patient ID: Tutu Martini  is a 45 y o  male  [facility] Clinical Progress Notes      Specialized Services Documentation  Therapist must complete separate progress note for each specific clinical activity in which the individual participated during the day  Allied Therapy   Group was facilitated virtually in a private office using HIPAA compliant and approved Microsoft Teams  Tutu Martini  consented to the use of tele-video modality of treatment and was virtually present for group allied therapy  [id number] Tutu Martini minimally shared in the [place] group focused on increasing awareness to emotions  Erika Poppy engaged in a wojciech analysis as well as a rhythmic drumming exercise  Group explored healthy ways to express emotions as well as how to practice it  Erika Mcwilliams learned five skills for emotional regulation  Slow initial effort noted toward treatment goal  Continue AT to encourage the development and practice of expressing emotions  Tx Plan Objective: 1 1,1 2,1 4, Therapist:  HAJA Farrell    Education Therapy   [id number] Tutu Martini  actively shared in morning assessment and goal review  Presented as Receptive related to readiness to learn  Tutu Martini did not complete goal from last treatment day as today is first treatment day  did not present with any barriers to learning  [id number] Tutu Martini engaged throughout the treatment day  Was engaged in learning related to Illness, Medication, Aftercare and Wellness Tools  Staff utilized Verbal, Written, A/V and Demonstration teaching methods  Tutu Martini shared area of learning and set a goal for outside of program to take a nap  Tx Plan Objective: 1 1,1 2,1 4, Therapist:  HAJA Farrell    Case Management Note    HAJA Farrell    Current suicide risk : Low     [id number] This writer met with Tutu Martini  for case management   Erika Poppy stated that he enjoyed his first day and noted that "you really didn't talk about my specific problem but I think some of the things could help"  This writer reviewed treatment plan goals and Li Cano agreed to them  Li Cano stated he was tired today as he is not used to waking up early and staying engaged all day, as group requires this  Next scheduled CM for 11/18/22    Medications changes/added/denied? No    Treatment session number: 1    Individual Case Management Visit provided today? Yes     [facility] follow up physician's orders: none at this time
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
## 2022-11-16 NOTE — PSYCH
Subjective:     Patient ID: Narciso Ansari  is a 45 y o  male  [facility] Clinical Progress Notes      Specialized Services Documentation  Therapist must complete separate progress note for each specific clinical activity in which the individual participated during the day  GROUP PSYCHOTHERAPY  ([id number]) Group was facilitated virtually in a private office using HIPAA Compliant and Approved Microsoft Teams  Narciso Ansari consented to the use of tele-video modality of treatment and was virtually present for group psychotherapy today  he attentively listened to Amarjit share her life story as she co-led this session  Group encouraged power of learning about self, accepting illness and personal responsibility in recovery  Community resources reviewed in addition to personal resources like the affirmations  Progress toward goals noted  Continue psychotherapy to encourage self -awareness and healthy engagement of supports      TX Plan Objectives: 1 1, 1 2, 1 4   Therapist: Arleth PRATHER RN

## 2022-11-16 NOTE — PSYCH
Subjective:    Patient ID: Minor Blizzard  is a 45 y o  male      [facility] Clinical Progress Notes      Specialized Services Documentation  Therapist must complete separate progress note for each specific clinical activity in which the individual participated during the day  Group Psychotherapy    [id number] This group was facilitated virtually in a private office using HIPAA Compliant and Approved [employer] Teams  Minor Blizzard engaged actively in a psychoeducational group focused on mindfulness  The group addressed misconceptions about mindfulness and explored physical and mental benefits of practicing mindfulness  Participants went over a list of many simple, mindful practices, aside from meditation  The group was provided the four steps of mindful practices to use in every day life  Minor Blizzard shared that he can practice mindfulness this week while taking his dogs out walking and while eating dinner  Progress towards goal noted as good  Continue with psychotherapy to enhance mindfulness skills and understanding       Tx Plan Objective: 1 1, 1 2, 1 4    Therapist: MARIANNE Hitchcock

## 2022-11-17 ENCOUNTER — OFFICE VISIT (OUTPATIENT)
Dept: PSYCHOLOGY | Facility: CLINIC | Age: 52
End: 2022-11-17

## 2022-11-17 DIAGNOSIS — F32.9 REACTIVE DEPRESSION: ICD-10-CM

## 2022-11-17 DIAGNOSIS — F29 PSYCHOSIS, UNSPECIFIED PSYCHOSIS TYPE (HCC): Primary | ICD-10-CM

## 2022-11-17 DIAGNOSIS — S06.9X9D TRAUMATIC BRAIN INJURY WITH LOSS OF CONSCIOUSNESS, SUBSEQUENT ENCOUNTER: ICD-10-CM

## 2022-11-17 NOTE — PSYCH
Subjective:     Patient ID: Norberto Shabazz  is a 45 y o  male  [facility]ations Clinical Progress Notes      Specialized Services Documentation  Therapist must complete separate progress note for each specific clinical activity in which the individual participated during the day  Case Management Note    HAJA Ibarra    Current suicide risk : Low     No CM scheduled for today  Additionally, no CM requested  Next scheduled CM for 11/18/22  Medications changes/added/denied? No    Treatment session number: 2    Individual Case Management Visit provided today? No    [facility]ations follow up physician's orders: none at this time

## 2022-11-17 NOTE — PSYCH
Subjective:    Patient ID: Rory Baker  is a 45 y o  male      Innovations Clinical Progress Notes      Specialized Services Documentation  Therapist must complete separate progress note for each specific clinical activity in which the individual participated during the day  Group Psychotherapy    [id number] This group was facilitated virtually in a private office using HIPAA Compliant and Approved [employer] Teams  Rory Baker participated actively in a psychoeducational group focused on scheduled worry time  The group began with an open discussion about daily anxiety as well as a collaborative list of things members have found helpful in dealing with the aforementioned experiences  Group members than went through detailed steps of using scheduled worry time as participants voluntarily contributed ideas to each step, displayed as a Word document via screen-sharing  The group was also provided with tips and disclaimers associated with the technique  Rory Baker openly shared  about his experiences with anxiety  Continued progress noted towards goals  Continue with psychotherapy to further develop anxiety management strategies       Tx Plan Objective 1 1, 1 2, 1 4 Therapist: MARIANNE Lopez

## 2022-11-17 NOTE — PSYCH
Subjective:     Patient ID: Anson Avery  is a 45 y o  male  [facility] Clinical Progress Notes      Specialized Services Documentation  Therapist must complete separate progress note for each specific clinical activity in which the individual participated during the day  Allied Therapy   This group was facilitated virtually in a private office using HIPAA Compliant and Approved [employer] Teams  Anson Avery  consented to the use of tele-video modality of treatment  [id number] [facility] in [facility] group focused on motivation  Engaged in [address] discussion and movement exercise exploring definition of, challenges to and ways to improve motivation  Group explored CBT and BA techniques to counteract limiting beliefs and set self up for success  [facility] was encouraged to write out an inclusion and counter belief to challenge a limiting belief in order to practice skill  He appeared attentive and shared without prompts  Some beginning progress toward goals  Continue AT to explore self awareness and encourage active practice to challenge symptoms      Tx Plan Objective: 1 1, 1 2 Therapist:  Leo SMYTH

## 2022-11-17 NOTE — PSYCH
Subjective:     Patient ID: [facility]  is a 45 y o  male  [facility] Clinical Progress Notes      Specialized Services Documentation  Therapist must complete separate progress note for each specific clinical activity in which the individual participated during the day  Group Psychotherapy  This group was facilitated virtually in a private office using HIPAA Compliant and Approved [employer] Teams  [facility]  consented to the use of tele-video modality of treatment  ([id number]) [facility]  attended group on Part II of Wellness Recovery Action Plan  Today, members focused on  WRAP's following specificsections and encouraged to fill in coping tools from their toolbox for planned responses:   · Daily Plan   · Identification of triggers and stressors  · Early warning signs  · When things are breaking down and or getting worse  · Crisis Plan  · Post crisis plan    Group members shared pieces of information from these sections and identified their importance  Writer encouraged the members of group to continue utilizing the packet to develop plans inside and outside of program  Some effort towards progress of goals which were displayed through participation and engagement in topic  Treatment Plan Objectives: 1 1, 1 2  Therapist: Mathew PRATHER RN            Education Therapy   [address]  with prompts shared in morning assessment and goal review  Presented as Receptive related to readiness to learn  [facility] did not complete goal from last treatment day identifying wanting to gain hope  did not present with any barriers to learning  [id number] [facility] engaged throughout the treatment day  Was engaged in learning related to Illness, Medication, Aftercare, and Wellness Tools  Staff utilized Verbal, Written, A/V, and Demonstration teaching methods  [facility] shared area of learning and set a goal for outside of program to cook dinner        Tx Plan Objective: 1 1,1 2,1 4 Therapist:  Mathew THOMPSONN RN

## 2022-11-18 ENCOUNTER — APPOINTMENT (OUTPATIENT)
Dept: PSYCHOLOGY | Facility: CLINIC | Age: 52
End: 2022-11-18

## 2022-11-18 ENCOUNTER — DOCUMENTATION (OUTPATIENT)
Dept: PSYCHOLOGY | Facility: CLINIC | Age: 52
End: 2022-11-18

## 2022-11-18 NOTE — PROGRESS NOTES
Subjective:     Patient ID: Ivette Galvez  is a 45 y o  male  [facility] Clinical Progress Notes      Specialized Services Documentation  Therapist must complete separate progress note for each specific clinical activity in which the individual participated during the day  Case Management Note    HAJA Pascual    Current suicide risk : Unable to assess due to absence  [address] emailed  stating he would be unable to attend group today due to "family issues"  Medications changes/added/denied? No    Treatment session number: n/a    Individual Case Management Visit provided today? No    [facility]ations follow up physician's orders: none at this time

## 2022-11-21 ENCOUNTER — OFFICE VISIT (OUTPATIENT)
Dept: PSYCHOLOGY | Facility: CLINIC | Age: 52
End: 2022-11-21

## 2022-11-21 DIAGNOSIS — S06.9X9D TRAUMATIC BRAIN INJURY WITH LOSS OF CONSCIOUSNESS, SUBSEQUENT ENCOUNTER: ICD-10-CM

## 2022-11-21 DIAGNOSIS — F29 PSYCHOSIS, UNSPECIFIED PSYCHOSIS TYPE (HCC): Primary | ICD-10-CM

## 2022-11-21 DIAGNOSIS — F32.9 REACTIVE DEPRESSION: ICD-10-CM

## 2022-11-21 NOTE — PSYCH
Subjective:     Patient ID: Lubna Ceron  is a 45 y o  male  In[facility] Clinical Progress Notes      Specialized Services Documentation  Therapist must complete separate progress note for each specific clinical activity in which the individual participated during the day  Allied Therapy   Group was facilitated virtually in a private office using HIPAA compliant and approved Microsoft Teams  Lubna Ceron  consented to the use of tele-video modality of treatment and was virtually present for group allied therapy  [address] [place] [address] Pete did not attend [facility] group focused on universal human needs  Continue AT to encourage development and practice of human needs  Tx Plan Objective: n/a, Therapist:  HAJA Shaw    Case Management Note    HAJA Shaw    Current suicide risk : Low     [id number] This writer called Lubna Ceron  for case management  Noah Murrayler stated he was "not good" and stated that he took his sleeping pills late last night and fell asleep at lunch time today  Zamora Best stated that his dog of 12 years is currently dying and that he declined very quickly  Noah Jewell wants to collect photos of his dog and either print them or compile them on his phone  This writer informed Noah Best of his medication check for Wednesday at 0930 as well as next scheduled CM for 11/23/22  Medications changes/added/denied? No    Treatment session number: 3    Individual Case Management Visit provided today? Yes     [facility]ations follow up physician's orders: none at this time
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
## 2022-11-21 NOTE — PSYCH
Subjective:     Patient ID: Mary Lopez  is a 45 y o  male  [facility] Clinical Progress Notes      Specialized Services Documentation  Therapist must complete separate progress note for each specific clinical activity in which the individual participated during the day  Psychotherapeutic Group ([id number])   This group was facilitated virtually in a private office using HIPAA Compliant and Approved Microsoft Teams  Mary Lopez consented to the use of tele-video modality of treatment  The group learned about all 15 cognitive distortions  They gained a further understanding about the way they think irrationally and discovered ways to address those instincts  We went over a lot of information on distortions; examples, definitions, consequences, and ways to identify/change cognitive distortions when they occur  The group was provided a worksheet with all 15 cognitive distortions and their definitions  Media in the form of a YouTube video regarding 10 common cognitive distortions (by Augusto) was played and discussed in depth  The video was an excellent short talk on how to recognize catastrophizing, the consequences of not addressing it, and how to combat it in the future  Mary Lopez  was in attendance, however, did not actively participate  Adan Meza is actively working on goals  Continue psychotherapy groups to encourage further exploration of needs, personal awareness, and skills       Tx Plan Objective: 1 1,1 2, 1 4   Therapist: Chrystine Fabry, MS

## 2022-11-21 NOTE — PSYCH
Subjective:    Patient ID: Madi Macedo  is a 45 y o  male      [facility] Clinical Progress Notes      Specialized Services Documentation  Therapist must complete separate progress note for each specific clinical activity in which the individual participated during the day  Education Therapy   [id number] This group was facilitated virtually in a private office using HIPAA Compliant and Approved [employer] Teams  Presented as Receptive related to readiness to learn  Madi Macedo Was not able to share if he was able to complete goal from last treatment day  did not present with any barriers to learning  [id number] This group was facilitated virtually in a private office using HIPAA Compliant and Approved [employer] Teams  Madi Macedo engaged throughout the treatment day  Was engaged in learning related to Illness, Medication, Aftercare, and Wellness Tools  Staff utilized Verbal, Written, A/V, and Demonstration teaching methods  Madi Macedo shared area of learning and set a goal for outside of program to take care of his dog  Tx Plan Objective: 1 1, 1 2, 1 4, Therapist: Sharyle Covey, [facility]    Group Psychotherapy    [id number] This group was facilitated virtually in a private office using HIPAA Compliant and Approved [employer] Teams  Madi Macedo participated actively in a psychoeducational group focused on emotional hygiene / first-aid  The group began by watching a TEDx talk by psychologist Clayton Pinedo Ph D  titled "How to practice emotional hygiene" followed by an article, "7 Ways to Practice Emotional First Aid " These were followed by an interactive group question/answer discussion that was displayed as a Word document via screen-sharing  Questions included things such as: emotional sensitivities / distress predispositions, signs of emotional wounds, distraction activities, ways to upkeep our emotional hygiene, and ideas for an emotional first-aid kit   Madi Macedo openly shared  about playing with pets as an emotional hygiene tool  Continued progress noted towards goals  Continue with psychotherapy to further enrich emotional wellbeing strategies       Tx Plan Objective 1 1, 1 2, 1 4 Therapist: MARIANNE Reis

## 2022-11-22 ENCOUNTER — OFFICE VISIT (OUTPATIENT)
Dept: PSYCHOLOGY | Facility: CLINIC | Age: 52
End: 2022-11-22

## 2022-11-22 DIAGNOSIS — S06.9X9D TRAUMATIC BRAIN INJURY WITH LOSS OF CONSCIOUSNESS, SUBSEQUENT ENCOUNTER: ICD-10-CM

## 2022-11-22 DIAGNOSIS — F29 PSYCHOSIS, UNSPECIFIED PSYCHOSIS TYPE (HCC): Primary | ICD-10-CM

## 2022-11-22 DIAGNOSIS — F32.9 REACTIVE DEPRESSION: ICD-10-CM

## 2022-11-22 NOTE — PSYCH
Virtual Regular Visit    Verification of patient location:    Patient is located in the following state in which I hold an active license [place]      Assessment/Plan:    Problem List Items Addressed This Visit        Nervous and Auditory    Traumatic brain injury with loss of consciousness ([facility] )       Other    Psychosis ([facility] [address] ) - Primary    Reactive depression       Goals addressed in session: Goal 1          Reason for visit is VIRTUAL PHP GROUP DUE TO COVID-19  Encounter provider [address] PARTIAL PSYCH PROGRAM    Provider located at [address] [place] [address]   [id number] [facility] [id number]      Recent Visits  Date Type Provider Dept   11/21/22 Office Visit [address] PARTIAL PSYCH GROUP THERAPY [facility]   11/17/22 Office Visit [address] PARTIAL PSYCH GROUP THERAPY [facility]   11/16/22 Office Visit [address] PARTIAL PSYCH GROUP THERAPY [facility]   11/15/22 Office Visit [address] PARTIAL PSYCH GROUP THERAPY [facility]   Showing recent visits within past 7 days and meeting all other requirements  Today's Visits  Date Type Provider Dept   11/22/22 Office Visit [address] PARTIAL PSYCH GROUP THERAPY [facility]   Showing today's visits and meeting all other requirements  Future Appointments  No visits were found meeting these conditions  Showing future appointments within next 150 days and meeting all other requirements       The patient was identified by name and date of birth  [facility]  was informed that this is a telemedicine visit and that the visit is being conducted [employer]  He agrees to proceed     My office door was closed  No one else was in the room  He acknowledged consent and understanding of privacy and security of the video platform  The patient has agreed to participate and understands they can discontinue the visit at any time  Patient is aware this is a billable service  Damián Medrano  is a 45 y o  male   HPI     Past Medical History:   Diagnosis Date   • Chemical exposure    • Diabetes mellitus ([facility] [address] )    • H/O bone graft    • Head injury     August 25, 2019 fell backwards hit head on a boulder w/ LOC   • Head injury     as an air [place] with the [employer] - jumped out of a plane parachute disfunction   • Hypertension    • Seizures ([facility] [address] )        Past Surgical History:   Procedure Laterality Date   • FL LUMBAR PUNCTURE DIAGNOSTIC  12/13/2019   • KNEE ARTHROSCOPY W/ MENISCECTOMY Left    • LEG SURGERY     • TONSILLECTOMY         Current Outpatient Medications   Medication Sig Dispense Refill   • amLODIPine (NORVASC) 10 mg tablet Take 10 mg by mouth daily     • amoxicillin (AMOXIL) 500 mg capsule TAKE 1 CAPSULE BY MOUTH TWICE DAILY FOR 10 DAYS     • aspirin 81 mg chewable tablet Chew 81 mg daily     • atorvastatin (LIPITOR) 20 mg tablet Take 20 mg by mouth every evening     • diazepam (VALIUM) 2 mg tablet Take 1 tablet (2 mg total) by mouth daily as needed for anxiety (severe anxiety only) 30 tablet 0   • divalproex sodium (DEPAKOTE) 250 mg EC tablet TAKE 1 TABLET BY MOUTH TWICE DAILY WITH  ONE  500  MG  TAB  FOR  TOAL  DOSE  OF  750  MG  TWICE  A  DAY 60 tablet 11   • divalproex sodium (DEPAKOTE) 500 mg EC tablet Take 1 tab twice a day with one 250 mg tab for total dose of 750 mg twice a day  60 tablet 11   • ergocalciferol (VITAMIN D2) 50,000 units Take 1 capsule (50,000 Units total) by mouth once a week for 8 doses 8 capsule 0   • haloperidol (HALDOL) 5 mg tablet Take 1 tablet (5 mg total) by mouth daily Start with half a tablet daily, increase as tolerated to one whole tablets or half a tablet twice a day   30 tablet 1   • ibuprofen (MOTRIN) 800 mg tablet daily as needed     • insulin NPH-insulin regular (NovoLIN 70/30) 100 units/mL subcutaneous injection Inject 10 Units under the skin daily before breakfast (Patient taking differently: Inject 12 Units under the skin daily before breakfast) 3 mL 0   • Jardiance 25 MG TABS Take 25 mg by mouth daily     • lamoTRIgine (LaMICtal) 100 mg tablet Take 1 tab (100 mg) twice a day 180 tablet 3   • lisinopril (ZESTRIL) 40 mg tablet Take 1 tablet by mouth daily     • metFORMIN (GLUCOPHAGE) 1000 MG tablet Take 1,000 mg by mouth 2 (two) times a day with meals     • metoprolol tartrate (LOPRESSOR) 100 mg tablet TAKE 1 TABLET BY MOUTH EVERY 12 HOURS 180 tablet 0   • OLANZapine (ZyPREXA) 20 MG tablet TAKE 1 TABLET BY MOUTH ONCE DAILY AT BEDTIME 30 tablet 5   • sertraline (Zoloft) 100 mg tablet Take 1 tablet (100 mg total) by mouth daily 30 tablet 1   • Ventolin  (90 Base) MCG/ACT inhaler Inhale 2 puffs every 6 (six) hours as needed     • Vitamin D, Cholecalciferol, 50 MCG (2000 UT) CAPS Take 1 capsule by mouth once daily 30 capsule 5   • zaleplon (SONATA) 5 MG capsule Take 1 capsule (5 mg total) by mouth daily at bedtime 30 capsule 5     No current facility-administered medications for this visit  No Known Allergies    Review of Systems    Video Exam    There were no vitals filed for this visit  Physical Exam     Visit Time  I have spent FOUR GROUP HOURS + CASE MANAGEMENT minutes with patient today in which greater than 50% of the time was spent in counseling/coordination of care regarding PHP- see notes

## 2022-11-22 NOTE — PSYCH
Subjective:     Patient ID: Dom Estrella  is a 45 y o  male  [facility] Clinical Progress Notes      Specialized Services Documentation  Therapist must complete separate progress note for each specific clinical activity in which the individual participated during the day  Allied Therapy   Group was facilitated virtually in a private office using HIPAA compliant and approved [employer] Teams  Dom Estrella  consented to the use of tele-video modality of treatment and was virtually present for group allied therapy  [address]  actively shared in [facility] group focused on stress management skills  Sagar Cerda was observed to be engaged in therapist led visualization as well as a progressive muscle relaxation  Group discussed symptoms of stress and seven tips for stress management with encouragement to use daily  Sagar Cerda identified his phone as something he could put in his self-soothe kit  Some effort noted toward treatment goal  Continue AT to encourage development and practice of stress management skills  Tx Plan Objective: 1 1,1 2,1 4, Therapist:  Aileen Schilder, MT-BC    Case Management Note    Aileen Schilder, MT-BC    Current suicide risk : Low     No CM scheduled for today  Additionally, no CM requested  Next scheduled CM for 11/23/22  Medications changes/added/denied? No    Treatment session number: 4    Individual Case Management Visit provided today? No    [facility] follow up physician's orders: none at this time

## 2022-11-22 NOTE — PSYCH
Subjective:     Patient ID: Ifrah Bain  is a 45 y o  male  [facility] Clinical Progress Notes      Specialized Services Documentation  Therapist must complete separate progress note for each specific clinical activity in which the individual participated during the day  Psychotherapeutic Group ([id number])   This group was facilitated virtually in a private office using HIPAA Compliant and Approved [employer] Teams  Ifrah Bain consented to the use of tele-video modality of treatment  The group learned about the causes of dissatisfaction and using gratitude as an antidote  The group gained a further understanding of feelings of dissatisfaction through a short video and discussions  They identified solutions to these feelings by doing writing exercises, practice gratitude journal, and discussion  The group also learned new skills to achieve a larger sense of gratitude  Ifrah Bain actively participated in group by watching the video, contributing in discussions, writing, reading a [place] gratitude blessing, and reviewing a worksheet  Samantha Zamora  is working towards goals  Continue psychotherapy groups to encourage further exploration of needs, personal awareness, and skills        Tx Plan Objective: 1 1, 1 2, 1 4 Therapist: Brie Lowe MS

## 2022-11-22 NOTE — PSYCH
Subjective:    Patient ID: Laura Wasserman  is a 45 y o  male      [facility] Clinical Progress Notes      Specialized Services Documentation  Therapist must complete separate progress note for each specific clinical activity in which the individual participated during the day  Education Therapy   [id number] This group was facilitated virtually in a private office using HIPAA Compliant and Approved [employer] Teams  Laura Wasserman actively shared in check in and goal review  Presented as Pain related to readiness to learn  Laura Wasserman Shared about the loss of his dog in the night since last treatment day  did present with any barriers to learning as evidenced by verbalizations of grief  5543-0867 This group was facilitated virtually in a private office using HIPAA Compliant and Approved [employer] Teams  Laura Wasserman engaged throughout the treatment day  Was engaged in learning related to Illness, Medication, Aftercare and Wellness Tools  Staff utilized Verbal, Written, A/V and Demonstration teaching methods  Laura Wasserman shared area of learning and set a goal for outside of program to take time  Tx Plan Objective: 1 1, 1 2, 1 4, Therapist: CHAPO Rashid [facility]    Group Psychotherapy    [id number] This group was facilitated virtually in a private office using HIPAA Compliant and Approved [employer] Teams  Laura Wasserman participated quietly in a psychoeducational group focused on the topic of values  The group defined the concept of values and went through several examples of values  Participants learned about ten domains of values then chose four to focus on for an independent writing prompt on aligning with valued directions  After open sharing and discussion, group members were asked to independently come up with ideas for incorporating these values into their routine  Continued progress noted towards goals  Continue with psychotherapy to encourage further value exploration       Tx Plan Objective 1 1, 1 2, 1 4 Therapist: Sharyle Covey, BA

## 2022-11-23 ENCOUNTER — DOCUMENTATION (OUTPATIENT)
Dept: PSYCHOLOGY | Facility: CLINIC | Age: 52
End: 2022-11-23

## 2022-11-23 ENCOUNTER — APPOINTMENT (OUTPATIENT)
Dept: PSYCHOLOGY | Facility: CLINIC | Age: 52
End: 2022-11-23

## 2022-11-23 NOTE — PROGRESS NOTES
Subjective:     Patient ID: Laura Wasserman  is a 45 y o  male  [facility] Clinical Progress Notes      Specialized Services Documentation  Therapist must complete separate progress note for each specific clinical activity in which the individual participated during the day  Case Management Note    HAJA Reyes    Current suicide risk : Unable to assess due to absence  3922 this writer attempted to call Laura Wasserman  Call was automatically denied  1126 this writer attempted to call Laura Wasserman  Call was automatically denied  1127 this writer attempted to call emergency contact, wife and left voicemail with callback number requesting return call  1500 attempted to call Laura Wasserman  Call was automatically denied  Medications changes/added/denied? Yes see BEAU torres  Treatment session number: n/a    Individual Case Management Visit provided today? No    [facility] follow up physician's orders: none at this time

## 2022-11-23 NOTE — PSYCH
Subjective:    Patient ID: Johann Schultz  is a 45 y o  male      [facility] Clinical Progress Notes      Specialized Services Documentation  Therapist must complete separate progress note for each specific clinical activity in which the individual participated during the day  Education Therapy   [id number] This group was facilitated virtually in a private office using HIPAA Compliant and Approved [employer] Teams  Johann Schultz with prompts shared in check in and goal review  Presented as Pain related to readiness to learn  Johann Schultz asked to leave group for the day then left the meeting before he checking in or participating in goal review  did present with any barriers to learning  8468-0876 This group was facilitated virtually in a private office using HIPAA Compliant and Approved [employer] Teams  Johann Schultz engaged throughout the treatment day  Was engaged in learning related to {Education Completed:83471}  Staff utilized {Teaching [employer] teaching methods  Johann Schultz shared area of learning and set a goal for outside of program to ***        Tx Plan Objective: 1 1, 1 2, 1 4, Therapist: MARIANNE Bashir

## 2022-11-25 ENCOUNTER — APPOINTMENT (OUTPATIENT)
Dept: PSYCHOLOGY | Facility: CLINIC | Age: 52
End: 2022-11-25

## 2022-11-25 ENCOUNTER — DOCUMENTATION (OUTPATIENT)
Dept: PSYCHOLOGY | Facility: CLINIC | Age: 52
End: 2022-11-25

## 2022-11-25 NOTE — PROGRESS NOTES
Subjective:     Patient ID: Dom Estrella  is a 45 y o  male  [facility] Clinical Progress Notes      Specialized Services Documentation  Therapist must complete separate progress note for each specific clinical activity in which the individual participated during the day  Case Management Note    Aileen Schilder, MT-BC    Current suicide risk : Unable to assess due to absence  1027 this writer called and was automatically declined  1033 this writer contacted EC, wife, and left voice mail asking for return call  1044 this writer sent email to Dom Estrella  asking for reply by phone or email  1332 attempted to call and was automatically declined  1411 attempted to call and was automatically declined  1418 called EC and left voicemail  1431 this writer sent email to Dom Estrella  asking for reply by phone or email  Medications changes/added/denied? No    Treatment session number: n/a [facility]    Individual Case Management Visit provided today? No    [facility] follow up physician's orders: none at this time

## 2022-11-28 ENCOUNTER — APPOINTMENT (OUTPATIENT)
Dept: PSYCHOLOGY | Facility: CLINIC | Age: 52
End: 2022-11-28

## 2022-11-28 ENCOUNTER — DOCUMENTATION (OUTPATIENT)
Dept: PSYCHOLOGY | Facility: CLINIC | Age: 52
End: 2022-11-28

## 2022-11-28 NOTE — PROGRESS NOTES
Subjective:     Patient ID: Alexa Matamoros  is a 45 y o  male  [facility] Clinical Progress Notes      Specialized Services Documentation  Therapist must complete separate progress note for each specific clinical activity in which the individual participated during the day  Case Management Note    HAJA Zambrano    Current suicide risk : Low     1059 this writer attempted to call and was automatically declined  1100 this writer called emergency contact and left voicemail stating crisis would be called if no response by 1400   1105 this writer sent an e-mail to Alexa Matamoros  stating if no response by 1400 crisis would be called  [phone] this writer received a call from Alexa Matamoros  who stated that YOVANI told him to "not come back until Tuesday"  Bruno Jarquin stated he was under the impression that he was not to come back to group for a week  This writer explained that was not the case, and most likely that his next med check wouldn't be until next week  This writer stated that if Bruno Jarquin misses any more group time he will need to be discharged due to poor attendance  Bruno Jarquin stated he understood and that he would be in group from [id number] for the rest of this week  This writer ensured that Bruno Jarquin and his wife have a contact number that way there should be no more missed calls  Next scheduled CM for 11/29/22  Medications changes/added/denied? No    Treatment session number: n/a [facility]    Individual Case Management Visit provided today? Yes     [facility] follow up physician's orders: none at this time

## 2022-11-29 ENCOUNTER — OFFICE VISIT (OUTPATIENT)
Dept: PSYCHOLOGY | Facility: CLINIC | Age: 52
End: 2022-11-29

## 2022-11-29 DIAGNOSIS — F32.9 REACTIVE DEPRESSION: ICD-10-CM

## 2022-11-29 DIAGNOSIS — F29 PSYCHOSIS, UNSPECIFIED PSYCHOSIS TYPE (HCC): Primary | ICD-10-CM

## 2022-11-29 DIAGNOSIS — S06.9X9D TRAUMATIC BRAIN INJURY WITH LOSS OF CONSCIOUSNESS, SUBSEQUENT ENCOUNTER: ICD-10-CM

## 2022-11-29 NOTE — PSYCH
Subjective:    Patient ID: Madi Macedo  is a 45 y o  male      [facility] Clinical Progress Notes      Specialized Services Documentation  Therapist must complete separate progress note for each specific clinical activity in which the individual participated during the day  Group Psychotherapy    [id number] This group was facilitated virtually in a private office using HIPAA Compliant and Approved [employer] Teams  Madi Macedo participated actively in a psych-educational group on boundaries  The group focused on the interpersonal effectiveness pillar of DBT; specifically, looking at the [place] [address]  Participants followed a step by step breakdown of the [facility] process and were encouraged to engage in open discussion throughout  Group members were given a [facility] practice worksheet to independently complete  Madi Macedo openly shared  throughout the group about personal situations involving boundaries or different elements of HERMAN  Continued progress noted towards goals  Continue with psychotherapy to encourage further development of interpersonal effectiveness skills      Tx Plan Objective 1 1, 1 2, 1 4 Therapist: Sharyle Covey, BA

## 2022-11-29 NOTE — BH TREATMENT PLAN
Assessment/Plan:       Diagnoses and all orders for this visit:     Psychosis, unspecified psychosis type ([address] )     Reactive depression     Traumatic brain injury with loss of consciousness, subsequent encounter         Subjective:   Patient ID: Malik Acosta  is a 45 y o  male  [facility] Treatment Plan   AREAS OF NEED: Psychosis, anxiety and depression as evidenced by auditory hallucinations, shortness of breath, isolation, helplessness, low motivation due to auditory hallucinations, inability to manage illness  Date Initiated: 11/15/22     Strengths: "helps everybody"                 LONG TERM GOAL:   Date Initiated: 11/15/22  1 0 I will identify three ways that my overall well being has improved since attending [facility]  Target Date: 12/13/22  Completion Date:         SHORT TERM OBJECTIVES:      Date Initiated: 11/15/22  1 1 I will identify three coping skills to use each week in order to manage symptoms in a healthy way  Revision Date: 11/29/22 due to absence yesterday   Target Date: 11/28/22  Completion Date:      Date Initiated: 11/15/22  1 2 I will practice at least one emotional regulation skill each week in order to understand and manage symptoms  Revision Date: 11/29/22 due to absence yesterday  Target Date: 11/28/22  Completion Date:     Date Initiated: 11/15/22  1 3 I will take medications as prescribed and share questions and concerns if arise  Revision Date: 11/29/22 due to absence yesterday  Target Date: 11/28/22  Completion Date:      Date Initiated: 11/15/22  1 4 I will identify 3 ways my supports can assist in my recovery and agree to staff/support contact as indicated  Revision Date: 11/29/22 due to absence yesterday  Target Date: 11/28/22  Completion Date:            7 DAY REVISION:  1 5 I will attend all groups, case management and medication checks as well as returning phone calls within 4 hours in order to successfully complete group     Date Initiated: 11/29/22   Revision Date:   Target Date: 12/09/22  Completion Date:        PSYCHIATRY:  Date Initiated:  11/15/22  Medication Management and Education       Revision Date: 11/29/22        1 3 Continue medication management      The person(s) responsible for carrying out the plan is Dr Franca Cho DO     NURSING/SYMPTOM EDUCATION:  Date Initiated: 11/15/22       1 1, 1 2  1 3, 1 4 Provide wellness/symptoms and skill education groups three to five days weekly to educate [facility]  on signs and symptoms of diagnoses, skills to manage stressors, and medication questions that will be addressed by the treatment team         Revision date: 11/29/22        1 1,1 2,1 3,1 4,1 5 Continue to encourage [facility]  to participate in wellness groups daily to learn about symptoms, coping strategies and warning signs to promote relapse prevention  The person(s) responsible for carrying out the plan is Deirdre Chapa RN, BSN     PSYCHOLOGY:   Date Initiated: 11/15/22       1 1, 1 2, 1 4 Provide psychotherapy group 5 times per week to allow opportunity for [facility]   to explore stressors and ways of coping  Revision Date: 11/29/22   1 1,1 2,1 4,1 5  Continue to provide psychotherapy group daily to [facility]  and encourage sharing of stressors, skills and positive change  The person(s) responsible for carrying out the plan is Bill Bee     ALLIED THERAPY:   Date Initiated: 11/15/22  1 1,1 2 [address] J[address]  in AT group 5 times daily to encourage development and use of wellness tools to decrease symptoms and promote recovery through meaningful activity  Revision Date: 11/29/22   1 1,1 2,1 5 Continue to engage [facility]  to participate in AT group to practice wellness tools within program and transfer to home sharing successes and barriers through healthy task involvement        The person(s) responsible for carrying out the plan is HAJA Coe, HAJA Jara     CASE MANAGEMENT:   Date Initiated: 11/15/22      1 0 This  will meet with [facility]   3-4 times weekly to assess treatment progress, discharge planning, connection to community supports and UR as indicated  Revision Date: 11/29/22   1 0 Continue to meet with [facility]  3-4 times weekly to assess growth, work toward goals, continued treatment needs, dc planning and use of supports  The person(s) responsible for carrying out the plan is Yoselyn FridayHAJA     TREATMENT REVIEW/COMMENTS:      DISCHARGE CRITERIA: Identify 3 signs of progress and complete relapse prevention plan  DISCHARGE PLAN: Connect with identified outpatient providers  Estimated Length of Stay: 10 treatment days       Diagnosis and Treatment Plan explained to [facility] relates understanding diagnosis and is agreeable to Treatment Plan         CLIENT COMMENTS / Please share your thoughts, feelings, need and/or experiences regarding your treatment plan:      Signatures can be found in the [facility] Treatment Plan consents

## 2022-11-29 NOTE — PSYCH
Subjective:     Patient ID: Chris Lake  is a 45 y o  male  [facility] Clinical Progress Notes      Specialized Services Documentation  Therapist must complete separate progress note for each specific clinical activity in which the individual participated during the day  Allied Therapy   Group was facilitated virtually in a private office using HIPAA compliant and approved [employer] Teams  Chris Lake  consented to the use of tele-video modality of treatment and was virtually present for group allied therapy  [address]  actively shared in [facility] group focused on SMART goals  Nish Cano was observed to be engaged in a therapist led discussion to the [facility] [employer] and talked about taking back control of their lives  Nish Cano engaged in discussion on what SMART goals are, why they are important, and how to set them  Group was given time to practice writing a SMART goal and listed wanting to decrease irritability and improve sleep as one they were hoping to accomplish  Some effort noted toward treatment goal  Continue AT to encourage development and practice of writing and understanding of SMART goals  Tx Plan Objective: 1 1,1 2,1 4, Therapist:  HAJA Hurtado    Case Management Note    HAJA Hurtado    Current suicide risk : Low     [id number] this writer met with Chris Lake  for case management  Nish Cano stated that he has been having a lot of rough days  Nish Cano stated that from the moment he wakes up his voices in his head start talking to him  Nish Cano reports that this is very annoying and causes him to be very irritated  This writer discussed working on responding to the negative things the voices are saying with positive things  This writer updated treatment plan to add attendance goal  Next scheduled CM for 12/01/22  This writer informed Nish Cano of medication check tomorrow at 0930 and that he is to log into morning assessment, join his med check link and then return to groups       Medications changes/added/denied? No    Treatment session number: 6    Individual Case Management Visit provided today? Yes     [facility]ations follow up physician's orders: none at this time

## 2022-11-29 NOTE — PSYCH
Subjective:     Patient ID: Mary Lopez  is a 45 y o  male  [facility] Clinical Progress Notes      Specialized Services Documentation  Therapist must complete separate progress note for each specific clinical activity in which the individual participated during the day  Group Psychotherapy ([id number])    The group focused on expressing their feelings through the mediums of creative writing and art  Group began with each person choosing a number from 1-100  Each number was assigned a different prompt (such as "I hope   ", "I am ", "Recovery is   ", "I forgive   ", etc) and then roughly 5-7 minutes was allotted for each member to write a poem, creative writing, paragraph, story, etc based on their assigned phrase  The group shared each of their writings and took a moment to explain/give each other positive feedback  The group was then opened up to allow any member to share their own creative writing from outside of program  The second exercise was as follows: the group was presented with an image on the television  Microtonal low-fi music was quietly played while each member wrote either a word association, poem, or short story based on/inspired by the visual/auditory stimulation  Group members were given a final exercise of using art to express their perception of an emotion  Mary Lopez contributed to the group by voluntarily sharing each of his assigned writing prompts  Adan Meza  is actively working towards goals  Continue psychotherapy groups to encourage further exploration of needs, personal awareness, and skills  Tx Plan Objective: 1 1,1 2, 1 4   Therapist: Chrystine Fabry, MS    Education Therapy   [id number] [facility] Sumaya Perez  actively shared in morning assessment and goal review  Presented as Receptive related to readiness to learn  Mary Lopez did not have a goal due to him not having been in program for several days  did not present with any barriers to learning  [address] engaged throughout the treatment day  Was engaged in learning related to Illness, Medication, Aftercare and Wellness Tools  Staff utilized Verbal, Written, A/V and Demonstration teaching methods  [address] shared area of learning and set a goal for outside of program to practice his sleep hygiene        Tx Plan Objective: 1 1,1 2, 1 4   Therapist: Kassandra Collier MS

## 2022-11-29 NOTE — PSYCH
Virtual Regular Visit    Verification of patient location:    Patient is located in the following state in which I hold an active license [place]      Assessment/Plan:    Problem List Items Addressed This Visit        Nervous and Auditory    Traumatic brain injury with loss of consciousness ([facility] [address] )       Other    Psychosis ([facility] [address] ) - Primary    Reactive depression       Goals addressed in session: Goal 1          Reason for visit is VIRTUAL PHP GROUP DUE TO COVID-19  Encounter provider [facility] PARTIAL PSYCH PROGRAM    Provider located at [address] [place] [address]   [address] [place] [id number]      Recent Visits  Date Type Provider Dept   11/22/22 Office Visit [facility] PARTIAL PSYCH GROUP THERAPY [facility]   Showing recent visits within past 7 days and meeting all other requirements  Today's Visits  Date Type Provider Dept   11/29/22 Office Visit [facility] PARTIAL PSYCH GROUP THERAPY [facility]   Showing today's visits and meeting all other requirements  Future Appointments  No visits were found meeting these conditions  Showing future appointments within next 150 days and meeting all other requirements       The patient was identified by name and date of birth  [facility]  was informed that this is a telemedicine visit and that the visit is being conducted [employer]  He agrees to proceed     My office door was closed  No one else was in the room  He acknowledged consent and understanding of privacy and security of the video platform  The patient has agreed to participate and understands they can discontinue the visit at any time  Patient is aware this is a billable service  Rosalba Whitaker  is a 45 y o  male         HPI     Past Medical History:   Diagnosis Date   • Chemical exposure    • Diabetes mellitus ([facility] [address] )    • H/O bone graft    • Head injury     August 25, 2019 fell backwards hit head on a boulder w/ LOC   • Head injury     as an air [place] with the [employer] - jumped out of a plane parachute disfunction   • Hypertension    • Seizures ([address] )        Past Surgical History:   Procedure Laterality Date   • FL LUMBAR PUNCTURE DIAGNOSTIC  12/13/2019   • KNEE ARTHROSCOPY W/ MENISCECTOMY Left    • LEG SURGERY     • TONSILLECTOMY         Current Outpatient Medications   Medication Sig Dispense Refill   • amLODIPine (NORVASC) 10 mg tablet Take 10 mg by mouth daily     • amoxicillin (AMOXIL) 500 mg capsule TAKE 1 CAPSULE BY MOUTH TWICE DAILY FOR 10 DAYS     • aspirin 81 mg chewable tablet Chew 81 mg daily     • atorvastatin (LIPITOR) 20 mg tablet Take 20 mg by mouth every evening     • diazepam (VALIUM) 2 mg tablet Take 1 tablet (2 mg total) by mouth daily as needed for anxiety (severe anxiety only) 30 tablet 0   • divalproex sodium (DEPAKOTE) 250 mg EC tablet TAKE 1 TABLET BY MOUTH TWICE DAILY WITH  ONE  500  MG  TAB  FOR  TOAL  DOSE  OF  750  MG  TWICE  A  DAY 60 tablet 11   • divalproex sodium (DEPAKOTE) 500 mg EC tablet Take 1 tab twice a day with one 250 mg tab for total dose of 750 mg twice a day  60 tablet 11   • ergocalciferol (VITAMIN D2) 50,000 units Take 1 capsule (50,000 Units total) by mouth once a week for 8 doses 8 capsule 0   • haloperidol (HALDOL) 5 mg tablet Take 1 tablet (5 mg total) by mouth daily Start with half a tablet daily, increase as tolerated to one whole tablets or half a tablet twice a day   30 tablet 1   • ibuprofen (MOTRIN) 800 mg tablet daily as needed     • insulin NPH-insulin regular (NovoLIN 70/30) 100 units/mL subcutaneous injection Inject 10 Units under the skin daily before breakfast (Patient taking differently: Inject 12 Units under the skin daily before breakfast) 3 mL 0   • Jardiance 25 MG TABS Take 25 mg by mouth daily     • lamoTRIgine (LaMICtal) 100 mg tablet Take 1 tab (100 mg) twice a day 180 tablet 3   • lisinopril (ZESTRIL) 40 mg tablet Take 1 tablet by mouth daily     • metFORMIN (GLUCOPHAGE) 1000 MG tablet Take 1,000 mg by mouth 2 (two) times a day with meals     • metoprolol tartrate (LOPRESSOR) 100 mg tablet TAKE 1 TABLET BY MOUTH EVERY 12 HOURS 180 tablet 0   • OLANZapine (ZyPREXA) 20 MG tablet TAKE 1 TABLET BY MOUTH ONCE DAILY AT BEDTIME 30 tablet 5   • sertraline (Zoloft) 100 mg tablet Take 1 tablet (100 mg total) by mouth daily 30 tablet 1   • Ventolin  (90 Base) MCG/ACT inhaler Inhale 2 puffs every 6 (six) hours as needed     • Vitamin D, Cholecalciferol, 50 MCG (2000 UT) CAPS Take 1 capsule by mouth once daily 30 capsule 5   • zaleplon (SONATA) 5 MG capsule Take 1 capsule (5 mg total) by mouth daily at bedtime 30 capsule 5     No current facility-administered medications for this visit  No Known Allergies    Review of Systems    Video Exam    There were no vitals filed for this visit  Physical Exam     Visit Time  I have spent FOUR GROUP HOURS + CASE MANAGEMENT minutes with patient today in which greater than 50% of the time was spent in counseling/coordination of care regarding PHP- see notes

## 2022-11-30 ENCOUNTER — TELEMEDICINE (OUTPATIENT)
Dept: PSYCHIATRY | Facility: CLINIC | Age: 52
End: 2022-11-30

## 2022-11-30 ENCOUNTER — OFFICE VISIT (OUTPATIENT)
Dept: PSYCHOLOGY | Facility: CLINIC | Age: 52
End: 2022-11-30

## 2022-11-30 DIAGNOSIS — F29 PSYCHOSIS, UNSPECIFIED PSYCHOSIS TYPE (HCC): Primary | ICD-10-CM

## 2022-11-30 DIAGNOSIS — F32.9 REACTIVE DEPRESSION: ICD-10-CM

## 2022-11-30 DIAGNOSIS — S06.9X9D TRAUMATIC BRAIN INJURY WITH LOSS OF CONSCIOUSNESS, SUBSEQUENT ENCOUNTER: ICD-10-CM

## 2022-11-30 DIAGNOSIS — F29 PSYCHOSIS, UNSPECIFIED PSYCHOSIS TYPE (HCC): ICD-10-CM

## 2022-11-30 RX ORDER — HALOPERIDOL 5 MG/1
7.5 TABLET ORAL DAILY
COMMUNITY
Start: 2022-11-30 | End: 2022-12-06 | Stop reason: ALTCHOICE

## 2022-11-30 NOTE — PSYCH
Subjective:     Patient ID: Dom Estrella  is a 45 y o  male  [facility] Clinical Progress Notes      Specialized Services Documentation  Therapist must complete separate progress note for each specific clinical activity in which the individual participated during the day  Case Management Note    Aileen Schilder, MT-BC    Current suicide risk : Low     No CM scheduled for today  Additionally, no CM requested  Next CM for 12/01/22    Medications changes/added/denied? Yes see BEAU Farmer's note  Treatment session number: 7    Individual Case Management Visit provided today? No    Innovations follow up physician's orders: none at this time

## 2022-11-30 NOTE — PSYCH
Petey Meth was unable to turn his camera on so visit done audio only  He is taking 5 mg haldol and notes no change in AH  Today voices are telling him to leave the program, that it is "useless"  No other commands  No SI  No noticeable side effects to haldol  Tremors have not worsened  Has not gotten EKG yet  Moustapha asked Eduard to get EKG done as soon as possible  Will discuss further with Dr Rufino Weinberg  For now cont haldol 5 mg and zyprexa 20 mg/d  Discussed with Dr Rufino Weinberg  Will increase haldol to 7 5 mg/d pending results of EKG  Petey Meth agrees

## 2022-11-30 NOTE — PSYCH
Virtual Regular Visit    Verification of patient location:    Patient is located in the following state in which I hold an active license [place]      Assessment/Plan:    Problem List Items Addressed This Visit        Nervous and Auditory    Traumatic brain injury with loss of consciousness ([facility] [address] )       Other    Psychosis ([facility] [address] ) - Primary    Reactive depression       Goals addressed in session: Goal 1          Reason for visit is VIRTUAL PHP GROUP DUE TO COVID-19  Encounter provider [employer] PARTIAL PSYCH PROGRAM    Provider located at [address] [place] [address]   [address] [facility] [id number]      Recent Visits  Date Type Provider Dept   11/29/22 Office Visit [address] PARTIAL PSYCH GROUP THERAPY [facility]   Showing recent visits within past 7 days and meeting all other requirements  Today's Visits  Date Type Provider Dept   11/30/22 Office Visit [address] PARTIAL PSYCH GROUP THERAPY [facility]   Showing today's visits and meeting all other requirements  Future Appointments  No visits were found meeting these conditions  Showing future appointments within next 150 days and meeting all other requirements       The patient was identified by name and date of birth  [facility]  was informed that this is a telemedicine visit and that the visit is being conducted [employer]  He agrees to proceed     My office door was closed  No one else was in the room  He acknowledged consent and understanding of privacy and security of the video platform  The patient has agreed to participate and understands they can discontinue the visit at any time  Patient is aware this is a billable service  Marques Marino  is a 45 y o  male          HPI     Past Medical History:   Diagnosis Date   • Chemical exposure    • Diabetes mellitus ([facility] [address] )    • H/O bone graft    • Head injury     August 25, 2019 fell backwards hit head on a [place]oulder w/ LOC   • Head injury     as an air [place] with [employer] - jumped out of a plane parachute disfunction   • Hypertension    • Seizures ([address] )        Past Surgical History:   Procedure Laterality Date   • FL LUMBAR PUNCTURE DIAGNOSTIC  12/13/2019   • KNEE ARTHROSCOPY W/ MENISCECTOMY Left    • LEG SURGERY     • TONSILLECTOMY         Current Outpatient Medications   Medication Sig Dispense Refill   • amLODIPine (NORVASC) 10 mg tablet Take 10 mg by mouth daily     • amoxicillin (AMOXIL) 500 mg capsule TAKE 1 CAPSULE BY MOUTH TWICE DAILY FOR 10 DAYS     • aspirin 81 mg chewable tablet Chew 81 mg daily     • atorvastatin (LIPITOR) 20 mg tablet Take 20 mg by mouth every evening     • diazepam (VALIUM) 2 mg tablet Take 1 tablet (2 mg total) by mouth daily as needed for anxiety (severe anxiety only) 30 tablet 0   • divalproex sodium (DEPAKOTE) 250 mg EC tablet TAKE 1 TABLET BY MOUTH TWICE DAILY WITH  ONE  500  MG  TAB  FOR  TOAL  DOSE  OF  750  MG  TWICE  A  DAY 60 tablet 11   • divalproex sodium (DEPAKOTE) 500 mg EC tablet Take 1 tab twice a day with one 250 mg tab for total dose of 750 mg twice a day   60 tablet 11   • ergocalciferol (VITAMIN D2) 50,000 units Take 1 capsule (50,000 Units total) by mouth once a week for 8 doses 8 capsule 0   • haloperidol (HALDOL) 5 mg tablet Take 1 5 tablets (7 5 mg total) by mouth daily     • ibuprofen (MOTRIN) 800 mg tablet daily as needed     • insulin NPH-insulin regular (NovoLIN 70/30) 100 units/mL subcutaneous injection Inject 10 Units under the skin daily before breakfast (Patient taking differently: Inject 12 Units under the skin daily before breakfast) 3 mL 0   • Jardiance 25 MG TABS Take 25 mg by mouth daily     • lamoTRIgine (LaMICtal) 100 mg tablet Take 1 tab (100 mg) twice a day 180 tablet 3   • lisinopril (ZESTRIL) 40 mg tablet Take 1 tablet by mouth daily     • metFORMIN (GLUCOPHAGE) 1000 MG tablet Take 1,000 mg by mouth 2 (two) times a day with meals     • metoprolol tartrate (LOPRESSOR) 100 mg tablet TAKE 1 TABLET BY MOUTH EVERY 12 HOURS 180 tablet 0   • OLANZapine (ZyPREXA) 20 MG tablet TAKE 1 TABLET BY MOUTH ONCE DAILY AT BEDTIME 30 tablet 5   • sertraline (Zoloft) 100 mg tablet Take 1 tablet (100 mg total) by mouth daily 30 tablet 1   • Ventolin  (90 Base) MCG/ACT inhaler Inhale 2 puffs every 6 (six) hours as needed     • Vitamin D, Cholecalciferol, 50 MCG (2000 UT) CAPS Take 1 capsule by mouth once daily 30 capsule 5   • zaleplon (SONATA) 5 MG capsule Take 1 capsule (5 mg total) by mouth daily at bedtime 30 capsule 5     No current facility-administered medications for this visit  No Known Allergies    Review of Systems    Video Exam    There were no vitals filed for this visit  Physical Exam     Visit Time  I have spent FOUR GROUP HOURS + CASE MANAGEMENT minutes with patient today in which greater than 50% of the time was spent in counseling/coordination of care regarding PHP- see notes

## 2022-11-30 NOTE — PSYCH
Subjective:    Patient ID: Malik Acosta  is a 45 y o  male      [facility] Clinical Progress Notes      Specialized Services Documentation  Therapist must complete separate progress note for each specific clinical activity in which the individual participated during the day  Education Therapy   [id number] This group was facilitated virtually in a private office using HIPAA Compliant and Approved [employer] Teams  Malik Acosta actively shared in check in and goal review  Presented as Receptive related to readiness to learn  Malik Acosta  did not complete goal from last treatment day identifying headaches as a barrier  did not present with any barriers to learning  [id number] This group was facilitated virtually in a private office using HIPAA Compliant and Approved [employer] Teams  Malik Acosta engaged throughout the treatment day  Was engaged in learning related to Illness, Medication, Aftercare and Wellness Tools  Staff utilized Verbal, Written, A/V and Demonstration teaching methods  Malik Acosta shared area of learning and set a goal for outside of program to walk his puppy  Tx Plan Objective: 1 1, 1 2, 1 4, Therapist: Ayla López    Group Psychotherapy    [id number] This group was facilitated virtually in a private office using HIPAA Compliant and Approved [employer] Teams  Malik Acosta participated actively in a psychoeducational group focused on behavioral activation  The group began by watching an informational video that explained behavioral activation as it relates to both CBT & DBT  The group then explored a document which detailed and displayed examples of how our negative events, feelings/moods, and behaviors can self-perpetuate in a vicious cycle  Members took time to independently identify the steps in a personal cycle revolving around negative behaviors  Followed by this, the same activity was completed, but this time with a positive behavior   Members voluntarily shared results and reflections  The group was provided different lists of pleasurable activities along with worksheets for planning previously enjoyed (but since discontinued) and new positive activities into their lives  For both categories of pleasurable activities the group was instructed on how to create realistic, actionable steps towards reintegrating or introducing these activities into their lives, as well as problem-solving in advance for any potential obstacles or setbacks  The group was provided with information and resources for activity planning / mood tracking, as well as tips for success  Romina Garcia engaged nonverbally as evidenced by  activity seen through his video camera  Continued progress noted towards goals  Continue with psychotherapy to encourage further encouragement towards positive activities  Tx Plan Objective 1 1, 1 2, 1 4 Therapist: MARIANNE Molina      ALLIED THERAPY ([id number])  This group was facilitated virtually in a private office using HIPAA Compliant and Approved [employer] Teams  Romina Garcia consented to the use of tele-video modality of treatment  CPS Millie Rosales shared her life story as she co-led this session  The guest speaker presentation about LETICIA encouraged group members to reflect on their mental health journey  regarding the power of learning about self, accepting illness, and personal responsibility in recovery  MARGA White reviewed community resources in addition to personal resources like positive affirmations and an acronym she uses to remind her about what tools she should use on a daily basis to maintain mental health wellness  [facility]  attentively listened to Cindy's presentation  Progress toward goals noted  Continue AT to encourage self -awareness and healthy engagement of supports      TX Plan Objectives: 1 1, 1 2, 1 4   Therapist: Rafita Estrella, [address] [place] [address]

## 2022-11-30 NOTE — PSYCH
Subjective:     Patient ID: Pineda Naranjo  is a 45 y o  male  [facility] Clinical Progress Notes      Specialized Services Documentation  Therapist must complete separate progress note for each specific clinical activity in which the individual participated during the day  Psychotherapeutic Group ([id number])    The group learned and discussed what happens in the body and mind during sleep, the consequences of poor sleep, things that cause poor sleep, and ways/skills to get to sleep/stay asleep  They shared methods that work, methods that don't, and learned new ones  We reviewed the stages of sleep in detail, in addition to multiple sleep hygiene handouts and sleep diaries  There were multiple handouts and worksheets in addition to white board content  This group contained a large amount of discussion and sharing  Pineda Naranjo shared actively and contributed to the group by sharing his difficulties with sleep, including how his sleep meds effect him  Js Goins was able to share very relatable struggles with the group and received good advice from peers  Tra Antoine is actively working towards goals  Continue psychotherapy groups to encourage further exploration of needs, personal awareness, and skills      Tx Plan Objective: 1 1,1 2, 1 4   Therapist: Terrie Iqbal MS

## 2022-12-01 ENCOUNTER — OFFICE VISIT (OUTPATIENT)
Dept: PSYCHOLOGY | Facility: CLINIC | Age: 52
End: 2022-12-01

## 2022-12-01 DIAGNOSIS — S06.9X9D TRAUMATIC BRAIN INJURY WITH LOSS OF CONSCIOUSNESS, SUBSEQUENT ENCOUNTER: ICD-10-CM

## 2022-12-01 DIAGNOSIS — F32.9 REACTIVE DEPRESSION: ICD-10-CM

## 2022-12-01 DIAGNOSIS — F29 PSYCHOSIS, UNSPECIFIED PSYCHOSIS TYPE (HCC): Primary | ICD-10-CM

## 2022-12-01 NOTE — PSYCH
Subjective:     Patient ID: Paulo Lewis  is a 45 y o  male  [facility] Clinical Progress Notes      Specialized Services Documentation  Therapist must complete separate progress note for each specific clinical activity in which the individual participated during the day  Allied Therapy   Group was facilitated virtually in a private office using HIPAA compliant and approved [employer] Teams  Paulo Lewis  consented to the use of tele-video modality of treatment and was virtually present for group allied therapy  [facility]   actively shared in [facility] group focused on how to begin to understand the stages of grief as well as beginning to take the steps toward acceptance  Roxanne Snyder engaged in a reflective wojciech analysis of "Landslide" by Alonso Headings and shared in group discussion  Group explored the process, stages, facts and tasks associated with grief  Group took time to work on a grief DBT house and was given a visual example of the directions  Roxanne Snyder did not share from their [place]  The group concluded with "[employer]" by Giuliana Tabares as a way to better relate to the grieving process  Minimal progress toward treatment goals  Continue AT to encourage the development and practice of understanding and progressing through the stages of grief  Tx Plan Objective: 1 1,1 2,1 4, Therapist:  HAJA Patel    Case Management Note    HAJA Patel    Current suicide risk : Low     [id number] this writer met with Paulo Lewis  for case management  Roxanne Snyder stated that "today is a really rough day"  Roxanne Snyder stated that the voices are very active today and are mimicking everything that is being said in group  Roxanne Snyder stated that he has cut back on coffee and will not have any after dinner  This writer reminded Roxanne Snyder that he is to get his EKG done before medications can be adjusted again  Roxanne Snyder stated he would do that this weekend  Roxanne Snyder requested discharge for early next week, after next medication check   This writer encouraged Marilyn Tyler to schedule appointment with outpatient medication management for anytime after 12/06/22  Medications changes/added/denied? No    Treatment session number: 8    Individual Case Management Visit provided today? Yes     [facility]ations follow up physician's orders: none at this time

## 2022-12-01 NOTE — PSYCH
Subjective:    Patient ID: Missy Roper  is a 45 y o  male      [facility] Clinical Progress Notes      Specialized Services Documentation  Therapist must complete separate progress note for each specific clinical activity in which the individual participated during the day  Education Therapy   [id number] This group was facilitated virtually in a private office using HIPAA Compliant and Approved [employer] Teams  Missy Roper actively shared in check in and goal review  Presented as Receptive related to readiness to learn  Missy Ropre  did complete goal from last treatment day identifying gaining a sense of accomplishment  did not present with any barriers to learning  [id number] This group was facilitated virtually in a private office using HIPAA Compliant and Approved [employer] Teams  Missy Roper engaged throughout the treatment day  Was engaged in learning related to Illness, Medication, Aftercare and Wellness Tools  Staff utilized Verbal, Written, A/V and Demonstration teaching methods  Missy Roper shared area of learning and set a goal for outside of program to walk his puppy outside and try to cook dinner  Tx Plan Objective: 1 1, 1 2, 1 4, Therapist: Roberto Alejo    Group Psychotherapy    [id number] This group was facilitated virtually in a private office using HIPAA Compliant and Approved [employer] Teams  Missy Roper engaged actively in a psychoeducational group focused on internal boundaries  Group members openly discussed: the idea and practice of setting and enforcing / barriers to and experiences with internal boundaries  Participants learned several key facets of internal boundaries and were provided with reflective and informational materials to begin identifying and formulating personal internal boundaries  Missy Roper openly shared  about a personal space boundary violation that occurs for him at [facility]  Continued progress noted towards goals   Continue with psychotherapy to further develop familiarity with various types of boundaries  Tx Plan Objective: 1 1, 1 2, 1 4    Therapist: CHAPO [facility]      Group Psychotherapy    [id number] This group was facilitated virtually in a private office using HIPAA Compliant and Approved [employer] Teams  Romina Garcia participated actively in a psychoeducational group focused on habit stacking  The group began by watching an informational video that explained habit stacking as it relates to routine building and mental health  The group then explored a document which further explained and detailed the benefits of routines, elements of habit stacking, steps to stacking habits, and tips for success  Members took time to independently identify examples of both positive and negative habits from their own lives  Members voluntarily shared reflections and ideas for habit stacking throughout the session  536 Radha engaged nonverbally as evidenced by  having his camera on  Continued progress noted towards goals  Continue with psychotherapy to encourage further encouragement towards positive activities       Tx Plan Objective 1 1, 1 2, 1 4 Therapist: MARIANNE Miranda

## 2022-12-01 NOTE — PSYCH
Virtual Regular Visit    Verification of patient location:    Patient is located in the following state in which I hold an active license [place]      Assessment/Plan:    Problem List Items Addressed This Visit        Nervous and Auditory    Traumatic brain injury with loss of consciousness ([facility] [facility] )       Other    Psychosis ([facility] ) - Primary    Reactive depression       Goals addressed in session: Goal 1          Reason for visit is VIRTUAL PHP GROUP DUE TO COVID-19  Encounter provider [address] [facility] PARTIAL PSYCH PROGRAM    Provider located at [address] [place] [address]   [address] [facility] [id number]      Recent Visits  Date Type Provider Dept   11/30/22 Office Visit [address] PARTIAL PSYCH GROUP THERAPY [facility]   11/29/22 Office Visit [address] PARTIAL PSYCH GROUP THERAPY [facility]   Showing recent visits within past 7 days and meeting all other requirements  Today's Visits  Date Type Provider Dept   12/01/22 Office Visit [address] PARTIAL PSYCH GROUP THERAPY [facility]   Showing today's visits and meeting all other requirements  Future Appointments  No visits were found meeting these conditions  Showing future appointments within next 150 days and meeting all other requirements       The patient was identified by name and date of birth  [facility]  was informed that this is a telemedicine visit and that the visit is being conducted [employer]  He agrees to proceed     My office door was closed  No one else was in the room  He acknowledged consent and understanding of privacy and security of the video platform  The patient has agreed to participate and understands they can discontinue the visit at any time  Patient is aware this is a billable service  Aleksandra Blum  is a 45 y o  male         HPI     Past Medical History:   Diagnosis Date   • Chemical exposure    • Diabetes mellitus ([facility] )    • H/O bone graft    • Head injury     August 25, 2019 fell backwards hit head on a boulder w/ LOC   • Head injury     as an air [place] with the [employer] - jumped out of a plane parachute disfunction   • Hypertension    • Seizures ([address] )        Past Surgical History:   Procedure Laterality Date   • FL LUMBAR PUNCTURE DIAGNOSTIC  12/13/2019   • KNEE ARTHROSCOPY W/ MENISCECTOMY Left    • LEG SURGERY     • TONSILLECTOMY         Current Outpatient Medications   Medication Sig Dispense Refill   • amLODIPine (NORVASC) 10 mg tablet Take 10 mg by mouth daily     • amoxicillin (AMOXIL) 500 mg capsule TAKE 1 CAPSULE BY MOUTH TWICE DAILY FOR 10 DAYS     • aspirin 81 mg chewable tablet Chew 81 mg daily     • atorvastatin (LIPITOR) 20 mg tablet Take 20 mg by mouth every evening     • diazepam (VALIUM) 2 mg tablet Take 1 tablet (2 mg total) by mouth daily as needed for anxiety (severe anxiety only) 30 tablet 0   • divalproex sodium (DEPAKOTE) 250 mg EC tablet TAKE 1 TABLET BY MOUTH TWICE DAILY WITH  ONE  500  MG  TAB  FOR  TOAL  DOSE  OF  750  MG  TWICE  A  DAY 60 tablet 11   • divalproex sodium (DEPAKOTE) 500 mg EC tablet Take 1 tab twice a day with one 250 mg tab for total dose of 750 mg twice a day   60 tablet 11   • ergocalciferol (VITAMIN D2) 50,000 units Take 1 capsule (50,000 Units total) by mouth once a week for 8 doses 8 capsule 0   • haloperidol (HALDOL) 5 mg tablet Take 1 5 tablets (7 5 mg total) by mouth daily     • ibuprofen (MOTRIN) 800 mg tablet daily as needed     • insulin NPH-insulin regular (NovoLIN 70/30) 100 units/mL subcutaneous injection Inject 10 Units under the skin daily before breakfast (Patient taking differently: Inject 12 Units under the skin daily before breakfast) 3 mL 0   • Jardiance 25 MG TABS Take 25 mg by mouth daily     • lamoTRIgine (LaMICtal) 100 mg tablet Take 1 tab (100 mg) twice a day 180 tablet 3   • lisinopril (ZESTRIL) 40 mg tablet Take 1 tablet by mouth daily     • metFORMIN (GLUCOPHAGE) 1000 MG tablet Take 1,000 mg by mouth 2 (two) times a day with meals     • metoprolol tartrate (LOPRESSOR) 100 mg tablet TAKE 1 TABLET BY MOUTH EVERY 12 HOURS 180 tablet 0   • OLANZapine (ZyPREXA) 20 MG tablet TAKE 1 TABLET BY MOUTH ONCE DAILY AT BEDTIME 30 tablet 5   • sertraline (Zoloft) 100 mg tablet Take 1 tablet (100 mg total) by mouth daily 30 tablet 1   • Ventolin  (90 Base) MCG/ACT inhaler Inhale 2 puffs every 6 (six) hours as needed     • Vitamin D, Cholecalciferol, 50 MCG (2000 UT) CAPS Take 1 capsule by mouth once daily 30 capsule 5   • zaleplon (SONATA) 5 MG capsule Take 1 capsule (5 mg total) by mouth daily at bedtime 30 capsule 5     No current facility-administered medications for this visit  No Known Allergies    Review of Systems    Video Exam    There were no vitals filed for this visit  Physical Exam     Visit Time  I have spent FOUR GROUP HOURS + CASE MANAGEMENT minutes with patient today in which greater than 50% of the time was spent in counseling/coordination of care regarding PHP- see notes

## 2022-12-02 ENCOUNTER — DOCUMENTATION (OUTPATIENT)
Dept: PSYCHOLOGY | Facility: CLINIC | Age: 52
End: 2022-12-02

## 2022-12-02 ENCOUNTER — APPOINTMENT (OUTPATIENT)
Dept: PSYCHOLOGY | Facility: CLINIC | Age: 52
End: 2022-12-02

## 2022-12-02 NOTE — PROGRESS NOTES
Subjective:     Patient ID: Alexa Matamoros  is a 45 y o  male  [facility] Clinical Progress Notes      Specialized Services Documentation  Therapist must complete separate progress note for each specific clinical activity in which the individual participated during the day  Case Management Note    Sean Castellano, [facility]    Current suicide risk : Low     [id number] this writer spoke with Alexa Matamoros  Gwenyth Rinne asked to be excused for the remainder of the day due to not sleeping last night as "the voices kept me awake"  This writer asked that Bruno Jarquin call his outpatient provider today while he had the time away from group in order to establish aftercare appointment  Bruno Jarquin stated he could not focus or think clearly today and felt that time to sleep would be helpful  This writer notified Bruno Jarquin of his medication check scheduled for 12/06/22 at 0900  This writer again encouraged Bruno Jarquin to complete his EKG prior to his next medication check  Next scheduled CM for 12/05/22    Medications changes/added/denied? No    Treatment session number: 9    Individual Case Management Visit provided today? Yes     [facility] follow up physician's orders: none at this time

## 2022-12-03 ENCOUNTER — APPOINTMENT (EMERGENCY)
Dept: CT IMAGING | Facility: HOSPITAL | Age: 52
End: 2022-12-03

## 2022-12-03 ENCOUNTER — HOSPITAL ENCOUNTER (EMERGENCY)
Facility: HOSPITAL | Age: 52
Discharge: HOME | End: 2022-12-03
Attending: EMERGENCY MEDICINE

## 2022-12-03 VITALS
OXYGEN SATURATION: 93 % | RESPIRATION RATE: 16 BRPM | DIASTOLIC BLOOD PRESSURE: 77 MMHG | WEIGHT: 260.14 LBS | TEMPERATURE: 98 F | SYSTOLIC BLOOD PRESSURE: 110 MMHG | HEART RATE: 69 BPM | BODY MASS INDEX: 37.33 KG/M2
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
## 2022-12-03 LAB
ATRIAL RATE: 68 BPM
P AXIS: 42 DEGREES
PR INTERVAL: 198 MS
QRS AXIS: -56 DEGREES
QRSD INTERVAL: 106 MS
QT INTERVAL: 418 MS
QTC INTERVAL: 444 MS
T WAVE AXIS: 14 DEGREES
VENTRICULAR RATE: 68 BPM

## 2022-12-03 RX ORDER — PREDNISONE 20 MG/1
60 TABLET ORAL DAILY
Qty: 15 TABLET | Refills: 0 | Status: SHIPPED | OUTPATIENT
Start: 2022-12-03 | End: 2022-12-08

## 2022-12-03 RX ORDER — DIAZEPAM 5 MG/1
5 TABLET ORAL EVERY 8 HOURS PRN
Qty: 15 TABLET | Refills: 0 | Status: SHIPPED | OUTPATIENT
Start: 2022-12-03 | End: 2022-12-08

## 2022-12-03 RX ORDER — DIAZEPAM 5 MG/1
5 TABLET ORAL ONCE
Status: COMPLETED | OUTPATIENT
Start: 2022-12-03 | End: 2022-12-03

## 2022-12-03 RX ADMIN — DIAZEPAM 5 MG: 5 TABLET ORAL at 18:07

## 2022-12-03 NOTE — ED PROVIDER NOTES
History  Chief Complaint   Patient presents with   • Fall     Patient reports falling into wall while getting up from sitting position, hitting head, denies LOC, n/v  Hx of TBI  Patient reports blurred vision  45year old male pt comes to the  ED with cc of midline cervical spine pain after a fall onto his head with hyperextension of the neck  Collar placed for concern of C spine injury      History provided by:  Patient   used: No    Fall  Mechanism of injury: fall    Injury location:  Head/neck  Head/neck injury location:  Head  Arrived directly from scene: no    Fall:     Fall occurred:  Tripped and walking  Prior to arrival data:     Breathing interventions:  None  Associated symptoms: no abdominal pain, no back pain, no blindness, no chest pain, no headaches, no hearing loss and no loss of consciousness        Prior to Admission Medications   Prescriptions Last Dose Informant Patient Reported? Taking?    Jardiance 25 MG TABS   Yes No   Sig: Take 25 mg by mouth daily   OLANZapine (ZyPREXA) 20 MG tablet   No No   Sig: TAKE 1 TABLET BY MOUTH ONCE DAILY AT BEDTIME   Ventolin  (90 Base) MCG/ACT inhaler   Yes No   Sig: Inhale 2 puffs every 6 (six) hours as needed   Vitamin D, Cholecalciferol, 50 MCG (2000 UT) CAPS   No No   Sig: Take 1 capsule by mouth once daily   amLODIPine (NORVASC) 10 mg tablet   Yes No   Sig: Take 10 mg by mouth daily   amoxicillin (AMOXIL) 500 mg capsule   Yes No   Sig: TAKE 1 CAPSULE BY MOUTH TWICE DAILY FOR 10 DAYS   aspirin 81 mg chewable tablet   Yes No   Sig: Chew 81 mg daily   atorvastatin (LIPITOR) 20 mg tablet   Yes No   Sig: Take 20 mg by mouth every evening   diazepam (VALIUM) 2 mg tablet   No No   Sig: Take 1 tablet (2 mg total) by mouth daily as needed for anxiety (severe anxiety only)   divalproex sodium (DEPAKOTE) 250 mg EC tablet   No No   Sig: TAKE 1 TABLET BY MOUTH TWICE DAILY WITH  ONE  500  MG  TAB  FOR  TOAL  DOSE  OF  750  MG  TWICE  A  DAY divalproex sodium (DEPAKOTE) 500 mg EC tablet   No No   Sig: Take 1 tab twice a day with one 250 mg tab for total dose of 750 mg twice a day     ergocalciferol (VITAMIN D2) 50,000 units   No No   Sig: Take 1 capsule (50,000 Units total) by mouth once a week for 8 doses   haloperidol (HALDOL) 5 mg tablet   Yes No   Sig: Take 1 5 tablets (7 5 mg total) by mouth daily   ibuprofen (MOTRIN) 800 mg tablet   Yes No   Sig: daily as needed   insulin NPH-insulin regular (NovoLIN 70/30) 100 units/mL subcutaneous injection   No No   Sig: Inject 10 Units under the skin daily before breakfast   Patient taking differently: Inject 12 Units under the skin daily before breakfast   lamoTRIgine (LaMICtal) 100 mg tablet   No No   Sig: Take 1 tab (100 mg) twice a day   lisinopril (ZESTRIL) 40 mg tablet   Yes No   Sig: Take 1 tablet by mouth daily   metFORMIN (GLUCOPHAGE) 1000 MG tablet   Yes No   Sig: Take 1,000 mg by mouth 2 (two) times a day with meals   metoprolol tartrate (LOPRESSOR) 100 mg tablet   No No   Sig: TAKE 1 TABLET BY MOUTH EVERY 12 HOURS   sertraline (Zoloft) 100 mg tablet   No No   Sig: Take 1 tablet (100 mg total) by mouth daily   zaleplon (SONATA) 5 MG capsule   No No   Sig: Take 1 capsule (5 mg total) by mouth daily at bedtime      Facility-Administered Medications: None       Past Medical History:   Diagnosis Date   • Chemical exposure    • Diabetes mellitus ([facility] [address] )    • H/O bone graft    • Head injury     August 25, 2019 fell backwards hit head on a boulder w/ LOC   • Head injury     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   • Hypertension    • Seizures ([address] )        Past Surgical History:   Procedure Laterality Date   • FL LUMBAR PUNCTURE DIAGNOSTIC  12/13/2019   • KNEE ARTHROSCOPY W/ MENISCECTOMY Left    • LEG SURGERY     • TONSILLECTOMY         Family History   Problem Relation Age of Onset   • Diabetes Mother    • Hypertension Mother    • Asthma Mother    • Brain cancer Father    • No Known Problems Sister    • No Known Problems Brother    • No Known Problems Brother      I have reviewed and agree with the history as documented  E-Cigarette/Vaping   • E-Cigarette Use Never User      E-Cigarette/Vaping Substances   • Nicotine No    • THC No    • CBD No    • Flavoring No    • Other No    • Unknown No      Social History     Tobacco Use   • Smoking status: Never   • Smokeless tobacco: Never   Vaping Use   • Vaping Use: Never used   Substance Use Topics   • Alcohol use: Not Currently     Alcohol/week: 3 0 standard drinks     Types: 3 Cans of beer per week   • Drug use: Never       Review of Systems   HENT: Negative for hearing loss  Eyes: Negative for blindness  Cardiovascular: Negative for chest pain  Gastrointestinal: Negative for abdominal pain  Musculoskeletal: Negative for back pain  Neurological: Negative for loss of consciousness and headaches  All other systems reviewed and are negative  Physical Exam  Physical Exam  Vitals and nursing note reviewed  Constitutional:       General: He is not in acute distress  Appearance: He is well-developed and well-nourished  He is not diaphoretic  HENT:      Head: Normocephalic and atraumatic  Right Ear: External ear normal       Left Ear: External ear normal    Eyes:      General: No scleral icterus  Right eye: No discharge  Left eye: No discharge  Extraocular Movements: EOM normal       Conjunctiva/sclera: Conjunctivae normal    Neck:      Thyroid: No thyromegaly  Vascular: No JVD  Trachea: No tracheal deviation  Cardiovascular:      Rate and Rhythm: Normal rate and regular rhythm  Pulmonary:      Effort: Pulmonary effort is normal  No respiratory distress  Breath sounds: Normal breath sounds  No stridor  No wheezing or rales  Abdominal:      General: Bowel sounds are normal  There is no distension  Palpations: Abdomen is soft  Tenderness:  There is no abdominal tenderness  Musculoskeletal:         General: No deformity or edema  Normal range of motion  Cervical back: Rigidity and tenderness present  Skin:     General: Skin is warm and dry  Neurological:      Mental Status: He is alert and oriented to person, place, and time  Cranial Nerves: No cranial nerve deficit  Coordination: Coordination normal    Psychiatric:         Mood and Affect: Mood and affect normal          Behavior: Behavior normal          Vital Signs  ED Triage Vitals   Temperature Pulse Respirations Blood Pressure SpO2   12/03/22 1739 12/03/22 1739 12/03/22 1739 12/03/22 1739 12/03/22 1739   98 °F (36 7 °C) 63 18 139/96 93 %      Temp Source Heart Rate Source Patient Position - Orthostatic VS BP Location FiO2 (%)   12/03/22 1739 12/03/22 1739 12/03/22 1815 12/03/22 1815 --   Oral Monitor Lying Right arm       Pain Score       --                  Vitals:    12/03/22 1739 12/03/22 1815 12/03/22 1845 12/03/22 1903   BP: 139/96 103/64 (!) 88/49 110/77   Pulse: 63 69 71 69   Patient Position - Orthostatic VS:  Lying Lying Lying         Visual Acuity  Visual Acuity    Flowsheet Row Most Recent Value   L Pupil Size (mm) 3   R Pupil Size (mm) 3          ED Medications  Medications   diazepam (VALIUM) tablet 5 mg (5 mg Oral Given 12/3/22 1807)       Diagnostic Studies  Results Reviewed     None                 CT head without contrast   Final Result by Alberto García DO (12/03 1901)      No acute intracranial abnormality  Stable examination  Workstation performed: [id number]         CT spine cervical without contrast   Final Result by Alberto García DO (12/03 1904)      Cervical degenerative change as described above  No acute osseous abnormality                     Workstation performed: [id number]                    Procedures  Procedures         ED Course                                             MDM  Number of Diagnoses or Management Options  Fall, initial encounter: new and requires workup     Amount and/or Complexity of Data Reviewed  Independent visualization of images, tracings, or specimens: yes        Disposition  Final diagnoses:   Fall, initial encounter     Time reflects when diagnosis was documented in both MDM as applicable and the Disposition within this note     Time User Action Codes Description Comment    12/3/2022  7:11 PM Daniella Caro Add [E19  Anjel Wang, initial encounter       ED Disposition     ED Disposition   Discharge    Condition   Stable    Date/Time   Sat Dec 3, 2022  7:11 PM    Comment   Demetri  discharge to home/self care  Follow-up Information     Follow up With Specialties Details Why [address] Internal Medicine   [facility]  [address] [place] [address]  [address]  [address]            Discharge Medication List as of 12/3/2022  7:13 PM      START taking these medications    Details   !! diazepam (VALIUM) 5 mg tablet Take 1 tablet (5 mg total) by mouth every 8 (eight) hours as needed for anxiety for up to 5 days, Starting Sat 12/3/2022, Until Thu 12/8/2022 at 2359, Print      predniSONE 20 mg tablet Take 3 tablets (60 mg total) by mouth daily for 5 days, Starting Sat 12/3/2022, Until Thu 12/8/2022, Normal       !! - Potential duplicate medications found  Please discuss with provider        CONTINUE these medications which have NOT CHANGED    Details   amLODIPine (NORVASC) 10 mg tablet Take 10 mg by mouth daily, Starting Sun 4/4/2021, Historical Med      amoxicillin (AMOXIL) 500 mg capsule TAKE 1 CAPSULE BY MOUTH TWICE DAILY FOR 10 DAYS, Historical Med      aspirin 81 mg chewable tablet Chew 81 mg daily, Starting Thu 4/22/2021, Historical Med      atorvastatin (LIPITOR) 20 mg tablet Take 20 mg by mouth every evening, Starting Fri 5/15/2020, Historical Med      !! diazepam (VALIUM) 2 mg tablet Take 1 tablet (2 mg total) by mouth daily as needed for anxiety (severe anxiety only), Starting Wed 6/8/2022, Normal      !! divalproex sodium (DEPAKOTE) 250 mg EC tablet TAKE 1 TABLET BY MOUTH TWICE DAILY WITH  ONE  500  MG  TAB  FOR  TOAL  DOSE  OF  750  MG  TWICE  A  DAY, Normal      !! divalproex sodium (DEPAKOTE) 500 mg EC tablet Take 1 tab twice a day with one 250 mg tab for total dose of 750 mg twice a day , Normal      ergocalciferol (VITAMIN D2) 50,000 units Take 1 capsule (50,000 Units total) by mouth once a week for 8 doses, Starting Sun 6/21/2020, Until Tue 11/15/2022, Normal      haloperidol (HALDOL) 5 mg tablet Take 1 5 tablets (7 5 mg total) by mouth daily, Starting Wed 11/30/2022, Historical Med      ibuprofen (MOTRIN) 800 mg tablet daily as needed, Starting Mon 3/29/2021, Historical Med      insulin NPH-insulin regular (NovoLIN 70/30) 100 units/mL subcutaneous injection Inject 10 Units under the skin daily before breakfast, Starting Sat 12/14/2019, Normal      Jardiance 25 MG TABS Take 25 mg by mouth daily, Starting Sun 4/25/2021, Historical Med      lamoTRIgine (LaMICtal) 100 mg tablet Take 1 tab (100 mg) twice a day, Normal      lisinopril (ZESTRIL) 40 mg tablet Take 1 tablet by mouth daily, Starting Mon 3/29/2021, Historical Med      metFORMIN (GLUCOPHAGE) 1000 MG tablet Take 1,000 mg by mouth 2 (two) times a day with meals, Starting Thu 3/11/2021, Historical Med      metoprolol tartrate (LOPRESSOR) 100 mg tablet TAKE 1 TABLET BY MOUTH EVERY 12 HOURS, Normal      OLANZapine (ZyPREXA) 20 MG tablet TAKE 1 TABLET BY MOUTH ONCE DAILY AT BEDTIME, Normal      sertraline (Zoloft) 100 mg tablet Take 1 tablet (100 mg total) by mouth daily, Starting Tue 11/15/2022, Normal      Ventolin  (90 Base) MCG/ACT inhaler Inhale 2 puffs every 6 (six) hours as needed, Starting Mon 10/31/2022, Historical Med      Vitamin D, Cholecalciferol, 50 MCG (2000 UT) CAPS Take 1 capsule by mouth once daily, Normal      zaleplon (SONATA) 5 MG capsule Take 1 capsule (5 mg total) by mouth daily at bedtime, Starting Mon 8/1/2022, Normal       !! - Potential duplicate medications found  Please discuss with provider  No discharge procedures on file      PDMP Review       Value Time User    PDMP Reviewed  Yes 8/1/2022  2:56 PM ROCKY Soriano          ED Provider  Electronically Signed by           Toribio Workman DO  12/04/22 2454

## 2022-12-05 ENCOUNTER — OFFICE VISIT (OUTPATIENT)
Dept: PSYCHOLOGY | Facility: CLINIC | Age: 52
End: 2022-12-05

## 2022-12-05 DIAGNOSIS — F32.9 REACTIVE DEPRESSION: ICD-10-CM

## 2022-12-05 DIAGNOSIS — F29 PSYCHOSIS, UNSPECIFIED PSYCHOSIS TYPE (HCC): Primary | ICD-10-CM

## 2022-12-05 DIAGNOSIS — S06.9X9D TRAUMATIC BRAIN INJURY WITH LOSS OF CONSCIOUSNESS, SUBSEQUENT ENCOUNTER: ICD-10-CM

## 2022-12-05 NOTE — PSYCH
[facility] Discharge Instructions:   Disposition: Home  Address: [address] [place] [id number]   Diagnosis:  1  Psychosis, unspecified psychosis type ([address] )        2  Reactive depression        3  Traumatic brain injury with loss of consciousness, subsequent encounter            Allergies (Drug/Food): No Known Allergies    Activity: No recommendations   Diet:no recommendations  Smoking Cessation: The best thing you can do to improve your health is to stop using tobacco  Diagnostic/Laboratory Orders: no labs ordered    Vaccines: If you received a vaccine, please notify your family physician on your next visit  For more information, please call [phone]  Follow-up appointments/Referrals:     Current Psychiatrist  Jennifer Brown   [address] [facility]  [place], [address]  P- [phone]  [phone]     Therapist:   None, referrals were sent  ICM/CTT:none    [facility]: [facility] [phone] and [facility] [phone]  Intake/Referral/Evaluation (Non-Emergency) *NON INSURED FOR FUNDING: [facility]: [phone], [facility]: [phone], [facility]: [phone] and [place]: [phone]  Crisis Intervention (Emergency) [place] Service: [facility]: [phone], [place]: [phone], [facility] [address]: [phone], [facility]): [phone], [facility]: [phone] and C/[facility]: [phone]  _________________________________  National Crisis Intervention Hotline: 1-589.751.9208  National Suicide Crisis Hotline: 2-824.157.7639  I, the undersigned, have received and understand the above instructions          Patient/Rep Signature: __________________________________       Date/Time: ______________         Physician Signature: ____________________________________      Date/Time: ______________               Signature: ________________________________       Date/Time: ______________
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
## 2022-12-05 NOTE — PSYCH
Subjective:     Patient ID: Jc Bishop  is a 45 y o  male  [facility] Clinical Progress Notes      Specialized Services Documentation  Therapist must complete separate progress note for each specific clinical activity in which the individual participated during the day  Education Therapy   [id number] [place] [facility] Bruce Morales  actively shared in morning assessment and goal review  Presented as Receptive related to readiness to learn  Jc Bishop did complete goal from last treatment day identifying gaining responsibility  did not present with any barriers to learning  Tx Plan Objective: 1 1,1 2,1 4 , Therapist:  HAJA Mai    Case Management Note    HAJA Mai    Current suicide risk : Low     [id number] this writer spoke with Jc Bishop  for case management  Kenny John stated that he had a fall this weekend and went to the ER to receive follow up care  Kenny Lopez stated his pain is okay today after his fall, but reported that his voices were bad today  Kenny Lopez stated that he did complete his ECG this weekend as well, and it appears that the results are in his chart under cardiology  This writer notified Kenny Lopez of medication check for 12/06/22 at 0900, and that we will continue with planned discharge at the end of the treatment day  Medications changes/added/denied? No    Treatment session number: 9    Individual Case Management Visit provided today? Yes     [facility] follow up physician's orders: none at this time

## 2022-12-05 NOTE — PSYCH
Subjective:     Patient ID: Yasmine Pappas  is a 45 y o  male  [facility] Clinical Progress Notes      Specialized Services Documentation  Therapist must complete separate progress note for each specific clinical activity in which the individual participated during the day  Group Psychotherapy Life Skills  ([id number])  Yasmine Pappas actively engaged in group focused on developing self-compassion which was facilitated virtually in a private office using HIPAA Compliant and Approved [employer] Teams  Yan consented to the use of tele-video modality of treatment and was virtually present for group psychotherapy today  Group members discussed what is self compassion  During the group we discussed the benefits of self-compassion and how it helps improve well-being, connection to others, increases selflessness, regulates emotions, reduces depression and anxiety  Group members practiced self-compassion by completing self-compassion exercises drawn from "The Self-Compassion Deck"  Yan discussed how they could bring self-compassion into their lives by appreciating the support that he does have  Orville Tana continues to make progress towards goals through verbal participation in group; to accomplish long term goals continue to utilize skills learned in programming  Continue with psychotherapy to educate and encourage use of wellness tools   Tx Plan Objective: 1 1,1 2 Therapist: Liliana Snowden

## 2022-12-05 NOTE — PSYCH
Subjective:    Patient ID: Julia Benavidez  is a 45 y o  male      [facility] Clinical Progress Notes      Specialized Services Documentation  Therapist must complete separate progress note for each specific clinical activity in which the individual participated during the day  Education Therapy   [id number] This group was facilitated virtually in a private office using HIPAA Compliant and Approved [employer] Teams  Julia Benavidez engaged throughout the treatment day  Was engaged in learning related to Illness, Medication, Aftercare and Wellness Tools  Staff utilized Verbal, Written, A/V and Demonstration teaching methods  Julia Benavidez shared area of learning and set a goal for outside of program to get through the day  Tx Plan Objective: 1 1, 1 2, 1 4, Therapist: [facility]    Group Psychotherapy    [id number] This group was facilitated virtually in a private office using HIPAA Compliant and Approved [employer] Teams  Julia Benavidez participated actively in a group focused on personal strengths  Group members independently completed the VIA Character Strengths Survey where they were guided through a series of questions to be answered on a fixed scale in order to identify personal alignment with 24 different key character strengths  Group members were provided with a document containing subsequent self-reflective questions, prompts, and exercises that built off of the identified strengths; this was also screen-shared for the group via Teams  Participants voluntarily provided responses for the prompts, in order and collaboratively  The latter half of the document contained activity ideas for how to engage in each of the 24 strengths, and participants were guided through making a plan to choose strengths to purposefully engage with this week  Julia Benavidez shared that their number one strength identified by the assessment was kindness  Continued progress noted towards goals   Continue with psychotherapy to further encourage utilization of strengths based personal development       Tx Plan Objective 1 1, 1 2, 1 4 Therapist: MARIANNE Doss

## 2022-12-05 NOTE — PSYCH
Subjective:     Patient ID: Aurea Gorman  is a 45 y o  male  [facility] Clinical Progress Notes      Specialized Services Documentation  Therapist must complete separate progress note for each specific clinical activity in which the individual participated during the day  Group Psychotherapy   This group was facilitated virtually in a private office using HIPAA Compliant and Approved Microsoft Teams  Aurea Gorman consented to the use of tele-video modality of treatment  [facility]  was present in psychoeducational group about Depression & Bipolar  The group followed Depression & Bipolar resource/exercise sheets and the video [employer]  Group was educated on:  • Signs and symptoms of Depression & Bipolar as well as different types of Bipolar  • Diagnosis criteria   • Causes and treatments of Depression & Bipolar   • Management skills for Depression & Bipolar  • Discussed various coping skills    Question and answer time allowed  Some progress towards goal noted through participation in group  Continue psychoeducational groups on anxiety and Bipolar to teach the value of learning about diagnosis and treatments available       Tx Plan Objective:  1 1, 1 2, 1 3, 1 4 Therapist:  Maxime PRATHER RN

## 2022-12-06 ENCOUNTER — TELEMEDICINE (OUTPATIENT)
Dept: PSYCHIATRY | Facility: CLINIC | Age: 52
End: 2022-12-06

## 2022-12-06 ENCOUNTER — OFFICE VISIT (OUTPATIENT)
Dept: PSYCHOLOGY | Facility: CLINIC | Age: 52
End: 2022-12-06

## 2022-12-06 DIAGNOSIS — F32.9 REACTIVE DEPRESSION: ICD-10-CM

## 2022-12-06 DIAGNOSIS — S06.9X9D TRAUMATIC BRAIN INJURY WITH LOSS OF CONSCIOUSNESS, SUBSEQUENT ENCOUNTER: ICD-10-CM

## 2022-12-06 DIAGNOSIS — F29 PSYCHOSIS, UNSPECIFIED PSYCHOSIS TYPE (HCC): Primary | ICD-10-CM

## 2022-12-06 RX ORDER — ZIPRASIDONE HYDROCHLORIDE 40 MG/1
CAPSULE ORAL
Qty: 60 CAPSULE | Refills: 0 | Status: SHIPPED | OUTPATIENT
Start: 2022-12-06

## 2022-12-06 RX ORDER — OLANZAPINE 20 MG/1
TABLET ORAL
COMMUNITY
Start: 2022-12-06
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
## 2022-12-06 NOTE — PSYCH
Subjective:     Patient ID: Ta Lemons  is a 45 y o  male  [facility] Clinical Progress Notes      Specialized Services Documentation  Therapist must complete separate progress note for each specific clinical activity in which the individual participated during the day  Group Psychotherapy Life Skills ([id number]) Ta Lemons actively engaged in group focused on their personal narrative using the tree of life tool  which was facilitated virtually in a private office using HIPAA Compliant and Approved [employer] Teams  Yan consented to the use of tele-video modality of treatment and was virtually present for group psychotherapy today  The group watched a PATRICIA talk “The importance of sharing your story” with speaker Kassi Roger  The group created their own tree of life which represents who they are  They had to write words that answered the questions about each part of the tree which represents them  Clients identified what they learned by doing this activity  Yajaira Clarke recognized that he needs more compassion in order to grow  Yajairajamison Clarke created a goal to work towards accomplishing the one area that he wants to grow  Group members discussed why it is important to share their story with at least one person  Yajaira Clarke continues to make progress towards goals through verbal participation in group; to accomplish long term goals continue to utilize skills learned in programming  Continue with psychotherapy to educate and encourage use of wellness tools   Tx Plan Objective: 1 1,1 2 Therapist: Alejo Guadalupe

## 2022-12-06 NOTE — PSYCH
Virtual Regular Visit    Verification of patient location:    Patient is located in the following state in which I hold an active license [place]      Assessment/Plan:    Problem List Items Addressed This Visit        Nervous and Auditory    Traumatic brain injury with loss of consciousness ([facility] )       Other    Psychosis ([facility] [address] ) - Primary    Reactive depression       Goals addressed in session: Goal 1          Reason for visit is VIRTUAL PHP GROUP DUE TO COVID-19  Encounter provider [facility] PARTIAL PSYCH PROGRAM    Provider located at [address] [place] [address]   [id number] [place] [id number]      Recent Visits  Date Type Provider Dept   12/05/22 Office Visit [facility] PARTIAL PSYCH GROUP THERAPY [facility]   12/01/22 Office Visit [facility] PARTIAL PSYCH GROUP THERAPY [facility]   11/30/22 Office Visit [facility] PARTIAL PSYCH GROUP THERAPY [facility]   11/29/22 Office Visit [facility] PARTIAL PSYCH GROUP THERAPY [facility]   Showing recent visits within past 7 days and meeting all other requirements  Today's Visits  Date Type Provider Dept   12/06/22 Office Visit [facility] PARTIAL PSYCH GROUP THERAPY [facility]   Showing today's visits and meeting all other requirements  Future Appointments  No visits were found meeting these conditions  Showing future appointments within next 150 days and meeting all other requirements       The patient was identified by name and date of birth  [facility]  was informed that this is a telemedicine visit and that the visit is being conducted [employer]  He agrees to proceed     My office door was closed  No one else was in the room  He acknowledged consent and understanding of privacy and security of the video platform  The patient has agreed to participate and understands they can discontinue the visit at any time  Patient is aware this is a billable service  Boy Urbina  is a 45 y o  male   HPI     Past Medical History:   Diagnosis Date   • Chemical exposure    • Diabetes mellitus ([facility] )    • H/O bone graft    • Head injury     August 25, 2019 fell backwards hit head on a [place]oulder w/ LOC   • Head injury     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   • Hypertension    • Seizures ([facility] [facility] )        Past Surgical History:   Procedure Laterality Date   • FL LUMBAR PUNCTURE DIAGNOSTIC  12/13/2019   • KNEE ARTHROSCOPY W/ MENISCECTOMY Left    • LEG SURGERY     • TONSILLECTOMY         Current Outpatient Medications   Medication Sig Dispense Refill   • amLODIPine (NORVASC) 10 mg tablet Take 10 mg by mouth daily     • amoxicillin (AMOXIL) 500 mg capsule TAKE 1 CAPSULE BY MOUTH TWICE DAILY FOR 10 DAYS     • aspirin 81 mg chewable tablet Chew 81 mg daily     • atorvastatin (LIPITOR) 20 mg tablet Take 20 mg by mouth every evening     • diazepam (VALIUM) 2 mg tablet Take 1 tablet (2 mg total) by mouth daily as needed for anxiety (severe anxiety only) 30 tablet 0   • diazepam (VALIUM) 5 mg tablet Take 1 tablet (5 mg total) by mouth every 8 (eight) hours as needed for anxiety for up to 5 days 15 tablet 0   • divalproex sodium (DEPAKOTE) 250 mg EC tablet TAKE 1 TABLET BY MOUTH TWICE DAILY WITH  ONE  500  MG  TAB  FOR  TOAL  DOSE  OF  750  MG  TWICE  A  DAY 60 tablet 11   • divalproex sodium (DEPAKOTE) 500 mg EC tablet Take 1 tab twice a day with one 250 mg tab for total dose of 750 mg twice a day   60 tablet 11   • ergocalciferol (VITAMIN D2) 50,000 units Take 1 capsule (50,000 Units total) by mouth once a week for 8 doses 8 capsule 0   • ibuprofen (MOTRIN) 800 mg tablet daily as needed     • insulin NPH-insulin regular (NovoLIN 70/30) 100 units/mL subcutaneous injection Inject 10 Units under the skin daily before breakfast (Patient taking differently: Inject 12 Units under the skin daily before breakfast) 3 mL 0   • Jardiance 25 MG TABS Take 25 mg by mouth daily     • lamoTRIgine (LaMICtal) 100 mg tablet Take 1 tab (100 mg) twice a day 180 tablet 3   • lisinopril (ZESTRIL) 40 mg tablet Take 1 tablet by mouth daily     • metFORMIN (GLUCOPHAGE) 1000 MG tablet Take 1,000 mg by mouth 2 (two) times a day with meals     • metoprolol tartrate (LOPRESSOR) 100 mg tablet TAKE 1 TABLET BY MOUTH EVERY 12 HOURS 180 tablet 0   • OLANZapine (ZyPREXA) 20 MG tablet 1/2 tab po q bedtime until home supply completed     • predniSONE 20 mg tablet Take 3 tablets (60 mg total) by mouth daily for 5 days 15 tablet 0   • sertraline (Zoloft) 100 mg tablet Take 1 tablet (100 mg total) by mouth daily 30 tablet 1   • Ventolin  (90 Base) MCG/ACT inhaler Inhale 2 puffs every 6 (six) hours as needed     • Vitamin D, Cholecalciferol, 50 MCG (2000 UT) CAPS Take 1 capsule by mouth once daily 30 capsule 5   • zaleplon (SONATA) 5 MG capsule Take 1 capsule (5 mg total) by mouth daily at bedtime 30 capsule 5   • ziprasidone (GEODON) 40 mg capsule One po q am with breakfast x 5 days then one po bid with breakfast and dinner 60 capsule 0     No current facility-administered medications for this visit  No Known Allergies    Review of Systems    Video Exam    There were no vitals filed for this visit  Physical Exam     Visit Time  I have spent FOUR GROUP HOURS + CASE MANAGEMENT minutes with patient today in which greater than 50% of the time was spent in counseling/coordination of care regarding PHP- see notes

## 2022-12-06 NOTE — PSYCH
Assessment/Plan:       Diagnoses and all orders for this visit:     Psychosis, unspecified psychosis type ([address] )     Reactive depression     Traumatic brain injury with loss of consciousness, subsequent encounter         Subjective:   Patient ID: Yan Evans a 45 y  o  male  [facility] Treatment Plan   AREAS OF NEED: Psychosis, anxiety and depression as evidenced by auditory hallucinations, shortness of breath, isolation, helplessness, low motivation due to auditory hallucinations, inability to manage illness    Date Initiated: 11/15/22     Strengths: "helps everybody"                 LONG TERM GOAL:   Date Initiated: 11/15/22  1 0 I will identify three ways that my overall well being has improved since attending [facility]    Target Date: 12/13/22  Completion Date: 12/06/22 DISCHARGE        SHORT TERM OBJECTIVES:      Date Initiated: 11/15/22  1 1 I will identify three coping skills to use each week in order to manage symptoms in a healthy way    Revision Date: 11/29/22 due to absence yesterday   Target Date: 11/28/22  Completion Date: 12/06/22 DISCHARGE     Date Initiated: 11/15/22  1 2 I will practice at least one emotional regulation skill each week in order to understand and manage symptoms    Revision Date: 11/29/22 due to absence yesterday  Target Date: 11/28/22  Completion Date:12/06/22 DISCHARGE     Date Initiated: 11/15/22  1 3 I will take medications as prescribed and share questions and concerns if arise     Revision Date: 11/29/22 due to absence yesterday  Target Date: 11/28/22  Completion Date: 12/06/22 DISCHARGE     Date Initiated: 11/15/22  1 4 I will identify 3 ways my supports can assist in my recovery and agree to staff/support contact as indicated     Revision Date: 11/29/22 due to absence yesterday  Target Date: 11/28/22  Completion Date:12/06/22 DISCHARGE            7 DAY REVISION:  1 5 I will attend all groups, case management and medication checks as well as returning phone calls within 4 hours in order to successfully complete group  Date Initiated: 11/29/22   Revision Date: 12/06/22 DISCHARGE  Target Date: 12/09/22  Completion Date:12/06/22 DISCHARGE        PSYCHIATRY:  Date Initiated:  11/15/22  Medication Management and Education       Revision Date: 11/29/22; 12/06/22 DISCHARGE       1 3 Continue medication management      The person(s) responsible for carrying out the plan is Dr Chencho Madsen DO     NURSING/SYMPTOM EDUCATION:  Date Initiated: 11/15/22       1 1, 1 2  1 3, 1 4 Provide wellness/symptoms and skill education groups three to five days weekly to educate [facility]  on signs and symptoms of diagnoses, skills to manage stressors, and medication questions that will be addressed by the treatment team         Revision date: 11/29/22; 12/06/22 DISCHARGE       1 1,1 2,1 3,1 4,1 5 Continue to encourage [facility]  to participate in wellness groups daily to learn about symptoms, coping strategies and warning signs to promote relapse prevention        The person(s) responsible for carrying out the plan is Gabby Javed RN, BSN     PSYCHOLOGY:   Date Initiated: 11/15/22       1 1, 1 2, 1 4 Provide psychotherapy group 5 times per week to allow opportunity for [facility]   to explore stressors and ways of coping  Revision Date: 11/29/22; 12/06/22 DISCHARGE  1 1,1 2,1 4,1 5  Continue to provide psychotherapy group daily to [facility]  and encourage sharing of stressors, skills and positive change  The person(s) responsible for carrying out the plan is YULISA Robert     ALLIED THERAPY:   Date Initiated: 11/15/22  1 1,1 2 Engage Yan Finch Jr  in AT group 5 times daily to encourage development and use of wellness tools to decrease symptoms and promote recovery through meaningful activity    Revision Date: 11/29/22; 12/06/22 DISCHARGE  1 1,1 2,1 5 Continue to engage [facility]  to participate in AT group to practice wellness tools within program and transfer to home sharing successes and barriers through healthy task involvement       The person(s) responsible for carrying out the plan is HAJA Ibarra, HAJA Saini     CASE MANAGEMENT:   Date Initiated: 11/15/22      1 0 This  will meet with [facility]   3-4 times weekly to assess treatment progress, discharge planning, connection to community supports and UR as indicated  Revision Date: 11/29/22; 12/06/22 DISCHARGE  1 0 Continue to meet with [facility]  3-4 times weekly to assess growth, work toward goals, continued treatment needs, dc planning and use of supports  The person(s) responsible for carrying out the plan is HAJA Ibarra     TREATMENT REVIEW/COMMENTS:      DISCHARGE CRITERIA: Identify 3 signs of progress and complete relapse prevention plan     DISCHARGE PLAN: Connect with identified outpatient providers     Estimated Length of Stay: 10 treatment days       Diagnosis and Treatment Plan explained to Yan Heredia relates understanding diagnosis and is agreeable to Treatment Plan          CLIENT COMMENTS / Please share your thoughts, feelings, need and/or experiences regarding your treatment plan:

## 2022-12-06 NOTE — PSYCH
Subjective:     Patient ID: [facility]  is a 45 y o  male  [facility] Clinical Progress Notes      Specialized Services Documentation  Therapist must complete separate progress note for each specific clinical activity in which the individual participated during the day  Allied Therapy   Group was facilitated virtually in a private office using HIPAA compliant and approved Microsoft Teams  [facility]  consented to the use of tele-video modality of treatment and was virtually present for group allied therapy  [facility] focused on understanding the stages of change  Misha Estrella engaged in a wojciech analysis of “Rehab” by Tressa Griffiths and “Grow as you Go” by Phil Lesch and discussed the stage of change relevant to each song  Misha Estrella shared he wants to change the way he takes his medications and identified he is in the preparation stage  Some effort noted toward treatment goal  Continue with discharge at the end of the treatment day  Tx Plan Objective: 1 1,1 2,1 4, Therapist:  HAJA Maier    Case Management Note    HAJA Maier    Current suicide risk : Low     [id number] this writer met with [facility]  for case management  This writer completed treatment plan goals, reviewed discharge instructions, medication list and completed relapse prevention plan  PHQ-9 at discharge was 14  Misha Estrella denied SI, HI, SIB  Misha Estrella identified an increase in relaxation and hope, and named drawing or coloring as a coping skill that he found helpful  Aftercare providers to receive discharge summary  Medications changes/added/denied? Yes see BEAU Farmer's note  Treatment session number: 10    Individual Case Management Visit provided today? Yes     [facility] follow up physician's orders: DISCHARGE from [address]  See BEAU Farmer/Dr Childers notes

## 2022-12-06 NOTE — PSYCH
Virtual Regular Visit    Verification of patient location:    Patient is located in the following state in which I hold an active license [place]    Reason for visit is   Chief Complaint   Patient presents with   • Virtual Regular Visit        Encounter provider Halie Head PA-C    Provider located at  [address] [place] [id number] [phone]      Recent Visits  Date Type Provider Dept   11/30/22 Telemedicine Halie Head PA-C \[facility]\"" 14  recent visits within past 7 days and meeting all other requirements  Future Appointments  No visits were found meeting these conditions  Showing future appointments within next 150 days and meeting all other requirements       The patient was identified by name and date of birth  [facility]  was informed that this is a telemedicine visit and that the visit is being conducted [employer]  He agrees to proceed     My office door was closed  No one else was in the room  He acknowledged consent and understanding of privacy and security of the video platform  The patient has agreed to participate and understands they can discontinue the visit at any time  Patient is aware this is a billable service  Visit Time    Visit Start Time: 0900  Visit Stop Time: 6922  Total Visit Duration: 12 minutes       Progress Note [facility]  45 y o  male MRN: [id number]     Progress at partial program: minimal to no improvement  Met with Aissatou Chairez and Aissatou Contreras and his wife  Aissatou Chairez last seen by Moustapha 11/30 at which time haldol increased to 7 5 mg   Aissatou Chairez notes no change in AH  Was seen in ED last night for fall in the context of position change  Aissatou Chairez denies syncope or any recent seizures  Is scheduled for discharge today  EKG reviewed with Dr Anay Singh             ROS:   As above otherwise negative    Active Ambulatory Problems     Diagnosis Date Noted   • Hy[facility]ension associated with diabetes [facility] 09/12/2017   • Type 2 diabetes mellitus without complication, with long-term current use of insulin ([facility] ) 01/15/2019   • Dizziness 09/18/2019   • Localization-related epilepsy ([facility] ) 09/18/2019   • Hyperglycemia 09/18/2019   • Traumatic brain injury with loss of consciousness ([facility] ) 09/19/2019   • Psychosis ([facility] ) 12/08/2019   • Encounter for long-term (current) use of high-risk medication 05/07/2020   • Syncope 01/08/2021   • Stroke-like symptoms 01/08/2021   • Acute kidney injury ([facility] ) 01/08/2021   • Class 2 severe obesity due to excess calories with serious comorbidity in adult ([facility] ) 11/14/2018   • SDH (subdural hematoma) 08/26/2019   • Cervical radiculopathy 10/20/2020   • Hyperlipidemia associated with type 2 diabetes mellitus ([facility] ) 02/11/2021   • Multinodular goiter 03/11/2021   • Subarachnoid hemorrhage ([facility] ) 08/26/2019   • DISH (diffuse idiopathic skeletal hyperostosis) 11/05/2020   • Reactive depression 11/15/2022     Resolved Ambulatory Problems     Diagnosis Date Noted   • Paresthesias 09/11/2017   • Headache 09/11/2017   • Hypertensive urgency 09/11/2017   • Other chest pain 01/14/2019   • Hypokalemia 01/14/2019   • Head injury 09/18/2019   • Refractory status epilepticus 09/19/2019   • Acute respiratory failure  09/19/2019   • Sepsis ([facility] ) 09/21/2019   • Paraphimosis 09/21/2019     Past Medical History:   Diagnosis Date   • Chemical exposure    • Diabetes mellitus ([facility] )    • H/O bone graft    • Hypertension    • Seizures ([facility] [address] )      Family History   Problem Relation Age of Onset   • Diabetes Mother    • Hypertension Mother    • Asthma Mother    • Brain cancer Father    • No Known Problems Sister    • No Known Problems Brother    • No Known Problems Brother      No Known Allergies       Mental Status Evaluation:    Appearance:  age appropriate   Behavior:  cooperative   Speech:  dysarthric   Mood: low   Affect:  constricted   Thought Process:  coherent   Associations: intact associations   Thought Content:  no overt delusions   Perceptual Disturbances: auditory hallucinations   Risk Potential: Suicidal ideation - None at present  Homicidal ideation - None   Sensorium:  oriented to person, place and time/date   Memory:  recent and remote memory grossly intact   Consciousness:  alert and awake   Attention: attention span and concentration are age appropriate   Insight:  intact   Judgment: intact   Gait/Station: unable to assess   Motor Activity: unable to assess today due to virtual visit       Laboratory results:no recent pertinent labs in [facility]  They have been ordered  12/3/22: EKG: NSR; QTc 444; left anterior fasicular block; poss old lateral infarct; no change from previous EKG  Assessment   Diagnoses and all orders for this visit:    Psychosis, unspecified psychosis type ([facility] [address] )  -     OLANZapine (ZyPREXA) 20 MG tablet; 1/2 tab po q bedtime until home supply completed  -     ziprasidone (GEODON) 40 mg capsule; One po q am with breakfast x 5 days then one po bid with breakfast and dinner    Reactive depression  -     ziprasidone (GEODON) 40 mg capsule;  One po q am with breakfast x 5 days then one po bid with breakfast and dinner    Traumatic brain injury with loss of consciousness, subsequent encounter       Current Outpatient Medications   Medication Sig Dispense Refill   • OLANZapine (ZyPREXA) 20 MG tablet 1/2 tab po q bedtime until home supply completed     • ziprasidone (GEODON) 40 mg capsule One po q am with breakfast x 5 days then one po bid with breakfast and dinner 60 capsule 0   • amLODIPine (NORVASC) 10 mg tablet Take 10 mg by mouth daily     • amoxicillin (AMOXIL) 500 mg capsule TAKE 1 CAPSULE BY MOUTH TWICE DAILY FOR 10 DAYS     • aspirin 81 mg chewable tablet Chew 81 mg daily     • atorvastatin (LIPITOR) 20 mg tablet Take 20 mg by mouth every evening     • diazepam (VALIUM) 2 mg tablet Take 1 tablet (2 mg total) by mouth daily as needed for anxiety (severe anxiety only) 30 tablet 0   • diazepam (VALIUM) 5 mg tablet Take 1 tablet (5 mg total) by mouth every 8 (eight) hours as needed for anxiety for up to 5 days 15 tablet 0   • divalproex sodium (DEPAKOTE) 250 mg EC tablet TAKE 1 TABLET BY MOUTH TWICE DAILY WITH  ONE  500  MG  TAB  FOR  TOAL  DOSE  OF  750  MG  TWICE  A  DAY 60 tablet 11   • divalproex sodium (DEPAKOTE) 500 mg EC tablet Take 1 tab twice a day with one 250 mg tab for total dose of 750 mg twice a day  60 tablet 11   • ergocalciferol (VITAMIN D2) 50,000 units Take 1 capsule (50,000 Units total) by mouth once a week for 8 doses 8 capsule 0   • ibuprofen (MOTRIN) 800 mg tablet daily as needed     • insulin NPH-insulin regular (NovoLIN 70/30) 100 units/mL subcutaneous injection Inject 10 Units under the skin daily before breakfast (Patient taking differently: Inject 12 Units under the skin daily before breakfast) 3 mL 0   • Jardiance 25 MG TABS Take 25 mg by mouth daily     • lamoTRIgine (LaMICtal) 100 mg tablet Take 1 tab (100 mg) twice a day 180 tablet 3   • lisinopril (ZESTRIL) 40 mg tablet Take 1 tablet by mouth daily     • metFORMIN (GLUCOPHAGE) 1000 MG tablet Take 1,000 mg by mouth 2 (two) times a day with meals     • metoprolol tartrate (LOPRESSOR) 100 mg tablet TAKE 1 TABLET BY MOUTH EVERY 12 HOURS 180 tablet 0   • predniSONE 20 mg tablet Take 3 tablets (60 mg total) by mouth daily for 5 days 15 tablet 0   • sertraline (Zoloft) 100 mg tablet Take 1 tablet (100 mg total) by mouth daily 30 tablet 1   • Ventolin  (90 Base) MCG/ACT inhaler Inhale 2 puffs every 6 (six) hours as needed     • Vitamin D, Cholecalciferol, 50 MCG (2000 UT) CAPS Take 1 capsule by mouth once daily 30 capsule 5   • zaleplon (SONATA) 5 MG capsule Take 1 capsule (5 mg total) by mouth daily at bedtime 30 capsule 5     No current facility-administered medications for this visit           Plan    Planned medication and treatment changes:   Discussed with Dr Sakina Motley  Reviewed EKG- ok to start geodon  Will stop haldol secondary to ineffectiveness  Will decrease zyprexa to 10 mg q bedtime until home supply completed (>20 days)  Start geodon 40 mg qam with breakfast x 5 days then increase to 40 mg bid with meals  [address] [place]  for discharge today  Encouraged kelly and his wife to call Moustapha or Dr Sakina Motley if any problems arise before appt with Rodríguez on 12/28/22 at 3:30  Gave wife number to [facility] office  Sandie Byrnes encouraged to call office in one week or so if he is tolerating geodon and AH unchanged- we may consider increasing dose before Rodríguez appt  All current active medications have been reviewed  Discharge planning      Risks / Benefits of Treatment:    Risks, benefits, and possible side effects of medications explained to patient and patient verbalizes understanding and agrees to medications  Counseling / Coordination of Care:    Patient's progress discussed with staff in treatment team meeting  Medications, treatment progress and treatment plan reviewed with patient      Fany La PA-C 12/06/22

## 2022-12-06 NOTE — PSYCH
Subjective:    Patient ID: Yaneli Salcido  is a 45 y o  male      [facility] Clinical Progress Notes      Specialized Services Documentation  Therapist must complete separate progress note for each specific clinical activity in which the individual participated during the day  Education Therapy   [id number] This group was facilitated virtually in a private office using HIPAA Compliant and Approved Microsoft Teams  Yaneli Salcido actively shared in check in and goal review  Presented as receptive related to readiness to learn  Yaneli Salcido  did not complete goal from last treatment day identifying he had hoped to take his dogs to the park and get exercise for himself and the puppy  did not present with any barriers to learning  [id number] This group was facilitated virtually in a private office using HIPAA Compliant and Approved Microsoft Teams  Yaneli Salcido engaged throughout the treatment day  Was engaged in learning related to Illness, Medication, Aftercare and Wellness Tools  Staff utilized Verbal, Written, A/V and Demonstration teaching methods  Yaneli Salcido shared area of learning and set a goal for outside of program to go outside and go to the supermarket  Tx Plan Objective: 1 1, 1 2, 1 4, Therapist: CHAPO Conroy [facility]    Group Psychotherapy    [id number] This group was facilitated virtually in a private office using HIPAA Compliant and Approved Microsoft Teams  Yaneli Salcido participated actively in a group focused on interpersonal connections and group bonding  Group members were presented with many simple get to know you icebreaker questions  After time to choose and answer at least three independently, the group took turns sharing the questions they chose and their answers  Participants then independently responded to a worksheet of introspective and self-exploratory questions; following which volunteers shared their answers, at will, with the group   Yaneli Salcido shared he believes main traits of himself are angry and private  Continued progress noted towards goals  Continue with psychotherapy to further encourage vulnerability and social skills       Tx Plan Objective 1 1, 1 2, 1 4 Therapist: MARIANNE Berry & RN Observers Marti Gutierrez

## 2022-12-07 ENCOUNTER — APPOINTMENT (OUTPATIENT)
Dept: PSYCHOLOGY | Facility: CLINIC | Age: 52
End: 2022-12-07

## 2022-12-08 ENCOUNTER — APPOINTMENT (OUTPATIENT)
Dept: PSYCHOLOGY | Facility: CLINIC | Age: 52
End: 2022-12-08

## 2022-12-09 ENCOUNTER — APPOINTMENT (OUTPATIENT)
Dept: PSYCHOLOGY | Facility: CLINIC | Age: 52
End: 2022-12-09

## 2023-01-16 NOTE — ASSESSMENT & PLAN NOTE
He continues to have difficulty with hallucinations which have been unchanged since his traumatic head injury  These do not appear to be related to seizures since they had not been associated with EEG changes in the past   These also have not responded to seizure medications thus far    Will be very important for him to continue to follow with his psychiatrist for ongoing management

## 2023-01-16 NOTE — ASSESSMENT & PLAN NOTE
He still has not had any additional seizures  He has been tolerating lamotrigine and divalproex unchanged  These medications have been effective in keeping him without any additional seizures, but have not been helpful significantly with controlling his mood  --He will continue divalproex and lamotrigine unchanged  --He has been having more difficulty with his mobility, which is likely due to deconditioning  I will order some physical therapy to try to help build up his strength

## 2023-01-23 DIAGNOSIS — F29 PSYCHOSIS, UNSPECIFIED PSYCHOSIS TYPE (HCC): ICD-10-CM

## 2023-01-23 DIAGNOSIS — F32.9 REACTIVE DEPRESSION: ICD-10-CM

## 2023-01-23 RX ORDER — ZIPRASIDONE HYDROCHLORIDE 40 MG/1
CAPSULE ORAL
Qty: 60 CAPSULE | Refills: 0 | Status: SHIPPED | OUTPATIENT
Start: 2023-01-23 | End: 2023-01-24 | Stop reason: SDUPTHER

## 2023-01-24 ENCOUNTER — TELEMEDICINE (OUTPATIENT)
Dept: PSYCHIATRY | Facility: CLINIC | Age: 53
End: 2023-01-24

## 2023-01-24 DIAGNOSIS — F29 PSYCHOSIS, UNSPECIFIED PSYCHOSIS TYPE (HCC): ICD-10-CM

## 2023-01-24 DIAGNOSIS — F32.9 REACTIVE DEPRESSION: ICD-10-CM

## 2023-01-24 RX ORDER — ZIPRASIDONE HYDROCHLORIDE 40 MG/1
CAPSULE ORAL
Qty: 60 CAPSULE | Refills: 0 | Status: SHIPPED | OUTPATIENT
Start: 2023-01-24 | End: 2023-01-24

## 2023-01-24 RX ORDER — SERTRALINE HYDROCHLORIDE 100 MG/1
150 TABLET, FILM COATED ORAL DAILY
Qty: 90 TABLET | Refills: 1 | Status: SHIPPED | OUTPATIENT
Start: 2023-01-24

## 2023-01-24 RX ORDER — ZIPRASIDONE HYDROCHLORIDE 60 MG/1
CAPSULE ORAL
Qty: 60 CAPSULE | Refills: 0 | Status: SHIPPED | OUTPATIENT
Start: 2023-01-24

## 2023-01-24 RX ORDER — SERTRALINE HYDROCHLORIDE 100 MG/1
150 TABLET, FILM COATED ORAL DAILY
Qty: 90 TABLET | Refills: 1 | Status: SHIPPED | OUTPATIENT
Start: 2023-01-24 | End: 2023-01-24

## 2023-01-24 NOTE — PSYCH
Virtual Regular Visit    Verification of patient location:    Patient is located in the following state in which I hold an active license [place]    Problem List Items Addressed This Visit        Other    Psychosis ([address] )    Relevant Medications    sertraline (Zoloft) 100 mg tablet    ziprasidone (GEODON) 60 mg capsule    Reactive depression    Relevant Medications    sertraline (Zoloft) 100 mg tablet    ziprasidone (GEODON) 60 mg capsule          Encounter provider ROCKY Alegre    Provider located at    [address] [place] [address]  [address] [facility] [id number] [phone]    Recent Visits  No visits were found meeting these conditions  Showing recent visits within past 7 days and meeting all other requirements  Today's Visits  Date Type Provider Dept   01/24/23 Telemedicine ROCKY Alegre  [facility]   Showing today's visits and meeting all other requirements  Future Appointments  No visits were found meeting these conditions  Showing future appointments within next 150 days and meeting all other requirements         The patient was identified by name and date of birth  [facility]  was informed that this is a telemedicine visit and that the [facility]  He agrees to proceed     My office door was closed  No one else was in the room  He acknowledged consent and understanding of privacy and security of the video platform  The patient has agreed to participate and understands they can discontinue the visit at any time  Patient is aware this is a billable service       HPI     Current Outpatient Medications   Medication Sig Dispense Refill   • sertraline (Zoloft) 100 mg tablet Take 1 5 tablets (150 mg total) by mouth daily 90 tablet 1   • ziprasidone (GEODON) 60 mg capsule Take 1 tablet twice daily 60 capsule 0   • amLODIPine (NORVASC) 10 mg tablet Take 10 mg by mouth daily     • amoxicillin (AMOXIL) 500 mg capsule TAKE 1 CAPSULE BY MOUTH TWICE DAILY FOR 10 DAYS     • aspirin 81 mg chewable tablet Chew 81 mg daily     • atorvastatin (LIPITOR) 20 mg tablet Take 20 mg by mouth every evening     • diazepam (VALIUM) 2 mg tablet Take 1 tablet (2 mg total) by mouth daily as needed for anxiety (severe anxiety only) 30 tablet 0   • diazepam (VALIUM) 5 mg tablet Take 1 tablet (5 mg total) by mouth every 8 (eight) hours as needed for anxiety for up to 5 days 15 tablet 0   • divalproex sodium (DEPAKOTE) 250 mg EC tablet TAKE 1 TABLET BY MOUTH TWICE DAILY WITH  ONE  500  MG  TAB  FOR  TOAL  DOSE  OF  750  MG  TWICE  A  DAY 60 tablet 11   • divalproex sodium (DEPAKOTE) 500 mg EC tablet Take 1 tab twice a day with one 250 mg tab for total dose of 750 mg twice a day   60 tablet 11   • ergocalciferol (VITAMIN D2) 50,000 units Take 1 capsule (50,000 Units total) by mouth once a week for 8 doses 8 capsule 0   • ibuprofen (MOTRIN) 800 mg tablet daily as needed     • insulin NPH-insulin regular (NovoLIN 70/30) 100 units/mL subcutaneous injection Inject 10 Units under the skin daily before breakfast (Patient taking differently: Inject 12 Units under the skin daily before breakfast) 3 mL 0   • Jardiance 25 MG TABS Take 25 mg by mouth daily     • lamoTRIgine (LaMICtal) 100 mg tablet Take 1 tab (100 mg) twice a day 180 tablet 3   • lisinopril (ZESTRIL) 40 mg tablet Take 1 tablet by mouth daily     • metFORMIN (GLUCOPHAGE) 1000 MG tablet Take 1,000 mg by mouth 2 (two) times a day with meals     • metoprolol tartrate (LOPRESSOR) 100 mg tablet TAKE 1 TABLET BY MOUTH EVERY 12 HOURS 180 tablet 0   • Ventolin  (90 Base) MCG/ACT inhaler Inhale 2 puffs every 6 (six) hours as needed     • Vitamin D, Cholecalciferol, 50 MCG (2000 UT) CAPS Take 1 capsule by mouth once daily 30 capsule 5   • zaleplon (SONATA) 5 MG capsule Take 1 capsule (5 mg total) by mouth daily at bedtime 30 capsule 5     No current facility-administered medications for this visit  Review of Systems  Video Exam    There were no vitals filed for this visit  Physical Exam   As a result of this visit, I have referred the patient for further respiratory evaluation  No      VIRTUAL VISIT DISCLAIMER    [facility]  acknowledges that he has consented to an online visit or consultation  He understands that the online visit is based solely on information provided by him, and that, in the absence of a face-to-face physical evaluation by the physician, the diagnosis he receives is both limited and provisional in terms of accuracy and completeness  This is not intended to replace a full medical face-to-face evaluation by the physician  Romina Radha understands and accepts these terms  MEDICATION MANAGEMENT NOTE        [facility]    Name and Date of Birth:  Romina Garcia  45 y o  1970 MRN: [id number]    Date of Visit: January 24, 2023    No Known Allergies  SUBJECTIVE:    Abeba is seen today for a follow up for depression and TBI psychosis  He reports that he continues to experience ongoing symptoms since the last visit  Patient has recently completed the partial program and trial of medications including Zyprexa, Haldol, and recently initiated Geodon 40 mg twice daily  Zyprexa and Haldol was ineffective for his chronic auditory hallucinations, has yet to see any improvement with Geodon 40 mg twice daily, titrate Geodon to 60 mg twice daily as patient is tolerating with no side effects  Auditory hallucinations since experiencing his TBI have persisted and previously failed Risperdal, Zyprexa, Haldol without any improvement in auditory hallucinations  Denies current command hallucinations, denies visual hallucinations   Highly encouraged patient to consider a TBI specialist and a TBI support groups and both of these resources were provided to patient and his wife during today's virtual encounter  Websites and phone numbers provided, both Tong Harper and his wife thankful for the resources given  Does report Zoloft being helpful to manage anxiety and depression which is caused from his chronic auditory hallucinations, titrate Zoloft 150 mg daily today  Encouraged patient to stay active and engaged in hobbies in attempts to take his mind off the chronic hallucinations  Patient's wife remains a strong support system and manages his medications  Patient denies SI  Attend support groups  He denies any side effects from medications  PLAN:    -Titrate Geodon to 60 mg twice daily for chronic auditory hallucinations in the context of his traumatic brain injury, recent EKG completed and reviewed  -Titrate Zoloft to 150 mg daily for depression related to the ongoing hallucinations  -follow-up closely with neurology regarding seizure disorder, denies any recent seizures   -Follow up with resources provided regarding a TBI specialist and support groups to help manage symptoms and diagnosis        Aware of 24 hour and weekend coverage for urgent situations accessed by calling [facility] main practice number  Referral for individual psychotherapy    Diagnoses and all orders for this visit:    Psychosis, unspecified psychosis type ([facility] )  -     Discontinue: ziprasidone (GEODON) 40 mg capsule; Take 1 tablet twice daily  -     ziprasidone (GEODON) 60 mg capsule; Take 1 tablet twice daily    Reactive depression  -     Discontinue: sertraline (Zoloft) 100 mg tablet; Take 1 5 tablets (150 mg total) by mouth daily  -     Discontinue: ziprasidone (GEODON) 40 mg capsule; Take 1 tablet twice daily  -     sertraline (Zoloft) 100 mg tablet; Take 1 5 tablets (150 mg total) by mouth daily  -     ziprasidone (GEODON) 60 mg capsule;  Take 1 tablet twice daily        Current Outpatient Medications on File Prior to Visit   Medication Sig Dispense Refill   • amLODIPine (NORVASC) 10 mg tablet Take 10 mg by mouth daily     • amoxicillin (AMOXIL) 500 mg capsule TAKE 1 CAPSULE BY MOUTH TWICE DAILY FOR 10 DAYS     • aspirin 81 mg chewable tablet Chew 81 mg daily     • atorvastatin (LIPITOR) 20 mg tablet Take 20 mg by mouth every evening     • diazepam (VALIUM) 2 mg tablet Take 1 tablet (2 mg total) by mouth daily as needed for anxiety (severe anxiety only) 30 tablet 0   • diazepam (VALIUM) 5 mg tablet Take 1 tablet (5 mg total) by mouth every 8 (eight) hours as needed for anxiety for up to 5 days 15 tablet 0   • divalproex sodium (DEPAKOTE) 250 mg EC tablet TAKE 1 TABLET BY MOUTH TWICE DAILY WITH  ONE  500  MG  TAB  FOR  TOAL  DOSE  OF  750  MG  TWICE  A  DAY 60 tablet 11   • divalproex sodium (DEPAKOTE) 500 mg EC tablet Take 1 tab twice a day with one 250 mg tab for total dose of 750 mg twice a day   60 tablet 11   • ergocalciferol (VITAMIN D2) 50,000 units Take 1 capsule (50,000 Units total) by mouth once a week for 8 doses 8 capsule 0   • ibuprofen (MOTRIN) 800 mg tablet daily as needed     • insulin NPH-insulin regular (NovoLIN 70/30) 100 units/mL subcutaneous injection Inject 10 Units under the skin daily before breakfast (Patient taking differently: Inject 12 Units under the skin daily before breakfast) 3 mL 0   • Jardiance 25 MG TABS Take 25 mg by mouth daily     • lamoTRIgine (LaMICtal) 100 mg tablet Take 1 tab (100 mg) twice a day 180 tablet 3   • lisinopril (ZESTRIL) 40 mg tablet Take 1 tablet by mouth daily     • metFORMIN (GLUCOPHAGE) 1000 MG tablet Take 1,000 mg by mouth 2 (two) times a day with meals     • metoprolol tartrate (LOPRESSOR) 100 mg tablet TAKE 1 TABLET BY MOUTH EVERY 12 HOURS 180 tablet 0   • Ventolin  (90 Base) MCG/ACT inhaler Inhale 2 puffs every 6 (six) hours as needed     • Vitamin D, Cholecalciferol, 50 MCG (2000 UT) CAPS Take 1 capsule by mouth once daily 30 capsule 5   • zaleplon (SONATA) 5 MG capsule Take 1 capsule (5 mg total) by mouth daily at bedtime 30 capsule 5   • [DISCONTINUED] sertraline (Zoloft) 100 mg tablet Take 1 tablet (100 mg total) by mouth daily 30 tablet 1   • [DISCONTINUED] ziprasidone (GEODON) 40 mg capsule One po q am with breakfast x 5 days then one po bid with breakfast and dinner 60 capsule 0     No current facility-administered medications on file prior to visit  Psychotherapy Provided:     Individual psychotherapy provided: Yes  Counseling was provided during the session today for 16 minutes  Supportive counseling provided  Medication changes discussed with Alverto Kaye  Medication education provided to Alverto Kaye  HPI ROS Appetite Changes and Sleep:     He reports normal sleep, adequate appetite, low energy   Denies homicidal ideation, denies suicidal ideation    Review Of Systems:      HPI ROS:               Medication Side Effects:  denies    Depression (10 worst): 6/10    Anxiety (10 worst): 5/10    Safety concerns (SI, HI, etc): Denies si and hi    Sleep: 6-7 hrs    Energy: Fair to low    Appetite: 2-3 meals    Weight Change: denies        Mental Status Evaluation:    Appearance Marginal/poor hygiene   Behavior cooperative   Mood depressed  Depression Scale - 6 of 10 (0 = No depression)  Anxiety Scale - 5 of 10 (0 = No anxiety)   Speech Soft   Affect constricted   Thought Processes Goal directed and coherent   Thought Content Does not verbalize delusional material   Associations Tightly connected   Perceptual Disturbances Auditory hallucinations without commands   Risk Potential Suicidal/Homicidal Ideation - No evidence of suicidal or homicidal ideation and patient does not verbalize suicidal or homicidal ideation  Risk of Violence - No evidence of risk for violence found on assessment  Risk of Self Mutilation - No evidence of risk for self mutilation found on assessment   Orientation oriented to person, place, time/date and situation   Memory recent and remote memory grossly intact   Consciousness alert and awake   Attention/Concentration attention span and concentration appear shorter than expected for age   Insight intact   Judgement intact   Muscle Strength and Gait decreased muscle tone, decreased muscle strength   Motor Activity no abnormal movements   Language no difficulty naming common objects, no difficulty repeating a phrase, no difficulty writing a sentence   Fund of Knowledge adequate knowledge of current events  adequate fund of knowledge regarding past history  adequate fund of knowledge regarding vocabulary          Past Medical History:    Past Medical History:   Diagnosis Date   • Chemical exposure    • Diabetes mellitus ([facility] )    • H/O bone graft    • Head injury     August 25, 2019 fell backwards hit head on a [place]oulder w/ LOC   • Head injury     as an air [place] with the [employer] - jumped out of a plane parachute disfunction   • Hypertension    • Seizures ([facility] )         Past Surgical History:   Procedure Laterality Date   • FL LUMBAR PUNCTURE DIAGNOSTIC  12/13/2019   • KNEE ARTHROSCOPY W/ MENISCECTOMY Left    • LEG SURGERY     • TONSILLECTOMY       No Known Allergies  Substance Abuse History:    Social History     Substance and Sexual Activity   Alcohol Use Not Currently   • Alcohol/week: 3 0 standard drinks   • Types: 3 Cans of beer per week     Social History     Substance and Sexual Activity   Drug Use Never     Social History:    Social History     Socioeconomic History   • Marital status: Single     Spouse name: Not on file   • Number of children: Not on file   • Years of education: Not on file   • Highest education level: Not on file   Occupational History   • Not on file   Tobacco Use   • Smoking status: Never   • Smokeless tobacco: Never   Vaping Use   • Vaping Use: Never used   Substance and Sexual Activity   • Alcohol use: Not Currently     Alcohol/week: 3 0 standard drinks     Types: 3 Cans of beer per week   • Drug use: Never   • Sexual activity: Not on file   Other Topics Concern   • Not on file   Social History Narrative   • Not on file     Social Determinants of Health     Financial Resource Strain: Not on file   Food Insecurity: Not on file   Transportation Needs: Not on file   Physical Activity: Not on file   Stress: Not on file   Social Connections: Not on file   Intimate Partner Violence: Not on file   Housing Stability: Not on file     Family Psychiatric History:     Family History   Problem Relation Age of Onset   • Diabetes Mother    • Hypertension Mother    • Asthma Mother    • Brain cancer Father    • No Known Problems Sister    • No Known Problems Brother    • No Known Problems Brother      History Review: The following portions of the patient's history were reviewed and updated as appropriate: allergies, current medications, past family history, past medical history, past social history, past surgical history and problem list     OBJECTIVE:     Vital signs in last 24 hours: There were no vitals filed for this visit  Laboratory Results:   Recent Labs (last 2 months): Admission on 12/03/2022, Discharged on 12/03/2022   Component Date Value   • Ventricular Rate 12/03/2022 68    • Atrial Rate 12/03/2022 68    • MT Interval 12/03/2022 198    • QRSD Interval 12/03/2022 106    • QT Interval 12/03/2022 418    • QTC Interval 12/03/2022 444    • P Axis 12/03/2022 42    • QRS Axis 12/03/2022 -64    • T Wave Axis 12/03/2022 14      I have personally reviewed all pertinent laboratory/tests results      Suicide/Homicide Risk Assessment:    Risk of Harm to Self:  Protective Factors: no current suicidal ideation, access to mental health treatment, being , compliant with medications, compliant with mental health treatment, connection to community, having a desire to be alive  Based on today's assessment, Lizbet Gauthier presents the following risk of harm to self: minimal    Risk of Harm to Others:  Based on today's assessment, Lizbet Gauthier presents the following risk of harm to others: minimal    The following interventions are recommended: referral for psychotherapy    Medications Risks/Benefits:      Risks, Benefits And Possible Side Effects Of Medications:    Discussed risks and benefits of treatment with patient including risk of suicidality, serotonin syndrome, increased QTc interval and SIADH related to treatment with antidepressants; Risk of induction of manic symptoms in certain patient populations and risk of parkinsonian symptoms, metabolic syndrome, tardive dyskinesia and neuroleptic malignant syndrome related to treatment with antipsychotic medications     Controlled Medication Discussion:     Not applicable    Treatment Plan:    Due for update/Updated:   no  Last treatment plan done 11/29/22 by mitesh arciniega  Treatment Plan due on 5/29/23  ROCKY Marte 01/24/23    This note was shared with patient     Visit Time    Visit Start Time: 10  Visit Stop Time: 2651  Total Visit Duration: 23 minutes

## 2023-01-31 DIAGNOSIS — F29 PSYCHOSIS, UNSPECIFIED PSYCHOSIS TYPE (HCC): ICD-10-CM

## 2023-01-31 DIAGNOSIS — I10 ESSENTIAL HYPERTENSION: ICD-10-CM

## 2023-01-31 RX ORDER — METOPROLOL TARTRATE 100 MG/1
TABLET ORAL
Qty: 180 TABLET | Refills: 0 | Status: SHIPPED | OUTPATIENT
Start: 2023-01-31

## 2023-01-31 NOTE — TELEPHONE ENCOUNTER
Name from pharmacy: Metoprolol Tartrate 100 MG Oral Tablet          Will file in chart as: metoprolol tartrate (LOPRESSOR) 100 mg tablet    Sig: TAKE 1 TABLET BY MOUTH EVERY 12 HOURS    Disp:  180 tablet    Refills:  0    Start: 1/31/2023    Class: Normal    Non-formulary For: Essential hypertension    Last ordered: 4 months ago by Delvin Poole MD Last refill: 9/6/2022    Rx #: [id number]    Cardiovascular:  Beta Blockers Failed 01/31/2023 03:47 PM   Protocol Details  Valid encounter within last 6 months    BP completed in the last 6 months    Last Heart Rate in normal range and within 180 days      To be filled at: [address] [place] [address], [address] [facility]             Please review and refill if possible  Thanks!

## 2023-02-01 RX ORDER — ZALEPLON 5 MG/1
CAPSULE ORAL
Qty: 30 CAPSULE | Refills: 0 | Status: SHIPPED | OUTPATIENT
Start: 2023-02-01

## 2023-03-01 DIAGNOSIS — F32.9 REACTIVE DEPRESSION: ICD-10-CM

## 2023-03-01 DIAGNOSIS — F29 PSYCHOSIS, UNSPECIFIED PSYCHOSIS TYPE (HCC): ICD-10-CM

## 2023-03-01 RX ORDER — ZALEPLON 5 MG/1
CAPSULE ORAL
Qty: 30 CAPSULE | Refills: 0 | Status: SHIPPED | OUTPATIENT
Start: 2023-03-01

## 2023-03-01 RX ORDER — ZIPRASIDONE HYDROCHLORIDE 60 MG/1
CAPSULE ORAL
Qty: 60 CAPSULE | Refills: 0 | Status: SHIPPED | OUTPATIENT
Start: 2023-03-01

## 2023-03-03 ENCOUNTER — TELEMEDICINE (OUTPATIENT)
Dept: NEUROLOGY | Facility: CLINIC | Age: 53
End: 2023-03-03

## 2023-03-03 DIAGNOSIS — G40.109 LOCALIZATION-RELATED EPILEPSY (HCC): Primary | ICD-10-CM

## 2023-03-03 DIAGNOSIS — E55.9 VITAMIN D DEFICIENCY: ICD-10-CM

## 2023-03-03 DIAGNOSIS — F29 PSYCHOSIS, UNSPECIFIED PSYCHOSIS TYPE (HCC): ICD-10-CM

## 2023-03-03 RX ORDER — LAMOTRIGINE 100 MG/1
TABLET ORAL
Qty: 180 TABLET | Refills: 3 | Status: SHIPPED | OUTPATIENT
Start: 2023-03-03

## 2023-03-03 NOTE — PATIENT INSTRUCTIONS
- Continue current medications for now  - Have your blood work done ASAP  - Continue following with psychiatry  - If lamotrigine and Depakote levels come back okay, may want to discuss tremor with psychiatry especially since it has been bothering you  - Call the office with any seizures  - Follow up in 3 months

## 2023-03-03 NOTE — PROGRESS NOTES
Patient ID: David Ferrer  is a 45 y o  male with prior traumatic subarachnoid hemorrhage, localization-related epilepsy, and ongoing auditory hallucinations, who is returning for follow-up of his seizures  Virtual Regular Visit    [facility]    Patient is located in the following state in which I hold an active license [place]      Assessment/Plan:    Problem List Items Addressed This Visit        Nervous and Auditory    Localization-related epilepsy ([facility] [address] ) - Primary     Patient continues to be seizure free on combination of lamotrigine 100 mg BID and Depakote 750 mg BID  He does have a tremor but it is unclear if this is clearly correlated with this medication  Became worse when he was recently started on ziprasidone  Since there is no recent blood work, recommended having AED levels, CBC, and CMP checked  Also recommended If AED levels are okay, he should discuss tremor worsening with addition of ziprasidone with psychiatry to see if there are possible alternative options regarding this  Patient will continue with current medications for now  Encouraged having blood work done so AED changes can be made if needed regarding tremor  He will call the office with breakthrough seizures or concerns  Follow up in 3 months or sooner if needed with Dr Coelho Scot  Relevant Medications    lamoTRIgine (LaMICtal) 100 mg tablet    Other Relevant Orders    Comprehensive metabolic panel    CBC and differential    Lamotrigine level    Valproic acid level, total       Other    Psychosis ([facility] [address] )     Following closely with psychiatry  Relevant Medications    lamoTRIgine (LaMICtal) 100 mg tablet    Vitamin D deficiency     Check vitamin D level  Long term Depakote use can contribute to worsening            Relevant Orders    Vitamin D 25 hydroxy            Reason for visit is   Chief Complaint   Patient presents with   • Virtual Regular Visit        Encounter provider Bambi Padron Saniya Moeller    Provider located at [address] [id number]      Recent Visits  Date Type Provider Dept   03/03/23 Telemedicine [address] [place] [address],[id number], [address] [place] [address] recent visits within past 7 days and meeting all other requirements  Future Appointments  No visits were found meeting these conditions  Showing future appointments within next 150 days and meeting all other requirements       The patient was identified by name and date of birth  [facility]  was informed that this is a telemedicine visit and that the visit is being conducted through the [employer]  He agrees to proceed     My office door was closed  No one else was in the room  He acknowledged consent and understanding of privacy and security of the video platform  The patient has agreed to participate and understands they can discontinue the visit at any time  Patient is aware this is a billable service  Susan Chau  is a 45 y o  male with prior traumatic subarachnoid hemorrhage, localization-related epilepsy, and ongoing auditory hallucinations, who is returning for follow-up of his seizures  Last seen via telemedicine by Dr Krishna Garcia on 10/28/22  At that time, there were no additional seizures on lamotrigine and Depakote  He did continue to have difficulty with hallucinations and was following with psychiatry  Hallucinations did not appear to be associated with EEG changes in the past  No changes were made to AED regimen at that time  Recommended 4 month follow up       Since his last visit, there have not been any seizures  Continues on lamotrigine 100 mg BID and Depakote 750 mg BID  No clear side effects but maybe tremor       He reports that his hands shake a lot  This started to get worse about one month ago  When he was in counseling they changed his medication and that may be one of the side effects   Wife noticed that this has been getting worse  Since ziprasidone change this has been worse  He has to eat with a spoon  Typically when he is trying to do something (intention tremor)  He does need to get his blood work done  Not happening right now        He does continue to hear voices in his head  He follows closely with psychiatry  They did make some recent medication changes  He has noticed some difference that he has some days that it is bearable and others that it is totally out of control   Does not feel that this is any worse than it was in the past   He has been feeling depressed but is working on this with psychiatry as well         Current seizure medications:  - lamotrigine 100 mg BID  - Depakote  mg BID  Other medications as per Epic         Prior Medications: Levetiracetam (worsened mood and hallucinations)     His history was also obtained from his wife, who was present at today's visit        I reviewed prior neurology notes, most recent labs, as documented in Epic/[employer], and summarized above         Past Medical History:   Diagnosis Date   • Chemical exposure    • Diabetes mellitus ([facility] [address] )    • H/O bone graft    • Head injury     August 25, 2019 fell backwards hit head on a [place]oulder w/ LOC   • Head injury     as an air [place] with the [employer] - jumped out of a plane parachute disfunction   • Hypertension    • Seizures ([facility] [address] )        Past Surgical History:   Procedure Laterality Date   • FL LUMBAR PUNCTURE DIAGNOSTIC  12/13/2019   • KNEE ARTHROSCOPY W/ MENISCECTOMY Left    • LEG SURGERY     • TONSILLECTOMY         Current Outpatient Medications   Medication Sig Dispense Refill   • aspirin 81 mg chewable tablet Chew 81 mg daily     • atorvastatin (LIPITOR) 20 mg tablet Take 20 mg by mouth every evening     • divalproex sodium (DEPAKOTE) 250 mg EC tablet TAKE 1 TABLET BY MOUTH TWICE DAILY WITH  ONE  500  MG  TAB  FOR  TOAL  DOSE  OF  750  MG  TWICE  A  DAY 60 tablet 11   • divalproex sodium (DEPAKOTE) 500 mg EC tablet Take 1 tab twice a day with one 250 mg tab for total dose of 750 mg twice a day   60 tablet 11   • ibuprofen (MOTRIN) 800 mg tablet daily as needed     • insulin NPH-insulin regular (NovoLIN 70/30) 100 units/mL subcutaneous injection Inject 10 Units under the skin daily before breakfast (Patient taking differently: Inject 12 Units under the skin daily before breakfast) 3 mL 0   • lamoTRIgine (LaMICtal) 100 mg tablet Take 1 tab (100 mg) twice a day 180 tablet 3   • metoprolol tartrate (LOPRESSOR) 100 mg tablet TAKE 1 TABLET BY MOUTH EVERY 12 HOURS 180 tablet 0   • Ventolin  (90 Base) MCG/ACT inhaler Inhale 2 puffs every 6 (six) hours as needed     • Vitamin D, Cholecalciferol, 50 MCG (2000 UT) CAPS Take 1 capsule by mouth once daily 30 capsule 5   • zaleplon (SONATA) 5 MG capsule Take 1 capsule by mouth once daily at bedtime 30 capsule 0   • ziprasidone (GEODON) 60 mg capsule Take 1 capsule by mouth twice daily 60 capsule 0   • amLODIPine (NORVASC) 10 mg tablet Take 10 mg by mouth daily (Patient not taking: Reported on 3/3/2023)     • amoxicillin (AMOXIL) 500 mg capsule TAKE 1 CAPSULE BY MOUTH TWICE DAILY FOR 10 DAYS (Patient not taking: Reported on 3/3/2023)     • benztropine (COGENTIN) 0 5 mg tablet Take 1 tablet (0 5 mg total) by mouth 2 (two) times a day 60 tablet 1   • diazepam (VALIUM) 2 mg tablet Take 1 tablet (2 mg total) by mouth daily as needed for anxiety (severe anxiety only) 30 tablet 0   • ergocalciferol (VITAMIN D2) 50,000 units Take 1 capsule (50,000 Units total) by mouth once a week for 8 doses 8 capsule 0   • Jardiance 25 MG TABS Take 25 mg by mouth daily (Patient not taking: Reported on 3/3/2023)     • lisinopril (ZESTRIL) 40 mg tablet Take 1 tablet by mouth daily (Patient not taking: Reported on 3/3/2023)     • metFORMIN (GLUCOPHAGE) 1000 MG tablet Take 1,000 mg by mouth 2 (two) times a day with meals (Patient not taking: Reported on 3/3/2023)     • sertraline (Zoloft) 100 mg tablet Take 2 tablets (200 mg total) by mouth daily 90 tablet 1     No current facility-administered medications for this visit  No Known Allergies    Review of Systems   Constitutional: Negative  Negative for appetite change and fever  HENT: Negative  Negative for hearing loss, tinnitus, trouble swallowing and voice change  Eyes: Negative  Negative for photophobia, pain and visual disturbance  Respiratory: Negative  Negative for shortness of breath  Cardiovascular: Negative  Negative for palpitations  Gastrointestinal: Negative  Negative for nausea and vomiting  Endocrine: Negative  Negative for cold intolerance  Genitourinary: Negative  Negative for dysuria, frequency and urgency  Musculoskeletal: Negative  Negative for gait problem, myalgias and neck pain  Skin: Negative  Negative for rash  Allergic/Immunologic: Negative  Neurological: Positive for tremors (Hands)  Negative for dizziness, seizures, syncope, facial asymmetry, speech difficulty, weakness, light-headedness, numbness and headaches  Hematological: Negative  Does not bruise/bleed easily  Psychiatric/Behavioral: Negative  Negative for confusion, hallucinations and sleep disturbance  ROS Obtained by MA and reviewed by myself     Video Exam    There were no vitals filed for this visit      Physical Exam     I spent 23 minutes directly with the patient during this visit

## 2023-03-03 NOTE — PROGRESS NOTES
Patient ID: Zayra Denny  is a 45 y o  male with prior traumatic subarachnoid hemorrhage, localization-related epilepsy, and ongoing auditory hallucinations, who is returning for follow-up of his seizures  Assessment/Plan:    No problem-specific Assessment & Plan notes found for this encounter  He will No follow-ups on file  Subjective:  Zayra Denny  is a 45 y o  male with prior traumatic subarachnoid hemorrhage, localization-related epilepsy, and ongoing auditory hallucinations, who is returning for follow-up of his seizures  Last seen via telemedicine by Dr Aleksandra Moscoso on 10/28/22  At that time, there were no additional seizures on lamotrigine and Depakote  He did continue to have difficulty with hallucinations and was following with psychiatry  Hallucinations did not appear to be associated with EEG changes in the past  No changes were made to AED regimen at that time  Recommended 4 month follow up  Since his last visit, there have not been any seizures  He reports that his hands shake a lot  This started to get worse about one month ago  When he was in counseling they changed his medication and that may be one of the side effects  Wife noticed that this has been getting worse  Since ziprasidone change this has been worse  He has to eat with a spoon  Typically when he is trying to do something (intention tremor)  He does need to get his blood work done  He does continue to hear voices in his head  He does continue to see psychiatry  They did make some recent medication changes  He has noticed some difference that he has some days that it is bearable and others that it is totally out of control  Does not feel that this is any worse than it was in the past   He has been feeling depressed but is working on this with psychiatry  23 minutes    Current seizure medications:  - lamotrigine 100 mg BID  - Depakote  mg BID  Other medications as per Epic        Prior Medications: Levetiracetam (worsened mood and hallucinations)      His history was also obtained from his ***, who {was/were} present at today's visit  *** I personally reviewed his {test name} from ***    *** I reviewed ***, as documented in Epic/[employer], and summarized above  Objective: There were no vitals taken for this visit  Physical Exam  No apparent distress  Appears well nourished  Mood appropriate for situation     Neurologic Exam  Mental status- alert and oriented to person, place, and time  Speech appropriate for conversation  Good attention and knowledge  Cranial Nerves- PERRL, VFFTC, EOMS normal, facial sensation and muscles symmetric, hearing intact bilaterally to finger rubs, tongue midline, palate rise symmetrical, shoulder shrug symmetrical     Motor- No pronator drift  Appropriate strength  Moves all extremities freely  No tremor  Sensory-  Intact distally in all extremities to light touch  DTRs- ***    Gait- normal casual gait  Normal tandem gait  Negative rhomberg  Coordination- FNF intact  ROS:    Review of Systems   Constitutional: Negative  Negative for appetite change and fever  HENT: Negative  Negative for hearing loss, tinnitus, trouble swallowing and voice change  Eyes: Negative  Negative for photophobia, pain and visual disturbance  Respiratory: Negative  Negative for shortness of breath  Cardiovascular: Negative  Negative for palpitations  Gastrointestinal: Negative  Negative for nausea and vomiting  Endocrine: Negative  Negative for cold intolerance  Genitourinary: Negative  Negative for dysuria, frequency and urgency  Musculoskeletal: Negative  Negative for gait problem, myalgias and neck pain  Skin: Negative  Negative for rash  Allergic/Immunologic: Negative  Neurological: Positive for tremors (Hands)   Negative for dizziness, seizures, syncope, facial asymmetry, speech difficulty, weakness, light-headedness, numbness and headaches  Hematological: Negative  Does not bruise/bleed easily  Psychiatric/Behavioral: Negative  Negative for confusion, hallucinations and sleep disturbance         ROS obtained by MA and reviewed by myself  This note may have been created using voice recognition software  There may be unintentional errors such as grammatical errors, spelling errors, or pronoun errors

## 2023-03-07 ENCOUNTER — TELEMEDICINE (OUTPATIENT)
Dept: PSYCHIATRY | Facility: CLINIC | Age: 53
End: 2023-03-07

## 2023-03-07 DIAGNOSIS — F29 PSYCHOSIS, UNSPECIFIED PSYCHOSIS TYPE (HCC): Primary | ICD-10-CM

## 2023-03-07 DIAGNOSIS — F32.9 REACTIVE DEPRESSION: ICD-10-CM

## 2023-03-07 DIAGNOSIS — S06.9X9D TRAUMATIC BRAIN INJURY WITH LOSS OF CONSCIOUSNESS, SUBSEQUENT ENCOUNTER: ICD-10-CM

## 2023-03-07 RX ORDER — BENZTROPINE MESYLATE 0.5 MG/1
0.5 TABLET ORAL 2 TIMES DAILY
Qty: 60 TABLET | Refills: 1 | Status: SHIPPED | OUTPATIENT
Start: 2023-03-07

## 2023-03-07 RX ORDER — DIAZEPAM 2 MG/1
2 TABLET ORAL DAILY PRN
Qty: 30 TABLET | Refills: 0 | Status: SHIPPED | OUTPATIENT
Start: 2023-03-07

## 2023-03-07 RX ORDER — SERTRALINE HYDROCHLORIDE 100 MG/1
200 TABLET, FILM COATED ORAL DAILY
Qty: 90 TABLET | Refills: 1 | Status: SHIPPED | OUTPATIENT
Start: 2023-03-07

## 2023-03-07 NOTE — PSYCH
Virtual Regular Visit    Verification of patient location:    Patient is located in the following state in which I hold an active license [place]    Problem List Items Addressed This Visit        Nervous and Auditory    Traumatic brain injury with loss of consciousness ([facility] [facility] )    Relevant Medications    benztropine (COGENTIN) 0 5 mg tablet    Other Relevant Orders    Ambulatory Referral to Neuropsychiatry       Other    Psychosis ([facility] ) - Primary    Relevant Medications    sertraline (Zoloft) 100 mg tablet    diazepam (VALIUM) 2 mg tablet    Reactive depression    Relevant Medications    sertraline (Zoloft) 100 mg tablet    diazepam (VALIUM) 2 mg tablet          Encounter provider ROCKY Lopez    Provider located at    [address] [place] [address]  [address] [facility] [id number] [phone]    Recent Visits  No visits were found meeting these conditions  Showing recent visits within past 7 days and meeting all other requirements  Today's Visits  Date Type Provider Dept   03/07/23 [facility]medicine [facility]OCK[facility]   Showing today's visits and meeting all other requirements  Future Appointments  No visits were found meeting these conditions  Showing future appointments within next 150 days and meeting all other requirements         The patient was identified by name and date of birth  [facility]  was informed that this is a telemedicine visit and that the visit is being conducted through[address]  He agrees to proceed     My office door was closed  No one else was in the room  He acknowledged consent and understanding of privacy and security of the video platform  The patient has agreed to participate and understands they can discontinue the visit at any time  Patient is aware this is a billable service       HPI     Current Outpatient Medications   Medication Sig Dispense Refill   • benztropine (COGENTIN) 0 5 mg tablet Take 1 tablet (0 5 mg total) by mouth 2 (two) times a day 60 tablet 1   • diazepam (VALIUM) 2 mg tablet Take 1 tablet (2 mg total) by mouth daily as needed for anxiety (severe anxiety only) 30 tablet 0   • divalproex sodium (DEPAKOTE) 250 mg EC tablet TAKE 1 TABLET BY MOUTH TWICE DAILY WITH  ONE  500  MG  TAB  FOR  TOAL  DOSE  OF  750  MG  TWICE  A  DAY 60 tablet 11   • divalproex sodium (DEPAKOTE) 500 mg EC tablet Take 1 tab twice a day with one 250 mg tab for total dose of 750 mg twice a day   60 tablet 11   • lamoTRIgine (LaMICtal) 100 mg tablet Take 1 tab (100 mg) twice a day 180 tablet 3   • metoprolol tartrate (LOPRESSOR) 100 mg tablet TAKE 1 TABLET BY MOUTH EVERY 12 HOURS 180 tablet 0   • sertraline (Zoloft) 100 mg tablet Take 2 tablets (200 mg total) by mouth daily 90 tablet 1   • Ventolin  (90 Base) MCG/ACT inhaler Inhale 2 puffs every 6 (six) hours as needed     • Vitamin D, Cholecalciferol, 50 MCG (2000 UT) CAPS Take 1 capsule by mouth once daily 30 capsule 5   • zaleplon (SONATA) 5 MG capsule Take 1 capsule by mouth once daily at bedtime 30 capsule 0   • ziprasidone (GEODON) 60 mg capsule Take 1 capsule by mouth twice daily 60 capsule 0   • amLODIPine (NORVASC) 10 mg tablet Take 10 mg by mouth daily (Patient not taking: Reported on 3/3/2023)     • amoxicillin (AMOXIL) 500 mg capsule TAKE 1 CAPSULE BY MOUTH TWICE DAILY FOR 10 DAYS (Patient not taking: Reported on 3/3/2023)     • aspirin 81 mg chewable tablet Chew 81 mg daily     • atorvastatin (LIPITOR) 20 mg tablet Take 20 mg by mouth every evening     • ergocalciferol (VITAMIN D2) 50,000 units Take 1 capsule (50,000 Units total) by mouth once a week for 8 doses 8 capsule 0   • ibuprofen (MOTRIN) 800 mg tablet daily as needed     • insulin NPH-insulin regular (NovoLIN 70/30) 100 units/mL subcutaneous injection Inject 10 Units under the skin daily before breakfast (Patient taking differently: Inject 12 Units under the skin daily before breakfast) 3 mL 0   • Jardiance 25 MG TABS Take 25 mg by mouth daily (Patient not taking: Reported on 3/3/2023)     • lisinopril (ZESTRIL) 40 mg tablet Take 1 tablet by mouth daily (Patient not taking: Reported on 3/3/2023)     • metFORMIN (GLUCOPHAGE) 1000 MG tablet Take 1,000 mg by mouth 2 (two) times a day with meals (Patient not taking: Reported on 3/3/2023)       No current facility-administered medications for this visit  Review of Systems  Video Exam    There were no vitals filed for this visit  Physical Exam   As a result of this visit, I have referred the patient for further respiratory evaluation  No      VIRTUAL VISIT DISCLAIMER    [facility]  acknowledges that he has consented to an online visit or consultation  He understands that the online visit is based solely on information provided by him, and that, in the absence of a face-to-face physical evaluation by the physician, the diagnosis he receives is both limited and provisional in terms of accuracy and completeness  This is not intended to replace a full medical face-to-face evaluation by the physician  Romina Bhagatway understands and accepts these terms  MEDICATION MANAGEMENT NOTE        [facility]    Name and Date of Birth:  Romina Garcia  45 y o  1970 MRN: [id number]    Date of Visit: March 7, 2023    No Known Allergies  SUBJECTIVE:    Kirit Almaguer is seen today for a follow up for psychosis and TBI  He reports that he continues to experience on and off symptoms since the last visit  Britt Juan reports a mild improvement in his chronic ongoing auditory hallucinations since Geodon was titrated up to 60 mg twice daily  Describes how some days the voices are much more manageable and he is able to have better days and ignore them which is considerably improved  Previously had no improvement at all with prior antipsychotics including Risperdal, Haldol, Zyprexa   Reports days where the hallucinations are severe and he has difficulty ignoring him  Due to the ongoing voices, reports passive suicidal ideation with no plan or intent at this time  Patient's wife is on today's virtual counter and agreeable to take him to the nearest ER if he ever develops suicidal ideation with a plan  Patient and his wife were reminded of the crisis hotline, warm line, suicidal hotline during today's encounter and have all resources  We discussed a reccomendation for Jaison Batista to see a TBI specialist and provider put in a referral and gave patient the number to [facility] neuropsychology  Patient his wife are appreciative of the resources provided today  Experiencing a moderate side effect of bilateral hand shaking when holding an object such as a cup or a spoon, they have noticed that his slightly worsened as Geodon has been titrated up  We will initiate Cogentin 0 5 mg twice daily to combat potential side effect of Geodon  Will titrate Zoloft 200 mg daily for reported 7/10 anxiety related to the ongoing auditory hallucinations  6/10 depression persist with dysphoria, anhedonia, lack of energy motivation  Patient helpful to continue to see progress with Geodon- Continue to monitor closely for side effects  Encouraged patient to stay active and engaged to help distract from [facility]  Patient's wife is a very strong support system and manages his medications  Patient denies current SI or HI  He reports bilateral hand shakes 2-3 times daily    PLAN:    -Initiate Cogentin 0 5 mg twice daily for bilateral hand shaking worsened when Geodon increased  -Continue Geodon to 60 mg twice daily for chronic auditory hallucinations in the context of his traumatic brain injury, recent EKG completed and reviewed     -Titrate Zoloft to 200 mg daily for depression related to the ongoing hallucinations PARQ completed including serotonin syndrome, SIADH, worsening depression, suicidality, induction of afshin, GI upset, headaches, activation, sexual side effects, sedation, potential drug interactions, and others     -Continue p r n  Valium 2 milligrams daily p r n  for severe anxiety/agitation which occasionally occurs due to patient's auditory hallucinations  Using infrequently according to PDMP  Discussed with patient the risks of sedation, respiratory depression, impairment of ability to drive and potential for abuse and addiction related to treatment with benzodiazepine medications  The patient understands risk of treatment with benzodiazepine medications, agrees to not drive if feels impaired and agrees to take medications as prescribed       - Neurologist prescribing Depakote 750 mg b i d  and Lamictal 100 mg BID for the management of epilepsy disorder, has remained free from seizures 3 years  Complete pending labs            -Follow-up closely with neurology regarding seizure disorder, denies any recent seizures   -Follow up with resources provided regarding a TBI specialist and support groups to help manage symptoms and diagnosis            Aware of 24 hour and weekend coverage for urgent situations accessed by calling [facility] main practice number  Referral for individual psychotherapy    Diagnoses and all orders for this visit:    Psychosis, unspecified psychosis type ([facility] )  -     diazepam (VALIUM) 2 mg tablet; Take 1 tablet (2 mg total) by mouth daily as needed for anxiety (severe anxiety only)    Traumatic brain injury with loss of consciousness, subsequent encounter  -     benztropine (COGENTIN) 0 5 mg tablet; Take 1 tablet (0 5 mg total) by mouth 2 (two) times a day  -     Ambulatory Referral to Neuropsychiatry; Future    Reactive depression  -     sertraline (Zoloft) 100 mg tablet;  Take 2 tablets (200 mg total) by mouth daily        Current Outpatient Medications on File Prior to Visit   Medication Sig Dispense Refill   • divalproex sodium (DEPAKOTE) 250 mg EC tablet TAKE 1 TABLET BY MOUTH TWICE DAILY WITH  ONE  500  MG  TAB FOR  TOAL  DOSE  OF  750  MG  TWICE  A  DAY 60 tablet 11   • divalproex sodium (DEPAKOTE) 500 mg EC tablet Take 1 tab twice a day with one 250 mg tab for total dose of 750 mg twice a day   60 tablet 11   • lamoTRIgine (LaMICtal) 100 mg tablet Take 1 tab (100 mg) twice a day 180 tablet 3   • metoprolol tartrate (LOPRESSOR) 100 mg tablet TAKE 1 TABLET BY MOUTH EVERY 12 HOURS 180 tablet 0   • Ventolin  (90 Base) MCG/ACT inhaler Inhale 2 puffs every 6 (six) hours as needed     • Vitamin D, Cholecalciferol, 50 MCG (2000 UT) CAPS Take 1 capsule by mouth once daily 30 capsule 5   • zaleplon (SONATA) 5 MG capsule Take 1 capsule by mouth once daily at bedtime 30 capsule 0   • ziprasidone (GEODON) 60 mg capsule Take 1 capsule by mouth twice daily 60 capsule 0   • amLODIPine (NORVASC) 10 mg tablet Take 10 mg by mouth daily (Patient not taking: Reported on 3/3/2023)     • amoxicillin (AMOXIL) 500 mg capsule TAKE 1 CAPSULE BY MOUTH TWICE DAILY FOR 10 DAYS (Patient not taking: Reported on 3/3/2023)     • aspirin 81 mg chewable tablet Chew 81 mg daily     • atorvastatin (LIPITOR) 20 mg tablet Take 20 mg by mouth every evening     • ergocalciferol (VITAMIN D2) 50,000 units Take 1 capsule (50,000 Units total) by mouth once a week for 8 doses 8 capsule 0   • ibuprofen (MOTRIN) 800 mg tablet daily as needed     • insulin NPH-insulin regular (NovoLIN 70/30) 100 units/mL subcutaneous injection Inject 10 Units under the skin daily before breakfast (Patient taking differently: Inject 12 Units under the skin daily before breakfast) 3 mL 0   • Jardiance 25 MG TABS Take 25 mg by mouth daily (Patient not taking: Reported on 3/3/2023)     • lisinopril (ZESTRIL) 40 mg tablet Take 1 tablet by mouth daily (Patient not taking: Reported on 3/3/2023)     • metFORMIN (GLUCOPHAGE) 1000 MG tablet Take 1,000 mg by mouth 2 (two) times a day with meals (Patient not taking: Reported on 3/3/2023)     • [DISCONTINUED] diazepam (VALIUM) 2 mg tablet Take 1 tablet (2 mg total) by mouth daily as needed for anxiety (severe anxiety only) 30 tablet 0   • [DISCONTINUED] diazepam (VALIUM) 5 mg tablet Take 1 tablet (5 mg total) by mouth every 8 (eight) hours as needed for anxiety for up to 5 days 15 tablet 0   • [DISCONTINUED] sertraline (Zoloft) 100 mg tablet Take 1 5 tablets (150 mg total) by mouth daily 90 tablet 1     No current facility-administered medications on file prior to visit  Psychotherapy Provided:     Individual psychotherapy provided: Yes  Counseling was provided during the session today for 16 minutes  Supportive counseling provided  Medication changes discussed with [facility]  Medication education provided to [facility]  HPI ROS Appetite Changes and Sleep:     He reports difficulty falling asleep, adequate appetite, low energy   Denies homicidal ideation, denies suicidal ideation    Review Of Systems:      HPI ROS:               Medication Side Effects:  hand shakiness    Depression (10 worst): 6/10    Anxiety (10 worst): 6/10    Safety concerns (SI, HI, etc): Denies current si and hi, hx passive SI    Sleep: 6-9 hrs    Energy: Fair to low    Appetite: 2-3 meals    Weight Change: denies        Mental Status Evaluation:    Appearance Adequate hygiene and grooming   Behavior guarded   Mood anxious and depressed  Depression Scale - 6 of 10 (0 = No depression)  Anxiety Scale - 6 of 10 (0 = No anxiety)   Speech Soft   Affect constricted   Thought Processes Goal directed and coherent   Thought Content Does not verbalize delusional material   Associations concrete associations   Perceptual Disturbances Auditory hallucinations without commands   Risk Potential Suicidal/Homicidal Ideation - Passive suicidal ideation without intent or plan  Risk of Violence - No evidence of risk for violence found on assessment  Risk of Self Mutilation - No evidence of risk for self mutilation found on assessment   Orientation oriented to person, place, time/date and situation Memory recent and remote memory grossly intact   Consciousness alert and awake   Attention/Concentration attention span and concentration appear shorter than expected for age   Insight partial   Judgement partial   Muscle Strength and Gait decreased muscle tone, decreased muscle strength   Motor Activity reports occasional hand shakiness   Language no difficulty naming common objects, no difficulty repeating a phrase, no difficulty writing a sentence   Fund of Knowledge adequate knowledge of current events  adequate fund of knowledge regarding past history  adequate fund of knowledge regarding vocabulary          Past Medical History:    Past Medical History:   Diagnosis Date   • Chemical exposure    • Diabetes mellitus ([facility] [address] )    • H/O bone graft    • Head injury     August 25, 2019 fell backwards hit head on a [place] w/ LOC   • Head injury     as an air [place] with the [employer] - jumped out of a plane parachute disfunction   • Hypertension    • Seizures ([facility] [address] )         Past Surgical History:   Procedure Laterality Date   • FL LUMBAR PUNCTURE DIAGNOSTIC  12/13/2019   • KNEE ARTHROSCOPY W/ MENISCECTOMY Left    • LEG SURGERY     • TONSILLECTOMY       No Known Allergies  Substance Abuse History:    Social History     Substance and Sexual Activity   Alcohol Use Not Currently   • Alcohol/week: 3 0 standard drinks   • Types: 3 Cans of beer per week     Social History     Substance and Sexual Activity   Drug Use Never     Social History:    Social History     Socioeconomic History   • Marital status: Single     Spouse name: Not on file   • Number of children: Not on file   • Years of education: Not on file   • Highest education level: Not on file   Occupational History   • Not on file   Tobacco Use   • Smoking status: Never   • Smokeless tobacco: Never   Vaping Use   • Vaping Use: Never used   Substance and Sexual Activity   • Alcohol use: Not Currently     Alcohol/week: 3 0 standard drinks     Types: 3 Cans of beer per week   • Drug use: Never   • Sexual activity: Not on file   Other Topics Concern   • Not on file   Social History Narrative   • Not on file     Social Determinants of Health     Financial Resource Strain: Not on file   Food Insecurity: Not on file   Transportation Needs: Not on file   Physical Activity: Not on file   Stress: Not on file   Social Connections: Not on file   Intimate Partner Violence: Not on file   Housing Stability: Not on file     Family Psychiatric History:     Family History   Problem Relation Age of Onset   • Diabetes Mother    • Hypertension Mother    • Asthma Mother    • Brain cancer Father    • No Known Problems Sister    • No Known Problems Brother    • No Known Problems Brother      History Review: The following portions of the patient's history were reviewed and updated as appropriate: allergies, current medications, past family history, past medical history, past social history, past surgical history and problem list     OBJECTIVE:     Vital signs in last 24 hours: There were no vitals filed for this visit  Laboratory Results:   Recent Labs (last 2 months):   No visits with results within 2 Month(s) from this visit  Latest known visit with results is:   Admission on 12/03/2022, Discharged on 12/03/2022   Component Date Value   • Ventricular Rate 12/03/2022 68    • Atrial Rate 12/03/2022 68    • ME Interval 12/03/2022 198    • QRSD Interval 12/03/2022 106    • QT Interval 12/03/2022 418    • QTC Interval 12/03/2022 444    • P Axis 12/03/2022 42    • QRS Axis 12/03/2022 -64    • T Wave Axis 12/03/2022 14      I have personally reviewed all pertinent laboratory/tests results      Suicide/Homicide Risk Assessment:    Risk of Harm to Self:  Protective Factors: no current suicidal ideation, access to mental health treatment, being , compliant with medications, compliant with mental health treatment, connection to community, medical compliance, no substance use problems, resiliency, responsibilities and duties to others  Based on today's Yeseniaphillip Gilliam presents the following risk of harm to self: minimal    Risk of Harm to Others:  Based on today's assessment, Benito Anderson presents the following risk of harm to others: minimal    The following interventions are recommended: contracts for safety at present - agrees to go to ED if feeling unsafe, referral for psychotherapy    Medications Risks/Benefits:      Risks, Benefits And Possible Side Effects Of Medications:    Discussed risks and benefits of treatment with patient including risk of suicidality, serotonin syndrome, increased QTc interval and SIADH related to treatment with antidepressants; Risk of induction of manic symptoms in certain patient populations, risk of parkinsonian symptoms, metabolic syndrome, tardive dyskinesia and neuroleptic malignant syndrome related to treatment with antipsychotic medications, risks of misuse, abuse or dependence, sedation and respiratory depression related to treatment with benzodiazepine medications, risk of rash related to treatment with Lamictal and risk of liver impairment related to treatment with Depakote     Controlled Medication Discussion:     Benito Anderson has been filling controlled prescriptions on time as prescribed according to [address]  Discussed with Benito Anderson the risks of sedation, respiratory depression, impairment of ability to drive and potential for abuse and addiction related to treatment with benzodiazepine medications  He understands risk of treatment with benzodiazepine medications, agrees to not drive if feels impaired and agrees to take medications as prescribed  Discussed with [facility] warning on concurrent use of benzodiazepines and opioid medications including sedation, respiratory depression, coma and death   He understands the risk of treatment with benzodiazepines in addition to opioids and wants to continue taking those medications  Discussed with [facility] increased risk of impairment related to concurrent use of benzodiazepines and hypnotic medications including excessive sedation, psychomotor impairment and respiratory depression  He understands the risk of treatment with benzodiazepines in addition to hypnotic medications and wants to continue taking those medications    Treatment Plan:    Due for update/Updated:   no  Last treatment plan done 11/29/22 by mitesh conley  Treatment Plan due on 5/29/23  ROCKY Sharp 03/07/23    This note was shared with patient        Visit Time    Visit Start Time: 11  Visit Stop Time: 1120  Total Visit Duration: 28 minutes

## 2023-03-08 PROBLEM — E55.9 VITAMIN D DEFICIENCY: Status: ACTIVE | Noted: 2023-03-08

## 2023-03-08 NOTE — ASSESSMENT & PLAN NOTE
Patient continues to be seizure free on combination of lamotrigine 100 mg BID and Depakote 750 mg BID  He does have a tremor but it is unclear if this is clearly correlated with this medication  Became worse when he was recently started on ziprasidone  Since there is no recent blood work, recommended having AED levels, CBC, and CMP checked  Also recommended If AED levels are okay, he should discuss tremor worsening with addition of ziprasidone with psychiatry to see if there are possible alternative options regarding this  Patient will continue with current medications for now  Encouraged having blood work done so AED changes can be made if needed regarding tremor  He will call the office with breakthrough seizures or concerns  Follow up in 3 months or sooner if needed with Dr Bartolo Salguero

## 2023-04-06 DIAGNOSIS — F29 PSYCHOSIS, UNSPECIFIED PSYCHOSIS TYPE (HCC): ICD-10-CM

## 2023-04-06 RX ORDER — ZALEPLON 5 MG/1
CAPSULE ORAL
Qty: 30 CAPSULE | Refills: 0 | Status: SHIPPED | OUTPATIENT
Start: 2023-04-06

## 2023-05-02 DIAGNOSIS — F29 PSYCHOSIS, UNSPECIFIED PSYCHOSIS TYPE (HCC): ICD-10-CM

## 2023-05-02 DIAGNOSIS — S06.9X9D TRAUMATIC BRAIN INJURY WITH LOSS OF CONSCIOUSNESS, SUBSEQUENT ENCOUNTER: ICD-10-CM

## 2023-05-02 RX ORDER — BENZTROPINE MESYLATE 0.5 MG/1
TABLET ORAL
Qty: 60 TABLET | Refills: 0 | Status: SHIPPED | OUTPATIENT
Start: 2023-05-02

## 2023-05-02 RX ORDER — ZALEPLON 5 MG/1
CAPSULE ORAL
Qty: 30 CAPSULE | Refills: 0 | Status: SHIPPED | OUTPATIENT
Start: 2023-05-02

## 2023-05-26 DIAGNOSIS — F29 PSYCHOSIS, UNSPECIFIED PSYCHOSIS TYPE (HCC): ICD-10-CM

## 2023-05-26 DIAGNOSIS — F32.9 REACTIVE DEPRESSION: ICD-10-CM

## 2023-05-29 RX ORDER — ZIPRASIDONE HYDROCHLORIDE 60 MG/1
CAPSULE ORAL
Qty: 60 CAPSULE | Refills: 0 | Status: SHIPPED | OUTPATIENT
Start: 2023-05-29

## 2023-05-29 RX ORDER — DIAZEPAM 2 MG/1
TABLET ORAL
Qty: 30 TABLET | Refills: 0 | Status: SHIPPED | OUTPATIENT
Start: 2023-05-29

## 2023-05-30 ENCOUNTER — HOSPITAL ENCOUNTER (EMERGENCY)
Facility: HOSPITAL | Age: 53
Discharge: HOME | End: 2023-05-30
Attending: EMERGENCY MEDICINE

## 2023-05-30 ENCOUNTER — APPOINTMENT (EMERGENCY)
Dept: CT IMAGING | Facility: HOSPITAL | Age: 53
End: 2023-05-30

## 2023-05-30 VITALS
DIASTOLIC BLOOD PRESSURE: 79 MMHG | SYSTOLIC BLOOD PRESSURE: 128 MMHG | HEART RATE: 67 BPM | RESPIRATION RATE: 18 BRPM | OXYGEN SATURATION: 95 % | TEMPERATURE: 98.6 F

## 2023-05-30 DIAGNOSIS — S06.9XAA TBI (TRAUMATIC BRAIN INJURY) (HCC): ICD-10-CM

## 2023-05-30 DIAGNOSIS — R44.0 AUDITORY HALLUCINATIONS: Primary | ICD-10-CM

## 2023-05-30 DIAGNOSIS — R45.851 SUICIDAL IDEATIONS: ICD-10-CM

## 2023-05-30 LAB
ALBUMIN SERPL BCP-MCNC: 4 G/DL (ref 3.5–5)
ALP SERPL-CCNC: 61 U/L (ref 34–104)
ALT SERPL W P-5'-P-CCNC: 11 U/L (ref 7–52)
ANION GAP SERPL CALCULATED.3IONS-SCNC: 6 MMOL/L (ref 4–13)
AST SERPL W P-5'-P-CCNC: 26 U/L (ref 13–39)
ATRIAL RATE: 65 BPM
BASOPHILS # BLD AUTO: 0.04 THOUSANDS/ÂΜL (ref 0–0.1)
BASOPHILS NFR BLD AUTO: 1 % (ref 0–1)
BILIRUB SERPL-MCNC: 0.74 MG/DL (ref 0.2–1)
BUN SERPL-MCNC: 12 MG/DL (ref 5–25)
CALCIUM SERPL-MCNC: 9.6 MG/DL (ref 8.4–10.2)
CHLORIDE SERPL-SCNC: 104 MMOL/L (ref 96–108)
CHOLEST SERPL-MCNC: 132 MG/DL
CO2 SERPL-SCNC: 29 MMOL/L (ref 21–32)
CREAT SERPL-MCNC: 1.03 MG/DL (ref 0.6–1.3)
EOSINOPHIL # BLD AUTO: 0.14 THOUSAND/ÂΜL (ref 0–0.61)
EOSINOPHIL NFR BLD AUTO: 2 % (ref 0–6)
ERYTHROCYTE [DISTWIDTH] IN BLOOD BY AUTOMATED COUNT: 14.6 % (ref 11.6–15.1)
EST. AVERAGE GLUCOSE BLD GHB EST-MCNC: 120 MG/DL
GFR SERPL CREATININE-BSD FRML MDRD: 81 ML/MIN/1.73SQ M
GLUCOSE SERPL-MCNC: 124 MG/DL (ref 65–140)
HBA1C MFR BLD: 5.8 %
HCT VFR BLD AUTO: 43.3 % (ref 36.5–49.3)
HDLC SERPL-MCNC: 39 MG/DL
HGB BLD-MCNC: 14.5 G/DL (ref 12–17)
IMM GRANULOCYTES # BLD AUTO: 0.04 THOUSAND/UL (ref 0–0.2)
IMM GRANULOCYTES NFR BLD AUTO: 1 % (ref 0–2)
LDLC SERPL CALC-MCNC: 63 MG/DL (ref 0–100)
LIPASE SERPL-CCNC: 24 U/L (ref 11–82)
LYMPHOCYTES # BLD AUTO: 1.59 THOUSANDS/ÂΜL (ref 0.6–4.47)
LYMPHOCYTES NFR BLD AUTO: 23 % (ref 14–44)
MCH RBC QN AUTO: 29.7 PG (ref 26.8–34.3)
MCHC RBC AUTO-ENTMCNC: 33.5 G/DL (ref 31.4–37.4)
MCV RBC AUTO: 89 FL (ref 82–98)
MONOCYTES # BLD AUTO: 0.62 THOUSAND/ÂΜL (ref 0.17–1.22)
MONOCYTES NFR BLD AUTO: 9 % (ref 4–12)
NEUTROPHILS # BLD AUTO: 4.55 THOUSANDS/ÂΜL (ref 1.85–7.62)
NEUTS SEG NFR BLD AUTO: 64 % (ref 43–75)
NRBC BLD AUTO-RTO: 0 /100 WBCS
P AXIS: 33 DEGREES
PLATELET # BLD AUTO: 170 THOUSANDS/UL (ref 149–390)
PMV BLD AUTO: 9 FL (ref 8.9–12.7)
POTASSIUM SERPL-SCNC: 3.7 MMOL/L (ref 3.5–5.3)
PR INTERVAL: 176 MS
PROT SERPL-MCNC: 7 G/DL (ref 6.4–8.4)
QRS AXIS: -53 DEGREES
QRSD INTERVAL: 106 MS
QT INTERVAL: 408 MS
QTC INTERVAL: 424 MS
RBC # BLD AUTO: 4.88 MILLION/UL (ref 3.88–5.62)
SODIUM SERPL-SCNC: 139 MMOL/L (ref 135–147)
T WAVE AXIS: 14 DEGREES
TRIGL SERPL-MCNC: 152 MG/DL
TSH SERPL DL<=0.05 MIU/L-ACNC: 1.49 UIU/ML (ref 0.45–4.5)
VALPROATE SERPL-MCNC: 89 UG/ML (ref 50–100)
VENTRICULAR RATE: 65 BPM
WBC # BLD AUTO: 6.98 THOUSAND/UL (ref 4.31–10.16)

## 2023-05-30 RX ORDER — LORAZEPAM 1 MG/1
1 TABLET ORAL ONCE
Status: COMPLETED | OUTPATIENT
Start: 2023-05-30 | End: 2023-05-30

## 2023-05-30 RX ORDER — ZIPRASIDONE HYDROCHLORIDE 20 MG/1
60 CAPSULE ORAL ONCE
Status: COMPLETED | OUTPATIENT
Start: 2023-05-30 | End: 2023-05-30

## 2023-05-30 RX ADMIN — LORAZEPAM 1 MG: 1 TABLET ORAL at 15:16

## 2023-05-30 RX ADMIN — ZIPRASIDONE HYDROCHLORIDE 60 MG: 20 CAPSULE ORAL at 18:40

## 2023-05-30 NOTE — DISCHARGE INSTRUCTIONS
Please follow up PCP and psych  Recommend calling psych and following up as soon as possible  Please return at any time if you change your mind and would like to stay in the hospital psychiatrically  Recommend tylenol 650 mg and ibuprofen 600 mg every 6 hours as needed for pain  Please return for severe chest pain, significant shortness of breath, severely worsening symptoms, or any other concerning signs or symptoms  Please refer to the following documents for additional instructions and return precautions

## 2023-05-30 NOTE — ED PROVIDER NOTES
History  Chief Complaint   Patient presents with   • Hallucinations     Auditory hallucinations, hx of same, TBI pt, family reports a lot of shaking and pressure in his head, pt admits to vague thoughts of wanting to hurt himself and others but not die      52 yom history of TBI, diabetes, diabetes, seizures, MI, diabetes, seizures, hypertension presenting with  Patient primarily relating here for worsening auditory hallucinations with occasional commands additional commands  Additional commands  Patient also reports head fogginess and worsening SI without plan  History history of similar with previous voluntary inpatient psychiatric stays  On aspirin and on aspirin denies any recent falls or head strikes or any head trauma  Denies any chest denies any chest pain shortness of breath  Denies any chest pain shortness of breath  Denies any abdominal pain nausea vomiting diarrhea  Denies any other complaints  Denies any other complaints  Chart reviewed  Past Medical History:  No date: Chemical exposure  No date: [facility] mellitus ([facility] )  No date: H/O bone graft  No date: Head injury      Comment:  August 25, 2019 fell backwards hit head on a [place]oulder w/                LOC  No date: Head injury      Comment:  as an air [place] with the [employer] - jumped out of a                plane parachute disfunction  No date: Hypertension  No date: Seizures ([facility] )  Family History: non-contributory  Social History            Prior to Admission Medications   Prescriptions Last Dose Informant Patient Reported? Taking?    Jardiance 25 MG TABS Not Taking  Yes No   Sig: Take 25 mg by mouth daily   Patient not taking: Reported on 3/3/2023   Ventolin  (90 Base) MCG/ACT inhaler More than a month  Yes No   Sig: Inhale 2 puffs every 6 (six) hours as needed   Vitamin D, Cholecalciferol, 50 MCG (2000 UT) CAPS 5/30/2023  No Yes   Sig: Take 1 capsule by mouth once daily   amLODIPine (NORVASC) 10 mg tablet 5/30/2023  Yes Yes   Sig: Take 10 mg by mouth daily   amoxicillin (AMOXIL) 500 mg capsule Not Taking  Yes No   Sig: TAKE 1 CAPSULE BY MOUTH TWICE DAILY FOR 10 DAYS   Patient not taking: Reported on 3/3/2023   aspirin 81 mg chewable tablet 5/30/2023  Yes Yes   Sig: Chew 81 mg daily   atorvastatin (LIPITOR) 20 mg tablet 5/29/2023 Self Yes Yes   Sig: Take 20 mg by mouth every evening   benztropine (COGENTIN) 0 5 mg tablet 5/30/2023  No Yes   Sig: Take 1 tablet by mouth twice daily   diazepam (VALIUM) 2 mg tablet Past Week  No Yes   Sig: TAKE 1 TABLET BY MOUTH ONCE DAILY AS NEEDED FOR ANXIETY (SEVERE  ANXIETY  ONLY)   divalproex sodium (DEPAKOTE) 250 mg EC tablet 5/30/2023  No Yes   Sig: TAKE 1 TABLET BY MOUTH TWICE DAILY WITH  ONE  500  MG  TAB  FOR  TOAL  DOSE  OF  750  MG  TWICE  A  DAY   divalproex sodium (DEPAKOTE) 500 mg EC tablet 5/30/2023  No Yes   Sig: Take 1 tab twice a day with one 250 mg tab for total dose of 750 mg twice a day     ibuprofen (MOTRIN) 800 mg tablet   Yes No   Sig: daily as needed   insulin NPH-insulin regular (NovoLIN 70/30) 100 units/mL subcutaneous injection 5/30/2023  No Yes   Sig: Inject 10 Units under the skin daily before breakfast   Patient taking differently: Inject 18 Units under the skin daily before breakfast   lamoTRIgine (LaMICtal) 100 mg tablet 5/30/2023  No Yes   Sig: Take 1 tab (100 mg) twice a day   lisinopril (ZESTRIL) 40 mg tablet Not Taking  Yes No   Sig: Take 1 tablet by mouth daily   Patient not taking: Reported on 3/3/2023   metFORMIN (GLUCOPHAGE) 1000 MG tablet Not Taking  Yes No   Sig: Take 1,000 mg by mouth 2 (two) times a day with meals   Patient not taking: Reported on 3/3/2023   metoprolol tartrate (LOPRESSOR) 100 mg tablet 5/30/2023  No Yes   Sig: TAKE 1 TABLET BY MOUTH EVERY 12 HOURS   sertraline (Zoloft) 100 mg tablet 5/29/2023  No Yes   Sig: Take 2 tablets (200 mg total) by mouth daily   Patient taking differently: Take 150 mg by mouth daily   zaleplon (SONATA) 5 MG capsule 5/29/2023 No Yes   Sig: Take 1 capsule by mouth once daily at bedtime   ziprasidone (GEODON) 60 mg capsule 5/29/2023  No Yes   Sig: Take 1 capsule by mouth twice daily      Facility-Administered Medications: None       Past Medical History:   Diagnosis Date   • Chemical exposure    • Diabetes mellitus ([facility] )    • H/O bone graft    • Head injury     August 25, 2019 fell backwards hit head on a [place] w/ LOC   • Head injury     as an air [place] with the [employer] - jumped out of a plane parachute disfunction   • Hypertension    • Seizures ([facility] )        Past Surgical History:   Procedure Laterality Date   • FL LUMBAR PUNCTURE DIAGNOSTIC  12/13/2019   • KNEE ARTHROSCOPY W/ MENISCECTOMY Left    • LEG SURGERY     • TONSILLECTOMY         Family History   Problem Relation Age of Onset   • Diabetes Mother    • Hypertension Mother    • Asthma Mother    • Brain cancer Father    • No Known Problems Sister    • No Known Problems Brother    • No Known Problems Brother      I have reviewed and agree with the history as documented  E-Cigarette/Vaping   • E-Cigarette Use Never User      E-Cigarette/Vaping Substances   • Nicotine No    • THC No    • CBD No    • Flavoring No    • Other No    • Unknown No      Social History     Tobacco Use   • Smoking status: Never   • Smokeless tobacco: Never   Vaping Use   • Vaping Use: Never used   Substance Use Topics   • Alcohol use: Not Currently     Alcohol/week: 3 0 standard drinks of alcohol     Types: 3 Cans of beer per week   • Drug use: Never       Review of Systems   Constitutional: Negative for appetite change, chills, diaphoresis, fever and unexpected weight change  HENT: Negative for congestion and rhinorrhea  Eyes: Negative for photophobia and visual disturbance  Respiratory: Negative for cough, chest tightness and shortness of breath  Cardiovascular: Negative for chest pain, palpitations and leg swelling     Gastrointestinal: Negative for abdominal distention, abdominal pain, blood in stool, constipation, diarrhea, nausea and vomiting  Genitourinary: Negative for dysuria and hematuria  Musculoskeletal: Negative for back pain, joint swelling, neck pain and neck stiffness  Skin: Negative for color change, pallor, rash and wound  Neurological: Negative for dizziness, syncope, weakness, light-headedness and headaches  Psychiatric/Behavioral: Positive for hallucinations and suicidal ideas  Negative for agitation  All other systems reviewed and are negative  Physical Exam  Physical Exam  Vitals and nursing note reviewed  Constitutional:       General: He is not in acute distress  Appearance: Normal appearance  He is well-developed  He is not ill-appearing, toxic-appearing or diaphoretic  HENT:      Head: Normocephalic and atraumatic  Nose: Nose normal  No congestion or rhinorrhea  Mouth/Throat:      Mouth: Mucous membranes are moist       Pharynx: Oropharynx is clear  No oropharyngeal exudate or posterior oropharyngeal erythema  Eyes:      General: No scleral icterus  Right eye: No discharge  Left eye: No discharge  Extraocular Movements: Extraocular movements intact  Conjunctiva/sclera: Conjunctivae normal       Pupils: Pupils are equal, round, and reactive to light  Neck:      Vascular: No JVD  Trachea: No tracheal deviation  Comments: Supple  Normal range of motion  Cardiovascular:      Rate and Rhythm: Normal rate and regular rhythm  Heart sounds: Normal heart sounds  No murmur heard  No friction rub  No gallop  Comments: Normal rate and regular rhythm  Pulmonary:      Effort: Pulmonary effort is normal  No respiratory distress  Breath sounds: Normal breath sounds  No stridor  No wheezing or rales  Comments: Clear to auscultation bilaterally  Chest:      Chest wall: No tenderness  Abdominal:      General: Bowel sounds are normal  There is no distension  Palpations: Abdomen is soft  Tenderness: There is no abdominal tenderness  There is no right CVA tenderness, left CVA tenderness, guarding or rebound  Comments: Soft, nontender, nondistended  Normal bowel sounds throughout   Musculoskeletal:         General: No swelling, tenderness, deformity or signs of injury  Normal range of motion  Cervical back: Normal range of motion and neck supple  No rigidity  No muscular tenderness  Right lower leg: No edema  Left lower leg: No edema  Lymphadenopathy:      Cervical: No cervical adenopathy  Skin:     General: Skin is warm and dry  Coloration: Skin is not pale  Findings: No erythema or rash  Neurological:      Mental Status: He is alert  Mental status is at baseline  Cranial Nerves: Cranial nerve deficit present  Sensory: No sensory deficit  Motor: No weakness or abnormal muscle tone  Coordination: Coordination normal       Gait: Gait normal       Comments: Alert  Baseline right facial droop  Strength and sensation grossly intact  Ambulatory without difficulty at baseline  Psychiatric:         Behavior: Behavior normal          Thought Content:  Thought content normal          Vital Signs  ED Triage Vitals   Temperature Pulse Respirations Blood Pressure SpO2   05/30/23 1357 05/30/23 1358 05/30/23 1357 05/30/23 1357 05/30/23 1357   98 6 °F (37 °C) 76 18 129/81 97 %      Temp src Heart Rate Source Patient Position - Orthostatic VS BP Location FiO2 (%)   -- 05/30/23 1357 05/30/23 1357 05/30/23 1357 --    Monitor Sitting Left arm       Pain Score       05/30/23 1357       No Pain           Vitals:    05/30/23 1357 05/30/23 1358 05/30/23 1845   BP: 129/81  128/79   Pulse:  76 67   Patient Position - Orthostatic VS: Sitting  Sitting         Visual Acuity  Visual Acuity    Flowsheet Row Most Recent Value   L Pupil Size (mm) 3   R Pupil Size (mm) 3          ED Medications  Medications   LORazepam (ATIVAN) tablet 1 mg (1 mg Oral Given 5/30/23 4118) ziprasidone (GEODON) capsule 60 mg (60 mg Oral Given 5/30/23 1840)       Diagnostic Studies  Results Reviewed     Procedure Component Value Units Date/Time    Hemoglobin A1C [[id number]]  (Abnormal) Collected: 05/30/23 1516    Lab Status: Final result Specimen: Blood from Arm, Right Updated: 05/30/23 2007     Hemoglobin A1C 5 8 %       mg/dl     TSH, 3rd generation with Free T4 reflex [[id number]]  (Normal) Collected: 05/30/23 1516    Lab Status: Final result Specimen: Blood from Arm, Right Updated: 05/30/23 1605     TSH 3RD GENERATON 1 492 uIU/mL     Lipid Panel with Direct LDL reflex [[id number]]  (Abnormal) Collected: 05/30/23 1516    Lab Status: Final result Specimen: Blood from Arm, Right Updated: 05/30/23 1554     Cholesterol 132 mg/dL      Triglycerides 152 mg/dL      HDL, Direct 39 mg/dL      LDL Calculated 63 mg/dL     Lipase [[id number]]  (Normal) Collected: 05/30/23 1516    Lab Status: Final result Specimen: Blood from Arm, Right Updated: 05/30/23 1553     Lipase 24 u/L     Comprehensive metabolic panel [[id number]] Collected: 05/30/23 1516    Lab Status: Final result Specimen: Blood from Arm, Right Updated: 05/30/23 1553     Sodium 139 mmol/L      Potassium 3 7 mmol/L      Chloride 104 mmol/L      CO2 29 mmol/L      ANION GAP 6 mmol/L      BUN 12 mg/dL      Creatinine 1 03 mg/dL      Glucose 124 mg/dL      Calcium 9 6 mg/dL      AST 26 U/L      ALT 11 U/L      Alkaline Phosphatase 61 U/L      Total Protein 7 0 g/dL      Albumin 4 0 g/dL      Total Bilirubin 0 74 mg/dL      eGFR 81 ml/min/1 73sq m     Narrative:      [place] guidelines for Chronic Kidney Disease (CKD):   •  Stage 1 with normal or high GFR (GFR > 90 mL/min/1 73 square meters)  •  Stage 2 Mild CKD (GFR = 60-89 mL/min/1 73 square meters)  •  Stage 3A Moderate CKD (GFR = 45-59 mL/min/1 73 square meters)  •  Stage 3B Moderate CKD (GFR = 30-44 mL/min/1 73 square meters)  •  Stage 4 Severe CKD (GFR = 15-29 mL/min/1 73 square meters)  •  Stage 5 End Stage CKD (GFR <15 mL/min/1 73 square meters)  Note: GFR calculation is accurate only with a steady state creatinine    Valproic acid level, total [[id number]]  (Normal) Collected: 05/30/23 1516    Lab Status: Final result Specimen: Blood from Arm, Right Updated: 05/30/23 1553     Valproic Acid, Total 89 ug/mL     CBC and differential [[id number]] Collected: 05/30/23 1516    Lab Status: Final result Specimen: Blood from Arm, Right Updated: 05/30/23 1530     WBC 6 98 Thousand/uL      RBC 4 88 Million/uL      Hemoglobin 14 5 g/dL      Hematocrit 43 3 %      MCV 89 fL      MCH 29 7 pg      MCHC 33 5 g/dL      RDW 14 6 %      MPV 9 0 fL      Platelets 145 Thousands/uL      nRBC 0 /100 WBCs      Neutrophils Relative 64 %      Immat GRANS % 1 %      Lymphocytes Relative 23 %      Monocytes Relative 9 %      Eosinophils Relative 2 %      Basophils Relative 1 %      Neutrophils Absolute 4 55 Thousands/µL      Immature Grans Absolute 0 04 Thousand/uL      Lymphocytes Absolute 1 59 Thousands/µL      Monocytes Absolute 0 62 Thousand/µL      Eosinophils Absolute 0 14 Thousand/µL      Basophils Absolute 0 04 Thousands/µL     Lamotrigine level [[id number]] Collected: 05/30/23 1516    Lab Status: In process Specimen: Blood from Arm, Right Updated: 05/30/23 1523    Vitamin D Panel [[id number]] Collected: 05/30/23 1516    Lab Status: In process Specimen: Arm, Right Updated: 05/30/23 1523                 CT head without contrast   Final Result by Dewayne Saleem MD (05/30 1544)      No acute intracranial abnormality  Chronic microangiopathic changes  Workstation performed: [id number]                    Procedures  Procedures         ED Course                               SBIRT 20yo+    Flowsheet Row Most Recent Value   Initial Alcohol Screen: US AUDIT-C     1  How often do you have a drink containing alcohol? 0 Filed at: 05/30/2023 1352   2   How many drinks containing alcohol do you have on a typical day you are drinking? 0 Filed at: 05/30/2023 1359   3a  Male UNDER 65: How often do you have five or more drinks on one occasion? 0 Filed at: 05/30/2023 1359   3b  FEMALE Any Age, or MALE 65+: How often do you have 4 or more drinks on one occassion? 0 Filed at: 05/30/2023 1359   Audit-C Score 0 Filed at: 05/30/2023 1352   RISHI: How many times in the past year have you    Used an illegal drug or used a prescription medication for non-medical reasons? Never Filed at: 05/30/2023 1359                    Medical Decision Making  52 yom history of TBI, diabetes, diabetes, seizures, MI, diabetes, seizures, hypertension presenting with suicidal ideations and head fog and head fogginess  SI without plan on multiple psychiatric couple psychiatric medications couple psychiatric medications with command hallucinations but denies any command hallucinations to hurt himself or others  Plan for medical evaluation including to and including but basic labs plus basic labs plus abdominal labs, EKG, CT head, medication levels  Psych clearance  Reassess  EKG normal sinus rhythm with nonspecific ST abnormality  Labs interpreted by me notable for mildly elevated triglycerides  Valproic acid in normal range  Symptoms improved after medications  Patient evaluated by crisis and patient does not wish to stay at this time  SI without plan  Patient unable to get his home Geodon until at least tomorrow  Last dose couple days ago  Will provide dose here  Strict instructions and return precautions  Advised to call psychiatrist in the morning to try to get a sooner appointment  Advised can return anytime if he changes his mind and would like to sign in voluntarily for psych  Discussed results and recommendations  Advised follow up PCP and psych  Medication recommendations  Given instructions and return precautions   Patient/family at bedside acknowledged understanding of all written and verbal instructions and return precautions  Discharged  Amount and/or Complexity of Data Reviewed  Labs: ordered  Radiology: ordered  Risk  Prescription drug management  Disposition  Final diagnoses: Auditory hallucinations   TBI (traumatic brain injury) ([facility] )   Suicidal ideations     Time reflects when diagnosis was documented in both MDM as applicable and the Disposition within this note     Time User Action Codes Description Comment    5/30/2023  6:34 PM Hernandez Shine Add [R44 0] Auditory hallucinations     5/30/2023  6:34 PM Hernandez Shine Add [S06  9XAA] TBI (traumatic brain injury) ([facility] )     5/30/2023  6:35 PM Hernandez Shine Add [H97 798] Suicidal ideations       ED Disposition     ED Disposition   Discharge    Condition   Stable    Date/Time   Tue May 30, 2023  6:34 PM    R [address]  discharge to home/self care                 MD Documentation    Fartun Party Most Recent Value   Sending MD Dr Alexandrea Sparks up With Specialties Details Why Contact Info Additional [id number] [facility] Internal Medicine Schedule an appointment as soon as possible for a visit in 1 week  [address] [facility] [phone] [facility] Schedule an appointment as soon as possible for a visit in 1 week  [address] [phone] [facility] [address] [facility] [place], [place], [place], [address]          Discharge Medication List as of 5/30/2023  6:36 PM      CONTINUE these medications which have NOT CHANGED    Details   amLODIPine (NORVASC) 10 mg tablet Take 10 mg by mouth daily, Starting Sun 4/4/2021, Historical Med      amoxicillin (AMOXIL) 500 mg capsule TAKE 1 CAPSULE BY MOUTH TWICE DAILY FOR 10 DAYS, Historical Med      aspirin 81 mg chewable tablet Chew 81 mg daily, Starting Thu 4/22/2021, Historical Med      atorvastatin (LIPITOR) 20 mg tablet Take 20 mg by mouth every evening, Starting Fri 5/15/2020, Historical Med      benztropine (COGENTIN) 0 5 mg tablet Take 1 tablet by mouth twice daily, Normal      diazepam (VALIUM) 2 mg tablet TAKE 1 TABLET BY MOUTH ONCE DAILY AS NEEDED FOR ANXIETY (SEVERE  ANXIETY  ONLY), Normal      !! divalproex sodium (DEPAKOTE) 250 mg EC tablet TAKE 1 TABLET BY MOUTH TWICE DAILY WITH  ONE  500  MG  TAB  FOR  TOAL  DOSE  OF  750  MG  TWICE  A  DAY, Normal      !! divalproex sodium (DEPAKOTE) 500 mg EC tablet Take 1 tab twice a day with one 250 mg tab for total dose of 750 mg twice a day , Normal      ibuprofen (MOTRIN) 800 mg tablet daily as needed, Starting Mon 3/29/2021, Historical Med      insulin NPH-insulin regular (NovoLIN 70/30) 100 units/mL subcutaneous injection Inject 10 Units under the skin daily before breakfast, Starting Sat 12/14/2019, Normal      Jardiance 25 MG TABS Take 25 mg by mouth daily, Starting Sun 4/25/2021, Historical Med      lamoTRIgine (LaMICtal) 100 mg tablet Take 1 tab (100 mg) twice a day, Normal      lisinopril (ZESTRIL) 40 mg tablet Take 1 tablet by mouth daily, Starting Mon 3/29/2021, Historical Med      metFORMIN (GLUCOPHAGE) 1000 MG tablet Take 1,000 mg by mouth 2 (two) times a day with meals, Starting Thu 3/11/2021, Historical Med      metoprolol tartrate (LOPRESSOR) 100 mg tablet TAKE 1 TABLET BY MOUTH EVERY 12 HOURS, Normal      sertraline (Zoloft) 100 mg tablet Take 2 tablets (200 mg total) by mouth daily, Starting Tue 3/7/2023, Normal      Ventolin  (90 Base) MCG/ACT inhaler Inhale 2 puffs every 6 (six) hours as needed, Starting Mon 10/31/2022, Historical Med      Vitamin D, Cholecalciferol, 50 MCG (2000 UT) CAPS Take 1 capsule by mouth once daily, Normal      zaleplon (SONATA) 5 MG capsule Take 1 capsule by mouth once daily at bedtime, Normal      ziprasidone (GEODON) 60 mg capsule Take 1 capsule by mouth twice daily, Normal       !! - Potential duplicate medications found  Please discuss with provider  No discharge procedures on file      PDMP Review       Value Time User    PDMP Reviewed  Yes 5/29/2023  8:05 AM ROCKY [place]          ED Provider  Electronically Signed by           Debbie Golden MD  05/30/23 1637

## 2023-05-30 NOTE — ED NOTES
Pt presents to the ED with his wife due to c/o hallucinations  Pt notes he has been Dx'ed with a TBI in 2019 after hitting his head on a boulder while working on his car  Pt notes he fell backwards and hit his head  Since then, pt reports he has been hearing voices daily  He notes there are 2 voices; one is calm and the other is agitated  He stated at times they tell him not to keep his doctor appt's, or to OD on his meds or to beat up people in a store that may be standing to close to him  Pt notes at this time he either stays in the car while his wife shops, or he doesn't go out at all some days  Pt notes the last time his voices told him to OD on meds was 1 week ago, and he is always able to resist what the voices tell him to do  Pt adds that the voices also tell him that they're his only friend  Pt denies any D & A problems, nor any legal issues  Pt denies any Hx of abuse or neglect  Pt reports getting 5 hrs of choppy sleep due to the voices, but has a good appetite  Pt has a neurologist and a psychiatrist, but no therapy  Pt's wife states they are looking for a therapy group specifically for people with TBIs  Pt has been hospitalized x1 a few yrs ago  CW discussed Tx options with pt and his wife, and pt states at this time he wants to go home  CW suggested that rather than see his psychiatrist q 2 mos, he may want to see him more frequently due to current symptoms  CW also suggested that getting involved in out-pt therapy may be helpful in developing some effective coping skills  Pt and wife are in agreement with this Tx plan, and CW reminded pt that he may return to the ED at any time if symptoms persist or worsen  He agreed to do so  CW discussed this case with Dr Comfort Franklin who is in agreement to D/C pt home      Ayla Delvalle CIS   05/30/23 18:38

## 2023-06-01 LAB — LAMOTRIGINE SERPL-MCNC: 10.8 UG/ML (ref 2–20)

## 2023-06-03 LAB
25(OH)D2 SERPL-MCNC: <1 NG/ML
25(OH)D3 SERPL-MCNC: 33 NG/ML
25(OH)D3+25(OH)D2 SERPL-MCNC: 33 NG/ML

## 2023-06-04 DIAGNOSIS — F29 PSYCHOSIS, UNSPECIFIED PSYCHOSIS TYPE (HCC): ICD-10-CM

## 2023-06-05 ENCOUNTER — TELEMEDICINE (OUTPATIENT)
Dept: PSYCHIATRY | Facility: CLINIC | Age: 53
End: 2023-06-05
Payer: MEDICARE

## 2023-06-05 DIAGNOSIS — F32.9 REACTIVE DEPRESSION: ICD-10-CM

## 2023-06-05 DIAGNOSIS — S06.9X9D TRAUMATIC BRAIN INJURY WITH LOSS OF CONSCIOUSNESS, SUBSEQUENT ENCOUNTER: ICD-10-CM

## 2023-06-05 DIAGNOSIS — F29 PSYCHOSIS, UNSPECIFIED PSYCHOSIS TYPE (HCC): Primary | ICD-10-CM

## 2023-06-05 PROCEDURE — 99214 OFFICE O/P EST MOD 30 MIN: CPT

## 2023-06-05 RX ORDER — ZALEPLON 5 MG/1
CAPSULE ORAL
Qty: 30 CAPSULE | Refills: 0 | Status: SHIPPED | OUTPATIENT
Start: 2023-06-05

## 2023-06-05 NOTE — PSYCH
Virtual Regular Visit    Verification of patient location:    Patient is located in the following state in which I hold an active license [place]    Problem List Items Addressed This Visit        Nervous and Auditory    Traumatic brain injury with loss of consciousness ([facility] [address] )       Other    Psychosis ([facility] [address] ) - Primary    Reactive depression          Encounter provider ROCKY Quintero    Provider located at    [id number] [facility] [address]  [address] [address] [phone]    Recent Visits  No visits were found meeting these conditions  Showing recent visits within past 7 days and meeting all other requirements  Today's Visits  Date Type Provider Dept   06/05/23 Telemedicine ROCKY Quintero [facility]   Showing today's visits and meeting all other requirements  Future Appointments  No visits were found meeting these conditions  Showing future appointments within next 150 days and meeting all other requirements         The patient was identified by name and date of birth  [facility]  was informed that this is a telemedicine visit and that the visit is being conducted throughthe [facility]  He agrees to proceed     My office door was closed  No one else was in the room  He acknowledged consent and understanding of privacy and security of the video platform  The patient has agreed to participate and understands they can discontinue the visit at any time  Patient is aware this is a billable service       HPI     Current Outpatient Medications   Medication Sig Dispense Refill   • amLODIPine (NORVASC) 10 mg tablet Take 10 mg by mouth daily     • amoxicillin (AMOXIL) 500 mg capsule TAKE 1 CAPSULE BY MOUTH TWICE DAILY FOR 10 DAYS (Patient not taking: Reported on 3/3/2023)     • aspirin 81 mg chewable tablet Chew 81 mg daily     • atorvastatin (LIPITOR) 20 mg tablet Take 20 mg by mouth every evening     • benztropine (COGENTIN) 0 5 mg tablet Take 1 tablet by mouth twice daily 60 tablet 0   • diazepam (VALIUM) 2 mg tablet TAKE 1 TABLET BY MOUTH ONCE DAILY AS NEEDED FOR ANXIETY (SEVERE  ANXIETY  ONLY) 30 tablet 0   • divalproex sodium (DEPAKOTE) 250 mg EC tablet TAKE 1 TABLET BY MOUTH TWICE DAILY WITH  ONE  500  MG  TAB  FOR  TOAL  DOSE  OF  750  MG  TWICE  A  DAY 60 tablet 11   • divalproex sodium (DEPAKOTE) 500 mg EC tablet Take 1 tab twice a day with one 250 mg tab for total dose of 750 mg twice a day  60 tablet 11   • ibuprofen (MOTRIN) 800 mg tablet daily as needed     • insulin NPH-insulin regular (NovoLIN 70/30) 100 units/mL subcutaneous injection Inject 10 Units under the skin daily before breakfast (Patient taking differently: Inject 18 Units under the skin daily before breakfast) 3 mL 0   • Jardiance 25 MG TABS Take 25 mg by mouth daily (Patient not taking: Reported on 3/3/2023)     • lamoTRIgine (LaMICtal) 100 mg tablet Take 1 tab (100 mg) twice a day 180 tablet 3   • lisinopril (ZESTRIL) 40 mg tablet Take 1 tablet by mouth daily (Patient not taking: Reported on 3/3/2023)     • metFORMIN (GLUCOPHAGE) 1000 MG tablet Take 1,000 mg by mouth 2 (two) times a day with meals (Patient not taking: Reported on 3/3/2023)     • metoprolol tartrate (LOPRESSOR) 100 mg tablet TAKE 1 TABLET BY MOUTH EVERY 12 HOURS 60 tablet 1   • sertraline (Zoloft) 100 mg tablet Take 2 tablets (200 mg total) by mouth daily (Patient taking differently: Take 150 mg by mouth daily) 90 tablet 1   • Ventolin  (90 Base) MCG/ACT inhaler Inhale 2 puffs every 6 (six) hours as needed     • Vitamin D, Cholecalciferol, 50 MCG (2000 UT) CAPS Take 1 capsule by mouth once daily 30 capsule 5   • zaleplon (SONATA) 5 MG capsule Take 1 capsule by mouth once daily at bedtime 30 capsule 0   • ziprasidone (GEODON) 60 mg capsule Take 1 capsule by mouth twice daily 60 capsule 0     No current facility-administered medications for this visit         Review of "Systems  Video Exam    There were no vitals filed for this visit  Physical Exam   As a result of this visit, I have referred the patient for further respiratory evaluation  No      VIRTUAL VISIT DISCLAIMER    [facility] Etienne Souza  acknowledges that he has consented to an online visit or consultation  He understands that the online visit is based solely on information provided by him, and that, in the absence of a face-to-face physical evaluation by the physician, the diagnosis he receives is both limited and provisional in terms of accuracy and completeness  This is not intended to replace a full medical face-to-face evaluation by the physician  Romina Garcia understands and accepts these terms  MEDICATION MANAGEMENT NOTE        [facility]    Name and Date of Birth:  Romina Garcia  52 y o  1970 MRN: [id number]    Date of Visit: June 5, 2023    No Known Allergies  SUBJECTIVE:    Liliana Watt is seen today for a follow up for hallucinations, depression, hx TBI  He reports that he continues to experience on and off symptoms since the last visit  Patient had an ER visit on 5/30 what he described as \" a bad day for my hallucinations\" and states they were command in nature in terms of telling him to hurt other people  He continues to resist these urges and ignore the voices as best as he can, no evidence of aggression towards others or himself at any point with his chronic hallucinations  The thought of the hallucinations telling him to hurt others causes anxiety and stress which brought him to the ER  Discharged after medical evaluation as he did not meet criteria for inpatient hospitalization according to records  Patient agrees to return to the emergency room immediately if command hallucinations occur and he fears for his safety or the safety of others    States he has been doing fairly well since discharge from the ER, the voices have calmed down and are occurring less " frequently  Describes that today is a good day and he barely hears the voices at all at this time  The voices appear to be induced by his history of TBI as they continue to be minimally improved by multiple psychiatric medications including past trials of Zyprexa, Risperdal, Haldol  Current Geodon 60 mg twice daily most effective and causing minimal to no side effects at this time  Only complaint is occasional shakiness in bilateral arms, denies any involuntary jerking movements  Aims 1  No medication changes made today, continue to monitor voices closely and go to the ER if needed, patient verbalized understanding  Patient and wife appreciative of support provided as they both state the care they received in ER was not the best experience  Patient is trying to do his best to stay active and get outside to avoid self isolation contributing to worsening depression symptoms  Shares he had a recent family gathering the other day which improved spirits  Wife continues to be a strong support system and manages his medications  Denies current SI/HI  He denies any side effects from medications  PLAN:    -Continue Geodon to 60 mg twice daily for chronic auditory hallucinations in the context of his traumatic brain injury  Continue Cogentin 0 5 mg twice daily for bilateral hand shaking worsened when Geodon increased    -Continue Zoloft to 200 mg daily for depression related to the ongoing hallucinations PARQ completed including serotonin syndrome, SIADH, worsening depression, suicidality, induction of afshin, GI upset, headaches, activation, sexual side effects, sedation, potential drug interactions, and others      -Continue p r n   Valium 2 milligrams daily p r n  for severe anxiety/agitation which occasionally occurs due to patient's auditory hallucinations  Using infrequently according to PDMP  Discussed with patient the risks of sedation, respiratory depression, impairment of ability to drive and potential for abuse and addiction related to treatment with benzodiazepine medications  The patient understands risk of treatment with benzodiazepine medications, agrees to not drive if feels impaired and agrees to take medications as prescribed       - Neurologist prescribing Depakote 750 mg b i d  and Lamictal 100 mg BID for the management of epilepsy disorder, has remained free from seizures 3 years  Complete pending labs  Aware of 24 hour and weekend coverage for urgent situations accessed by calling [facility] main practice number  Referral for individual psychotherapy    Diagnoses and all orders for this visit:    Psychosis, unspecified psychosis type ([facility] [address] )    Reactive depression    Traumatic brain injury with loss of consciousness, subsequent encounter        Current Outpatient Medications on File Prior to Visit   Medication Sig Dispense Refill   • amLODIPine (NORVASC) 10 mg tablet Take 10 mg by mouth daily     • amoxicillin (AMOXIL) 500 mg capsule TAKE 1 CAPSULE BY MOUTH TWICE DAILY FOR 10 DAYS (Patient not taking: Reported on 3/3/2023)     • aspirin 81 mg chewable tablet Chew 81 mg daily     • atorvastatin (LIPITOR) 20 mg tablet Take 20 mg by mouth every evening     • benztropine (COGENTIN) 0 5 mg tablet Take 1 tablet by mouth twice daily 60 tablet 0   • diazepam (VALIUM) 2 mg tablet TAKE 1 TABLET BY MOUTH ONCE DAILY AS NEEDED FOR ANXIETY (SEVERE  ANXIETY  ONLY) 30 tablet 0   • divalproex sodium (DEPAKOTE) 250 mg EC tablet TAKE 1 TABLET BY MOUTH TWICE DAILY WITH  ONE  500  MG  TAB  FOR  TOAL  DOSE  OF  750  MG  TWICE  A  DAY 60 tablet 11   • divalproex sodium (DEPAKOTE) 500 mg EC tablet Take 1 tab twice a day with one 250 mg tab for total dose of 750 mg twice a day   60 tablet 11   • ibuprofen (MOTRIN) 800 mg tablet daily as needed     • insulin NPH-insulin regular (NovoLIN 70/30) 100 units/mL subcutaneous injection Inject 10 Units under the skin daily before breakfast (Patient taking differently: Inject 18 Units under the skin daily before breakfast) 3 mL 0   • Jardiance 25 MG TABS Take 25 mg by mouth daily (Patient not taking: Reported on 3/3/2023)     • lamoTRIgine (LaMICtal) 100 mg tablet Take 1 tab (100 mg) twice a day 180 tablet 3   • lisinopril (ZESTRIL) 40 mg tablet Take 1 tablet by mouth daily (Patient not taking: Reported on 3/3/2023)     • metFORMIN (GLUCOPHAGE) 1000 MG tablet Take 1,000 mg by mouth 2 (two) times a day with meals (Patient not taking: Reported on 3/3/2023)     • metoprolol tartrate (LOPRESSOR) 100 mg tablet TAKE 1 TABLET BY MOUTH EVERY 12 HOURS 60 tablet 1   • sertraline (Zoloft) 100 mg tablet Take 2 tablets (200 mg total) by mouth daily (Patient taking differently: Take 150 mg by mouth daily) 90 tablet 1   • Ventolin  (90 Base) MCG/ACT inhaler Inhale 2 puffs every 6 (six) hours as needed     • Vitamin D, Cholecalciferol, 50 MCG (2000 UT) CAPS Take 1 capsule by mouth once daily 30 capsule 5   • zaleplon (SONATA) 5 MG capsule Take 1 capsule by mouth once daily at bedtime 30 capsule 0   • ziprasidone (GEODON) 60 mg capsule Take 1 capsule by mouth twice daily 60 capsule 0   • [DISCONTINUED] zaleplon (SONATA) 5 MG capsule Take 1 capsule by mouth once daily at bedtime 30 capsule 0     No current facility-administered medications on file prior to visit  Psychotherapy Provided:     Individual psychotherapy provided: Yes  Counseling was provided during the session today for 16 minutes  Supportive counseling provided  Medication changes discussed with [facility]  Medication education provided to [facility]  Goals discussed during in session: improve control of hallucinations  HPI ROS Appetite Changes and Sleep:     He reports difficulty falling asleep, adequate appetite, low energy   Denies homicidal ideation, denies suicidal ideation    Review Of Systems:      HPI ROS:               Medication Side Effects:  denies currently    Depression (10 worst): 5-6/10    Anxiety (10 worst): 6/10    Safety concerns (SI, HI, etc): Denies si and hi    Sleep: 5-7 hrs    Energy: low    Appetite: 2-3 meals    Weight Change: denies        Mental Status Evaluation:    Appearance Adequate hygiene and grooming   Behavior calm and cooperative   Mood anxious and depressed  Depression Scale - 6 of 10 (0 = No depression)  Anxiety Scale - 6 of 10 (0 = No anxiety)   Speech Soft   Affect constricted   Thought Processes Goal directed and coherent   Thought Content Does not verbalize delusional material   Associations concrete associations   Perceptual Disturbances Auditory hallucinations without commands   Risk Potential Suicidal/Homicidal Ideation - No evidence of suicidal or homicidal ideation and patient does not verbalize suicidal or homicidal ideation  Risk of Violence - No evidence of risk for violence found on assessment  Risk of Self Mutilation - No evidence of risk for self mutilation found on assessment   Orientation oriented to person, place, time/date and situation   Memory recent and remote memory grossly intact   Consciousness alert and awake   Attention/Concentration attention span and concentration appear shorter than expected for age   Insight partial   Judgement partial   Muscle Strength and Gait slow gait   Motor Activity no abnormal movements   Language no difficulty naming common objects, no difficulty repeating a phrase, no difficulty writing a sentence   Fund of Knowledge adequate knowledge of current events  adequate fund of knowledge regarding past history  adequate fund of knowledge regarding vocabulary          Past Medical History:    Past Medical History:   Diagnosis Date   • Chemical exposure    • Diabetes mellitus ([facility] [address] )    • H/O bone graft    • Head injury     August 25, 2019 fell backwards hit head on a [place]oulder w/ LOC   • Head injury     as an air [place] with the [employer] - jumped out of a plane parachute disfunction   • Hypertension    • Seizures ([address] )         Past Surgical History:   Procedure Laterality Date   • FL LUMBAR PUNCTURE DIAGNOSTIC  12/13/2019   • KNEE ARTHROSCOPY W/ MENISCECTOMY Left    • LEG SURGERY     • TONSILLECTOMY       No Known Allergies  Substance Abuse History:    Social History     Substance and Sexual Activity   Alcohol Use Not Currently   • Alcohol/week: 3 0 standard drinks of alcohol   • Types: 3 Cans of beer per week     Social History     Substance and Sexual Activity   Drug Use Never     Social History:    Social History     Socioeconomic History   • Marital status: Single     Spouse name: Not on file   • Number of children: Not on file   • Years of education: Not on file   • Highest education level: Not on file   Occupational History   • Not on file   Tobacco Use   • Smoking status: Never   • Smokeless tobacco: Never   Vaping Use   • Vaping Use: Never used   Substance and Sexual Activity   • Alcohol use: Not Currently     Alcohol/week: 3 0 standard drinks of alcohol     Types: 3 Cans of beer per week   • Drug use: Never   • Sexual activity: Not on file   Other Topics Concern   • Not on file   Social History Narrative   • Not on file     Social Determinants of Health     Financial Resource Strain: Not on file   Food Insecurity: Not on file   Transportation Needs: Not on file   Physical Activity: Not on file   Stress: Not on file   Social Connections: Not on file   Intimate Partner Violence: Not on file   Housing Stability: Not on file     Family Psychiatric History:     Family History   Problem Relation Age of Onset   • Diabetes Mother    • Hypertension Mother    • Asthma Mother    • Brain cancer Father    • No Known Problems Sister    • No Known Problems Brother    • No Known Problems Brother      History Review: The following portions of the patient's history were reviewed and updated as appropriate: allergies, current medications, past family history, past medical history, past social history, past surgical history and problem list     OBJECTIVE: Vital signs in last 24 hours: There were no vitals filed for this visit  Laboratory Results:   Recent Labs (last 2 months):    Admission on 05/30/2023, Discharged on 05/30/2023   Component Date Value   • WBC 05/30/2023 6 98    • RBC 05/30/2023 4 88    • Hemoglobin 05/30/2023 14 5    • Hematocrit 05/30/2023 43 3    • MCV 05/30/2023 89    • MCH 05/30/2023 29 7    • MCHC 05/30/2023 33 5    • RDW 05/30/2023 14 6    • MPV 05/30/2023 9 0    • Platelets 12/71/1666 170    • nRBC 05/30/2023 0    • Neutrophils Relative 05/30/2023 64    • Immat GRANS % 05/30/2023 1    • Lymphocytes Relative 05/30/2023 23    • Monocytes Relative 05/30/2023 9    • Eosinophils Relative 05/30/2023 2    • Basophils Relative 05/30/2023 1    • Neutrophils Absolute 05/30/2023 4 55    • Immature Grans Absolute 05/30/2023 0 04    • Lymphocytes Absolute 05/30/2023 1 59    • Monocytes Absolute 05/30/2023 0 62    • Eosinophils Absolute 05/30/2023 0 14    • Basophils Absolute 05/30/2023 0 04    • Sodium 05/30/2023 139    • Potassium 05/30/2023 3 7    • Chloride 05/30/2023 104    • CO2 05/30/2023 29    • ANION GAP 05/30/2023 6    • BUN 05/30/2023 12    • Creatinine 05/30/2023 1 03    • Glucose 05/30/2023 124    • Calcium 05/30/2023 9 6    • AST 05/30/2023 26    • ALT 05/30/2023 11    • Alkaline Phosphatase 05/30/2023 61    • Total Protein 05/30/2023 7 0    • Albumin 05/30/2023 4 0    • Total Bilirubin 05/30/2023 0 74    • eGFR 05/30/2023 81    • Lipase 05/30/2023 24    • Valproic Acid, Total 05/30/2023 89    • Lamotrigine Lvl 05/30/2023 10 8    • TSH 3RD GENERATON 05/30/2023 1 492    • Hemoglobin A1C 05/30/2023 5 8 (H)    • EAG 05/30/2023 120    • Ventricular Rate 05/30/2023 65    • Atrial Rate 05/30/2023 65    • TX Interval 05/30/2023 176    • QRSD Interval 05/30/2023 106    • QT Interval 05/30/2023 408    • QTC Interval 05/30/2023 424    • P Axis 05/30/2023 33    • QRS Axis 05/30/2023 -53    • T Wave Axis 05/30/2023 14    • 25-HYDROXY VIT D 05/30/2023 33    • 25-Hydroxy D2 05/30/2023 <1 0    • 25-HYDROXY VIT D3 05/30/2023 33    • Cholesterol 05/30/2023 132    • Triglycerides 05/30/2023 152 (H)    • HDL, Direct 05/30/2023 39 (L)    • LDL Calculated 05/30/2023 63      I have personally reviewed all pertinent laboratory/tests results  Suicide/Homicide Risk Assessment:    Risk of Harm to Self:  Protective Factors: no current suicidal ideation, access to mental health treatment, being , compliant with medications, compliant with mental health treatment, connection to community, medical compliance, personal beliefs, resiliency  Based on today's assessment, Fredericshalonda Valdovinos[place] presents the following risk of harm to self: minimal    Risk of Harm to Others:  Based on today's assessment, [place] presents the following risk of harm to others: minimal    The following interventions are recommended: referral for psychotherapy    Medications Risks/Benefits:      Risks, Benefits And Possible Side Effects Of Medications:    Discussed risks and benefits of treatment with patient including risk of suicidality, serotonin syndrome, increased QTc interval and SIADH related to treatment with antidepressants;  Risk of induction of manic symptoms in certain patient populations, risk of parkinsonian symptoms, metabolic syndrome, tardive dyskinesia and neuroleptic malignant syndrome related to treatment with antipsychotic medications, risks of misuse, abuse or dependence, sedation and respiratory depression related to treatment with benzodiazepine medications, risk of rash related to treatment with Lamictal and risk of liver impairment related to treatment with Depakote     Controlled Medication Discussion:     [place] has been filling controlled prescriptions on time as prescribed according to [address]  Discussed with [place] the risks of sedation, respiratory depression, impairment of ability to drive and potential for abuse and addiction related to treatment with benzodiazepine medications  He understands risk of treatment with benzodiazepine medications, agrees to not drive if feels impaired and agrees to take medications as prescribed  Discussed with [facility] warning on concurrent use of benzodiazepines and opioid medications including sedation, respiratory depression, coma and death  He understands the risk of treatment with benzodiazepines in addition to opioids and wants to continue taking those medications  Discussed with [facility] increased risk of impairment related to concurrent use of benzodiazepines and hypnotic medications including excessive sedation, psychomotor impairment and respiratory depression  He understands the risk of treatment with benzodiazepines in addition to hypnotic medications and wants to continue taking those medications    Treatment Plan:    Due for update/Updated:   yes  Last treatment plan done 6/5/23   Treatment Plan due on 11/5/23  Jodeen Heimlich, CRNP 06/05/23    This note was shared with patient        Visit Time    Visit Start Time: 130  Visit Stop Time: 155  Total Visit Duration: 25 minutes

## 2023-06-05 NOTE — BH TREATMENT PLAN
TREATMENT PLAN (Medication Management Only)        [facility]    Name and Date of Birth:  Humberto Arias  52 y o  1970  Date of Treatment Plan: June 5, 2023  Diagnosis/Diagnoses:    1  Psychosis, unspecified psychosis type ([facility] [address] )    2  Reactive depression    3  Traumatic brain injury with loss of consciousness, subsequent encounter      Strengths/Personal Resources for Self-Care: supportive family, supportive friends, taking medications as prescribed, family ties  Area/Areas of need (in own words): anxiety symptoms, depressive symptoms  1  Long Term Goal: continue improvement in psychotic symptoms  Target Date:6 months - 12/5/2023  Person/Persons responsible for completion of goal: Yan  2  Short Term Objective (s) - How will we reach this goal?:   A  Provider new recommended medication/dosage changes and/or continue medication(s): continue current medications as prescribed  B  Attend medication management appointments regularly  C  Take psychiatric medications responsibly  Target Date:6 months - 12/5/2023  Person/Persons Responsible for Completion of Goal: Yan  Progress Towards Goals: continuing treatment  Treatment Modality: medication management every 6 weeks  Review due 180 days from date of this plan: 6 months - 12/5/2023  Expected length of service: ongoing treatment  My Physician/PA/NP and I have developed this plan together and I agree to work on the goals and objectives  I understand the treatment goals that were developed for my treatment

## 2023-06-20 DIAGNOSIS — F29 PSYCHOSIS, UNSPECIFIED PSYCHOSIS TYPE (HCC): ICD-10-CM

## 2023-06-20 DIAGNOSIS — F32.9 REACTIVE DEPRESSION: ICD-10-CM

## 2023-06-20 RX ORDER — ZIPRASIDONE HYDROCHLORIDE 60 MG/1
CAPSULE ORAL
Qty: 60 CAPSULE | Refills: 0 | Status: SHIPPED | OUTPATIENT
Start: 2023-06-20

## 2023-07-05 DIAGNOSIS — F29 PSYCHOSIS, UNSPECIFIED PSYCHOSIS TYPE (HCC): ICD-10-CM

## 2023-07-05 RX ORDER — ZALEPLON 5 MG/1
CAPSULE ORAL
Qty: 30 CAPSULE | Refills: 0 | Status: SHIPPED | OUTPATIENT
Start: 2023-07-05

## 2023-07-18 DIAGNOSIS — S06.9X9D TRAUMATIC BRAIN INJURY WITH LOSS OF CONSCIOUSNESS, SUBSEQUENT ENCOUNTER: ICD-10-CM

## 2023-07-18 RX ORDER — BENZTROPINE MESYLATE 0.5 MG/1
TABLET ORAL
Qty: 60 TABLET | Refills: 0 | Status: SHIPPED | OUTPATIENT
Start: 2023-07-18

## 2023-07-25 ENCOUNTER — TELEMEDICINE (OUTPATIENT)
Dept: PSYCHIATRY | Facility: CLINIC | Age: 53
End: 2023-07-25
Payer: MEDICARE

## 2023-07-25 DIAGNOSIS — F29 PSYCHOSIS, UNSPECIFIED PSYCHOSIS TYPE (HCC): Primary | ICD-10-CM

## 2023-07-25 DIAGNOSIS — F32.9 REACTIVE DEPRESSION: ICD-10-CM

## 2023-07-25 DIAGNOSIS — S06.9X9D TRAUMATIC BRAIN INJURY WITH LOSS OF CONSCIOUSNESS, SUBSEQUENT ENCOUNTER: ICD-10-CM

## 2023-07-25 PROCEDURE — 99213 OFFICE O/P EST LOW 20 MIN: CPT

## 2023-07-25 RX ORDER — ZIPRASIDONE HYDROCHLORIDE 60 MG/1
60 CAPSULE ORAL 2 TIMES DAILY
Qty: 60 CAPSULE | Refills: 1 | Status: SHIPPED | OUTPATIENT
Start: 2023-07-25

## 2023-07-25 RX ORDER — SERTRALINE HYDROCHLORIDE 100 MG/1
150 TABLET, FILM COATED ORAL DAILY
Qty: 90 TABLET | Refills: 1 | Status: SHIPPED | OUTPATIENT
Start: 2023-07-25

## 2023-07-25 NOTE — PSYCH
Virtual Regular Visit    Verification of patient location:    Patient is located in the following state in which I hold an active license [place]    Problem List Items Addressed This Visit        Nervous and Auditory    Traumatic brain injury with loss of consciousness ([address])       Other    Psychosis ([address]) - Primary    Relevant Medications    sertraline (Zoloft) 100 mg tablet    ziprasidone (GEODON) 60 mg capsule    Reactive depression    Relevant Medications    sertraline (Zoloft) 100 mg tablet    ziprasidone (GEODON) 60 mg capsule          Encounter provider ROCKY Gordon    Provider located at    [id number] [address] [address] [phone]    Recent Visits  No visits were found meeting these conditions. Showing recent visits within past 7 days and meeting all other requirements  Today's Visits  Date Type Provider Dept   07/25/23 Telemedicine ROCKY Gordon  [facility]   Showing today's visits and meeting all other requirements  Future Appointments  No visits were found meeting these conditions. Showing future appointments within next 150 days and meeting all other requirements         The patient was identified by name and date of birth. [facility]. was informed that this is a telemedicine visit and [facility] He agrees to proceed. .  My office door was closed. No one else was in the room. He acknowledged consent and understanding of privacy and security of the video platform. The patient has agreed to participate and understands they can discontinue the visit at any time. Patient is aware this is a billable service.      HPI     Current Outpatient Medications   Medication Sig Dispense Refill   • benztropine (COGENTIN) 0.5 mg tablet Take 1 tablet by mouth twice daily 60 tablet 0   • diazepam (VALIUM) 2 mg tablet TAKE 1 TABLET BY MOUTH ONCE DAILY AS NEEDED FOR ANXIETY (SEVERE  ANXIETY  ONLY) 30 tablet 0   • divalproex sodium (DEPAKOTE) 250 mg EC tablet TAKE 1 TABLET BY MOUTH TWICE DAILY WITH  ONE  500  MG  TAB  FOR  TOAL  DOSE  OF  750  MG  TWICE  A  DAY 60 tablet 11   • divalproex sodium (DEPAKOTE) 500 mg EC tablet Take 1 tab twice a day with one 250 mg tab for total dose of 750 mg twice a day.  60 tablet 11   • sertraline (Zoloft) 100 mg tablet Take 1.5 tablets (150 mg total) by mouth daily 90 tablet 1   • zaleplon (SONATA) 5 MG capsule Take 1 capsule by mouth once daily at bedtime 30 capsule 0   • ziprasidone (GEODON) 60 mg capsule Take 1 capsule (60 mg total) by mouth 2 (two) times a day 60 capsule 1   • amLODIPine (NORVASC) 10 mg tablet Take 10 mg by mouth daily     • amoxicillin (AMOXIL) 500 mg capsule TAKE 1 CAPSULE BY MOUTH TWICE DAILY FOR 10 DAYS (Patient not taking: Reported on 3/3/2023)     • aspirin 81 mg chewable tablet Chew 81 mg daily     • atorvastatin (LIPITOR) 20 mg tablet Take 20 mg by mouth every evening     • ibuprofen (MOTRIN) 800 mg tablet daily as needed     • insulin NPH-insulin regular (NovoLIN 70/30) 100 units/mL subcutaneous injection Inject 10 Units under the skin daily before breakfast (Patient taking differently: Inject 18 Units under the skin daily before breakfast) 3 mL 0   • Jardiance 25 MG TABS Take 25 mg by mouth daily (Patient not taking: Reported on 3/3/2023)     • lamoTRIgine (LaMICtal) 100 mg tablet Take 1 tab (100 mg) twice a day 180 tablet 3   • lisinopril (ZESTRIL) 40 mg tablet Take 1 tablet by mouth daily (Patient not taking: Reported on 3/3/2023)     • metFORMIN (GLUCOPHAGE) 1000 MG tablet Take 1,000 mg by mouth 2 (two) times a day with meals (Patient not taking: Reported on 3/3/2023)     • metoprolol tartrate (LOPRESSOR) 100 mg tablet TAKE 1 TABLET BY MOUTH EVERY 12 HOURS 60 tablet 1   • Ventolin  (90 Base) MCG/ACT inhaler Inhale 2 puffs every 6 (six) hours as needed     • Vitamin D, Cholecalciferol, 50 MCG (2000 UT) CAPS Take 1 capsule by mouth once daily 30 capsule 5     No current facility-administered medications for this visit. Review of Systems  Video Exam    There were no vitals filed for this visit. Physical Exam   As a result of this visit, I have referred the patient for further respiratory evaluation. No      VIRTUAL VISIT DISCLAIMER    [facility] Demetri Love acknowledges that he has consented to an online visit or consultation. He understands that the online visit is based solely on information provided by him, and that, in the absence of a face-to-face physical evaluation by the physician, the diagnosis he receives is both limited and provisional in terms of accuracy and completeness. This is not intended to replace a full medical face-to-face evaluation by the physician. Robert Morales understands and accepts these terms. MEDICATION MANAGEMENT NOTE        [facility]    Name and Date of Birth:  Robert Morales. 52 y.o. 1970 MRN: [id number]    Date of Visit: July 25, 2023    No Known Allergies  SUBJECTIVE:    Shawn Kussmaul is seen today for a follow up for hallucinations, depression, hx TBI. He reports that he continues to experience on and off symptoms since the last visit. Patient reports feeling somewhat encouraged by a recent 1-2 good weeks in terms of lessening chronic auditory hallucinations. He cannot identify any recent change in his life that caused improvement in symptoms but hopeful this continues. He was brighter, more talkative during today's conversation. He is thankful for all the support his wife provides him, thankful to this provider. Periodic derogatory auditory hallucinations persist, worsened when he is around crowds, they are often negative, he is doing his best to ignore the voices. Denies command hallucinations. Occasional shakiness in bilateral arms reported, aims 1.  No medication changes made today, continue to monitor symptoms closely and agreeable to go to the ER immediately if suicidal ideation, homicidal ideation, severe command hallucinations occur. Wife continues to be a strong support system and manages his medications. Denies current SI/HI. He denies any side effects from medications. PLAN:    -Continue Geodon to 60 mg twice daily for chronic auditory hallucinations in the context of his traumatic brain injury. Continue Cogentin 0.5 mg twice daily for periodic bilateral hand shaking   -Continue Zoloft 150 mg daily for depression related to the ongoing hallucinations PARQ completed including serotonin syndrome, SIADH, worsening depression, suicidality, induction of afshin, GI upset, headaches, activation, sexual side effects, sedation, potential drug interactions, and others.     -Continue p.r.n. Valium 2 milligrams daily p.r.n. for severe anxiety/agitation which occasionally occurs due to patient's auditory hallucinations. Using infrequently according to PDMP. Discussed with patient the risks of sedation, respiratory depression, impairment of ability to drive and potential for abuse and addiction related to treatment with benzodiazepine medications. The patient understands risk of treatment with benzodiazepine medications, agrees to not drive if feels impaired and agrees to take medications as prescribed.      - Neurologist prescribing Depakote 750 mg b.i.d. and Lamictal 100 mg BID for the management of epilepsy disorder, has remained free from seizures 3 years. Complete pending labs. Aware of 24 hour and weekend coverage for urgent situations accessed by calling [facility] main practice number  Referral for individual psychotherapy    Diagnoses and all orders for this visit:    Psychosis, unspecified psychosis type ([address] [facility])  -     ziprasidone (GEODON) 60 mg capsule;  Take 1 capsule (60 mg total) by mouth 2 (two) times a day    Reactive depression  -     sertraline (Zoloft) 100 mg tablet; Take 1.5 tablets (150 mg total) by mouth daily  -     ziprasidone (GEODON) 60 mg capsule; Take 1 capsule (60 mg total) by mouth 2 (two) times a day    Traumatic brain injury with loss of consciousness, subsequent encounter        Current Outpatient Medications on File Prior to Visit   Medication Sig Dispense Refill   • benztropine (COGENTIN) 0.5 mg tablet Take 1 tablet by mouth twice daily 60 tablet 0   • diazepam (VALIUM) 2 mg tablet TAKE 1 TABLET BY MOUTH ONCE DAILY AS NEEDED FOR ANXIETY (SEVERE  ANXIETY  ONLY) 30 tablet 0   • divalproex sodium (DEPAKOTE) 250 mg EC tablet TAKE 1 TABLET BY MOUTH TWICE DAILY WITH  ONE  500  MG  TAB  FOR  TOAL  DOSE  OF  750  MG  TWICE  A  DAY 60 tablet 11   • divalproex sodium (DEPAKOTE) 500 mg EC tablet Take 1 tab twice a day with one 250 mg tab for total dose of 750 mg twice a day.  60 tablet 11   • zaleplon (SONATA) 5 MG capsule Take 1 capsule by mouth once daily at bedtime 30 capsule 0   • [DISCONTINUED] sertraline (Zoloft) 100 mg tablet Take 2 tablets (200 mg total) by mouth daily (Patient taking differently: Take 150 mg by mouth daily) 90 tablet 1   • [DISCONTINUED] ziprasidone (GEODON) 60 mg capsule Take 1 capsule by mouth twice daily 60 capsule 0   • amLODIPine (NORVASC) 10 mg tablet Take 10 mg by mouth daily     • amoxicillin (AMOXIL) 500 mg capsule TAKE 1 CAPSULE BY MOUTH TWICE DAILY FOR 10 DAYS (Patient not taking: Reported on 3/3/2023)     • aspirin 81 mg chewable tablet Chew 81 mg daily     • atorvastatin (LIPITOR) 20 mg tablet Take 20 mg by mouth every evening     • ibuprofen (MOTRIN) 800 mg tablet daily as needed     • insulin NPH-insulin regular (NovoLIN 70/30) 100 units/mL subcutaneous injection Inject 10 Units under the skin daily before breakfast (Patient taking differently: Inject 18 Units under the skin daily before breakfast) 3 mL 0   • Jardiance 25 MG TABS Take 25 mg by mouth daily (Patient not taking: Reported on 3/3/2023)     • lamoTRIgine (LaMICtal) 100 mg tablet Take 1 tab (100 mg) twice a day 180 tablet 3   • lisinopril (ZESTRIL) 40 mg tablet Take 1 tablet by mouth daily (Patient not taking: Reported on 3/3/2023)     • metFORMIN (GLUCOPHAGE) 1000 MG tablet Take 1,000 mg by mouth 2 (two) times a day with meals (Patient not taking: Reported on 3/3/2023)     • metoprolol tartrate (LOPRESSOR) 100 mg tablet TAKE 1 TABLET BY MOUTH EVERY 12 HOURS 60 tablet 1   • Ventolin  (90 Base) MCG/ACT inhaler Inhale 2 puffs every 6 (six) hours as needed     • Vitamin D, Cholecalciferol, 50 MCG (2000 UT) CAPS Take 1 capsule by mouth once daily 30 capsule 5     No current facility-administered medications on file prior to visit. Psychotherapy Provided:     Individual psychotherapy provided: Yes  Counseling was provided during the session today for 10 minutes. Supportive counseling provided. Medication changes discussed with [facility]. Medication education provided to [facility]. Goals discussed during in session: improve control of hallucinations. HPI ROS Appetite Changes and Sleep:     He reports difficulty falling asleep, adequate appetite, low energy.  Denies homicidal ideation, denies suicidal ideation    Review Of Systems:      HPI ROS:               Medication Side Effects:  denies currently    Depression (10 worst): 5/10    Anxiety (10 worst): 6/10    Safety concerns (SI, HI, etc): Denies si and hi    Sleep: 5-6 hrs    Energy: low    Appetite: 2-3 meals    Weight Change: denies        Mental Status Evaluation:    Appearance Adequate hygiene and grooming   Behavior calm and cooperative   Mood anxious and depressed  Depression Scale - 5 of 10 (0 = No depression)  Anxiety Scale - 6 of 10 (0 = No anxiety)   Speech Soft   Affect constricted   Thought Processes Goal directed and coherent   Thought Content Does not verbalize delusional material   Associations concrete associations   Perceptual Disturbances Auditory hallucinations without commands   Risk Potential Suicidal/Homicidal Ideation - No evidence of suicidal or homicidal ideation and patient does not verbalize suicidal or homicidal ideation  Risk of Violence - No evidence of risk for violence found on assessment  Risk of Self Mutilation - No evidence of risk for self mutilation found on assessment   Orientation oriented to person, place, time/date and situation   Memory recent and remote memory grossly intact   Consciousness alert and awake   Attention/Concentration attention span and concentration appear shorter than expected for age   Insight partial   Judgement partial   Muscle Strength and Gait slow gait   Motor Activity no abnormal movements   Language no difficulty naming common objects, no difficulty repeating a phrase, no difficulty writing a sentence   Fund of Knowledge adequate knowledge of current events  adequate fund of knowledge regarding past history  adequate fund of knowledge regarding vocabulary          Past Medical History:    Past Medical History:   Diagnosis Date   • Chemical exposure    • Diabetes mellitus ([address])    • H/O bone graft    • Head injury     August 25, 2019 fell backwards hit head on a [place] w/ LOC   • Head injury     as an air [place] with the [employer] - jumped out of a plane parachute disfunction   • Hypertension    • Seizures ([address])         Past Surgical History:   Procedure Laterality Date   • FL LUMBAR PUNCTURE DIAGNOSTIC  12/13/2019   • KNEE ARTHROSCOPY W/ MENISCECTOMY Left    • LEG SURGERY     • TONSILLECTOMY       No Known Allergies  Substance Abuse History:    Social History     Substance and Sexual Activity   Alcohol Use Not Currently   • Alcohol/week: 3.0 standard drinks of alcohol   • Types: 3 Cans of beer per week     Social History     Substance and Sexual Activity   Drug Use Never     Social History:    Social History     Socioeconomic History   • Marital status: Single     Spouse name: Not on file   • Number of children: Not on file   • Years of education: Not on file   • Highest education level: Not on file   Occupational History   • Not on file   Tobacco Use   • Smoking status: Never   • Smokeless tobacco: Never   Vaping Use   • Vaping Use: Never used   Substance and Sexual Activity   • Alcohol use: Not Currently     Alcohol/week: 3.0 standard drinks of alcohol     Types: 3 Cans of beer per week   • Drug use: Never   • Sexual activity: Not on file   Other Topics Concern   • Not on file   Social History Narrative   • Not on file     Social Determinants of Health     Financial Resource Strain: Not on file   Food Insecurity: Not on file   Transportation Needs: Not on file   Physical Activity: Not on file   Stress: Not on file   Social Connections: Not on file   Intimate Partner Violence: Not on file   Housing Stability: Not on file     Family Psychiatric History:     Family History   Problem Relation Age of Onset   • Diabetes Mother    • Hypertension Mother    • Asthma Mother    • Brain cancer Father    • No Known Problems Sister    • No Known Problems Brother    • No Known Problems Brother      History Review: The following portions of the patient's history were reviewed and updated as appropriate: allergies, current medications, past family history, past medical history, past social history, past surgical history and problem list     OBJECTIVE:     Vital signs in last 24 hours: There were no vitals filed for this visit. Laboratory Results:   Recent Labs (last 2 months):    Admission on 05/30/2023, Discharged on 05/30/2023   Component Date Value   • WBC 05/30/2023 6.98    • RBC 05/30/2023 4.88    • Hemoglobin 05/30/2023 14.5    • Hematocrit 05/30/2023 43.3    • MCV 05/30/2023 89    • MCH 05/30/2023 29.7    • MCHC 05/30/2023 33.5    • RDW 05/30/2023 14.6    • MPV 05/30/2023 9.0    • Platelets 69/00/7735 170    • nRBC 05/30/2023 0    • Neutrophils Relative 05/30/2023 64    • Immat GRANS % 05/30/2023 1    • Lymphocytes Relative 05/30/2023 23    • Monocytes Relative 05/30/2023 9    • Eosinophils Relative 05/30/2023 2    • Basophils Relative 05/30/2023 1    • Neutrophils Absolute 05/30/2023 4.55    • Immature Grans Absolute 05/30/2023 0.04    • Lymphocytes Absolute 05/30/2023 1.59    • Monocytes Absolute 05/30/2023 0.62    • Eosinophils Absolute 05/30/2023 0.14    • Basophils Absolute 05/30/2023 0.04    • Sodium 05/30/2023 139    • Potassium 05/30/2023 3.7    • Chloride 05/30/2023 104    • CO2 05/30/2023 29    • ANION GAP 05/30/2023 6    • BUN 05/30/2023 12    • Creatinine 05/30/2023 1.03    • Glucose 05/30/2023 124    • Calcium 05/30/2023 9.6    • AST 05/30/2023 26    • ALT 05/30/2023 11    • Alkaline Phosphatase 05/30/2023 61    • Total Protein 05/30/2023 7.0    • Albumin 05/30/2023 4.0    • Total Bilirubin 05/30/2023 0.74    • eGFR 05/30/2023 81    • Lipase 05/30/2023 24    • Valproic Acid, Total 05/30/2023 89    • Lamotrigine Lvl 05/30/2023 10.8    • TSH 3RD GENERATON 05/30/2023 1.492    • Hemoglobin A1C 05/30/2023 5.8 (H)    • EAG 05/30/2023 120    • Ventricular Rate 05/30/2023 65    • Atrial Rate 05/30/2023 65    • MS Interval 05/30/2023 176    • QRSD Interval 05/30/2023 106    • QT Interval 05/30/2023 408    • QTC Interval 05/30/2023 424    • P Axis 05/30/2023 33    • QRS Axis 05/30/2023 -53    • T Wave Axis 05/30/2023 14    • 25-HYDROXY VIT D 05/30/2023 33    • 25-Hydroxy D2 05/30/2023 <1.0    • 25-HYDROXY VIT D3 05/30/2023 33    • Cholesterol 05/30/2023 132    • Triglycerides 05/30/2023 152 (H)    • HDL, Direct 05/30/2023 39 (L)    • LDL Calculated 05/30/2023 63      I have personally reviewed all pertinent laboratory/tests results.     Suicide/Homicide Risk Assessment:    Risk of Harm to Self:  Protective Factors: no current suicidal ideation, access to mental health treatment, being , compliant with medications, compliant with mental health treatment, connection to community, medical compliance, personal beliefs, resiliency  Based on today's assessment, [facility] presents the following risk of harm to self: minimal    Risk of Harm to Others:  Based on today's assessment, Kody Puga presents the following risk of harm to others: minimal    The following interventions are recommended: referral for psychotherapy    Medications Risks/Benefits:      Risks, Benefits And Possible Side Effects Of Medications:    Discussed risks and benefits of treatment with patient including risk of suicidality, serotonin syndrome, increased QTc interval and SIADH related to treatment with antidepressants; Risk of induction of manic symptoms in certain patient populations, risk of parkinsonian symptoms, metabolic syndrome, tardive dyskinesia and neuroleptic malignant syndrome related to treatment with antipsychotic medications, risks of misuse, abuse or dependence, sedation and respiratory depression related to treatment with benzodiazepine medications, risk of rash related to treatment with Lamictal and risk of liver impairment related to treatment with Depakote     Controlled Medication Discussion:     Kody Puga has been filling controlled prescriptions on time as prescribed according to [email or web address] Faheem Lam  Discussed with Kody Puga the risks of sedation, respiratory depression, impairment of ability to drive and potential for abuse and addiction related to treatment with benzodiazepine medications. He understands risk of treatment with benzodiazepine medications, agrees to not drive if feels impaired and agrees to take medications as prescribed  Discussed with [facility] Box warning on concurrent use of benzodiazepines and opioid medications including sedation, respiratory depression, coma and death.  He understands the risk of treatment with benzodiazepines in addition to opioids and wants to continue taking those medications  Discussed with Kody Puga increased risk of impairment related to concurrent use of benzodiazepines and hypnotic medications including excessive sedation, psychomotor impairment and respiratory depression. He understands the risk of treatment with benzodiazepines in addition to hypnotic medications and wants to continue taking those medications    Treatment Plan:    Due for update/Updated:   no  Last treatment plan done 6/5/23 . Treatment Plan due on 11/5/23. ROCKY Beaver 07/25/23    This note was shared with patient.       Visit Time    Visit Start Time: 130  Visit Stop Time: 151  Total Visit Duration: 21 minutes

## 2023-08-02 DIAGNOSIS — F29 PSYCHOSIS, UNSPECIFIED PSYCHOSIS TYPE (HCC): ICD-10-CM

## 2023-08-02 RX ORDER — ZALEPLON 5 MG/1
CAPSULE ORAL
Qty: 30 CAPSULE | Refills: 0 | Status: SHIPPED | OUTPATIENT
Start: 2023-08-02

## 2023-08-09 DIAGNOSIS — F29 PSYCHOSIS, UNSPECIFIED PSYCHOSIS TYPE (HCC): ICD-10-CM

## 2023-08-09 RX ORDER — DIAZEPAM 2 MG/1
TABLET ORAL
Qty: 30 TABLET | Refills: 0 | Status: SHIPPED | OUTPATIENT
Start: 2023-08-09

## 2023-08-11 DIAGNOSIS — S06.9X9D TRAUMATIC BRAIN INJURY WITH LOSS OF CONSCIOUSNESS, SUBSEQUENT ENCOUNTER: ICD-10-CM

## 2023-08-11 RX ORDER — BENZTROPINE MESYLATE 0.5 MG/1
TABLET ORAL
Qty: 60 TABLET | Refills: 0 | Status: SHIPPED | OUTPATIENT
Start: 2023-08-11

## 2023-08-22 ENCOUNTER — APPOINTMENT (EMERGENCY)
Dept: CT IMAGING | Facility: HOSPITAL | Age: 53
End: 2023-08-22
Payer: MEDICARE

## 2023-08-22 ENCOUNTER — HOSPITAL ENCOUNTER (EMERGENCY)
Facility: HOSPITAL | Age: 53
Discharge: HOME | End: 2023-08-23
Attending: EMERGENCY MEDICINE
Payer: MEDICARE

## 2023-08-22 DIAGNOSIS — S09.90XA CHI (CLOSED HEAD INJURY): ICD-10-CM

## 2023-08-22 DIAGNOSIS — R44.0 AUDITORY HALLUCINATIONS: Primary | ICD-10-CM

## 2023-08-22 LAB
ALBUMIN SERPL BCP-MCNC: 4.4 G/DL (ref 3.5–5)
ALP SERPL-CCNC: 63 U/L (ref 34–104)
ALT SERPL W P-5'-P-CCNC: 19 U/L (ref 7–52)
AMPHETAMINES SERPL QL SCN: NEGATIVE
ANION GAP SERPL CALCULATED.3IONS-SCNC: 5 MMOL/L
AST SERPL W P-5'-P-CCNC: 31 U/L (ref 13–39)
BACTERIA UR QL AUTO: ABNORMAL /HPF
BARBITURATES UR QL: NEGATIVE
BASOPHILS # BLD AUTO: 0.05 THOUSANDS/ÂΜL (ref 0–0.1)
BASOPHILS NFR BLD AUTO: 1 % (ref 0–1)
BENZODIAZ UR QL: POSITIVE
BILIRUB SERPL-MCNC: 0.56 MG/DL (ref 0.2–1)
BILIRUB UR QL STRIP: NEGATIVE
BUN SERPL-MCNC: 13 MG/DL (ref 5–25)
CALCIUM SERPL-MCNC: 9.6 MG/DL (ref 8.4–10.2)
CHLORIDE SERPL-SCNC: 107 MMOL/L (ref 96–108)
CLARITY UR: ABNORMAL
CO2 SERPL-SCNC: 28 MMOL/L (ref 21–32)
COCAINE UR QL: NEGATIVE
COLOR UR: YELLOW
CREAT SERPL-MCNC: 0.97 MG/DL (ref 0.6–1.3)
EOSINOPHIL # BLD AUTO: 0.18 THOUSAND/ÂΜL (ref 0–0.61)
EOSINOPHIL NFR BLD AUTO: 3 % (ref 0–6)
ERYTHROCYTE [DISTWIDTH] IN BLOOD BY AUTOMATED COUNT: 14.8 % (ref 11.6–15.1)
ETHANOL EXG-MCNC: 0 MG/DL
GFR SERPL CREATININE-BSD FRML MDRD: 87 ML/MIN/1.73SQ M
GLUCOSE SERPL-MCNC: 102 MG/DL (ref 65–140)
GLUCOSE UR STRIP-MCNC: NEGATIVE MG/DL
HCT VFR BLD AUTO: 43.2 % (ref 36.5–49.3)
HGB BLD-MCNC: 14.3 G/DL (ref 12–17)
HGB UR QL STRIP.AUTO: NEGATIVE
IMM GRANULOCYTES # BLD AUTO: 0.02 THOUSAND/UL (ref 0–0.2)
IMM GRANULOCYTES NFR BLD AUTO: 0 % (ref 0–2)
KETONES UR STRIP-MCNC: NEGATIVE MG/DL
LEUKOCYTE ESTERASE UR QL STRIP: NEGATIVE
LYMPHOCYTES # BLD AUTO: 1.73 THOUSANDS/ÂΜL (ref 0.6–4.47)
LYMPHOCYTES NFR BLD AUTO: 25 % (ref 14–44)
MCH RBC QN AUTO: 29.6 PG (ref 26.8–34.3)
MCHC RBC AUTO-ENTMCNC: 33.1 G/DL (ref 31.4–37.4)
MCV RBC AUTO: 89 FL (ref 82–98)
METHADONE UR QL: NEGATIVE
MONOCYTES # BLD AUTO: 0.66 THOUSAND/ÂΜL (ref 0.17–1.22)
MONOCYTES NFR BLD AUTO: 10 % (ref 4–12)
MUCOUS THREADS UR QL AUTO: ABNORMAL
NEUTROPHILS # BLD AUTO: 4.27 THOUSANDS/ÂΜL (ref 1.85–7.62)
NEUTS SEG NFR BLD AUTO: 61 % (ref 43–75)
NITRITE UR QL STRIP: NEGATIVE
NON-SQ EPI CELLS URNS QL MICRO: ABNORMAL /HPF
NRBC BLD AUTO-RTO: 0 /100 WBCS
OPIATES UR QL SCN: NEGATIVE
OXYCODONE+OXYMORPHONE UR QL SCN: NEGATIVE
PCP UR QL: NEGATIVE
PH UR STRIP.AUTO: 6.5 [PH]
PLATELET # BLD AUTO: 182 THOUSANDS/UL (ref 149–390)
PMV BLD AUTO: 9.1 FL (ref 8.9–12.7)
POTASSIUM SERPL-SCNC: 3.8 MMOL/L (ref 3.5–5.3)
PROT SERPL-MCNC: 7.1 G/DL (ref 6.4–8.4)
PROT UR STRIP-MCNC: ABNORMAL MG/DL
RBC # BLD AUTO: 4.83 MILLION/UL (ref 3.88–5.62)
RBC #/AREA URNS AUTO: ABNORMAL /HPF
SODIUM SERPL-SCNC: 140 MMOL/L (ref 135–147)
SP GR UR STRIP.AUTO: 1.03 (ref 1–1.03)
THC UR QL: NEGATIVE
TSH SERPL DL<=0.05 MIU/L-ACNC: 1.89 UIU/ML (ref 0.45–4.5)
UROBILINOGEN UR STRIP-ACNC: 2 MG/DL
WBC # BLD AUTO: 6.91 THOUSAND/UL (ref 4.31–10.16)
WBC #/AREA URNS AUTO: ABNORMAL /HPF

## 2023-08-22 PROCEDURE — G0426 INPT/ED TELECONSULT50: HCPCS | Performed by: GENERAL PRACTICE

## 2023-08-22 PROCEDURE — 70450 CT HEAD/BRAIN W/O DYE: CPT

## 2023-08-22 PROCEDURE — 82075 ASSAY OF BREATH ETHANOL: CPT | Performed by: EMERGENCY MEDICINE

## 2023-08-22 PROCEDURE — 80307 DRUG TEST PRSMV CHEM ANLYZR: CPT | Performed by: EMERGENCY MEDICINE

## 2023-08-22 PROCEDURE — 81001 URINALYSIS AUTO W/SCOPE: CPT | Performed by: EMERGENCY MEDICINE

## 2023-08-22 PROCEDURE — 36415 COLL VENOUS BLD VENIPUNCTURE: CPT | Performed by: EMERGENCY MEDICINE

## 2023-08-22 PROCEDURE — G1004 CDSM NDSC: HCPCS

## 2023-08-22 PROCEDURE — 93005 ELECTROCARDIOGRAM TRACING: CPT

## 2023-08-22 PROCEDURE — 85025 COMPLETE CBC W/AUTO DIFF WBC: CPT | Performed by: EMERGENCY MEDICINE

## 2023-08-22 PROCEDURE — 80053 COMPREHEN METABOLIC PANEL: CPT | Performed by: EMERGENCY MEDICINE

## 2023-08-22 PROCEDURE — 84443 ASSAY THYROID STIM HORMONE: CPT | Performed by: EMERGENCY MEDICINE

## 2023-08-22 PROCEDURE — 99285 EMERGENCY DEPT VISIT HI MDM: CPT

## 2023-08-23 VITALS
DIASTOLIC BLOOD PRESSURE: 80 MMHG | RESPIRATION RATE: 19 BRPM | TEMPERATURE: 98.3 F | HEART RATE: 66 BPM | OXYGEN SATURATION: 93 % | SYSTOLIC BLOOD PRESSURE: 124 MMHG

## 2023-08-23 LAB
ATRIAL RATE: 67 BPM
P AXIS: 53 DEGREES
PR INTERVAL: 178 MS
QRS AXIS: -44 DEGREES
QRSD INTERVAL: 106 MS
QT INTERVAL: 426 MS
QTC INTERVAL: 450 MS
T WAVE AXIS: -1 DEGREES
VENTRICULAR RATE: 67 BPM

## 2023-08-23 PROCEDURE — 93010 ELECTROCARDIOGRAM REPORT: CPT | Performed by: INTERNAL MEDICINE

## 2023-08-23 NOTE — CONSULTS
TeleConsultation - [address] [facility],[address]. 52 y.o. male MRN: [id number]  Unit/Bed#: ED 09 Encounter: [id number]        REQUIRED DOCUMENTATION:     1. This service was provided via Telemedicine. 2. Provider located at [facility].  3. TeleMed provider: Lynne Caballero MD.  4. Identify all parties in room with patient during tele consult: Patient   5. Patient was then informed that this was a Telemedicine visit and that the exam was being conducted confidentially over secure lines. My office door was closed. No one else was in the room. Patient acknowledged consent and understanding of privacy and security of the Telemedicine visit, and gave us permission to have the assistant stay in the room in order to assist with the history and to conduct the exam.  I informed the patient that I have reviewed their record in Epic and presented the opportunity for them to ask any questions regarding the visit today. The patient agreed to participate. Discussed with Hermila Bailey D.O       Assessment/Plan     Present on Admission:  **None**    Assessment:    TBI     Patient presents with worsening auditory hallucinations which were historically new in onset after his TBI previously did not suffer from sleep symptoms. Patient has had a worsening in symptoms that correlates with the recent fall. Patient's symptoms are not always command in nature and is never unsupervised and wife has taken precautions for many years to mitigate impulsive behaviors. Patient has established outpatient psychiatric and neurological follow-up and at this point does not represent an acute or imminent risk of harm to self or others requiring involuntary inpatient psychiatric admission and is safe for discharge home.       Treatment Plan:    Planned Medication Changes:    -None     Current Medications:         Risks / Benefits of Treatment:    Risks, benefits, and possible side effects of medications explained to patient and patient verbalizes understanding. Other treatment modalities recommended as indicated:    · psychotherapy  · outpatient referral      Consults  Physician Requesting Consult: Fernando Dunham DO  Principal Problem:<principal problem not specified>    Reason for Consult: Psych Evaluation       History of Present Illness      Per Crisis Evaluation by Christopher Montez:   Pt presents to the ED s/p falling in the [place] and hitting [place] head. Pt notes he sustained a fall last week as well in the hallway of his home, and hit his head then as well. Pt notes that he hit his head on a boulder when he lost his balance while working on his car 4 yrs ago, and fell backward. Pt was then DX'ed with a TBI as a result thereof. Pt notes since that time he has been experiencing auditory hallucinations daily, telling him to OD, and infrequently to fight someone physically when he is out in a store like [facility] where there are a lot of people, and if he feels that someone is getting too close to him and invading his personal space. However, pt notes that he has no strength to fight anybody, and is lately unsteady on his feet. Pt adds that he rarely goes out to stores anymore. Pt states that he is able to ignore the voices fairly well, and is being treated by both a psychiatrist and a neurologist. Pt denies any suicidal or homicidal ideations, nor any visual hallucinations at this time. Pt denies any D & A problems, nor any current legal issues. Pt denies any HX of abuse or neglect. Pt reports good sleep and a fair appetite. Pt adds that his family is a great support system for him, particularly his wife and his daughter. CW discussed Tx options with pt who is requesting to return home at this time. He noted that should his Sxs get worse, he will return to the ED for possible in-pt Tx. CW to discuss this casew with Dr Venus Vee.     Patient reports that he fell and hit his head and had a Traumatic Brain Injury in 2019 so he comes and gets evaluated and that he hears voices. Patient states that [address] will tell him to start a fight or OD on pills. Patient states that the voices are no always this way and sometimes are just songs. Patient states that his wife moves his medication everyday to prevent any potential mishaps. Patient's wife provides additional collateral stating that he does not go anywhere alone and that he is not mpulsive. Patient denied any current SI/HI/AVH or other acute psychiatric complaints. Psychiatric Review Of Systems:    sleep: no  appetite changes: no  weight changes: no  energy/anergy: no  interest/pleasure/anhedonia: no  somatic symptoms: no  anxiety/panic: no  afshin: no  guilty/hopeless: no  self injurious behavior/risky behavior: no    Historical Information     Past Psychiatric History:     Psychiatric Hospitalizations:   • No history of past inpatient psychiatric admissions  Outpatient Treatment History:   • Nurse Practitioner   Suicide Attempts:   • None  History of self-harm:   • None  Violence History:   • no  Past Psychiatric medication trials: Multiple     Substance Abuse History: Denied      Family Psychiatric History:  Denied         Social History:    Education: high school diploma/GED  Learning Disabilities: Denied  Marital history:   Children: Yes   Living arrangement, social support: The patient lives in home with wife. Occupational History: on permanent disability  Functioning Relationships: good support system.   Other Pertinent History: None    Traumatic History:     Abuse: None  Other Traumatic Events: none    Past Medical History:   Diagnosis Date   • Chemical exposure    • Diabetes mellitus ([address])    • H/O bone graft    • Head injury     August 25, 2019 fell backwards hit head on a boulder w/ LOC   • Head injury     as an [employer] [place] with the [employer] - jumped out of a plane parachute disfunction   • Hypertension    • Seizures ([address])        Medical Review Of Systems:    Review of Systems    Meds/Allergies     all current active meds have been reviewed  No Known Allergies    Objective     Vital signs in last 24 hours:  Temp:  [98.3 °F (36.8 °C)] 98.3 °F (36.8 °C)  HR:  [65-84] 65  Resp:  [18-20] 20  BP: (128-145)/(73-96) 128/73    No intake or output data in the 24 hours ending 08/22/23 1639    Mental Status Evaluation:    Appearance:  age appropriate   Behavior:  normal   Speech:  normal pitch and normal volume   Mood:  normal   Affect:  normal   Language: naming objects   Thought Process:  normal   Associations intact associations   Thought Content:  normal   Perceptual Disturbances: None   Risk Potential: Suicidal Ideations none  Homicidal Ideations none  Potential for Aggression No   Sensorium:  person, place and time/date   Cognition:  recent and remote memory grossly intact   Consciousness:  alert    Attention: attention span and concentration were age appropriate   Intellect: within normal limits   Fund of Knowledge: awareness of current events: President   Insight:  limited   Judgment: limited   Muscle Strength:  Muscle Tone: NFT NFT  normal   Gait/Station: normal gait/station   Motor Activity: no abnormal movements       Lab Results: I have personally reviewed all pertinent laboratory/tests results.      Most Recent Labs:   Lab Results   Component Value Date    WBC 6.91 08/22/2023    RBC 4.83 08/22/2023    HGB 14.3 08/22/2023    HCT 43.2 08/22/2023     08/22/2023    RDW 14.8 08/22/2023    NEUTROABS 4.27 08/22/2023    SODIUM 140 08/22/2023    K 3.8 08/22/2023     08/22/2023    CO2 28 08/22/2023    BUN 13 08/22/2023    CREATININE 0.97 08/22/2023    GLUC 102 08/22/2023    GLUF 187 (H) 01/15/2019    CALCIUM 9.6 08/22/2023    AST 31 08/22/2023    ALT 19 08/22/2023    ALKPHOS 63 08/22/2023    TP 7.1 08/22/2023    ALB 4.4 08/22/2023    TBILI 0.56 08/22/2023    CHOLESTEROL 132 05/30/2023    HDL 39 (L) 05/30/2023    TRIG 152 (H) 05/30/2023    LDLCALC 63 05/30/2023    BECKY 89 05/30/2023    BWL1QBFDQFCM 1.887 08/22/2023    RPR Non-Reactive 12/12/2019    HGBA1C 5.8 (H) 05/30/2023     05/30/2023       Imaging Studies: CT head without contrast    Result Date: 8/22/2023  Narrative: CT BRAIN - WITHOUT CONTRAST INDICATION:   head injury. COMPARISON: May 30, 2023 TECHNIQUE:  CT examination of the brain was performed. Multiplanar 2D reformatted images were created from the source data. Radiation dose length product (DLP) for this visit:  836 mGy-cm . This examination, like all CT scans performed in the [facility], was performed utilizing techniques to minimize radiation dose exposure, including the use of iterative reconstruction and automated exposure control. IMAGE QUALITY:  Diagnostic. FINDINGS: PARENCHYMA:  No intracranial mass, mass effect or midline shift. No CT signs of acute infarction. No acute parenchymal hemorrhage. VENTRICLES AND EXTRA-AXIAL SPACES:  Normal for the patient's age. VISUALIZED ORBITS: Normal visualized orbits. PARANASAL SINUSES: Normal visualized paranasal sinuses. CALVARIUM AND EXTRACRANIAL SOFT TISSUES:  Normal.     Impression: No acute intracranial abnormality. Workstation performed: [id number]     EKG/Pathology/Other Studies:   Lab Results   Component Value Date    VENTRATE 67 08/22/2023    ATRIALRATE 67 08/22/2023    PRINT 178 08/22/2023    QRSDINT 106 08/22/2023    QTINT 426 08/22/2023    QTCINT 450 08/22/2023    PAXIS 53 08/22/2023    QRSAXIS -44 08/22/2023    TWAVEAXIS -1 08/22/2023        Code Status: Prior  Advance Directive and Living Will:      Power of :    POLST:      Screenings:    1. Nutrition Screening  Nutrition Assessment (completed by Staff): Nutrition  Appetite: Good    2. Pain Screening  Pain Screening:      3.  Suicide Screening  ED Crisis Suicide Risk Assessment: Suicide Risk Assessment  Violence Risk to Self: Denies ideation within past 6 months  Description of self harming behaviors or thoughts[de-identified] Pt has [facility] to OD secondary to a TBI  Protective Factors: The patient has desire to live, The patient does not want to die, The patient cannot leave family/children, The patient has no thoughts of suicide, The patient does not want to hurt family/friends, The patient has a reliable support system, The patient cares enough about his/her self to live, The patient wants to watch his/her children grow, The patient has responsibility and commitment to family/children      Counseling / Coordination of Care: Total floor / unit time spent today 30 minutes. Greater than 50% of total time was spent with the patient and / or family counseling and / or coordination of care. A description of the counseling / coordination of care: Direct Patient Care, Chart Review, and Documentation.

## 2023-08-23 NOTE — ED NOTES
Pt presents to the ED s[place]p [place] in [place] and hitting [place] head. Pt notes he sustained a fall last week as well in the hallway of his home, and hit his head then as well. Pt notes that he hit his head on a boulder when he lost his balance while working on his car 4 yrs ago, and fell backward. Pt was then 'ed with a TBI as a result thereof. Pt notes since that time he has been experiencing auditory hallucinations daily, telling him to OD, and infrequently to fight someone physically when he is out in a store like [employer] where there are a lot of people, and if he feels that someone is getting too close to him and invading his personal space. However, pt notes that he has no strength to fight anybody, and is lately unsteady on his feet. Pt adds that he rarely goes out to stores anymore. Pt states that he is able to ignore the voices fairly well, and is being treated by both a psychiatrist and a neurologist. Pt denies any suicidal or homicidal ideations, nor any visual hallucinations at this time. Pt denies any D & A problems, nor any current legal issues. Pt denies any HX of abuse or neglect. Pt reports good sleep and a fair appetite. Pt adds that his family is a great support system for him, particularly his wife and his daughter. CW discussed Tx options with pt who is requesting to return home at this time. He noted that should his Sxs get worse, he will return to the ED for possible in-pt Tx. CW to discuss this casew with Dr Lucas Waite.     Sully Palomo, [address], [facility] [address]  08/22/23 23:06

## 2023-08-23 NOTE — ED PROVIDER NOTES
History  Chief Complaint   Patient presents with   • Head Injury     Pt tripped and hit his head on wall this morning. Denies LOC and bts. Pt had a past TBI and has a hx of audible hallucinations that are getting worse. 51 y/o male, hx of TBI and auditory hallucinations, presents to the ED for head injury and worsening hallucinations. Patient states that he has a hx of balance issues since his TBI in 2019. States that today he lost his balance and fell, hitting his head on the wall. Denies LOC. But states that he is on aspirin. States that he was nauseous but denies vomiting. States that he also has a hx of auditory hallucinations. Have been worsening over the last few weeks. He states that they are command in nature and tell him to kill himself and hurt others. He states that they tell him to OD on his medications. Also states that if someone makes him angry they tell him to hurt them. He denies acting on the commands. Denies any other complaints. History provided by:  Patient      Prior to Admission Medications   Prescriptions Last Dose Informant Patient Reported? Taking?    Jardiance 25 MG TABS   Yes No   Sig: Take 25 mg by mouth daily   Patient not taking: Reported on 3/3/2023   Ventolin  (90 Base) MCG/ACT inhaler   Yes No   Sig: Inhale 2 puffs every 6 (six) hours as needed   Vitamin D, Cholecalciferol, 50 MCG (2000 UT) CAPS   No No   Sig: Take 1 capsule by mouth once daily   amLODIPine (NORVASC) 10 mg tablet   Yes No   Sig: Take 10 mg by mouth daily   amoxicillin (AMOXIL) 500 mg capsule   Yes No   Sig: TAKE 1 CAPSULE BY MOUTH TWICE DAILY FOR 10 DAYS   Patient not taking: Reported on 3/3/2023   aspirin 81 mg chewable tablet   Yes No   Sig: Chew 81 mg daily   atorvastatin (LIPITOR) 20 mg tablet  Self Yes No   Sig: Take 20 mg by mouth every evening   benztropine (COGENTIN) 0.5 mg tablet   No No   Sig: Take 1 tablet by mouth twice daily   diazepam (VALIUM) 2 mg tablet   No No   Sig: TAKE 1 TABLET BY MOUTH ONCE DAILY AS NEEDED FOR ANXIETY (SEVERE  ANXIETY  ONLY)   divalproex sodium (DEPAKOTE) 250 mg EC tablet   No No   Sig: TAKE 1 TABLET BY MOUTH TWICE DAILY WITH  ONE  500  MG  TAB  FOR  TOAL  DOSE  OF  750  MG  TWICE  A  DAY   divalproex sodium (DEPAKOTE) 500 mg EC tablet   No No   Sig: Take 1 tab twice a day with one 250 mg tab for total dose of 750 mg twice a day.    ibuprofen (MOTRIN) 800 mg tablet   Yes No   Sig: daily as needed   insulin NPH-insulin regular (NovoLIN 70/30) 100 units/mL subcutaneous injection   No No   Sig: Inject 10 Units under the skin daily before breakfast   Patient taking differently: Inject 18 Units under the skin daily before breakfast   lamoTRIgine (LaMICtal) 100 mg tablet   No No   Sig: Take 1 tab (100 mg) twice a day   lisinopril (ZESTRIL) 40 mg tablet   Yes No   Sig: Take 1 tablet by mouth daily   Patient not taking: Reported on 3/3/2023   metFORMIN (GLUCOPHAGE) 1000 MG tablet   Yes No   Sig: Take 1,000 mg by mouth 2 (two) times a day with meals   Patient not taking: Reported on 3/3/2023   metoprolol tartrate (LOPRESSOR) 100 mg tablet   No No   Sig: TAKE 1 TABLET BY MOUTH EVERY 12 HOURS   sertraline (Zoloft) 100 mg tablet   No No   Sig: Take 1.5 tablets (150 mg total) by mouth daily   zaleplon (SONATA) 5 MG capsule   No No   Sig: Take 1 capsule by mouth once daily at bedtime   ziprasidone (GEODON) 60 mg capsule   No No   Sig: Take 1 capsule (60 mg total) by mouth 2 (two) times a day      Facility-Administered Medications: None       Past Medical History:   Diagnosis Date   • Chemical exposure    • Diabetes mellitus ([address])    • H/O bone graft    • Head injury     August 25, 2019 fell backwards hit head on a [place]oulder w/ LOC   • Head injury     [place] an air [place] the [employer] - jumped out of a plane parachute disfunction   • Hypertension    • Seizures ([address])        Past Surgical History:   Procedure Laterality Date   • FL LUMBAR PUNCTURE DIAGNOSTIC  12/13/2019   • KNEE ARTHROSCOPY W/ MENISCECTOMY Left    • LEG SURGERY     • TONSILLECTOMY         Family History   Problem Relation Age of Onset   • Diabetes Mother    • Hypertension Mother    • Asthma Mother    • Brain cancer Father    • No Known Problems Sister    • No Known Problems Brother    • No Known Problems Brother      I have reviewed and agree with the history as documented. E-Cigarette/Vaping   • E-Cigarette Use Never User      E-Cigarette/Vaping Substances   • Nicotine No    • THC No    • CBD No    • Flavoring No    • Other No    • Unknown No      Social History     Tobacco Use   • Smoking status: Never   • Smokeless tobacco: Never   Vaping Use   • Vaping Use: Never used   Substance Use Topics   • Alcohol use: Not Currently     Alcohol/week: 3.0 standard drinks of alcohol     Types: 3 Cans of beer per week   • Drug use: Never       Review of Systems   Constitutional: Negative for chills and fever. HENT: Negative for congestion, ear pain and sore throat. Eyes: Negative for pain and visual disturbance. Respiratory: Negative for cough, shortness of breath and wheezing. Cardiovascular: Negative for chest pain and leg swelling. Gastrointestinal: Negative for abdominal pain, diarrhea, nausea and vomiting. Genitourinary: Negative for dysuria, frequency, hematuria and urgency. Musculoskeletal: Negative for neck pain and neck stiffness. Skin: Negative for rash and wound. Neurological: Positive for headaches. Negative for weakness and numbness. Psychiatric/Behavioral: Positive for hallucinations. Negative for agitation and confusion. All other systems reviewed and are negative. Physical Exam  Physical Exam  Vitals and nursing note reviewed. Constitutional:       Appearance: He is well-developed. HENT:      Head: Normocephalic and atraumatic. Eyes:      Pupils: Pupils are equal, round, and reactive to light. Cardiovascular:      Rate and Rhythm: Normal rate and regular rhythm.    Pulmonary: Effort: Pulmonary effort is normal.      Breath sounds: Normal breath sounds. Abdominal:      General: Bowel sounds are normal.      Palpations: Abdomen is soft. Musculoskeletal:         General: Normal range of motion. Cervical back: Normal range of motion and neck supple. Skin:     General: Skin is warm and dry. Neurological:      General: No focal deficit present. Mental Status: He is alert and oriented to person, place, and time. Comments: No focal deficits   Psychiatric:         Attention and Perception: Attention and perception normal.         Mood and Affect: Mood is depressed. Affect is flat. Speech: Speech normal.         Behavior: Behavior normal. Behavior is cooperative.          Vital Signs  ED Triage Vitals   Temperature Pulse Respirations Blood Pressure SpO2   08/22/23 1740 08/22/23 1740 08/22/23 1740 08/22/23 1740 08/22/23 1740   98.3 °F (36.8 °C) 76 18 143/89 95 %      Temp Source Heart Rate Source Patient Position - Orthostatic VS BP Location FiO2 (%)   08/22/23 1740 08/22/23 1900 08/22/23 1900 08/22/23 1900 --   Oral Monitor Sitting Right arm       Pain Score       08/23/23 0015       No Pain           Vitals:    08/22/23 2100 08/22/23 2200 08/23/23 0000 08/23/23 0015   BP: 145/87 128/73 127/77 124/80   Pulse: 66 65 63 66   Patient Position - Orthostatic VS: Sitting   Sitting         Visual Acuity  Visual Acuity    Flowsheet Row Most Recent Value   L Pupil Size (mm) 3   R Pupil Size (mm) 3          ED Medications  Medications - No data to display    Diagnostic Studies  Results Reviewed     Procedure Component Value Units Date/Time    TSH, 3rd generation with Free T4 reflex [[id number]]  (Normal) Collected: 08/22/23 1950    Lab Status: Final result Specimen: Blood from Arm, Right Updated: 08/22/23 2040     TSH 3RD GENERATON 1.887 uIU/mL     Comprehensive metabolic panel [[id number]] Collected: 08/22/23 1950    Lab Status: Final result Specimen: Blood from Arm, Right Updated: 08/22/23 2029     Sodium 140 mmol/L      Potassium 3.8 mmol/L      Chloride 107 mmol/L      CO2 28 mmol/L      ANION GAP 5 mmol/L      BUN 13 mg/dL      Creatinine 0.97 mg/dL      Glucose 102 mg/dL      Calcium 9.6 mg/dL      AST 31 U/L      ALT 19 U/L      Alkaline Phosphatase 63 U/L      Total Protein 7.1 g/dL      Albumin 4.4 g/dL      Total Bilirubin 0.56 mg/dL      eGFR 87 ml/min/1.73sq m     Narrative:      [facility] guidelines for Chronic Kidney Disease (CKD):   •  Stage 1 with normal or high GFR (GFR > 90 mL/min/1.73 square meters)  •  Stage 2 Mild CKD (GFR = 60-89 mL/min/1.73 square meters)  •  Stage 3A Moderate CKD (GFR = 45-59 mL/min/1.73 square meters)  •  Stage 3B Moderate CKD (GFR = 30-44 mL/min/1.73 square meters)  •  Stage 4 Severe CKD (GFR = 15-29 mL/min/1.73 square meters)  •  Stage 5 End Stage CKD (GFR <15 mL/min/1.73 square meters)  Note: GFR calculation is accurate only with a steady state creatinine    Rapid drug screen, urine [[id number]]  (Abnormal) Collected: 08/22/23 1952    Lab Status: Final result Specimen: Urine, Clean Catch Updated: 08/22/23 2027     Amph/Meth UR Negative     Barbiturate Ur Negative     Benzodiazepine Urine Positive     Cocaine Urine Negative     Methadone Urine Negative     Opiate Urine Negative     PCP Ur Negative     THC Urine Negative     Oxycodone Urine Negative    Narrative:      Presumptive report. If requested, specimen will be sent to reference lab for confirmation. FOR MEDICAL PURPOSES ONLY. IF CONFIRMATION NEEDED PLEASE CONTACT THE LAB WITHIN 5 DAYS.     Drug Screen Cutoff Levels:  AMPHETAMINE/METHAMPHETAMINES  1000 ng/mL  BARBITURATES     200 ng/mL  BENZODIAZEPINES     200 ng/mL  COCAINE      300 ng/mL  METHADONE      300 ng/mL  OPIATES      300 ng/mL  PHENCYCLIDINE     25 ng/mL  THC       50 ng/mL  OXYCODONE      100 ng/mL    Urine Microscopic [[id number]]  (Abnormal) Collected: 08/22/23 1928    Lab Status: Final result Specimen: Urine, Clean Catch Updated: 08/22/23 2017     RBC, UA None Seen /hpf      WBC, UA 1-2 /hpf      Epithelial Cells Occasional /hpf      Bacteria, UA None Seen /hpf      MUCUS THREADS Occasional    UA w Reflex to Microscopic w Reflex to Culture [[id number]]  (Abnormal) Collected: 08/22/23 1928    Lab Status: Final result Specimen: Urine, Clean Catch Updated: 08/22/23 2004     Color, UA Yellow     Clarity, UA Turbid     Specific Gravity, UA 1.027     pH, UA 6.5     Leukocytes, UA Negative     Nitrite, UA Negative     Protein, UA Trace mg/dl      Glucose, UA Negative mg/dl      Ketones, UA Negative mg/dl      Urobilinogen, UA 2.0 mg/dl      Bilirubin, UA Negative     Occult Blood, UA Negative    CBC and differential [[id number]] Collected: 08/22/23 1950    Lab Status: Final result Specimen: Blood from Arm, Right Updated: 08/22/23 2001     WBC 6.91 Thousand/uL      RBC 4.83 Million/uL      Hemoglobin 14.3 g/dL      Hematocrit 43.2 %      MCV 89 fL      MCH 29.6 pg      MCHC 33.1 g/dL      RDW 14.8 %      MPV 9.1 fL      Platelets 735 Thousands/uL      nRBC 0 /100 WBCs      Neutrophils Relative 61 %      Immat GRANS % 0 %      Lymphocytes Relative 25 %      Monocytes Relative 10 %      Eosinophils Relative 3 %      Basophils Relative 1 %      Neutrophils Absolute 4.27 Thousands/µL      Immature Grans Absolute 0.02 Thousand/uL      Lymphocytes Absolute 1.73 Thousands/µL      Monocytes Absolute 0.66 Thousand/µL      Eosinophils Absolute 0.18 Thousand/µL      Basophils Absolute 0.05 Thousands/µL     POCT alcohol breath test [[id number]]  (Normal) Resulted: 08/22/23 1908    Lab Status: Final result Updated: 08/22/23 1908     EXTBreath Alcohol 0.00                 CT head without contrast   Final Result by [address] [place] [address],  (08/22 2034)      No acute intracranial abnormality.                   Workstation performed: [id number]                    Procedures  Procedures         ED Course  ED Course as of 08/23/23 9517 Tue Aug 22, 2023   1847 Hx of tbi and auditory hallucinations. Lost balance today and hit head on wall. No loc. On aspirin. Wed Aug 23, 2023   0006 Spoke with Dr Clifford Valles from psychiatry. States that patient can be d/c home                                SBIRT 22yo+    Flowsheet Row Most Recent Value   Initial Alcohol Screen: US AUDIT-C     1. How often do you have a drink containing alcohol? 1 Filed at: 08/22/2023 2224   2. How many drinks containing alcohol do you have on a typical day you are drinking? 1 Filed at: 08/22/2023 2224   3b. FEMALE Any Age, or MALE 65+: How often do you have 4 or more drinks on one occassion? 0 Filed at: 08/22/2023 2224   Audit-C Score 2 Filed at: 08/22/2023 2224   RISHI: How many times in the past year have you. .. Used an illegal drug or used a prescription medication for non-medical reasons? Never Filed at: 08/22/2023 2224                    Medical Decision Making  51 y/o male with head injury and auditory hallucinations -will get ct head, labs, and crisis consult. Spoke with Dr Clifford Valles from psychiatry, states that patient can be discharged home. Auditory hallucinations: acute illness or injury  CHI (closed head injury): acute illness or injury  Amount and/or Complexity of Data Reviewed  Labs: ordered. Radiology: ordered. Disposition  Final diagnoses: Auditory hallucinations   CHI (closed head injury)     Time reflects when diagnosis was documented in both MDM as applicable and the Disposition within this note     Time User Action Codes Description Comment    8/22/2023 11:16 PM Marnee Sandifer A Add [R44.0] Auditory hallucinations     8/23/2023 12:07 AM Marnee Sandifer A Add [K68.49ZJ] CHI (closed head injury)       ED Disposition     ED Disposition   Discharge    Condition   Stable    Date/Time   Wed Aug 23, 2023 12:07 AM    [address] discharge to home/self care.                MD Documentation    Katia Mcintosh Most Recent Value   Sending MD Dr Gisela Napier     Follow up With Specialties Details Why Contact Info Additional 826 [facility] Internal Medicine Call in 1 day for follow up within 2-3 days. 196 [facility] Emergency Department Emergency Medicine Go to  immediately for any new or worsening symptoms 201 [facility] [id number] 7298 [facility] Emergency Department, [place], [place], [address]          Patient's Medications   Discharge Prescriptions    No medications on file       No discharge procedures on file.     PDMP Review       Value Time User    PDMP Reviewed  Yes 8/9/2023 12:09 PM Arleth Ynag, [address] [facility]          ED Provider  Electronically Signed by           Hermila Bailey DO  08/23/23 6037

## 2023-08-23 NOTE — ED NOTES
CW spoke with Juan F to log the psych consult.     Frankie Esqueda, [address], CIS ll  08/22/23 23:25

## 2023-08-23 NOTE — ED NOTES
Per provider although the patient rates with a moderate risk on the columbia scale, the provider requested a 1:1 due to his command hallucinations. Charge nurse made aware. 1:1 in place. Patient is sitting upright in bed with his wife and daughter at bedside. No signs of distress. Plan of care ongoing.      Elisabet Lindquist RN  08/23/23 2463

## 2023-08-28 DIAGNOSIS — F29 PSYCHOSIS, UNSPECIFIED PSYCHOSIS TYPE (HCC): ICD-10-CM

## 2023-08-28 DIAGNOSIS — I10 ESSENTIAL HYPERTENSION: ICD-10-CM

## 2023-08-29 ENCOUNTER — TELEPHONE (OUTPATIENT)
Dept: CARDIOLOGY CLINIC | Facility: CLINIC | Age: 53
End: 2023-08-29

## 2023-08-29 RX ORDER — ZALEPLON 5 MG/1
CAPSULE ORAL
Qty: 30 CAPSULE | Refills: 0 | Status: SHIPPED | OUTPATIENT
Start: 2023-08-29

## 2023-08-29 RX ORDER — METOPROLOL TARTRATE 100 MG/1
TABLET ORAL
Qty: 60 TABLET | Refills: 0 | Status: SHIPPED | OUTPATIENT
Start: 2023-08-29

## 2023-08-29 NOTE — TELEPHONE ENCOUNTER
Patient has not been seen in three years. He is now a new patient.  Scheduled him with Dr. Izzy Kendrick October 31 aware Luna yLnn

## 2023-08-29 NOTE — TELEPHONE ENCOUNTER
Name from pharmacy: Metoprolol Tartrate 100 MG Oral Tablet         Will file in chart as: metoprolol tartrate (LOPRESSOR) 100 mg tablet    Sig: TAKE 1 TABLET BY MOUTH EVERY 12 HOURS    Disp:  60 tablet    Refills:  0    Start: 8/28/2023    Class: Normal    Non-formulary For: Essential hypertension    Last ordered: 3 months ago (5/30/2023) by Kellen Agustin MD    Last refill: 6/25/2023    Rx #: [id number]    Cardiovascular:  Beta Blockers Failed 08/28/2023 10:41 PM   Protocol Details  Valid encounter within last 6 months    BP completed in the last 6 months    Last Heart Rate in normal range and within 180 days      To be filled at: [address] [facility], [address]   Please review and refill if possible. Message was sent to clerical to schedule pt for a follow up appt. Thanks!

## 2023-09-01 ENCOUNTER — TELEPHONE (OUTPATIENT)
Dept: PSYCHIATRY | Facility: CLINIC | Age: 53
End: 2023-09-01

## 2023-09-11 ENCOUNTER — HOSPITAL ENCOUNTER (EMERGENCY)
Facility: HOSPITAL | Age: 53
End: 2023-09-12
Attending: EMERGENCY MEDICINE
Payer: MEDICARE

## 2023-09-11 DIAGNOSIS — R44.3 HALLUCINATIONS: Primary | ICD-10-CM

## 2023-09-11 LAB
ALBUMIN SERPL BCP-MCNC: 4.2 G/DL (ref 3.5–5)
ALP SERPL-CCNC: 69 U/L (ref 34–104)
ALT SERPL W P-5'-P-CCNC: 19 U/L (ref 7–52)
AMPHETAMINES SERPL QL SCN: NEGATIVE
ANION GAP SERPL CALCULATED.3IONS-SCNC: 7 MMOL/L
AST SERPL W P-5'-P-CCNC: 29 U/L (ref 13–39)
BACTERIA UR QL AUTO: ABNORMAL /HPF
BARBITURATES UR QL: NEGATIVE
BASOPHILS # BLD AUTO: 0.05 THOUSANDS/ÂΜL (ref 0–0.1)
BASOPHILS NFR BLD AUTO: 1 % (ref 0–1)
BENZODIAZ UR QL: POSITIVE
BILIRUB SERPL-MCNC: 0.56 MG/DL (ref 0.2–1)
BILIRUB UR QL STRIP: NEGATIVE
BUN SERPL-MCNC: 13 MG/DL (ref 5–25)
CALCIUM SERPL-MCNC: 9.3 MG/DL (ref 8.4–10.2)
CHLORIDE SERPL-SCNC: 105 MMOL/L (ref 96–108)
CLARITY UR: ABNORMAL
CO2 SERPL-SCNC: 27 MMOL/L (ref 21–32)
COCAINE UR QL: NEGATIVE
COLOR UR: YELLOW
CREAT SERPL-MCNC: 1.11 MG/DL (ref 0.6–1.3)
EOSINOPHIL # BLD AUTO: 0.17 THOUSAND/ÂΜL (ref 0–0.61)
EOSINOPHIL NFR BLD AUTO: 2 % (ref 0–6)
ERYTHROCYTE [DISTWIDTH] IN BLOOD BY AUTOMATED COUNT: 14.4 % (ref 11.6–15.1)
ETHANOL EXG-MCNC: 0 MG/DL
FLUAV RNA RESP QL NAA+PROBE: NEGATIVE
FLUBV RNA RESP QL NAA+PROBE: NEGATIVE
GFR SERPL CREATININE-BSD FRML MDRD: 74 ML/MIN/1.73SQ M
GLUCOSE SERPL-MCNC: 136 MG/DL (ref 65–140)
GLUCOSE UR STRIP-MCNC: NEGATIVE MG/DL
HCT VFR BLD AUTO: 42.7 % (ref 36.5–49.3)
HGB BLD-MCNC: 14.2 G/DL (ref 12–17)
HGB UR QL STRIP.AUTO: NEGATIVE
IMM GRANULOCYTES # BLD AUTO: 0.05 THOUSAND/UL (ref 0–0.2)
IMM GRANULOCYTES NFR BLD AUTO: 1 % (ref 0–2)
KETONES UR STRIP-MCNC: NEGATIVE MG/DL
LEUKOCYTE ESTERASE UR QL STRIP: ABNORMAL
LYMPHOCYTES # BLD AUTO: 2.03 THOUSANDS/ÂΜL (ref 0.6–4.47)
LYMPHOCYTES NFR BLD AUTO: 27 % (ref 14–44)
MCH RBC QN AUTO: 29.6 PG (ref 26.8–34.3)
MCHC RBC AUTO-ENTMCNC: 33.3 G/DL (ref 31.4–37.4)
MCV RBC AUTO: 89 FL (ref 82–98)
METHADONE UR QL: NEGATIVE
MONOCYTES # BLD AUTO: 0.55 THOUSAND/ÂΜL (ref 0.17–1.22)
MONOCYTES NFR BLD AUTO: 7 % (ref 4–12)
MUCOUS THREADS UR QL AUTO: ABNORMAL
NEUTROPHILS # BLD AUTO: 4.64 THOUSANDS/ÂΜL (ref 1.85–7.62)
NEUTS SEG NFR BLD AUTO: 62 % (ref 43–75)
NITRITE UR QL STRIP: NEGATIVE
NON-SQ EPI CELLS URNS QL MICRO: ABNORMAL /HPF
NRBC BLD AUTO-RTO: 0 /100 WBCS
OPIATES UR QL SCN: NEGATIVE
OXYCODONE+OXYMORPHONE UR QL SCN: NEGATIVE
PCP UR QL: NEGATIVE
PH UR STRIP.AUTO: 6.5 [PH]
PLATELET # BLD AUTO: 197 THOUSANDS/UL (ref 149–390)
PMV BLD AUTO: 9.4 FL (ref 8.9–12.7)
POTASSIUM SERPL-SCNC: 3.7 MMOL/L (ref 3.5–5.3)
PROT SERPL-MCNC: 7.1 G/DL (ref 6.4–8.4)
PROT UR STRIP-MCNC: ABNORMAL MG/DL
RBC # BLD AUTO: 4.79 MILLION/UL (ref 3.88–5.62)
RBC #/AREA URNS AUTO: ABNORMAL /HPF
RSV RNA RESP QL NAA+PROBE: NEGATIVE
SARS-COV-2 RNA RESP QL NAA+PROBE: NEGATIVE
SODIUM SERPL-SCNC: 139 MMOL/L (ref 135–147)
SP GR UR STRIP.AUTO: 1.03 (ref 1–1.03)
THC UR QL: NEGATIVE
TSH SERPL DL<=0.05 MIU/L-ACNC: 2.09 UIU/ML (ref 0.45–4.5)
UROBILINOGEN UR STRIP-ACNC: 2 MG/DL
WBC # BLD AUTO: 7.49 THOUSAND/UL (ref 4.31–10.16)
WBC #/AREA URNS AUTO: ABNORMAL /HPF

## 2023-09-11 PROCEDURE — 93005 ELECTROCARDIOGRAM TRACING: CPT

## 2023-09-11 PROCEDURE — 80053 COMPREHEN METABOLIC PANEL: CPT | Performed by: EMERGENCY MEDICINE

## 2023-09-11 PROCEDURE — 36415 COLL VENOUS BLD VENIPUNCTURE: CPT | Performed by: EMERGENCY MEDICINE

## 2023-09-11 PROCEDURE — 0241U HB NFCT DS VIR RESP RNA 4 TRGT: CPT | Performed by: EMERGENCY MEDICINE

## 2023-09-11 PROCEDURE — 80307 DRUG TEST PRSMV CHEM ANLYZR: CPT | Performed by: EMERGENCY MEDICINE

## 2023-09-11 PROCEDURE — 99285 EMERGENCY DEPT VISIT HI MDM: CPT

## 2023-09-11 PROCEDURE — 82075 ASSAY OF BREATH ETHANOL: CPT | Performed by: EMERGENCY MEDICINE

## 2023-09-11 PROCEDURE — 99285 EMERGENCY DEPT VISIT HI MDM: CPT | Performed by: EMERGENCY MEDICINE

## 2023-09-11 PROCEDURE — 85025 COMPLETE CBC W/AUTO DIFF WBC: CPT | Performed by: EMERGENCY MEDICINE

## 2023-09-11 PROCEDURE — 84443 ASSAY THYROID STIM HORMONE: CPT | Performed by: EMERGENCY MEDICINE

## 2023-09-11 PROCEDURE — 81001 URINALYSIS AUTO W/SCOPE: CPT | Performed by: EMERGENCY MEDICINE

## 2023-09-11 RX ORDER — IBUPROFEN 400 MG/1
800 TABLET ORAL ONCE
Status: COMPLETED | OUTPATIENT
Start: 2023-09-11 | End: 2023-09-11

## 2023-09-11 RX ORDER — DIAZEPAM 2 MG/1
2 TABLET ORAL ONCE
Status: COMPLETED | OUTPATIENT
Start: 2023-09-11 | End: 2023-09-11

## 2023-09-11 RX ADMIN — IBUPROFEN 800 MG: 400 TABLET, FILM COATED ORAL at 20:32

## 2023-09-11 RX ADMIN — DIAZEPAM 2 MG: 2 TABLET ORAL at 20:32

## 2023-09-11 NOTE — ED NOTES
Pt presents to the ED with his wife due to c/o increased auditory command hallucinations telling him to OD and kill himself, as well as to beat up random strangers. Pt reports having these hallucinations s/p a TBI that he sustained 4 yrs ago. Pt admits to feeling increasingly depressed due to not being able to work any longer and feeling useless. Pt denies any homicidal ideations, nor any visual hallucinations at this time. Pt denies any D & A problems, nor any current legal issues. Pt denies any HX of abuse or neglect. Pt reports both good sleep and appetite. Pt notes that his wife and daughter are highly supportive of him. Pt has out-pt psychiatry at this time, but feels the meds are not helpful. Pt has never been hospitalized. CW discussed [place] options with pt and his wife, and although pt expressed some fears and anxiety about the in-pt experience, he ultimately agreed to sign a 201. C W discussed the 201 process and what pt may expect from in-pt [place]. DUANE discussed this case with Dr Federico Rivera who is in agreement with this [facility] plan.     Marina Lin, [address], CIS ll  09/11/23 19:26

## 2023-09-11 NOTE — LETTER
[address] [facility]  [facility] [address]  Dept: [phone]      EMTALA TRANSFER CONSENT    NAME [facility].  1970                              MRN [id number]    I have been informed of my rights regarding examination, treatment, and transfer   by Dr. Rose Cortez MD    Benefits: Continuity of care    Risks: Potential for delay in receiving treatment      Consent for Transfer:  I acknowledge that my medical condition has been evaluated and explained to me by the emergency department physician or other qualified medical person and/or my attending physician, who has recommended that I be transferred to the service of  Accepting Physician: Dr Mary Gabriel at [address] [facility] [address] Name, [address] [facility]. The above potential benefits of such transfer, the potential risks associated with such transfer, and the probable risks of not being transferred have been explained to me, and I fully understand them. The doctor has explained that, in my case, the benefits of transfer outweigh the risks. I agree to be transferred. I authorize the performance of emergency medical procedures and treatments upon me in both transit and upon arrival at the receiving facility. Additionally, I authorize the release of any and all medical records to the receiving facility and request they be transported with me, if possible. I understand that the safest mode of transportation during a medical emergency is an ambulance and that the Hospital advocates the use of this mode of transport. Risks of traveling to the receiving facility by car, including absence of medical control, life sustaining equipment, such as oxygen, and medical personnel has been explained to me and I fully understand them. (JENNIFER CORRECT BOX BELOW)  [  Aris Sprain  I consent to the stated transfer and to be transported by ambulance/helicopter.   [  ]  I consent to the stated transfer, but refuse transportation by ambulance and accept full responsibility for my transportation by car. I understand the risks of non-ambulance transfers and I exonerate the Hospital and its staff from any deterioration in my condition that results from this refusal.    X___________________________________________    DATE  23  TIME________  Signature of patient or legally responsible individual signing on patient behalf           RELATIONSHIP TO PATIENT_______Self_________________                              Provider Certification    NAME Fide Ribeiro  1970                              MRN [id number]    A medical screening exam was performed on the above named patient. Based on the examination:    Condition Necessitating Transfer The encounter diagnosis was Hallucinations. Patient Condition: The patient has been stabilized such that within reasonable medical probability, no material deterioration of the patient condition or the condition of the unborn child(isa) is likely to result from the transfer    Reason for Transfer: Level of Care needed not available at this facility    Transfer Requirements: [address] available and qualified personnel available for treatment as acknowledged by Idris Damon, CIS ll @ [phone]  Agreed to accept transfer and to provide appropriate medical treatment as acknowledged by       Dr Kimberly Morel  Appropriate medical records of the examination and treatment of the patient are provided at the time of transfer   [address] __A. A._____  Transfer will be performed by qualified personnel from [facility]  and appropriate transfer equipment as required, including the use of necessary and appropriate life support measures.     Provider Certification: I have examined the patient and explained the following risks and benefits of being transferred/refusing transfer to the patient/family:  General risk, such as traffic hazards, adverse weather conditions, rough terrain or turbulence, possible failure of equipment (including vehicle or aircraft), or consequences of actions of persons outside the control of the transport personnel      Based on these reasonable risks and benefits to the patient and/or the unborn child(isa), and based upon the information available at the time of the patient’s examination, I certify that the medical benefits reasonably to be expected from the provision of appropriate medical treatments at another medical facility outweigh the increasing risks, if any, to the individual’s medical condition, and in the case of labor to the unborn child, from effecting the transfer.     X____________________________________________ DATE 09/11/23        TIME_______      ORIGINAL - SEND TO MEDICAL RECORDS   COPY - SEND WITH PATIENT DURING TRANSFER

## 2023-09-11 NOTE — ED NOTES
As per Kitty Payne of Intake, there are no older adult beds available in-network tonight.     MARIANNE Lindsey, CIS ll  09/11/23 19:37

## 2023-09-12 VITALS
SYSTOLIC BLOOD PRESSURE: 134 MMHG | HEART RATE: 71 BPM | RESPIRATION RATE: 16 BRPM | TEMPERATURE: 98.2 F | OXYGEN SATURATION: 98 % | DIASTOLIC BLOOD PRESSURE: 89 MMHG

## 2023-09-12 LAB
ATRIAL RATE: 66 BPM
P AXIS: 40 DEGREES
PR INTERVAL: 184 MS
QRS AXIS: -52 DEGREES
QRSD INTERVAL: 104 MS
QT INTERVAL: 434 MS
QTC INTERVAL: 454 MS
T WAVE AXIS: 10 DEGREES
VENTRICULAR RATE: 66 BPM

## 2023-09-12 PROCEDURE — 93010 ELECTROCARDIOGRAM REPORT: CPT | Performed by: INTERNAL MEDICINE

## 2023-09-12 RX ORDER — METOPROLOL TARTRATE 50 MG/1
100 TABLET, FILM COATED ORAL EVERY 12 HOURS
Status: DISCONTINUED | OUTPATIENT
Start: 2023-09-12 | End: 2023-09-12 | Stop reason: HOSPADM

## 2023-09-12 RX ORDER — ASPIRIN 81 MG/1
81 TABLET, CHEWABLE ORAL DAILY
Status: DISCONTINUED | OUTPATIENT
Start: 2023-09-12 | End: 2023-09-12 | Stop reason: HOSPADM

## 2023-09-12 RX ORDER — BENZTROPINE MESYLATE 1 MG/1
0.5 TABLET ORAL 2 TIMES DAILY
Status: DISCONTINUED | OUTPATIENT
Start: 2023-09-12 | End: 2023-09-12 | Stop reason: HOSPADM

## 2023-09-12 RX ORDER — DIVALPROEX SODIUM 250 MG/1
750 TABLET, DELAYED RELEASE ORAL EVERY 12 HOURS SCHEDULED
Status: DISCONTINUED | OUTPATIENT
Start: 2023-09-12 | End: 2023-09-12 | Stop reason: HOSPADM

## 2023-09-12 RX ORDER — DIAZEPAM 2 MG/1
2 TABLET ORAL EVERY 12 HOURS PRN
Status: DISCONTINUED | OUTPATIENT
Start: 2023-09-12 | End: 2023-09-12 | Stop reason: HOSPADM

## 2023-09-12 RX ORDER — LAMOTRIGINE 100 MG/1
100 TABLET ORAL
Status: DISCONTINUED | OUTPATIENT
Start: 2023-09-12 | End: 2023-09-12 | Stop reason: HOSPADM

## 2023-09-12 RX ORDER — ALBUTEROL SULFATE 90 UG/1
2 AEROSOL, METERED RESPIRATORY (INHALATION) EVERY 6 HOURS PRN
Status: DISCONTINUED | OUTPATIENT
Start: 2023-09-12 | End: 2023-09-12 | Stop reason: HOSPADM

## 2023-09-12 RX ORDER — ATORVASTATIN CALCIUM 20 MG/1
20 TABLET, FILM COATED ORAL EVERY EVENING
Status: DISCONTINUED | OUTPATIENT
Start: 2023-09-12 | End: 2023-09-12 | Stop reason: HOSPADM

## 2023-09-12 RX ORDER — AMLODIPINE BESYLATE 5 MG/1
10 TABLET ORAL DAILY
Status: DISCONTINUED | OUTPATIENT
Start: 2023-09-12 | End: 2023-09-12 | Stop reason: HOSPADM

## 2023-09-12 RX ORDER — LANOLIN ALCOHOL/MO/W.PET/CERES
6 CREAM (GRAM) TOPICAL
Status: DISCONTINUED | OUTPATIENT
Start: 2023-09-12 | End: 2023-09-12 | Stop reason: HOSPADM

## 2023-09-12 RX ORDER — DIVALPROEX SODIUM 250 MG/1
250 TABLET, DELAYED RELEASE ORAL EVERY 12 HOURS SCHEDULED
Status: DISCONTINUED | OUTPATIENT
Start: 2023-09-12 | End: 2023-09-12

## 2023-09-12 RX ADMIN — DIVALPROEX SODIUM 750 MG: 250 TABLET, DELAYED RELEASE ORAL at 02:12

## 2023-09-12 RX ADMIN — Medication 6 MG: at 02:01

## 2023-09-12 RX ADMIN — LAMOTRIGINE 100 MG: 100 TABLET ORAL at 02:01

## 2023-09-12 NOTE — ED NOTES
Wife called back. Advised Melva of the patients transport information, location and telephone number. This writer reviewed his medications. Advised of his d/c time. Bhavani Guerin understood and was in accordance with this plan.       Tacho Singleton RN  09/12/23 Jaun Bennett RN  09/12/23 5911

## 2023-09-12 NOTE — ED PROVIDER NOTES
History  Chief Complaint   Patient presents with   • Hallucinations     Reports auditory hallucinations x4 years but "they're getting really bad." Reports voices tell him to hurt himself and other. Denies acting upon voices but "I am getting worn down." Denies SI "Right now"     75-year-old male presented emergency department for psychiatric evaluation. Patient states he has a history of auditory hallucinations. He has had them consistently for the past 4 years, he was seen and evaluated for this recently, offered inpatient treatment but declined at that time, he did not meet any involuntary criteria at that time. Patient comes back of his own volition today states that the symptoms are more intrusive, he is having trouble focusing, they are having stronger commands to harm himself or others. He does not have any suicidal or homicidal ideation otherwise. He has no other hallucinations. He has no physical complaints at this time. Prior to Admission Medications   Prescriptions Last Dose Informant Patient Reported? Taking?    Jardiance 25 MG TABS   Yes No   Sig: Take 25 mg by mouth daily   Patient not taking: Reported on 3/3/2023   Ventolin  (90 Base) MCG/ACT inhaler   Yes No   Sig: Inhale 2 puffs every 6 (six) hours as needed   Vitamin D, Cholecalciferol, 50 MCG (2000 UT) CAPS   No No   Sig: Take 1 capsule by mouth once daily   amLODIPine (NORVASC) 10 mg tablet   Yes No   Sig: Take 10 mg by mouth daily   amoxicillin (AMOXIL) 500 mg capsule   Yes No   Sig: TAKE 1 CAPSULE BY MOUTH TWICE DAILY FOR 10 DAYS   Patient not taking: Reported on 3/3/2023   aspirin 81 mg chewable tablet   Yes No   Sig: Chew 81 mg daily   atorvastatin (LIPITOR) 20 mg tablet  Self Yes No   Sig: Take 20 mg by mouth every evening   benztropine (COGENTIN) 0.5 mg tablet   No No   Sig: Take 1 tablet by mouth twice daily   diazepam (VALIUM) 2 mg tablet   No No   Sig: TAKE 1 TABLET BY MOUTH ONCE DAILY AS NEEDED FOR ANXIETY (SEVERE ANXIETY  ONLY)   divalproex sodium (DEPAKOTE) 250 mg EC tablet   No No   Sig: TAKE 1 TABLET BY MOUTH TWICE DAILY WITH  ONE  500  MG  TAB  FOR  TOAL  DOSE  OF  750  MG  TWICE  A  DAY   divalproex sodium (DEPAKOTE) 500 mg EC tablet   No No   Sig: Take 1 tab twice a day with one 250 mg tab for total dose of 750 mg twice a day.    ibuprofen (MOTRIN) 800 mg tablet   Yes No   Sig: daily as needed   insulin NPH-insulin regular (NovoLIN 70/30) 100 units/mL subcutaneous injection   No No   Sig: Inject 10 Units under the skin daily before breakfast   Patient taking differently: Inject 18 Units under the skin daily before breakfast   lamoTRIgine (LaMICtal) 100 mg tablet   No No   Sig: Take 1 tab (100 mg) twice a day   lisinopril (ZESTRIL) 40 mg tablet   Yes No   Sig: Take 1 tablet by mouth daily   Patient not taking: Reported on 3/3/2023   metFORMIN (GLUCOPHAGE) 1000 MG tablet   Yes No   Sig: Take 1,000 mg by mouth 2 (two) times a day with meals   Patient not taking: Reported on 3/3/2023   metoprolol tartrate (LOPRESSOR) 100 mg tablet   No No   Sig: TAKE 1 TABLET BY MOUTH EVERY 12 HOURS   sertraline (Zoloft) 100 mg tablet   No No   Sig: Take 1.5 tablets (150 mg total) by mouth daily   zaleplon (SONATA) 5 MG capsule   No No   Sig: Take 1 capsule by mouth once daily at bedtime   ziprasidone (GEODON) 60 mg capsule   No No   Sig: Take 1 capsule (60 mg total) by mouth 2 (two) times a day      Facility-Administered Medications: None       Past Medical History:   Diagnosis Date   • Chemical exposure    • Diabetes mellitus ([address])    • H/O bone graft    • Head injury     August 25, 2019 fell backwards hit head on a [place]oulder w/ LOC   • Head injury     as an air [place] the [employer] - jumped out of a plane parachute disfunction   • Hypertension    • Seizures ([address])        Past Surgical History:   Procedure Laterality Date   • FL LUMBAR PUNCTURE DIAGNOSTIC  12/13/2019   • KNEE ARTHROSCOPY W/ MENISCECTOMY Left    • LEG SURGERY     • TONSILLECTOMY         Family History   Problem Relation Age of Onset   • Diabetes Mother    • Hypertension Mother    • Asthma Mother    • Brain cancer Father    • No Known Problems Sister    • No Known Problems Brother    • No Known Problems Brother      I have reviewed and agree with the history as documented. E-Cigarette/Vaping   • E-Cigarette Use Never User      E-Cigarette/Vaping Substances   • Nicotine No    • THC No    • CBD No    • Flavoring No    • Other No    • Unknown No      Social History     Tobacco Use   • Smoking status: Some Days     Types: Cigars   • Smokeless tobacco: Never   Vaping Use   • Vaping Use: Never used   Substance Use Topics   • Alcohol use: Not Currently     Alcohol/week: 3.0 standard drinks of alcohol     Types: 3 Cans of beer per week     Comment: socially   • Drug use: Never       Review of Systems   Constitutional: Negative for appetite change, chills, fatigue and fever. HENT: Negative for sneezing and sore throat. Eyes: Negative for visual disturbance. Respiratory: Negative for cough, choking, chest tightness, shortness of breath and wheezing. Cardiovascular: Negative for chest pain and palpitations. Gastrointestinal: Negative for abdominal pain, constipation, diarrhea, nausea and vomiting. Genitourinary: Negative for difficulty urinating and dysuria. Neurological: Negative for dizziness, weakness, light-headedness, numbness and headaches. Psychiatric/Behavioral: Positive for hallucinations. All other systems reviewed and are negative. Physical Exam  Physical Exam  Vitals and nursing note reviewed. Constitutional:       General: He is not in acute distress. Appearance: He is well-developed. He is not diaphoretic. HENT:      Head: Normocephalic and atraumatic. Eyes:      Pupils: Pupils are equal, round, and reactive to light. Neck:      Vascular: No JVD. Trachea: No tracheal deviation.    Cardiovascular:      Rate and Rhythm: Normal rate and regular rhythm. Heart sounds: Normal heart sounds. No murmur heard. No friction rub. No gallop. Pulmonary:      Effort: Pulmonary effort is normal. No respiratory distress. Breath sounds: Normal breath sounds. No wheezing or rales. Abdominal:      General: Bowel sounds are normal. There is no distension. Palpations: Abdomen is soft. Tenderness: There is no abdominal tenderness. There is no guarding or rebound. Skin:     General: Skin is warm and dry. Coloration: Skin is not pale. Neurological:      Mental Status: He is alert and oriented to person, place, and time. Cranial Nerves: No cranial nerve deficit. Motor: No abnormal muscle tone.    Psychiatric:         Behavior: Behavior normal.         Vital Signs  ED Triage Vitals [09/11/23 1614]   Temperature Pulse Respirations Blood Pressure SpO2   98.3 °F (36.8 °C) 71 18 127/77 92 %      Temp Source Heart Rate Source Patient Position - Orthostatic VS BP Location FiO2 (%)   Oral Monitor Sitting Left arm --      Pain Score       No Pain           Vitals:    09/11/23 1614   BP: 127/77   Pulse: 71   Patient Position - Orthostatic VS: Sitting         Visual Acuity      ED Medications  Medications   diazepam (VALIUM) tablet 2 mg (2 mg Oral Given 9/11/23 2032)   ibuprofen (MOTRIN) tablet 800 mg (800 mg Oral Given 9/11/23 2032)       Diagnostic Studies  Results Reviewed     Procedure Component Value Units Date/Time    TSH [[id number]]  (Normal) Collected: 09/11/23 2000    Lab Status: Final result Specimen: Blood from Hand, Right Updated: 09/11/23 2039     TSH 3RD GENERATON 2.092 uIU/mL     Urine Microscopic [[id number]]  (Abnormal) Collected: 09/11/23 1941    Lab Status: Final result Specimen: Urine Updated: 09/11/23 2036     RBC, UA None Seen /hpf      WBC, UA 1-2 /hpf      Epithelial Cells Occasional /hpf      Bacteria, UA None Seen /hpf      MUCUS THREADS Innumerable    UA w Reflex to Microscopic w Reflex to Culture [[id number]]  (Abnormal) Collected: 09/11/23 1941    Lab Status: Final result Specimen: Urine Updated: 09/11/23 2026     Color, UA Yellow     Clarity, UA Turbid     Specific Gravity, UA 1.035     pH, UA 6.5     Leukocytes, UA Trace     Nitrite, UA Negative     Protein, UA 30 (1+) mg/dl      Glucose, UA Negative mg/dl      Ketones, UA Negative mg/dl      Urobilinogen, UA 2.0 mg/dl      Bilirubin, UA Negative     Occult Blood, UA Negative    Comprehensive metabolic panel [[id number]] Collected: 09/11/23 2000    Lab Status: Final result Specimen: Blood from Hand, Right Updated: 09/11/23 2023     Sodium 139 mmol/L      Potassium 3.7 mmol/L      Chloride 105 mmol/L      CO2 27 mmol/L      ANION GAP 7 mmol/L      BUN 13 mg/dL      Creatinine 1.11 mg/dL      Glucose 136 mg/dL      Calcium 9.3 mg/dL      AST 29 U/L      ALT 19 U/L      Alkaline Phosphatase 69 U/L      Total Protein 7.1 g/dL      Albumin 4.2 g/dL      Total Bilirubin 0.56 mg/dL      eGFR 74 ml/min/1.73sq m     Narrative:      [place] guidelines for Chronic Kidney Disease (CKD):   •  Stage 1 with normal or high GFR (GFR > 90 mL/min/1.73 square meters)  •  Stage 2 Mild CKD (GFR = 60-89 mL/min/1.73 square meters)  •  Stage 3A Moderate CKD (GFR = 45-59 mL/min/1.73 square meters)  •  Stage 3B Moderate CKD (GFR = 30-44 mL/min/1.73 square meters)  •  Stage 4 Severe CKD (GFR = 15-29 mL/min/1.73 square meters)  •  Stage 5 End Stage CKD (GFR <15 mL/min/1.73 square meters)  Note: GFR calculation is accurate only with a steady state creatinine    CBC and differential [[id number]] Collected: 09/11/23 2000    Lab Status: Final result Specimen: Blood from Hand, Right Updated: 09/11/23 2007     WBC 7.49 Thousand/uL      RBC 4.79 Million/uL      Hemoglobin 14.2 g/dL      Hematocrit 42.7 %      MCV 89 fL      MCH 29.6 pg      MCHC 33.3 g/dL      RDW 14.4 %      MPV 9.4 fL      Platelets 112 Thousands/uL      nRBC 0 /100 WBCs      Neutrophils Relative 62 %      Immat GRANS % 1 %      Lymphocytes Relative 27 %      Monocytes Relative 7 %      Eosinophils Relative 2 %      Basophils Relative 1 %      Neutrophils Absolute 4.64 Thousands/µL      Immature Grans Absolute 0.05 Thousand/uL      Lymphocytes Absolute 2.03 Thousands/µL      Monocytes Absolute 0.55 Thousand/µL      Eosinophils Absolute 0.17 Thousand/µL      Basophils Absolute 0.05 Thousands/µL     FLU/RSV/COVID - if FLU/RSV clinically relevant [[id number]]  (Normal) Collected: 09/11/23 1738    Lab Status: Final result Specimen: Nares from Nose Updated: 09/11/23 1824     SARS-CoV-2 Negative     INFLUENZA A PCR Negative     INFLUENZA B PCR Negative     RSV PCR Negative    Narrative:      FOR PEDIATRIC PATIENTS - copy/paste COVID Guidelines URL to browser: [email or web address]    SARS-CoV-2 assay is a Nucleic Acid Amplification assay intended for the  qualitative detection of nucleic acid from SARS-CoV-2 in nasopharyngeal  swabs. Results are for the presumptive identification of SARS-CoV-2 RNA. Positive results are indicative of infection with SARS-CoV-2, the virus  causing COVID-19, but do not rule out bacterial infection or co-infection  with other viruses. Laboratories within the [facility] and its  territories are required to report all positive results to the appropriate  public health authorities. Negative results do not preclude SARS-CoV-2  infection and should not be used as the sole basis for treatment or other  patient management decisions. Negative results must be combined with  clinical observations, patient history, and epidemiological information. This test has not been FDA cleared or approved. This test has been authorized by FDA under an Emergency Use Authorization  (EUA).  This test is only authorized for the duration of time the  declaration that circumstances exist justifying the authorization of the  emergency use of an in vitro diagnostic tests for detection of SARS-CoV-2  virus and/or diagnosis of COVID-19 infection under section 564(b)(1) of  the Act, 21 U. S.C. 704PAY-1(E)(0), unless the authorization is terminated  or revoked sooner. The test has been validated but independent review by FDA  and CLIA is pending. Test performed using [employer] GeneXpert: This RT-PCR assay targets N2,  a region unique to SARS-CoV-2. A conserved region in the E-gene was chosen  for pan-Sarbecovirus detection which includes SARS-CoV-2. According to CMS-2020-01-R, this platform meets the definition of high-throughput technology. Rapid drug screen, urine [[id number]]  (Abnormal) Collected: 09/11/23 1738    Lab Status: Final result Specimen: Urine, Clean Catch Updated: 09/11/23 1804     Amph/Meth UR Negative     Barbiturate Ur Negative     Benzodiazepine Urine Positive     Cocaine Urine Negative     Methadone Urine Negative     Opiate Urine Negative     PCP Ur Negative     THC Urine Negative     Oxycodone Urine Negative    Narrative:      Presumptive report. If requested, specimen will be sent to reference lab for confirmation. FOR MEDICAL PURPOSES ONLY. IF CONFIRMATION NEEDED PLEASE CONTACT THE LAB WITHIN 5 DAYS. Drug Screen Cutoff Levels:  AMPHETAMINE/METHAMPHETAMINES  1000 ng/mL  BARBITURATES     200 ng/mL  BENZODIAZEPINES     200 ng/mL  COCAINE      300 ng/mL  METHADONE      300 ng/mL  OPIATES      300 ng/mL  PHENCYCLIDINE     25 ng/mL  THC       50 ng/mL  OXYCODONE      100 ng/mL    POCT alcohol breath test [[id number]]  (Normal) Resulted: 09/11/23 1719    Lab Status: Final result Updated: 09/11/23 1719     EXTBreath Alcohol 0.00                 No orders to display              Procedures  Procedures         ED Course                               SBIRT 22yo+    Flowsheet Row Most Recent Value   Initial Alcohol Screen: US AUDIT-C     1. How often do you have a drink containing alcohol? 0 Filed at: 09/11/2023 0127   2.  How many drinks containing alcohol do you have on a typical day you are drinking? 0 Filed at: 09/11/2023 1617   3a. Male UNDER 65: How often do you have five or more drinks on one occasion? 0 Filed at: 09/11/2023 1617   3b. FEMALE Any Age, or MALE 65+: How often do you have 4 or more drinks on one occassion? 0 Filed at: 09/11/2023 1617   Audit-C Score 0 Filed at: 09/11/2023 1617   RISHI: How many times in the past year have you. .. Used an illegal drug or used a prescription medication for non-medical reasons? Never Filed at: 09/11/2023 1617                    Medical Decision Making  57-year-old male presenting with increasingly intrusive auditory command hallucinations. He is calm and cooperative here. He is mildly anxious but has an overall normal neuropsychiatric exam otherwise. Because of his age he will require more comprehensive medical clearance for evaluation by psychiatry, we will complete this and reassess. Reviewed [place] labs and medical work-up, this is reassuring at this time. Patient is medically cleared for inpatient psychiatric care. Amount and/or Complexity of Data Reviewed  Labs: ordered. Risk  Prescription drug management. Disposition  Final diagnoses:   None     ED Disposition     None      MD Documentation    Lb Needles Most Recent Value   Sending MD Dr Zandra Dubin    None         Patient's Medications   Discharge Prescriptions    No medications on file       No discharge procedures on file.     PDMP Review       Value Time User    PDMP Reviewed  Yes 8/29/2023  9:40 AM ROCKY Hein          ED Provider  Electronically Signed by           Philip Bhatti MD  09/11/23 2046

## 2023-09-12 NOTE — ED NOTES
Message left for wife letting her know of the patients p/u time. Also requested call back for med requisition. Patient unsure of every medication he takes as his wife helps him with it.      Anel Lopez RN  09/12/23 8602

## 2023-09-12 NOTE — ED NOTES
Patient is accepted at [facility].  Patient is accepted by Dr. Anderson Jett per [facility]. Transportation is arranged with [facility] via [address] [place] [address]. Transportation is scheduled for 07:30 AM.   Patient may go to the floor after 02:00.      CW informed Preston Stephens of pt's ETA and COB info.         MARIANNE Ledesma, CIS ll  09/11/23 22:21

## 2023-09-12 NOTE — ED NOTES
As per [facility], they have beds. CW faxed pt's chart for review.     Lisbet Gonsalez, BA, CIS ll  09/11/23 20:40

## 2023-09-12 NOTE — ED NOTES
Pt ambulated to bathroom, gait steady. Pt back into bed, is calm and cooperative, and denies other needs at this time.      Valerio Nyhan, RN  09/12/23 5182

## 2023-09-12 NOTE — ED NOTES
Both pt and Dr Elli Grayson signed the [phone] document.     Neville Stock, [facility], CIS ll  09/11/23 20:23

## 2023-09-15 DIAGNOSIS — F29 PSYCHOSIS, UNSPECIFIED PSYCHOSIS TYPE (HCC): ICD-10-CM

## 2023-09-18 RX ORDER — ZALEPLON 5 MG/1
CAPSULE ORAL
Qty: 30 CAPSULE | Refills: 0 | Status: SHIPPED | OUTPATIENT
Start: 2023-09-18

## 2023-10-03 DIAGNOSIS — F29 PSYCHOSIS, UNSPECIFIED PSYCHOSIS TYPE (HCC): ICD-10-CM

## 2023-10-03 DIAGNOSIS — S06.9X9D TRAUMATIC BRAIN INJURY WITH LOSS OF CONSCIOUSNESS, SUBSEQUENT ENCOUNTER: ICD-10-CM

## 2023-10-03 RX ORDER — BENZTROPINE MESYLATE 0.5 MG/1
TABLET ORAL
Qty: 60 TABLET | Refills: 0 | Status: SHIPPED | OUTPATIENT
Start: 2023-10-03

## 2023-10-03 RX ORDER — DIAZEPAM 2 MG/1
TABLET ORAL
Qty: 30 TABLET | Refills: 0 | Status: SHIPPED | OUTPATIENT
Start: 2023-10-03

## 2023-10-13 DIAGNOSIS — I10 ESSENTIAL HYPERTENSION: ICD-10-CM

## 2023-10-13 NOTE — TELEPHONE ENCOUNTER
Please review and refill if appropriate. Pt has an upcoming appt w/ Dr. Claude Schultz.  Saw Dr. Marisa Zavala 4 years ago    Name from pharmacy: Metoprolol Tartrate 100 MG Oral Tablet          Will file in chart as: metoprolol tartrate (LOPRESSOR) 100 mg tablet     Possible duplicate: Nura to review recent actions on this medication    Sig: TAKE 1 TABLET BY MOUTH EVERY 12 HOURS    Disp: 60 tablet    Refills: 0    Start: 10/13/2023    Class: Normal    Non-formulary For: Essential hypertension    Last ordered: 1 month ago (8/29/2023) by Blanka Adair MD    Last refill: 8/29/2023    Rx #: [id number]    Cardiovascular:  Beta Blockers Esmlld42/13/2023 03:43 PM   Protocol Details Valid encounter within last 6 months    BP completed in the last 6 months    Last Heart Rate in normal range and within 180 days      To be filled at: [address], [address] [facility]

## 2023-10-16 RX ORDER — METOPROLOL TARTRATE 100 MG/1
TABLET ORAL
Qty: 60 TABLET | Refills: 0 | Status: SHIPPED | OUTPATIENT
Start: 2023-10-16

## 2023-10-24 DIAGNOSIS — S06.9X9D TRAUMATIC BRAIN INJURY WITH LOSS OF CONSCIOUSNESS, SUBSEQUENT ENCOUNTER: ICD-10-CM

## 2023-10-24 RX ORDER — BENZTROPINE MESYLATE 0.5 MG/1
TABLET ORAL
Qty: 60 TABLET | Refills: 0 | Status: SHIPPED | OUTPATIENT
Start: 2023-10-24 | End: 2023-10-30 | Stop reason: SDUPTHER

## 2023-10-25 DIAGNOSIS — F32.9 REACTIVE DEPRESSION: ICD-10-CM

## 2023-10-25 DIAGNOSIS — F29 PSYCHOSIS, UNSPECIFIED PSYCHOSIS TYPE (HCC): ICD-10-CM

## 2023-10-25 RX ORDER — ZIPRASIDONE HYDROCHLORIDE 80 MG/1
80 CAPSULE ORAL 2 TIMES DAILY
Qty: 60 CAPSULE | Refills: 2 | Status: SHIPPED | OUTPATIENT
Start: 2023-10-25

## 2023-10-26 ENCOUNTER — TELEMEDICINE (OUTPATIENT)
Dept: PSYCHIATRY | Facility: CLINIC | Age: 53
End: 2023-10-26
Payer: MEDICARE

## 2023-10-26 DIAGNOSIS — F29 PSYCHOSIS, UNSPECIFIED PSYCHOSIS TYPE (HCC): Primary | ICD-10-CM

## 2023-10-26 DIAGNOSIS — S06.9X9D TRAUMATIC BRAIN INJURY WITH LOSS OF CONSCIOUSNESS, SUBSEQUENT ENCOUNTER: ICD-10-CM

## 2023-10-26 DIAGNOSIS — F32.9 REACTIVE DEPRESSION: ICD-10-CM

## 2023-10-26 PROCEDURE — 99214 OFFICE O/P EST MOD 30 MIN: CPT

## 2023-10-26 RX ORDER — ZALEPLON 5 MG/1
5 CAPSULE ORAL
Qty: 30 CAPSULE | Refills: 0 | Status: SHIPPED | OUTPATIENT
Start: 2023-10-26

## 2023-10-26 RX ORDER — DIAZEPAM 2 MG/1
2 TABLET ORAL DAILY PRN
Qty: 30 TABLET | Refills: 0 | Status: SHIPPED | OUTPATIENT
Start: 2023-10-26

## 2023-10-26 RX ORDER — SERTRALINE HYDROCHLORIDE 100 MG/1
150 TABLET, FILM COATED ORAL DAILY
Qty: 90 TABLET | Refills: 1 | Status: SHIPPED | OUTPATIENT
Start: 2023-10-26

## 2023-10-26 NOTE — PSYCH
Virtual Regular Visit    Verification of patient location:    Patient is located in the following state in which I hold an active license [place]    Problem List Items Addressed This Visit          Nervous and Auditory    Traumatic brain injury with loss of consciousness ([address])       Other    Psychosis ([address]) - Primary    Relevant Medications    sertraline (Zoloft) 100 mg tablet    zaleplon (SONATA) 5 MG capsule    diazepam (VALIUM) 2 mg tablet    Reactive depression    Relevant Medications    sertraline (Zoloft) 100 mg tablet    zaleplon (SONATA) 5 MG capsule    diazepam (VALIUM) 2 mg tablet          Encounter provider ROCKY Joyce    Provider located at    [id number] [facility]  [address] [facility] [id number] [phone]    Recent Visits  Date Type Provider Dept   10/26/23 Telemedicine Joselo Hendricks, [address] [place] [address]   Showing recent visits within past 7 days and meeting all other requirements  Future Appointments  No visits were found meeting these conditions. Showing future appointments within next 150 days and meeting all other requirements         The patient was identified by name and date of birth. [facility]. was informed that this is a telemedicine visit and [facility] He agrees to proceed. .  My office door was closed. No one else was in the room. He acknowledged consent and understanding of privacy and security of the video platform. The patient has agreed to participate and understands they can discontinue the visit at any time. Patient is aware this is a billable service.      HPI     Current Outpatient Medications   Medication Sig Dispense Refill    amLODIPine (NORVASC) 10 mg tablet Take 10 mg by mouth daily      aspirin 81 mg chewable tablet Chew 81 mg daily      atorvastatin (LIPITOR) 20 mg tablet Take 20 mg by mouth every evening      benztropine (COGENTIN) 0.5 mg tablet Take 1 tablet by mouth twice daily 60 tablet 0    diazepam (VALIUM) 2 mg tablet Take 1 tablet (2 mg total) by mouth daily as needed for anxiety 30 tablet 0    divalproex sodium (DEPAKOTE) 250 mg EC tablet TAKE 1 TABLET BY MOUTH TWICE DAILY WITH  ONE  500  MG  TAB  FOR  TOAL  DOSE  OF  750  MG  TWICE  A  DAY 60 tablet 11    divalproex sodium (DEPAKOTE) 500 mg EC tablet Take 1 tab twice a day with one 250 mg tab for total dose of 750 mg twice a day.  60 tablet 11    ibuprofen (MOTRIN) 800 mg tablet daily as needed      insulin NPH-insulin regular (NovoLIN 70/30) 100 units/mL subcutaneous injection Inject 10 Units under the skin daily before breakfast (Patient taking differently: Inject 18 Units under the skin daily before breakfast) 3 mL 0    lamoTRIgine (LaMICtal) 100 mg tablet Take 1 tab (100 mg) twice a day 180 tablet 3    metoprolol tartrate (LOPRESSOR) 100 mg tablet TAKE 1 TABLET BY MOUTH EVERY 12 HOURS 60 tablet 0    sertraline (Zoloft) 100 mg tablet Take 1.5 tablets (150 mg total) by mouth daily 90 tablet 1    Ventolin  (90 Base) MCG/ACT inhaler Inhale 2 puffs every 6 (six) hours as needed      Vitamin D, Cholecalciferol, 50 MCG (2000 UT) CAPS Take 1 capsule by mouth once daily 30 capsule 5    zaleplon (SONATA) 5 MG capsule Take 1 capsule (5 mg total) by mouth daily at bedtime 30 capsule 0    ziprasidone (GEODON) 80 mg capsule Take 1 capsule (80 mg total) by mouth 2 (two) times a day 60 capsule 2    amoxicillin (AMOXIL) 500 mg capsule TAKE 1 CAPSULE BY MOUTH TWICE DAILY FOR 10 DAYS (Patient not taking: Reported on 3/3/2023)      Jardiance 25 MG TABS Take 25 mg by mouth daily (Patient not taking: Reported on 3/3/2023)      lisinopril (ZESTRIL) 40 mg tablet Take 1 tablet by mouth daily (Patient not taking: Reported on 3/3/2023)      metFORMIN (GLUCOPHAGE) 1000 MG tablet Take 1,000 mg by mouth 2 (two) times a day with meals (Patient not taking: Reported on 3/3/2023)       No current facility-administered medications for this visit. Review of Systems  Video Exam    There were no vitals filed for this visit. Physical Exam   As a result of this visit, I have referred the patient for further respiratory evaluation. No      VIRTUAL VISIT DISCLAIMER    [facility] Zaria Rodriguez. acknowledges that he has consented to an online visit or consultation. He understands that the online visit is based solely on information provided by him, and that, in the absence of a face-to-face physical evaluation by the physician, the diagnosis he receives is both limited and provisional in terms of accuracy and completeness. This is not intended to replace a full medical face-to-face evaluation by the physician. Robert Morales understands and accepts these terms. MEDICATION MANAGEMENT NOTE        [address] [facility]    Name and Date of Birth:  Robert Morales. 52 y.o. 1970 MRN: [id number]    Date of Visit: October 30, 2023    No Known Allergies  SUBJECTIVE:    Pamela Yeboah is seen today for a follow up for  hallucinations, depression, hx TBI . He reports that he continues to experience on and off symptoms since the last visit. Miguel Avendano recently spent 3 days in [facility] for worsening of chronic auditory hallucinations, Geodon was titrated to 80 mg twice daily and is doing better since discharge. Describes how his auditory hallucinations wax and wane in terms of intensity, unable to determine anything that worsens or improves symptoms. Family does report AH are significantly less intense when he takes the Geodon compared to without it. He agrees to get immediate evaluation ER if ever experiencing command AH. States he is currently having a good day and hallucinations are mild-cannot make out what they are saying today. Denies side effects with Geodon 80 twice daily, history of shakiness in bilateral arms, none observed today or reported recently.   He does have as needed Cogentin if needed for shakiness. Mood was appropriate, constricted. Expresses optimism for the future in terms of being a grandfather in the coming months. His wife remains present and very supportive, she manages his medications. No medication changes made today. He denies SI. He denies any side effects from medications. PLAN:    -Continue Geodon to 80 mg twice daily for chronic auditory hallucinations in the context of his traumatic brain injury, recently titrated to this dosage during [facility] inpatient stay. Continue Cogentin 0.5 mg twice daily prn for periodic bilateral hand shaking     -Continue Zoloft 150 mg daily for depression related to the ongoing hallucinations   PARQ completed including serotonin syndrome, SIADH, worsening depression, suicidality, induction of afshin, GI upset, headaches, activation, sexual side effects, sedation, potential drug interactions, and others.     -Continue p.r.n. Valium 2 milligrams daily p.r.n. for severe anxiety/agitation which occasionally occurs due to patient's auditory hallucinations. Using infrequently according to [address] Discussed with patient the risks of sedation, respiratory depression, impairment of ability to drive and potential for abuse and addiction related to treatment with benzodiazepine medications. The patient understands risk of treatment with benzodiazepine medications, agrees to not drive if feels impaired and agrees to take medications as prescribed. -Utilizes prn Sonata 5mg for insomnia     - Neurologist prescribing Depakote 750 mg b.i.d. and Lamictal 100 mg BID for the management of epilepsy disorder, has remained free from seizures 3 years. Complete pending labs.       Aware of 24 hour and weekend coverage for urgent situations accessed by calling [facility] main practice number  Referral for individual psychotherapy    Diagnoses and all orders for this visit:    Psychosis, unspecified psychosis type ([address] [facility])  - zaleplon (SONATA) 5 MG capsule; Take 1 capsule (5 mg total) by mouth daily at bedtime  -     diazepam (VALIUM) 2 mg tablet; Take 1 tablet (2 mg total) by mouth daily as needed for anxiety    Traumatic brain injury with loss of consciousness, subsequent encounter    Reactive depression  -     sertraline (Zoloft) 100 mg tablet; Take 1.5 tablets (150 mg total) by mouth daily        Current Outpatient Medications on File Prior to Visit   Medication Sig Dispense Refill    amLODIPine (NORVASC) 10 mg tablet Take 10 mg by mouth daily      aspirin 81 mg chewable tablet Chew 81 mg daily      atorvastatin (LIPITOR) 20 mg tablet Take 20 mg by mouth every evening      benztropine (COGENTIN) 0.5 mg tablet Take 1 tablet by mouth twice daily 60 tablet 0    divalproex sodium (DEPAKOTE) 250 mg EC tablet TAKE 1 TABLET BY MOUTH TWICE DAILY WITH  ONE  500  MG  TAB  FOR  TOAL  DOSE  OF  750  MG  TWICE  A  DAY 60 tablet 11    divalproex sodium (DEPAKOTE) 500 mg EC tablet Take 1 tab twice a day with one 250 mg tab for total dose of 750 mg twice a day.  60 tablet 11    ibuprofen (MOTRIN) 800 mg tablet daily as needed      insulin NPH-insulin regular (NovoLIN 70/30) 100 units/mL subcutaneous injection Inject 10 Units under the skin daily before breakfast (Patient taking differently: Inject 18 Units under the skin daily before breakfast) 3 mL 0    lamoTRIgine (LaMICtal) 100 mg tablet Take 1 tab (100 mg) twice a day 180 tablet 3    metoprolol tartrate (LOPRESSOR) 100 mg tablet TAKE 1 TABLET BY MOUTH EVERY 12 HOURS 60 tablet 0    Ventolin  (90 Base) MCG/ACT inhaler Inhale 2 puffs every 6 (six) hours as needed      Vitamin D, Cholecalciferol, 50 MCG (2000 UT) CAPS Take 1 capsule by mouth once daily 30 capsule 5    ziprasidone (GEODON) 80 mg capsule Take 1 capsule (80 mg total) by mouth 2 (two) times a day 60 capsule 2    amoxicillin (AMOXIL) 500 mg capsule TAKE 1 CAPSULE BY MOUTH TWICE DAILY FOR 10 DAYS (Patient not taking: Reported on 3/3/2023)      Jardiance 25 MG TABS Take 25 mg by mouth daily (Patient not taking: Reported on 3/3/2023)      lisinopril (ZESTRIL) 40 mg tablet Take 1 tablet by mouth daily (Patient not taking: Reported on 3/3/2023)      metFORMIN (GLUCOPHAGE) 1000 MG tablet Take 1,000 mg by mouth 2 (two) times a day with meals (Patient not taking: Reported on 3/3/2023)       No current facility-administered medications on file prior to visit. Psychotherapy Provided:     Individual psychotherapy provided: Yes  Counseling was provided during the session today for 6 minutes. Supportive counseling provided. Medication changes discussed with Pamela Yeboah. Medication education provided to Pamela Yeboah. Goals discussed during in session: improve control of hallucinations. HPI ROS Appetite Changes and Sleep:     He reports difficulty falling asleep, adequate appetite, low energy.  Denies homicidal ideation, denies suicidal ideation    Review Of Systems:      HPI ROS:               Medication Side Effects:  denies currently    Depression (10 worst): 5/10    Anxiety (10 worst): 5-8/10    Safety concerns (SI, HI, etc): Denies si and hi    Sleep: 5-6 hrs    Energy: Remains low    Appetite: 2-3 meals    Weight Change: denies        Mental Status Evaluation:    Appearance Adequate hygiene and grooming   Behavior calm and cooperative   Mood anxious and depressed  Depression Scale - 5 of 10 (0 = No depression)  Anxiety Scale - 6 of 10 (0 = No anxiety)   Speech Soft   Affect constricted   Thought Processes Goal directed and coherent   Thought Content Does not verbalize delusional material   Associations concrete associations   Perceptual Disturbances Auditory hallucinations without commands   Risk Potential Suicidal/Homicidal Ideation - No evidence of suicidal or homicidal ideation and patient does not verbalize suicidal or homicidal ideation  Risk of Violence - No evidence of risk for violence found on assessment  Risk of Self Mutilation - No evidence of risk for self mutilation found on assessment   Orientation oriented to person, place, time/date and situation   Memory recent and remote memory grossly intact   Consciousness alert and awake   Attention/Concentration attention span and concentration appear shorter than expected for age   Insight partial   Judgement partial   Muscle Strength and Gait decreased muscle strength   Motor Activity no abnormal movements   Language no difficulty naming common objects, no difficulty repeating a phrase, no difficulty writing a sentence   Fund of Knowledge adequate knowledge of current events  adequate fund of knowledge regarding past history  adequate fund of knowledge regarding vocabulary          Past Medical History:    Past Medical History:   Diagnosis Date    Chemical exposure     Diabetes mellitus ([address])     H/O bone graft     Head injury     August 25, 2019 fell backwards hit head on a [place]oulder w/ LOC    Head injury     as an air [place] with the [employer] - jumped out of a plane parachute disfunction    Hypertension     Seizures ([address])         Past Surgical History:   Procedure Laterality Date    FL LUMBAR PUNCTURE DIAGNOSTIC  12/13/2019    KNEE ARTHROSCOPY W/ MENISCECTOMY Left     LEG SURGERY      TONSILLECTOMY       No Known Allergies  Substance Abuse History:    Social History     Substance and Sexual Activity   Alcohol Use Not Currently    Alcohol/week: 3.0 standard drinks of alcohol    Types: 3 Cans of beer per week    Comment: socially     Social History     Substance and Sexual Activity   Drug Use Never     Social History:    Social History     Socioeconomic History    Marital status: Single     Spouse name: Not on file    Number of children: Not on file    Years of education: Not on file    Highest education level: Not on file   Occupational History    Not on file   Tobacco Use    Smoking status: Some Days     Types: Cigars    Smokeless tobacco: Never   Vaping Use    Vaping Use: Never used   Substance and Sexual Activity    Alcohol use: Not Currently     Alcohol/week: 3.0 standard drinks of alcohol     Types: 3 Cans of beer per week     Comment: socially    Drug use: Never    Sexual activity: Not on file   Other Topics Concern    Not on file   Social History Narrative    Not on file     Social Determinants of Health     Financial Resource Strain: Not on file   Food Insecurity: Not on file   Transportation Needs: Not on file   Physical Activity: Not on file   Stress: Not on file   Social Connections: Not on file   Intimate Partner Violence: Not on file   Housing Stability: Not on file     Family Psychiatric History:     Family History   Problem Relation Age of Onset    Diabetes Mother     Hypertension Mother     Asthma Mother     Brain cancer Father     No Known Problems Sister     No Known Problems Brother     No Known Problems Brother      History Review: The following portions of the patient's history were reviewed and updated as appropriate: allergies, current medications, past family history, past medical history, past social history, past surgical history and problem list     OBJECTIVE:     Vital signs in last 24 hours: There were no vitals filed for this visit. Laboratory Results:   Recent Labs (last 2 months):    Admission on 09/11/2023, Discharged on 09/12/2023   Component Date Value    EXTBreath Alcohol 09/11/2023 0.00     Amph/Meth UR 09/11/2023 Negative     Barbiturate Ur 09/11/2023 Negative     Benzodiazepine Urine 09/11/2023 Positive (A)     Cocaine Urine 09/11/2023 Negative     Methadone Urine 09/11/2023 Negative     Opiate Urine 09/11/2023 Negative     PCP Ur 09/11/2023 Negative     THC Urine 09/11/2023 Negative     Oxycodone Urine 09/11/2023 Negative     SARS-CoV-2 09/11/2023 Negative     INFLUENZA A PCR 09/11/2023 Negative     INFLUENZA B PCR 09/11/2023 Negative     RSV PCR 09/11/2023 Negative     WBC 09/11/2023 7.49     RBC 09/11/2023 4.79     Hemoglobin 09/11/2023 14.2     Hematocrit 09/11/2023 42.7     MCV 09/11/2023 89     MCH 09/11/2023 29.6     MCHC 09/11/2023 33.3     RDW 09/11/2023 14.4     MPV 09/11/2023 9.4     Platelets 32/78/2925 197     nRBC 09/11/2023 0     Neutrophils Relative 09/11/2023 62     Immat GRANS % 09/11/2023 1     Lymphocytes Relative 09/11/2023 27     Monocytes Relative 09/11/2023 7     Eosinophils Relative 09/11/2023 2     Basophils Relative 09/11/2023 1     Neutrophils Absolute 09/11/2023 4.64     Immature Grans Absolute 09/11/2023 0.05     Lymphocytes Absolute 09/11/2023 2.03     Monocytes Absolute 09/11/2023 0.55     Eosinophils Absolute 09/11/2023 0.17     Basophils Absolute 09/11/2023 0.05     Sodium 09/11/2023 139     Potassium 09/11/2023 3.7     Chloride 09/11/2023 105     CO2 09/11/2023 27     ANION GAP 09/11/2023 7     BUN 09/11/2023 13     Creatinine 09/11/2023 1.11     Glucose 09/11/2023 136     Calcium 09/11/2023 9.3     AST 09/11/2023 29     ALT 09/11/2023 19     Alkaline Phosphatase 09/11/2023 69     Total Protein 09/11/2023 7.1     Albumin 09/11/2023 4.2     Total Bilirubin 09/11/2023 0.56     eGFR 09/11/2023 74     TSH 3RD GENERATON 09/11/2023 2.092     Color, UA 09/11/2023 Yellow     Clarity, UA 09/11/2023 Turbid     Specific Gravity, UA 09/11/2023 1.035 (H)     pH, UA 09/11/2023 6.5     Leukocytes, UA 09/11/2023 Trace (A)     Nitrite, UA 09/11/2023 Negative     Protein, UA 09/11/2023 30 (1+) (A)     Glucose, UA 09/11/2023 Negative     Ketones, UA 09/11/2023 Negative     Urobilinogen, UA 09/11/2023 2.0 (A)     Bilirubin, UA 09/11/2023 Negative     Occult Blood, UA 09/11/2023 Negative     RBC, UA 09/11/2023 None Seen     WBC, UA 09/11/2023 1-2     Epithelial Cells 09/11/2023 Occasional     Bacteria, UA 09/11/2023 None Seen     MUCUS THREADS 09/11/2023 Innumerable (A)     Ventricular Rate 09/11/2023 66     Atrial Rate 09/11/2023 66     AL Interval 09/11/2023 184     QRSD Interval 09/11/2023 104     QT Interval 09/11/2023 434     QTC Interval 09/11/2023 454     P Monarch 09/11/2023 40     QRS Axis 09/11/2023 -46     T Wave Axis 09/11/2023 10      I have personally reviewed all pertinent laboratory/tests results. Suicide/Homicide Risk Assessment:    Risk of Harm to Self:  Protective Factors: no current suicidal ideation, access to mental health treatment, being , compliant with medications, compliant with mental health treatment, connection to community, medical compliance, personal beliefs, resiliency  Based on today's assessment, Dread Elmore presents the following risk of harm to self: minimal    Risk of Harm to Others:  Based on today's assessment, Dread Elmore presents the following risk of harm to others: minimal    The following interventions are recommended: referral for psychotherapy    Medications Risks/Benefits:      Risks, Benefits And Possible Side Effects Of Medications:    Discussed risks and benefits of treatment with patient including risk of suicidality, serotonin syndrome, increased QTc interval and SIADH related to treatment with antidepressants; Risk of induction of manic symptoms in certain patient populations, risk of parkinsonian symptoms, metabolic syndrome, tardive dyskinesia and neuroleptic malignant syndrome related to treatment with antipsychotic medications, risks of misuse, abuse or dependence, sedation and respiratory depression related to treatment with benzodiazepine medications, risk of rash related to treatment with Lamictal, and risk of liver impairment related to treatment with Depakote     Controlled Medication Discussion:     Dread Elmore has been filling controlled prescriptions on time as prescribed according to Alejandra1 Faheem Lam  Discussed with Dread Elmore the risks of sedation, respiratory depression, impairment of ability to drive and potential for abuse and addiction related to treatment with benzodiazepine medications.  He understands risk of treatment with benzodiazepine medications, agrees to not drive if feels impaired and agrees to take medications as prescribed  Discussed with [facility] [employer] warning on concurrent use of benzodiazepines and opioid medications including sedation, respiratory depression, coma and death. He understands the risk of treatment with benzodiazepines in addition to opioids and wants to continue taking those medications  Discussed with Avinash Toledo increased risk of impairment related to concurrent use of benzodiazepines and hypnotic medications including excessive sedation, psychomotor impairment and respiratory depression. He understands the risk of treatment with benzodiazepines in addition to hypnotic medications and wants to continue taking those medications    Treatment Plan:    Due for update/Updated:   no  Last treatment plan done 6/5/23 . Treatment Plan due on 11/5/23. ROCKY Zarate 10/30/23    This note was shared with patient.       Visit Time    Visit Start Time: 1  Visit Stop Time: 125  Total Visit Duration:  25 minutes

## 2023-10-30 RX ORDER — BENZTROPINE MESYLATE 0.5 MG/1
0.5 TABLET ORAL 2 TIMES DAILY PRN
Qty: 60 TABLET | Refills: 0
Start: 2023-10-30

## 2023-11-08 ENCOUNTER — TELEMEDICINE (OUTPATIENT)
Dept: NEUROLOGY | Facility: CLINIC | Age: 53
End: 2023-11-08
Payer: MEDICARE

## 2023-11-08 DIAGNOSIS — F29 PSYCHOSIS, UNSPECIFIED PSYCHOSIS TYPE (HCC): ICD-10-CM

## 2023-11-08 DIAGNOSIS — R56.9 SEIZURE (HCC): ICD-10-CM

## 2023-11-08 DIAGNOSIS — S06.9X9D TRAUMATIC BRAIN INJURY WITH LOSS OF CONSCIOUSNESS, SUBSEQUENT ENCOUNTER: Primary | ICD-10-CM

## 2023-11-08 DIAGNOSIS — G40.109 LOCALIZATION-RELATED EPILEPSY (HCC): ICD-10-CM

## 2023-11-08 PROCEDURE — 99214 OFFICE O/P EST MOD 30 MIN: CPT | Performed by: PSYCHIATRY & NEUROLOGY

## 2023-11-08 RX ORDER — DIVALPROEX SODIUM 250 MG/1
TABLET, DELAYED RELEASE ORAL
Qty: 60 TABLET | Refills: 11 | Status: SHIPPED | OUTPATIENT
Start: 2023-11-08

## 2023-11-08 RX ORDER — LAMOTRIGINE 100 MG/1
TABLET ORAL
Qty: 180 TABLET | Refills: 3 | Status: SHIPPED | OUTPATIENT
Start: 2023-11-08

## 2023-11-08 RX ORDER — DIVALPROEX SODIUM 500 MG/1
TABLET, DELAYED RELEASE ORAL
Qty: 60 TABLET | Refills: 11 | Status: SHIPPED | OUTPATIENT
Start: 2023-11-08

## 2023-11-08 NOTE — PROGRESS NOTES
Virtual Regular Visit    Verification of patient location:    Patient is located at Home in the following state in which I hold an active license [place]      Assessment/Plan:    Patient ID: Andie Sherman is a 52 y.o. male with prior traumatic subarachnoid hemorrhage, localization-related epilepsy, and ongoing auditory hallucinations , who is returning for follow-up of his seizures. Assessment/Plan:    Localization-related epilepsy [facility])  He has not had any additional seizures since his last appointment. He is still on Divalproex and Lamotrigine, which may also help with his mood. The description of his tremor with being mostly when active would raise the possibility of this being more of an action tremor, but the exact type of tremor is not apparent with the video visit. I would like to see him in the office next time to fully assess his tremor. I suspect he may have some drug-induced parkinsonism/tremor from his antipsychotics. Either way, it will be best for him to work with his psychiatrist for ongoing medication adjustments to minimize side effects and improve his hallucinations. --he will continue Divalproex and Lamotrigine unchanged. Psychosis ([address])  He will continue to work with his psychiatrist. As noted above, he will likely need his medications adjusted to limit extrapyramidal side effects from his antipsychotic medications. Traumatic brain injury with loss of consciousness, subsequent encounter  He has ongoing issues with mobility, which may be related to his prior injury, but also exacerbated by deconditioning. I did refer him to work with physical therapy to try to build up his strength. I will also order a hospital bed, a wheelchair and a raised toilet seat for the time being. Ultimately, I stressed the need to work with therapy and improve his mobility as the best solution. He will Return in about 4 months (around 3/8/2024).           Reason for visit is   Chief Complaint   Patient presents with    Virtual Regular Visit          Encounter provider Brittany Toure MD    Provider located at [address] [facility] [address]  [address] [facility]  [phone]      Recent Visits  No visits were found meeting these conditions. Showing recent visits within past 7 days and meeting all other requirements  Today's Visits  Date Type Provider Dept   11/08/23 Telemedicine Brittany Toure MD [facility] [address]  today's visits and meeting all other requirements  Future Appointments  No visits were found meeting these conditions. Showing future appointments within next 150 days and meeting all other requirements       The patient was identified by name and date of birth. [facility]. was informed that this is a telemedicine visit and that the visit is being conducted through the [address] [place] [address] [employer] platform. He agrees to proceed. .  My office door was closed. He acknowledged consent and understanding of privacy and security of the video platform. The patient has agreed to participate and understands they can discontinue the visit at any time. Patient is aware this is a billable service. Subjective  Yan Jaime. is a 52 y.o. male     Current seizure medications:  1. Lamotrigine 100 mg twice a day  2. Divalproex  mg twice a day. Other medications as per Epic. Since his last visit, he has not had any additional seizures since his last appointment. He continues to have a lot of rough days due to his hallucinations. In September, he was seen in the ED due to severe worsening of his hallucinations to where he wasn't able to manage at home. There was a mix-up and he was transferred to a drug rehab unit instead of a psychiatric unit. During that admission, they realized he was missing the ziprasidone. They restarted and increased this. Initially this helped, but it seems to have been wearing off.  He continues to follow with his psychiatrist, who has been following with him at least monthly. Along with the hallucinations, when they become more severe, he will feel like he has pressure in his head, then will need to go lay down. His wife will notice a change in his demeanor during these times as well. These seem to be tied closely to when his hallucinations are really severe and overwhelming. He has been having more trouble with shaking. This is especially problematic when eating with utensils. He has been having ongoing issues with mobility, but often is not very active and does not want to leave the house due to his issues with hallucinations and his fear that he may behave inappropriately. Prior Medications: Levetiracetam (worsened mood and hallucinations)     His history was also obtained from his wife, who was also present via [facility].          HPI     Past Medical History:   Diagnosis Date    Chemical exposure     Diabetes mellitus ([address])     H/O bone graft     Head injury     August 25, 2019 fell backwards hit head on a boulder w/ LOC    Head injury     as an air [place] with the [employer] - jumped out of a plane parachute disfunction    Hypertension     Seizures ([address])        Past Surgical History:   Procedure Laterality Date    FL LUMBAR PUNCTURE DIAGNOSTIC  12/13/2019    KNEE ARTHROSCOPY W/ MENISCECTOMY Left     LEG SURGERY      TONSILLECTOMY         Current Outpatient Medications   Medication Sig Dispense Refill    amLODIPine (NORVASC) 10 mg tablet Take 10 mg by mouth daily      aspirin 81 mg chewable tablet Chew 81 mg daily      atorvastatin (LIPITOR) 20 mg tablet Take 20 mg by mouth every evening      benztropine (COGENTIN) 0.5 mg tablet Take 1 tablet (0.5 mg total) by mouth 2 (two) times a day as needed for tremors 60 tablet 0    diazepam (VALIUM) 2 mg tablet Take 1 tablet (2 mg total) by mouth daily as needed for anxiety 30 tablet 0    divalproex sodium (DEPAKOTE) 250 mg DR tablet TAKE 1 TABLET BY MOUTH TWICE DAILY WITH ONE  500  MG  TAB  FOR  TOAL  DOSE  OF  750  MG  TWICE  A  DAY 60 tablet 11    divalproex sodium (DEPAKOTE) 500 mg DR tablet Take 1 tab twice a day with one 250 mg tab for total dose of 750 mg twice a day. 60 tablet 11    ibuprofen (MOTRIN) 800 mg tablet daily as needed      insulin NPH-insulin regular (NovoLIN 70/30) 100 units/mL subcutaneous injection Inject 10 Units under the skin daily before breakfast (Patient taking differently: Inject 18 Units under the skin daily before breakfast) 3 mL 0    lamoTRIgine (LaMICtal) 100 mg tablet Take 1 tab (100 mg) twice a day 180 tablet 3    metoprolol tartrate (LOPRESSOR) 100 mg tablet TAKE 1 TABLET BY MOUTH EVERY 12 HOURS 60 tablet 0    sertraline (Zoloft) 100 mg tablet Take 1.5 tablets (150 mg total) by mouth daily 90 tablet 1    Ventolin  (90 Base) MCG/ACT inhaler Inhale 2 puffs every 6 (six) hours as needed      Vitamin D, Cholecalciferol, 50 MCG (2000 UT) CAPS Take 1 capsule by mouth once daily 30 capsule 5    zaleplon (SONATA) 5 MG capsule Take 1 capsule (5 mg total) by mouth daily at bedtime 30 capsule 0    ziprasidone (GEODON) 80 mg capsule Take 1 capsule (80 mg total) by mouth 2 (two) times a day 60 capsule 2    amoxicillin (AMOXIL) 500 mg capsule TAKE 1 CAPSULE BY MOUTH TWICE DAILY FOR 10 DAYS (Patient not taking: Reported on 3/3/2023)      Jardiance 25 MG TABS Take 25 mg by mouth daily (Patient not taking: Reported on 3/3/2023)      lisinopril (ZESTRIL) 40 mg tablet Take 1 tablet by mouth daily (Patient not taking: Reported on 3/3/2023)      metFORMIN (GLUCOPHAGE) 1000 MG tablet Take 1,000 mg by mouth 2 (two) times a day with meals (Patient not taking: Reported on 3/3/2023)       No current facility-administered medications for this visit. No Known Allergies    Review of Systems    Video Exam    There were no vitals filed for this visit.     Physical Exam     Visit Time  Total Visit Duration: 15 min

## 2023-11-08 NOTE — ASSESSMENT & PLAN NOTE
He has ongoing issues with mobility, which may be related to his prior injury, but also exacerbated by deconditioning. I did refer him to work with physical therapy to try to build up his strength. I will also order a hospital bed, a wheelchair and a raised toilet seat for the time being. Ultimately, I stressed the need to work with therapy and improve his mobility as the best solution.

## 2023-11-08 NOTE — ASSESSMENT & PLAN NOTE
He will continue to work with his psychiatrist. As noted above, he will likely need his medications adjusted to limit extrapyramidal side effects from his antipsychotic medications.

## 2023-11-08 NOTE — PATIENT INSTRUCTIONS
-- continue to take Divalproex and Lamotrigine unchanged. -- I will put in a referral for you to work with physical therapy.      -- Please continue to follow with your psychiatrist.

## 2023-11-08 NOTE — ASSESSMENT & PLAN NOTE
He has not had any additional seizures since his last appointment. He is still on Divalproex and Lamotrigine, which may also help with his mood. The description of his tremor with being mostly when active would raise the possibility of this being more of an action tremor, but the exact type of tremor is not apparent with the video visit. I would like to see him in the office next time to fully assess his tremor. I suspect he may have some drug-induced parkinsonism/tremor from his antipsychotics. Either way, it will be best for him to work with his psychiatrist for ongoing medication adjustments to minimize side effects and improve his hallucinations. --he will continue Divalproex and Lamotrigine unchanged.

## 2023-11-09 ENCOUNTER — TELEPHONE (OUTPATIENT)
Dept: CARDIOLOGY CLINIC | Facility: CLINIC | Age: 53
End: 2023-11-09

## 2023-11-09 DIAGNOSIS — I10 ESSENTIAL HYPERTENSION: ICD-10-CM

## 2023-11-09 RX ORDER — METOPROLOL TARTRATE 100 MG/1
TABLET ORAL
Qty: 60 TABLET | Refills: 4 | Status: SHIPPED | OUTPATIENT
Start: 2023-11-09

## 2023-11-09 NOTE — TELEPHONE ENCOUNTER
Spoke with pt . Appt made for 3/18/24, courtesy refill given to cover til his next appt date. Pt not seen in 5 years he understood if he does not make it to his next visit no more refills will be given.

## 2023-11-29 DIAGNOSIS — S06.9X9D TRAUMATIC BRAIN INJURY WITH LOSS OF CONSCIOUSNESS, SUBSEQUENT ENCOUNTER: ICD-10-CM

## 2023-11-29 RX ORDER — BENZTROPINE MESYLATE 0.5 MG/1
0.5 TABLET ORAL 2 TIMES DAILY
Qty: 60 TABLET | Refills: 0 | Status: SHIPPED | OUTPATIENT
Start: 2023-11-29

## 2023-12-10 DIAGNOSIS — F29 PSYCHOSIS, UNSPECIFIED PSYCHOSIS TYPE (HCC): ICD-10-CM

## 2023-12-11 RX ORDER — ZALEPLON 5 MG/1
5 CAPSULE ORAL
Qty: 30 CAPSULE | Refills: 0 | Status: SHIPPED | OUTPATIENT
Start: 2023-12-11

## 2024-01-06 DIAGNOSIS — F29 PSYCHOSIS, UNSPECIFIED PSYCHOSIS TYPE (HCC): ICD-10-CM

## 2024-01-06 DIAGNOSIS — R56.9 SEIZURE (HCC): ICD-10-CM

## 2024-01-07 DIAGNOSIS — S06.9X9D TRAUMATIC BRAIN INJURY WITH LOSS OF CONSCIOUSNESS, SUBSEQUENT ENCOUNTER: ICD-10-CM

## 2024-01-08 RX ORDER — BENZTROPINE MESYLATE 0.5 MG/1
0.5 TABLET ORAL 2 TIMES DAILY
Qty: 60 TABLET | Refills: 0 | Status: SHIPPED | OUTPATIENT
Start: 2024-01-08

## 2024-01-08 RX ORDER — DIVALPROEX SODIUM 500 MG/1
TABLET, DELAYED RELEASE ORAL
Qty: 60 TABLET | Refills: 5 | Status: SHIPPED | OUTPATIENT
Start: 2024-01-08

## 2024-01-08 RX ORDER — ZALEPLON 5 MG/1
5 CAPSULE ORAL
Qty: 30 CAPSULE | Refills: 0 | Status: SHIPPED | OUTPATIENT
Start: 2024-01-08

## 2024-01-09 ENCOUNTER — TELEMEDICINE (OUTPATIENT)
Dept: PSYCHIATRY | Facility: CLINIC | Age: 54
End: 2024-01-09
Payer: MEDICARE

## 2024-01-09 DIAGNOSIS — S06.9X9D TRAUMATIC BRAIN INJURY WITH LOSS OF CONSCIOUSNESS, SUBSEQUENT ENCOUNTER: ICD-10-CM

## 2024-01-09 DIAGNOSIS — F32.9 REACTIVE DEPRESSION: ICD-10-CM

## 2024-01-09 DIAGNOSIS — F29 PSYCHOSIS, UNSPECIFIED PSYCHOSIS TYPE (HCC): Primary | ICD-10-CM

## 2024-01-09 PROCEDURE — 99214 OFFICE O/P EST MOD 30 MIN: CPT

## 2024-01-09 NOTE — BH TREATMENT PLAN
TREATMENT PLAN (Medication Management Only)        [facility]    Name and Date of Birth:  Yan Finch Jr. 53 y.o. 1970  Date of Treatment Plan: January 9, 2024  Diagnosis/Diagnoses:    1. Traumatic brain injury with loss of consciousness, subsequent encounter    2. Psychosis, unspecified psychosis type (HCC)    3. Reactive depression      Strengths/Personal Resources for Self-Care: supportive family, taking medications as prescribed, ability to listen.  Area/Areas of need (in own words): depressive symptoms, hallucinations  1. Long Term Goal: continue improvement in depression.  Target Date:6 months - 7/9/2024  Person/Persons responsible for completion of goal: Yan  2.  Short Term Objective (s) - How will we reach this goal?:   A. Provider new recommended medication/dosage changes and/or continue medication(s): continue current medications as prescribed.  B. Think positively.  C. Attend medication management appointments regularly.  Target Date:6 months - 7/9/2024  Person/Persons Responsible for Completion of Goal: Yan  Progress Towards Goals: continuing treatment  Treatment Modality: medication management every 2 months  Review due 180 days from date of this plan: 6 months - 7/9/2024  Expected length of service: ongoing treatment  My Physician/PA/NP and I have developed this plan together and I agree to work on the goals and objectives. I understand the treatment goals that were developed for my treatment.

## 2024-01-09 NOTE — PSYCH
Virtual Regular Visit    Verification of patient location:    Patient is located in the following state in which I hold an active license [place]    Problem List Items Addressed This Visit          Nervous and Auditory    Traumatic brain injury with loss of consciousness, subsequent encounter - Primary       Other    Psychosis (HCC)    Reactive depression          Encounter provider ROCKY Orellana    Provider located at    [facility]  [address] [facility] [place] [facility] [place] [address] [phone]    Recent Visits  No visits were found meeting these conditions.  Showing recent visits within past 7 days and meeting all other requirements  Future Appointments  No visits were found meeting these conditions.  Showing future appointments within next 150 days and meeting all other requirements         The patient was identified by name and date of birth. Yan Heathmare Marquez was informed that this is a telemedicine visit and that the visit is being conducted throughthe Epic Embedded platform. He agrees to proceed..  My office door was closed. No one else was in the room.  He acknowledged consent and understanding of privacy and security of the video platform. The patient has agreed to participate and understands they can discontinue the visit at any time.    Patient is aware this is a billable service.     HPI     Current Outpatient Medications   Medication Sig Dispense Refill    amLODIPine (NORVASC) 10 mg tablet Take 10 mg by mouth daily      amoxicillin (AMOXIL) 500 mg capsule TAKE 1 CAPSULE BY MOUTH TWICE DAILY FOR 10 DAYS (Patient not taking: Reported on 3/3/2023)      aspirin 81 mg chewable tablet Chew 81 mg daily      atorvastatin (LIPITOR) 20 mg tablet Take 20 mg by mouth every evening      benztropine (COGENTIN) 0.5 mg tablet Take 1 tablet by mouth twice daily 60 tablet 0    diazepam (VALIUM) 2 mg tablet Take 1 tablet (2 mg total) by mouth daily as needed for anxiety  30 tablet 0    divalproex sodium (DEPAKOTE) 250 mg DR tablet TAKE 1 TABLET BY MOUTH TWICE DAILY WITH  ONE  500  MG  TAB  FOR  TOAL  DOSE  OF  750  MG  TWICE  A  DAY 60 tablet 11    divalproex sodium (DEPAKOTE) 500 mg DR tablet TAKE 1 TABLET BY MOUTH TWICE DAILY WITH  MG TABLET FOR A TOTAL OF 750MG TWICE DAILY 60 tablet 5    ibuprofen (MOTRIN) 800 mg tablet daily as needed      insulin NPH-insulin regular (NovoLIN 70/30) 100 units/mL subcutaneous injection Inject 10 Units under the skin daily before breakfast (Patient taking differently: Inject 18 Units under the skin daily before breakfast) 3 mL 0    Jardiance 25 MG TABS Take 25 mg by mouth daily (Patient not taking: Reported on 3/3/2023)      lamoTRIgine (LaMICtal) 100 mg tablet Take 1 tab (100 mg) twice a day 180 tablet 3    lisinopril (ZESTRIL) 40 mg tablet Take 1 tablet by mouth daily (Patient not taking: Reported on 3/3/2023)      metFORMIN (GLUCOPHAGE) 1000 MG tablet Take 1,000 mg by mouth 2 (two) times a day with meals (Patient not taking: Reported on 3/3/2023)      metoprolol tartrate (LOPRESSOR) 100 mg tablet TAKE 1 TABLET BY MOUTH EVERY 12 HOURS 60 tablet 4    sertraline (Zoloft) 100 mg tablet Take 1.5 tablets (150 mg total) by mouth daily 90 tablet 1    Ventolin  (90 Base) MCG/ACT inhaler Inhale 2 puffs every 6 (six) hours as needed      Vitamin D, Cholecalciferol, 50 MCG (2000 UT) CAPS Take 1 capsule by mouth once daily 30 capsule 5    zaleplon (SONATA) 5 MG capsule Take 1 capsule by mouth once daily at bedtime 30 capsule 0    ziprasidone (GEODON) 80 mg capsule Take 1 capsule (80 mg total) by mouth 2 (two) times a day 60 capsule 2     No current facility-administered medications for this visit.       Review of Systems  Video Exam    There were no vitals filed for this visit.    Physical Exam   As a result of this visit, I have referred the patient for further respiratory evaluation. No      VIRTUAL VISIT DISCLAIMER    Yan Finch Jr.  acknowledges that he has consented to an online visit or consultation. He understands that the online visit is based solely on information provided by him, and that, in the absence of a face-to-face physical evaluation by the physician, the diagnosis he receives is both limited and provisional in terms of accuracy and completeness. This is not intended to replace a full medical face-to-face evaluation by the physician. Yan Finch Jr. understands and accepts these terms.    MEDICATION MANAGEMENT NOTE        [facility]    Name and Date of Birth:  Yan Finch Jr. 53 y.o. 1970 MRN: [id number]    Date of Visit: January 9, 2024    No Known Allergies  SUBJECTIVE:    Yan is seen today for a follow up for  hallucinations, depression, hx TBI . He reports that he continues to experience on and off symptoms since the last visit. Eduard reports having a few good days in a row in terms of his chronic auditory hallucinations, voices have been somewhat limited this week but overall continue to wax and wane in terms of intensity and frequency despite Geodon 80 mg twice daily. Continues to do his best to ignore voices and distract himself with positive thoughts and activities.  Wife remains very involved with his care and helps him cope with chronic symptoms.  Hallucinations have clearly decreased in intensity and frequency with the Geodon, plan to continue with no adjustments today.  Occasionally describes mild EPS symptoms in terms of rhythmic bilateral hand-shaking-Cogentin helpful with such.  No evidence of repetitive involuntary movements such as grimacing, eye blinking, tongue movements.  His wife is reporting decreased memory and attention span over the past few months, they plan to speak to  neurologist about such.  Provider offered patient to come to the office and complete  Mini-Mental Status exam as well to obtain baseline. Depression remains ongoing, exacerbated in early  December when his daughter passed away from illness.  Supportive counseling provided, patient appears to be grieving appropriately and has  support of his family.  His first granddaughter was born 3 days later and has brought significant jazmin to his life, brightened when speaking about her.  No medication changes made today.  He denies SI.        He denies any side effects from medications.    PLAN:    -Continue Geodon to 80 mg twice daily for chronic auditory hallucinations in the context of his traumatic brain injury, recently titrated to this dosage during [facility] inpatient stay.    PARQ discussed with patient included sedation, dizziness,  dry mouth, constipation, hypotension, dyslipidemia, EPS, metabolic syndrome, peripheral edema, dyspepsia, joint and body pains, tachycardia, orthostatic hypotension, rash with sunlight exposure, hyperglycemia, seizures, skin rash, rare NMS, and increased risk of CVA in patient's with dementia.     -Continue Cogentin 0.5 mg twice daily prn for periodic bilateral hand shaking     -Continue Zoloft 150 mg daily for depression related to the ongoing hallucinations   PARQ completed including serotonin syndrome, SIADH, worsening depression, suicidality, induction of afshin, GI upset, headaches, activation, sexual side effects, sedation, potential drug interactions, and others.     -Continue p.r.n. Valium 2 milligrams p.r.n. for severe anxiety/agitation which occurs due to patient's distressing auditory hallucinations. Using infrequently according to PDMP.   Discussed with patient the risks of sedation, respiratory depression, impairment of ability to drive and potential for abuse and addiction related to treatment with benzodiazepine medications. The patient understands risk of treatment with benzodiazepine medications, agrees to not drive if feels impaired and agrees to take medications as prescribed.      -Utilizes prn Sonata 5mg for insomnia     - Neurologist prescribing Depakote  750 mg b.i.d. and Lamictal 100 mg BID for the management of epilepsy disorder, has remained free from seizures 3 years. Complete pending labs.      Aware of 24 hour and weekend coverage for urgent situations accessed by calling [facility] main practice number  Referral for individual psychotherapy    Diagnoses and all orders for this visit:    Traumatic brain injury with loss of consciousness, subsequent encounter    Psychosis, unspecified psychosis type (HCC)    Reactive depression        Current Outpatient Medications on File Prior to Visit   Medication Sig Dispense Refill    amLODIPine (NORVASC) 10 mg tablet Take 10 mg by mouth daily      amoxicillin (AMOXIL) 500 mg capsule TAKE 1 CAPSULE BY MOUTH TWICE DAILY FOR 10 DAYS (Patient not taking: Reported on 3/3/2023)      aspirin 81 mg chewable tablet Chew 81 mg daily      atorvastatin (LIPITOR) 20 mg tablet Take 20 mg by mouth every evening      benztropine (COGENTIN) 0.5 mg tablet Take 1 tablet by mouth twice daily 60 tablet 0    diazepam (VALIUM) 2 mg tablet Take 1 tablet (2 mg total) by mouth daily as needed for anxiety 30 tablet 0    divalproex sodium (DEPAKOTE) 250 mg DR tablet TAKE 1 TABLET BY MOUTH TWICE DAILY WITH  ONE  500  MG  TAB  FOR  TOAL  DOSE  OF  750  MG  TWICE  A  DAY 60 tablet 11    divalproex sodium (DEPAKOTE) 500 mg DR tablet TAKE 1 TABLET BY MOUTH TWICE DAILY WITH  MG TABLET FOR A TOTAL OF 750MG TWICE DAILY 60 tablet 5    ibuprofen (MOTRIN) 800 mg tablet daily as needed      insulin NPH-insulin regular (NovoLIN 70/30) 100 units/mL subcutaneous injection Inject 10 Units under the skin daily before breakfast (Patient taking differently: Inject 18 Units under the skin daily before breakfast) 3 mL 0    Jardiance 25 MG TABS Take 25 mg by mouth daily (Patient not taking: Reported on 3/3/2023)      lamoTRIgine (LaMICtal) 100 mg tablet Take 1 tab (100 mg) twice a day 180 tablet 3    lisinopril (ZESTRIL) 40 mg tablet Take 1  tablet by mouth daily (Patient not taking: Reported on 3/3/2023)      metFORMIN (GLUCOPHAGE) 1000 MG tablet Take 1,000 mg by mouth 2 (two) times a day with meals (Patient not taking: Reported on 3/3/2023)      metoprolol tartrate (LOPRESSOR) 100 mg tablet TAKE 1 TABLET BY MOUTH EVERY 12 HOURS 60 tablet 4    sertraline (Zoloft) 100 mg tablet Take 1.5 tablets (150 mg total) by mouth daily 90 tablet 1    Ventolin  (90 Base) MCG/ACT inhaler Inhale 2 puffs every 6 (six) hours as needed      Vitamin D, Cholecalciferol, 50 MCG (2000 UT) CAPS Take 1 capsule by mouth once daily 30 capsule 5    zaleplon (SONATA) 5 MG capsule Take 1 capsule by mouth once daily at bedtime 30 capsule 0    ziprasidone (GEODON) 80 mg capsule Take 1 capsule (80 mg total) by mouth 2 (two) times a day 60 capsule 2     No current facility-administered medications on file prior to visit.       Psychotherapy Provided:     Individual psychotherapy provided: Yes  Counseling was provided during the session today for 10 minutes.  Supportive counseling provided.  Medication changes discussed with Yan.  Medication education provided to Yan.  Goals discussed during in session: improve control of hallucinations.      HPI ROS Appetite Changes and Sleep:     He reports difficulty falling asleep, adequate appetite, low energy. Denies homicidal ideation, denies suicidal ideation    Review Of Systems:      HPI ROS:               Medication Side Effects:  denies currently    Depression (10 worst): 5/10    Anxiety (10 worst): 5-7/10    Safety concerns (SI, HI, etc): Denies si and hi    Sleep: 6 hrs    Energy: Remains low    Appetite: 2-3 meals    Weight Change: denies        Mental Status Evaluation:    Appearance Adequate hygiene and grooming   Behavior calm and cooperative   Mood anxious and depressed  Depression Scale - 5 of 10 (0 = No depression)  Anxiety Scale - 6 of 10 (0 = No anxiety)   Speech Soft   Affect constricted   Thought Processes Goal directed  and coherent   Thought Content Does not verbalize delusional material   Associations concrete associations   Perceptual Disturbances Auditory hallucinations without commands   Risk Potential Suicidal/Homicidal Ideation - No evidence of suicidal or homicidal ideation and patient does not verbalize suicidal or homicidal ideation  Risk of Violence - No evidence of risk for violence found on assessment  Risk of Self Mutilation - No evidence of risk for self mutilation found on assessment   Orientation oriented to person, place, time/date and situation   Memory recent and remote memory grossly intact   Consciousness alert and awake   Attention/Concentration attention span and concentration appear shorter than expected for age   Insight partial   Judgement partial   Muscle Strength and Gait decreased muscle strength   Motor Activity no abnormal movements   Language no difficulty naming common objects, no difficulty repeating a phrase, no difficulty writing a sentence   Fund of Knowledge adequate knowledge of current events  adequate fund of knowledge regarding past history  adequate fund of knowledge regarding vocabulary          Past Medical History:    Past Medical History:   Diagnosis Date    Chemical exposure     Diabetes mellitus (HCC)     H/O bone graft     Head injury     August 25, 2019 fell backwards hit head on a [place]oulder w/ LOC    Head injury     as an air [place] with the [employer] - jumped out of a plane parachute disfunction    Hypertension     Seizures (HCC)         Past Surgical History:   Procedure Laterality Date    FL LUMBAR PUNCTURE DIAGNOSTIC  12/13/2019    KNEE ARTHROSCOPY W/ MENISCECTOMY Left     LEG SURGERY      TONSILLECTOMY       No Known Allergies  Substance Abuse History:    Social History     Substance and Sexual Activity   Alcohol Use Not Currently    Alcohol/week: 3.0 standard drinks of alcohol    Types: 3 Cans of beer per week    Comment: socially     Social History     Substance and Sexual  Activity   Drug Use Never     Social History:    Social History     Socioeconomic History    Marital status: Single     Spouse name: Not on file    Number of children: Not on file    Years of education: Not on file    Highest education level: Not on file   Occupational History    Not on file   Tobacco Use    Smoking status: Some Days     Types: Cigars    Smokeless tobacco: Never   Vaping Use    Vaping status: Never Used   Substance and Sexual Activity    Alcohol use: Not Currently     Alcohol/week: 3.0 standard drinks of alcohol     Types: 3 Cans of beer per week     Comment: socially    Drug use: Never    Sexual activity: Not on file   Other Topics Concern    Not on file   Social History Narrative    Not on file     Social Determinants of Health     Financial Resource Strain: Not on file   Food Insecurity: Not on file   Transportation Needs: Not on file   Physical Activity: Not on file   Stress: Not on file   Social Connections: Not on file   Intimate Partner Violence: Not on file   Housing Stability: Not on file     Family Psychiatric History:     Family History   Problem Relation Age of Onset    Diabetes Mother     Hypertension Mother     Asthma Mother     Brain cancer Father     No Known Problems Sister     No Known Problems Brother     No Known Problems Brother      History Review:The following portions of the patient's history were reviewed and updated as appropriate: allergies, current medications, past family history, past medical history, past social history, past surgical history and problem list     OBJECTIVE:     Vital signs in last 24 hours:    There were no vitals filed for this visit.  Laboratory Results:   Recent Labs (last 2 months):   No visits with results within 2 Month(s) from this visit.   Latest known visit with results is:   Admission on 09/11/2023, Discharged on 09/12/2023   Component Date Value    EXTBreath Alcohol 09/11/2023 0.00     Amph/Meth UR 09/11/2023 Negative     Barbiturate Ur  09/11/2023 Negative     Benzodiazepine Urine 09/11/2023 Positive (A)     Cocaine Urine 09/11/2023 Negative     Methadone Urine 09/11/2023 Negative     Opiate Urine 09/11/2023 Negative     PCP Ur 09/11/2023 Negative     THC Urine 09/11/2023 Negative     Oxycodone Urine 09/11/2023 Negative     SARS-CoV-2 09/11/2023 Negative     INFLUENZA A PCR 09/11/2023 Negative     INFLUENZA B PCR 09/11/2023 Negative     RSV PCR 09/11/2023 Negative     WBC 09/11/2023 7.49     RBC 09/11/2023 4.79     Hemoglobin 09/11/2023 14.2     Hematocrit 09/11/2023 42.7     MCV 09/11/2023 89     MCH 09/11/2023 29.6     MCHC 09/11/2023 33.3     RDW 09/11/2023 14.4     MPV 09/11/2023 9.4     Platelets 09/11/2023 197     nRBC 09/11/2023 0     Neutrophils Relative 09/11/2023 62     Immat GRANS % 09/11/2023 1     Lymphocytes Relative 09/11/2023 27     Monocytes Relative 09/11/2023 7     Eosinophils Relative 09/11/2023 2     Basophils Relative 09/11/2023 1     Neutrophils Absolute 09/11/2023 4.64     Immature Grans Absolute 09/11/2023 0.05     Lymphocytes Absolute 09/11/2023 2.03     Monocytes Absolute 09/11/2023 0.55     Eosinophils Absolute 09/11/2023 0.17     Basophils Absolute 09/11/2023 0.05     Sodium 09/11/2023 139     Potassium 09/11/2023 3.7     Chloride 09/11/2023 105     CO2 09/11/2023 27     ANION GAP 09/11/2023 7     BUN 09/11/2023 13     Creatinine 09/11/2023 1.11     Glucose 09/11/2023 136     Calcium 09/11/2023 9.3     AST 09/11/2023 29     ALT 09/11/2023 19     Alkaline Phosphatase 09/11/2023 69     Total Protein 09/11/2023 7.1     Albumin 09/11/2023 4.2     Total Bilirubin 09/11/2023 0.56     eGFR 09/11/2023 74     TSH 3RD GENERATON 09/11/2023 2.092     Color, UA 09/11/2023 Yellow     Clarity, UA 09/11/2023 Turbid     Specific Gravity, UA 09/11/2023 1.035 (H)     pH, UA 09/11/2023 6.5     Leukocytes, UA 09/11/2023 Trace (A)     Nitrite, UA 09/11/2023 Negative     Protein, UA 09/11/2023 30 (1+) (A)     Glucose, UA 09/11/2023 Negative      Ketones, UA 09/11/2023 Negative     Urobilinogen, UA 09/11/2023 2.0 (A)     Bilirubin, UA 09/11/2023 Negative     Occult Blood, UA 09/11/2023 Negative     RBC, UA 09/11/2023 None Seen     WBC, UA 09/11/2023 1-2     Epithelial Cells 09/11/2023 Occasional     Bacteria, UA 09/11/2023 None Seen     MUCUS THREADS 09/11/2023 Innumerable (A)     Ventricular Rate 09/11/2023 66     Atrial Rate 09/11/2023 66     SD Interval 09/11/2023 184     QRSD Interval 09/11/2023 104     QT Interval 09/11/2023 434     QTC Interval 09/11/2023 454     P Axis 09/11/2023 40     QRS Yale 09/11/2023 -52     T Wave Yale 09/11/2023 10      I have personally reviewed all pertinent laboratory/tests results.    Suicide/Homicide Risk Assessment:    Risk of Harm to Self:  Protective Factors: no current suicidal ideation, access to mental health treatment, being , compliant with medications, compliant with mental health treatment, connection to community, medical compliance, personal beliefs, resiliency  Based on today's assessment, Yan presents the following risk of harm to self: minimal    Risk of Harm to Others:  Based on today's assessment, Yan presents the following risk of harm to others: minimal    The following interventions are recommended: referral for psychotherapy    Medications Risks/Benefits:      Risks, Benefits And Possible Side Effects Of Medications:    Discussed risks and benefits of treatment with patient including risk of suicidality, serotonin syndrome, increased QTc interval and SIADH related to treatment with antidepressants; Risk of induction of manic symptoms in certain patient populations, risk of parkinsonian symptoms, metabolic syndrome, tardive dyskinesia and neuroleptic malignant syndrome related to treatment with antipsychotic medications, risks of misuse, abuse or dependence, sedation and respiratory depression related to treatment with benzodiazepine medications, risk of rash related to treatment with  Lamictal, and risk of liver impairment related to treatment with Depakote     Controlled Medication Discussion:     Yan has been filling controlled prescriptions on time as prescribed according to [place] Prescription Drug Monitoring Program  Discussed with Yan the risks of sedation, respiratory depression, impairment of ability to drive and potential for abuse and addiction related to treatment with benzodiazepine medications. He understands risk of treatment with benzodiazepine medications, agrees to not drive if feels impaired and agrees to take medications as prescribed  Discussed with Yan Black Box warning on concurrent use of benzodiazepines and opioid medications including sedation, respiratory depression, coma and death. He understands the risk of treatment with benzodiazepines in addition to opioids and wants to continue taking those medications  Discussed with Yan increased risk of impairment related to concurrent use of benzodiazepines and hypnotic medications including excessive sedation, psychomotor impairment and respiratory depression. He understands the risk of treatment with benzodiazepines in addition to hypnotic medications and wants to continue taking those medications    Treatment Plan:    Due for update/Updated:   no  Last treatment plan done 1/9/24, due 7/9/24    ROCKY Orellana 01/09/24    This note was shared with patient.      Visit Time    Visit Start Time: 230  Visit Stop Time: 255  Total Visit Duration:  25 minutes

## 2024-01-15 DIAGNOSIS — F29 PSYCHOSIS, UNSPECIFIED PSYCHOSIS TYPE (HCC): ICD-10-CM

## 2024-01-15 DIAGNOSIS — F32.9 REACTIVE DEPRESSION: ICD-10-CM

## 2024-01-15 RX ORDER — ZIPRASIDONE HYDROCHLORIDE 80 MG/1
80 CAPSULE ORAL 2 TIMES DAILY
Qty: 60 CAPSULE | Refills: 0 | Status: SHIPPED | OUTPATIENT
Start: 2024-01-15

## 2024-02-05 DIAGNOSIS — F29 PSYCHOSIS, UNSPECIFIED PSYCHOSIS TYPE (HCC): ICD-10-CM

## 2024-02-05 RX ORDER — DIAZEPAM 2 MG/1
TABLET ORAL
Qty: 30 TABLET | Refills: 0 | Status: SHIPPED | OUTPATIENT
Start: 2024-02-05

## 2024-02-08 DIAGNOSIS — F29 PSYCHOSIS, UNSPECIFIED PSYCHOSIS TYPE (HCC): ICD-10-CM

## 2024-02-08 RX ORDER — ZALEPLON 5 MG/1
5 CAPSULE ORAL
Qty: 30 CAPSULE | Refills: 0 | Status: SHIPPED | OUTPATIENT
Start: 2024-02-08

## 2024-02-18 DIAGNOSIS — F29 PSYCHOSIS, UNSPECIFIED PSYCHOSIS TYPE (HCC): ICD-10-CM

## 2024-02-18 DIAGNOSIS — F32.9 REACTIVE DEPRESSION: ICD-10-CM

## 2024-02-19 RX ORDER — ZIPRASIDONE HYDROCHLORIDE 80 MG/1
80 CAPSULE ORAL 2 TIMES DAILY
Qty: 60 CAPSULE | Refills: 0 | Status: SHIPPED | OUTPATIENT
Start: 2024-02-19

## 2024-02-26 DIAGNOSIS — F32.9 REACTIVE DEPRESSION: ICD-10-CM

## 2024-02-26 DIAGNOSIS — S06.9X9D TRAUMATIC BRAIN INJURY WITH LOSS OF CONSCIOUSNESS, SUBSEQUENT ENCOUNTER: ICD-10-CM

## 2024-02-26 RX ORDER — SERTRALINE HYDROCHLORIDE 100 MG/1
150 TABLET, FILM COATED ORAL DAILY
Qty: 90 TABLET | Refills: 0 | Status: SHIPPED | OUTPATIENT
Start: 2024-02-26

## 2024-02-26 RX ORDER — BENZTROPINE MESYLATE 0.5 MG/1
0.5 TABLET ORAL 2 TIMES DAILY
Qty: 60 TABLET | Refills: 0 | Status: SHIPPED | OUTPATIENT
Start: 2024-02-26

## 2024-03-06 ENCOUNTER — TELEPHONE (OUTPATIENT)
Dept: PSYCHIATRY | Facility: CLINIC | Age: 54
End: 2024-03-06

## 2024-03-06 NOTE — TELEPHONE ENCOUNTER
Patient is requesting a call from you regarding his current state of psychosis. He did not feel comfortable giving me much detail, just requesting a call from you.

## 2024-03-07 NOTE — TELEPHONE ENCOUNTER
Spoke to patient, states he is struggling to cope with recent losses in the past 6 months including his daughter passing away and dog passing away more recently. States he simply needed someone to speak to, we discussed his recent losses, provided supportive counseling.  History of chronic on and off hallucinations as a result of prior TBI.  Denies command hallucinations at this time.  Denies current SI and HI.  Patient has the number to crisis and suicidal hotline.  He is agreeable to go to the nearest emergency room if suicidal ideation with plan ever develops and he agreed to plan. Wife remains present and manages his medications.

## 2024-03-12 DIAGNOSIS — F29 PSYCHOSIS, UNSPECIFIED PSYCHOSIS TYPE (HCC): ICD-10-CM

## 2024-03-12 RX ORDER — ZALEPLON 5 MG/1
5 CAPSULE ORAL
Qty: 30 CAPSULE | Refills: 0 | Status: SHIPPED | OUTPATIENT
Start: 2024-03-12

## 2024-03-22 DIAGNOSIS — S06.9X9D TRAUMATIC BRAIN INJURY WITH LOSS OF CONSCIOUSNESS, SUBSEQUENT ENCOUNTER: ICD-10-CM

## 2024-03-23 DIAGNOSIS — F29 PSYCHOSIS, UNSPECIFIED PSYCHOSIS TYPE (HCC): ICD-10-CM

## 2024-03-23 DIAGNOSIS — F32.9 REACTIVE DEPRESSION: ICD-10-CM

## 2024-03-25 RX ORDER — BENZTROPINE MESYLATE 0.5 MG/1
0.5 TABLET ORAL 2 TIMES DAILY
Qty: 60 TABLET | Refills: 0 | Status: SHIPPED | OUTPATIENT
Start: 2024-03-25

## 2024-03-25 RX ORDER — ZIPRASIDONE HYDROCHLORIDE 80 MG/1
80 CAPSULE ORAL 2 TIMES DAILY
Qty: 60 CAPSULE | Refills: 0 | Status: SHIPPED | OUTPATIENT
Start: 2024-03-25

## 2024-03-27 ENCOUNTER — PATIENT MESSAGE (OUTPATIENT)
Dept: NEUROLOGY | Facility: CLINIC | Age: 54
End: 2024-03-27

## 2024-03-27 ENCOUNTER — TELEMEDICINE (OUTPATIENT)
Dept: NEUROLOGY | Facility: CLINIC | Age: 54
End: 2024-03-27
Payer: MEDICARE

## 2024-03-27 VITALS — HEIGHT: 70 IN | BODY MASS INDEX: 37.94 KG/M2 | WEIGHT: 265 LBS

## 2024-03-27 DIAGNOSIS — F29 PSYCHOSIS, UNSPECIFIED PSYCHOSIS TYPE (HCC): ICD-10-CM

## 2024-03-27 DIAGNOSIS — G40.109 LOCALIZATION-RELATED EPILEPSY (HCC): Primary | ICD-10-CM

## 2024-03-27 DIAGNOSIS — S06.9X9D TRAUMATIC BRAIN INJURY WITH LOSS OF CONSCIOUSNESS, SUBSEQUENT ENCOUNTER: ICD-10-CM

## 2024-03-27 PROCEDURE — 99214 OFFICE O/P EST MOD 30 MIN: CPT | Performed by: PSYCHIATRY & NEUROLOGY

## 2024-03-27 PROCEDURE — G2211 COMPLEX E/M VISIT ADD ON: HCPCS | Performed by: PSYCHIATRY & NEUROLOGY

## 2024-03-27 NOTE — PROGRESS NOTES
Virtual Regular Visit    Verification of patient location: [place]    Patient is located at Home in the following state in which I hold an active license [place]      Assessment/Plan:    Patient ID: Yan Finch Jr. is a 54 y.o. male with prior traumatic subarachnoid hemorrhage, localization-related epilepsy, and ongoing auditory hallucinations, who is returning for follow-up of his seizures.       Assessment/Plan:    Localization-related epilepsy (HCC)  He continues to be seizure-free on combination of divalproex and lamotrigine, which is also likely helping with  his mood. It will be important for him to continue to take these medications going forward to prevent any additional seizures.     -- he will continue Divalproex and Lamotrigine unchanged.     Psychosis (HCC)  Will be important for him to continue to follow with his psychiatrist for ongoing management of his psychosis.  Overall, his divalproex and lamotrigine are likely providing some benefit, but he is still having very frequent/daily auditory hallucinations which continue to wear him down.  I also concerned that with his depression he is not as active and is gaining weight and losing some mobility.  It will be important for him to get this under better control so that is able to be more active which will be important for his overall health.  Hopefully if they can get his psychosis and mood under better control, he would be able to work with physical therapy to improve his overall function      He will Return in about 4 months (around 7/27/2024).            Reason for visit is   Chief Complaint   Patient presents with    Virtual Regular Visit          Encounter provider Balaji Jewell MD    Provider located at [facility] [place]  [address] [facility] [id number]      Recent Visits  Date Type Provider Dept   03/27/24 Telemedicine Balaji Jewell MD [facility]   Showing  recent visits within past 7 days and meeting all other requirements  Future Appointments  No visits were found meeting these conditions.  Showing future appointments within next 150 days and meeting all other requirements       The patient was identified by name and date of birth. Yan Finch Jr. was informed that this is a telemedicine visit and that the visit is being conducted through the Epic Embedded platform. He agrees to proceed..  My office door was closed. No one else was in the room.  He acknowledged consent and understanding of privacy and security of the video platform. The patient has agreed to participate and understands they can discontinue the visit at any time.    Patient is aware this is a billable service.     Subjective    Current seizure medications:  1. Divalproex  mg twice a day   2. Lamotrigine  100 mg twice a day   Other medications as per [facility].     Since his last visit, he has had a lot of traumatic issues with multiple deaths in the family with his daughter and more recently his son-in-law. This has been very hard.      He continues to be following with psychiatry. They have been adjusting medications. They do feel that his voices are a little more bearable and his wife feels his mood has been a little better. He feels it doesn't work, but his wife acknowledges that things have been better with these changes, even if not fully controlled.       They have noted that his health overall has been declining. He has been having pain in his back. He has been refusing to go to his PCP or to Urgent Care. He is not very mobile and sits at home each day.      Prior Seizure Medications: Levetiracetam (worsened mood and hallucinations)     His history was also obtained from his wife, who was present on video.     HPI     Past Medical History:   Diagnosis Date    Chemical exposure     Diabetes mellitus (HCC)     H/O bone graft     Head injury     August 25, 2019 fell backwards hit head on a boulder  w/ LOC    Head injury     as an air [place] with the [employer] - jumped out of a plane parachute disfunction    Hypertension     Seizures (HCC)        Past Surgical History:   Procedure Laterality Date    FL LUMBAR PUNCTURE DIAGNOSTIC  12/13/2019    KNEE ARTHROSCOPY W/ MENISCECTOMY Left     LEG SURGERY      TONSILLECTOMY         Current Outpatient Medications   Medication Sig Dispense Refill    amLODIPine (NORVASC) 10 mg tablet Take 10 mg by mouth daily      aspirin 81 mg chewable tablet Chew 81 mg daily      atorvastatin (LIPITOR) 20 mg tablet Take 20 mg by mouth every evening      benztropine (COGENTIN) 0.5 mg tablet Take 1 tablet by mouth twice daily 60 tablet 0    diazepam (VALIUM) 2 mg tablet TAKE 1 TABLET BY MOUTH ONCE DAILY AS NEEDED FOR ANXIETY 30 tablet 0    divalproex sodium (DEPAKOTE) 250 mg DR tablet TAKE 1 TABLET BY MOUTH TWICE DAILY WITH  ONE  500  MG  TAB  FOR  TOAL  DOSE  OF  750  MG  TWICE  A  DAY 60 tablet 11    divalproex sodium (DEPAKOTE) 500 mg DR tablet TAKE 1 TABLET BY MOUTH TWICE DAILY WITH  MG TABLET FOR A TOTAL OF 750MG TWICE DAILY 60 tablet 5    ibuprofen (MOTRIN) 800 mg tablet daily as needed      insulin NPH-insulin regular (NovoLIN 70/30) 100 units/mL subcutaneous injection Inject 10 Units under the skin daily before breakfast (Patient taking differently: Inject 18 Units under the skin daily before breakfast) 3 mL 0    lamoTRIgine (LaMICtal) 100 mg tablet Take 1 tab (100 mg) twice a day 180 tablet 3    metoprolol tartrate (LOPRESSOR) 100 mg tablet TAKE 1 TABLET BY MOUTH EVERY 12 HOURS 60 tablet 4    sertraline (ZOLOFT) 100 mg tablet TAKE 1 & 1/2 (ONE & ONE-HALF) TABLETS BY MOUTH ONCE DAILY 90 tablet 0    Ventolin  (90 Base) MCG/ACT inhaler Inhale 2 puffs every 6 (six) hours as needed      Vitamin D, Cholecalciferol, 50 MCG (2000 UT) CAPS Take 1 capsule by mouth once daily 30 capsule 5    zaleplon (SONATA) 5 MG capsule Take 1 capsule by mouth once daily at bedtime 30 capsule  "0    ziprasidone (GEODON) 80 mg capsule Take 1 capsule by mouth twice daily 60 capsule 0    amoxicillin (AMOXIL) 500 mg capsule TAKE 1 CAPSULE BY MOUTH TWICE DAILY FOR 10 DAYS (Patient not taking: Reported on 3/3/2023)      Jardiance 25 MG TABS Take 25 mg by mouth daily (Patient not taking: Reported on 3/3/2023)      lisinopril (ZESTRIL) 40 mg tablet Take 1 tablet by mouth daily (Patient not taking: Reported on 3/3/2023)      metFORMIN (GLUCOPHAGE) 1000 MG tablet Take 1,000 mg by mouth 2 (two) times a day with meals (Patient not taking: Reported on 3/3/2023)       No current facility-administered medications for this visit.        No Known Allergies    Review of Systems   Constitutional:  Negative for appetite change, fatigue and fever.   HENT: Negative.  Negative for hearing loss, tinnitus, trouble swallowing and voice change.    Eyes: Negative.  Negative for photophobia, pain and visual disturbance.   Respiratory: Negative.  Negative for shortness of breath.    Cardiovascular: Negative.  Negative for palpitations.   Gastrointestinal: Negative.  Negative for nausea and vomiting.   Endocrine: Negative.  Negative for cold intolerance.   Genitourinary: Negative.  Negative for dysuria, frequency and urgency.   Musculoskeletal:  Positive for gait problem. Negative for back pain, myalgias, neck pain and neck stiffness.   Skin: Negative.  Negative for rash.   Allergic/Immunologic: Negative.    Neurological:  Negative for dizziness, tremors, seizures, syncope, facial asymmetry, speech difficulty, weakness, light-headedness, numbness and headaches.   Hematological: Negative.  Does not bruise/bleed easily.   Psychiatric/Behavioral:  Positive for dysphoric mood and hallucinations. Negative for confusion and sleep disturbance.        Video Exam    Vitals:    03/27/24 1337   Weight: 120 kg (265 lb)   Height: 5' 10\" (1.778 m)       Physical Exam     Visit Time  Total Visit Duration: 20 min        "

## 2024-03-27 NOTE — PROGRESS NOTES
Patient ID: Yan Finch Jr. is a 54 y.o. male with  prior traumatic subarachnoid hemorrhage, localization-related epilepsy, and ongoing auditory hallucinations , who is returning to Neurology office for follow up of his seizures.       Assessment/Plan:    No problem-specific Assessment & Plan notes found for this encounter.        He will No follow-ups on file.      Subjective:    HPI  Current seizure medications:  1. Divalproex  mg twice a day   2. Lamotrigine  100 mg twice a day   Other medications as per [facility].    Since his last visit, he has had a lot of traumatic issues with multiple deaths in the family with his daughter and more recently his son-in-law. This has been very hard.     He continues to be following with psychiatry. They have been adjusting medications. They do feel that his voices are a little more bearable and his wife feels his mood has been a little better. He feels it doesn't work, but his wife acknowledges that things have been better with these changes, even if not fully controlled.      They have noted that his health overall has been declining. He has been having pain in his back. He has been refusing to go to his PCP or to Urgent Care. He is not very mobile and sits at home each day.     Prior Seizure Medications: Levetiracetam (worsened mood and hallucinations)    His history was also obtained from his wife, who was present at today's visit.     *** I personally reviewed his {test name} from ***.   *** I reviewed ***, as documented in Epic/CapRally, and summarized above.        Objective:    There were no vitals taken for this visit.    Physical Exam    Neurological Exam      ROS:    Review of Systems    I personally reviewed the ROS that was entered by the medical assistant in a separate note.     Voice recognition software was used in the generation of this note. There may be unintentional errors including grammatical errors, spelling errors, or pronoun errors.

## 2024-03-27 NOTE — PATIENT INSTRUCTIONS
-- continue to take Divalproex and Lamotrigine unchanged.     -- It is important to continue to work with your psychiatrist for your mood and hallucinations.     -- I would like for you to work with physical therapy to improve your mobility. A big goal would be to try to work toward getting out of the house and to be able to be more active.

## 2024-03-28 ENCOUNTER — TELEMEDICINE (OUTPATIENT)
Dept: PSYCHIATRY | Facility: CLINIC | Age: 54
End: 2024-03-28

## 2024-03-28 DIAGNOSIS — F32.9 REACTIVE DEPRESSION: ICD-10-CM

## 2024-03-28 DIAGNOSIS — Z91.199 NO-SHOW FOR APPOINTMENT: ICD-10-CM

## 2024-03-28 DIAGNOSIS — S06.9X9D TRAUMATIC BRAIN INJURY WITH LOSS OF CONSCIOUSNESS, SUBSEQUENT ENCOUNTER: ICD-10-CM

## 2024-03-28 DIAGNOSIS — F29 PSYCHOSIS, UNSPECIFIED PSYCHOSIS TYPE (HCC): Primary | ICD-10-CM

## 2024-03-28 PROCEDURE — NOSHOW

## 2024-04-01 NOTE — ASSESSMENT & PLAN NOTE
Will be important for him to continue to follow with his psychiatrist for ongoing management of his psychosis.  Overall, his divalproex and lamotrigine are likely providing some benefit, but he is still having very frequent/daily auditory hallucinations which continue to wear him down.  I also concerned that with his depression he is not as active and is gaining weight and losing some mobility.  It will be important for him to get this under better control so that is able to be more active which will be important for his overall health.  Hopefully if they can get his psychosis and mood under better control, he would be able to work with physical therapy to improve his overall function

## 2024-04-01 NOTE — ASSESSMENT & PLAN NOTE
He continues to be seizure-free on combination of divalproex and lamotrigine, which is also likely helping with  his mood. It will be important for him to continue to take these medications going forward to prevent any additional seizures.     -- he will continue Divalproex and Lamotrigine unchanged.

## 2024-04-02 NOTE — PSYCH
No Call. No Show. No Charge    Yan Finch Jr. no showed 04/02/24 appointment , staff called and left message to reschedule appointment     Treatment Plan not due at this session.

## 2024-04-16 DIAGNOSIS — F29 PSYCHOSIS, UNSPECIFIED PSYCHOSIS TYPE (HCC): ICD-10-CM

## 2024-04-16 DIAGNOSIS — F32.9 REACTIVE DEPRESSION: ICD-10-CM

## 2024-04-20 DIAGNOSIS — F29 PSYCHOSIS, UNSPECIFIED PSYCHOSIS TYPE (HCC): ICD-10-CM

## 2024-04-23 ENCOUNTER — TELEPHONE (OUTPATIENT)
Dept: PSYCHIATRY | Facility: CLINIC | Age: 54
End: 2024-04-23

## 2024-04-23 NOTE — TELEPHONE ENCOUNTER
Patient's Home Health aid, Kristin, left VM wanting to refill medications. Writer returned call and patient answered. Writer explained that his health aid called to refill prescriptions. Patient did not know what medications to refill at the time of the phone call.     Patient gave permission to speak to health aid on his behalf and will have health aid contact us back to refill.     Patient's appt is scheduled for  4/30/24 at 11:30am.

## 2024-04-24 RX ORDER — ZALEPLON 5 MG/1
5 CAPSULE ORAL
Qty: 30 CAPSULE | Refills: 0 | Status: SHIPPED | OUTPATIENT
Start: 2024-04-24

## 2024-04-24 RX ORDER — DIAZEPAM 2 MG/1
TABLET ORAL
Qty: 30 TABLET | Refills: 0 | Status: SHIPPED | OUTPATIENT
Start: 2024-04-24

## 2024-04-24 RX ORDER — SERTRALINE HYDROCHLORIDE 100 MG/1
150 TABLET, FILM COATED ORAL DAILY
Qty: 90 TABLET | Refills: 0 | Status: SHIPPED | OUTPATIENT
Start: 2024-04-24

## 2024-04-26 DIAGNOSIS — F29 PSYCHOSIS, UNSPECIFIED PSYCHOSIS TYPE (HCC): ICD-10-CM

## 2024-04-26 DIAGNOSIS — F32.9 REACTIVE DEPRESSION: ICD-10-CM

## 2024-04-26 RX ORDER — ZIPRASIDONE HYDROCHLORIDE 80 MG/1
80 CAPSULE ORAL 2 TIMES DAILY
Qty: 60 CAPSULE | Refills: 0 | Status: SHIPPED | OUTPATIENT
Start: 2024-04-26

## 2024-04-30 ENCOUNTER — TELEMEDICINE (OUTPATIENT)
Dept: PSYCHIATRY | Facility: CLINIC | Age: 54
End: 2024-04-30
Payer: MEDICARE

## 2024-04-30 DIAGNOSIS — F29 PSYCHOSIS, UNSPECIFIED PSYCHOSIS TYPE (HCC): Primary | ICD-10-CM

## 2024-04-30 DIAGNOSIS — Z79.899 POLYPHARMACY: ICD-10-CM

## 2024-04-30 DIAGNOSIS — Z79.899 DRUG THERAPY: ICD-10-CM

## 2024-04-30 DIAGNOSIS — F32.9 REACTIVE DEPRESSION: ICD-10-CM

## 2024-04-30 DIAGNOSIS — S06.9X9D TRAUMATIC BRAIN INJURY WITH LOSS OF CONSCIOUSNESS, SUBSEQUENT ENCOUNTER: ICD-10-CM

## 2024-04-30 PROCEDURE — 99214 OFFICE O/P EST MOD 30 MIN: CPT

## 2024-04-30 NOTE — PSYCH
Virtual Regular Visit    Verification of patient location:    Patient is located in the following state in which I hold an active license [place]    Problem List Items Addressed This Visit          Nervous and Auditory    Traumatic brain injury with loss of consciousness, subsequent encounter - Primary       Behavioral Health    Psychosis (HCC)    Reactive depression          Encounter provider ROCKY Orellana    Provider located at    [facility] [place]  [address] [facility] [address] [phone]    Recent Visits  No visits were found meeting these conditions.  Showing recent visits within past 7 days and meeting all other requirements  Future Appointments  No visits were found meeting these conditions.  Showing future appointments within next 150 days and meeting all other requirements         The patient was identified by name and date of birth. Yan Stef Marquez was informed that this is a telemedicine visit and that the visit is being conducted throughthe Epic Embedded platform. He agrees to proceed..  My office door was closed. No one else was in the room.  He acknowledged consent and understanding of privacy and security of the video platform. The patient has agreed to participate and understands they can discontinue the visit at any time.    Patient is aware this is a billable service.     HPI     Current Outpatient Medications   Medication Sig Dispense Refill    amLODIPine (NORVASC) 10 mg tablet Take 10 mg by mouth daily      amoxicillin (AMOXIL) 500 mg capsule TAKE 1 CAPSULE BY MOUTH TWICE DAILY FOR 10 DAYS (Patient not taking: Reported on 3/3/2023)      aspirin 81 mg chewable tablet Chew 81 mg daily      atorvastatin (LIPITOR) 20 mg tablet Take 20 mg by mouth every evening      benztropine (COGENTIN) 0.5 mg tablet Take 1 tablet by mouth twice daily 60 tablet 0    diazepam (VALIUM) 2 mg tablet TAKE 1 TABLET BY MOUTH ONCE DAILY AS NEEDED FOR  ANXIETY 30 tablet 0    divalproex sodium (DEPAKOTE) 250 mg DR tablet TAKE 1 TABLET BY MOUTH TWICE DAILY WITH  ONE  500  MG  TAB  FOR  TOAL  DOSE  OF  750  MG  TWICE  A  DAY 60 tablet 11    divalproex sodium (DEPAKOTE) 500 mg DR tablet TAKE 1 TABLET BY MOUTH TWICE DAILY WITH  MG TABLET FOR A TOTAL OF 750MG TWICE DAILY 60 tablet 5    ibuprofen (MOTRIN) 800 mg tablet daily as needed      insulin NPH-insulin regular (NovoLIN 70/30) 100 units/mL subcutaneous injection Inject 10 Units under the skin daily before breakfast (Patient taking differently: Inject 18 Units under the skin daily before breakfast) 3 mL 0    Jardiance 25 MG TABS Take 25 mg by mouth daily (Patient not taking: Reported on 3/3/2023)      lamoTRIgine (LaMICtal) 100 mg tablet Take 1 tab (100 mg) twice a day 180 tablet 3    lisinopril (ZESTRIL) 40 mg tablet Take 1 tablet by mouth daily (Patient not taking: Reported on 3/3/2023)      metFORMIN (GLUCOPHAGE) 1000 MG tablet Take 1,000 mg by mouth 2 (two) times a day with meals (Patient not taking: Reported on 3/3/2023)      metoprolol tartrate (LOPRESSOR) 100 mg tablet TAKE 1 TABLET BY MOUTH EVERY 12 HOURS 60 tablet 4    sertraline (ZOLOFT) 100 mg tablet TAKE 1 & 1/2 (ONE & ONE-HALF) TABLETS BY MOUTH ONCE DAILY 90 tablet 0    Ventolin  (90 Base) MCG/ACT inhaler Inhale 2 puffs every 6 (six) hours as needed      Vitamin D, Cholecalciferol, 50 MCG (2000 UT) CAPS Take 1 capsule by mouth once daily 30 capsule 5    zaleplon (SONATA) 5 MG capsule Take 1 capsule by mouth once daily at bedtime 30 capsule 0    ziprasidone (GEODON) 80 mg capsule Take 1 capsule by mouth twice daily 60 capsule 0     No current facility-administered medications for this visit.       Review of Systems  Video Exam    There were no vitals filed for this visit.    Physical Exam   As a result of this visit, I have referred the patient for further respiratory evaluation. No      VIRTUAL VISIT DISCLAIMER    Yan Finch Jr.  acknowledges that he has consented to an online visit or consultation. He understands that the online visit is based solely on information provided by him, and that, in the absence of a face-to-face physical evaluation by the physician, the diagnosis he receives is both limited and provisional in terms of accuracy and completeness. This is not intended to replace a full medical face-to-face evaluation by the physician. Yan Finch Jr. understands and accepts these terms.    MEDICATION MANAGEMENT NOTE        [facility]    Name and Date of Birth:  Yan Finch Jr. 54 y.o. 1970 MRN: [id number]    Date of Visit: April 30, 2024    No Known Allergies  SUBJECTIVE:    Yan is seen today for a follow up for  hallucinations, depression, hx TBI . He reports that he continues to experience on and off symptoms since the last visit. Eduard reports continuing chronic auditory hallucinations that wax and wane depending on the week, hallucinations started in 2019 after a traumatic brain injury and have been present ever since.  His wife continues to report the hallucinations have improved since Geodon 80 mg daily twice daily initiated and titrated up to the maximum current dose.  Patient's wife is a great support system, she continues to manage his medication, encourage him out of the house.  Patient has low energy, motivation-wife came up with a goal of patient getting out of the house twice a week to go to the gym with her, will monitor progress on goal going forward.  The patient states he is looking forward to the warmer weather, spending more time outside.  No evidence of internal preoccupation or current hallucinations observed during today's counter. Denies command hallucinations.  He does appear to have made progress with current Geodon, tolerating with minimal side effects of occasional hand shaking relieved by Cogentin.  Denies any permanent or involuntary movements.  When  the hallucinations worsen, patient experiences agitation and anxiety-relieved by as needed Valium 2 mg, using sparingly and appropriate for situation.  Patient unable to drive and cannot get to office, will continue virtual visits for the time being.  Current depression 5/10 with periods of dysphoria, lack of energy motivation, anhedonia, sadness.  He is very thankful to this provider for providing his medications, supportive counseling.  He brightens when talking about his granddaughter and the time he gets to spend with her.  No medication changes made.  Denies SI and HI. Complete pending labs to monitor for metabolic s/s.        He denies any side effects from medications.    PLAN:    -Continue Geodon to 80 mg twice daily for chronic auditory hallucinations in the context of his traumatic brain injury.    PARQ discussed with patient included sedation, dizziness,  dry mouth, constipation, hypotension, dyslipidemia, EPS, metabolic syndrome, peripheral edema, dyspepsia, joint and body pains, tachycardia, orthostatic hypotension, rash with sunlight exposure, hyperglycemia, seizures, skin rash, rare NMS, and increased risk of CVA in patient's with dementia.     -Continue Cogentin 0.5 mg twice daily prn for periodic bilateral hand shaking, reports improvement.    -Continue Zoloft 150 mg daily for chronic depression which initiated with  started since 19  PARQ completed including serotonin syndrome, SIADH, worsening depression, suicidality, induction of afshin, GI upset, headaches, activation, sexual side effects, sedation, potential drug interactions, and others.     -Continue p.r.n. Valium 2 milligrams p.r.n. for severe anxiety/agitation which occurs due to patient's distressing auditory hallucinations.   Using infrequently and only as needed.  Discussed with patient the risks of sedation, respiratory depression, impairment of ability to drive and potential for abuse and addiction related to treatment with  benzodiazepine medications. The patient understands risk of treatment with benzodiazepine medications, agrees to not drive if feels impaired and agrees to take medications as prescribed.      -Utilizes prn Sonata 5mg for insomnia       - Neurologist prescribes Depakote 750 mg b.i.d. and Lamictal 100 mg BID for the management of epilepsy disorder, has remained free from seizures 4 years. Complete pending labs.      Aware of 24 hour and weekend coverage for urgent situations accessed by calling [facility] main practice number  Referral for individual psychotherapy    Diagnoses and all orders for this visit:    Traumatic brain injury with loss of consciousness, subsequent encounter    Psychosis, unspecified psychosis type (HCC)    Reactive depression        Current Outpatient Medications on File Prior to Visit   Medication Sig Dispense Refill    amLODIPine (NORVASC) 10 mg tablet Take 10 mg by mouth daily      amoxicillin (AMOXIL) 500 mg capsule TAKE 1 CAPSULE BY MOUTH TWICE DAILY FOR 10 DAYS (Patient not taking: Reported on 3/3/2023)      aspirin 81 mg chewable tablet Chew 81 mg daily      atorvastatin (LIPITOR) 20 mg tablet Take 20 mg by mouth every evening      benztropine (COGENTIN) 0.5 mg tablet Take 1 tablet by mouth twice daily 60 tablet 0    diazepam (VALIUM) 2 mg tablet TAKE 1 TABLET BY MOUTH ONCE DAILY AS NEEDED FOR ANXIETY 30 tablet 0    divalproex sodium (DEPAKOTE) 250 mg DR tablet TAKE 1 TABLET BY MOUTH TWICE DAILY WITH  ONE  500  MG  TAB  FOR  TOAL  DOSE  OF  750  MG  TWICE  A  DAY 60 tablet 11    divalproex sodium (DEPAKOTE) 500 mg DR tablet TAKE 1 TABLET BY MOUTH TWICE DAILY WITH  MG TABLET FOR A TOTAL OF 750MG TWICE DAILY 60 tablet 5    ibuprofen (MOTRIN) 800 mg tablet daily as needed      insulin NPH-insulin regular (NovoLIN 70/30) 100 units/mL subcutaneous injection Inject 10 Units under the skin daily before breakfast (Patient taking differently: Inject 18 Units under  the skin daily before breakfast) 3 mL 0    Jardiance 25 MG TABS Take 25 mg by mouth daily (Patient not taking: Reported on 3/3/2023)      lamoTRIgine (LaMICtal) 100 mg tablet Take 1 tab (100 mg) twice a day 180 tablet 3    lisinopril (ZESTRIL) 40 mg tablet Take 1 tablet by mouth daily (Patient not taking: Reported on 3/3/2023)      metFORMIN (GLUCOPHAGE) 1000 MG tablet Take 1,000 mg by mouth 2 (two) times a day with meals (Patient not taking: Reported on 3/3/2023)      metoprolol tartrate (LOPRESSOR) 100 mg tablet TAKE 1 TABLET BY MOUTH EVERY 12 HOURS 60 tablet 4    sertraline (ZOLOFT) 100 mg tablet TAKE 1 & 1/2 (ONE & ONE-HALF) TABLETS BY MOUTH ONCE DAILY 90 tablet 0    Ventolin  (90 Base) MCG/ACT inhaler Inhale 2 puffs every 6 (six) hours as needed      Vitamin D, Cholecalciferol, 50 MCG (2000 UT) CAPS Take 1 capsule by mouth once daily 30 capsule 5    zaleplon (SONATA) 5 MG capsule Take 1 capsule by mouth once daily at bedtime 30 capsule 0    ziprasidone (GEODON) 80 mg capsule Take 1 capsule by mouth twice daily 60 capsule 0     No current facility-administered medications on file prior to visit.       Psychotherapy Provided:     Individual psychotherapy provided: Yes  Counseling was provided during the session today for 10 minutes.  Supportive counseling provided.  Goals discussed during in session: improve control of hallucinations.      HPI ROS Appetite Changes and Sleep:     He reports difficulty falling asleep, adequate appetite, low energy. Denies homicidal ideation, denies suicidal ideation    Review Of Systems:      HPI ROS:               Medication Side Effects:  denies currently    Depression (10 worst): 4-6/10    Anxiety (10 worst): 4/10    Safety concerns (SI, HI, etc): Denies si and hi    Sleep: 6-10hrs    Energy: Remains low    Appetite: 2-3 meals    Weight Change: denies        Mental Status Evaluation:    Appearance Adequate hygiene and grooming   Behavior calm and cooperative   Mood  depressed  Depression Scale - 5 of 10 (0 = No depression)  Anxiety Scale - 4 of 10 (0 = No anxiety)   Speech Soft   Affect constricted   Thought Processes Goal directed and coherent   Thought Content Does not verbalize delusional material   Associations concrete associations   Perceptual Disturbances Auditory hallucinations without commands   Risk Potential Suicidal/Homicidal Ideation - No evidence of suicidal or homicidal ideation and patient does not verbalize suicidal or homicidal ideation  Risk of Violence - No evidence of risk for violence found on assessment  Risk of Self Mutilation - No evidence of risk for self mutilation found on assessment   Orientation oriented to person, place, time/date and situation   Memory recent and remote memory grossly intact   Consciousness alert and awake   Attention/Concentration attention span and concentration appear shorter than expected for age   Insight partial   Judgement partial   Muscle Strength and Gait decreased muscle strength   Motor Activity no abnormal movements   Language no difficulty naming common objects, no difficulty repeating a phrase, no difficulty writing a sentence   Fund of Knowledge adequate knowledge of current events  adequate fund of knowledge regarding past history  adequate fund of knowledge regarding vocabulary          Past Medical History:    Past Medical History:   Diagnosis Date    Chemical exposure     Diabetes mellitus (HCC)     H/O bone graft     Head injury     August 25, 2019 fell backwards hit head on a [place]oulder w/ LOC    Head injury     as an air [place] with the [employer] - jumped out of a plane parachute disfunction    Hypertension     Seizures (HCC)         Past Surgical History:   Procedure Laterality Date    FL LUMBAR PUNCTURE DIAGNOSTIC  12/13/2019    KNEE ARTHROSCOPY W/ MENISCECTOMY Left     LEG SURGERY      TONSILLECTOMY       No Known Allergies  Substance Abuse History:    Social History     Substance and Sexual Activity    Alcohol Use Not Currently    Alcohol/week: 3.0 standard drinks of alcohol    Types: 3 Cans of beer per week    Comment: socially     Social History     Substance and Sexual Activity   Drug Use Never     Social History:    Social History     Socioeconomic History    Marital status: Single     Spouse name: Not on file    Number of children: Not on file    Years of education: Not on file    Highest education level: Not on file   Occupational History    Not on file   Tobacco Use    Smoking status: Some Days     Types: Cigars    Smokeless tobacco: Never   Vaping Use    Vaping status: Never Used   Substance and Sexual Activity    Alcohol use: Not Currently     Alcohol/week: 3.0 standard drinks of alcohol     Types: 3 Cans of beer per week     Comment: socially    Drug use: Never    Sexual activity: Not on file   Other Topics Concern    Not on file   Social History Narrative    Not on file     Social Determinants of Health     Financial Resource Strain: Not on file   Food Insecurity: Not on file   Transportation Needs: Not on file   Physical Activity: Not on file   Stress: Not on file   Social Connections: Not on file   Intimate Partner Violence: Not on file   Housing Stability: Not on file     Family Psychiatric History:     Family History   Problem Relation Age of Onset    Diabetes Mother     Hypertension Mother     Asthma Mother     Brain cancer Father     No Known Problems Sister     No Known Problems Brother     No Known Problems Brother      History Review:The following portions of the patient's history were reviewed and updated as appropriate: allergies, current medications, past family history, past medical history, past social history, past surgical history and problem list     OBJECTIVE:     Vital signs in last 24 hours:    There were no vitals filed for this visit.  Laboratory Results:   Recent Labs (last 2 months):   No visits with results within 2 Month(s) from this visit.   Latest known visit with results  is:   Admission on 09/11/2023, Discharged on 09/12/2023   Component Date Value    EXTBreath Alcohol 09/11/2023 0.00     Amph/Meth UR 09/11/2023 Negative     Barbiturate Ur 09/11/2023 Negative     Benzodiazepine Urine 09/11/2023 Positive (A)     Cocaine Urine 09/11/2023 Negative     Methadone Urine 09/11/2023 Negative     Opiate Urine 09/11/2023 Negative     PCP Ur 09/11/2023 Negative     THC Urine 09/11/2023 Negative     Oxycodone Urine 09/11/2023 Negative     SARS-CoV-2 09/11/2023 Negative     INFLUENZA A PCR 09/11/2023 Negative     INFLUENZA B PCR 09/11/2023 Negative     RSV PCR 09/11/2023 Negative     WBC 09/11/2023 7.49     RBC 09/11/2023 4.79     Hemoglobin 09/11/2023 14.2     Hematocrit 09/11/2023 42.7     MCV 09/11/2023 89     MCH 09/11/2023 29.6     MCHC 09/11/2023 33.3     RDW 09/11/2023 14.4     MPV 09/11/2023 9.4     Platelets 09/11/2023 197     nRBC 09/11/2023 0     Segmented % 09/11/2023 62     Immature Grans % 09/11/2023 1     Lymphocytes % 09/11/2023 27     Monocytes % 09/11/2023 7     Eosinophils Relative 09/11/2023 2     Basophils Relative 09/11/2023 1     Absolute Neutrophils 09/11/2023 4.64     Absolute Immature Grans 09/11/2023 0.05     Absolute Lymphocytes 09/11/2023 2.03     Absolute Monocytes 09/11/2023 0.55     Eosinophils Absolute 09/11/2023 0.17     Basophils Absolute 09/11/2023 0.05     Sodium 09/11/2023 139     Potassium 09/11/2023 3.7     Chloride 09/11/2023 105     CO2 09/11/2023 27     ANION GAP 09/11/2023 7     BUN 09/11/2023 13     Creatinine 09/11/2023 1.11     Glucose 09/11/2023 136     Calcium 09/11/2023 9.3     AST 09/11/2023 29     ALT 09/11/2023 19     Alkaline Phosphatase 09/11/2023 69     Total Protein 09/11/2023 7.1     Albumin 09/11/2023 4.2     Total Bilirubin 09/11/2023 0.56     eGFR 09/11/2023 74     TSH 3RD GENERATON 09/11/2023 2.092     Color, UA 09/11/2023 Yellow     Clarity, UA 09/11/2023 Turbid     Specific Gravity, UA 09/11/2023 1.035 (H)     pH, UA 09/11/2023 6.5      Leukocytes, UA 09/11/2023 Trace (A)     Nitrite, UA 09/11/2023 Negative     Protein, UA 09/11/2023 30 (1+) (A)     Glucose, UA 09/11/2023 Negative     Ketones, UA 09/11/2023 Negative     Urobilinogen, UA 09/11/2023 2.0 (A)     Bilirubin, UA 09/11/2023 Negative     Occult Blood, UA 09/11/2023 Negative     RBC, UA 09/11/2023 None Seen     WBC, UA 09/11/2023 1-2     Epithelial Cells 09/11/2023 Occasional     Bacteria, UA 09/11/2023 None Seen     MUCUS THREADS 09/11/2023 Innumerable (A)     Ventricular Rate 09/11/2023 66     Atrial Rate 09/11/2023 66     MO Interval 09/11/2023 184     QRSD Interval 09/11/2023 104     QT Interval 09/11/2023 434     QTC Interval 09/11/2023 454     P Axis 09/11/2023 40     QRS [place] 09/11/2023 -52     T Wave [place] 09/11/2023 10      I have personally reviewed all pertinent laboratory/tests results.    Suicide/Homicide Risk Assessment:    Risk of Harm to Self:  Protective Factors: no current suicidal ideation, access to mental health treatment, being , compliant with medications, compliant with mental health treatment, connection to community, medical compliance, personal beliefs, resiliency  Based on today's assessment, Yan presents the following risk of harm to self: minimal    Risk of Harm to Others:  Based on today's assessment, Yan presents the following risk of harm to others: minimal    The following interventions are recommended: referral for psychotherapy    Medications Risks/Benefits:      Risks, Benefits And Possible Side Effects Of Medications:    Discussed risks and benefits of treatment with patient including risk of suicidality, serotonin syndrome, increased QTc interval and SIADH related to treatment with antidepressants; Risk of induction of manic symptoms in certain patient populations, risk of parkinsonian symptoms, metabolic syndrome, tardive dyskinesia and neuroleptic malignant syndrome related to treatment with antipsychotic medications, risks of misuse,  abuse or dependence, sedation and respiratory depression related to treatment with benzodiazepine medications, risk of rash related to treatment with Lamictal, and risk of liver impairment related to treatment with Depakote     Controlled Medication Discussion:     Yan has been filling controlled prescriptions on time as prescribed according to [place] Prescription Drug Monitoring Program  Discussed with Yan the risks of sedation, respiratory depression, impairment of ability to drive and potential for abuse and addiction related to treatment with benzodiazepine medications. He understands risk of treatment with benzodiazepine medications, agrees to not drive if feels impaired and agrees to take medications as prescribed  Discussed with Yan [place] warning on concurrent use of benzodiazepines and opioid medications including sedation, respiratory depression, coma and death. He understands the risk of treatment with benzodiazepines in addition to opioids and wants to continue taking those medications  Discussed with Yan increased risk of impairment related to concurrent use of benzodiazepines and hypnotic medications including excessive sedation, psychomotor impairment and respiratory depression. He understands the risk of treatment with benzodiazepines in addition to hypnotic medications and wants to continue taking those medications    Treatment Plan:    Due for update/Updated:   no  Last treatment plan done 1/9/24, due 7/9/24    ROCKY Orellana 04/30/24    This note was shared with patient.      Visit Time    Visit Start Time: 1130  Visit Stop Time: 12  Total Visit Duration:  30 minutes

## 2024-05-06 DIAGNOSIS — S06.9X9D TRAUMATIC BRAIN INJURY WITH LOSS OF CONSCIOUSNESS, SUBSEQUENT ENCOUNTER: ICD-10-CM

## 2024-05-07 RX ORDER — BENZTROPINE MESYLATE 0.5 MG/1
0.5 TABLET ORAL 2 TIMES DAILY
Qty: 60 TABLET | Refills: 0 | Status: SHIPPED | OUTPATIENT
Start: 2024-05-07

## 2024-05-10 DIAGNOSIS — I10 ESSENTIAL HYPERTENSION: ICD-10-CM

## 2024-05-10 RX ORDER — METOPROLOL TARTRATE 100 MG/1
TABLET ORAL
Qty: 60 TABLET | Refills: 1 | Status: SHIPPED | OUTPATIENT
Start: 2024-05-10

## 2024-05-24 DIAGNOSIS — F29 PSYCHOSIS, UNSPECIFIED PSYCHOSIS TYPE (HCC): ICD-10-CM

## 2024-05-28 RX ORDER — ZALEPLON 5 MG/1
5 CAPSULE ORAL
Qty: 30 CAPSULE | Refills: 0 | Status: SHIPPED | OUTPATIENT
Start: 2024-05-28

## 2024-06-05 DIAGNOSIS — F29 PSYCHOSIS, UNSPECIFIED PSYCHOSIS TYPE (HCC): ICD-10-CM

## 2024-06-05 DIAGNOSIS — F32.9 REACTIVE DEPRESSION: ICD-10-CM

## 2024-06-05 RX ORDER — ZIPRASIDONE HYDROCHLORIDE 80 MG/1
80 CAPSULE ORAL 2 TIMES DAILY
Qty: 60 CAPSULE | Refills: 0 | Status: SHIPPED | OUTPATIENT
Start: 2024-06-05

## 2024-06-13 DIAGNOSIS — F32.9 REACTIVE DEPRESSION: ICD-10-CM

## 2024-06-13 DIAGNOSIS — S06.9X9D TRAUMATIC BRAIN INJURY WITH LOSS OF CONSCIOUSNESS, SUBSEQUENT ENCOUNTER: ICD-10-CM

## 2024-06-13 RX ORDER — BENZTROPINE MESYLATE 0.5 MG/1
0.5 TABLET ORAL 2 TIMES DAILY
Qty: 60 TABLET | Refills: 0 | Status: SHIPPED | OUTPATIENT
Start: 2024-06-13

## 2024-06-13 RX ORDER — SERTRALINE HYDROCHLORIDE 100 MG/1
150 TABLET, FILM COATED ORAL DAILY
Qty: 90 TABLET | Refills: 0 | Status: SHIPPED | OUTPATIENT
Start: 2024-06-13

## 2024-06-18 ENCOUNTER — TELEMEDICINE (OUTPATIENT)
Dept: PSYCHIATRY | Facility: CLINIC | Age: 54
End: 2024-06-18
Payer: MEDICARE

## 2024-06-18 DIAGNOSIS — F32.9 REACTIVE DEPRESSION: Primary | ICD-10-CM

## 2024-06-18 DIAGNOSIS — S06.9X9D TRAUMATIC BRAIN INJURY WITH LOSS OF CONSCIOUSNESS, SUBSEQUENT ENCOUNTER: ICD-10-CM

## 2024-06-18 DIAGNOSIS — F29 PSYCHOSIS, UNSPECIFIED PSYCHOSIS TYPE (HCC): ICD-10-CM

## 2024-06-18 PROCEDURE — 99214 OFFICE O/P EST MOD 30 MIN: CPT

## 2024-06-18 NOTE — PSYCH
Virtual Regular Visit    Verification of patient location:    Patient is located in the following state in which I hold an active license [place]    Problem List Items Addressed This Visit          Nervous and Auditory    Traumatic brain injury with loss of consciousness, subsequent encounter       Behavioral Health    Psychosis (HCC)    Reactive depression - Primary            Encounter provider ROCKY Orellana    Provider located at    [facility]  [address] [facility] [address] [phone]    Recent Visits  No visits were found meeting these conditions.  Showing recent visits within past 7 days and meeting all other requirements  Today's Visits  Date Type Provider Dept   06/18/24 Telemedicine ROCKY Orellana  [facility]   Showing today's visits and meeting all other requirements  Future Appointments  No visits were found meeting these conditions.  Showing future appointments within next 150 days and meeting all other requirements         The patient was identified by name and date of birth. Yan Finch JrNino was informed that this is a telemedicine visit and that the visit is being conducted throughthe Epic Embedded platform. He agrees to proceed..  My office door was closed. No one else was in the room.  He acknowledged consent and understanding of privacy and security of the video platform. The patient has agreed to participate and understands they can discontinue the visit at any time.    Patient is aware this is a billable service.     HPI     Current Outpatient Medications   Medication Sig Dispense Refill    amLODIPine (NORVASC) 10 mg tablet Take 10 mg by mouth daily      aspirin 81 mg chewable tablet Chew 81 mg daily      atorvastatin (LIPITOR) 20 mg tablet Take 20 mg by mouth every evening      benztropine (COGENTIN) 0.5 mg tablet Take 1 tablet by mouth twice daily 60 tablet 0    diazepam (VALIUM) 2 mg tablet  TAKE 1 TABLET BY MOUTH ONCE DAILY AS NEEDED FOR ANXIETY 30 tablet 0    divalproex sodium (DEPAKOTE) 250 mg DR tablet TAKE 1 TABLET BY MOUTH TWICE DAILY WITH  ONE  500  MG  TAB  FOR  TOAL  DOSE  OF  750  MG  TWICE  A  DAY 60 tablet 11    divalproex sodium (DEPAKOTE) 500 mg DR tablet TAKE 1 TABLET BY MOUTH TWICE DAILY WITH  MG TABLET FOR A TOTAL OF 750MG TWICE DAILY 60 tablet 5    ibuprofen (MOTRIN) 800 mg tablet daily as needed      insulin NPH-insulin regular (NovoLIN 70/30) 100 units/mL subcutaneous injection Inject 10 Units under the skin daily before breakfast (Patient taking differently: Inject 18 Units under the skin daily before breakfast) 3 mL 0    lamoTRIgine (LaMICtal) 100 mg tablet Take 1 tab (100 mg) twice a day 180 tablet 3    metoprolol tartrate (LOPRESSOR) 100 mg tablet TAKE 1 TABLET BY MOUTH EVERY 12 HOURS 60 tablet 1    sertraline (ZOLOFT) 100 mg tablet TAKE 1 & 1/2 (ONE & ONE-HALF) TABLETS BY MOUTH ONCE DAILY 90 tablet 0    Ventolin  (90 Base) MCG/ACT inhaler Inhale 2 puffs every 6 (six) hours as needed      Vitamin D, Cholecalciferol, 50 MCG (2000 UT) CAPS Take 1 capsule by mouth once daily 30 capsule 5    zaleplon (SONATA) 5 MG capsule Take 1 capsule by mouth once daily at bedtime 30 capsule 0    ziprasidone (GEODON) 80 mg capsule Take 1 capsule by mouth twice daily 60 capsule 0    amoxicillin (AMOXIL) 500 mg capsule TAKE 1 CAPSULE BY MOUTH TWICE DAILY FOR 10 DAYS (Patient not taking: Reported on 3/3/2023)      lisinopril (ZESTRIL) 40 mg tablet Take 1 tablet by mouth daily (Patient not taking: Reported on 3/3/2023)      metFORMIN (GLUCOPHAGE) 1000 MG tablet Take 1,000 mg by mouth 2 (two) times a day with meals (Patient not taking: Reported on 3/3/2023)       No current facility-administered medications for this visit.       Review of Systems  Video Exam    There were no vitals filed for this visit.    Physical Exam   As a result of this visit, I have referred the patient for further  respiratory evaluation. No      VIRTUAL VISIT DISCLAIMER    Yan Finch Jr. acknowledges that he has consented to an online visit or consultation. He understands that the online visit is based solely on information provided by him, and that, in the absence of a face-to-face physical evaluation by the physician, the diagnosis he receives is both limited and provisional in terms of accuracy and completeness. This is not intended to replace a full medical face-to-face evaluation by the physician. Yan Finch Jr. understands and accepts these terms.    MEDICATION MANAGEMENT NOTE        [facility]    Name and Date of Birth:  Yan Finch Jr. 54 y.o. 1970 MRN: [id number]    Date of Visit: June 18, 2024    No Known Allergies  SUBJECTIVE:    Yan is seen today for a follow up for  hallucinations, depression, hx TBI . He reports that he continues to improve slowly since the last visit. Eduard and his wife present, both report no significant changes over the past 2 months.  Eduard continues to report on and off auditory hallucinations chronic in nature which started 2019 after TBI.  He continues to ignore the hallucinations as best as he can, trying to cope with the fact that hallucinations may be chronic after his head injury.  Wife continues to be very supportive, she continues to encourage him to leave the house more often which is improving his mood and distract him from chronic auditory associations. AH remained negative in nature, telling him that he is a failure.  Denies any command hallucinations.  Denies visual hallucinations.  He continues compliance with Geodon 80 mg twice daily, tolerating with minimal side effects, occasional restless legs which is relieved by Cogentin.  Hallucinations have decreased in frequency since initiation of Geodon.  Chronic depression and anxiety 5/10 related to TBI in the way changed his life afterwards.  Denies SI.  Continues to be unable to  drive, will continue virtual visits.  It is yet to complete pending antipsychotic labs, again reminded today.  Denies SI and HI.        He denies any side effects from medications.    PLAN:    -Continue Geodon to 80 mg twice daily for chronic auditory hallucinations in the context of his traumatic brain injury.    PARQ discussed with patient included sedation, dizziness,  dry mouth, constipation, hypotension, dyslipidemia, EPS, metabolic syndrome, peripheral edema, dyspepsia, joint and body pains, tachycardia, orthostatic hypotension, rash with sunlight exposure, hyperglycemia, seizures, skin rash, rare NMS, and increased risk of CVA in patient's with dementia.     -Continue Cogentin 0.5 mg twice daily prn for periodic bilateral hand shaking, reports improvement.    -Continue Zoloft 150 mg daily for chronic depression which initiated with  started since 2019  PARQ completed including serotonin syndrome, SIADH, worsening depression, suicidality, induction of afshin, GI upset, headaches, activation, sexual side effects, sedation, potential drug interactions, and others.     -Continue p.r.n. Valium 2 milligrams p.r.n. for severe anxiety/agitation which occurs due to patient's distressing auditory hallucinations.   Using infrequently and only as needed.  Discussed with patient the risks of sedation, respiratory depression, impairment of ability to drive and potential for abuse and addiction related to treatment with benzodiazepine medications. The patient understands risk of treatment with benzodiazepine medications, agrees to not drive if feels impaired and agrees to take medications as prescribed.      -Utilizes prn Sonata 5mg for insomnia         - Neurologist prescribes Depakote 750 mg b.i.d. and Lamictal 100 mg BID for the management of epilepsy disorder, has remained free from seizures 4 years. Complete pending labs.      Aware of 24 hour and weekend coverage for urgent situations accessed by calling [facility] main practice number  Referral for individual psychotherapy    Diagnoses and all orders for this visit:    Reactive depression    Traumatic brain injury with loss of consciousness, subsequent encounter    Psychosis, unspecified psychosis type ([facility])          Current Outpatient Medications on File Prior to Visit   Medication Sig Dispense Refill    amLODIPine (NORVASC) 10 mg tablet Take 10 mg by mouth daily      aspirin 81 mg chewable tablet Chew 81 mg daily      atorvastatin (LIPITOR) 20 mg tablet Take 20 mg by mouth every evening      benztropine (COGENTIN) 0.5 mg tablet Take 1 tablet by mouth twice daily 60 tablet 0    diazepam (VALIUM) 2 mg tablet TAKE 1 TABLET BY MOUTH ONCE DAILY AS NEEDED FOR ANXIETY 30 tablet 0    divalproex sodium (DEPAKOTE) 250 mg DR tablet TAKE 1 TABLET BY MOUTH TWICE DAILY WITH  ONE  500  MG  TAB  FOR  TOAL  DOSE  OF  750  MG  TWICE  A  DAY 60 tablet 11    divalproex sodium (DEPAKOTE) 500 mg DR tablet TAKE 1 TABLET BY MOUTH TWICE DAILY WITH  MG TABLET FOR A TOTAL OF 750MG TWICE DAILY 60 tablet 5    ibuprofen (MOTRIN) 800 mg tablet daily as needed      insulin NPH-insulin regular (NovoLIN 70/30) 100 units/mL subcutaneous injection Inject 10 Units under the skin daily before breakfast (Patient taking differently: Inject 18 Units under the skin daily before breakfast) 3 mL 0    lamoTRIgine (LaMICtal) 100 mg tablet Take 1 tab (100 mg) twice a day 180 tablet 3    metoprolol tartrate (LOPRESSOR) 100 mg tablet TAKE 1 TABLET BY MOUTH EVERY 12 HOURS 60 tablet 1    sertraline (ZOLOFT) 100 mg tablet TAKE 1 & 1/2 (ONE & ONE-HALF) TABLETS BY MOUTH ONCE DAILY 90 tablet 0    Ventolin  (90 Base) MCG/ACT inhaler Inhale 2 puffs every 6 (six) hours as needed      Vitamin D, Cholecalciferol, 50 MCG (2000 UT) CAPS Take 1 capsule by mouth once daily 30 capsule 5    zaleplon (SONATA) 5 MG capsule Take 1 capsule by mouth once daily at bedtime 30 capsule 0    ziprasidone (GEODON)  80 mg capsule Take 1 capsule by mouth twice daily 60 capsule 0    amoxicillin (AMOXIL) 500 mg capsule TAKE 1 CAPSULE BY MOUTH TWICE DAILY FOR 10 DAYS (Patient not taking: Reported on 3/3/2023)      lisinopril (ZESTRIL) 40 mg tablet Take 1 tablet by mouth daily (Patient not taking: Reported on 3/3/2023)      metFORMIN (GLUCOPHAGE) 1000 MG tablet Take 1,000 mg by mouth 2 (two) times a day with meals (Patient not taking: Reported on 3/3/2023)       No current facility-administered medications on file prior to visit.       Psychotherapy Provided:     Individual psychotherapy provided: Yes  Counseling was provided during the session today for 5 minutes.  Supportive counseling provided.  Goals discussed during in session: improve control of hallucinations.      HPI ROS Appetite Changes and Sleep:     He reports difficulty falling asleep, adequate appetite, low energy. Denies homicidal ideation, denies suicidal ideation    Review Of Systems:      HPI ROS:               Medication Side Effects:  denies currently    Depression (10 worst): 5/10    Anxiety (10 worst): 5/10    Safety concerns (SI, HI, etc): Denies si and hi    Sleep: 6-8hrs    Energy: Remains low    Appetite: 2-3 meals    Weight Change: denies        Mental Status Evaluation:    Appearance Adequate hygiene and grooming   Behavior calm and cooperative   Mood depressed  Depression Scale - 5 of 10 (0 = No depression)  Anxiety Scale - 5 of 10 (0 = No anxiety)   Speech Normal rate and volume   Affect constricted   Thought Processes Goal directed and coherent   Thought Content Does not verbalize delusional material   Associations concrete associations   Perceptual Disturbances Auditory hallucinations without commands   Risk Potential Suicidal/Homicidal Ideation - No evidence of suicidal or homicidal ideation and patient does not verbalize suicidal or homicidal ideation  Risk of Violence - No evidence of risk for violence found on assessment  Risk of Self Mutilation -  No evidence of risk for self mutilation found on assessment   Orientation oriented to person, place, time/date and situation   Memory recent and remote memory grossly intact   Consciousness alert and awake   Attention/Concentration attention span and concentration appear shorter than expected for age   Insight partial   Judgement partial   Muscle Strength and Gait decreased muscle strength   Motor Activity no abnormal movements   Language no difficulty naming common objects, no difficulty repeating a phrase, no difficulty writing a sentence   Fund of Knowledge adequate knowledge of current events  adequate fund of knowledge regarding past history  adequate fund of knowledge regarding vocabulary          Past Medical History:    Past Medical History:   Diagnosis Date    Chemical exposure     Diabetes mellitus (HCC)     H/O bone graft     Head injury     August 25, 2019 fell backwards hit head on a [place]oulder w/ LOC    Head injury     as an air [place] with the [employer] - jumped out of a plane parachute disfunction    Hypertension     Seizures (HCC)         Past Surgical History:   Procedure Laterality Date    FL LUMBAR PUNCTURE DIAGNOSTIC  12/13/2019    KNEE ARTHROSCOPY W/ MENISCECTOMY Left     LEG SURGERY      TONSILLECTOMY       No Known Allergies  Substance Abuse History:    Social History     Substance and Sexual Activity   Alcohol Use Not Currently    Alcohol/week: 3.0 standard drinks of alcohol    Types: 3 Cans of beer per week    Comment: socially     Social History     Substance and Sexual Activity   Drug Use Never     Social History:    Social History     Socioeconomic History    Marital status: Single     Spouse name: Not on file    Number of children: Not on file    Years of education: Not on file    Highest education level: Not on file   Occupational History    Not on file   Tobacco Use    Smoking status: Some Days     Types: Cigars    Smokeless tobacco: Never   Vaping Use    Vaping status: Never Used    Substance and Sexual Activity    Alcohol use: Not Currently     Alcohol/week: 3.0 standard drinks of alcohol     Types: 3 Cans of beer per week     Comment: socially    Drug use: Never    Sexual activity: Not on file   Other Topics Concern    Not on file   Social History Narrative    Not on file     Social Determinants of Health     Financial Resource Strain: Not on file   Food Insecurity: Not on file   Transportation Needs: Not on file   Physical Activity: Not on file   Stress: Not on file   Social Connections: Not on file   Intimate Partner Violence: Not on file   Housing Stability: Not on file     Family Psychiatric History:     Family History   Problem Relation Age of Onset    Diabetes Mother     Hypertension Mother     Asthma Mother     Brain cancer Father     No Known Problems Sister     No Known Problems Brother     No Known Problems Brother      History Review:The following portions of the patient's history were reviewed and updated as appropriate: allergies, current medications, past family history, past medical history, past social history, past surgical history and problem list     OBJECTIVE:     Vital signs in last 24 hours:    There were no vitals filed for this visit.  Laboratory Results:   Recent Labs (last 2 months):   No visits with results within 2 Month(s) from this visit.   Latest known visit with results is:   Admission on 09/11/2023, Discharged on 09/12/2023   Component Date Value    EXTBreath Alcohol 09/11/2023 0.00     Amph/Meth UR 09/11/2023 Negative     Barbiturate Ur 09/11/2023 Negative     Benzodiazepine Urine 09/11/2023 Positive (A)     Cocaine Urine 09/11/2023 Negative     Methadone Urine 09/11/2023 Negative     Opiate Urine 09/11/2023 Negative     PCP Ur 09/11/2023 Negative     THC Urine 09/11/2023 Negative     Oxycodone Urine 09/11/2023 Negative     SARS-CoV-2 09/11/2023 Negative     INFLUENZA A PCR 09/11/2023 Negative     INFLUENZA B PCR 09/11/2023 Negative     RSV PCR 09/11/2023  Negative     WBC 09/11/2023 7.49     RBC 09/11/2023 4.79     Hemoglobin 09/11/2023 14.2     Hematocrit 09/11/2023 42.7     MCV 09/11/2023 89     MCH 09/11/2023 29.6     MCHC 09/11/2023 33.3     RDW 09/11/2023 14.4     MPV 09/11/2023 9.4     Platelets 09/11/2023 197     nRBC 09/11/2023 0     Segmented % 09/11/2023 62     Immature Grans % 09/11/2023 1     Lymphocytes % 09/11/2023 27     Monocytes % 09/11/2023 7     Eosinophils Relative 09/11/2023 2     Basophils Relative 09/11/2023 1     Absolute Neutrophils 09/11/2023 4.64     Absolute Immature Grans 09/11/2023 0.05     Absolute Lymphocytes 09/11/2023 2.03     Absolute Monocytes 09/11/2023 0.55     Eosinophils Absolute 09/11/2023 0.17     Basophils Absolute 09/11/2023 0.05     Sodium 09/11/2023 139     Potassium 09/11/2023 3.7     Chloride 09/11/2023 105     CO2 09/11/2023 27     ANION GAP 09/11/2023 7     BUN 09/11/2023 13     Creatinine 09/11/2023 1.11     Glucose 09/11/2023 136     Calcium 09/11/2023 9.3     AST 09/11/2023 29     ALT 09/11/2023 19     Alkaline Phosphatase 09/11/2023 69     Total Protein 09/11/2023 7.1     Albumin 09/11/2023 4.2     Total Bilirubin 09/11/2023 0.56     eGFR 09/11/2023 74     TSH 3RD GENERATON 09/11/2023 2.092     Color, UA 09/11/2023 Yellow     Clarity, UA 09/11/2023 Turbid     Specific Gravity, UA 09/11/2023 1.035 (H)     pH, UA 09/11/2023 6.5     Leukocytes, UA 09/11/2023 Trace (A)     Nitrite, UA 09/11/2023 Negative     Protein, UA 09/11/2023 30 (1+) (A)     Glucose, UA 09/11/2023 Negative     Ketones, UA 09/11/2023 Negative     Urobilinogen, UA 09/11/2023 2.0 (A)     Bilirubin, UA 09/11/2023 Negative     Occult Blood, UA 09/11/2023 Negative     RBC, UA 09/11/2023 None Seen     WBC, UA 09/11/2023 1-2     Epithelial Cells 09/11/2023 Occasional     Bacteria, UA 09/11/2023 None Seen     MUCUS THREADS 09/11/2023 Innumerable (A)     Ventricular Rate 09/11/2023 66     Atrial Rate 09/11/2023 66     WV Interval 09/11/2023 184     QRSD  Interval 09/11/2023 104     QT Interval 09/11/2023 434     QTC Interval 09/11/2023 454     P Axis 09/11/2023 40     QRS Caroline 09/11/2023 -52     T Wave Caroline 09/11/2023 10      I have personally reviewed all pertinent laboratory/tests results.    Suicide/Homicide Risk Assessment:    Risk of Harm to Self:  Protective Factors: no current suicidal ideation, access to mental health treatment, being , compliant with medications, compliant with mental health treatment, connection to community, medical compliance, personal beliefs, resiliency  Based on today's assessment, Yan presents the following risk of harm to self: minimal    Risk of Harm to Others:  Based on today's assessment, Yan presents the following risk of harm to others: minimal    The following interventions are recommended: referral for psychotherapy    Medications Risks/Benefits:      Risks, Benefits And Possible Side Effects Of Medications:    Discussed risks and benefits of treatment with patient including risk of suicidality, serotonin syndrome, increased QTc interval and SIADH related to treatment with antidepressants; Risk of induction of manic symptoms in certain patient populations, risk of parkinsonian symptoms, metabolic syndrome, tardive dyskinesia and neuroleptic malignant syndrome related to treatment with antipsychotic medications, risks of misuse, abuse or dependence, sedation and respiratory depression related to treatment with benzodiazepine medications, risk of rash related to treatment with Lamictal, and risk of liver impairment related to treatment with Depakote     Controlled Medication Discussion:     Yan has been filling controlled prescriptions on time as prescribed according to [place] Prescription Drug Monitoring Program  Discussed with Yan the risks of sedation, respiratory depression, impairment of ability to drive and potential for abuse and addiction related to treatment with benzodiazepine medications. He  understands risk of treatment with benzodiazepine medications, agrees to not drive if feels impaired and agrees to take medications as prescribed  Discussed with Yan Black Box warning on concurrent use of benzodiazepines and opioid medications including sedation, respiratory depression, coma and death. He understands the risk of treatment with benzodiazepines in addition to opioids and wants to continue taking those medications  Discussed with Yan increased risk of impairment related to concurrent use of benzodiazepines and hypnotic medications including excessive sedation, psychomotor impairment and respiratory depression. He understands the risk of treatment with benzodiazepines in addition to hypnotic medications and wants to continue taking those medications    Treatment Plan:    Due for update/Updated:   no  Last treatment plan done 1/9/24, due 7/9/24    ROCKY Orellana 06/18/24    This note was shared with patient.      Visit Time    Visit Start Time: 320  Visit Stop Time: 350  Total Visit Duration:  30 minutes

## 2024-06-21 ENCOUNTER — TELEPHONE (OUTPATIENT)
Dept: PSYCHIATRY | Facility: CLINIC | Age: 54
End: 2024-06-21

## 2024-06-21 NOTE — TELEPHONE ENCOUNTER
Left voicemail informing patient and/or parent/guardian of the Psych Encounter form needing to be signed as a requirement from the insurance company for billing purposes. Patient can access form via Call Loop and sign electronically.     Please make patient aware this form must be signed for each visit as a requirement to continue future visits with provider.

## 2024-06-25 DIAGNOSIS — R56.9 SEIZURE (HCC): ICD-10-CM

## 2024-06-26 RX ORDER — DIVALPROEX SODIUM 500 MG/1
TABLET, DELAYED RELEASE ORAL
Qty: 60 TABLET | Refills: 0 | Status: SHIPPED | OUTPATIENT
Start: 2024-06-26

## 2024-06-30 DIAGNOSIS — S06.9X9D TRAUMATIC BRAIN INJURY WITH LOSS OF CONSCIOUSNESS, SUBSEQUENT ENCOUNTER: ICD-10-CM

## 2024-06-30 DIAGNOSIS — F29 PSYCHOSIS, UNSPECIFIED PSYCHOSIS TYPE (HCC): ICD-10-CM

## 2024-07-01 ENCOUNTER — TELEPHONE (OUTPATIENT)
Dept: PSYCHIATRY | Facility: CLINIC | Age: 54
End: 2024-07-01

## 2024-07-01 NOTE — TELEPHONE ENCOUNTER
Left voicemail informing patient and/or parent/guardian of the Psych Encounter form needing to be signed as a requirement from the insurance company for billing purposes. Patient can access form via [employer] and sign electronically.     Please make patient aware this form must be signed for each visit as a requirement to continue future visits with provider.

## 2024-07-02 RX ORDER — ZALEPLON 5 MG/1
5 CAPSULE ORAL
Qty: 30 CAPSULE | Refills: 0 | Status: SHIPPED | OUTPATIENT
Start: 2024-07-02

## 2024-07-02 RX ORDER — DIAZEPAM 2 MG/1
TABLET ORAL
Qty: 30 TABLET | Refills: 0 | Status: SHIPPED | OUTPATIENT
Start: 2024-07-02

## 2024-07-02 RX ORDER — BENZTROPINE MESYLATE 0.5 MG/1
0.5 TABLET ORAL 2 TIMES DAILY
Qty: 60 TABLET | Refills: 0 | Status: SHIPPED | OUTPATIENT
Start: 2024-07-02

## 2024-07-08 DIAGNOSIS — F32.9 REACTIVE DEPRESSION: ICD-10-CM

## 2024-07-08 DIAGNOSIS — F29 PSYCHOSIS, UNSPECIFIED PSYCHOSIS TYPE (HCC): ICD-10-CM

## 2024-07-08 RX ORDER — ZIPRASIDONE HYDROCHLORIDE 80 MG/1
80 CAPSULE ORAL 2 TIMES DAILY
Qty: 60 CAPSULE | Refills: 0 | Status: SHIPPED | OUTPATIENT
Start: 2024-07-08

## 2024-07-08 NOTE — TELEPHONE ENCOUNTER
Left voicemail informing patient of the Psych Encounter form needing to be signed as a requirement from the insurance company for billing purposes. Patient can access form via patient's MyChart and sign electronically.     Please make patient aware this form must be signed for each visit as a requirement to continue future visits with provider.

## 2024-07-17 ENCOUNTER — TELEPHONE (OUTPATIENT)
Dept: PSYCHIATRY | Facility: CLINIC | Age: 54
End: 2024-07-17

## 2024-07-17 NOTE — TELEPHONE ENCOUNTER
Appointment adjustment notification letter has been sent to Yan Finch Jr. due to non-compliance of required forms from recent visit(s) on 06/18/2024 and 04/30/2024. Appointment has been changed to an in-office visit at this time until forms have been received.  Provider has also been made aware.    Letter has been mailed to address on file.

## 2024-08-02 DIAGNOSIS — F29 PSYCHOSIS, UNSPECIFIED PSYCHOSIS TYPE (HCC): ICD-10-CM

## 2024-08-02 DIAGNOSIS — F32.9 REACTIVE DEPRESSION: ICD-10-CM

## 2024-08-02 RX ORDER — ZIPRASIDONE HYDROCHLORIDE 80 MG/1
80 CAPSULE ORAL 2 TIMES DAILY
Qty: 60 CAPSULE | Refills: 0 | Status: SHIPPED | OUTPATIENT
Start: 2024-08-02

## 2024-08-03 DIAGNOSIS — F29 PSYCHOSIS, UNSPECIFIED PSYCHOSIS TYPE (HCC): ICD-10-CM

## 2024-08-03 DIAGNOSIS — F32.9 REACTIVE DEPRESSION: ICD-10-CM

## 2024-08-03 DIAGNOSIS — I10 ESSENTIAL HYPERTENSION: ICD-10-CM

## 2024-08-03 DIAGNOSIS — S06.9X9D TRAUMATIC BRAIN INJURY WITH LOSS OF CONSCIOUSNESS, SUBSEQUENT ENCOUNTER: ICD-10-CM

## 2024-08-04 RX ORDER — SERTRALINE HYDROCHLORIDE 100 MG/1
150 TABLET, FILM COATED ORAL DAILY
Qty: 90 TABLET | Refills: 0 | Status: SHIPPED | OUTPATIENT
Start: 2024-08-04

## 2024-08-04 RX ORDER — BENZTROPINE MESYLATE 0.5 MG/1
0.5 TABLET ORAL 2 TIMES DAILY
Qty: 60 TABLET | Refills: 0 | Status: SHIPPED | OUTPATIENT
Start: 2024-08-04

## 2024-08-05 RX ORDER — ZALEPLON 5 MG/1
5 CAPSULE ORAL
Qty: 30 CAPSULE | Refills: 0 | Status: SHIPPED | OUTPATIENT
Start: 2024-08-05

## 2024-08-05 RX ORDER — METOPROLOL TARTRATE 100 MG/1
TABLET ORAL
Qty: 30 TABLET | Refills: 0 | Status: SHIPPED | OUTPATIENT
Start: 2024-08-05

## 2024-08-06 ENCOUNTER — TELEPHONE (OUTPATIENT)
Dept: NEUROLOGY | Facility: CLINIC | Age: 54
End: 2024-08-06

## 2024-08-06 NOTE — TELEPHONE ENCOUNTER
[employer] is requesting office notes- 3/27/24 fax to them for pt to get wheelchair.      [phone] Fax

## 2024-08-30 DIAGNOSIS — S06.9X9D TRAUMATIC BRAIN INJURY WITH LOSS OF CONSCIOUSNESS, SUBSEQUENT ENCOUNTER: ICD-10-CM

## 2024-08-30 RX ORDER — BENZTROPINE MESYLATE 0.5 MG/1
0.5 TABLET ORAL 2 TIMES DAILY
Qty: 60 TABLET | Refills: 0 | Status: SHIPPED | OUTPATIENT
Start: 2024-08-30

## 2024-08-31 DIAGNOSIS — F32.9 REACTIVE DEPRESSION: ICD-10-CM

## 2024-08-31 DIAGNOSIS — F29 PSYCHOSIS, UNSPECIFIED PSYCHOSIS TYPE (HCC): ICD-10-CM

## 2024-09-03 RX ORDER — ZIPRASIDONE HYDROCHLORIDE 80 MG/1
80 CAPSULE ORAL 2 TIMES DAILY
Qty: 60 CAPSULE | Refills: 0 | Status: SHIPPED | OUTPATIENT
Start: 2024-09-03

## 2024-09-04 DIAGNOSIS — F29 PSYCHOSIS, UNSPECIFIED PSYCHOSIS TYPE (HCC): ICD-10-CM

## 2024-09-04 DIAGNOSIS — I10 ESSENTIAL HYPERTENSION: ICD-10-CM

## 2024-09-05 RX ORDER — METOPROLOL TARTRATE 100 MG
TABLET ORAL
Qty: 30 TABLET | Refills: 0 | Status: SHIPPED | OUTPATIENT
Start: 2024-09-05

## 2024-09-06 ENCOUNTER — TELEMEDICINE (OUTPATIENT)
Dept: PSYCHIATRY | Facility: CLINIC | Age: 54
End: 2024-09-06
Payer: MEDICARE

## 2024-09-06 ENCOUNTER — TELEPHONE (OUTPATIENT)
Age: 54
End: 2024-09-06

## 2024-09-06 DIAGNOSIS — F29 PSYCHOSIS, UNSPECIFIED PSYCHOSIS TYPE (HCC): Primary | ICD-10-CM

## 2024-09-06 DIAGNOSIS — F32.9 REACTIVE DEPRESSION: ICD-10-CM

## 2024-09-06 DIAGNOSIS — S06.9X9D TRAUMATIC BRAIN INJURY WITH LOSS OF CONSCIOUSNESS, SUBSEQUENT ENCOUNTER: ICD-10-CM

## 2024-09-06 PROCEDURE — G2211 COMPLEX E/M VISIT ADD ON: HCPCS

## 2024-09-06 PROCEDURE — 99214 OFFICE O/P EST MOD 30 MIN: CPT

## 2024-09-06 RX ORDER — ZALEPLON 5 MG/1
5 CAPSULE ORAL
Qty: 30 CAPSULE | Refills: 0 | OUTPATIENT
Start: 2024-09-06

## 2024-09-06 RX ORDER — DULAGLUTIDE 0.75 MG/.5ML
0.75 INJECTION, SOLUTION SUBCUTANEOUS WEEKLY
COMMUNITY
Start: 2024-09-03

## 2024-09-06 RX ORDER — ZALEPLON 5 MG/1
5 CAPSULE ORAL
Qty: 30 CAPSULE | Refills: 0 | Status: SHIPPED | OUTPATIENT
Start: 2024-09-06

## 2024-09-06 NOTE — TELEPHONE ENCOUNTER
Pt called to get his appt for 9/6 at 2:30 switched to a virtual visit due to not feeling well.  Writer switched appt.

## 2024-09-06 NOTE — PSYCH
Virtual Regular Visit    Verification of patient location:    Patient is located in the following state in which I hold an active license [place]    Problem List Items Addressed This Visit          Nervous and Auditory    Traumatic brain injury with loss of consciousness, subsequent encounter       Behavioral Health    Psychosis (HCC) - Primary    Relevant Medications    zaleplon (SONATA) 5 MG capsule    Reactive depression    Relevant Medications    zaleplon (SONATA) 5 MG capsule            Encounter provider ROCKY Orellana    Provider located at    [place] [address] [facility] [address] [phone]    Recent Visits  No visits were found meeting these conditions.  Showing recent visits within past 7 days and meeting all other requirements  Today's Visits  Date Type Provider Dept   09/06/24 Telemedicine ROCKY Orellana [facility]   Showing today's visits and meeting all other requirements  Future Appointments  No visits were found meeting these conditions.  Showing future appointments within next 150 days and meeting all other requirements         The patient was identified by name and date of birth. Yna Stef Marquez was informed that this is a telemedicine visit and that the visit is being conducted throughthe Epic Embedded platform. He agrees to proceed..  My office door was closed. No one else was in the room.  He acknowledged consent and understanding of privacy and security of the video platform. The patient has agreed to participate and understands they can discontinue the visit at any time.    Patient is aware this is a billable service.     HPI     Current Outpatient Medications   Medication Sig Dispense Refill    amLODIPine (NORVASC) 10 mg tablet Take 10 mg by mouth daily      aspirin 81 mg chewable tablet Chew 81 mg daily      atorvastatin (LIPITOR) 20 mg tablet Take 20 mg by mouth every evening       benztropine (COGENTIN) 0.5 mg tablet Take 1 tablet by mouth twice daily 60 tablet 0    diazepam (VALIUM) 2 mg tablet TAKE 1 TABLET BY MOUTH ONCE DAILY AS NEEDED FOR ANXIETY 30 tablet 0    divalproex sodium (DEPAKOTE) 250 mg DR tablet TAKE 1 TABLET BY MOUTH TWICE DAILY WITH  ONE  500  MG  TAB  FOR  TOAL  DOSE  OF  750  MG  TWICE  A  DAY 60 tablet 11    divalproex sodium (DEPAKOTE) 500 mg DR tablet TAKE 1 TABLET BY MOUTH TWICE DAILY WITH  ONE  250  MG  TABLET  FOR  A  TOTAL  OF  750MG  TWICE  DAILY 60 tablet 0    ibuprofen (MOTRIN) 800 mg tablet daily as needed      insulin NPH-insulin regular (NovoLIN 70/30) 100 units/mL subcutaneous injection Inject 10 Units under the skin daily before breakfast (Patient taking differently: Inject 18 Units under the skin daily before breakfast) 3 mL 0    lamoTRIgine (LaMICtal) 100 mg tablet Take 1 tab (100 mg) twice a day 180 tablet 3    metoprolol tartrate (LOPRESSOR) 100 mg tablet TAKE 1 TABLET BY MOUTH EVERY 12 HOURS 30 tablet 0    sertraline (ZOLOFT) 100 mg tablet TAKE 1 & 1/2 (ONE & ONE-HALF) TABLETS BY MOUTH ONCE DAILY 90 tablet 0    Ventolin  (90 Base) MCG/ACT inhaler Inhale 2 puffs every 6 (six) hours as needed      Vitamin D, Cholecalciferol, 50 MCG (2000 UT) CAPS Take 1 capsule by mouth once daily 30 capsule 5    zaleplon (SONATA) 5 MG capsule Take 1 capsule (5 mg total) by mouth daily at bedtime 30 capsule 0    ziprasidone (GEODON) 80 mg capsule Take 1 capsule by mouth twice daily 60 capsule 0    amoxicillin (AMOXIL) 500 mg capsule TAKE 1 CAPSULE BY MOUTH TWICE DAILY FOR 10 DAYS (Patient not taking: Reported on 3/3/2023)      dulaglutide (Trulicity) 0.75 MG/0.5ML injection Inject 0.75 mg under the skin Once a week (Patient not taking: Reported on 9/6/2024)      lisinopril (ZESTRIL) 40 mg tablet Take 1 tablet by mouth daily (Patient not taking: Reported on 3/3/2023)      metFORMIN (GLUCOPHAGE) 1000 MG tablet Take 1,000 mg by mouth 2 (two) times a day with meals  "(Patient not taking: Reported on 3/3/2023)      semaglutide, 0.25 or 0.5 mg/dose, (Ozempic, 0.25 or 0.5 MG/DOSE,) 2 mg/3 mL injection pen Inject 0.25 mg under the skin Once a week (Patient not taking: Reported on 9/6/2024)       No current facility-administered medications for this visit.       Review of Systems  Video Exam    There were no vitals filed for this visit.    Physical Exam   As a result of this visit, I have referred the patient for further respiratory evaluation. No      VIRTUAL VISIT DISCLAIMER    Yan Finch Jr. acknowledges that he has consented to an online visit or consultation. He understands that the online visit is based solely on information provided by him, and that, in the absence of a face-to-face physical evaluation by the physician, the diagnosis he receives is both limited and provisional in terms of accuracy and completeness. This is not intended to replace a full medical face-to-face evaluation by the physician. Yan Finch Jr. understands and accepts these terms.    MEDICATION MANAGEMENT NOTE        [facility]    Name and Date of Birth:  Yan Finch Jr. 54 y.o. 1970 MRN: [id number]    Date of Visit: September 6, 2024    No Known Allergies  SUBJECTIVE:    Yan is seen today for a follow up for  hallucinations, depression, hx significant TBI . He reports that he continues to do relatively well since the last visit. Eduard and his wife present, patient reports mild cold-like symptoms which is why he switched appointment to virtual, states he is in no distress otherwise.  Psychiatrically, reports no significant change over the past 3 months, continues to endorse chronic auditory hallucinations of voices telling him  \"you are not good enough\", voices have been present ever since experiencing significant TBI in 2018.  Denies command hallucinations.  Voices have very mildly improved with psychiatric medication.  Wife does believe the " medications are less in frequency with the Geodon 80 mg twice daily compared to without medication.  The benefits of Geodon appear to outweigh the risks as patient is reporting no significant adverse effects of the antipsychotic.  Patient is yet to complete his yearly antipsychotic blood work and again encouraged him to do so soon as possible.  Depression is moderate 5/10 with dysphoria, low energy motivation, anhedonia and mostly related to his chronic auditory hallucinations caused by TBI.  Anxiety is controlled at this time, worsens with hallucinations increase.  Wife continues to be a strong support system patient describes a strong support system.  Patient brightens when talking about his young granddaughter, enjoyed talk about his recent family barbecue, going out to restaurants with family.  The chronic AH and depression improve when pt is active and engaged in the community compared to self isolating at home.  Continue to encourage patient to increase socialization and get involved in a group if possible.  Patient has physical limitations and cannot drive, relies on his wife.  He is reporting adequate but broken sleep, reports adequate appetite.  Continue to encourage a healthier diet, regular rectal exercise to improve blood work.  Continue to work closely with PCP regarding hypertension and type 2 diabetes.  Mood is appropriate, constricted.  He denies SI and HI.        He denies any side effects from medications.    PLAN:    -Continue Geodon to 80 mg twice daily for chronic auditory hallucinations in the context of his traumatic brain injury. Complete pending labs.    PARQ discussed with patient included sedation, dizziness,  dry mouth, constipation, hypotension, dyslipidemia, EPS, metabolic syndrome, peripheral edema, dyspepsia, joint and body pains, tachycardia, orthostatic hypotension, rash with sunlight exposure, hyperglycemia, seizures, skin rash, rare NMS, and increased risk of CVA in patient's with  dementia.     -Continue Cogentin 0.5 mg twice daily prn for periodic bilateral hand shaking, reports improvement with medication    -Continue Zoloft 150 mg daily for chronic depression which initiated with  started since 2019  PARQ completed including serotonin syndrome, SIADH, worsening depression, suicidality, induction of afshin, GI upset, headaches, activation, sexual side effects, sedation, potential drug interactions, and others.     -Continue p.r.n. Valium 2 milligrams p.r.n. for severe anxiety/agitation which occurs due to patient's distressing auditory hallucinations.   Using infrequently and only as needed.  Discussed with patient the risks of sedation, respiratory depression, impairment of ability to drive and potential for abuse and addiction related to treatment with benzodiazepine medications. The patient understands risk of treatment with benzodiazepine medications, agrees to not drive if feels impaired and agrees to take medications as prescribed.      -Utilizes prn Sonata 5mg for insomnia         - Neurologist prescribes Depakote 750 mg b.i.d. and Lamictal 100 mg BID for the management of epilepsy disorder, has remained free from seizures 4 years. Complete pending labs.      Aware of 24 hour and weekend coverage for urgent situations accessed by calling [facility] main practice number  Referral for individual psychotherapy    Diagnoses and all orders for this visit:    Psychosis, unspecified psychosis type (HCC)  -     zaleplon (SONATA) 5 MG capsule; Take 1 capsule (5 mg total) by mouth daily at bedtime    Traumatic brain injury with loss of consciousness, subsequent encounter    Reactive depression    Other orders  -     semaglutide, 0.25 or 0.5 mg/dose, (Ozempic, 0.25 or 0.5 MG/DOSE,) 2 mg/3 mL injection pen; Inject 0.25 mg under the skin Once a week (Patient not taking: Reported on 9/6/2024)  -     dulaglutide (Trulicity) 0.75 MG/0.5ML injection; Inject 0.75 mg under the  skin Once a week (Patient not taking: Reported on 9/6/2024)          Current Outpatient Medications on File Prior to Visit   Medication Sig Dispense Refill    amLODIPine (NORVASC) 10 mg tablet Take 10 mg by mouth daily      aspirin 81 mg chewable tablet Chew 81 mg daily      atorvastatin (LIPITOR) 20 mg tablet Take 20 mg by mouth every evening      benztropine (COGENTIN) 0.5 mg tablet Take 1 tablet by mouth twice daily 60 tablet 0    diazepam (VALIUM) 2 mg tablet TAKE 1 TABLET BY MOUTH ONCE DAILY AS NEEDED FOR ANXIETY 30 tablet 0    divalproex sodium (DEPAKOTE) 250 mg DR tablet TAKE 1 TABLET BY MOUTH TWICE DAILY WITH  ONE  500  MG  TAB  FOR  TOAL  DOSE  OF  750  MG  TWICE  A  DAY 60 tablet 11    divalproex sodium (DEPAKOTE) 500 mg DR tablet TAKE 1 TABLET BY MOUTH TWICE DAILY WITH  ONE  250  MG  TABLET  FOR  A  TOTAL  OF  750MG  TWICE  DAILY 60 tablet 0    ibuprofen (MOTRIN) 800 mg tablet daily as needed      insulin NPH-insulin regular (NovoLIN 70/30) 100 units/mL subcutaneous injection Inject 10 Units under the skin daily before breakfast (Patient taking differently: Inject 18 Units under the skin daily before breakfast) 3 mL 0    lamoTRIgine (LaMICtal) 100 mg tablet Take 1 tab (100 mg) twice a day 180 tablet 3    metoprolol tartrate (LOPRESSOR) 100 mg tablet TAKE 1 TABLET BY MOUTH EVERY 12 HOURS 30 tablet 0    sertraline (ZOLOFT) 100 mg tablet TAKE 1 & 1/2 (ONE & ONE-HALF) TABLETS BY MOUTH ONCE DAILY 90 tablet 0    Ventolin  (90 Base) MCG/ACT inhaler Inhale 2 puffs every 6 (six) hours as needed      Vitamin D, Cholecalciferol, 50 MCG (2000 UT) CAPS Take 1 capsule by mouth once daily 30 capsule 5    ziprasidone (GEODON) 80 mg capsule Take 1 capsule by mouth twice daily 60 capsule 0    [DISCONTINUED] zaleplon (SONATA) 5 MG capsule Take 1 capsule by mouth once daily at bedtime 30 capsule 0    amoxicillin (AMOXIL) 500 mg capsule TAKE 1 CAPSULE BY MOUTH TWICE DAILY FOR 10 DAYS (Patient not taking: Reported on  3/3/2023)      dulaglutide (Trulicity) 0.75 MG/0.5ML injection Inject 0.75 mg under the skin Once a week (Patient not taking: Reported on 9/6/2024)      lisinopril (ZESTRIL) 40 mg tablet Take 1 tablet by mouth daily (Patient not taking: Reported on 3/3/2023)      metFORMIN (GLUCOPHAGE) 1000 MG tablet Take 1,000 mg by mouth 2 (two) times a day with meals (Patient not taking: Reported on 3/3/2023)      semaglutide, 0.25 or 0.5 mg/dose, (Ozempic, 0.25 or 0.5 MG/DOSE,) 2 mg/3 mL injection pen Inject 0.25 mg under the skin Once a week (Patient not taking: Reported on 9/6/2024)       No current facility-administered medications on file prior to visit.       Psychotherapy Provided:     Individual psychotherapy provided: Yes  Counseling was provided during the session today for 10 minutes.  Supportive counseling provided.  Goals discussed during in session: improve control of hallucinations.      HPI ROS Appetite Changes and Sleep:     He reports difficulty falling asleep, adequate appetite, low energy. Denies homicidal ideation, denies suicidal ideation    Review Of Systems:      HPI ROS:               Medication Side Effects:  denies currently    Depression (10 worst): 4-6/10    Anxiety (10 worst): 4/10    Safety concerns (SI, HI, etc): Denies si and hi    Sleep: 7hrs but broken    Energy: Remains low    Appetite: 2-3 meals    Weight Change: denies        Mental Status Evaluation:    Appearance Adequate hygiene and grooming   Behavior calm and cooperative   Mood depressed  Depression Scale - 5 of 10 (0 = No depression)  Anxiety Scale - 4 of 10 (0 = No anxiety)   Speech Normal rate and volume   Affect constricted   Thought Processes Goal directed and coherent   Thought Content Does not verbalize delusional material   Associations concrete associations   Perceptual Disturbances Auditory hallucinations without commands   Risk Potential Suicidal/Homicidal Ideation - No evidence of suicidal or homicidal ideation and patient  does not verbalize suicidal or homicidal ideation  Risk of Violence - No evidence of risk for violence found on assessment  Risk of Self Mutilation - No evidence of risk for self mutilation found on assessment   Orientation oriented to person, place, time/date and situation   Memory recent and remote memory grossly intact   Consciousness alert and awake   Attention/Concentration attention span and concentration appear shorter than expected for age   Insight intact   Judgement partial   Muscle Strength and Gait decreased muscle strength   Motor Activity no abnormal movements   Language no difficulty naming common objects, no difficulty repeating a phrase, no difficulty writing a sentence   Fund of Knowledge adequate knowledge of current events  adequate fund of knowledge regarding past history  adequate fund of knowledge regarding vocabulary          Past Medical History:    Past Medical History:   Diagnosis Date    Chemical exposure     Diabetes mellitus (HCC)     H/O bone graft     Head injury     August 25, 2019 fell backwards hit head on a [place]oulder w/ LOC    Head injury     as an air Born [place] with the [employer] - jumped out of a plane parachute disfunction    Hypertension     Seizures (HCC)         Past Surgical History:   Procedure Laterality Date    FL LUMBAR PUNCTURE DIAGNOSTIC  12/13/2019    KNEE ARTHROSCOPY W/ MENISCECTOMY Left     LEG SURGERY      TONSILLECTOMY       No Known Allergies  Substance Abuse History:    Social History     Substance and Sexual Activity   Alcohol Use Not Currently    Alcohol/week: 3.0 standard drinks of alcohol    Types: 3 Cans of beer per week    Comment: socially     Social History     Substance and Sexual Activity   Drug Use Never     Social History:    Social History     Socioeconomic History    Marital status: Single     Spouse name: Not on file    Number of children: Not on file    Years of education: Not on file    Highest education level: Not on file   Occupational History     Not on file   Tobacco Use    Smoking status: Some Days     Types: Cigars    Smokeless tobacco: Never   Vaping Use    Vaping status: Never Used   Substance and Sexual Activity    Alcohol use: Not Currently     Alcohol/week: 3.0 standard drinks of alcohol     Types: 3 Cans of beer per week     Comment: socially    Drug use: Never    Sexual activity: Not on file   Other Topics Concern    Not on file   Social History Narrative    Not on file     Social Determinants of Health     Financial Resource Strain: Low Risk  (7/22/2024)    Received from [facility]    Overall Financial Resource Strain (CARDIA)     Difficulty of Paying Living Expenses: Not very hard   Food Insecurity: No Food Insecurity (7/22/2024)    Received from [facility]    Hunger Vital Sign     Worried About Running Out of Food in the Last Year: Never true     Ran Out of Food in the Last Year: Never true   Transportation Needs: No Transportation Needs (7/22/2024)    Received from [facility]    PRAPARE - Transportation     Lack of Transportation (Medical): No     Lack of Transportation (Non-Medical): No   Physical Activity: Not on file   Stress: Stress Concern Present (7/22/2024)    Received from [facility]    [employer] [place] [employer] - Occupational Stress Questionnaire     Feeling of Stress : Very much   Social Connections: Feeling Somewhat Isolated (7/22/2024)    Received from [facility]    OASIS : Social Isolation     How often do you feel lonely or isolated from those around you?: Sometimes   Intimate Partner Violence: Not At Risk (7/22/2024)    Received from [facility]    Humiliation, Afraid, Rape, and Kick questionnaire     Fear of Current or Ex-Partner: No     Emotionally Abused: No     Physically Abused: No     Sexually Abused: No   Housing Stability: Low Risk  (7/22/2024)    Received from [facility]     Housing Stability Vital Sign     Unable to Pay for Housing in the Last Year: No     Number of Times Moved in the Last Year: 0     Homeless in the Last Year: No     Family Psychiatric History:     Family History   Problem Relation Age of Onset    Diabetes Mother     Hypertension Mother     Asthma Mother     Brain cancer Father     No Known Problems Sister     No Known Problems Brother     No Known Problems Brother      History Review:The following portions of the patient's history were reviewed and updated as appropriate: allergies, current medications, past family history, past medical history, past social history, past surgical history and problem list     OBJECTIVE:     Vital signs in last 24 hours:    There were no vitals filed for this visit.  Laboratory Results:   Recent Labs (last 2 months):   No visits with results within 2 Month(s) from this visit.   Latest known visit with results is:   Admission on 09/11/2023, Discharged on 09/12/2023   Component Date Value    EXTBreath Alcohol 09/11/2023 0.00     Amph/Meth UR 09/11/2023 Negative     Barbiturate Ur 09/11/2023 Negative     Benzodiazepine Urine 09/11/2023 Positive (A)     Cocaine Urine 09/11/2023 Negative     Methadone Urine 09/11/2023 Negative     Opiate Urine 09/11/2023 Negative     PCP Ur 09/11/2023 Negative     THC Urine 09/11/2023 Negative     Oxycodone Urine 09/11/2023 Negative     SARS-CoV-2 09/11/2023 Negative     INFLUENZA A PCR 09/11/2023 Negative     INFLUENZA B PCR 09/11/2023 Negative     RSV PCR 09/11/2023 Negative     WBC 09/11/2023 7.49     RBC 09/11/2023 4.79     Hemoglobin 09/11/2023 14.2     Hematocrit 09/11/2023 42.7     MCV 09/11/2023 89     MCH 09/11/2023 29.6     MCHC 09/11/2023 33.3     RDW 09/11/2023 14.4     MPV 09/11/2023 9.4     Platelets 09/11/2023 197     nRBC 09/11/2023 0     Segmented % 09/11/2023 62     Immature Grans % 09/11/2023 1     Lymphocytes % 09/11/2023 27     Monocytes % 09/11/2023 7     Eosinophils Relative  09/11/2023 2     Basophils Relative 09/11/2023 1     Absolute Neutrophils 09/11/2023 4.64     Absolute Immature Grans 09/11/2023 0.05     Absolute Lymphocytes 09/11/2023 2.03     Absolute Monocytes 09/11/2023 0.55     Eosinophils Absolute 09/11/2023 0.17     Basophils Absolute 09/11/2023 0.05     Sodium 09/11/2023 139     Potassium 09/11/2023 3.7     Chloride 09/11/2023 105     CO2 09/11/2023 27     ANION GAP 09/11/2023 7     BUN 09/11/2023 13     Creatinine 09/11/2023 1.11     Glucose 09/11/2023 136     Calcium 09/11/2023 9.3     AST 09/11/2023 29     ALT 09/11/2023 19     Alkaline Phosphatase 09/11/2023 69     Total Protein 09/11/2023 7.1     Albumin 09/11/2023 4.2     Total Bilirubin 09/11/2023 0.56     eGFR 09/11/2023 74     TSH 3RD GENERATON 09/11/2023 2.092     Color, UA 09/11/2023 Yellow     Clarity, UA 09/11/2023 Turbid     Specific Gravity, UA 09/11/2023 1.035 (H)     pH, UA 09/11/2023 6.5     Leukocytes, UA 09/11/2023 Trace (A)     Nitrite, UA 09/11/2023 Negative     Protein, UA 09/11/2023 30 (1+) (A)     Glucose, UA 09/11/2023 Negative     Ketones, UA 09/11/2023 Negative     Urobilinogen, UA 09/11/2023 2.0 (A)     Bilirubin, UA 09/11/2023 Negative     Occult Blood, UA 09/11/2023 Negative     RBC, UA 09/11/2023 None Seen     WBC, UA 09/11/2023 1-2     Epithelial Cells 09/11/2023 Occasional     Bacteria, UA 09/11/2023 None Seen     MUCUS THREADS 09/11/2023 Innumerable (A)     Ventricular Rate 09/11/2023 66     Atrial Rate 09/11/2023 66     MN Interval 09/11/2023 184     QRSD Interval 09/11/2023 104     QT Interval 09/11/2023 434     QTC Interval 09/11/2023 454     P Axis 09/11/2023 40     QRS [place] 09/11/2023 -52     T Wave [place] 09/11/2023 10      I have personally reviewed all pertinent laboratory/tests results.    Suicide/Homicide Risk Assessment:    Risk of Harm to Self:  Protective Factors: no current suicidal ideation, access to mental health treatment, being , compliant with medications,  compliant with mental health treatment, connection to community, medical compliance, personal beliefs, resiliency  Based on today's assessment, Yan presents the following risk of harm to self: minimal    Risk of Harm to Others:  Based on today's assessment, Yan presents the following risk of harm to others: minimal    The following interventions are recommended: referral for psychotherapy    Medications Risks/Benefits:      Risks, Benefits And Possible Side Effects Of Medications:    Discussed risks and benefits of treatment with patient including risk of suicidality, serotonin syndrome, increased QTc interval and SIADH related to treatment with antidepressants; Risk of induction of manic symptoms in certain patient populations, risk of parkinsonian symptoms, metabolic syndrome, tardive dyskinesia and neuroleptic malignant syndrome related to treatment with antipsychotic medications, risks of misuse, abuse or dependence, sedation and respiratory depression related to treatment with benzodiazepine medications, risk of rash related to treatment with Lamictal, and risk of liver impairment related to treatment with Depakote     Controlled Medication Discussion:     Yan has been filling controlled prescriptions on time as prescribed according to [place] Prescription Drug Monitoring Program  Discussed with Yan the risks of sedation, respiratory depression, impairment of ability to drive and potential for abuse and addiction related to treatment with benzodiazepine medications. He understands risk of treatment with benzodiazepine medications, agrees to not drive if feels impaired and agrees to take medications as prescribed  Discussed with Yan Black Box warning on concurrent use of benzodiazepines and opioid medications including sedation, respiratory depression, coma and death. He understands the risk of treatment with benzodiazepines in addition to opioids and wants to continue taking those  medications  Discussed with Yan increased risk of impairment related to concurrent use of benzodiazepines and hypnotic medications including excessive sedation, psychomotor impairment and respiratory depression. He understands the risk of treatment with benzodiazepines in addition to hypnotic medications and wants to continue taking those medications    Treatment Plan:    Due for update/Updated:   no  Last treatment plan done 9/6, due 3/6/25    ROCKY Orellana 09/06/24    This note was shared with patient.      Visit Time    Visit Start Time: 230  Visit Stop Time: 255  Total Visit Duration:  25 minutes

## 2024-09-06 NOTE — TELEPHONE ENCOUNTER
Patient called in stating they got a new phone and do not have [employer] downloaded just yet and could the link please be sent in a text message to [phone]    Writer made provider aware of the request and provider was able to send the link via text.

## 2024-09-06 NOTE — BH TREATMENT PLAN
TREATMENT PLAN (Medication Management Only)        [facility]    Name and Date of Birth:  Yan Finch Jr. 54 y.o. 1970  Date of Treatment Plan: September 6, 2024  Diagnosis/Diagnoses:    1. Psychosis, unspecified psychosis type (HCC)    2. Traumatic brain injury with loss of consciousness, subsequent encounter    3. Reactive depression      Strengths/Personal Resources for Self-Care: supportive family, taking medications as prescribed, ability to communicate needs, ability to listen, family ties.  Area/Areas of need (in own words): depressive symptoms, hallucinations  1. Long Term Goal: continue improvement in depression.  Target Date:6 months - 3/6/2025  Person/Persons responsible for completion of goal: Yan  2.  Short Term Objective (s) - How will we reach this goal?:   A. Provider new recommended medication/dosage changes and/or continue medication(s): continue current medications as prescribed.  B. Think positively.  C. Attend medication management appointments regularly.  Target Date:6 months - 3/6/2025  Person/Persons Responsible for Completion of Goal: Yan  Progress Towards Goals: continuing treatment  Treatment Modality: medication management every 2 months  Review due 180 days from date of this plan: 6 months - 3/6/2025  Expected length of service: ongoing treatment  My Physician/PA/NP and I have developed this plan together and I agree to work on the goals and objectives. I understand the treatment goals that were developed for my treatment.

## 2024-09-11 ENCOUNTER — TELEPHONE (OUTPATIENT)
Dept: PSYCHIATRY | Facility: CLINIC | Age: 54
End: 2024-09-11

## 2024-09-20 ENCOUNTER — TELEPHONE (OUTPATIENT)
Dept: PSYCHIATRY | Facility: CLINIC | Age: 54
End: 2024-09-20

## 2024-09-27 ENCOUNTER — TELEPHONE (OUTPATIENT)
Dept: PSYCHIATRY | Facility: CLINIC | Age: 54
End: 2024-09-27

## 2024-09-29 DIAGNOSIS — F32.9 REACTIVE DEPRESSION: ICD-10-CM

## 2024-09-29 DIAGNOSIS — F29 PSYCHOSIS, UNSPECIFIED PSYCHOSIS TYPE (HCC): ICD-10-CM

## 2024-09-30 DIAGNOSIS — S06.9X9D TRAUMATIC BRAIN INJURY WITH LOSS OF CONSCIOUSNESS, SUBSEQUENT ENCOUNTER: ICD-10-CM

## 2024-09-30 RX ORDER — ZIPRASIDONE HYDROCHLORIDE 80 MG/1
80 CAPSULE ORAL 2 TIMES DAILY
Qty: 60 CAPSULE | Refills: 0 | Status: SHIPPED | OUTPATIENT
Start: 2024-09-30

## 2024-09-30 RX ORDER — BENZTROPINE MESYLATE 0.5 MG/1
0.5 TABLET ORAL 2 TIMES DAILY
Qty: 60 TABLET | Refills: 0 | Status: SHIPPED | OUTPATIENT
Start: 2024-09-30

## 2024-10-07 ENCOUNTER — PATIENT MESSAGE (OUTPATIENT)
Dept: NEUROLOGY | Facility: CLINIC | Age: 54
End: 2024-10-07

## 2024-10-07 DIAGNOSIS — I10 ESSENTIAL HYPERTENSION: ICD-10-CM

## 2024-10-07 DIAGNOSIS — R56.9 SEIZURE (HCC): ICD-10-CM

## 2024-10-07 DIAGNOSIS — E55.9 VITAMIN D DEFICIENCY: ICD-10-CM

## 2024-10-08 RX ORDER — METOPROLOL TARTRATE 100 MG
100 TABLET ORAL EVERY 12 HOURS
Qty: 30 TABLET | Refills: 1 | Status: SHIPPED | OUTPATIENT
Start: 2024-10-08

## 2024-10-09 ENCOUNTER — TELEPHONE (OUTPATIENT)
Age: 54
End: 2024-10-09

## 2024-10-09 DIAGNOSIS — F32.9 REACTIVE DEPRESSION: ICD-10-CM

## 2024-10-09 DIAGNOSIS — F29 PSYCHOSIS, UNSPECIFIED PSYCHOSIS TYPE (HCC): ICD-10-CM

## 2024-10-09 RX ORDER — DIAZEPAM 2 MG
2 TABLET ORAL DAILY PRN
Qty: 30 TABLET | Refills: 0 | Status: SHIPPED | OUTPATIENT
Start: 2024-10-09

## 2024-10-09 RX ORDER — SERTRALINE HYDROCHLORIDE 100 MG/1
150 TABLET, FILM COATED ORAL DAILY
Qty: 90 TABLET | Refills: 0 | Status: SHIPPED | OUTPATIENT
Start: 2024-10-09

## 2024-10-09 RX ORDER — ZALEPLON 5 MG/1
5 CAPSULE ORAL
Qty: 30 CAPSULE | Refills: 0 | Status: SHIPPED | OUTPATIENT
Start: 2024-10-09

## 2024-10-09 NOTE — TELEPHONE ENCOUNTER
Reason for call:   [x] Refill   [] Prior Auth  [x] Other: pt's wife also mentioned that she was supposed to sign a form but she never got it, said that she'd check his MyChart again but she didn't see it last time she was on there and wasn't sure what form it is, would like a call back or a new one sent to the [facility]    Office:   [] PCP/Provider -   [x] Specialty/Provider - psychiatry     Medication: diazepam 2 mg take one daily as needed     Sertraline 100 mg take 1 1/2 daily      Zaleplon 5 mg take one daily at bedtime         Quantity: 30 day for all    [facility] [address] [facility]    Does the patient have enough for 3 days?   [] Yes   [x] No - Send as HP to POD

## 2024-10-09 NOTE — TELEPHONE ENCOUNTER
Medication:multiple   09/06/2024 09/06/2024 Zaleplon (Capsule) 30.0 30 5 MG [facility]  Medicare 0 / 0 PA   1 [id number] 08/05/2024 08/05/2024 Zaleplon (Capsule) 30.0 30 5 MG NA [facility]  Medicare 0 / 0 PA   1 [id number] 07/02/2024 07/02/2024 diazePAM (Tablet) 30.0 30 2 MG  [facility]  Medicare 0 / 0 PA   1 [id number] 07/02/2024 07/02/2024 Zaleplon (Capsule) 30.0 30 5 MG  [facility]  Medicare 0 / 0 PA   1 [id number] 05/28/2024 05/28/2024 Zaleplon (Capsule) 30.0 30 5 MG  [facility]  Medicare 0 / 0 PA   1 [id number] 04/24/2024 04/24/2024 diazePAM (Tablet) 30.0 30 2 MG  [facility]  Medicare 0 / 0 PA   1 [id number] 04/24/2024 04/24/2024 Zaleplon (Capsule) 30.0 30 5 MG NA [facility]  Medicare 0 / 0 PA   1 [id number] 03/12/2024 03/12/2024 Zaleplon (Capsule) 30.0 30 5 MG  [facility]  Medicare 0 / 0 PA   1 [id number] 02/10/2024 02/08/2024 Zaleplon (Capsule) 30.0 30 5 MG NA [facility]  Medicare 0 / 0

## 2024-10-09 NOTE — TELEPHONE ENCOUNTER
Medication: divalproex sodium (DEPAKOTE) 500 mg DR tablet     Dose/Frequency: TAKE 1 TABLET BY MOUTH TWICE DAILY WITH  ONE  250  MG  TABLET  FOR  A  TOTAL  OF  750MG  TWICE  DAILY     Quantity: 60    Pharmacy: ***    Office:   [] PCP/Provider -   [x] Speciality/Provider -     Does the patient have enough for 3 days?   [] Yes   [] No - Send as HP to POD  Patient sent a [employer] message requesting refills for Divalproex 250 and 500mg.                   LOV 3-27-24  NOV Not scheduled (Should have been scheduled around 7-27-24)

## 2024-10-09 NOTE — TELEPHONE ENCOUNTER
Patient sent a [employer] message requesting refills for Divalproex 250 and 500mg.     LOV 3-27-24  NOV Not scheduled (Should have been scheduled around 7-27-24)

## 2024-10-10 RX ORDER — DIVALPROEX SODIUM 500 MG/1
TABLET, DELAYED RELEASE ORAL
Qty: 60 TABLET | Refills: 5 | Status: SHIPPED | OUTPATIENT
Start: 2024-10-10

## 2024-10-10 RX ORDER — DIVALPROEX SODIUM 250 MG/1
TABLET, DELAYED RELEASE ORAL
Qty: 60 TABLET | Refills: 5 | Status: SHIPPED | OUTPATIENT
Start: 2024-10-10

## 2024-10-10 NOTE — ASSESSMENT & PLAN NOTE
Conjunctivae and eyelids appear normal,  Sclerae : White without injection  · Reports an episode of syncope on admission; patient reports feeling a intermittent buzzing over his head for the past several days; as well as some visual disturbances such as silver floaters  · Cardiac verses neurologic  · Appears to be cardiac in nature as patient did have positive orthostatic vital signs  · Telemetry, no events noted  · Orthostatics + with a significant drop from laying to sitting, including his heart rate  · Cardiology consult  · Echocardiogram pending  · Okay to discharge, cardiology office will follow up with patient

## 2024-10-10 NOTE — PATIENT COMMUNICATION
Pt is requesting refills of Depakote 250 and 500 mg. Pt last seen via Telemedicine on 3/27/24 with instructions to return in 4 months (around 7/27/24).    [facility]er[facility] - please call pt to schedule.    Dr. Jewell - Please sign off if agreeable. Thank you.

## 2024-10-14 RX ORDER — DIVALPROEX SODIUM 250 MG/1
TABLET, DELAYED RELEASE ORAL
Qty: 60 TABLET | Refills: 0 | Status: SHIPPED | OUTPATIENT
Start: 2024-10-14

## 2024-10-14 RX ORDER — MULTIVIT-MIN/IRON/FOLIC ACID/K 18-600-40
1 CAPSULE ORAL DAILY
Qty: 30 CAPSULE | Refills: 0 | Status: SHIPPED | OUTPATIENT
Start: 2024-10-14

## 2024-10-14 RX ORDER — DIVALPROEX SODIUM 500 MG/1
TABLET, DELAYED RELEASE ORAL
Qty: 60 TABLET | Refills: 0 | Status: SHIPPED | OUTPATIENT
Start: 2024-10-14

## 2024-10-28 DIAGNOSIS — F29 PSYCHOSIS, UNSPECIFIED PSYCHOSIS TYPE (HCC): ICD-10-CM

## 2024-10-28 DIAGNOSIS — F32.9 REACTIVE DEPRESSION: ICD-10-CM

## 2024-10-29 DIAGNOSIS — S06.9X9D TRAUMATIC BRAIN INJURY WITH LOSS OF CONSCIOUSNESS, SUBSEQUENT ENCOUNTER: ICD-10-CM

## 2024-10-29 RX ORDER — ZIPRASIDONE HYDROCHLORIDE 80 MG/1
80 CAPSULE ORAL 2 TIMES DAILY
Qty: 60 CAPSULE | Refills: 0 | Status: SHIPPED | OUTPATIENT
Start: 2024-10-29

## 2024-10-29 RX ORDER — BENZTROPINE MESYLATE 0.5 MG/1
0.5 TABLET ORAL 2 TIMES DAILY
Qty: 60 TABLET | Refills: 0 | Status: SHIPPED | OUTPATIENT
Start: 2024-10-29

## 2024-10-30 DIAGNOSIS — R56.9 SEIZURE (HCC): ICD-10-CM

## 2024-10-30 DIAGNOSIS — I10 ESSENTIAL HYPERTENSION: ICD-10-CM

## 2024-10-31 RX ORDER — DIVALPROEX SODIUM 500 MG/1
TABLET, DELAYED RELEASE ORAL
Qty: 60 TABLET | Refills: 5 | Status: SHIPPED | OUTPATIENT
Start: 2024-10-31

## 2024-10-31 RX ORDER — DIVALPROEX SODIUM 250 MG/1
TABLET, DELAYED RELEASE ORAL
Qty: 60 TABLET | Refills: 5 | Status: SHIPPED | OUTPATIENT
Start: 2024-10-31

## 2024-11-01 RX ORDER — METOPROLOL TARTRATE 100 MG/1
100 TABLET ORAL EVERY 12 HOURS
Qty: 60 TABLET | Refills: 1 | Status: SHIPPED | OUTPATIENT
Start: 2024-11-01

## 2024-11-01 NOTE — TELEPHONE ENCOUNTER
Refill provided, he should now have enough metoprolol until his appointment with Dr. Garza. He needs to be seen in office for further refills. Thanks.

## 2024-11-07 DIAGNOSIS — F29 PSYCHOSIS, UNSPECIFIED PSYCHOSIS TYPE (HCC): ICD-10-CM

## 2024-11-07 RX ORDER — ZALEPLON 5 MG/1
5 CAPSULE ORAL
Qty: 30 CAPSULE | Refills: 0 | Status: SHIPPED | OUTPATIENT
Start: 2024-11-07

## 2024-11-07 NOTE — TELEPHONE ENCOUNTER
Refill cannot be delegated    1 [id number] 10/09/2024 10/09/2024 diazePAM (Tablet) 30.0 30 2 MG NA [facility]  Medicare 0 / 0 PA   1 [id number] 10/09/2024 10/09/2024 Zaleplon (Capsule) 30.0 30 5 MG NA [facility]  Medicare 0 / 0 PA   1 [id number] 09/06/2024 09/06/2024 Zaleplon (Capsule) 30.0 30 5 MG NA [facility]  Medicare 0 / 0 PA   1 [id number] 08/05/2024 08/05/2024 Zaleplon (Capsule) 30.0 30 5 MG [facility]  Medicare 0 / 0 PA   1 [id number] 07/02/2024 07/02/2024 diazePAM (Tablet) 30.0 30 2 MG [facility]  Medicare 0 / 0 PA   1 [id number] 07/02/2024 07/02/2024 Zaleplon (Capsule) 30.0 30 5 MG [facility]  Medicare 0 / 0 PA   1 [id number] 05/28/2024 05/28/2024 Zaleplon (Capsule) 30.0 30 5 MG [facility]  Medicare 0 / 0 PA   1 [id number] 04/24/2024 04/24/2024 diazePAM (Tablet) 30.0 30 2 MG [facility]  Medicare 0 / 0 PA   1 [id number] 04/24/2024 04/24/2024 Zaleplon (Capsule) 30.0 30 5 MG [facility]  Medicare 0 / 0 PA   1 [id number] 03/12/2024 03/12/2024 Zaleplon (Capsule) 30.0 30 5 MG  [facility]  Medicare 0 / 0 PA

## 2024-11-15 ENCOUNTER — TELEPHONE (OUTPATIENT)
Dept: PSYCHIATRY | Facility: CLINIC | Age: 54
End: 2024-11-15

## 2024-11-23 DIAGNOSIS — S06.9X9D TRAUMATIC BRAIN INJURY WITH LOSS OF CONSCIOUSNESS, SUBSEQUENT ENCOUNTER: ICD-10-CM

## 2024-11-24 DIAGNOSIS — F32.9 REACTIVE DEPRESSION: ICD-10-CM

## 2024-11-24 DIAGNOSIS — F29 PSYCHOSIS, UNSPECIFIED PSYCHOSIS TYPE (HCC): ICD-10-CM

## 2024-11-25 RX ORDER — BENZTROPINE MESYLATE 0.5 MG/1
0.5 TABLET ORAL 2 TIMES DAILY
Qty: 60 TABLET | Refills: 0 | Status: SHIPPED | OUTPATIENT
Start: 2024-11-25

## 2024-11-26 RX ORDER — ZIPRASIDONE HYDROCHLORIDE 80 MG/1
80 CAPSULE ORAL 2 TIMES DAILY
Qty: 60 CAPSULE | Refills: 0 | Status: SHIPPED | OUTPATIENT
Start: 2024-11-26

## 2024-12-14 DIAGNOSIS — F32.9 REACTIVE DEPRESSION: ICD-10-CM

## 2024-12-14 DIAGNOSIS — R56.9 SEIZURE (HCC): ICD-10-CM

## 2024-12-14 DIAGNOSIS — F29 PSYCHOSIS, UNSPECIFIED PSYCHOSIS TYPE (HCC): ICD-10-CM

## 2024-12-16 ENCOUNTER — TELEPHONE (OUTPATIENT)
Age: 54
End: 2024-12-16

## 2024-12-16 RX ORDER — DIAZEPAM 2 MG/1
TABLET ORAL
Qty: 30 TABLET | Refills: 0 | Status: SHIPPED | OUTPATIENT
Start: 2024-12-16

## 2024-12-16 RX ORDER — DIVALPROEX SODIUM 500 MG/1
TABLET, DELAYED RELEASE ORAL
Qty: 60 TABLET | Refills: 0 | OUTPATIENT
Start: 2024-12-16

## 2024-12-16 RX ORDER — SERTRALINE HYDROCHLORIDE 100 MG/1
150 TABLET, FILM COATED ORAL DAILY
Qty: 90 TABLET | Refills: 0 | Status: SHIPPED | OUTPATIENT
Start: 2024-12-16

## 2024-12-16 RX ORDER — ZALEPLON 5 MG/1
5 CAPSULE ORAL
Qty: 30 CAPSULE | Refills: 0 | Status: SHIPPED | OUTPATIENT
Start: 2024-12-16

## 2024-12-16 NOTE — TELEPHONE ENCOUNTER
Patient contacted the office to schedule a follow up visit with provider. Patient is now scheduled for 12/19/2024  at 2 pm virtually.

## 2024-12-16 NOTE — TELEPHONE ENCOUNTER
Pts wife called refill line stating refills are needed. Please address. She states one of the meds they are out of and 2 they only have a couple days worth left.    I warm transferred Kay to scheduling and Sameera took over call and scheduled pt for 12/19

## 2024-12-19 ENCOUNTER — TELEPHONE (OUTPATIENT)
Age: 54
End: 2024-12-19

## 2024-12-19 NOTE — TELEPHONE ENCOUNTER
Called and left a VM for the patient to call back and get his appt that was scheduled for 12/19/2024 at 2 pm rescheduled. Patient can only be seen virtually on a day when provider is in office. Please reschedule accordingly when patient returns call.

## 2024-12-19 NOTE — TELEPHONE ENCOUNTER
Patient contacted the office to schedule a follow up visit with provider. Patient is now scheduled for 1/22/25  at 8am virtually.

## 2024-12-28 DIAGNOSIS — F32.9 REACTIVE DEPRESSION: ICD-10-CM

## 2024-12-28 DIAGNOSIS — F29 PSYCHOSIS, UNSPECIFIED PSYCHOSIS TYPE (HCC): ICD-10-CM

## 2024-12-30 RX ORDER — ZIPRASIDONE HYDROCHLORIDE 80 MG/1
80 CAPSULE ORAL 2 TIMES DAILY
Qty: 60 CAPSULE | Refills: 0 | Status: SHIPPED | OUTPATIENT
Start: 2024-12-30

## 2025-01-08 DIAGNOSIS — F29 PSYCHOSIS, UNSPECIFIED PSYCHOSIS TYPE (HCC): ICD-10-CM

## 2025-01-08 DIAGNOSIS — G40.109 LOCALIZATION-RELATED EPILEPSY (HCC): ICD-10-CM

## 2025-01-09 RX ORDER — LAMOTRIGINE 100 MG/1
100 TABLET ORAL 2 TIMES DAILY
Qty: 180 TABLET | Refills: 0 | Status: SHIPPED | OUTPATIENT
Start: 2025-01-09

## 2025-01-13 NOTE — TELEPHONE ENCOUNTER
Patient scheduled for a Follow up on 6/9/2025     Patient requested a virtual Visit and this was the 1st available    Called patient back to offer F2F with a AP or CRNP for sooner . No answer left a message to call back if they changed their mind and wanted to see a AP or CRNP but would need to see face to face for the first visit     Awaiting patient call back for now they are scheduled for 6/9/2025

## 2025-01-14 NOTE — TELEPHONE ENCOUNTER
Lamotrigine filled 1/9/25    Depakote 250 and 500 mg have refills at the pharmacy. Contacted pharmacy and confirmed.     Sent patient [facility] [facility] message

## 2025-01-16 DIAGNOSIS — F29 PSYCHOSIS, UNSPECIFIED PSYCHOSIS TYPE (HCC): ICD-10-CM

## 2025-01-16 NOTE — TELEPHONE ENCOUNTER
Reason for call:   [x] Refill   [] Prior Auth  [] Other:     Office:   [] PCP/Provider -   [x] Specialty/Provider - Will Childers DO / [facility]     Medication: zaleplon (SONATA) 5 MG capsule / Take 1 capsule by mouth once daily at bedtime     Pharmacy: [id number] - [facility] [place] - [address]     Does the patient have enough for 3 days?   [] Yes   [x] No - Send as HP to POD

## 2025-01-16 NOTE — TELEPHONE ENCOUNTER
Medication:  PDMP  12/16/2024 12/16/2024 diazePAM (Tablet) 30.0 30 2 MG NA [facility]  Medicare 0 / 0 PA   1 [id number] 12/16/2024 12/16/2024 Zaleplon (Capsule) 30.0 30 5 MG NA [facility]  Medicare 0 / 0 PA   1 [id number] 11/07/2024 11/07/2024 Zaleplon (Capsule) 30.0 30 5 MG NA [facility]  Medicare 0 / 0  Active agreement on file -          activity/ineffective pain medication

## 2025-01-17 RX ORDER — ZALEPLON 5 MG/1
5 CAPSULE ORAL
Qty: 30 CAPSULE | Refills: 0 | Status: SHIPPED | OUTPATIENT
Start: 2025-01-17

## 2025-01-23 ENCOUNTER — TELEPHONE (OUTPATIENT)
Age: 55
End: 2025-01-23

## 2025-01-23 DIAGNOSIS — I10 ESSENTIAL HYPERTENSION: ICD-10-CM

## 2025-01-23 NOTE — TELEPHONE ENCOUNTER
Aileen Reyes are behalf of patient  contacted the office to schedule a follow up visit with provider. Patient is now scheduled for 1/29/25  at 1pm virtually.       Patient gave verbal consent to speak with Aileen Reyes.

## 2025-01-26 RX ORDER — METOPROLOL TARTRATE 100 MG/1
100 TABLET ORAL 2 TIMES DAILY
Qty: 60 TABLET | Refills: 0 | OUTPATIENT
Start: 2025-01-26

## 2025-01-26 NOTE — TELEPHONE ENCOUNTER
Patient has not been seen in our office in several years.  He can follow-up with his PCP for refill until he is seen in office by Dr. Garza in February.

## 2025-01-29 ENCOUNTER — TELEMEDICINE (OUTPATIENT)
Dept: PSYCHIATRY | Facility: CLINIC | Age: 55
End: 2025-01-29
Payer: MEDICARE

## 2025-01-29 DIAGNOSIS — F32.9 REACTIVE DEPRESSION: ICD-10-CM

## 2025-01-29 DIAGNOSIS — S06.9X9D TRAUMATIC BRAIN INJURY WITH LOSS OF CONSCIOUSNESS, SUBSEQUENT ENCOUNTER: ICD-10-CM

## 2025-01-29 DIAGNOSIS — F29 PSYCHOSIS, UNSPECIFIED PSYCHOSIS TYPE (HCC): Primary | ICD-10-CM

## 2025-01-29 DIAGNOSIS — Z79.899 POLYPHARMACY: ICD-10-CM

## 2025-01-29 PROCEDURE — G2211 COMPLEX E/M VISIT ADD ON: HCPCS

## 2025-01-29 PROCEDURE — 99214 OFFICE O/P EST MOD 30 MIN: CPT

## 2025-01-29 RX ORDER — DULAGLUTIDE 0.75 MG/.5ML
INJECTION, SOLUTION SUBCUTANEOUS
COMMUNITY
Start: 2024-12-05

## 2025-01-29 NOTE — ASSESSMENT & PLAN NOTE
-Continue Zoloft 150 mg daily for chronic depression which initiated with AH started since 2019  PARQ completed including serotonin syndrome, SIADH, worsening depression, suicidality, induction of afshin, GI upset, headaches, activation, sexual side effects, sedation, potential drug interactions, and others.   (3) no apparent problem

## 2025-01-29 NOTE — ASSESSMENT & PLAN NOTE
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 Complete pending labs.    PARQ discussed with patient included sedation, dizziness,  dry mouth, constipation, hypotension, dyslipidemia, EPS, metabolic syndrome, peripheral edema, dyspepsia, joint and body pains, tachycardia, orthostatic hypotension, rash with sunlight exposure, hyperglycemia, seizures, skin rash, rare NMS, and increased risk of CVA in patient's with dementia.     -Continue Cogentin 0.5 mg twice daily prn for periodic bilateral hand shaking, reports improvement with medication     -Continue p.r.n. Valium 2 milligrams p.r.n. for severe anxiety/agitation which occurs due to patient's distressing auditory hallucinations.   Using infrequently and only as needed.  Discussed with patient the risks of sedation, respiratory depression, impairment of ability to drive and potential for abuse and addiction related to treatment with benzodiazepine medications. The patient understands risk of treatment with benzodiazepine medications, agrees to not drive if feels impaired and agrees to take medications as prescribed.      -Utilizes prn Sonata 5mg for insomnia

## 2025-01-29 NOTE — PSYCH
Virtual Regular Visit    Verification of patient location: home    Patient is located in the following state in which I hold an active license [place]    Assessment & Plan  Psychosis, unspecified psychosis type (HCC)  -Continue Geodon to 80 mg twice daily for chronic auditory hallucinations in the context of his traumatic brain injury. Complete pending labs.    PARQ discussed with patient included sedation, dizziness,  dry mouth, constipation, hypotension, dyslipidemia, EPS, metabolic syndrome, peripheral edema, dyspepsia, joint and body pains, tachycardia, orthostatic hypotension, rash with sunlight exposure, hyperglycemia, seizures, skin rash, rare NMS, and increased risk of CVA in patient's with dementia.     -Continue Cogentin 0.5 mg twice daily prn for periodic bilateral hand shaking, reports improvement with medication     -Continue p.r.n. Valium 2 milligrams p.r.n. for severe anxiety/agitation which occurs due to patient's distressing auditory hallucinations.   Using infrequently and only as needed.  Discussed with patient the risks of sedation, respiratory depression, impairment of ability to drive and potential for abuse and addiction related to treatment with benzodiazepine medications. The patient understands risk of treatment with benzodiazepine medications, agrees to not drive if feels impaired and agrees to take medications as prescribed.      -Utilizes prn Sonata 5mg for insomnia   Traumatic brain injury with loss of consciousness, subsequent encounter    Reactive depression    -Continue Zoloft 150 mg daily for chronic depression which initiated with  started since 2019  PARQ completed including serotonin syndrome, SIADH, worsening depression, suicidality, induction of afshin, GI upset, headaches, activation, sexual side effects, sedation, potential drug interactions, and others.  Polypharmacy           Encounter provider ROCKY Orellana    Provider located at    [facility]  [facility] [place]  [address] [facility] [id number] [phone]    Recent Visits  Date Type Provider Dept   01/29/25 Telemedicine ROCKY Orellana [facility]   Showing recent visits within past 7 days and meeting all other requirements  Future Appointments  No visits were found meeting these conditions.  Showing future appointments within next 150 days and meeting all other requirements         The patient was identified by name and date of birth. Yan Finch JrNino was informed that this is a telemedicine visit and that the visit is being conducted throughthe Epic Embedded platform. He agrees to proceed..  My office door was closed. No one else was in the room.  He acknowledged consent and understanding of privacy and security of the video platform. The patient has agreed to participate and understands they can discontinue the visit at any time.    Patient is aware this is a billable service.     HPI     Current Outpatient Medications   Medication Sig Dispense Refill    amLODIPine (NORVASC) 10 mg tablet Take 10 mg by mouth daily      aspirin 81 mg chewable tablet Chew 81 mg daily      atorvastatin (LIPITOR) 20 mg tablet Take 20 mg by mouth every evening      benztropine (COGENTIN) 0.5 mg tablet Take 1 tablet by mouth twice daily 60 tablet 0    diazepam (VALIUM) 2 mg tablet TAKE 1 TABLET BY MOUTH ONCE DAILY AS NEEDED FOR ANXIETY 30 tablet 0    divalproex sodium (DEPAKOTE) 250 mg DR tablet TAKE 1 TABLET BY MOUTH TWICE DAILY WITH  ONE  500  MG  TAB  FOR  TOAL  DOSE  OF  750  MG  TWICE  A  DAY 60 tablet 5    divalproex sodium (DEPAKOTE) 500 mg DR tablet TAKE 1 TABLET BY MOUTH TWICE DAILY WITH  MG TABLET FOR A TOTAL OF 750MG TWICE DAILY 60 tablet 5    dulaglutide (Trulicity) 0.75 MG/0.5ML injection Inject 0.75 mg under the skin Once a week      ibuprofen (MOTRIN) 800 mg tablet daily as needed      insulin NPH-insulin regular (NovoLIN 70/30) 100 units/mL  subcutaneous injection Inject 10 Units under the skin daily before breakfast 3 mL 0    lamoTRIgine (LaMICtal) 100 mg tablet Take 1 tablet by mouth twice daily 180 tablet 0    metoprolol tartrate (LOPRESSOR) 100 mg tablet Take 1 tablet (100 mg total) by mouth every 12 (twelve) hours 60 tablet 1    sertraline (ZOLOFT) 100 mg tablet TAKE 1 & 1/2 (ONE & ONE-HALF) TABLETS BY MOUTH ONCE DAILY 90 tablet 0    Trulicity 0.75 MG/0.5ML SOAJ INJECT 0.75 MG SUBCUTANEOUSLY ONCE A WEEK      Ventolin  (90 Base) MCG/ACT inhaler Inhale 2 puffs every 6 (six) hours as needed      Vitamin D, Cholecalciferol, 50 MCG (2000 UT) CAPS Take 1 capsule by mouth daily 30 capsule 0    zaleplon (SONATA) 5 MG capsule Take 1 capsule (5 mg total) by mouth daily at bedtime 30 capsule 0    ziprasidone (GEODON) 80 mg capsule Take 1 capsule by mouth twice daily 60 capsule 0    amoxicillin (AMOXIL) 500 mg capsule TAKE 1 CAPSULE BY MOUTH TWICE DAILY FOR 10 DAYS (Patient not taking: Reported on 3/3/2023)      divalproex sodium (DEPAKOTE) 250 mg DR tablet TAKE 1 TABLET BY MOUTH TWICE DAILY WITH  ONE  500  MG  TAB  FOR  TOAL  DOSE  OF  750  MG  TWICE  A  DAY (Patient not taking: Reported on 1/29/2025) 60 tablet 5    divalproex sodium (DEPAKOTE) 500 mg DR tablet TAKE 1 TABLET BY MOUTH TWICE DAILY WITH  MG TABLET FOR A TOTAL OF 750MG TWICE DAILY (Patient not taking: Reported on 1/29/2025) 60 tablet 5    metFORMIN (GLUCOPHAGE) 1000 MG tablet Take 1,000 mg by mouth 2 (two) times a day with meals (Patient not taking: Reported on 3/3/2023)      semaglutide, 0.25 or 0.5 mg/dose, (Ozempic, 0.25 or 0.5 MG/DOSE,) 2 mg/3 mL injection pen Inject 0.25 mg under the skin Once a week (Patient not taking: Reported on 1/29/2025)       No current facility-administered medications for this visit.       Review of Systems  Video Exam    There were no vitals filed for this visit.    Physical Exam   As a result of this visit, I have referred the patient for further  "respiratory evaluation. No      VIRTUAL VISIT DISCLAIMER    Yan Finch Jr. acknowledges that he has consented to an online visit or consultation. He understands that the online visit is based solely on information provided by him, and that, in the absence of a face-to-face physical evaluation by the physician, the diagnosis he receives is both limited and provisional in terms of accuracy and completeness. This is not intended to replace a full medical face-to-face evaluation by the physician. Yan Finch Jr. understands and accepts these terms.    MEDICATION MANAGEMENT NOTE        [facility]    Name and Date of Birth:  Yan Finch Jr. 54 y.o. 1970 MRN: [id number]    Date of Visit: February 3, 2025    No Known Allergies  SUBJECTIVE:    Yan is seen today for a follow up for  hallucinations, depression, hx significant TBI . He reports that he continues to experience on and off symptoms since the last visit. Eduard and his wife present, both report he continues to have good and bad days in terms of his chronic mental health symptoms contributed by history of TBI.  States he really enjoys the days that he does not hear the voices and feels relaxed during this time, other days the voices are very hard to ignore.Voices remain derogatory in nature telling him \"you are no good\", \"you are worthless\" which makes him very upset.  Denies command hallucinations.  Denies recent visual hallucinations.  His wife continues to report the voices have decreased with current psychiatric regimen of Geodon 80 mg twice daily.  We again discussed coping skills during his difficult days and his wife is very helpful.  His wife continues to manage all medications.  Patient overall is in bright spirits today, discussing his grandchildren, upcoming move to a new rental house which he is excited about but also nervous.  Has a goal to work on improving physical exercise to avoid weight gain.  " Reminded patient has yet to complete his lab work for the last year and to please complete.  Sleep continues to be adequate but broken at times, states he is unable to sleep without his prescribed Sonata.  Continues to endorse days with severe anxiety when hallucinations are at the peak, able to calm down with occasional Valium and using sparingly.  He overall denies adverse effects from medication regimen and no changes were made today.  Recommend to continue improving exercise, diet, and following up with PCP regarding history of type 2 diabetes.  Denies SI and HI.          He denies any side effects from medications.    PLAN:    -Continue Geodon to 80 mg twice daily for chronic auditory hallucinations in the context of his traumatic brain injury. Complete pending labs from 5/3/24   PARQ discussed with patient included sedation, dizziness,  dry mouth, constipation, hypotension, dyslipidemia, EPS, metabolic syndrome, peripheral edema, dyspepsia, joint and body pains, tachycardia, orthostatic hypotension, rash with sunlight exposure, hyperglycemia, seizures, skin rash, rare NMS, and increased risk of CVA in patient's with dementia.     -Continue Cogentin 0.5 mg twice daily prn for periodic bilateral hand shaking, reports improvement with medication, aims 2    -Continue Zoloft 150 mg daily for chronic depression which initiated with  started since 2019  PARQ completed including serotonin syndrome, SIADH, worsening depression, suicidality, induction of afshin, GI upset, headaches, activation, sexual side effects, sedation, potential drug interactions, and others.     -Continue p.r.n. Valium 2 milligrams only for severe anxiety/agitation which occurs due to patient's distressing auditory hallucinations.   Using infrequently and only as needed.  Discussed with patient the risks of sedation, respiratory depression, impairment of ability to drive and potential for abuse and addiction related to treatment with  benzodiazepine medications. The patient understands risk of treatment with benzodiazepine medications, agrees to not drive if feels impaired and agrees to take medications as prescribed.      -Utilizes prn Sonata 5mg for insomnia         - Neurologist prescribes Depakote 750 mg b.i.d. and Lamictal 100 mg BID for the management of epilepsy disorder, has remained free from seizures 4 years. Complete pending labs.      Aware of 24 hour and weekend coverage for urgent situations accessed by calling [facility] main practice number  Referral for individual psychotherapy    Diagnoses and all orders for this visit:    Psychosis, unspecified psychosis type (HCC)    Traumatic brain injury with loss of consciousness, subsequent encounter    Reactive depression    Polypharmacy    Other orders  -     Trulicity 0.75 MG/0.5ML SOAJ; INJECT 0.75 MG SUBCUTANEOUSLY ONCE A WEEK          Current Outpatient Medications on File Prior to Visit   Medication Sig Dispense Refill    amLODIPine (NORVASC) 10 mg tablet Take 10 mg by mouth daily      aspirin 81 mg chewable tablet Chew 81 mg daily      atorvastatin (LIPITOR) 20 mg tablet Take 20 mg by mouth every evening      benztropine (COGENTIN) 0.5 mg tablet Take 1 tablet by mouth twice daily 60 tablet 0    diazepam (VALIUM) 2 mg tablet TAKE 1 TABLET BY MOUTH ONCE DAILY AS NEEDED FOR ANXIETY 30 tablet 0    divalproex sodium (DEPAKOTE) 250 mg DR tablet TAKE 1 TABLET BY MOUTH TWICE DAILY WITH  ONE  500  MG  TAB  FOR  TOAL  DOSE  OF  750  MG  TWICE  A  DAY 60 tablet 5    divalproex sodium (DEPAKOTE) 500 mg DR tablet TAKE 1 TABLET BY MOUTH TWICE DAILY WITH  MG TABLET FOR A TOTAL OF 750MG TWICE DAILY 60 tablet 5    dulaglutide (Trulicity) 0.75 MG/0.5ML injection Inject 0.75 mg under the skin Once a week      ibuprofen (MOTRIN) 800 mg tablet daily as needed      insulin NPH-insulin regular (NovoLIN 70/30) 100 units/mL subcutaneous injection Inject 10 Units under the skin  daily before breakfast 3 mL 0    lamoTRIgine (LaMICtal) 100 mg tablet Take 1 tablet by mouth twice daily 180 tablet 0    metoprolol tartrate (LOPRESSOR) 100 mg tablet Take 1 tablet (100 mg total) by mouth every 12 (twelve) hours 60 tablet 1    sertraline (ZOLOFT) 100 mg tablet TAKE 1 & 1/2 (ONE & ONE-HALF) TABLETS BY MOUTH ONCE DAILY 90 tablet 0    Trulicity 0.75 MG/0.5ML SOAJ INJECT 0.75 MG SUBCUTANEOUSLY ONCE A WEEK      Ventolin  (90 Base) MCG/ACT inhaler Inhale 2 puffs every 6 (six) hours as needed      Vitamin D, Cholecalciferol, 50 MCG (2000 UT) CAPS Take 1 capsule by mouth daily 30 capsule 0    zaleplon (SONATA) 5 MG capsule Take 1 capsule (5 mg total) by mouth daily at bedtime 30 capsule 0    ziprasidone (GEODON) 80 mg capsule Take 1 capsule by mouth twice daily 60 capsule 0    amoxicillin (AMOXIL) 500 mg capsule TAKE 1 CAPSULE BY MOUTH TWICE DAILY FOR 10 DAYS (Patient not taking: Reported on 3/3/2023)      divalproex sodium (DEPAKOTE) 250 mg DR tablet TAKE 1 TABLET BY MOUTH TWICE DAILY WITH  ONE  500  MG  TAB  FOR  TOAL  DOSE  OF  750  MG  TWICE  A  DAY (Patient not taking: Reported on 1/29/2025) 60 tablet 5    divalproex sodium (DEPAKOTE) 500 mg DR tablet TAKE 1 TABLET BY MOUTH TWICE DAILY WITH  MG TABLET FOR A TOTAL OF 750MG TWICE DAILY (Patient not taking: Reported on 1/29/2025) 60 tablet 5    metFORMIN (GLUCOPHAGE) 1000 MG tablet Take 1,000 mg by mouth 2 (two) times a day with meals (Patient not taking: Reported on 3/3/2023)      semaglutide, 0.25 or 0.5 mg/dose, (Ozempic, 0.25 or 0.5 MG/DOSE,) 2 mg/3 mL injection pen Inject 0.25 mg under the skin Once a week (Patient not taking: Reported on 1/29/2025)       No current facility-administered medications on file prior to visit.       Psychotherapy Provided:     Individual psychotherapy provided: Yes    Supportive counseling provided.      HPI ROS Appetite Changes and Sleep:     He reports difficulty falling asleep, adequate appetite, low  energy. Denies homicidal ideation, denies suicidal ideation    Review Of Systems:      HPI ROS:               Medication Side Effects:  denies currently    Depression (10 worst): 4-6/10    Anxiety (10 worst): 7/10    Safety concerns (SI, HI, etc): Denies si and hi    Sleep: 8-10 hrs but broken at time    Energy:  low    Appetite: 3 meals    Weight Change: denies        Mental Status Evaluation:    Appearance Adequate hygiene and grooming   Behavior calm and cooperative   Mood depressed and improving  Depression Scale - 5 of 10 (0 = No depression)  Anxiety Scale - 7 of 10 (0 = No anxiety)   Speech Normal rate and volume   Affect mood-congruent and constricted   Thought Processes Goal directed and coherent   Thought Content Does not verbalize delusional material   Associations concrete associations   Perceptual Disturbances Auditory hallucinations without commands   Risk Potential Suicidal/Homicidal Ideation - No evidence of suicidal or homicidal ideation and patient does not verbalize suicidal or homicidal ideation  Risk of Violence - No evidence of risk for violence found on assessment  Risk of Self Mutilation - No evidence of risk for self mutilation found on assessment   Orientation oriented to person, place, time/date and situation   Memory recent and remote memory grossly intact   Consciousness alert and awake   Attention/Concentration attention span and concentration appear shorter than expected for age   Insight intact   Judgement impaired due to psychosis   Muscle Strength and Gait decreased muscle tone, decreased muscle strength, unstable gait   Motor Activity no abnormal movements   Language no difficulty naming common objects, no difficulty repeating a phrase, no difficulty writing a sentence   Fund of Knowledge adequate knowledge of current events  adequate fund of knowledge regarding past history  adequate fund of knowledge regarding vocabulary          Past Medical History:    Past Medical History:    Diagnosis Date    Chemical exposure     Diabetes mellitus (HCC)     H/O bone graft     Head injury     August 25, 2019 fell backwards hit head on a [place]oulder w/ LOC    Head injury     as an air Born [place] with the [employer] - jumped out of a plane parachute disfunction    Hypertension     Seizures (HCC)         Past Surgical History:   Procedure Laterality Date    FL LUMBAR PUNCTURE DIAGNOSTIC  12/13/2019    KNEE ARTHROSCOPY W/ MENISCECTOMY Left     LEG SURGERY      TONSILLECTOMY       No Known Allergies  Substance Abuse History:    Social History     Substance and Sexual Activity   Alcohol Use Not Currently    Alcohol/week: 3.0 standard drinks of alcohol    Types: 3 Cans of beer per week    Comment: socially     Social History     Substance and Sexual Activity   Drug Use Never     Social History:    Social History     Socioeconomic History    Marital status: Single     Spouse name: Not on file    Number of children: Not on file    Years of education: Not on file    Highest education level: Not on file   Occupational History    Not on file   Tobacco Use    Smoking status: Some Days     Types: Cigars    Smokeless tobacco: Never   Vaping Use    Vaping status: Never Used   Substance and Sexual Activity    Alcohol use: Not Currently     Alcohol/week: 3.0 standard drinks of alcohol     Types: 3 Cans of beer per week     Comment: socially    Drug use: Never    Sexual activity: Not on file   Other Topics Concern    Not on file   Social History Narrative    Not on file     Social Drivers of Health     Financial Resource Strain: Low Risk  (7/22/2024)    Received from [facility]    Overall Financial Resource Strain (CARDIA)     Difficulty of Paying Living Expenses: Not very hard   Food Insecurity: No Food Insecurity (7/22/2024)    Received from [facility]    Hunger Vital Sign     Worried About Running Out of Food in the Last Year: Never true     Ran Out of Food in the Last Year: Never true    Transportation Needs: No Transportation Needs (7/22/2024)    Received from [facility]    PRAPARE - Transportation     Lack of Transportation (Medical): No     Lack of Transportation (Non-Medical): No   Physical Activity: Not on file   Stress: Stress Concern Present (7/22/2024)    Received from [facility]    [place] [employer] - Occupational Stress Questionnaire     Feeling of Stress : Very much   Social Connections: Feeling Somewhat Isolated (7/22/2024)    Received from [facility]    OASIS : Social Isolation     How often do you feel lonely or isolated from those around you?: Sometimes   Intimate Partner Violence: Not At Risk (7/22/2024)    Received from [facility], [facility]    Humiliation, Afraid, Rape, and Kick questionnaire     Fear of Current or Ex-Partner: No     Emotionally Abused: No     Physically Abused: No     Sexually Abused: No   Housing Stability: Low Risk  (7/22/2024)    Received from [facility]    Housing Stability Vital Sign     Unable to Pay for Housing in the Last Year: No     Number of Times Moved in the Last Year: 0     Homeless in the Last Year: No     Family Psychiatric History:     Family History   Problem Relation Age of Onset    Diabetes Mother     Hypertension Mother     Asthma Mother     Brain cancer Father     No Known Problems Sister     No Known Problems Brother     No Known Problems Brother      History Review:The following portions of the patient's history were reviewed and updated as appropriate: allergies, current medications, past family history, past medical history, past social history, past surgical history and problem list     OBJECTIVE:     Vital signs in last 24 hours:    There were no vitals filed for this visit.  Laboratory Results:   Recent Labs (last 2 months):   No visits with results within 2 Month(s) from this visit.   Latest known  visit with results is:   Admission on 09/11/2023, Discharged on 09/12/2023   Component Date Value    EXTBreath Alcohol 09/11/2023 0.00     Amph/Meth UR 09/11/2023 Negative     Barbiturate Ur 09/11/2023 Negative     Benzodiazepine Urine 09/11/2023 Positive (A)     Cocaine Urine 09/11/2023 Negative     Methadone Urine 09/11/2023 Negative     Opiate Urine 09/11/2023 Negative     PCP Ur 09/11/2023 Negative     THC Urine 09/11/2023 Negative     Oxycodone Urine 09/11/2023 Negative     SARS-CoV-2 09/11/2023 Negative     INFLUENZA A PCR 09/11/2023 Negative     INFLUENZA B PCR 09/11/2023 Negative     RSV PCR 09/11/2023 Negative     WBC 09/11/2023 7.49     RBC 09/11/2023 4.79     Hemoglobin 09/11/2023 14.2     Hematocrit 09/11/2023 42.7     MCV 09/11/2023 89     MCH 09/11/2023 29.6     MCHC 09/11/2023 33.3     RDW 09/11/2023 14.4     MPV 09/11/2023 9.4     Platelets 09/11/2023 197     nRBC 09/11/2023 0     Segmented % 09/11/2023 62     Immature Grans % 09/11/2023 1     Lymphocytes % 09/11/2023 27     Monocytes % 09/11/2023 7     Eosinophils Relative 09/11/2023 2     Basophils Relative 09/11/2023 1     Absolute Neutrophils 09/11/2023 4.64     Absolute Immature Grans 09/11/2023 0.05     Absolute Lymphocytes 09/11/2023 2.03     Absolute Monocytes 09/11/2023 0.55     Eosinophils Absolute 09/11/2023 0.17     Basophils Absolute 09/11/2023 0.05     Sodium 09/11/2023 139     Potassium 09/11/2023 3.7     Chloride 09/11/2023 105     CO2 09/11/2023 27     ANION GAP 09/11/2023 7     BUN 09/11/2023 13     Creatinine 09/11/2023 1.11     Glucose 09/11/2023 136     Calcium 09/11/2023 9.3     AST 09/11/2023 29     ALT 09/11/2023 19     Alkaline Phosphatase 09/11/2023 69     Total Protein 09/11/2023 7.1     Albumin 09/11/2023 4.2     Total Bilirubin 09/11/2023 0.56     eGFR 09/11/2023 74     TSH 3RD GENERATON 09/11/2023 2.092     Color, UA 09/11/2023 Yellow     Clarity, UA 09/11/2023 Turbid     Specific Gravity, UA 09/11/2023 1.035 (H)     pH,  UA 09/11/2023 6.5     Leukocytes, UA 09/11/2023 Trace (A)     Nitrite, UA 09/11/2023 Negative     Protein, UA 09/11/2023 30 (1+) (A)     Glucose, UA 09/11/2023 Negative     Ketones, UA 09/11/2023 Negative     Urobilinogen, UA 09/11/2023 2.0 (A)     Bilirubin, UA 09/11/2023 Negative     Occult Blood, UA 09/11/2023 Negative     RBC, UA 09/11/2023 None Seen     WBC, UA 09/11/2023 1-2     Epithelial Cells 09/11/2023 Occasional     Bacteria, UA 09/11/2023 None Seen     MUCUS THREADS 09/11/2023 Innumerable (A)     Ventricular Rate 09/11/2023 66     Atrial Rate 09/11/2023 66     TN Interval 09/11/2023 184     QRSD Interval 09/11/2023 104     QT Interval 09/11/2023 434     QTC Interval 09/11/2023 454     P Axis 09/11/2023 40     QRS [place] 09/11/2023 -52     T Wave [place] 09/11/2023 10      I have personally reviewed all pertinent laboratory/tests results.    Suicide/Homicide Risk Assessment:    Risk of Harm to Self:  Protective Factors: no current suicidal ideation, access to mental health treatment, being , compliant with medications, compliant with mental health treatment, connection to community, medical compliance, personal beliefs, resiliency, supportive family  Based on today's assessment, Yan presents the following risk of harm to self: minimal    Risk of Harm to Others:  Based on today's assessment, Yan presents the following risk of harm to others: minimal    The following interventions are recommended: referral for psychotherapy    Medications Risks/Benefits:      Risks, Benefits And Possible Side Effects Of Medications:    Discussed risks and benefits of treatment with patient including risk of suicidality, serotonin syndrome, increased QTc interval and SIADH related to treatment with antidepressants; Risk of induction of manic symptoms in certain patient populations, risk of parkinsonian symptoms, metabolic syndrome, tardive dyskinesia and neuroleptic malignant syndrome related to treatment with  antipsychotic medications, risks of misuse, abuse or dependence, sedation and respiratory depression related to treatment with benzodiazepine medications, risk of rash related to treatment with Lamictal, and risk of liver impairment related to treatment with Depakote     Controlled Medication Discussion:     Yan has been filling controlled prescriptions on time as prescribed according to [place] Prescription Drug Monitoring Program  Discussed with Yan the risks of sedation, respiratory depression, impairment of ability to drive and potential for abuse and addiction related to treatment with benzodiazepine medications. He understands risk of treatment with benzodiazepine medications, agrees to not drive if feels impaired and agrees to take medications as prescribed  Discussed with Yan Black Box warning on concurrent use of benzodiazepines and opioid medications including sedation, respiratory depression, coma and death. He understands the risk of treatment with benzodiazepines in addition to opioids and wants to continue taking those medications  Discussed with Yan increased risk of impairment related to concurrent use of benzodiazepines and hypnotic medications including excessive sedation, psychomotor impairment and respiratory depression. He understands the risk of treatment with benzodiazepines in addition to hypnotic medications and wants to continue taking those medications    Treatment Plan:    Due for update/Updated:   no  Last treatment plan done 9/6, due 3/6/25    ROCKY Orellana 02/03/25    This note was shared with patient.      Visit Time    Visit Start Time: 1  Visit Stop Time: 125  Total Visit Duration:  25 minutes

## 2025-01-31 DIAGNOSIS — R56.9 SEIZURE (HCC): ICD-10-CM

## 2025-01-31 DIAGNOSIS — I10 ESSENTIAL HYPERTENSION: ICD-10-CM

## 2025-01-31 RX ORDER — DIVALPROEX SODIUM 250 MG/1
TABLET, DELAYED RELEASE ORAL
Qty: 60 TABLET | Refills: 0 | OUTPATIENT
Start: 2025-01-31

## 2025-01-31 RX ORDER — DIVALPROEX SODIUM 500 MG/1
TABLET, DELAYED RELEASE ORAL
Qty: 60 TABLET | Refills: 0 | OUTPATIENT
Start: 2025-01-31

## 2025-01-31 NOTE — TELEPHONE ENCOUNTER
Dr. Jewell - the medication is already at the pharmacy with refills so another order is not needed. Per the med protocol, the patient is in need of labs. Do you actually need/want any of these labs right now? If not, we will not contact the patient. Please advise.     Madison Montemayor MA - is the patient aware that their medication is at the pharmacy with refills?

## 2025-01-31 NOTE — TELEPHONE ENCOUNTER
Reason for call:   [x] Refill   [] Prior Auth  [] Other:     Office:   [] PCP/Provider -   [x] Specialty/Provider - [facility]      divalproex sodium (DEPAKOTE) 250 mg DR tab   TAKE 1 TABLET BY MOUTH TWICE DAILY WITH  MG TAB FOR TOAL DOSE  MG TWICE A DAY   60     divalproex sodium (DEPAKOTE) 500 mg DR tab   TAKE 1 TABLET BY MOUTH TWICE DAILY WITH  MG TABLET FOR A TOTAL OF 750MG TWICE DAILY   60    Pharmacy: [id number]    Does the patient have enough for 3 days?   [] Yes   [x] No - Send as HP to POD

## 2025-01-31 NOTE — TELEPHONE ENCOUNTER
Ordered 10/10/24  #60, 5 refills (2 refills remain on last order) at [facility]   0 = understands/communicates without difficulty

## 2025-02-03 ENCOUNTER — TELEPHONE (OUTPATIENT)
Dept: PSYCHIATRY | Facility: CLINIC | Age: 55
End: 2025-02-03

## 2025-02-05 RX ORDER — METOPROLOL TARTRATE 100 MG/1
100 TABLET ORAL 2 TIMES DAILY
Qty: 180 TABLET | Refills: 0 | OUTPATIENT
Start: 2025-02-05

## 2025-02-05 NOTE — TELEPHONE ENCOUNTER
Patient has not been seen in our office for several years.  He needs to attend his upcoming appointment with Dr. Garza for further refills.  He can request refills from his PCP in the meantime.

## 2025-02-11 DIAGNOSIS — F32.9 REACTIVE DEPRESSION: ICD-10-CM

## 2025-02-11 RX ORDER — SERTRALINE HYDROCHLORIDE 100 MG/1
150 TABLET, FILM COATED ORAL DAILY
Qty: 90 TABLET | Refills: 1 | Status: SHIPPED | OUTPATIENT
Start: 2025-02-11

## 2025-02-12 DIAGNOSIS — R56.9 SEIZURE (HCC): ICD-10-CM

## 2025-02-13 RX ORDER — DIVALPROEX SODIUM 500 MG/1
TABLET, DELAYED RELEASE ORAL
Qty: 60 TABLET | Refills: 5 | Status: SHIPPED | OUTPATIENT
Start: 2025-02-13

## 2025-02-15 DIAGNOSIS — F29 PSYCHOSIS, UNSPECIFIED PSYCHOSIS TYPE (HCC): ICD-10-CM

## 2025-02-17 RX ORDER — ZALEPLON 5 MG/1
5 CAPSULE ORAL
Qty: 30 CAPSULE | Refills: 0 | Status: SHIPPED | OUTPATIENT
Start: 2025-02-17

## 2025-02-17 NOTE — TELEPHONE ENCOUNTER
01/17/2025 01/17/2025 Zaleplon (Capsule) 30.0 30 5 MG  [facility]  [place] 0 / 0 [place]   1 [id number] 12/16/2024 12/16/2024 diazePAM (Tablet) 30.0 30 2 MG NA [facility]  Medicare 0 / 0 [place]   1 [id number] 12/16/2024 12/16/2024 Zaleplon (Capsule) 30.0 30 5 MG  [place]  Medicare 0 / 0 [place]   1 [id number] 11/07/2024 11/07/2024 Zaleplon (Capsule) 30.0 30 5 MG  [facility]  Medicare 0 / 0 [place]   1 [id number] 10/09/2024 10/09/2024 diazePAM (Tablet) 30.0 30 2 MG  [facility]  Medicare 0 / 0 [place]   1 [id number] 10/09/2024 10/09/2024 Zaleplon (Capsule) 30.0 30 5 MG  [facility]  Medicare 0 / 0 [place]   1 [id number] 09/06/2024 09/06/2024 Zaleplon (Capsule) 30.0 30 5 MG [facility]  Medicare 0 / 0 [place]   1 [id number] 08/05/2024 08/05/2024 Zaleplon (Capsule) 30.0 30 5 MG [facility]  Medicare 0 / 0 [place]   1 [id number] 07/02/2024 07/02/2024 diazePAM (Tablet) 30.0 30 2 MG  [facility]  Medicare 0 / 0 [place]   1 [id number] 07/02/2024 07/02/2024 Zaleplon (Capsule) 30.0

## 2025-02-28 DIAGNOSIS — S06.9X9D TRAUMATIC BRAIN INJURY WITH LOSS OF CONSCIOUSNESS, SUBSEQUENT ENCOUNTER: ICD-10-CM

## 2025-03-03 RX ORDER — BENZTROPINE MESYLATE 0.5 MG/1
0.5 TABLET ORAL 2 TIMES DAILY
Qty: 60 TABLET | Refills: 0 | Status: SHIPPED | OUTPATIENT
Start: 2025-03-03

## 2025-03-04 DIAGNOSIS — R56.9 SEIZURE (HCC): ICD-10-CM

## 2025-03-04 DIAGNOSIS — F29 PSYCHOSIS, UNSPECIFIED PSYCHOSIS TYPE (HCC): ICD-10-CM

## 2025-03-04 RX ORDER — DIVALPROEX SODIUM 250 MG/1
TABLET, DELAYED RELEASE ORAL
Qty: 60 TABLET | Refills: 0 | Status: SHIPPED | OUTPATIENT
Start: 2025-03-04

## 2025-03-04 NOTE — TELEPHONE ENCOUNTER
Refill must be reviewed and completed by the office or provider. The refill is unable to be approved or denied by the medication management team.      [id number] 02/17/2025 02/17/2025 Zaleplon (Capsule) 30.0 30 5 MG NA [facility]  Medicare 0 / 0 [place]   1 [id number] 01/17/2025 01/17/2025 Zaleplon (Capsule) 30.0 30 5 MG NA [facility]  Medicare 0 / 0 [place]   1 [id number] 12/16/2024 12/16/2024 diazePAM (Tablet) 30.0 30 2 MG NA [place] [facility]  Medicare 0 / 0 [place]

## 2025-03-04 NOTE — TELEPHONE ENCOUNTER
Reason for call:   [x] Refill   [] Prior Auth  [] Other:     Office: [facility]   [] PCP/Provider -   [x] Specialty/Provider - Psychiatry/ Tone [facility]     Medication: diazepam     Dose/Frequency: 2 mg/ daily PRN     Quantity: 30 day supply     Pharmacy: [facility] in [place]     Does the patient have enough for 3 days?   [] Yes   [x] No - Send as HP to POD- patient is out completely.

## 2025-03-04 NOTE — TELEPHONE ENCOUNTER
Reason for call:   [x] Refill   [] Prior Auth  [] Other:     Office:   [] PCP/Provider -   [x] Specialty/Provider - [facility]  Authorized By: Balaji Jewell MD     Medication: divalproex sodium (DEPAKOTE) 250 mg DR tablet     Dose/Frequency: TAKE 1 TABLET BY MOUTH TWICE DAILY WITH  MG TAB FOR TOAL DOSE  MG TWICE A DAY,     Quantity: 60    Pharmacy: [id number] - [facility] [address]YA J[address]NSON  [id number]     Does the patient have enough for 3 days?   [] Yes   [x] No - Send as HP to POD-misplaced script when moving

## 2025-03-04 NOTE — TELEPHONE ENCOUNTER
Patient needs updated blood work and has previously placed orders. Please contact patient to go for labs. Courtesy refill provided. CBC,CMP,     Patient needs updated blood work. Please place orders. A courtesy refill was provided.   Valproic acid     Last ov 3/27/24-Return in about 4 months (around 7/27/2024).   Follow up scheduled 6/9/25

## 2025-03-05 RX ORDER — DIAZEPAM 2 MG/1
2 TABLET ORAL AS NEEDED
Qty: 30 TABLET | Refills: 0 | Status: SHIPPED | OUTPATIENT
Start: 2025-03-05

## 2025-03-18 DIAGNOSIS — F29 PSYCHOSIS, UNSPECIFIED PSYCHOSIS TYPE (HCC): ICD-10-CM

## 2025-03-19 RX ORDER — ZALEPLON 5 MG/1
5 CAPSULE ORAL
Qty: 30 CAPSULE | Refills: 0 | Status: SHIPPED | OUTPATIENT
Start: 2025-03-19

## 2025-03-19 NOTE — TELEPHONE ENCOUNTER
Refill must be reviewed and completed by the office or provider. The refill is unable to be approved or denied by the medication management team.      [id number] 02/17/2025 02/17/2025 Zaleplon (Capsule) 30.0 30 5 MG NA [facility]  Medicare 0 / 0 [place]   [id number] 01/17/2025 01/17/2025 Zaleplon (Capsule) 30.0 30 5 MG [facility]

## 2025-03-30 DIAGNOSIS — S06.9X9D TRAUMATIC BRAIN INJURY WITH LOSS OF CONSCIOUSNESS, SUBSEQUENT ENCOUNTER: ICD-10-CM

## 2025-03-31 DIAGNOSIS — F32.9 REACTIVE DEPRESSION: ICD-10-CM

## 2025-03-31 DIAGNOSIS — F29 PSYCHOSIS, UNSPECIFIED PSYCHOSIS TYPE (HCC): ICD-10-CM

## 2025-03-31 RX ORDER — ZIPRASIDONE HYDROCHLORIDE 80 MG/1
80 CAPSULE ORAL 2 TIMES DAILY
Qty: 60 CAPSULE | Refills: 0 | Status: SHIPPED | OUTPATIENT
Start: 2025-03-31

## 2025-03-31 RX ORDER — BENZTROPINE MESYLATE 0.5 MG/1
0.5 TABLET ORAL 2 TIMES DAILY
Qty: 60 TABLET | Refills: 0 | Status: SHIPPED | OUTPATIENT
Start: 2025-03-31

## 2025-04-10 DIAGNOSIS — F29 PSYCHOSIS, UNSPECIFIED PSYCHOSIS TYPE (HCC): ICD-10-CM

## 2025-04-10 DIAGNOSIS — G40.109 LOCALIZATION-RELATED EPILEPSY (HCC): ICD-10-CM

## 2025-04-10 RX ORDER — LAMOTRIGINE 100 MG/1
100 TABLET ORAL 2 TIMES DAILY
Qty: 180 TABLET | Refills: 0 | Status: SHIPPED | OUTPATIENT
Start: 2025-04-10

## 2025-04-17 ENCOUNTER — TELEMEDICINE (OUTPATIENT)
Dept: PSYCHIATRY | Facility: CLINIC | Age: 55
End: 2025-04-17
Payer: MEDICARE

## 2025-04-17 ENCOUNTER — TELEPHONE (OUTPATIENT)
Age: 55
End: 2025-04-17

## 2025-04-17 DIAGNOSIS — S06.9X9D TRAUMATIC BRAIN INJURY WITH LOSS OF CONSCIOUSNESS, SUBSEQUENT ENCOUNTER: ICD-10-CM

## 2025-04-17 DIAGNOSIS — Z79.899 POLYPHARMACY: ICD-10-CM

## 2025-04-17 DIAGNOSIS — F29 PSYCHOSIS, UNSPECIFIED PSYCHOSIS TYPE (HCC): Primary | ICD-10-CM

## 2025-04-17 DIAGNOSIS — F32.9 REACTIVE DEPRESSION: ICD-10-CM

## 2025-04-17 PROCEDURE — G2211 COMPLEX E/M VISIT ADD ON: HCPCS

## 2025-04-17 PROCEDURE — 99214 OFFICE O/P EST MOD 30 MIN: CPT

## 2025-04-17 RX ORDER — ZALEPLON 5 MG/1
5 CAPSULE ORAL
Qty: 30 CAPSULE | Refills: 0 | Status: SHIPPED | OUTPATIENT
Start: 2025-04-17

## 2025-04-17 RX ORDER — SERTRALINE HYDROCHLORIDE 100 MG/1
200 TABLET, FILM COATED ORAL DAILY
Qty: 60 TABLET | Refills: 1 | Status: SHIPPED | OUTPATIENT
Start: 2025-04-17

## 2025-04-17 NOTE — ASSESSMENT & PLAN NOTE
Not at goal  -Titrate Zoloft 200 mg daily for chronic depression which initiated with  started since 2019  PARQ completed including serotonin syndrome, SIADH, worsening depression, suicidality, induction of afshin, GI upset, headaches, activation, sexual side effects, sedation, potential drug interactions, and others.  Orders:  •  sertraline (ZOLOFT) 100 mg tablet; Take 2 tablets (200 mg total) by mouth daily

## 2025-04-17 NOTE — PSYCH
MEDICATION MANAGEMENT NOTE    Name: Yan Finch Jr.      : 1970      MRN: [id number]  Encounter Provider: ROCKY Orellana  Encounter Date: 2025   Encounter department: [facility]    Insurance: Payor: MEDICARE / Plan: MEDICARE A AND B / Product Type: Medicare A & B Fee for Service /      Reason for Visit: No chief complaint on file.  :  Assessment & Plan  Psychosis, unspecified psychosis type (HCC)  At baseline  -Continue Geodon to 80 mg twice daily for chronic auditory hallucinations in the context of his traumatic brain injury. Complete pending labs.    PARQ discussed with patient included sedation, dizziness,  dry mouth, constipation, hypotension, dyslipidemia, EPS, metabolic syndrome, peripheral edema, dyspepsia, joint and body pains, tachycardia, orthostatic hypotension, rash with sunlight exposure, hyperglycemia, seizures, skin rash, rare NMS, and increased risk of CVA in patient's with dementia.      -Continue Cogentin 0.5 mg twice daily prn for periodic bilateral hand shaking, reports improvement with medication, aims 0 today     -Continue p.r.n. Valium 2 milligrams p.r.n. for severe anxiety/agitation which occurs due to patient's distressing auditory hallucinations.   Using infrequently and only as needed.  Discussed with patient the risks of sedation, respiratory depression, impairment of ability to drive and potential for abuse and addiction related to treatment with benzodiazepine medications. The patient understands risk of treatment with benzodiazepine medications, agrees to not drive if feels impaired and agrees to take medications as prescribed.      -Utilizes prn Sonata 5mg for insomnia   Orders:  •  zaleplon (SONATA) 5 MG capsule; Take 1 capsule (5 mg total) by mouth daily at bedtime    Reactive depression  Not at goal  -Titrate Zoloft 200 mg daily for chronic depression which initiated with  started since 2019  PARQ completed including  serotonin syndrome, SIADH, worsening depression, suicidality, induction of afshin, GI upset, headaches, activation, sexual side effects, sedation, potential drug interactions, and others.  Orders:  •  sertraline (ZOLOFT) 100 mg tablet; Take 2 tablets (200 mg total) by mouth daily    Traumatic brain injury with loss of consciousness, subsequent encounter             Treatment Recommendations:    Educated about diagnosis and treatment modalities. Verbalizes understanding and agreement with the treatment plan.  Discussed self monitoring of symptoms, and symptom monitoring tools.  Discussed medications and if treatment adjustment was needed or desired.  Aware of 24 hour and weekend coverage for urgent situations accessed by calling [facility] main practice number  Check lipid profile and hemoglobin A1C ASAP  I am scheduling this patient out for greater than 3 months: No    Medications Risks/Benefits:      Risks, Benefits And Possible Side Effects Of Medications:    Risks, benefits, and possible side effects of medications explained to Eduard and he (or legal representative) verbalizes understanding and agreement for treatment.    Controlled Medication Discussion:     Eduard has been filling controlled prescriptions on time as prescribed according to [place] Prescription Drug Monitoring Program.  Discussed with Eduard the risks of sedation, respiratory depression, impairment of ability to drive and potential for abuse and addiction related to treatment with benzodiazepine medications. He understands risk of treatment with benzodiazepine medications, agrees to not drive if feels impaired and agrees to take medications as prescribed.  Eduard is using medication appropriately.      History of Present Illness     CC: Eduard presents today for follow up on TBI-psychosis, depression, anxiety       Eduard and wife present, both report no significant changes or updates to his mental health profile.  Continues to have  good days and bad days per patient in terms of depression caused by TBI induced hallucinations.  Continues to experience on and off auditory hallucinations telling him derogatory comments in nature, they have persisted since his TBI, lessened with antipsychotics still present.  He denies any command hallucinations, denies any visual hallucinations.  He appears at baseline with no acute changes during this encounter.  He is talkative, pleasant, constricted in mood.  Depression remains moderate to high given his current physical and mental limitations.  There is also the anniversary of his daughter passing away and reports  increasing dysphoria, anhedonia, low energy.  He requested an increase in antidepressant, will titrate Zoloft to 200 mg daily today to maximize his benefit.  Denies adverse effects from the Zoloft or antipsychotic Geodon.  Continue to educate on the need to improve his physical exercise and diet to avoid weight gain and metabolic symptoms.  Reminded him to complete his pending blood work ordered since 2024.  Wife continues to be a very strong support system and manages medications.  He brightens when talking about his grandchildren.  Denies SI and HI.       Med Compliance: yes    Since our last visit, overall symptoms have been unchanged.       HPI ROS:     Medication Side Effects: denies  Depression: 6 /10 (10 worst)  Anxiety: 6 /10 (10 worst)  Safety concerns (SI, HI, others): denies si and hi  Sleep: 6-10 hrs  Energy: remains low  Appetite: 2 meals  Weight Change: denies    Eduard denies any side effects from medications unless noted above.    Review Of Systems: A review of systems is obtained and is negative except for the pertinent positives listed in HPI/Subjective above.      Current Rating Scores:     Current PHQ-9   PHQ-2/9 Depression Screening    Little interest or pleasure in doing things: 1 - several days  Feeling down, depressed, or hopeless: 1 - several days  Trouble falling or staying  asleep, or sleeping too much: 1 - several days  Feeling tired or having little energy: 1 - several days  Poor appetite or overeatin - several days  Feeling bad about yourself - or that you are a failure or have let yourself or your family down: 3 - nearly every day  Trouble concentrating on things, such as reading the newspaper or watching television: 1 - several days  Moving or speaking so slowly that other people could have noticed. Or the opposite - being so fidgety or restless that you have been moving around a lot more than usual: 1 - several days         Areas of Improvement: reviewed in HPI/Subjective Section and reviewed in Assessment and Plan Section    Past Medical History:   Diagnosis Date   • Chemical exposure    • Diabetes mellitus (HCC)    • H/O bone graft    • Head injury     2019 fell backwards hit head on a [place]oulder w/ LOC   • Head injury     as an air [place] with the [employer] - jumped out of a plane parachute disfunction   • Hypertension    • Seizures (HCC)      Past Surgical History:   Procedure Laterality Date   • FL LUMBAR PUNCTURE DIAGNOSTIC  2019   • KNEE ARTHROSCOPY W/ MENISCECTOMY Left    • LEG SURGERY     • TONSILLECTOMY       Allergies: No Known Allergies    Current Outpatient Medications   Medication Instructions   • amLODIPine (NORVASC) 10 mg, Daily   • amoxicillin (AMOXIL) 500 mg capsule TAKE 1 CAPSULE BY MOUTH TWICE DAILY FOR 10 DAYS   • aspirin 81 mg, Daily   • atorvastatin (LIPITOR) 20 mg, Every evening   • benztropine (COGENTIN) 0.5 mg, Oral, 2 times daily   • diazepam (VALIUM) 2 mg, Oral, As needed   • divalproex sodium (DEPAKOTE) 250 mg DR tablet TAKE 1 TABLET BY MOUTH TWICE DAILY WITH  ONE  500  MG  TAB  FOR  TOAL  DOSE  OF  750  MG  TWICE  A  DAY   • divalproex sodium (DEPAKOTE) 250 mg DR tablet TAKE 1 TABLET BY MOUTH TWICE DAILY WITH  ONE  500  MG  TAB  FOR  TOAL  DOSE  OF  750  MG  TWICE  A  DAY   • divalproex sodium (DEPAKOTE) 500 mg DR tablet TAKE 1  TABLET BY MOUTH TWICE DAILY WITH  MG TABLET FOR A TOTAL OF 750MG TWICE DAILY   • divalproex sodium (DEPAKOTE) 500 mg DR tablet TAKE 1 TABLET BY MOUTH TWICE DAILY WITH  MG TABLET FOR A TOTAL OF 750MG TWICE DAILY   • divalproex sodium (DEPAKOTE) 500 mg DR tablet TAKE 1 TABLET BY MOUTH TWICE A DAY WITH ONE  MG TABLET FOR A TOTAL DOSE  MG TWICE A DAY   • ibuprofen (MOTRIN) 800 mg tablet Daily PRN   • insulin NPH-insulin regular (NOVOLIN 70/30) 10 Units, Subcutaneous, Daily before breakfast   • lamoTRIgine (LAMICTAL) 100 mg, Oral, 2 times daily   • metFORMIN (GLUCOPHAGE) 1,000 mg, 2 times daily with meals   • metoprolol tartrate (LOPRESSOR) 100 mg, Oral, Every 12 hours   • semaglutide (0.25 or 0.5 mg/dose) (OZEMPIC (0.25 OR 0.5 MG/DOSE)) 0.25 mg, Weekly   • sertraline (ZOLOFT) 200 mg, Oral, Daily   • Trulicity 0.75 MG/0.5ML SOAJ INJECT 0.75 MG SUBCUTANEOUSLY ONCE A WEEK   • Trulicity 0.75 mg, Weekly   • Ventolin  (90 Base) MCG/ACT inhaler 2 puffs, Every 6 hours PRN   • Vitamin D, Cholecalciferol, 50 MCG (2000 UT) CAPS 1 capsule, Oral, Daily   • zaleplon (SONATA) 5 mg, Oral, Daily at bedtime   • ziprasidone (GEODON) 80 mg, Oral, 2 times daily        Substance Abuse History:    Tobacco, Alcohol and Drug Use History     Tobacco Use   • Smoking status: Some Days     Types: Cigars   • Smokeless tobacco: Never   Vaping Use   • Vaping status: Never Used   Substance Use Topics   • Alcohol use: Not Currently     Alcohol/week: 3.0 standard drinks of alcohol     Types: 3 Cans of beer per week     Comment: socially   • Drug use: Never     Alcohol Use: Not At Risk (7/22/2024)    Received from [facility]    AUDIT-C    • Frequency of Alcohol Consumption: Never    • Average Number of Drinks: Patient does not drink    • Frequency of Binge Drinking: Never       Social History:    Social History     Socioeconomic History   • Marital status: Single     Spouse name: Not on file   • Number of  children: Not on file   • Years of education: Not on file   • Highest education level: Not on file   Occupational History   • Not on file   Other Topics Concern   • Not on file   Social History Narrative   • Not on file        Family Psychiatric History:     Family History   Problem Relation Age of Onset   • Diabetes Mother    • Hypertension Mother    • Asthma Mother    • Brain cancer Father    • No Known Problems Sister    • No Known Problems Brother    • No Known Problems Brother        Medical History Reviewed by provider this encounter:  Tobacco  Allergies  Meds  Problems  Med Hx  Surg Hx  Fam Hx          Objective   There were no vitals taken for this visit.     Mental Status Evaluation:    Appearance age appropriate, casually dressed, looks older than stated age   Behavior cooperative, calm   Speech normal rate, normal volume, normal pitch, spontaneous   Mood dysphoric   Affect appropriate   Thought Processes goal directed   Thought Content no overt delusions   Perceptual Disturbances: auditory hallucinations    Abnormal Thoughts  Risk Potential Suicidal ideation - None at present  Homicidal ideation - None  Potential for aggression - No   Orientation oriented to person, place, time/date, and situation   Memory long term memory mildly impaired   Consciousness alert and awake   Attention Span Concentration Span attention span and concentration appear shorter than expected for age   Intellect appears to be of average intelligence   Insight age appropriate   Judgement limited   Muscle Strength and  Gait decreased muscle strength, decreased muscle tone   Motor activity unable to assess today due to virtual visit   Language no difficulty naming common objects, no difficulty repeating a phrase, no difficulty writing a sentence   Fund of Knowledge adequate knowledge of current events  adequate fund of knowledge regarding past history  adequate fund of knowledge regarding vocabulary    Pain mild   Pain Scale 2        Laboratory Results: I have personally reviewed all pertinent laboratory/tests results    Recent Labs (last 2 months):   No visits with results within 2 Month(s) from this visit.   Latest known visit with results is:   Admission on 09/11/2023, Discharged on 09/12/2023   Component Date Value   • EXTBreath Alcohol 09/11/2023 0.00    • Amph/Meth UR 09/11/2023 Negative    • Barbiturate Ur 09/11/2023 Negative    • Benzodiazepine Urine 09/11/2023 Positive (A)    • Cocaine Urine 09/11/2023 Negative    • Methadone Urine 09/11/2023 Negative    • Opiate Urine 09/11/2023 Negative    • PCP Ur 09/11/2023 Negative    • THC Urine 09/11/2023 Negative    • Oxycodone Urine 09/11/2023 Negative    • SARS-CoV-2 09/11/2023 Negative    • INFLUENZA A PCR 09/11/2023 Negative    • INFLUENZA B PCR 09/11/2023 Negative    • RSV PCR 09/11/2023 Negative    • WBC 09/11/2023 7.49    • RBC 09/11/2023 4.79    • Hemoglobin 09/11/2023 14.2    • Hematocrit 09/11/2023 42.7    • MCV 09/11/2023 89    • MCH 09/11/2023 29.6    • MCHC 09/11/2023 33.3    • RDW 09/11/2023 14.4    • MPV 09/11/2023 9.4    • Platelets 09/11/2023 197    • nRBC 09/11/2023 0    • Segmented % 09/11/2023 62    • Immature Grans % 09/11/2023 1    • Lymphocytes % 09/11/2023 27    • Monocytes % 09/11/2023 7    • Eosinophils Relative 09/11/2023 2    • Basophils Relative 09/11/2023 1    • Absolute Neutrophils 09/11/2023 4.64    • Absolute Immature Grans 09/11/2023 0.05    • Absolute Lymphocytes 09/11/2023 2.03    • Absolute Monocytes 09/11/2023 0.55    • Eosinophils Absolute 09/11/2023 0.17    • Basophils Absolute 09/11/2023 0.05    • Sodium 09/11/2023 139    • Potassium 09/11/2023 3.7    • Chloride 09/11/2023 105    • CO2 09/11/2023 27    • ANION GAP 09/11/2023 7    • BUN 09/11/2023 13    • Creatinine 09/11/2023 1.11    • Glucose 09/11/2023 136    • Calcium 09/11/2023 9.3    • AST 09/11/2023 29    • ALT 09/11/2023 19    • Alkaline Phosphatase 09/11/2023 69    • Total Protein 09/11/2023  7.1    • Albumin 09/11/2023 4.2    • Total Bilirubin 09/11/2023 0.56    • eGFR 09/11/2023 74    • TSH 3RD GENERATON 09/11/2023 2.092    • Color, UA 09/11/2023 Yellow    • Clarity, UA 09/11/2023 Turbid    • Specific Gravity, UA 09/11/2023 1.035 (H)    • pH, UA 09/11/2023 6.5    • Leukocytes, UA 09/11/2023 Trace (A)    • Nitrite, UA 09/11/2023 Negative    • Protein, UA 09/11/2023 30 (1+) (A)    • Glucose, UA 09/11/2023 Negative    • Ketones, UA 09/11/2023 Negative    • Urobilinogen, UA 09/11/2023 2.0 (A)    • Bilirubin, UA 09/11/2023 Negative    • Occult Blood, UA 09/11/2023 Negative    • RBC, UA 09/11/2023 None Seen    • WBC, UA 09/11/2023 1-2    • Epithelial Cells 09/11/2023 Occasional    • Bacteria, UA 09/11/2023 None Seen    • MUCUS THREADS 09/11/2023 Innumerable (A)    • Ventricular Rate 09/11/2023 66    • Atrial Rate 09/11/2023 66    • OH Interval 09/11/2023 184    • QRSD Interval 09/11/2023 104    • QT Interval 09/11/2023 434    • QTC Interval 09/11/2023 454    • P Axis 09/11/2023 40    • QRS Axis 09/11/2023 -52    • T Wave Axis 09/11/2023 10      Depakote:   Lab Results   Component Value Date    VALPROICTOT 89 05/30/2023       Suicide/Homicide Risk Assessment:    Risk of Harm to Self:  Protective Factors: no current suicidal ideation, access to mental health treatment, being a parent, being , compliant with medications, compliant with mental health treatment, future oriented, medical compliance, no substance use problems, strong relationships  Based on today's assessment, Eduard presents the following risk of harm to self: minimal    Risk of Harm to Others:  Based on today's assessment, Eduard presents the following risk of harm to others: minimal    The following interventions are recommended: Continue medication management. Continue psychotherapy. No other intervention changes indicated at this time.    Psychotherapy Provided:     Individual psychotherapy provided: Yes    Medications, treatment progress and  treatment plan reviewed with Eduard.  Medication changes discussed with Eduard.  Medication education provided to Eduard.    Treatment Plan:    Completed and signed during the session: Yes - with Eduard.    Goals: Progress towards Treatment Plan goals - Yes, progressing, as evidenced by subjective findings in HPI/Subjective Section and in Assessment and Plan Section    Depression Follow-up Plan Completed: No    Note Share:    This note was shared with patient.    Administrative Statements   Administrative Statements   Encounter provider ROCKY Orellana    The Patient is located at Home and in the following state in which I hold an active license [place].    The patient was identified by name and date of birth. Yan Finch Jr. was informed that this is a telemedicine visit and that the visit is being conducted through the Epic Embedded platform. He agrees to proceed..  My office door was closed. No one else was in the room.  He acknowledged consent and understanding of privacy and security of the video platform. The patient has agreed to participate and understands they can discontinue the visit at any time.    I have spent a total time of 25 minutes in caring for this patient on the day of the visit/encounter including Risks and benefits of tx options, not including the time spent for establishing the audio/video connection.    Visit Time  Visit Start Time: 1255  Visit Stop Time: 120  Total Visit Duration:  25 minutes    ROCKY Orellana 04/18/25

## 2025-04-17 NOTE — ASSESSMENT & PLAN NOTE
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 Complete pending labs.    PARQ discussed with patient included sedation, dizziness,  dry mouth, constipation, hypotension, dyslipidemia, EPS, metabolic syndrome, peripheral edema, dyspepsia, joint and body pains, tachycardia, orthostatic hypotension, rash with sunlight exposure, hyperglycemia, seizures, skin rash, rare NMS, and increased risk of CVA in patient's with dementia.      -Continue Cogentin 0.5 mg twice daily prn for periodic bilateral hand shaking, reports improvement with medication, aims 0 today     -Continue p.r.n. Valium 2 milligrams p.r.n. for severe anxiety/agitation which occurs due to patient's distressing auditory hallucinations.   Using infrequently and only as needed.  Discussed with patient the risks of sedation, respiratory depression, impairment of ability to drive and potential for abuse and addiction related to treatment with benzodiazepine medications. The patient understands risk of treatment with benzodiazepine medications, agrees to not drive if feels impaired and agrees to take medications as prescribed.      -Utilizes prn Sonata 5mg for insomnia   Orders:  •  zaleplon (SONATA) 5 MG capsule; Take 1 capsule (5 mg total) by mouth daily at bedtime

## 2025-04-22 ENCOUNTER — TELEPHONE (OUTPATIENT)
Dept: PSYCHIATRY | Facility: CLINIC | Age: 55
End: 2025-04-22

## 2025-04-25 DIAGNOSIS — F29 PSYCHOSIS, UNSPECIFIED PSYCHOSIS TYPE (HCC): ICD-10-CM

## 2025-04-25 DIAGNOSIS — F32.9 REACTIVE DEPRESSION: ICD-10-CM

## 2025-04-25 RX ORDER — ZIPRASIDONE HYDROCHLORIDE 80 MG/1
80 CAPSULE ORAL 2 TIMES DAILY
Qty: 60 CAPSULE | Refills: 0 | Status: SHIPPED | OUTPATIENT
Start: 2025-04-25

## 2025-05-15 DIAGNOSIS — S06.9X9D TRAUMATIC BRAIN INJURY WITH LOSS OF CONSCIOUSNESS, SUBSEQUENT ENCOUNTER: ICD-10-CM

## 2025-05-15 RX ORDER — BENZTROPINE MESYLATE 0.5 MG/1
0.5 TABLET ORAL 2 TIMES DAILY
Qty: 60 TABLET | Refills: 0 | Status: SHIPPED | OUTPATIENT
Start: 2025-05-15

## 2025-05-20 DIAGNOSIS — F29 PSYCHOSIS, UNSPECIFIED PSYCHOSIS TYPE (HCC): ICD-10-CM

## 2025-05-20 RX ORDER — ZALEPLON 5 MG/1
5 CAPSULE ORAL
Qty: 30 CAPSULE | Refills: 0 | Status: SHIPPED | OUTPATIENT
Start: 2025-05-20

## 2025-05-20 NOTE — TELEPHONE ENCOUNTER
04/21/2025 04/18/2025 04/17/2025 Zaleplon (Capsule) 30.0 30 5 MG NA [facility]  Medicare 0 / 0 [place]   2 [id number] 03/20/2025 03/19/2025 03/19/2025 Zaleplon (Capsule) 30.0 30 5 MG NA [facility]  [place]ic[place] 0 / 0 [place]   2 [id number] 03/06/2025 03/05/2025 03/05/2025 diazePAM (Tablet) 30.0 30 2 MG NA [facility]  Medicare 0 / 0 [place]   2 [id number] 02/18/2025 02/17/2025 02/17/2025 Zaleplon (Capsule) 30.0 30 5 MG NA [facility]  Medicare 0 / 0 [place]   2 [id number] 01/19/2025 01/17/2025 01/17/2025 Zaleplon (Capsule) 30.0 30 5 MG NA [facility]  Medicare 0 / 0 [place]   2 [id number] 12/17/2024 12/16/2024 12/16/2024 diazePAM (Tablet) 30.0 30 2 MG NA [facility]UG[facility] CASTRO [facility]  Medicare 0 / 0 [place]   2 [id number] 12/17/2024 12/16/2024 12/16/2024 Zaleplon (Capsule) 30.0 30

## 2025-05-23 DIAGNOSIS — F29 PSYCHOSIS, UNSPECIFIED PSYCHOSIS TYPE (HCC): ICD-10-CM

## 2025-05-23 DIAGNOSIS — F32.9 REACTIVE DEPRESSION: ICD-10-CM

## 2025-05-23 RX ORDER — ZIPRASIDONE HYDROCHLORIDE 80 MG/1
80 CAPSULE ORAL 2 TIMES DAILY
Qty: 60 CAPSULE | Refills: 0 | Status: SHIPPED | OUTPATIENT
Start: 2025-05-23

## 2025-05-23 NOTE — TELEPHONE ENCOUNTER
Refill must be reviewed and completed by the office or provider. The refill is unable to be approved or denied by the medication management team.  Medication cannot be delegated.

## 2025-06-09 ENCOUNTER — TELEMEDICINE (OUTPATIENT)
Dept: NEUROLOGY | Facility: CLINIC | Age: 55
End: 2025-06-09
Payer: MEDICARE

## 2025-06-09 DIAGNOSIS — F29 PSYCHOSIS, UNSPECIFIED PSYCHOSIS TYPE (HCC): ICD-10-CM

## 2025-06-09 DIAGNOSIS — R56.9 SEIZURE (HCC): ICD-10-CM

## 2025-06-09 DIAGNOSIS — Z79.899 ENCOUNTER FOR LONG-TERM (CURRENT) USE OF HIGH-RISK MEDICATION: ICD-10-CM

## 2025-06-09 DIAGNOSIS — R51.9 HEADACHE: ICD-10-CM

## 2025-06-09 DIAGNOSIS — G40.109 LOCALIZATION-RELATED EPILEPSY (HCC): Primary | ICD-10-CM

## 2025-06-09 PROCEDURE — 99214 OFFICE O/P EST MOD 30 MIN: CPT | Performed by: PSYCHIATRY & NEUROLOGY

## 2025-06-09 PROCEDURE — G2211 COMPLEX E/M VISIT ADD ON: HCPCS | Performed by: PSYCHIATRY & NEUROLOGY

## 2025-06-09 RX ORDER — PROPRANOLOL HYDROCHLORIDE 60 MG/1
60 CAPSULE, EXTENDED RELEASE ORAL DAILY
Qty: 30 CAPSULE | Refills: 11 | Status: SHIPPED | OUTPATIENT
Start: 2025-06-09

## 2025-06-09 RX ORDER — DIVALPROEX SODIUM 500 MG/1
TABLET, DELAYED RELEASE ORAL
Qty: 180 TABLET | Refills: 3 | Status: SHIPPED | OUTPATIENT
Start: 2025-06-09

## 2025-06-09 RX ORDER — DIVALPROEX SODIUM 250 MG/1
TABLET, DELAYED RELEASE ORAL
Qty: 180 TABLET | Refills: 3 | Status: SHIPPED | OUTPATIENT
Start: 2025-06-09

## 2025-06-09 RX ORDER — LAMOTRIGINE 100 MG/1
100 TABLET ORAL 2 TIMES DAILY
Qty: 180 TABLET | Refills: 3 | Status: SHIPPED | OUTPATIENT
Start: 2025-06-09

## 2025-06-09 NOTE — PROGRESS NOTES
Name: Yan Finch Jr.      : 1970      MRN: [id number]  Encounter Provider: Balaji Jewell MD  Encounter Date: 2025   Encounter department: NEUROLOGY [facility]  :  Assessment & Plan  Localization-related epilepsy (HCC)  He has not had any additional seizures since his last appointment.  He is having difficulty with ongoing tremor, which may be partially due to his divalproex and lamotrigine.  However this does have more features of an action induced tremor.  We discussed potential treatment options including reducing his divalproex or lamotrigine, but given his ongoing hallucinations and prior seizures, they were reluctant to do so.  For now we will start with trying a low-dose of propranolol to try to improve his tremor and his headaches, as noted below.    --He will continue divalproex and lamotrigine unchanged.    --He is due to get blood work checked prior to his next appointment.  I will check a CBC, CMP, medication levels and vitamin D, as below  Orders:    lamoTRIgine (LaMICtal) 100 mg tablet; Take 1 tablet (100 mg total) by mouth 2 (two) times a day    CBC; Future    Comprehensive metabolic panel; Future    Valproic acid level, total; Future    Vitamin D 25 hydroxy; Future    Lamotrigine level; Future    Psychosis, unspecified psychosis type (HCC)  He continues to have difficulty with ongoing hallucinations.  These are unlikely to be due to seizures, and will be very important for him to continue to follow-up with his psychiatrist going forward.  He will continue divalproex and lamotrigine as these are likely helpful for his mood, as above  Orders:    lamoTRIgine (LaMICtal) 100 mg tablet; Take 1 tablet (100 mg total) by mouth 2 (two) times a day    Encounter for long-term (current) use of high-risk medication  Divalproex can be associated with low vitamin D and low bone density.  Will be important for him to get his vitamin D rechecked with his next set of blood work  Orders:     Vitamin D 25 hydroxy; Future    Headache  As noted above, he has been having difficulty with tremor and worsened headaches.  These are quite bothersome for him and he would likely benefit from medication treatment.  To try to improve both his tremor and headaches, I will have him start taking propranolol 60 mg daily.  We discussed the risks and rationale of this medication including potential side effects such as dizziness and lightheadedness.  He will give our office a call if he would have any difficulty with new side effects  Orders:    propranolol (INDERAL LA) 60 mg 24 hr capsule; Take 1 capsule (60 mg total) by mouth daily        He will Return in about 3 months (around 9/9/2025).      History of Present Illness   HPI  Yan Finch Jr. is a 55 y.o. male with prior traumatic subarachnoid hemorrhage, localization-related epilepsy, and ongoing auditory hallucinations, who is returning for follow-up of his seizures.     Current seizure medications:  1. Divalproex  mg twice a day  2. Lamotrigine 100 mg twice a day   Other medications as per Epic.    Over the course since his last appointment, he continues to hear a lot of auditory hallucinations. There are two voices that he hears that continue to tell him to do things, but he has been better able to distinguish them from reality. He continues to follow with psychiatry.     He denies any seizures that they are aware of. His wife has not noted any seizures either.     He has always had some tremor, but this has gotten worse over time. He has trouble shaking when he eats, he has trouble writing, he has trouble with shaking when moving small objects. He also has some shaking when just sitting at rest.     If anything, he is able to stand a little better, but doesn't do much walking. If anything, he has been getting around better. His wife feels he walks with one shoulder up and one down. His family notes that he may shuffle his steps, but this seems to have been  "getting a little better now that he is getting more exercise and that he is more active. He is less prone to falls than he was in the past.     He will stutter or have difficulty finding the right words.     He has been getting a lot of headaches, about every other day. These are bilateral temporal pain. Positive photophobia. Denies nausea. These last hours at a time. He will take Motrin 800 mg, which helps \"sometimes\".     Prior Seizure Medications: Levetiracetam (worsened mood and hallucinations)    His history was also obtained from his wife, who was present at today's visit with the patient via video.     Review of Systems  I personally reviewed the review of systems that was entered by the medical assistant in a separate note       Objective   There were no vitals taken for this visit.     Physical Exam    Voice recognition software was used in the generation of this note. There may be unintentional errors including grammatical errors, spelling errors, or pronoun errors.  Administrative Statements   Encounter provider Balaji Jewell MD    The Patient is located at Home and in the following state in which I hold an active license [place].    The patient was identified by name and date of birth. Yan Finch JrNino was informed that this is a telemedicine visit and that the visit is being conducted through the Epic Embedded platform. He agrees to proceed..  My office door was closed. No one else was in the room.  He acknowledged consent and understanding of privacy and security of the video platform. The patient has agreed to participate and understands they can discontinue the visit at any time.    I have spent a total time of 20 minutes in caring for this patient on the day of the visit/encounter including Instructions for management, Impressions, and Obtaining or reviewing history  , not including the time spent for establishing the audio/video connection.    :    "

## 2025-06-09 NOTE — ASSESSMENT & PLAN NOTE
He continues to have difficulty with ongoing hallucinations.  These are unlikely to be due to seizures, and will be very important for him to continue to follow-up with his psychiatrist going forward.  He will continue divalproex and lamotrigine as these are likely helpful for his mood, as above  Orders:    lamoTRIgine (LaMICtal) 100 mg tablet; Take 1 tablet (100 mg total) by mouth 2 (two) times a day

## 2025-06-09 NOTE — PATIENT INSTRUCTIONS
-- continue to take the Divalproex and Lamotrigine  unchanged.     -- Please start taking propranolol ER 60 mg daily.    -- call our office if any problems arise, including if you would have dizziness with the new medication.

## 2025-06-09 NOTE — PROGRESS NOTES
Review of Systems   Constitutional:  Negative for appetite change, fatigue and fever.   HENT: Negative.  Negative for hearing loss, tinnitus, trouble swallowing and voice change.    Eyes: Negative.  Negative for photophobia, pain and visual disturbance.   Respiratory: Negative.  Negative for shortness of breath.    Cardiovascular: Negative.  Negative for palpitations.   Gastrointestinal: Negative.  Negative for nausea and vomiting.   Endocrine: Negative.  Negative for cold intolerance.   Genitourinary: Negative.  Negative for dysuria, frequency and urgency.   Musculoskeletal:  Negative for back pain, gait problem, myalgias, neck pain and neck stiffness.   Skin: Negative.  Negative for rash.   Allergic/Immunologic: Negative.    Neurological:  Positive for tremors (B/L hands- affecting his ADL's) and speech difficulty (stuttering has increase). Negative for dizziness, seizures, syncope, facial asymmetry, weakness, light-headedness, numbness and headaches.   Hematological: Negative.  Does not bruise/bleed easily.   Psychiatric/Behavioral: Negative.  Negative for confusion, hallucinations and sleep disturbance.

## 2025-06-09 NOTE — ASSESSMENT & PLAN NOTE
Divalproex can be associated with low vitamin D and low bone density.  Will be important for him to get his vitamin D rechecked with his next set of blood work  Orders:    Vitamin D 25 hydroxy; Future

## 2025-06-09 NOTE — ASSESSMENT & PLAN NOTE
He has not had any additional seizures since his last appointment.  He is having difficulty with ongoing tremor, which may be partially due to his divalproex and lamotrigine.  However this does have more features of an action induced tremor.  We discussed potential treatment options including reducing his divalproex or lamotrigine, but given his ongoing hallucinations and prior seizures, they were reluctant to do so.  For now we will start with trying a low-dose of propranolol to try to improve his tremor and his headaches, as noted below.    --He will continue divalproex and lamotrigine unchanged.    --He is due to get blood work checked prior to his next appointment.  I will check a CBC, CMP, medication levels and vitamin D, as below  Orders:    lamoTRIgine (LaMICtal) 100 mg tablet; Take 1 tablet (100 mg total) by mouth 2 (two) times a day    CBC; Future    Comprehensive metabolic panel; Future    Valproic acid level, total; Future    Vitamin D 25 hydroxy; Future    Lamotrigine level; Future

## 2025-06-16 DIAGNOSIS — S06.9X9D TRAUMATIC BRAIN INJURY WITH LOSS OF CONSCIOUSNESS, SUBSEQUENT ENCOUNTER: ICD-10-CM

## 2025-06-16 RX ORDER — BENZTROPINE MESYLATE 0.5 MG/1
0.5 TABLET ORAL 2 TIMES DAILY
Qty: 60 TABLET | Refills: 0 | Status: SHIPPED | OUTPATIENT
Start: 2025-06-16

## 2025-06-19 DIAGNOSIS — F29 PSYCHOSIS, UNSPECIFIED PSYCHOSIS TYPE (HCC): ICD-10-CM

## 2025-06-20 NOTE — TELEPHONE ENCOUNTER
Refill must be reviewed and completed by the office or provider. The refill is unable to be approved or denied by the medication management team.      05/22/2025 05/20/2025 05/20/2025 Zaleplon (Capsule) 30.0 30 5 MG NA [facility]  Medicare 0 / 0 [place]   1 [id number] 04/21/2025 04/18/2025 04/17/2025 Zaleplon (Capsule) 30.0 30 5 MG NA [facility]  Medicare 0 / 0 [place]   2 [id number] 03/20/2025 03/19/2025 03/19/2025 Zaleplon (Capsule) 30.0 30 5 MG NA [facility]  Medicare 0 / 0 [place]

## 2025-06-23 DIAGNOSIS — F29 PSYCHOSIS, UNSPECIFIED PSYCHOSIS TYPE (HCC): ICD-10-CM

## 2025-06-23 RX ORDER — ZALEPLON 5 MG/1
5 CAPSULE ORAL
Qty: 30 CAPSULE | Refills: 0 | Status: SHIPPED | OUTPATIENT
Start: 2025-06-23

## 2025-06-23 NOTE — TELEPHONE ENCOUNTER
Reason for call:   [x] Refill   [] Prior Auth  [] Other:     Office:   [] PCP/Provider -   [x] Specialty/Provider - Psychiatry    Medication: diazepam (VALIUM) 2 mg tablet Take 1 tablet (2 mg total) by mouth as needed for anxiety,     Pharmacy: [id number] - [facility] [place] - [id number]      Local Pharmacy   Does the patient have enough for 3 days?   [x] Yes   [] No - Send as HP to POD    Mail Away Pharmacy   Does the patient have enough for 10 days?   [] Yes   [] No - Send as HP to POD

## 2025-06-24 RX ORDER — DIAZEPAM 2 MG/1
2 TABLET ORAL AS NEEDED
Qty: 30 TABLET | Refills: 0 | Status: SHIPPED | OUTPATIENT
Start: 2025-06-24

## 2025-06-24 NOTE — TELEPHONE ENCOUNTER
06/23/2025 06/23/2025 06/23/2025 Zaleplon (Capsule) 30.0 30 5 [facility]  [facility] 0 / 0 [place]      1 [id number] 05/22/2025 05/20/2025 05/20/2025 Zaleplon (Capsule) 30.0 30 5 MG [facility]  [place]ic[place] 0 / 0 [place]    1 [id number] 04/21/2025 04/18/2025 04/17/2025 Zaleplon (Capsule) 30.0 30 5 MG [facility]  [place]ic[place] 0 / 0 [place]    2 [id number] 03/20/2025 03/19/2025 03/19/2025 Zaleplon (Capsule) 30.0 30 5 MG NA [facility]  [place]ic[place] 0 / 0 [place]    2 [id number] 03/06/2025 03/05/2025 03/05/2025 diazePAM (Tablet) 30.0 30 2 MG NA [facility]  [place]ic[place] 0 / 0 [place]    2 [id number] 02/18/2025 02/17/2025 02/17/2025 Zaleplon (Capsule) 30.0 30 5 MG NA [facility]  Medic[place] 0 / 0 [place]    2 [id number] 01/19/2025 01/17/2025 01/17/2025 Zaleplon (Capsule) 30.0 30 5 MG NA [facility]  Medic[place] 0 / 0 [place]    2 [id number] 12/17/2024 12/16/2024 12/16/2024 diazePAM (Tablet) 30.0 30 2 MG NA AUGUSTO CASTRO [facility]

## 2025-06-25 DIAGNOSIS — F32.9 REACTIVE DEPRESSION: ICD-10-CM

## 2025-06-25 DIAGNOSIS — F29 PSYCHOSIS, UNSPECIFIED PSYCHOSIS TYPE (HCC): ICD-10-CM

## 2025-06-26 RX ORDER — ZIPRASIDONE HYDROCHLORIDE 80 MG/1
80 CAPSULE ORAL 2 TIMES DAILY
Qty: 60 CAPSULE | Refills: 0 | Status: SHIPPED | OUTPATIENT
Start: 2025-06-26

## 2025-07-18 DIAGNOSIS — F29 PSYCHOSIS, UNSPECIFIED PSYCHOSIS TYPE (HCC): ICD-10-CM

## 2025-07-22 RX ORDER — ZALEPLON 5 MG/1
5 CAPSULE ORAL
Qty: 30 CAPSULE | Refills: 0 | Status: SHIPPED | OUTPATIENT
Start: 2025-07-22

## 2025-07-26 DIAGNOSIS — F29 PSYCHOSIS, UNSPECIFIED PSYCHOSIS TYPE (HCC): ICD-10-CM

## 2025-07-26 DIAGNOSIS — F32.9 REACTIVE DEPRESSION: ICD-10-CM

## 2025-07-28 DIAGNOSIS — S06.9X9D TRAUMATIC BRAIN INJURY WITH LOSS OF CONSCIOUSNESS, SUBSEQUENT ENCOUNTER: ICD-10-CM

## 2025-07-28 RX ORDER — ZIPRASIDONE HYDROCHLORIDE 80 MG/1
80 CAPSULE ORAL 2 TIMES DAILY
Qty: 14 CAPSULE | Refills: 0 | Status: SHIPPED | OUTPATIENT
Start: 2025-07-28 | End: 2025-08-08

## 2025-07-30 RX ORDER — BENZTROPINE MESYLATE 0.5 MG/1
0.5 TABLET ORAL 2 TIMES DAILY
Qty: 60 TABLET | Refills: 0 | Status: SHIPPED | OUTPATIENT
Start: 2025-07-30

## 2025-08-08 DIAGNOSIS — F29 PSYCHOSIS, UNSPECIFIED PSYCHOSIS TYPE (HCC): ICD-10-CM

## 2025-08-08 DIAGNOSIS — F32.9 REACTIVE DEPRESSION: ICD-10-CM

## 2025-08-08 RX ORDER — ZIPRASIDONE HYDROCHLORIDE 80 MG/1
80 CAPSULE ORAL 2 TIMES DAILY
Qty: 60 CAPSULE | Refills: 0 | Status: SHIPPED | OUTPATIENT
Start: 2025-08-08

## 2025-08-08 RX ORDER — SERTRALINE HYDROCHLORIDE 100 MG/1
200 TABLET, FILM COATED ORAL DAILY
Qty: 60 TABLET | Refills: 1 | Status: SHIPPED | OUTPATIENT
Start: 2025-08-08

## 2025-08-21 DIAGNOSIS — F29 PSYCHOSIS, UNSPECIFIED PSYCHOSIS TYPE (HCC): ICD-10-CM

## 2025-08-23 RX ORDER — ZALEPLON 5 MG/1
5 CAPSULE ORAL
Qty: 30 CAPSULE | Refills: 0 | Status: SHIPPED | OUTPATIENT
Start: 2025-08-23